# Patient Record
Sex: MALE | Race: WHITE | Employment: UNEMPLOYED | URBAN - METROPOLITAN AREA
[De-identification: names, ages, dates, MRNs, and addresses within clinical notes are randomized per-mention and may not be internally consistent; named-entity substitution may affect disease eponyms.]

---

## 2021-09-09 ENCOUNTER — HOSPITAL ENCOUNTER (INPATIENT)
Age: 56
LOS: 25 days | Discharge: NURSING, REHAB OR LONG-TERM CARE | DRG: 291 | End: 2021-10-04
Attending: EMERGENCY MEDICINE | Admitting: HOSPITALIST
Payer: MEDICARE

## 2021-09-09 ENCOUNTER — APPOINTMENT (OUTPATIENT)
Dept: GENERAL RADIOLOGY | Age: 56
DRG: 291 | End: 2021-09-09
Attending: EMERGENCY MEDICINE
Payer: MEDICARE

## 2021-09-09 DIAGNOSIS — T38.0X5A STEROID-INDUCED HYPERGLYCEMIA: ICD-10-CM

## 2021-09-09 DIAGNOSIS — J96.22 ACUTE ON CHRONIC RESPIRATORY FAILURE WITH HYPERCAPNIA (HCC): Primary | ICD-10-CM

## 2021-09-09 DIAGNOSIS — L03.114 CELLULITIS OF LEFT UPPER EXTREMITY: ICD-10-CM

## 2021-09-09 DIAGNOSIS — R73.9 STEROID-INDUCED HYPERGLYCEMIA: ICD-10-CM

## 2021-09-09 DIAGNOSIS — J44.1 ACUTE EXACERBATION OF CHRONIC OBSTRUCTIVE PULMONARY DISEASE (COPD) (HCC): ICD-10-CM

## 2021-09-09 PROBLEM — J96.20 ACUTE ON CHRONIC RESPIRATORY FAILURE (HCC): Status: ACTIVE | Noted: 2021-09-09

## 2021-09-09 LAB
ALBUMIN SERPL-MCNC: 2.6 G/DL (ref 3.5–5)
ALBUMIN/GLOB SERPL: 0.5 {RATIO} (ref 1.1–2.2)
ALP SERPL-CCNC: 106 U/L (ref 45–117)
ALT SERPL-CCNC: 12 U/L (ref 12–78)
ANION GAP SERPL CALC-SCNC: 2 MMOL/L (ref 5–15)
ARTERIAL PATENCY WRIST A: POSITIVE
ARTERIAL PATENCY WRIST A: POSITIVE
AST SERPL-CCNC: 9 U/L (ref 15–37)
ATRIAL RATE: 74 BPM
BASE EXCESS BLD CALC-SCNC: 7 MMOL/L
BASE EXCESS BLD CALC-SCNC: 7.5 MMOL/L
BASOPHILS # BLD: 0 K/UL (ref 0–0.1)
BASOPHILS NFR BLD: 0 % (ref 0–1)
BDY SITE: ABNORMAL
BDY SITE: ABNORMAL
BILIRUB SERPL-MCNC: 0.5 MG/DL (ref 0.2–1)
BNP SERPL-MCNC: 625 PG/ML
BUN SERPL-MCNC: 27 MG/DL (ref 6–20)
BUN/CREAT SERPL: 24 (ref 12–20)
CA-I BLD-SCNC: 1.22 MMOL/L (ref 1.12–1.32)
CALCIUM SERPL-MCNC: 8.7 MG/DL (ref 8.5–10.1)
CALCULATED P AXIS, ECG09: 43 DEGREES
CALCULATED R AXIS, ECG10: -136 DEGREES
CALCULATED T AXIS, ECG11: 62 DEGREES
CHLORIDE SERPL-SCNC: 102 MMOL/L (ref 97–108)
CO2 SERPL-SCNC: 33 MMOL/L (ref 21–32)
COMMENT, HOLDF: NORMAL
COMMENT, HOLDF: NORMAL
COVID-19 RAPID TEST, COVR: NOT DETECTED
CREAT SERPL-MCNC: 1.14 MG/DL (ref 0.7–1.3)
DIAGNOSIS, 93000: NORMAL
DIFFERENTIAL METHOD BLD: ABNORMAL
EOSINOPHIL # BLD: 0.2 K/UL (ref 0–0.4)
EOSINOPHIL NFR BLD: 2 % (ref 0–7)
ERYTHROCYTE [DISTWIDTH] IN BLOOD BY AUTOMATED COUNT: 16.8 % (ref 11.5–14.5)
GAS FLOW.O2 O2 DELIVERY SYS: ABNORMAL L/MIN
GAS FLOW.O2 O2 DELIVERY SYS: ABNORMAL L/MIN
GLOBULIN SER CALC-MCNC: 5.1 G/DL (ref 2–4)
GLUCOSE BLD STRIP.AUTO-MCNC: 138 MG/DL (ref 65–117)
GLUCOSE BLD STRIP.AUTO-MCNC: 143 MG/DL (ref 65–117)
GLUCOSE SERPL-MCNC: 120 MG/DL (ref 65–100)
HCO3 BLD-SCNC: 39.1 MMOL/L (ref 22–26)
HCO3 BLD-SCNC: 40 MMOL/L (ref 22–26)
HCT VFR BLD AUTO: 44.9 % (ref 36.6–50.3)
HGB BLD-MCNC: 13 G/DL (ref 12.1–17)
IMM GRANULOCYTES # BLD AUTO: 0.1 K/UL (ref 0–0.04)
IMM GRANULOCYTES NFR BLD AUTO: 1 % (ref 0–0.5)
LYMPHOCYTES # BLD: 1.1 K/UL (ref 0.8–3.5)
LYMPHOCYTES NFR BLD: 7 % (ref 12–49)
MCH RBC QN AUTO: 24.1 PG (ref 26–34)
MCHC RBC AUTO-ENTMCNC: 29 G/DL (ref 30–36.5)
MCV RBC AUTO: 83.3 FL (ref 80–99)
MONOCYTES # BLD: 1.2 K/UL (ref 0–1)
MONOCYTES NFR BLD: 8 % (ref 5–13)
NEUTS SEG # BLD: 12.4 K/UL (ref 1.8–8)
NEUTS SEG NFR BLD: 82 % (ref 32–75)
NRBC # BLD: 0 K/UL (ref 0–0.01)
NRBC BLD-RTO: 0 PER 100 WBC
O2/TOTAL GAS SETTING VFR VENT: 4 %
O2/TOTAL GAS SETTING VFR VENT: 50 %
P-R INTERVAL, ECG05: 212 MS
PCO2 BLD: 91.9 MMHG (ref 35–45)
PCO2 BLD: 95.1 MMHG (ref 35–45)
PEEP RESPIRATORY: 8 CMH2O
PH BLD: 7.23 [PH] (ref 7.35–7.45)
PH BLD: 7.24 [PH] (ref 7.35–7.45)
PIP ISTAT,IPIP: 18
PLATELET # BLD AUTO: 210 K/UL (ref 150–400)
PMV BLD AUTO: 10.5 FL (ref 8.9–12.9)
PO2 BLD: 64 MMHG (ref 80–100)
PO2 BLD: 64 MMHG (ref 80–100)
POTASSIUM SERPL-SCNC: 3.7 MMOL/L (ref 3.5–5.1)
PROT SERPL-MCNC: 7.7 G/DL (ref 6.4–8.2)
Q-T INTERVAL, ECG07: 398 MS
QRS DURATION, ECG06: 96 MS
QTC CALCULATION (BEZET), ECG08: 441 MS
RBC # BLD AUTO: 5.39 M/UL (ref 4.1–5.7)
SAMPLES BEING HELD,HOLD: NORMAL
SAMPLES BEING HELD,HOLD: NORMAL
SAO2 % BLD: 85.6 % (ref 92–97)
SAO2 % BLD: 86 % (ref 92–97)
SERVICE CMNT-IMP: ABNORMAL
SODIUM SERPL-SCNC: 137 MMOL/L (ref 136–145)
SOURCE, COVRS: NORMAL
SPECIMEN TYPE: ABNORMAL
SPECIMEN TYPE: ABNORMAL
TROPONIN I SERPL-MCNC: <0.05 NG/ML
VENTILATION MODE VENT: ABNORMAL
VENTRICULAR RATE, ECG03: 74 BPM
WBC # BLD AUTO: 15 K/UL (ref 4.1–11.1)

## 2021-09-09 PROCEDURE — 82962 GLUCOSE BLOOD TEST: CPT

## 2021-09-09 PROCEDURE — 94762 N-INVAS EAR/PLS OXIMTRY CONT: CPT

## 2021-09-09 PROCEDURE — 85025 COMPLETE CBC W/AUTO DIFF WBC: CPT

## 2021-09-09 PROCEDURE — 74011000258 HC RX REV CODE- 258: Performed by: EMERGENCY MEDICINE

## 2021-09-09 PROCEDURE — 94660 CPAP INITIATION&MGMT: CPT

## 2021-09-09 PROCEDURE — 36415 COLL VENOUS BLD VENIPUNCTURE: CPT

## 2021-09-09 PROCEDURE — 77030020365 HC SOL INJ SOD CL 0.9% 50ML

## 2021-09-09 PROCEDURE — 77030013038 HC MSK CPAP FISP -A

## 2021-09-09 PROCEDURE — 77010033678 HC OXYGEN DAILY

## 2021-09-09 PROCEDURE — 74011636637 HC RX REV CODE- 636/637: Performed by: HOSPITALIST

## 2021-09-09 PROCEDURE — 74011250636 HC RX REV CODE- 250/636: Performed by: HOSPITALIST

## 2021-09-09 PROCEDURE — 83880 ASSAY OF NATRIURETIC PEPTIDE: CPT

## 2021-09-09 PROCEDURE — 74011250636 HC RX REV CODE- 250/636: Performed by: EMERGENCY MEDICINE

## 2021-09-09 PROCEDURE — 99285 EMERGENCY DEPT VISIT HI MDM: CPT

## 2021-09-09 PROCEDURE — 36600 WITHDRAWAL OF ARTERIAL BLOOD: CPT

## 2021-09-09 PROCEDURE — 74011250637 HC RX REV CODE- 250/637: Performed by: HOSPITALIST

## 2021-09-09 PROCEDURE — 76937 US GUIDE VASCULAR ACCESS: CPT

## 2021-09-09 PROCEDURE — 5A09357 ASSISTANCE WITH RESPIRATORY VENTILATION, LESS THAN 24 CONSECUTIVE HOURS, CONTINUOUS POSITIVE AIRWAY PRESSURE: ICD-10-PCS | Performed by: INTERNAL MEDICINE

## 2021-09-09 PROCEDURE — 94640 AIRWAY INHALATION TREATMENT: CPT

## 2021-09-09 PROCEDURE — 02HV33Z INSERTION OF INFUSION DEVICE INTO SUPERIOR VENA CAVA, PERCUTANEOUS APPROACH: ICD-10-PCS | Performed by: INTERNAL MEDICINE

## 2021-09-09 PROCEDURE — 84484 ASSAY OF TROPONIN QUANT: CPT

## 2021-09-09 PROCEDURE — 74011000250 HC RX REV CODE- 250: Performed by: HOSPITALIST

## 2021-09-09 PROCEDURE — 82803 BLOOD GASES ANY COMBINATION: CPT

## 2021-09-09 PROCEDURE — 87635 SARS-COV-2 COVID-19 AMP PRB: CPT

## 2021-09-09 PROCEDURE — 71046 X-RAY EXAM CHEST 2 VIEWS: CPT

## 2021-09-09 PROCEDURE — 65610000006 HC RM INTENSIVE CARE

## 2021-09-09 PROCEDURE — 65660000001 HC RM ICU INTERMED STEPDOWN

## 2021-09-09 PROCEDURE — 36573 INSJ PICC RS&I 5 YR+: CPT | Performed by: HOSPITALIST

## 2021-09-09 PROCEDURE — 74011000258 HC RX REV CODE- 258: Performed by: HOSPITALIST

## 2021-09-09 PROCEDURE — C1751 CATH, INF, PER/CENT/MIDLINE: HCPCS

## 2021-09-09 PROCEDURE — 93005 ELECTROCARDIOGRAM TRACING: CPT

## 2021-09-09 PROCEDURE — 87040 BLOOD CULTURE FOR BACTERIA: CPT

## 2021-09-09 PROCEDURE — 80053 COMPREHEN METABOLIC PANEL: CPT

## 2021-09-09 RX ORDER — FUROSEMIDE 10 MG/ML
40 INJECTION INTRAMUSCULAR; INTRAVENOUS DAILY
Status: DISCONTINUED | OUTPATIENT
Start: 2021-09-09 | End: 2021-09-12

## 2021-09-09 RX ORDER — NEBIVOLOL 10 MG/1
10 TABLET ORAL DAILY
Status: DISCONTINUED | OUTPATIENT
Start: 2021-09-10 | End: 2021-09-10 | Stop reason: ALTCHOICE

## 2021-09-09 RX ORDER — POLYETHYLENE GLYCOL 3350 17 G/17G
17 POWDER, FOR SOLUTION ORAL DAILY PRN
Status: DISCONTINUED | OUTPATIENT
Start: 2021-09-09 | End: 2021-09-11 | Stop reason: SDUPTHER

## 2021-09-09 RX ORDER — ENOXAPARIN SODIUM 100 MG/ML
40 INJECTION SUBCUTANEOUS DAILY
Status: DISCONTINUED | OUTPATIENT
Start: 2021-09-10 | End: 2021-09-13

## 2021-09-09 RX ORDER — SODIUM CHLORIDE 0.9 % (FLUSH) 0.9 %
5-40 SYRINGE (ML) INJECTION AS NEEDED
Status: DISCONTINUED | OUTPATIENT
Start: 2021-09-09 | End: 2021-10-04 | Stop reason: HOSPADM

## 2021-09-09 RX ORDER — ONDANSETRON 2 MG/ML
4 INJECTION INTRAMUSCULAR; INTRAVENOUS
Status: DISCONTINUED | OUTPATIENT
Start: 2021-09-09 | End: 2021-10-04 | Stop reason: HOSPADM

## 2021-09-09 RX ORDER — ONDANSETRON 4 MG/1
4 TABLET, ORALLY DISINTEGRATING ORAL
Status: DISCONTINUED | OUTPATIENT
Start: 2021-09-09 | End: 2021-10-04 | Stop reason: HOSPADM

## 2021-09-09 RX ORDER — BUDESONIDE 0.5 MG/2ML
500 INHALANT ORAL
Status: DISCONTINUED | OUTPATIENT
Start: 2021-09-09 | End: 2021-10-04 | Stop reason: HOSPADM

## 2021-09-09 RX ORDER — LISINOPRIL 10 MG/1
40 TABLET ORAL DAILY
Status: DISCONTINUED | OUTPATIENT
Start: 2021-09-10 | End: 2021-09-10 | Stop reason: ALTCHOICE

## 2021-09-09 RX ORDER — ACETAMINOPHEN 650 MG/1
650 SUPPOSITORY RECTAL
Status: DISCONTINUED | OUTPATIENT
Start: 2021-09-09 | End: 2021-10-04 | Stop reason: HOSPADM

## 2021-09-09 RX ORDER — MICONAZOLE NITRATE 2 %
POWDER (GRAM) TOPICAL 2 TIMES DAILY
Status: DISCONTINUED | OUTPATIENT
Start: 2021-09-09 | End: 2021-10-04 | Stop reason: HOSPADM

## 2021-09-09 RX ORDER — DEXTROSE 50 % IN WATER (D50W) INTRAVENOUS SYRINGE
25-50 AS NEEDED
Status: DISCONTINUED | OUTPATIENT
Start: 2021-09-09 | End: 2021-10-04 | Stop reason: HOSPADM

## 2021-09-09 RX ORDER — IPRATROPIUM BROMIDE AND ALBUTEROL SULFATE 2.5; .5 MG/3ML; MG/3ML
3 SOLUTION RESPIRATORY (INHALATION)
Status: ACTIVE | OUTPATIENT
Start: 2021-09-09 | End: 2021-09-10

## 2021-09-09 RX ORDER — POLYETHYLENE GLYCOL 3350 17 G/17G
17 POWDER, FOR SOLUTION ORAL DAILY PRN
Status: DISCONTINUED | OUTPATIENT
Start: 2021-09-09 | End: 2021-10-04 | Stop reason: HOSPADM

## 2021-09-09 RX ORDER — ACETAMINOPHEN 325 MG/1
650 TABLET ORAL
Status: DISCONTINUED | OUTPATIENT
Start: 2021-09-09 | End: 2021-10-04 | Stop reason: HOSPADM

## 2021-09-09 RX ORDER — IPRATROPIUM BROMIDE AND ALBUTEROL SULFATE 2.5; .5 MG/3ML; MG/3ML
3 SOLUTION RESPIRATORY (INHALATION)
Status: DISCONTINUED | OUTPATIENT
Start: 2021-09-09 | End: 2021-09-11

## 2021-09-09 RX ORDER — AMLODIPINE BESYLATE 5 MG/1
10 TABLET ORAL DAILY
Status: DISCONTINUED | OUTPATIENT
Start: 2021-09-10 | End: 2021-09-10 | Stop reason: ALTCHOICE

## 2021-09-09 RX ORDER — INSULIN GLARGINE 100 [IU]/ML
40 INJECTION, SOLUTION SUBCUTANEOUS DAILY
Status: DISCONTINUED | OUTPATIENT
Start: 2021-09-10 | End: 2021-09-10

## 2021-09-09 RX ORDER — SODIUM CHLORIDE 0.9 % (FLUSH) 0.9 %
5-40 SYRINGE (ML) INJECTION EVERY 8 HOURS
Status: DISCONTINUED | OUTPATIENT
Start: 2021-09-09 | End: 2021-10-04 | Stop reason: HOSPADM

## 2021-09-09 RX ORDER — ARFORMOTEROL TARTRATE 15 UG/2ML
15 SOLUTION RESPIRATORY (INHALATION)
Status: DISCONTINUED | OUTPATIENT
Start: 2021-09-09 | End: 2021-10-04 | Stop reason: HOSPADM

## 2021-09-09 RX ORDER — ENOXAPARIN SODIUM 100 MG/ML
40 INJECTION SUBCUTANEOUS DAILY
Status: DISCONTINUED | OUTPATIENT
Start: 2021-09-10 | End: 2021-09-09

## 2021-09-09 RX ORDER — INSULIN LISPRO 100 [IU]/ML
INJECTION, SOLUTION INTRAVENOUS; SUBCUTANEOUS EVERY 4 HOURS
Status: DISCONTINUED | OUTPATIENT
Start: 2021-09-09 | End: 2021-09-10

## 2021-09-09 RX ORDER — HYDRALAZINE HYDROCHLORIDE 50 MG/1
25 TABLET, FILM COATED ORAL 3 TIMES DAILY
Status: DISCONTINUED | OUTPATIENT
Start: 2021-09-09 | End: 2021-09-10 | Stop reason: ALTCHOICE

## 2021-09-09 RX ORDER — MAGNESIUM SULFATE 100 %
4 CRYSTALS MISCELLANEOUS AS NEEDED
Status: DISCONTINUED | OUTPATIENT
Start: 2021-09-09 | End: 2021-10-04 | Stop reason: HOSPADM

## 2021-09-09 RX ADMIN — DOXYCYCLINE 100 MG: 100 INJECTION, POWDER, LYOPHILIZED, FOR SOLUTION INTRAVENOUS at 19:19

## 2021-09-09 RX ADMIN — MICONAZOLE NITRATE 2 % TOPICAL POWDER: at 19:25

## 2021-09-09 RX ADMIN — Medication 10 ML: at 17:06

## 2021-09-09 RX ADMIN — FUROSEMIDE 40 MG: 10 INJECTION, SOLUTION INTRAMUSCULAR; INTRAVENOUS at 19:14

## 2021-09-09 RX ADMIN — INSULIN LISPRO 2 UNITS: 100 INJECTION, SOLUTION INTRAVENOUS; SUBCUTANEOUS at 19:25

## 2021-09-09 RX ADMIN — METHYLPREDNISOLONE SODIUM SUCCINATE 125 MG: 125 INJECTION, POWDER, FOR SOLUTION INTRAMUSCULAR; INTRAVENOUS at 19:14

## 2021-09-09 RX ADMIN — IPRATROPIUM BROMIDE AND ALBUTEROL SULFATE 3 ML: .5; 3 SOLUTION RESPIRATORY (INHALATION) at 19:35

## 2021-09-09 RX ADMIN — IPRATROPIUM BROMIDE AND ALBUTEROL SULFATE 3 ML: .5; 3 SOLUTION RESPIRATORY (INHALATION) at 23:59

## 2021-09-09 RX ADMIN — CEFTRIAXONE SODIUM 1 G: 1 INJECTION, POWDER, FOR SOLUTION INTRAMUSCULAR; INTRAVENOUS at 19:17

## 2021-09-09 RX ADMIN — ARFORMOTEROL TARTRATE 15 MCG: 15 SOLUTION RESPIRATORY (INHALATION) at 19:35

## 2021-09-09 RX ADMIN — Medication 5 ML: at 22:00

## 2021-09-09 RX ADMIN — BUDESONIDE 500 MCG: 0.5 INHALANT RESPIRATORY (INHALATION) at 19:35

## 2021-09-09 RX ADMIN — AZITHROMYCIN MONOHYDRATE 500 MG: 500 INJECTION, POWDER, LYOPHILIZED, FOR SOLUTION INTRAVENOUS at 23:54

## 2021-09-09 NOTE — PROGRESS NOTES
9/9/2021; 17:10 -   CM received call from nursing indicating that patient is currently not alert and oriented enough to give procedural consents. CM reviewed patient's chart and determined that patient was last seen in the [address] system approximately 5 years ago by an Joesph Mcgill. CM reviewed patient's media and found Permission to Release PMH indicating a Jacklyn De La Torre with same phone number as patient's listed home phone. CM also found an incorrectly scanned release for Jacklyn De La Torre that includes Ninoska Cam and Verona Given. Nursing to attempt reaching Jacklyn De La Torre at [phone]. Nursing identified that patient does have his phone present at bedside; nursing to check patient's phone and other belongings for additional emergency contact information if needed. CM discussed that 2 MDs may be able to provide consent, if LNOK cannot be determined. At present time, a [place] would not be possible until normal business hours of 9/10.     CRM: Cirstino Bazzi, MPH, [address] [place] [address]; Z: [phone]

## 2021-09-09 NOTE — ED TRIAGE NOTES
Pt arrives from home via EMS due to the belief of having cellulitis on left arm since Sunday. 70% at room air when dispatch health arrived, he was then placed on 6L at 99%, when EMS transported they placed him on 4L and he stayed at 95%, EMS reports clear lung sounds. EMS reports the pt is on blood thinners, but has recently stopped taking within the last 2-4 weeks. EMS reports the pt has had the COVID vaccine.

## 2021-09-09 NOTE — ED NOTES
Bedside and Verbal shift change report given to NIK Harvey (oncoming nurse) by Ketty Cruz RN (offgoing nurse). Report included the following information SBAR, ED Summary, MAR and Recent Results.

## 2021-09-09 NOTE — ED NOTES
Patient was stuck for IV and straight stick x5, including ultrasound IV. Only successful line is a 22 in the right hand, but does not draw blood back. Patient needs blood cultures and antibiotics given, PICC line insertion requested by Dr. Jodi De La Paz. PICC team called, no answer, left voicemail requesting PICC.

## 2021-09-09 NOTE — ED NOTES
Bedside and Verbal shift change report given to Jenny Valadez RN (oncoming nurse) by Nas Echavarria RN (offgoing nurse). Report included the following information SBAR, ED Summary, MAR and Recent Results.

## 2021-09-09 NOTE — CONSULTS
Pulmonary    Reason:  Acute on chronic resp failure  Requesting:  Dr Janis Marshall reviewed / images viewed    47 yo male  has a past medical history of DM (diabetes mellitus) ([address] Dyslipidemia, and Hypertension. presented with shortness of breath and altered mental status  Has morbid obesity and has a h/o CHF on nocturnal O2 and cpap  He is fully vaccinated for covid 19  COVID testing was negative    Patient Vitals for the past 4 hrs:   BP Temp Pulse Resp SpO2   21 1545 123/87  61 20 91 %   21 1503     91 %   21 1500 120/72 98.9 °F (37.2 °C) (!) 58 17 92 %   21 1424     95 %   21 1419 122/70  62 18 (!) 87 %   Temp (24hrs), Av.7 °F (37.1 °C), Min:98.4 °F (36.9 °C), Max:98.9 °F (37.2 °C)  No intake or output data in the 24 hours ending 21 1728    Lab:  Recent Labs     21  1306   WBC 15.0*   HGB 13.0         K 3.7      CO2 33*   BUN 27*   CREA 1.14   *   CA 8.7   TROIQ <0.05   TBILI 0.5     ABG:  Recent Labs     21  1306   PHI 7.24*   PCO2I 91.9*   PO2I 64*   HCO3I 39.1*   SO2I 86.0*   FIO2I 4     Results for Zeyad Brantley (MRN [id number]) as of 2021 17:24   Ref.  Range 2021 13:06   NT pro-BNP Latest Ref Range: <125 PG/ (H)     CXR - low lung volumes, CM    Pulmonary Impression / Recommendations:    Acute on chronic hypoxemic / hypercarbic respiratory failure 2/2 CHF / ? Obstructive lung disease / IVY / OHS with hypercarbia and CO2 narcosis    --placed on NIPPV  --bronchodilators / steroids / diuretics / empiric abx  --Echo ordered  --wean O2 by sats  --will need follow up with sleep physician  --avoid sedatives  --consider check TSH    Robinson Goldberg, MD

## 2021-09-09 NOTE — PROGRESS NOTES
Order for PICC verified. Consent obtained from his brother Shayna Damon [phone] (pt  gave his name to call for emergency ), after risk,benefits and procedure explained and questions answered.     Krystyna Powell  Vascular Access Team

## 2021-09-09 NOTE — PROCEDURES
PICC Placement Note    Spoke to Dr. Anita Estrada prior to procedure regarding pending blood cultures and elevated WBCs. States okay to proceed with line placement. PRE-PROCEDURE VERIFICATION  Correct Procedure: yes  Correct Site:  yes  Temperature: Temp: 98.9 °F (37.2 °C), Temperature Source: Temp Source: Oral  Recent Labs     09/09/21  1306   BUN 27*   CREA 1.14      WBC 15.0*       Allergies: Patient has no known allergies. Education materials, including PICC Booklet and written information regarding central catheter related bloodstream infection and prevention given to patient. See Patient Education activity for further details. PROCEDURE DETAIL  A triple lumen power injectable PICC line was started for reliable vascular access. The following documentation is in addition to the PICC properties in the lines/airways flowsheet :  Lot #: [id number]  Xylocaine 1% used intradermally: yes  Total Catheter Length: 44 (cm)  External Catheter Length: 0 (cm)  Circumference: 36 (cm)  Vein Selection for PICC: right basilic  Central Line Bundle followed: yes  Complication Related to Insertion: none    The placement was verified by ECG technology. The PICC is on the right side and the tip overlies the superior vena cava. ECG verification documentation is on the patient's paper chart. Line is okay to use. Report to [facility].     JASON MaciasN, RN, [facility]   Vascular Access Team

## 2021-09-09 NOTE — ED NOTES
Spoke to PICC team at this time, nurse states she needs to discuss some information with Dr. Melissa Klein prior to PICC insertion. Awaiting call back with answer.

## 2021-09-09 NOTE — ED PROVIDER NOTES
81-year-old gentleman history of diabetes, hypertension, dyslipidemia, likely history of CHF as well he uses oxygen as needed at home (particularly at night for obstructive sleep apnea) presents to the emergency department today with chief complaint of generalized not feeling well for the past 4 to 5 days. No fevers but increased shortness of breath. EMS reports room air hypoxia in the 70s. Patient complains of a rash to his left upper extremity consistent with cellulitis and is not currently on treatment. He has been up titrating his Lasix at home without significant relief of his symptoms. There is a conflicting history about whether or not he has been taking his blood thinner as prescribed. He is fully Covid vaccinated. The history is provided by the patient, the EMS personnel and medical records. Shortness of Breath  This is a recurrent problem. The problem has been gradually worsening. Associated symptoms include cough, orthopnea, rash (Left upper extremity) and leg swelling. Pertinent negatives include no fever, no headaches, no sore throat, no chest pain, no vomiting and no abdominal pain. Past Medical History:   Diagnosis Date    DM (diabetes mellitus) [address]     Dyslipidemia     Hypertension        No past surgical history on file. No family history on file.     Social History     Socioeconomic History    Marital status: SINGLE     Spouse name: Not on file    Number of children: Not on file    Years of education: Not on file    Highest education level: Not on file   Occupational History    Not on file   Tobacco Use    Smoking status: Never Smoker   Substance and Sexual Activity    Alcohol use: No    Drug use: No    Sexual activity: Not on file   Other Topics Concern    Not on file   Social History Narrative    Not on file     Social Determinants of Health     Financial Resource Strain:     Difficulty of Paying Living Expenses:    Food Insecurity:     Worried About Running Out of Food in the Last Year:    [address] in the Last Year:    Transportation Needs:     Lack of Transportation (Medical):  Lack of Transportation (Non-Medical):    Physical Activity:     Days of Exercise per Week:     Minutes of Exercise per Session:    Stress:     Feeling of Stress :    Social Connections:     Frequency of Communication with Friends and Family:     Frequency of Social Gatherings with Friends and Family:     Attends Zoroastrianism Services:     Active Member of Clubs or Organizations:     Attends Club or Organization Meetings:     Marital Status:    Intimate Partner Violence:     Fear of Current or Ex-Partner:     Emotionally Abused:     Physically Abused:     Sexually Abused: ALLERGIES: Patient has no known allergies. Review of Systems   Constitutional: Negative for fatigue and fever. HENT: Negative for sneezing and sore throat. Respiratory: Positive for cough and shortness of breath. Cardiovascular: Positive for orthopnea and leg swelling. Negative for chest pain. Gastrointestinal: Negative for abdominal pain, diarrhea, nausea and vomiting. Genitourinary: Negative for difficulty urinating and dysuria. Musculoskeletal: Negative for arthralgias and myalgias. Skin: Positive for rash (Left upper extremity). Negative for color change. Neurological: Negative for weakness and headaches. Psychiatric/Behavioral: Negative for agitation and behavioral problems. There were no vitals filed for this visit. Physical Exam  Vitals and nursing note reviewed. Constitutional:       General: He is not in acute distress. Appearance: He is well-developed. He is obese. He is ill-appearing. He is not toxic-appearing or diaphoretic. HENT:      Head: Normocephalic and atraumatic. Nose: Nose normal.      Mouth/Throat:      Mouth: Mucous membranes are moist.      Pharynx: Oropharynx is clear.    Eyes:      Extraocular Movements: Extraocular movements intact. Conjunctiva/sclera: Conjunctivae normal.      Pupils: Pupils are equal, round, and reactive to light. Cardiovascular:      Rate and Rhythm: Normal rate and regular rhythm. Pulses: Normal pulses. Heart sounds: No murmur heard. Pulmonary:      Effort: Pulmonary effort is normal. Tachypnea present. No respiratory distress. Breath sounds: Decreased breath sounds present. No wheezing. Chest:      Chest wall: No mass or tenderness. Abdominal:      General: There is no distension. Palpations: Abdomen is soft. Tenderness: There is no abdominal tenderness. There is no guarding or rebound. Musculoskeletal:         General: No swelling, tenderness, deformity or signs of injury. Normal range of motion. Cervical back: Normal range of motion and neck supple. No rigidity. No muscular tenderness. Right lower leg: No tenderness. Edema present. Left lower leg: No tenderness. Edema present. Skin:     General: Skin is warm and dry. Capillary Refill: Capillary refill takes less than 2 seconds. Neurological:      General: No focal deficit present. Mental Status: He is alert and oriented to person, place, and time. Psychiatric:         Mood and Affect: Mood normal.         Behavior: Behavior normal.          MDM  Number of Diagnoses or Management Options  Acute on chronic respiratory failure with hypercapnia (HCC)  Cellulitis of left upper extremity  Diagnosis management comments: 44-year-old gentleman presents as above with acute on chronic hypercapnic respiratory failure which likely represents a COPD exacerbation along with a cellulitic rash to his left upper extremity. Plan to treat with antibiotics, BiPAP, admit to the hospital for further management.        Amount and/or Complexity of Data Reviewed  Clinical lab tests: reviewed  Tests in the radiology section of CPT®: reviewed  Tests in the medicine section of CPT®: reviewed    Risk of Complications, Morbidity, and/or Mortality  General comments: 1:33 PM  I have spent 40 minutes of critical care time involved in lab review, consultations with specialist, family decision-making, and documentation. During this entire length of time I was immediately available to the patient. Critical Care: The reason for providing this level of medical care for this critically ill patient was due a critical illness that impaired one or more vital organ systems such that there was a high probability of imminent or life threatening deterioration in the patients condition. This care involved high complexity decision making to assess, manipulate, and support vital system functions, to treat this degreee vital organ system failure and to prevent further life threatening deterioration of the patient's condition. ED Course as of Sep 09 1333   Thu Sep 09, 2021   1204 ED EKG interpretation:Rhythm: Sinus rhythm with first-degree AV block at a rate of approximately 74, WI interval approximate 212. Axis: normal.  ST segment:  No concerning ST elevations or depressions. This EKG was interpreted by Anival Hahn MD,ED Provider. [JM]      ED Course User Index  [JM] Rojelio Khalil MD       Procedures      Perfect Serve Consult for Admission  2:08 PM    ED Room Number: G/HG  Patient Name and age:  Patricia Brown 48 y.o.  male  Working Diagnosis:   1. Acute on chronic respiratory failure with hypercapnia (HCC)    2. Cellulitis of left upper extremity    3. Acute exacerbation of chronic obstructive pulmonary disease (COPD) ([address]        COVID-19 Suspicion:  no  Sepsis present:  no  Reassessment needed: N/A  Code Status:  Full Code  Readmission: no  Isolation Requirements:  no  Recommended Level of Care:  step down  Department:[facility] Adult ED - 21   Other: BiPAP for hypercapnic/hypoxic respiratory failure. Will send Covid testing as well.

## 2021-09-09 NOTE — H&P
History & Physical    Primary Care Provider: None  Source of Information: Patient     History of Presenting Illness:   Tejsa Carreon is a 48 y.o. male who presents with sob and AMS     71-year-old gentleman history of diabetes, hypertension, dyslipidemia, likely history of CHF as well he uses oxygen as needed at home (particularly at night for obstructive sleep apnea), morbid obese  presents to the emergency department today with chief complaint of generalized not feeling well for the past 4 to 5 days. No fevers but increased shortness of breath. EMS reports room air hypoxia in the 70s. Patient complains of a rash to his left upper extremity consistent with cellulitis and is not currently on treatment. He has been up titrating his Lasix at home without significant relief of his symptoms. There is a conflicting history about whether or not he has been taking his blood thinner as prescribed. He is fully Covid vaccinated. He is lethargic in ER. Place on bipap now. Not able to get history for now        Review of Systems:  UA now     Past Medical History:   Diagnosis Date    DM (diabetes mellitus) ([facility]     Dyslipidemia     Hypertension       No past surgical history on file. Prior to Admission medications    Medication Sig Start Date End Date Taking? Authorizing Provider   insulin aspart (NOVOLOG) 100 unit/mL injection 0.2 mL by SubCUTAneous route Before breakfast, lunch, and dinner. Plus sliding scale 4/25/13   Nader Wallace MD   insulin detemir (LEVEMIR) 100 unit/mL injection 0.6 mL by SubCUTAneous route two (2) times a day. 4/25/13   Nader Wallace MD   hydrochlorothiazide (HYDRODIURIL) 25 mg tablet Take 25 mg by mouth daily. Provider, Historical   insulin NPH (NOVOLIN, HUMULIN) 100 unit/mL injection Inject 50 Units at bedtime and 50 Units in the morning 1/22/13   Nader Wallace MD   nebivolol (BYSTOLIC) 10 mg tablet Take 1 Tab by mouth daily.  1/22/13   Love Eduardo Seaman MD   Olmesartan-Amlodipine-HCTZ Triciapiter [place]) 40-10-25 mg Tab Take 1 Tab by mouth daily. 1/22/13   Mick Allen MD   lisinopril (PRINIVIL, ZESTRIL) 40 mg tablet TAKE ONE TABLET BY MOUTH EVERY DAY 11/25/11   Mick Allen MD   amLODIPine (NORVASC) 10 mg tablet TAKE ONE TABLET BY MOUTH EVERY DAY 11/27/11   Mick Allen MD   hydrALAZINE (APRESOLINE) 25 mg tablet TAKE ONE TABLET BY MOUTH THREE TIMES DAILY 10/8/11   Mick Allen MD   Insulin Needles, Disposable, 31 X 5/16 \" Ndle by SubCUTAneous route three (3) times daily (with meals). 11/1/10   Mick Allen MD   Lancets (ONE TOUCH ULTRASOFT LANCETS) Misc by Does Not Apply route. Provider, Historical   glucose blood VI test strips (ONE TOUCH ULTRA TEST) strip by Does Not Apply route See Admin Instructions. Provider, Historical   Blood-Glucose Meter (CONTOUR METER) monitoring kit by Does Not Apply route. Provider, Historical     No Known Allergies   No family history on file. SOCIAL HISTORY:   Patient resides:  Independently x   Assisted Living    SNF    With family care       Smoking history:   None x   Former    Chronic      Alcohol history:   None x   Social    Chronic      Ambulates: unknown   Independently    w/cane    w/walker    w/wc    CODE STATUS:  DNR    Full x   Other      Objective:     Physical Exam:     Visit Vitals  /87   Pulse 61   Temp 98.4 °F (36.9 °C)   Resp 20   Wt 149.5 kg (329 lb 9.4 oz)   SpO2 91%   BMI 43.48 kg/m²    O2 Flow Rate (L/min): 15 l/min O2 Device: BIPAP    General:  Lethargic, on bipap, open eyes, no good conversation at this moment    Head:  Normocephalic, without obvious abnormality, atraumatic. Eyes:  Conjunctivae/corneas clear. PERRL, EOMs intact. Nose: On bipap    Throat:    Neck: Supple, symmetrical, trachea midline, no adenopathy, thyroid: no enlargement/tenderness/nodules, no carotid bruit and no JVD. Back:   Symmetric, no curvature. ROM normal. No CVA tenderness.    Lungs:   Clear to auscultation bilaterally. Chest wall:  No tenderness or deformity. Heart:  Regular rate and rhythm, S1, S2 normal, no murmur, click, rub or gallop. Abdomen:   Obese, swelling of scrotum and redness skin noticed    Extremities: bilat leg edema, chronic stasis with compressive dressing. Left upper arm redness rash , mild swelling    Pulses: 2+ and symmetric all extremities. Skin: As above    Neurologic: Limited                Data Review:     Recent Days:  Recent Labs     09/09/21  1306   WBC 15.0*   HGB 13.0   HCT 44.9        Recent Labs     09/09/21  1306      K 3.7      CO2 33*   *   BUN 27*   CREA 1.14   CA 8.7   ALB 2.6*   ALT 12     No results for input(s): PH, PCO2, PO2, HCO3, FIO2 in the last 72 hours. 24 Hour Results:  Recent Results (from the past 24 hour(s))   EKG, 12 LEAD, INITIAL    Collection Time: 09/09/21 11:54 AM   Result Value Ref Range    Ventricular Rate 74 BPM    Atrial Rate 74 BPM    P-R Interval 212 ms    QRS Duration 96 ms    Q-T Interval 398 ms    QTC Calculation (Bezet) 441 ms    Calculated P Axis 43 degrees    Calculated R Axis -136 degrees    Calculated T Axis 62 degrees    Diagnosis       Sinus rhythm with 1st degree AV block  Low voltage QRS  Septal infarct , age undetermined  Possible Lateral infarct , age undetermined  Abnormal ECG  No previous ECGs available  Confirmed by Saroj Loera ([id number]) on 9/9/2021 12:07:33 PM     SAMPLES BEING HELD    Collection Time: 09/09/21 11:59 AM   Result Value Ref Range    SAMPLES BEING HELD 1 BC(HAIDER)     COMMENT        Add-on orders for these samples will be processed based on acceptable specimen integrity and analyte stability, which may vary by analyte. CBC WITH AUTOMATED DIFF    Collection Time: 09/09/21  1:06 PM   Result Value Ref Range    WBC 15.0 (H) 4.1 - 11.1 K/uL    RBC 5.39 4. 10 - 5.70 M/uL    HGB 13.0 12.1 - 17.0 g/dL    HCT 44.9 36.6 - 50.3 %    MCV 83.3 80.0 - 99.0 FL    MCH 24.1 (L) 26.0 - 34.0 PG MCHC 29.0 (L) 30.0 - 36.5 g/dL    RDW 16.8 (H) 11.5 - 14.5 %    PLATELET 243 892 - 436 K/uL    MPV 10.5 8.9 - 12.9 FL    NRBC 0.0 0  WBC    ABSOLUTE NRBC 0.00 0.00 - 0.01 K/uL    NEUTROPHILS 82 (H) 32 - 75 %    LYMPHOCYTES 7 (L) 12 - 49 %    MONOCYTES 8 5 - 13 %    EOSINOPHILS 2 0 - 7 %    BASOPHILS 0 0 - 1 %    IMMATURE GRANULOCYTES 1 (H) 0.0 - 0.5 %    ABS. NEUTROPHILS 12.4 (H) 1.8 - 8.0 K/UL    ABS. LYMPHOCYTES 1.1 0.8 - 3.5 K/UL    ABS. MONOCYTES 1.2 (H) 0.0 - 1.0 K/UL    ABS. EOSINOPHILS 0.2 0.0 - 0.4 K/UL    ABS. BASOPHILS 0.0 0.0 - 0.1 K/UL    ABS. IMM. GRANS. 0.1 (H) 0.00 - 0.04 K/UL    DF AUTOMATED     METABOLIC PANEL, COMPREHENSIVE    Collection Time: 09/09/21  1:06 PM   Result Value Ref Range    Sodium 137 136 - 145 mmol/L    Potassium 3.7 3.5 - 5.1 mmol/L    Chloride 102 97 - 108 mmol/L    CO2 33 (H) 21 - 32 mmol/L    Anion gap 2 (L) 5 - 15 mmol/L    Glucose 120 (H) 65 - 100 mg/dL    BUN 27 (H) 6 - 20 MG/DL    Creatinine 1.14 0.70 - 1.30 MG/DL    BUN/Creatinine ratio 24 (H) 12 - 20      GFR est AA >60 >60 ml/min/1.73m2    GFR est non-AA >60 >60 ml/min/1.73m2    Calcium 8.7 8.5 - 10.1 MG/DL    Bilirubin, total 0.5 0.2 - 1.0 MG/DL    ALT (SGPT) 12 12 - 78 U/L    AST (SGOT) 9 (L) 15 - 37 U/L    Alk.  phosphatase 106 45 - 117 U/L    Protein, total 7.7 6.4 - 8.2 g/dL    Albumin 2.6 (L) 3.5 - 5.0 g/dL    Globulin 5.1 (H) 2.0 - 4.0 g/dL    A-G Ratio 0.5 (L) 1.1 - 2.2     TROPONIN I    Collection Time: 09/09/21  1:06 PM   Result Value Ref Range    Troponin-I, Qt. <0.05 <0.05 ng/mL   NT-PRO BNP    Collection Time: 09/09/21  1:06 PM   Result Value Ref Range    NT pro- (H) <125 PG/ML   POC EG7    Collection Time: 09/09/21  1:06 PM   Result Value Ref Range    Calcium, ionized (POC) 1.22 1.12 - 1.32 mmol/L    FIO2 (POC) 4 %    pH (POC) 7.24 (LL) 7.35 - 7.45      pCO2 (POC) 91.9 (H) 35.0 - 45.0 MMHG    pO2 (POC) 64 (L) 80 - 100 MMHG    HCO3 (POC) 39.1 (H) 22 - 26 MMOL/L    Base excess (POC) 7.0 mmol/L sO2 (POC) 86.0 (L) 92 - 97 %    Site RIGHT BRACHIAL      Device: NASAL CANNULA      Allens test (POC) Positive      Specimen type (POC) ARTERIAL      Critical value read back SANDY KLEIN MD    SAMPLES BEING HELD    Collection Time: 09/09/21  1:06 PM   Result Value Ref Range    SAMPLES BEING HELD 1RED 1BLU     COMMENT        Add-on orders for these samples will be processed based on acceptable specimen integrity and analyte stability, which may vary by analyte. Imaging:   XR CHEST PA LAT    Result Date: 9/9/2021  1. Study limited by patient increased body habitus. 2.  Pulmonary vascular congestion with mild edema pattern       Assessment:     Active Problems:    Acute on chronic respiratory failure ([address] (9/9/2021)           Plan:     1. Acute on chronic  hypercapnic respiratory failure: ? COPD ex+ CHF, + IVY. bipap now. Iv steroids, abx, nebs, pulmonologist consult   2. CODP exacerbation: plan as above. 3. CHF: continue lasix 40mg iv daily , echo. Follow cr.   4. Cellulitis: of left upper ext, groin, iv rocephin and azithromycin and re-assess. 5. Morbid obese   6. DM: npo tonight due to respiratory status. SSI. bg may high due to steroids.  lantus in am, will need adjust dsoe        Signed By: Ken Guillen MD     September 9, 2021

## 2021-09-09 NOTE — PROGRESS NOTES
Follow up pt, increasing lethargy   Repeat ABG no improvement compared to 4 hours ago. I discussed with ICU Dr. Tameka Holman.  Will transfer to ICU

## 2021-09-09 NOTE — ED NOTES
Pt moved into ER 7. Pt was lethargic with O2 around 87% on 4L, however pt was mouth breathing. Pt temporarily placed on non-rebreather and up to 94%. Pt responds to stimuli.

## 2021-09-09 NOTE — PROGRESS NOTES
Clinical Pharmacy Note: Ceftriaxone Dosing    Please note that the ceftriaxone dose for Ivin Mabry has been changed to 1000 mg IV q24h per [facility]-approved protocol. Please contact the pharmacy with any questions.     Zo Castillo [facility]

## 2021-09-09 NOTE — ED NOTES
PICC team called back and stated they will be down to place PICC as soon as possible. PICC team also needs consent from a relative, since patient is drowsy at this time.

## 2021-09-10 ENCOUNTER — APPOINTMENT (OUTPATIENT)
Dept: GENERAL RADIOLOGY | Age: 56
DRG: 291 | End: 2021-09-10
Attending: HOSPITALIST
Payer: MEDICARE

## 2021-09-10 ENCOUNTER — APPOINTMENT (OUTPATIENT)
Dept: NON INVASIVE DIAGNOSTICS | Age: 56
DRG: 291 | End: 2021-09-10
Attending: HOSPITALIST
Payer: MEDICARE

## 2021-09-10 ENCOUNTER — APPOINTMENT (OUTPATIENT)
Dept: CT IMAGING | Age: 56
DRG: 291 | End: 2021-09-10
Attending: NURSE PRACTITIONER
Payer: MEDICARE

## 2021-09-10 ENCOUNTER — APPOINTMENT (OUTPATIENT)
Dept: VASCULAR SURGERY | Age: 56
DRG: 291 | End: 2021-09-10
Attending: HOSPITALIST
Payer: MEDICARE

## 2021-09-10 LAB
ALBUMIN SERPL-MCNC: 2.5 G/DL (ref 3.5–5)
ALBUMIN/GLOB SERPL: 0.5 {RATIO} (ref 1.1–2.2)
ALP SERPL-CCNC: 113 U/L (ref 45–117)
ALT SERPL-CCNC: 12 U/L (ref 12–78)
ANION GAP SERPL CALC-SCNC: 7 MMOL/L (ref 5–15)
ARTERIAL PATENCY WRIST A: POSITIVE
AST SERPL-CCNC: 13 U/L (ref 15–37)
BASE EXCESS BLD CALC-SCNC: 4.5 MMOL/L
BASOPHILS # BLD: 0 K/UL (ref 0–0.1)
BASOPHILS NFR BLD: 0 % (ref 0–1)
BDY SITE: ABNORMAL
BILIRUB SERPL-MCNC: 0.4 MG/DL (ref 0.2–1)
BUN SERPL-MCNC: 31 MG/DL (ref 6–20)
BUN/CREAT SERPL: 26 (ref 12–20)
CALCIUM SERPL-MCNC: 9.2 MG/DL (ref 8.5–10.1)
CHLORIDE SERPL-SCNC: 102 MMOL/L (ref 97–108)
CO2 SERPL-SCNC: 31 MMOL/L (ref 21–32)
COMMENT, HOLDF: NORMAL
CREAT SERPL-MCNC: 1.17 MG/DL (ref 0.7–1.3)
DIFFERENTIAL METHOD BLD: ABNORMAL
EOSINOPHIL # BLD: 0 K/UL (ref 0–0.4)
EOSINOPHIL NFR BLD: 0 % (ref 0–7)
ERYTHROCYTE [DISTWIDTH] IN BLOOD BY AUTOMATED COUNT: 18.1 % (ref 11.5–14.5)
GAS FLOW.O2 O2 DELIVERY SYS: ABNORMAL L/MIN
GLOBULIN SER CALC-MCNC: 5.5 G/DL (ref 2–4)
GLUCOSE BLD STRIP.AUTO-MCNC: 158 MG/DL (ref 65–117)
GLUCOSE BLD STRIP.AUTO-MCNC: 173 MG/DL (ref 65–117)
GLUCOSE BLD STRIP.AUTO-MCNC: 184 MG/DL (ref 65–117)
GLUCOSE BLD STRIP.AUTO-MCNC: 226 MG/DL (ref 65–117)
GLUCOSE BLD STRIP.AUTO-MCNC: 296 MG/DL (ref 65–117)
GLUCOSE SERPL-MCNC: 171 MG/DL (ref 65–100)
HCO3 BLD-SCNC: 30.8 MMOL/L (ref 22–26)
HCT VFR BLD AUTO: 48.1 % (ref 36.6–50.3)
HGB BLD-MCNC: 14.1 G/DL (ref 12.1–17)
IMM GRANULOCYTES # BLD AUTO: 0.1 K/UL (ref 0–0.04)
IMM GRANULOCYTES NFR BLD AUTO: 1 % (ref 0–0.5)
LYMPHOCYTES # BLD: 0.2 K/UL (ref 0.8–3.5)
LYMPHOCYTES NFR BLD: 2 % (ref 12–49)
MAGNESIUM SERPL-MCNC: 2.6 MG/DL (ref 1.6–2.4)
MCH RBC QN AUTO: 24.3 PG (ref 26–34)
MCHC RBC AUTO-ENTMCNC: 29.3 G/DL (ref 30–36.5)
MCV RBC AUTO: 82.9 FL (ref 80–99)
MONOCYTES # BLD: 0.1 K/UL (ref 0–1)
MONOCYTES NFR BLD: 1 % (ref 5–13)
NEUTS SEG # BLD: 10.9 K/UL (ref 1.8–8)
NEUTS SEG NFR BLD: 96 % (ref 32–75)
NRBC # BLD: 0 K/UL (ref 0–0.01)
NRBC BLD-RTO: 0 PER 100 WBC
O2/TOTAL GAS SETTING VFR VENT: 100 %
PCO2 BLD: 50.4 MMHG (ref 35–45)
PH BLD: 7.39 [PH] (ref 7.35–7.45)
PHOSPHATE SERPL-MCNC: 4 MG/DL (ref 2.6–4.7)
PLATELET # BLD AUTO: 223 K/UL (ref 150–400)
PMV BLD AUTO: 11 FL (ref 8.9–12.9)
PO2 BLD: 61 MMHG (ref 80–100)
POTASSIUM SERPL-SCNC: 4.7 MMOL/L (ref 3.5–5.1)
PROT SERPL-MCNC: 8 G/DL (ref 6.4–8.2)
RBC # BLD AUTO: 5.8 M/UL (ref 4.1–5.7)
RBC MORPH BLD: ABNORMAL
RBC MORPH BLD: ABNORMAL
SAMPLES BEING HELD,HOLD: NORMAL
SAO2 % BLD: 90.4 % (ref 92–97)
SERVICE CMNT-IMP: ABNORMAL
SODIUM SERPL-SCNC: 140 MMOL/L (ref 136–145)
SPECIMEN TYPE: ABNORMAL
TSH SERPL DL<=0.05 MIU/L-ACNC: 0.5 UIU/ML (ref 0.36–3.74)
WBC # BLD AUTO: 11.3 K/UL (ref 4.1–11.1)

## 2021-09-10 PROCEDURE — 74011636637 HC RX REV CODE- 636/637: Performed by: NURSE PRACTITIONER

## 2021-09-10 PROCEDURE — C8929 TTE W OR WO FOL WCON,DOPPLER: HCPCS

## 2021-09-10 PROCEDURE — 82962 GLUCOSE BLOOD TEST: CPT

## 2021-09-10 PROCEDURE — 93321 DOPPLER ECHO F-UP/LMTD STD: CPT | Performed by: SPECIALIST

## 2021-09-10 PROCEDURE — 77030041076 HC DRSG AG OPTICELL MDII -A

## 2021-09-10 PROCEDURE — 93971 EXTREMITY STUDY: CPT

## 2021-09-10 PROCEDURE — 71275 CT ANGIOGRAPHY CHEST: CPT

## 2021-09-10 PROCEDURE — 84443 ASSAY THYROID STIM HORMONE: CPT

## 2021-09-10 PROCEDURE — 85025 COMPLETE CBC W/AUTO DIFF WBC: CPT

## 2021-09-10 PROCEDURE — 80053 COMPREHEN METABOLIC PANEL: CPT

## 2021-09-10 PROCEDURE — 93325 DOPPLER ECHO COLOR FLOW MAPG: CPT | Performed by: SPECIALIST

## 2021-09-10 PROCEDURE — 36600 WITHDRAWAL OF ARTERIAL BLOOD: CPT

## 2021-09-10 PROCEDURE — 74011250637 HC RX REV CODE- 250/637: Performed by: HOSPITALIST

## 2021-09-10 PROCEDURE — 36415 COLL VENOUS BLD VENIPUNCTURE: CPT

## 2021-09-10 PROCEDURE — 84100 ASSAY OF PHOSPHORUS: CPT

## 2021-09-10 PROCEDURE — 74011636637 HC RX REV CODE- 636/637: Performed by: HOSPITALIST

## 2021-09-10 PROCEDURE — 73080 X-RAY EXAM OF ELBOW: CPT

## 2021-09-10 PROCEDURE — 83735 ASSAY OF MAGNESIUM: CPT

## 2021-09-10 PROCEDURE — 94660 CPAP INITIATION&MGMT: CPT

## 2021-09-10 PROCEDURE — 74011000250 HC RX REV CODE- 250: Performed by: HOSPITALIST

## 2021-09-10 PROCEDURE — 65660000000 HC RM CCU STEPDOWN

## 2021-09-10 PROCEDURE — 74011250636 HC RX REV CODE- 250/636: Performed by: HEALTH CARE PROVIDER

## 2021-09-10 PROCEDURE — 74011000258 HC RX REV CODE- 258: Performed by: HOSPITALIST

## 2021-09-10 PROCEDURE — 82803 BLOOD GASES ANY COMBINATION: CPT

## 2021-09-10 PROCEDURE — 74011250636 HC RX REV CODE- 250/636: Performed by: HOSPITALIST

## 2021-09-10 PROCEDURE — 74011000636 HC RX REV CODE- 636: Performed by: RADIOLOGY

## 2021-09-10 PROCEDURE — 94640 AIRWAY INHALATION TREATMENT: CPT

## 2021-09-10 PROCEDURE — C8923 2D TTE W OR W/O FOL W/CON,CO: HCPCS

## 2021-09-10 PROCEDURE — 77030012794 HC KT NSL CANN/HGH TRAN -A

## 2021-09-10 PROCEDURE — 93308 TTE F-UP OR LMTD: CPT | Performed by: SPECIALIST

## 2021-09-10 RX ORDER — FENTANYL CITRATE 50 UG/ML
50 INJECTION, SOLUTION INTRAMUSCULAR; INTRAVENOUS
Status: DISCONTINUED | OUTPATIENT
Start: 2021-09-10 | End: 2021-10-04 | Stop reason: HOSPADM

## 2021-09-10 RX ORDER — HYDRALAZINE HYDROCHLORIDE 20 MG/ML
10 INJECTION INTRAMUSCULAR; INTRAVENOUS
Status: DISCONTINUED | OUTPATIENT
Start: 2021-09-10 | End: 2021-10-04 | Stop reason: HOSPADM

## 2021-09-10 RX ORDER — INSULIN GLARGINE 100 [IU]/ML
18 INJECTION, SOLUTION SUBCUTANEOUS DAILY
COMMUNITY

## 2021-09-10 RX ORDER — NEBIVOLOL 10 MG/1
10 TABLET ORAL DAILY
Status: DISCONTINUED | OUTPATIENT
Start: 2021-09-10 | End: 2021-09-10 | Stop reason: ALTCHOICE

## 2021-09-10 RX ORDER — INSULIN GLARGINE 100 [IU]/ML
18 INJECTION, SOLUTION SUBCUTANEOUS DAILY
Status: DISCONTINUED | OUTPATIENT
Start: 2021-09-10 | End: 2021-09-11

## 2021-09-10 RX ORDER — INSULIN GLARGINE 100 [IU]/ML
45 INJECTION, SOLUTION SUBCUTANEOUS
Status: DISCONTINUED | OUTPATIENT
Start: 2021-09-10 | End: 2021-09-10 | Stop reason: ALTCHOICE

## 2021-09-10 RX ORDER — AMLODIPINE BESYLATE 5 MG/1
10 TABLET ORAL DAILY
Status: DISCONTINUED | OUTPATIENT
Start: 2021-09-10 | End: 2021-09-10 | Stop reason: ALTCHOICE

## 2021-09-10 RX ORDER — INSULIN LISPRO 100 [IU]/ML
INJECTION, SOLUTION INTRAVENOUS; SUBCUTANEOUS
Status: DISCONTINUED | OUTPATIENT
Start: 2021-09-10 | End: 2021-09-14

## 2021-09-10 RX ADMIN — IPRATROPIUM BROMIDE AND ALBUTEROL SULFATE 3 ML: .5; 3 SOLUTION RESPIRATORY (INHALATION) at 16:52

## 2021-09-10 RX ADMIN — ACETAMINOPHEN 650 MG: 325 TABLET ORAL at 08:21

## 2021-09-10 RX ADMIN — IPRATROPIUM BROMIDE AND ALBUTEROL SULFATE 3 ML: .5; 3 SOLUTION RESPIRATORY (INHALATION) at 19:24

## 2021-09-10 RX ADMIN — ARFORMOTEROL TARTRATE 15 MCG: 15 SOLUTION RESPIRATORY (INHALATION) at 08:14

## 2021-09-10 RX ADMIN — INSULIN LISPRO 2 UNITS: 100 INJECTION, SOLUTION INTRAVENOUS; SUBCUTANEOUS at 08:22

## 2021-09-10 RX ADMIN — BUDESONIDE 500 MCG: 0.5 INHALANT RESPIRATORY (INHALATION) at 08:14

## 2021-09-10 RX ADMIN — Medication 10 ML: at 21:20

## 2021-09-10 RX ADMIN — METHYLPREDNISOLONE SODIUM SUCCINATE 40 MG: 40 INJECTION, POWDER, FOR SOLUTION INTRAMUSCULAR; INTRAVENOUS at 08:32

## 2021-09-10 RX ADMIN — ENOXAPARIN SODIUM 40 MG: 100 INJECTION SUBCUTANEOUS at 08:21

## 2021-09-10 RX ADMIN — AZITHROMYCIN MONOHYDRATE 500 MG: 500 INJECTION, POWDER, LYOPHILIZED, FOR SOLUTION INTRAVENOUS at 23:50

## 2021-09-10 RX ADMIN — Medication 10 ML: at 23:51

## 2021-09-10 RX ADMIN — ARFORMOTEROL TARTRATE 15 MCG: 15 SOLUTION RESPIRATORY (INHALATION) at 19:24

## 2021-09-10 RX ADMIN — METHYLPREDNISOLONE SODIUM SUCCINATE 40 MG: 40 INJECTION, POWDER, FOR SOLUTION INTRAMUSCULAR; INTRAVENOUS at 21:19

## 2021-09-10 RX ADMIN — CEFTRIAXONE SODIUM 1 G: 1 INJECTION, POWDER, FOR SOLUTION INTRAMUSCULAR; INTRAVENOUS at 17:21

## 2021-09-10 RX ADMIN — MICONAZOLE NITRATE 2 % TOPICAL POWDER: at 08:30

## 2021-09-10 RX ADMIN — INSULIN LISPRO 2 UNITS: 100 INJECTION, SOLUTION INTRAVENOUS; SUBCUTANEOUS at 11:25

## 2021-09-10 RX ADMIN — IPRATROPIUM BROMIDE AND ALBUTEROL SULFATE 3 ML: .5; 3 SOLUTION RESPIRATORY (INHALATION) at 04:23

## 2021-09-10 RX ADMIN — BUDESONIDE 500 MCG: 0.5 INHALANT RESPIRATORY (INHALATION) at 19:24

## 2021-09-10 RX ADMIN — INSULIN LISPRO 3 UNITS: 100 INJECTION, SOLUTION INTRAVENOUS; SUBCUTANEOUS at 21:30

## 2021-09-10 RX ADMIN — INSULIN GLARGINE 18 UNITS: 100 INJECTION, SOLUTION SUBCUTANEOUS at 10:29

## 2021-09-10 RX ADMIN — IPRATROPIUM BROMIDE AND ALBUTEROL SULFATE 3 ML: .5; 3 SOLUTION RESPIRATORY (INHALATION) at 23:04

## 2021-09-10 RX ADMIN — Medication 10 ML: at 22:00

## 2021-09-10 RX ADMIN — IPRATROPIUM BROMIDE AND ALBUTEROL SULFATE 3 ML: .5; 3 SOLUTION RESPIRATORY (INHALATION) at 08:14

## 2021-09-10 RX ADMIN — Medication 10 ML: at 14:00

## 2021-09-10 RX ADMIN — IOPAMIDOL 80 ML: 755 INJECTION, SOLUTION INTRAVENOUS at 01:07

## 2021-09-10 RX ADMIN — FUROSEMIDE 40 MG: 10 INJECTION, SOLUTION INTRAMUSCULAR; INTRAVENOUS at 08:21

## 2021-09-10 NOTE — PROGRESS NOTES
Pulmonary    ABG improved with NIPPV  Pt has removed bipap and apparently refusing to put it back on    ABG from this am with compensated chronic resp acidosis with pH of 7.39 and PCO2 of 50 (appears to be a baseline gas for him)    Patient Vitals for the past 4 hrs:   BP Temp Pulse Resp SpO2   09/10/21 0814     (!) 88 %   09/10/21 0700 (!) 160/88  68 22 94 %   09/10/21 0646 (!) 162/76 97.3 °F (36.3 °C) 68 15 94 %   09/10/21 0644     92 %   09/10/21 0500 137/70  (!) 57 20 93 %   Temp (24hrs), Av.2 °F (36.8 °C), Min:97.3 °F (36.3 °C), Max:98.9 °F (37.2 °C)      Intake/Output Summary (Last 24 hours) at 9/10/2021 0839  Last data filed at 9/10/2021 0100  Gross per 24 hour   Intake 400 ml   Output    Net 400 ml       Pulmonary Impression / Recommendations:     Acute on chronic hypoxemic / hypercarbic respiratory failure 2/2 CHF / ? Obstructive lung disease / IVY / OHS with hypercarbia and CO2 narcosis     --Bipap qhs and prn  --bronchodilators / steroids / diuretics / empiric abx  --Echo ordered  --wean O2 by sats - lowest level to keep 90-92%  --will need follow up with sleep physician  --avoid sedatives  --check TSH --> 0.50    Will be available to see again in the hospital if needed    Luis Alfredo Aguilar MD

## 2021-09-10 NOTE — PROGRESS NOTES
Physical Therapy 9/10/2021    Orders received and chart reviewed up to date. Per RN, best to defer PT at this time 2/2 SOB at rest. Will follow-up as appropriate. Thank you.   Olesya Randall, PT, DPT

## 2021-09-10 NOTE — ED NOTES
Verbal shift change report given to Henry Torres, RN and Osman Arvizu RN (oncoming nurse) by Len Jimenez RN and NIK Harvey (offgoing nurse). Report included the following information SBAR, ED Summary, Intake/Output, MAR and Recent Results.

## 2021-09-10 NOTE — ED NOTES
Pt pulled off bipap and is refusing it to be placed back on. Pt placed on nonrebreather at 15L. RN educated patient about need for BIPAP and pt continues to refuse.  Ramesh Skinner NP aware

## 2021-09-10 NOTE — WOUND CARE
WOCN Note:     New consult placed for assessment of legs. Assessed in room 400. Chart reviewed. Admitted DX:  Acute on chronic respiratory failure. Past Medical History:   Diagnosis Date    DM (diabetes mellitus) ([address]     Dyslipidemia     Hypertension      Assessment:   Patient is A&O x 3, communicative and sitting up in chair. Bed: foam mattress  Patient reports no pain. Heels intact without erythema. Bilateral low legs have red erythema and hemosiderin staining with edema. 1. POA Left low leg, venous ulcer: 1 x 0.5 x 0.1 cm  100% red; small serous exudate; no odor. Periwound has edema and red erythema. Cleansed with saline; applied Opticel ag; covered with abd pad and roll gauze. 2.  POA Abdomen and abdominal fold:  Red moist rash consistent with Candidiasis. Miconazole powder in use. Wound, Pressure Prevention & Skin Care Recommendations:    1. Minimize layers of linen/pads under patient to optimize support surface. 2.  Turn/reposition approximately every 2 hours and offload heels. 3.  Manage moisture/ Keep skin folds clean and dry. 4.  Left and right low legs:  Daily cleanse legs with soap and water; cleanse Left leg wound with saline; apply purple moisturizing cream to bilateral legs; apply Opticel ag to wound; cover with abd pad and roll gauze. Discussed above plan with patient and Courtney King RN.     Transition of Care:   Plan to follow as needed while admitted to hospital.    ARISTEO Reyna RN [facility] Inpatient Wound Care  Available on Perfect Serve  Pager [phone]  Office [phone]

## 2021-09-10 NOTE — PROGRESS NOTES
9/10/2021; 11:15 -   CM attempted to reach patient's Emergency Contact (Brother, Kedar Jung: [phone]) and left a HIPAA compliant VM requesting a return call. CRM: Sharon Mario, MPH, [facility]; Z: [phone]    11:25 -   TRANSITIONS OF CARE PLAN:   1. RUR: 15%  2. DESTINATION: TBD - possibly own home  3. TRANSPORT: TBD  4. NEEDS FOR DISCHARGE: likely [facility] Orders for HH  5. ANTICIPATED FOLLOW UPS: [facility]  6. ONGOING INPATIENT NEEDS: Echo, O2 support as needed with weaning as possible, ABX, PT/OT evals,     Anticipated Discharge is: Greater than 48 Hours    Reason for Admission:  Acute on Chronic Respiratory Failure, Cellulitis                 RUR Score:    15%                 Plan for utilizing home health: Will likely need [facility] Orders    PCP: First and Last name:  None  Followed closely by [facility]   Name of Practice:    Are you a current patient: Yes/No:    Approximate date of last visit:    Can you participate in a virtual visit with your PCP:                     Current Advanced Directive/Advance Care Plan: Full Code      Healthcare Decision Maker:   Click here to complete [address] including selection of the Healthcare Decision Maker Relationship (ie \"Primary\")           Primary Decision Maker: Tahira Vasquez, brother: [phone]                  Transition of Care Plan:        CM received return call from patient's brother Macie Barksdale. Per the brother, he and the patient do not keep in touch very well. The brother identified that at baseline, patient is independent in ADLs, to include driving and living alone in a 2nd floor apartment. Patient's vehicle is currently not functioning, so the patient walks to most places. Home DME includes: walker, cane, electric scooter is on order, shower stool, bedside commode, CPAP, Home O2, glucometer. The patient is believed to be followed closely by [facility]. Patient's brother is unsure of patient's ins coverage.   Per the brother, patient may be open to [facility] at this time for wound care SN, PT, and OT. The brother cannot recall the agency's name, but stated that it was coordinated by [facility]. Patient also has distant hx of Rehab. Patient's brother identified that the patient is not legally , does not have any children, and both parents are . Patient is believed to have an AMD that identifies brother, Christine Wilkinson, as [facility]. The brother is also [facility] due to no other relatives. CM contacted [facility] Team, who identified that the only available documentation indicates that the patient may have had a Rehab stay at a [facility] in 2020. CM Team to continue following for patient's mentation to clear in order to discuss possible disposition of discharge with [facility] Orders for [facility], pending determination of which [facility] agency follows the patient. Patient will likely require transport assist.            Correct Home Address is believed to be: [address]    Care Management Interventions  Mode of Transport at Discharge: S  Transition of Care Consult (CM Consult): Discharge Planning  MyChart Signup: No  Discharge Durable Medical Equipment: No (has: walker, cane, electric scooter is on order, shower stool, bedside commode, CPAP, Home O2, glucometer)  Health Maintenance Reviewed: Yes (cm spoke with patient's brother by phone)  Physical Therapy Consult: Yes  Occupational Therapy Consult: Yes  Speech Therapy Consult: No  Support Systems: Other Family Member(s)  Confirm Follow Up Transport: Other (see comment) (independent in adls, to include living alone - vehicle currently is not working)  [facility]  Discharge Location  Discharge Placement: Unable to determine at this time (lives alone in a 2nd floor apartment)  CRM: Guillermo Regalado, MPH, [address] [place] [address]; Z: [phone]

## 2021-09-10 NOTE — CONSULTS
ICU TEAM Consult Note    Name: Theresa Olivas   : 1965   MRN: [id number]   Date: 2021      Assessment: Mr. Joseph Bermeo is a 48year old male with a PMH significant for COPD, CHF, IVY on CPAP at night, DM II, severe obesity, HTN, and dyslipidemia. He arrived to the ED via EMS for increased SOB, per EMS he was 70% on RA. He is being admitted to the ICU for a COPD exacerbation. ICU Problems:  1. Acute on chronic respiratory failure  2. Respiratory acidosis  3. Hypercapnic respiratory failure    ICU Comprehensive Plan of Care:     Plans for this Shift:     1. Acute on chronic respiratory failure  2. Respiratory acidosis  3. Hypercapnic respiratory failure- remain on BIPAP, titrate settings to decrease CO2 as it was >90%, trend ABGs. Most recent ABG CO2 50.4%. 4. COVID Treatment: he received COVID-19 vaccine, rapid was negative. 5. SBP Goal of: > 90 mmHg  6. MAP Goal of: > 65 mmHg  7. None - For above SBP/MAP goals  8. IVFs: None  9. Transfusion Trigger (Hgb): <7 g/dL  10. Respiratory Goals:  a. Head of bed > 30 degrees  b. Incentive spirometry  11. Pulmonary toilet: Duo-Nebs   12. SpO2 Goal: 88-92%  13. Keep K>4; Mg>2   14. PT/OT: on hold   15. Goals of Care Discussion with family N/A   12. Plan of Care/Code Status: Full Code  17. Appreciate Consultants Input Pulmonary  18. Discussed Care Plan with Bedside RN  19. Documentation of Current Medications  20.  Rest of Plan Below:    F - Feeding:  No NPO  A - Analgesia: Acetaminophen, fentanyl  S - Sedation: N/A  T - DVT Prophylaxis: Lovenox   H - Head of Bed: > 30 Degrees  U - Ulcer Prophylaxis: Not at this time   G - Glycemic Control: Insulin  S - Spontaneous Breathing Trial: N/A  B - Bowel Regimen: MiraLax  I - Indwelling Catheter:   Tubes: None  Lines: Peripheral IV  Drains: None  D - De-escalation of Antibiotics: Ceftriaxone  doxycycline    Subjective:   Progress Note: 2021      Reason for ICU Admission: Acute on chronic respiratory failure    HPI: Per EMS, Mr. Matt Mcdonald was 70% on RA at home. He was brought in by ambulance for increased shortness of breath and is being admitted to the ICU for acute on chronic COPD exacerbation with hypercapnia. On arrival he was trialed on Hiflow but found to have CO2 >90 and was transitioned to Bipap. Currently his CO2 is 50.4 after the majority of his evening was spent on Bipap. He has a history significant for COPD, which he wears CPAP at night for at home. Mr. Matt Mcdonald has been fully vaccinated against COVID-19 and his rapid test was negative. A CTA of his chest is pending. Active Problem List:     Problem List  Date Reviewed: 4/28/2013        Codes Class    Acute on chronic respiratory failure [facility]) ICD-10-CM: J96.20  ICD-9-CM: 518.84               Past Medical History:      has a past medical history of DM (diabetes mellitus) ([employer] [facility] [address] Dyslipidemia, and Hypertension. Past Surgical History:      has no past surgical history on file. Home Medications:     Prior to Admission medications    Medication Sig Start Date End Date Taking? Authorizing Provider   insulin aspart (NOVOLOG) 100 unit/mL injection 0.2 mL by SubCUTAneous route Before breakfast, lunch, and dinner. Plus sliding scale 4/25/13   Joi Gonzalez MD   insulin detemir (LEVEMIR) 100 unit/mL injection 0.6 mL by SubCUTAneous route two (2) times a day. 4/25/13   Joi Gonzalez MD   hydrochlorothiazide (HYDRODIURIL) 25 mg tablet Take 25 mg by mouth daily. Provider, Historical   insulin NPH (NOVOLIN, HUMULIN) 100 unit/mL injection Inject 50 Units at bedtime and 50 Units in the morning 1/22/13   Joi Gonzalez MD   nebivolol (BYSTOLIC) 10 mg tablet Take 1 Tab by mouth daily. 1/22/13   Joi Gonzalez MD   Olmesartan-Amlodipine-HCTZ Dennie Breath) 40-10-25 mg Tab Take 1 Tab by mouth daily.  1/22/13   Joi Gonzalez MD   lisinopril (PRINIVIL, ZESTRIL) 40 mg tablet TAKE ONE TABLET BY MOUTH EVERY DAY 11/25/11   Joi Gonzalez MD   amLODIPine (NORVASC) 10 mg tablet TAKE ONE TABLET BY MOUTH EVERY DAY 11   Lyubov Guerrero MD   hydrALAZINE (APRESOLINE) 25 mg tablet TAKE ONE TABLET BY MOUTH THREE TIMES DAILY 10/8/11   Lyubov Guerrero MD   Insulin Needles, Disposable, 31 X 5/16 \" Ndle by SubCUTAneous route three (3) times daily (with meals). 11/1/10   Lyubov Guerrero MD   Lancets (ONE TOUCH ULTRASOFT LANCETS) Misc by Does Not Apply route. Provider, Historical   glucose blood VI test strips (ONE TOUCH ULTRA TEST) strip by Does Not Apply route See Admin Instructions. Provider, Historical   Blood-Glucose Meter (CONTOUR METER) monitoring kit by Does Not Apply route. Provider, Historical       Allergies/Social/Family History:     No Known Allergies   Social History     Tobacco Use    Smoking status: Never Smoker   Substance Use Topics    Alcohol use: No      No family history on file. Review of Systems:     Respiratory: positive for dyspnea on exertion  Musculoskeletal:positive for back pain    Objective:   Vital Signs:  Visit Vitals  /70 (BP 1 Location: Right arm, BP Patient Position: At rest)   Pulse (!) 52   Temp 98.9 °F (37.2 °C)   Resp 18   Wt 149.5 kg (329 lb 9.4 oz)   SpO2 94%   BMI 43.48 kg/m²    O2 Flow Rate (L/min): 15 l/min O2 Device: BIPAP Temp (24hrs), Av.7 °F (37.1 °C), Min:98.4 °F (36.9 °C), Max:98.9 °F (37.2 °C)    Intake/Output:   No intake or output data in the 24 hours ending 21    Physical Exam:    General:  alert, cooperative, no distress, appears older than stated age, pale, morbidly obese  Eye:  negative  Neurologic:  oriented, normal  Neck:  normal and no erythema or exudates noted. Lungs:  rales R anterior, L anterior, diminished breath sounds R base, L base  Heart:  regular rate and rhythm, S1, S2 normal, no murmur, click, rub or gallop  Abdomen:  soft, non-tender.  Bowel sounds normal. No masses,  no organomegaly, abnormal findings:  obese  Cardiovascular:  Regular rate and rhythm, S1S2 present, without murmur or extra heart sounds, pedal pulses normal and no edema  Skin:  Erythema/excoriation noted on trunk, scrotum and LUE and discolored BLE, cellulitis of the LUE and abdomen     LABS AND  DATA: Personally reviewed  Recent Labs     09/09/21  1306   WBC 15.0*   HGB 13.0   HCT 44.9        Recent Labs     09/09/21  1306      K 3.7      CO2 33*   BUN 27*   CREA 1.14   *   CA 8.7     Recent Labs     09/09/21  1306      TP 7.7   ALB 2.6*   GLOB 5.1*     No results for input(s): INR, PTP, APTT, INREXT in the last 72 hours. Recent Labs     09/09/21  1742 09/09/21  1306   PHI 7.23* 7.24*   PCO2I 95.1* 91.9*   PO2I 64* 64*   FIO2I 50 4     Recent Labs     09/09/21  1306   TROIQ <0.05       MEDS: Reviewed    Chest X-Ray:  CXR Results  (Last 48 hours)               09/09/21 1220  XR CHEST PA LAT Final result    Impression:  1. Study limited by patient increased body habitus. 2.  Pulmonary vascular congestion with mild edema pattern           Narrative:  EXAM: XR CHEST PA LAT       INDICATION: Short of breath       COMPARISON: None       TECHNIQUE: PA and lateral chest views       FINDINGS:   Study limited by patient increased body habitus. Cardiomediastinal silhouette is enlarged. Pulmonary vascular congestion with   mild edema pattern. Right hemidiaphragm elevation. No definite focal consolidation, sizable pleural   effusion, no discernible pneumothorax. ECHO:  Ordered. ABCDEF Bundle/Checklist Completed:  Yes    SPECIAL EQUIPMENT  BIPAP    DISPOSITION  Stay in ICU    CRITICAL CARE CONSULTANT NOTE  I had a face to face encounter with the patient, reviewed and interpreted patient data including clinical events, labs, images, vital signs, I/O's, and examined patient.   I have discussed the case and the plan and management of the patient's care with the consulting services, the bedside nurses and the respiratory therapist.      NOTE OF PERSONAL INVOLVEMENT IN CARE   This patient has a high probability of imminent, clinically significant deterioration, which requires the highest level of preparedness to intervene urgently. I participated in the decision-making and personally managed or directed the management of the following life and organ supporting interventions that required my frequent assessment to treat or prevent imminent deterioration.       [id number] [facility] Student

## 2021-09-10 NOTE — PROGRESS NOTES
Hospitalist Progress Note    NAME: Osmany Sanchez   :  1965   MRN:  [id number]     Subjective:   Daily Progress Note: 9/10/2021 10:46 AM      Chief complaint: Admitted with respiratory failure, left upper extremity redness and swelling  Patient seen and examined at bedside today in the ER  Patient is off BiPAP, seen by pulmonary, not in respiratory distress  Patient denies chest pain, shortness of breath but admits to left upper extremity swelling and redness  Patient denies any recent trauma to left elbow area    Current Facility-Administered Medications   Medication Dose Route Frequency    fentaNYL citrate (PF) injection 50 mcg  50 mcg IntraVENous Q4H PRN    insulin glargine (LANTUS) injection 18 Units  18 Units SubCUTAneous DAILY    hydrALAZINE (APRESOLINE) 20 mg/mL injection 10 mg  10 mg IntraVENous Q6H PRN    glucose chewable tablet 16 g  4 Tablet Oral PRN    dextrose (D50W) injection syrg 12.5-25 g  25-50 mL IntraVENous PRN    glucagon (GLUCAGEN) injection 1 mg  1 mg IntraMUSCular PRN    insulin lispro (HUMALOG) injection   SubCUTAneous Q4H    sodium chloride (NS) flush 5-40 mL  5-40 mL IntraVENous Q8H    sodium chloride (NS) flush 5-40 mL  5-40 mL IntraVENous PRN    acetaminophen (TYLENOL) tablet 650 mg  650 mg Oral Q6H PRN    Or    acetaminophen (TYLENOL) suppository 650 mg  650 mg Rectal Q6H PRN    polyethylene glycol (MIRALAX) packet 17 g  17 g Oral DAILY PRN    ondansetron (ZOFRAN ODT) tablet 4 mg  4 mg Oral Q8H PRN    Or    ondansetron (ZOFRAN) injection 4 mg  4 mg IntraVENous Q6H PRN    azithromycin (ZITHROMAX) 500 mg in 0.9% sodium chloride 250 mL (VIAL-MATE)  500 mg IntraVENous Q24H    methylPREDNISolone (PF) (SOLU-MEDROL) injection 40 mg  40 mg IntraVENous Q12H    albuterol-ipratropium (DUO-NEB) 2.5 MG-0.5 MG/3 ML  3 mL Nebulization Q4H RT    arformoteroL (BROVANA) neb solution 15 mcg  15 mcg Nebulization BID RT    And    budesonide (PULMICORT) 500 mcg/2 ml nebulizer suspension  500 mcg Nebulization BID RT    furosemide (LASIX) injection 40 mg  40 mg IntraVENous DAILY    miconazole (MICOTIN) 2 % powder   Topical BID    cefTRIAXone (ROCEPHIN) 1 g in 0.9% sodium chloride (MBP/ADV) 50 mL MBP  1 g IntraVENous Q24H    sodium chloride (NS) flush 5-40 mL  5-40 mL IntraVENous Q8H    sodium chloride (NS) flush 5-40 mL  5-40 mL IntraVENous PRN    polyethylene glycol (MIRALAX) packet 17 g  17 g Oral DAILY PRN    enoxaparin (LOVENOX) injection 40 mg  40 mg SubCUTAneous DAILY     Current Outpatient Medications   Medication Sig    insulin aspart (NOVOLOG) 100 unit/mL injection 0.2 mL by SubCUTAneous route Before breakfast, lunch, and dinner. Plus sliding scale    insulin detemir (LEVEMIR) 100 unit/mL injection 0.6 mL by SubCUTAneous route two (2) times a day.  hydrochlorothiazide (HYDRODIURIL) 25 mg tablet Take 25 mg by mouth daily.  insulin NPH (NOVOLIN, HUMULIN) 100 unit/mL injection Inject 50 Units at bedtime and 50 Units in the morning    nebivolol (BYSTOLIC) 10 mg tablet Take 1 Tab by mouth daily.  Olmesartan-Amlodipine-HCTZ (TRIBENZOR) 40-10-25 mg Tab Take 1 Tab by mouth daily.     lisinopril (PRINIVIL, ZESTRIL) 40 mg tablet TAKE ONE TABLET BY MOUTH EVERY DAY    amLODIPine (NORVASC) 10 mg tablet TAKE ONE TABLET BY MOUTH EVERY DAY    hydrALAZINE (APRESOLINE) 25 mg tablet TAKE ONE TABLET BY MOUTH THREE TIMES DAILY          Objective:     Visit Vitals  BP (!) 160/88   Pulse 68   Temp 97.3 °F (36.3 °C)   Resp 22   Wt 149.5 kg (329 lb 9.4 oz)   SpO2 (!) 88%   BMI 43.48 kg/m²    O2 Flow Rate (L/min): 6 l/min O2 Device: Nasal cannula    Temp (24hrs), Av.2 °F (36.8 °C), Min:97.3 °F (36.3 °C), Max:98.9 °F (37.2 °C)        PHYSICAL EXAM:  General chronic ill looking older than stated age  Neck Short  CVS RRR, normal S1-S2  Respiratory coarse sounds, not in distress  Abdomen obese soft  Extremities with Ace wraps to LE, left elbow and forearm swelling redness noted  Neuro awake alert oriented x3  Psych anxious        Data Review    Recent Results (from the past 24 hour(s))   EKG, 12 LEAD, INITIAL    Collection Time: 09/09/21 11:54 AM   Result Value Ref Range    Ventricular Rate 74 BPM    Atrial Rate 74 BPM    P-R Interval 212 ms    QRS Duration 96 ms    Q-T Interval 398 ms    QTC Calculation (Bezet) 441 ms    Calculated P Axis 43 degrees    Calculated R Axis -136 degrees    Calculated T Axis 62 degrees    Diagnosis       Sinus rhythm with 1st degree AV block  Low voltage QRS  Septal infarct , age undetermined  Possible Lateral infarct , age undetermined  Abnormal ECG  No previous ECGs available  Confirmed by Joaquín Nath ([id number]) on 9/9/2021 12:07:33 PM     SAMPLES BEING HELD    Collection Time: 09/09/21 11:59 AM   Result Value Ref Range    SAMPLES BEING HELD 1 BC(HAIDER)     COMMENT        Add-on orders for these samples will be processed based on acceptable specimen integrity and analyte stability, which may vary by analyte. CULTURE, BLOOD    Collection Time: 09/09/21 11:59 AM    Specimen: Blood   Result Value Ref Range    Special Requests: NO SPECIAL REQUESTS      Culture result: NO GROWTH AFTER 11 HOURS     CBC WITH AUTOMATED DIFF    Collection Time: 09/09/21  1:06 PM   Result Value Ref Range    WBC 15.0 (H) 4.1 - 11.1 K/uL    RBC 5.39 4. 10 - 5.70 M/uL    HGB 13.0 12.1 - 17.0 g/dL    HCT 44.9 36.6 - 50.3 %    MCV 83.3 80.0 - 99.0 FL    MCH 24.1 (L) 26.0 - 34.0 PG    MCHC 29.0 (L) 30.0 - 36.5 g/dL    RDW 16.8 (H) 11.5 - 14.5 %    PLATELET 588 877 - 019 K/uL    MPV 10.5 8.9 - 12.9 FL    NRBC 0.0 0  WBC    ABSOLUTE NRBC 0.00 0.00 - 0.01 K/uL    NEUTROPHILS 82 (H) 32 - 75 %    LYMPHOCYTES 7 (L) 12 - 49 %    MONOCYTES 8 5 - 13 %    EOSINOPHILS 2 0 - 7 %    BASOPHILS 0 0 - 1 %    IMMATURE GRANULOCYTES 1 (H) 0.0 - 0.5 %    ABS. NEUTROPHILS 12.4 (H) 1.8 - 8.0 K/UL    ABS. LYMPHOCYTES 1.1 0.8 - 3.5 K/UL    ABS. MONOCYTES 1.2 (H) 0.0 - 1.0 K/UL    ABS.  EOSINOPHILS 0.2 0.0 - 0.4 K/UL    ABS. BASOPHILS 0.0 0.0 - 0.1 K/UL    ABS. IMM. GRANS. 0.1 (H) 0.00 - 0.04 K/UL    DF AUTOMATED     METABOLIC PANEL, COMPREHENSIVE    Collection Time: 09/09/21  1:06 PM   Result Value Ref Range    Sodium 137 136 - 145 mmol/L    Potassium 3.7 3.5 - 5.1 mmol/L    Chloride 102 97 - 108 mmol/L    CO2 33 (H) 21 - 32 mmol/L    Anion gap 2 (L) 5 - 15 mmol/L    Glucose 120 (H) 65 - 100 mg/dL    BUN 27 (H) 6 - 20 MG/DL    Creatinine 1.14 0.70 - 1.30 MG/DL    BUN/Creatinine ratio 24 (H) 12 - 20      GFR est AA >60 >60 ml/min/1.73m2    GFR est non-AA >60 >60 ml/min/1.73m2    Calcium 8.7 8.5 - 10.1 MG/DL    Bilirubin, total 0.5 0.2 - 1.0 MG/DL    ALT (SGPT) 12 12 - 78 U/L    AST (SGOT) 9 (L) 15 - 37 U/L    Alk. phosphatase 106 45 - 117 U/L    Protein, total 7.7 6.4 - 8.2 g/dL    Albumin 2.6 (L) 3.5 - 5.0 g/dL    Globulin 5.1 (H) 2.0 - 4.0 g/dL    A-G Ratio 0.5 (L) 1.1 - 2.2     TROPONIN I    Collection Time: 09/09/21  1:06 PM   Result Value Ref Range    Troponin-I, Qt. <0.05 <0.05 ng/mL   NT-PRO BNP    Collection Time: 09/09/21  1:06 PM   Result Value Ref Range    NT pro- (H) <125 PG/ML   POC EG7    Collection Time: 09/09/21  1:06 PM   Result Value Ref Range    Calcium, ionized (POC) 1.22 1.12 - 1.32 mmol/L    FIO2 (POC) 4 %    pH (POC) 7.24 (LL) 7.35 - 7.45      pCO2 (POC) 91.9 (H) 35.0 - 45.0 MMHG    pO2 (POC) 64 (L) 80 - 100 MMHG    HCO3 (POC) 39.1 (H) 22 - 26 MMOL/L    Base excess (POC) 7.0 mmol/L    sO2 (POC) 86.0 (L) 92 - 97 %    Site RIGHT BRACHIAL      Device: NASAL CANNULA      Allens test (POC) Positive      Specimen type (POC) ARTERIAL      Critical value read back SANDY KLEIN MD    SAMPLES BEING HELD    Collection Time: 09/09/21  1:06 PM   Result Value Ref Range    SAMPLES BEING HELD 1RED 1BLU     COMMENT        Add-on orders for these samples will be processed based on acceptable specimen integrity and analyte stability, which may vary by analyte.    COVID-19 RAPID TEST    Collection Time: 09/09/21  4:28 PM   Result Value Ref Range    Specimen source Nasopharyngeal      COVID-19 rapid test Not detected NOTD     CULTURE, BLOOD    Collection Time: 09/09/21  4:55 PM    Specimen: Blood   Result Value Ref Range    Special Requests: NO SPECIAL REQUESTS      Culture result: NO GROWTH AFTER 12 HOURS     POC G3 - PUL    Collection Time: 09/09/21  5:42 PM   Result Value Ref Range    FIO2 (POC) 50 %    pH (POC) 7.23 (LL) 7.35 - 7.45      pCO2 (POC) 95.1 (H) 35.0 - 45.0 MMHG    pO2 (POC) 64 (L) 80 - 100 MMHG    HCO3 (POC) 40.0 (H) 22 - 26 MMOL/L    sO2 (POC) 85.6 (L) 92 - 97 %    Base excess (POC) 7.5 mmol/L    Site RIGHT BRACHIAL      Device: BIPAP MASK      Mode Non Invasive      PEEP/CPAP (POC) 8 cmH2O    PIP (POC) 18      Allens test (POC) Positive      Specimen type (POC) ARTERIAL      Critical value read back SANDY CAVANAUGH.  MD    GLUCOSE, POC    Collection Time: 09/09/21  7:12 PM   Result Value Ref Range    Glucose (POC) 143 (H) 65 - 117 mg/dL    Performed by Sendy Carvalho    GLUCOSE, POC    Collection Time: 09/09/21 11:14 PM   Result Value Ref Range    Glucose (POC) 138 (H) 65 - 117 mg/dL    Performed by Manuelito ZAFAR    POC G3 - PUL    Collection Time: 09/10/21  1:11 AM   Result Value Ref Range    FIO2 (POC) 100 %    pH (POC) 7.39 7.35 - 7.45      pCO2 (POC) 50.4 (H) 35.0 - 45.0 MMHG    pO2 (POC) 61 (L) 80 - 100 MMHG    HCO3 (POC) 30.8 (H) 22 - 26 MMOL/L    sO2 (POC) 90.4 (L) 92 - 97 %    Base excess (POC) 4.5 mmol/L    Site RIGHT BRACHIAL      Device: BIPAP MASK      Allens test (POC) Positive      Specimen type (POC) ARTERIAL     GLUCOSE, POC    Collection Time: 09/10/21  3:58 AM   Result Value Ref Range    Glucose (POC) 158 (H) 65 - 117 mg/dL    Performed by [address] [place] [address], [facility]    Collection Time: 09/10/21  4:11 AM   Result Value Ref Range    Sodium 140 136 - 145 mmol/L    Potassium 4.7 3.5 - 5.1 mmol/L    Chloride 102 97 - 108 mmol/L    CO2 31 21 - 32 mmol/L    Anion gap 7 5 - 15 mmol/L    Glucose 171 (H) 65 - 100 mg/dL    BUN 31 (H) 6 - 20 MG/DL    Creatinine 1.17 0.70 - 1.30 MG/DL    BUN/Creatinine ratio 26 (H) 12 - 20      GFR est AA >60 >60 ml/min/1.73m2    GFR est non-AA >60 >60 ml/min/1.73m2    Calcium 9.2 8.5 - 10.1 MG/DL    Bilirubin, total 0.4 0.2 - 1.0 MG/DL    ALT (SGPT) 12 12 - 78 U/L    AST (SGOT) 13 (L) 15 - 37 U/L    Alk. phosphatase 113 45 - 117 U/L    Protein, total 8.0 6.4 - 8.2 g/dL    Albumin 2.5 (L) 3.5 - 5.0 g/dL    Globulin 5.5 (H) 2.0 - 4.0 g/dL    A-G Ratio 0.5 (L) 1.1 - 2.2     MAGNESIUM    Collection Time: 09/10/21  4:11 AM   Result Value Ref Range    Magnesium 2.6 (H) 1.6 - 2.4 mg/dL   PHOSPHORUS    Collection Time: 09/10/21  4:11 AM   Result Value Ref Range    Phosphorus 4.0 2.6 - 4.7 MG/DL   TSH 3RD GENERATION    Collection Time: 09/10/21  4:11 AM   Result Value Ref Range    TSH 0.50 0.36 - 3.74 uIU/mL   CBC WITH AUTOMATED DIFF    Collection Time: 09/10/21  4:11 AM   Result Value Ref Range    WBC 11.3 (H) 4.1 - 11.1 K/uL    RBC 5.80 (H) 4.10 - 5.70 M/uL    HGB 14.1 12.1 - 17.0 g/dL    HCT 48.1 36.6 - 50.3 %    MCV 82.9 80.0 - 99.0 FL    MCH 24.3 (L) 26.0 - 34.0 PG    MCHC 29.3 (L) 30.0 - 36.5 g/dL    RDW 18.1 (H) 11.5 - 14.5 %    PLATELET 004 977 - 550 K/uL    MPV 11.0 8.9 - 12.9 FL    NRBC 0.0 0  WBC    ABSOLUTE NRBC 0.00 0.00 - 0.01 K/uL    NEUTROPHILS 96 (H) 32 - 75 %    LYMPHOCYTES 2 (L) 12 - 49 %    MONOCYTES 1 (L) 5 - 13 %    EOSINOPHILS 0 0 - 7 %    BASOPHILS 0 0 - 1 %    IMMATURE GRANULOCYTES 1 (H) 0.0 - 0.5 %    ABS. NEUTROPHILS 10.9 (H) 1.8 - 8.0 K/UL    ABS. LYMPHOCYTES 0.2 (L) 0.8 - 3.5 K/UL    ABS. MONOCYTES 0.1 0.0 - 1.0 K/UL    ABS. EOSINOPHILS 0.0 0.0 - 0.4 K/UL    ABS. BASOPHILS 0.0 0.0 - 0.1 K/UL    ABS. IMM.  GRANS. 0.1 (H) 0.00 - 0.04 K/UL    DF SMEAR SCANNED      RBC COMMENTS ANISOCYTOSIS  1+        RBC COMMENTS POLYCHROMASIA  1+       SAMPLES BEING HELD    Collection Time: 09/10/21  4:11 AM   Result Value Ref Range    SAMPLES BEING HELD 1RED     COMMENT        Add-on orders for these samples will be processed based on acceptable specimen integrity and analyte stability, which may vary by analyte. GLUCOSE, POC    Collection Time: 09/10/21  7:56 AM   Result Value Ref Range    Glucose (POC) 173 (H) 65 - 117 mg/dL    Performed by Arnulfo Linares      No results found for this visit on 09/09/21. All Micro Results     Procedure Component Value Units Date/Time    CULTURE, BLOOD [[id number]] Collected: 09/09/21 1159    Order Status: Completed Specimen: Blood Updated: 09/10/21 0526     Special Requests: NO SPECIAL REQUESTS        Culture result: NO GROWTH AFTER 11 HOURS       CULTURE, BLOOD [[id number]] Collected: 09/09/21 1655    Order Status: Completed Specimen: Blood Updated: 09/10/21 0526     Special Requests: NO SPECIAL REQUESTS        Culture result: NO GROWTH AFTER 12 HOURS       COVID-19 RAPID TEST [[id number]] Collected: 09/09/21 1628    Order Status: Completed Specimen: Nasopharyngeal Updated: 09/09/21 1650     Specimen source Nasopharyngeal        COVID-19 rapid test Not detected        Comment: Rapid Abbott ID Now       Rapid NAAT:  The specimen is NEGATIVE for SARS-CoV-2, the novel coronavirus associated with COVID-19. Negative results should be treated as presumptive and, if inconsistent with clinical signs and symptoms or necessary for patient management, should be tested with an alternative molecular assay. Negative results do not preclude SARS-CoV-2 infection and should not be used as the sole basis for patient management decisions. This test has been authorized by the FDA under an Emergency Use Authorization (EUA) for use by authorized laboratories.    Fact sheet for Healthcare Providers: ConventionUpdate.co.nz  Fact sheet for Patients: ConventionUpdate.co.nz       Methodology: Isothermal Nucleic Acid Amplification         CULTURE, BLOOD, PAIRED [[id number]]     Order Status: Canceled Specimen: Blood            Radiology reports and films for the last 24 hours have been reviewed    CTA chest IMPRESSION  1. No pulmonary embolism to the segmental arterial level. 2. Cardiomegaly and coronary artery disease with evidence of old left  ventricular apical infarct. 3. Bilateral patchy atelectasis versus nonspecific pneumonia. No classic pattern  of COVID19. Assessment/Plan:     Acute on chronic  hypercapnic respiratory failure: Off bipap now. Wean O2, monitor clinically, ABG if needed, COVID rapid neg. CTA chest  No PE as above    Ac CODP exacerbation: Iv steroids, IV abx, nebs, pulmonologist on case wean O2 to keep sats 88-91%    CHF: continue lasix 40mg iv daily , Echo pend, I&o    Cellulitis: LUE iv rocephin and azithromycin, f/u XRays, Ortho eval to r/o joint infecton.  F/u CRp/procla    Morbid obese TSH ok    DM:  SSI/ lantus monitor BG f/u A1c    DVT PRx sq heparin  AD FC  NOK brother  Dispo: >48hrs    Signed By: Marito Savage MD     September 10, 2021

## 2021-09-10 NOTE — PROGRESS NOTES
Wound care at bedside. D/w patient how he is SOB with sats at 90% on 6 LO2 NC. Pt declining to wear mask. Pt wishes to stay in the chair. 1610: Shift change report given to Bessy Williamson RN.

## 2021-09-10 NOTE — CONSULTS
ORTHO CONSULT NOTE    Subjective:     Date of Consultation:  September 10, 2021    Silvestre Joyce is a 48 y.o. male who is being seen for left arm pain and swelling. He denies any injury. Symptoms began on Sunday, September 5, 2021. He is normally followed at [facility] for chronic medical issues including diabetes, CHF, HTN, hyperlipidemia, and sleep apnea. He presented to the ER at [address] [place] [address] yesterday because he was feeling poorly overall and had not seen any improvement in his left arm symptoms. He was found to be significantly short of breath and has been admitted to the hospital. He localizes his pain to the proximal forearm and posterior elbow. Pain is worsened with motion. He has no previous similar episodes of left arm swelling. He denies numbness/tingling/weakness. Orthopedic surgery has been consulted for evaluation and management of his left arm. Patient Active Problem List    Diagnosis Date Noted    Acute on chronic respiratory failure ([facility] 09/09/2021     History reviewed. No pertinent family history. Social History     Tobacco Use    Smoking status: Never Smoker   Substance Use Topics    Alcohol use: No     Past Medical History:   Diagnosis Date    DM (diabetes mellitus) ([facility] [facility]     Dyslipidemia     Hypertension       History reviewed. No pertinent surgical history. Prior to Admission medications    Medication Sig Start Date End Date Taking? Authorizing Provider   insulin glargine (LANTUS) 100 unit/mL injection 18 Units by SubCUTAneous route daily.    Yes Provider, Historical     Current Facility-Administered Medications   Medication Dose Route Frequency    fentaNYL citrate (PF) injection 50 mcg  50 mcg IntraVENous Q4H PRN    insulin glargine (LANTUS) injection 18 Units  18 Units SubCUTAneous DAILY    hydrALAZINE (APRESOLINE) 20 mg/mL injection 10 mg  10 mg IntraVENous Q6H PRN    perflutren lipid microspheres (DEFINITY) in NS bolus IV  1.5 mL IntraVENous RAD ONCE    glucose chewable tablet 16 g 4 Tablet Oral PRN    dextrose (D50W) injection syrg 12.5-25 g  25-50 mL IntraVENous PRN    glucagon (GLUCAGEN) injection 1 mg  1 mg IntraMUSCular PRN    insulin lispro (HUMALOG) injection   SubCUTAneous Q4H    sodium chloride (NS) flush 5-40 mL  5-40 mL IntraVENous Q8H    sodium chloride (NS) flush 5-40 mL  5-40 mL IntraVENous PRN    acetaminophen (TYLENOL) tablet 650 mg  650 mg Oral Q6H PRN    Or    acetaminophen (TYLENOL) suppository 650 mg  650 mg Rectal Q6H PRN    polyethylene glycol (MIRALAX) packet 17 g  17 g Oral DAILY PRN    ondansetron (ZOFRAN ODT) tablet 4 mg  4 mg Oral Q8H PRN    Or    ondansetron (ZOFRAN) injection 4 mg  4 mg IntraVENous Q6H PRN    azithromycin (ZITHROMAX) 500 mg in 0.9% sodium chloride 250 mL (VIAL-MATE)  500 mg IntraVENous Q24H    methylPREDNISolone (PF) (SOLU-MEDROL) injection 40 mg  40 mg IntraVENous Q12H    albuterol-ipratropium (DUO-NEB) 2.5 MG-0.5 MG/3 ML  3 mL Nebulization Q4H RT    arformoteroL (BROVANA) neb solution 15 mcg  15 mcg Nebulization BID RT    And    budesonide (PULMICORT) 500 mcg/2 ml nebulizer suspension  500 mcg Nebulization BID RT    furosemide (LASIX) injection 40 mg  40 mg IntraVENous DAILY    miconazole (MICOTIN) 2 % powder   Topical BID    cefTRIAXone (ROCEPHIN) 1 g in 0.9% sodium chloride (MBP/ADV) 50 mL MBP  1 g IntraVENous Q24H    sodium chloride (NS) flush 5-40 mL  5-40 mL IntraVENous Q8H    sodium chloride (NS) flush 5-40 mL  5-40 mL IntraVENous PRN    polyethylene glycol (MIRALAX) packet 17 g  17 g Oral DAILY PRN    enoxaparin (LOVENOX) injection 40 mg  40 mg SubCUTAneous DAILY      No Known Allergies     Review of Systems:  A comprehensive review of systems was negative except for that written in the HPI.     Mental Status: no dementia    Objective:     Patient Vitals for the past 8 hrs:   BP Temp Pulse Resp SpO2   09/10/21 1300 (!) 143/79 98.7 °F (37.1 °C) 73 18 91 %   09/10/21 1200 (!) 151/79  81 23 (!) 87 %   09/10/21 1000 (!) 140/90  85 19 (!) 89 %   09/10/21 0814     (!) 88 %   09/10/21 0700 (!) 160/88  68 22 94 %   09/10/21 0646 (!) 162/76 97.3 °F (36.3 °C) 68 15 94 %   09/10/21 0644     92 %     Temp (24hrs), Av.3 °F (36.8 °C), Min:97.3 °F (36.3 °C), Max:98.9 °F (37.2 °C)      PHYSICAL EXAM:  General: 51yo male, morbid obesity, has difficulty providing history due to shortness of breath, cooperative,  CNA: alert/oriented, normal mood  Skin: left elbow generally swollen, erythema/warmth noted on proximal forearm, no obvious wound  Extremities: swelling as above, good ROM left elbow/wrist/hand, tender over olecranon without significant fluctuance, compartments swollen but compressible  Vascular: strong radial pulse, capillary refill <2 secs in left hand  Neurologic: sensation grossly intact throughout left arm, moving hand/wrist normally    Imaging Review:   XR ELBOW LT MIN 3 V 9/10/2021 11:30   INDICATION: L elbow swelling. COMPARISON: None. FINDINGS: Three views of the left elbow demonstrate no fracture, dislocation,  effusion or other acute abnormality. IMPRESSION  No acute abnormality.     Labs:   Recent Results (from the past 24 hour(s))   COVID-19 RAPID TEST    Collection Time: 21  4:28 PM   Result Value Ref Range    Specimen source Nasopharyngeal      COVID-19 rapid test Not detected NOTD     CULTURE, BLOOD    Collection Time: 21  4:55 PM    Specimen: Blood   Result Value Ref Range    Special Requests: NO SPECIAL REQUESTS      Culture result: NO GROWTH AFTER 12 HOURS     POC G3 - PUL    Collection Time: 21  5:42 PM   Result Value Ref Range    FIO2 (POC) 50 %    pH (POC) 7.23 (LL) 7.35 - 7.45      pCO2 (POC) 95.1 (H) 35.0 - 45.0 MMHG    pO2 (POC) 64 (L) 80 - 100 MMHG    HCO3 (POC) 40.0 (H) 22 - 26 MMOL/L    sO2 (POC) 85.6 (L) 92 - 97 %    Base excess (POC) 7.5 mmol/L    Site RIGHT BRACHIAL      Device: BIPAP MASK      Mode Non Invasive      PEEP/CPAP (POC) 8 cmH2O    PIP (POC) 18      Allens test (POC) Positive      Specimen type (POC) ARTERIAL      Critical value read back SANDY KLEIN MD    GLUCOSE, POC    Collection Time: 09/09/21  7:12 PM   Result Value Ref Range    Glucose (POC) 143 (H) 65 - 117 mg/dL    Performed by Doe Franco    GLUCOSE, POC    Collection Time: 09/09/21 11:14 PM   Result Value Ref Range    Glucose (POC) 138 (H) 65 - 117 mg/dL    Performed by Keila ZAFAR    POC G3 - PUL    Collection Time: 09/10/21  1:11 AM   Result Value Ref Range    FIO2 (POC) 100 %    pH (POC) 7.39 7.35 - 7.45      pCO2 (POC) 50.4 (H) 35.0 - 45.0 MMHG    pO2 (POC) 61 (L) 80 - 100 MMHG    HCO3 (POC) 30.8 (H) 22 - 26 MMOL/L    sO2 (POC) 90.4 (L) 92 - 97 %    Base excess (POC) 4.5 mmol/L    Site RIGHT BRACHIAL      Device: BIPAP MASK      Allens test (POC) Positive      Specimen type (POC) ARTERIAL     GLUCOSE, POC    Collection Time: 09/10/21  3:58 AM   Result Value Ref Range    Glucose (POC) 158 (H) 65 - 117 mg/dL    Performed by [address] [place] [address]    Collection Time: 09/10/21  4:11 AM   Result Value Ref Range    Sodium 140 136 - 145 mmol/L    Potassium 4.7 3.5 - 5.1 mmol/L    Chloride 102 97 - 108 mmol/L    CO2 31 21 - 32 mmol/L    Anion gap 7 5 - 15 mmol/L    Glucose 171 (H) 65 - 100 mg/dL    BUN 31 (H) 6 - 20 MG/DL    Creatinine 1.17 0.70 - 1.30 MG/DL    BUN/Creatinine ratio 26 (H) 12 - 20      GFR est AA >60 >60 ml/min/1.73m2    GFR est non-AA >60 >60 ml/min/1.73m2    Calcium 9.2 8.5 - 10.1 MG/DL    Bilirubin, total 0.4 0.2 - 1.0 MG/DL    ALT (SGPT) 12 12 - 78 U/L    AST (SGOT) 13 (L) 15 - 37 U/L    Alk.  phosphatase 113 45 - 117 U/L    Protein, total 8.0 6.4 - 8.2 g/dL    Albumin 2.5 (L) 3.5 - 5.0 g/dL    Globulin 5.5 (H) 2.0 - 4.0 g/dL    A-G Ratio 0.5 (L) 1.1 - 2.2     MAGNESIUM    Collection Time: 09/10/21  4:11 AM   Result Value Ref Range    Magnesium 2.6 (H) 1.6 - 2.4 mg/dL   PHOSPHORUS    Collection Time: 09/10/21  4:11 AM   Result Value Ref Range    Phosphorus 4.0 2.6 - 4.7 MG/DL   TSH 3RD GENERATION    Collection Time: 09/10/21  4:11 AM   Result Value Ref Range    TSH 0.50 0.36 - 3.74 uIU/mL   CBC WITH AUTOMATED DIFF    Collection Time: 09/10/21  4:11 AM   Result Value Ref Range    WBC 11.3 (H) 4.1 - 11.1 K/uL    RBC 5.80 (H) 4.10 - 5.70 M/uL    HGB 14.1 12.1 - 17.0 g/dL    HCT 48.1 36.6 - 50.3 %    MCV 82.9 80.0 - 99.0 FL    MCH 24.3 (L) 26.0 - 34.0 PG    MCHC 29.3 (L) 30.0 - 36.5 g/dL    RDW 18.1 (H) 11.5 - 14.5 %    PLATELET 056 936 - 315 K/uL    MPV 11.0 8.9 - 12.9 FL    NRBC 0.0 0  WBC    ABSOLUTE NRBC 0.00 0.00 - 0.01 K/uL    NEUTROPHILS 96 (H) 32 - 75 %    LYMPHOCYTES 2 (L) 12 - 49 %    MONOCYTES 1 (L) 5 - 13 %    EOSINOPHILS 0 0 - 7 %    BASOPHILS 0 0 - 1 %    IMMATURE GRANULOCYTES 1 (H) 0.0 - 0.5 %    ABS. NEUTROPHILS 10.9 (H) 1.8 - 8.0 K/UL    ABS. LYMPHOCYTES 0.2 (L) 0.8 - 3.5 K/UL    ABS. MONOCYTES 0.1 0.0 - 1.0 K/UL    ABS. EOSINOPHILS 0.0 0.0 - 0.4 K/UL    ABS. BASOPHILS 0.0 0.0 - 0.1 K/UL    ABS. IMM. GRANS. 0.1 (H) 0.00 - 0.04 K/UL    DF SMEAR SCANNED      RBC COMMENTS ANISOCYTOSIS  1+        RBC COMMENTS POLYCHROMASIA  1+       SAMPLES BEING HELD    Collection Time: 09/10/21  4:11 AM   Result Value Ref Range    SAMPLES BEING HELD 1RED     COMMENT        Add-on orders for these samples will be processed based on acceptable specimen integrity and analyte stability, which may vary by analyte.    GLUCOSE, POC    Collection Time: 09/10/21  7:56 AM   Result Value Ref Range    Glucose (POC) 173 (H) 65 - 117 mg/dL    Performed by [address] [place] [address] Ne, POC    Collection Time: 09/10/21 11:14 AM   Result Value Ref Range    Glucose (POC) 184 (H) 65 - 117 mg/dL    Performed by Shayna Main          Impression:     Patient Active Problem List    Diagnosis Date Noted    Acute on chronic respiratory failure ([facility] [address] 09/09/2021     Active Problems:    [facility] (9/9/2021)        Plan:   No indication for orthopedic surgical intervention at this time. Appears to have generalized cellulitis rather than septic joint or bursa. Nothing to aspirate today. Would anticipate that this will improve with antibiotics. Will re-evaluate over the weekend. Dr. Montero Gum aware and agree with above plan.       Agata Lara 39

## 2021-09-10 NOTE — ED NOTES
RN spoke with Abrahan Randall NP about pt's ABG results from 09/09 1742 and pt awaiting to be seen by ICU provider in ED.  Per NP, repeat ABG now and pt will be evaluated

## 2021-09-10 NOTE — ROUTINE PROCESS
TRANSFER - OUT REPORT:    Verbal report given to Che(name) on Bridgett Schumacher  being transferred to [facility](unit) for routine progression of care       Report consisted of patients Situation, Background, Assessment and   Recommendations(SBAR). Information from the following report(s) SBAR, ED Summary and MAR was reviewed with the receiving nurse. Lines:   PICC Triple Lumen 75/90/38 Right;Basilic (Active)       Peripheral IV 09/09/21 Right Hand (Active)   Site Assessment Clean, dry, & intact 09/09/21 1147   Phlebitis Assessment 0 09/09/21 1147   Infiltration Assessment 0 09/09/21 1147   Dressing Status Clean, dry, & intact 09/09/21 1147   Dressing Type Transparent 09/09/21 1147   Hub Color/Line Status Blue;Flushed;Patent 09/09/21 1147   Action Taken Blood drawn 09/09/21 1147        Opportunity for questions and clarification was provided.       Patient transported with:   [employer]

## 2021-09-10 NOTE — PROGRESS NOTES
Admission Medication Reconciliation: In progress:    Consult received by Dr. Luciana English to perform medication reconciliation services. Unable to speak with patient face to face at this time due to general isolation precautions in the ED related to COVID-19 pandemic. At this time patient can not participate in interview by telephone due to clinical condition. Unable to reach Coretta Olvera @ [phone] (located in chart notes, not in demographics) or brother Patsy Berger @ [phone]. Chart notes: PTA med list is noted to have had all items removed today between 1532-8893 without notation as to who removed from list or why. Was apparently \"last reviewed by Dr. Boy Harrington on 4/28/13 @ 1333\" - clearly Epic documentation is not appropriately reflective of today's review by unknown person. There is no RX Query data at this time. RX Query were used to update medication list.    Will check back to see whether patient can participate in interview- will update PTA med list and post progress note if current information becomes available. Thank you for allowing me to participate in the care of your patient. Judy Weir PharmD, RN # [phone]       [facility] pharmacy benefit data reflects medications filled and processed through the patient's insurance, however   this data does NOT capture whether the medication was picked up or is currently being taken by the patient. Allergies:  Patient has no known allergies.     Significant PMH/Disease States:   Past Medical History:   Diagnosis Date    DM (diabetes mellitus) ([address]     Dyslipidemia     Hypertension      Chief Complaint for this Admission:    Chief Complaint   Patient presents with    Rash    Shortness of Breath     Prior to Admission Medications:   Prior to Admission Medications           Please contact the main inpatient pharmacy with any questions or concerns at [phone] and we will direct you to the clinical pharmacist covering this patient's care while in-house.    MORAIMA Vasquez

## 2021-09-10 NOTE — ED NOTES
Pt's linen changed and changed into a gown. Extensive breakdown and redness noted to pt's perineum. Antifungal powder applied to area. Waffle pillow placed under sacrum and pillows given to support arms.

## 2021-09-10 NOTE — PROGRESS NOTES
[facility]    ICU TEAM Progress Note    Name: Dorina Castrejon   : 1965   MRN: [id number]   Date: 9/10/2021           ICU Assessment     1. Hypercapnic respiratory failure  2. Left upper extremity cellulitis  3. Morbid obesity    Imagin/10/2021   Reviewed         ICU Comprehensive Plan of Care:     1. Neurological System  No current issues  Spontaneous Awakening Trial: N/A  Sedation: N/A  Analgesia: Fentanyl    2. Cardiovascular System  Possible CHF, echo pending  SBP Goal of: > 90 mmHg  MAP Goal of: > 65 mmHg  None - For above SBP/MAP goals  Transfusion Trigger (Hgb): <7 g/dL  Keep K > 4; Mg > 2     3. Respiratory System  Aggressive bronchopulmonary hygiene  Spontaneous Breathing Trial: N/A  Pulmonary toilet: Incentive Spirometry   SpO2 Goal: > 92%   DVT Prophylaxis: Lovenox     4. Renal/GI/Endocrine System  IVFs: TKO  Ulcer Prophylaxis: Not at this time   Bowel Regimen: MiraLax  Feeding:  Yes    Blood Sugar Goal 120-180 - Glycemic Control: Insulin    5. Infectious Disease  Ceftriaxone and Zithromax for left upper extremity cellulitis   Indwelling Catheter:  Tubes: None  Lines: PICC Line  Drains: None  D - De-escalation of Antibiotics:   Azithromycin  Ceftriaxone  COVID Treatment:  Rapid negative    6. PT/OT: PT consulted and on board and OT consulted and on board     7. Goals of Care Discussion with family Yes     8. Plan of Care/Code Status: Full Code    9. Discussed Care Plan with Bedside RN    10. Documentation of Current Medications    Subjective:   Progress Note: 9/10/2021      Reason for ICU Admission: Hypercapnic respiratory failure    HPI: Patient is a 23-year-old morbidly obese male that presented to the ED yesterday for left upper extremity cellulitis. It was noted that the patient was hypoxic when vitals were taken. An ABG was obtained which showed a PCO2 in the 90s. The patient was placed on BiPAP and slowly improved overnight.     Overnight Events:   9/10/2021  As above    POD:  * No surgery found *    S/P:       Active Problem List:     Problem List  Date Reviewed: 2013        Codes Class    Acute on chronic respiratory failure [facility]) ICD-10-CM: J96.20  ICD-9-CM: 518.84               Past Medical History:      has a past medical history of DM (diabetes mellitus) ([address] Dyslipidemia, and Hypertension. Past Surgical History:      has no past surgical history on file. Home Medications:     Prior to Admission medications    Medication Sig Start Date End Date Taking? Authorizing Provider   insulin aspart (NOVOLOG) 100 unit/mL injection 0.2 mL by SubCUTAneous route Before breakfast, lunch, and dinner. Plus sliding scale 13   Venice Valenzuela MD   insulin detemir (LEVEMIR) 100 unit/mL injection 0.6 mL by SubCUTAneous route two (2) times a day. 13   Venice Valenzuela MD   insulin NPH (NOVOLIN, HUMULIN) 100 unit/mL injection Inject 50 Units at bedtime and 50 Units in the morning 13   Venice Valenzuela MD   nebivolol (BYSTOLIC) 10 mg tablet Take 1 Tab by mouth daily. 13   Venice Valenzuela MD   Olmesartan-Amlodipine-HCTZ Darlina Netter) 40-10-25 mg Tab Take 1 Tab by mouth daily. 13   Venice Valenzuela MD   lisinopril (PRINIVIL, ZESTRIL) 40 mg tablet TAKE ONE TABLET BY MOUTH EVERY DAY 11   Venice Valenzuela MD       Allergies/Social/Family History:     No Known Allergies   Social History     Tobacco Use    Smoking status: Never Smoker   Substance Use Topics    Alcohol use: No      History reviewed. No pertinent family history.     Review of Systems:     Integument/breast: positive for Left upper extremity cellulitis    Objective:   Vital Signs:  Visit Vitals  BP (!) 160/88   Pulse 68   Temp 97.3 °F (36.3 °C)   Resp 22   Wt 149.5 kg (329 lb 9.4 oz)   SpO2 (!) 88%   BMI 43.48 kg/m²    O2 Flow Rate (L/min): 6 l/min O2 Device: Nasal cannula Temp (24hrs), Av.1 °F (36.7 °C), Min:97.3 °F (36.3 °C), Max:98.9 °F (37.2 °C)           Intake/Output:     Intake/Output Summary (Last 24 hours) at 9/10/2021 1117  Last data filed at 9/10/2021 0100  Gross per 24 hour   Intake 400 ml   Output    Net 400 ml       Physical Exam:    General appearance: alert, cooperative, no distress, appears older than stated age  Lungs: clear to auscultation bilaterally  Heart: regular rate and rhythm, S1, S2 normal, no murmur, click, rub or gallop  Abdomen: soft, non-tender. Bowel sounds normal. No masses,  no organomegaly, pannus noted  Skin: Patient with left upper extremity erythema from mid humerus to wrist.  Warm and tender to the touch. No fluctuance noted  Neurologic: Alert and oriented X 3, normal strength and tone. Normal symmetric reflexes. Normal coordination and gait      LABS AND  DATA: Personally reviewed  Recent Labs     09/10/21  0411 09/09/21  1306   WBC 11.3* 15.0*   HGB 14.1 13.0   HCT 48.1 44.9    210     Recent Labs     09/10/21  0411 09/09/21  1306    137   K 4.7 3.7    102   CO2 31 33*   BUN 31* 27*   CREA 1.17 1.14   * 120*   CA 9.2 8.7   MG 2.6*  --    PHOS 4.0  --      Recent Labs     09/10/21  0411 09/09/21  1306    106   TP 8.0 7.7   ALB 2.5* 2.6*   GLOB 5.5* 5.1*     No results for input(s): INR, PTP, APTT, INREXT in the last 72 hours. Recent Labs     09/10/21  0111 09/09/21  1742   PHI 7.39 7.23*   PCO2I 50.4* 95.1*   PO2I 61* 64*   FIO2I 100 50     Recent Labs     09/09/21  1306   TROIQ <0.05       Hemodynamics:   PAP:   CO:     Wedge:   CI:     CVP:    SVR:       PVR:       Ventilator Settings:  Mode Rate Tidal Volume Pressure FiO2 PEEP            60 %       Peak airway pressure:      Minute ventilation: 11.9 l/min        MEDS: Reviewed    Chest X-Ray:  CXR Results  (Last 48 hours)               09/09/21 1220  XR CHEST PA LAT Final result    Impression:  1. Study limited by patient increased body habitus.    2.  Pulmonary vascular congestion with mild edema pattern           Narrative:  EXAM: XR CHEST PA LAT       INDICATION: Short of breath       COMPARISON: None       TECHNIQUE: PA and lateral chest views       FINDINGS:   Study limited by patient increased body habitus. Cardiomediastinal silhouette is enlarged. Pulmonary vascular congestion with   mild edema pattern. Right hemidiaphragm elevation. No definite focal consolidation, sizable pleural   effusion, no discernible pneumothorax. ECHO:        Multidisciplinary Rounds Completed:  N/A    ABCDEF Bundle/Checklist Completed:  Yes    SPECIAL EQUIPMENT  None    DISPOSITION  Transfer to non-ICU bed    CRITICAL CARE CONSULTANT NOTE  I had a face to face encounter with the patient, reviewed and interpreted patient data including clinical events, labs, images, vital signs, I/O's, and examined patient. I have discussed the case and the plan and management of the patient's care with the consulting services, the bedside nurses and the respiratory therapist.      NOTE OF PERSONAL INVOLVEMENT IN CARE   This patient has a high probability of imminent, clinically significant deterioration, which requires the highest level of preparedness to intervene urgently. I participated in the decision-making and personally managed or directed the management of the following life and organ supporting interventions that required my frequent assessment to treat or prevent imminent deterioration. I personally spent 30 minutes of critical care time. This is time spent at this critically ill patient's bedside actively involved in patient care as well as the coordination of care. This does not include any procedural time which has been billed separately.     [address] [facility]  9/10/2021

## 2021-09-11 LAB
ANION GAP SERPL CALC-SCNC: 3 MMOL/L (ref 5–15)
BASOPHILS # BLD: 0 K/UL (ref 0–0.1)
BASOPHILS NFR BLD: 0 % (ref 0–1)
BUN SERPL-MCNC: 39 MG/DL (ref 6–20)
BUN/CREAT SERPL: 27 (ref 12–20)
CALCIUM SERPL-MCNC: 8.8 MG/DL (ref 8.5–10.1)
CHLORIDE SERPL-SCNC: 99 MMOL/L (ref 97–108)
CO2 SERPL-SCNC: 34 MMOL/L (ref 21–32)
CREAT SERPL-MCNC: 1.42 MG/DL (ref 0.7–1.3)
CRP SERPL HS-MCNC: >9.5 MG/L
DIFFERENTIAL METHOD BLD: ABNORMAL
EOSINOPHIL # BLD: 0 K/UL (ref 0–0.4)
EOSINOPHIL NFR BLD: 0 % (ref 0–7)
ERYTHROCYTE [DISTWIDTH] IN BLOOD BY AUTOMATED COUNT: 17 % (ref 11.5–14.5)
EST. AVERAGE GLUCOSE BLD GHB EST-MCNC: 192 MG/DL
GLUCOSE BLD STRIP.AUTO-MCNC: 239 MG/DL (ref 65–117)
GLUCOSE BLD STRIP.AUTO-MCNC: 272 MG/DL (ref 65–117)
GLUCOSE BLD STRIP.AUTO-MCNC: 325 MG/DL (ref 65–117)
GLUCOSE BLD STRIP.AUTO-MCNC: 355 MG/DL (ref 65–117)
GLUCOSE SERPL-MCNC: 284 MG/DL (ref 65–100)
HBA1C MFR BLD: 8.3 % (ref 4–5.6)
HCT VFR BLD AUTO: 43.8 % (ref 36.6–50.3)
HGB BLD-MCNC: 12.9 G/DL (ref 12.1–17)
IMM GRANULOCYTES # BLD AUTO: 0.2 K/UL (ref 0–0.04)
IMM GRANULOCYTES NFR BLD AUTO: 1 % (ref 0–0.5)
LYMPHOCYTES # BLD: 0.2 K/UL (ref 0.8–3.5)
LYMPHOCYTES NFR BLD: 1 % (ref 12–49)
MAGNESIUM SERPL-MCNC: 2.6 MG/DL (ref 1.6–2.4)
MCH RBC QN AUTO: 23.8 PG (ref 26–34)
MCHC RBC AUTO-ENTMCNC: 29.5 G/DL (ref 30–36.5)
MCV RBC AUTO: 81 FL (ref 80–99)
MONOCYTES # BLD: 0.3 K/UL (ref 0–1)
MONOCYTES NFR BLD: 2 % (ref 5–13)
NEUTS SEG # BLD: 15.8 K/UL (ref 1.8–8)
NEUTS SEG NFR BLD: 96 % (ref 32–75)
NRBC # BLD: 0 K/UL (ref 0–0.01)
NRBC BLD-RTO: 0 PER 100 WBC
PHOSPHATE SERPL-MCNC: 3.1 MG/DL (ref 2.6–4.7)
PLATELET # BLD AUTO: 226 K/UL (ref 150–400)
PMV BLD AUTO: 10.9 FL (ref 8.9–12.9)
POTASSIUM SERPL-SCNC: 3.6 MMOL/L (ref 3.5–5.1)
PROCALCITONIN SERPL-MCNC: 0.07 NG/ML
RBC # BLD AUTO: 5.41 M/UL (ref 4.1–5.7)
RBC MORPH BLD: ABNORMAL
SERVICE CMNT-IMP: ABNORMAL
SODIUM SERPL-SCNC: 136 MMOL/L (ref 136–145)
URATE SERPL-MCNC: 9.3 MG/DL (ref 3.5–7.2)
WBC # BLD AUTO: 16.5 K/UL (ref 4.1–11.1)

## 2021-09-11 PROCEDURE — 84100 ASSAY OF PHOSPHORUS: CPT

## 2021-09-11 PROCEDURE — 74011250637 HC RX REV CODE- 250/637: Performed by: HOSPITALIST

## 2021-09-11 PROCEDURE — 74011250636 HC RX REV CODE- 250/636: Performed by: HOSPITALIST

## 2021-09-11 PROCEDURE — 74011250637 HC RX REV CODE- 250/637: Performed by: INTERNAL MEDICINE

## 2021-09-11 PROCEDURE — 74011000250 HC RX REV CODE- 250: Performed by: HOSPITALIST

## 2021-09-11 PROCEDURE — 80048 BASIC METABOLIC PNL TOTAL CA: CPT

## 2021-09-11 PROCEDURE — 97161 PT EVAL LOW COMPLEX 20 MIN: CPT

## 2021-09-11 PROCEDURE — 85025 COMPLETE CBC W/AUTO DIFF WBC: CPT

## 2021-09-11 PROCEDURE — 77010033711 HC HIGH FLOW OXYGEN

## 2021-09-11 PROCEDURE — 94640 AIRWAY INHALATION TREATMENT: CPT

## 2021-09-11 PROCEDURE — 36415 COLL VENOUS BLD VENIPUNCTURE: CPT

## 2021-09-11 PROCEDURE — 86141 C-REACTIVE PROTEIN HS: CPT

## 2021-09-11 PROCEDURE — 74011636637 HC RX REV CODE- 636/637: Performed by: NURSE PRACTITIONER

## 2021-09-11 PROCEDURE — 97116 GAIT TRAINING THERAPY: CPT

## 2021-09-11 PROCEDURE — 82962 GLUCOSE BLOOD TEST: CPT

## 2021-09-11 PROCEDURE — 83735 ASSAY OF MAGNESIUM: CPT

## 2021-09-11 PROCEDURE — 74011250636 HC RX REV CODE- 250/636: Performed by: HEALTH CARE PROVIDER

## 2021-09-11 PROCEDURE — 84145 PROCALCITONIN (PCT): CPT

## 2021-09-11 PROCEDURE — 74011000258 HC RX REV CODE- 258: Performed by: HOSPITALIST

## 2021-09-11 PROCEDURE — 83036 HEMOGLOBIN GLYCOSYLATED A1C: CPT

## 2021-09-11 PROCEDURE — 84550 ASSAY OF BLOOD/URIC ACID: CPT

## 2021-09-11 PROCEDURE — 74011000250 HC RX REV CODE- 250: Performed by: INTERNAL MEDICINE

## 2021-09-11 PROCEDURE — 65660000000 HC RM CCU STEPDOWN

## 2021-09-11 RX ORDER — INSULIN GLARGINE 100 [IU]/ML
22 INJECTION, SOLUTION SUBCUTANEOUS DAILY
Status: DISCONTINUED | OUTPATIENT
Start: 2021-09-12 | End: 2021-09-12

## 2021-09-11 RX ORDER — IPRATROPIUM BROMIDE AND ALBUTEROL SULFATE 2.5; .5 MG/3ML; MG/3ML
3 SOLUTION RESPIRATORY (INHALATION)
Status: DISCONTINUED | OUTPATIENT
Start: 2021-09-11 | End: 2021-09-13

## 2021-09-11 RX ADMIN — Medication 10 ML: at 22:46

## 2021-09-11 RX ADMIN — ARFORMOTEROL TARTRATE 15 MCG: 15 SOLUTION RESPIRATORY (INHALATION) at 08:04

## 2021-09-11 RX ADMIN — INSULIN GLARGINE 18 UNITS: 100 INJECTION, SOLUTION SUBCUTANEOUS at 09:38

## 2021-09-11 RX ADMIN — INSULIN LISPRO 3 UNITS: 100 INJECTION, SOLUTION INTRAVENOUS; SUBCUTANEOUS at 09:37

## 2021-09-11 RX ADMIN — METHYLPREDNISOLONE SODIUM SUCCINATE 40 MG: 40 INJECTION, POWDER, FOR SOLUTION INTRAMUSCULAR; INTRAVENOUS at 09:38

## 2021-09-11 RX ADMIN — ENOXAPARIN SODIUM 40 MG: 100 INJECTION SUBCUTANEOUS at 09:37

## 2021-09-11 RX ADMIN — Medication 5 ML: at 06:00

## 2021-09-11 RX ADMIN — BUDESONIDE 500 MCG: 0.5 INHALANT RESPIRATORY (INHALATION) at 08:04

## 2021-09-11 RX ADMIN — METHYLPREDNISOLONE SODIUM SUCCINATE 40 MG: 40 INJECTION, POWDER, FOR SOLUTION INTRAMUSCULAR; INTRAVENOUS at 20:45

## 2021-09-11 RX ADMIN — IPRATROPIUM BROMIDE AND ALBUTEROL SULFATE 3 ML: .5; 3 SOLUTION RESPIRATORY (INHALATION) at 13:41

## 2021-09-11 RX ADMIN — IPRATROPIUM BROMIDE AND ALBUTEROL SULFATE 3 ML: .5; 3 SOLUTION RESPIRATORY (INHALATION) at 08:04

## 2021-09-11 RX ADMIN — Medication 10 ML: at 13:03

## 2021-09-11 RX ADMIN — MICONAZOLE NITRATE 2 % TOPICAL POWDER: at 10:14

## 2021-09-11 RX ADMIN — BUDESONIDE 500 MCG: 0.5 INHALANT RESPIRATORY (INHALATION) at 19:13

## 2021-09-11 RX ADMIN — MICONAZOLE NITRATE 2 % TOPICAL POWDER: at 18:21

## 2021-09-11 RX ADMIN — ACETAMINOPHEN 650 MG: 325 TABLET ORAL at 10:15

## 2021-09-11 RX ADMIN — INSULIN LISPRO 7 UNITS: 100 INJECTION, SOLUTION INTRAVENOUS; SUBCUTANEOUS at 12:21

## 2021-09-11 RX ADMIN — INSULIN LISPRO 4 UNITS: 100 INJECTION, SOLUTION INTRAVENOUS; SUBCUTANEOUS at 22:45

## 2021-09-11 RX ADMIN — BENZOCAINE AND MENTHOL 1 LOZENGE: 15; 3.6 LOZENGE ORAL at 13:03

## 2021-09-11 RX ADMIN — INSULIN LISPRO 5 UNITS: 100 INJECTION, SOLUTION INTRAVENOUS; SUBCUTANEOUS at 18:20

## 2021-09-11 RX ADMIN — IPRATROPIUM BROMIDE AND ALBUTEROL SULFATE 3 ML: .5; 3 SOLUTION RESPIRATORY (INHALATION) at 19:13

## 2021-09-11 RX ADMIN — CEFTRIAXONE SODIUM 1 G: 1 INJECTION, POWDER, FOR SOLUTION INTRAMUSCULAR; INTRAVENOUS at 18:20

## 2021-09-11 RX ADMIN — ARFORMOTEROL TARTRATE 15 MCG: 15 SOLUTION RESPIRATORY (INHALATION) at 19:13

## 2021-09-11 RX ADMIN — FUROSEMIDE 40 MG: 10 INJECTION, SOLUTION INTRAMUSCULAR; INTRAVENOUS at 09:38

## 2021-09-11 NOTE — PROGRESS NOTES
[address] [facility] Adult  Hospitalist Group                                                                                          Hospitalist Progress Note  Marcos Marina MD  Answering service: [phone] from in house phone        Date of Service:  2021  NAME:  Mavis Espinoza  :  1965  MRN:  [id number]      Admission Summary:   60-year-old gentleman history of diabetes, hypertension, dyslipidemia, likely history of CHF as well he uses oxygen as needed at home (particularly at night for obstructive sleep apnea), morbid obese  presents to the emergency department today with chief complaint of generalized not feeling well for the past 4 to 5 days. Interval history / Subjective:   Pt is doing ok overall. Has some ongoing R upper extremity redness but improving. On 15HFNC, and saturating low 90's. Says he has 2LNC PRN at home and at night. Assessment & Plan:     Acute on chronic hypercapneic and hypoxic respiratory failure  Acute COPD exacerbation   - PRN bipap  - On 15LNC wean as tolerated for saturation 88% or above   - Continue steroids, nebs  - Pulmonary following  - will need o/p sleep study     Acute on chronic diastolic CHF - NYHA class IV on admit  - I and O   - Daily lasix   - BMP daily   - O2 as  needed    Cellulitis - LUE  - IV CTX, improving  - Monitor with abx  - Ortho consulted, no sign of joint or bursa involvement.      Type 2 diabetes - check A1c  - SSI, POCYT glucose checks and hypoglycemia prtocol   - increase glargine to 22U    Morbid obestiy -counseled weight loss     Code status: FULL  DVT prophylaxis: heparin     Care Plan discussed with: Patient/Family  Anticipated Disposition: Home w/Family  Anticipated Discharge: Greater than 48 hours     Hospital Problems  Date Reviewed: 2013        Codes Class Noted POA    Acute on chronic respiratory failure [facility]) ICD-10-CM: J96.20  ICD-9-CM: 518.84  2021 Unknown                Review of Systems: A comprehensive review of systems was negative except for that written in the HPI. Vital Signs:    Last 24hrs VS reviewed since prior progress note. Most recent are:  Visit Vitals  BP (!) 143/74 (BP 1 Location: Left upper arm, BP Patient Position: Sitting)   Pulse 79   Temp 97.8 °F (36.6 °C)   Resp 21   Wt 149.5 kg (329 lb 9.4 oz)   SpO2 90%   BMI 43.48 kg/m²         Intake/Output Summary (Last 24 hours) at 9/11/2021 1355  Last data filed at 9/11/2021 1222  Gross per 24 hour   Intake 90 ml   Output 2040 ml   Net -1950 ml        Physical Examination:     I had a face to face encounter with this patient and independently examined them on 9/11/2021 as outlined below:          Constitutional:  No acute distress, cooperative, pleasant, morbidly obese   ENT:  Oral mucosa moist, oropharynx benign. Resp:  Scattered wheezing, bilateral crackles in the bases. No accessory muscle use saturating 90% on 15 L   CV:  Regular rhythm, normal rate, no murmurs, gallops, rubs    GI:  Soft, non distended, non tender. normoactive bowel sounds, no hepatosplenomegaly     Musculoskeletal:  1+ edema, warm, 2+ pulses throughout. Left upper extremity with minimal erythema to the elbow    Neurologic:  Moves all extremities.   AAOx3, CN II-XII reviewed            Data Review:    Review and/or order of clinical lab test  Review and/or order of tests in the radiology section of CPT  Review and/or order of tests in the medicine section of CPT      Labs:     Recent Labs     09/11/21  0333 09/10/21  0411   WBC 16.5* 11.3*   HGB 12.9 14.1   HCT 43.8 48.1    223     Recent Labs     09/11/21  0333 09/10/21  0411 09/09/21  1306    140 137   K 3.6 4.7 3.7   CL 99 102 102   CO2 34* 31 33*   BUN 39* 31* 27*   CREA 1.42* 1.17 1.14   * 171* 120*   CA 8.8 9.2 8.7   MG 2.6* 2.6*  --    PHOS 3.1 4.0  --    URICA 9.3*  --   --      Recent Labs     09/10/21  0411 09/09/21  1306   ALT 12 12    106   TBILI 0.4 0.5   TP 8.0 7.7 ALB 2.5* 2.6*   GLOB 5.5* 5.1*     No results for input(s): INR, PTP, APTT, INREXT in the last 72 hours. No results for input(s): FE, TIBC, PSAT, FERR in the last 72 hours. No results found for: FOL, RBCF   No results for input(s): PH, PCO2, PO2 in the last 72 hours.   Recent Labs     09/09/21  1306   TROIQ <0.05     Lab Results   Component Value Date/Time    Cholesterol, total 170 01/23/2013 11:08 AM    HDL Cholesterol 37 (L) 01/23/2013 11:08 AM    LDL, calculated 97 01/23/2013 11:08 AM    Triglyceride 180 (H) 01/23/2013 11:08 AM    CHOL/HDL Ratio 6.0 (H) 10/06/2010 03:18 PM     Lab Results   Component Value Date/Time    Glucose (POC) 355 (H) 09/11/2021 11:15 AM    Glucose (POC) 239 (H) 09/11/2021 08:14 AM    Glucose (POC) 296 (H) 09/10/2021 09:25 PM    Glucose (POC) 226 (H) 09/10/2021 05:06 PM    Glucose (POC) 184 (H) 09/10/2021 11:14 AM     No results found for: COLOR, APPRN, SPGRU, REFSG, ELIZA, PROTU, GLUCU, KETU, BILU, UROU, RON, LEUKU, GLUKE, EPSU, BACTU, WBCU, RBCU, CASTS, UCRY      Medications Reviewed:     Current Facility-Administered Medications   Medication Dose Route Frequency    albuterol-ipratropium (DUO-NEB) 2.5 MG-0.5 MG/3 ML  3 mL Nebulization Q6H RT    benzocaine-menthoL (CEPACOL) lozenge 1 Lozenge  1 Lozenge Mucous Membrane PRN    fentaNYL citrate (PF) injection 50 mcg  50 mcg IntraVENous Q4H PRN    insulin glargine (LANTUS) injection 18 Units  18 Units SubCUTAneous DAILY    hydrALAZINE (APRESOLINE) 20 mg/mL injection 10 mg  10 mg IntraVENous Q6H PRN    insulin lispro (HUMALOG) injection   SubCUTAneous AC&HS    glucose chewable tablet 16 g  4 Tablet Oral PRN    dextrose (D50W) injection syrg 12.5-25 g  25-50 mL IntraVENous PRN    glucagon (GLUCAGEN) injection 1 mg  1 mg IntraMUSCular PRN    sodium chloride (NS) flush 5-40 mL  5-40 mL IntraVENous Q8H    sodium chloride (NS) flush 5-40 mL  5-40 mL IntraVENous PRN    acetaminophen (TYLENOL) tablet 650 mg  650 mg Oral Q6H PRN    Or  acetaminophen (TYLENOL) suppository 650 mg  650 mg Rectal Q6H PRN    polyethylene glycol (MIRALAX) packet 17 g  17 g Oral DAILY PRN    ondansetron (ZOFRAN ODT) tablet 4 mg  4 mg Oral Q8H PRN    Or    ondansetron (ZOFRAN) injection 4 mg  4 mg IntraVENous Q6H PRN    azithromycin (ZITHROMAX) 500 mg in 0.9% sodium chloride 250 mL (VIAL-MATE)  500 mg IntraVENous Q24H    methylPREDNISolone (PF) (SOLU-MEDROL) injection 40 mg  40 mg IntraVENous Q12H    arformoteroL (BROVANA) neb solution 15 mcg  15 mcg Nebulization BID RT    And    budesonide (PULMICORT) 500 mcg/2 ml nebulizer suspension  500 mcg Nebulization BID RT    furosemide (LASIX) injection 40 mg  40 mg IntraVENous DAILY    miconazole (MICOTIN) 2 % powder   Topical BID    cefTRIAXone (ROCEPHIN) 1 g in 0.9% sodium chloride (MBP/ADV) 50 mL MBP  1 g IntraVENous Q24H    sodium chloride (NS) flush 5-40 mL  5-40 mL IntraVENous Q8H    sodium chloride (NS) flush 5-40 mL  5-40 mL IntraVENous PRN    polyethylene glycol (MIRALAX) packet 17 g  17 g Oral DAILY PRN    enoxaparin (LOVENOX) injection 40 mg  40 mg SubCUTAneous DAILY     ______________________________________________________________________  EXPECTED LENGTH OF STAY: - - -  ACTUAL LENGTH OF STAY:          2                 Jung Watkins MD

## 2021-09-11 NOTE — PROGRESS NOTES
Bedside shift change report given to Meredith RN (oncoming nurse) by Baldo Billingsley RN (offgoing nurse). Report included the following information SBAR, Kardex, Intake/Output, MAR and Recent Results.    t

## 2021-09-11 NOTE — PROGRESS NOTES
Bedside shift change report given to [address] [place] [address] (oncoming nurse) by Joel Villalobos (offgoing nurse). Report included the following information SBAR, ED Summary, Intake/Output, MAR, Recent Results and Cardiac Rhythm NSR.

## 2021-09-11 NOTE — PROGRESS NOTES
Ortho Progress Note    Reports left arm is still somewhat painful  Clinically his arm looks much better, swelling and redness decreasing  No palpable fluctuance  Continue ABX  No plan for further intervention from orthopedic surgery    Pamela Tuckre PA-C

## 2021-09-11 NOTE — PROGRESS NOTES
Bedside shift change report given to NIK Rico (oncoming nurse) by Jose Armenta (offgoing nurse). Report included the following information SBAR, Kardex, OR Summary, MAR and Recent Results.

## 2021-09-11 NOTE — PROGRESS NOTES
Problem: Mobility Impaired (Adult and Pediatric)  Goal: *Acute Goals and Plan of Care (Insert Text)  Description: FUNCTIONAL STATUS PRIOR TO ADMISSION: Patient was modified independent using a rollator for functional mobility. HOME SUPPORT PRIOR TO ADMISSION: The patient lived alone with family to provide assistance. Patient also reports having an aide that comes 11-5 Monday-Friday. Physical Therapy Goals  Initiated 9/11/2021  1. Patient will move from supine to sit and sit to supine , scoot up and down, and roll side to side in bed with modified independence within 7 day(s). 2.  Patient will transfer from bed to chair and chair to bed with modified independence using the least restrictive device within 7 day(s). 3.  Patient will perform sit to stand with modified independence within 7 day(s). 4.  Patient will ambulate with modified independence for 100 feet with the least restrictive device within 7 day(s). Outcome: Progressing Towards Goal     PHYSICAL THERAPY EVALUATION  Patient: Claudean So [de-identified]48 y.o. male)  Date: 9/11/2021  Primary Diagnosis: Acute on chronic respiratory failure (HCC) [J96.20]        Precautions:        ASSESSMENT  Based on the objective data described below, the patient presents with generalized weakness, impaired balance, reliance on supplemental O2, and decreased independence from baseline following admission for acute on chronic respiratory failure. Patient currently on midflow O2 at 12L. Patient overall CGA for observed transfers and tolerated ambulation x 15 feet with rollator. O2 sats 85% with activity, requires seated rest break to return O2 to baseline 97%. At baseline patient does not require supplemental O2. Patient remained OOB in chair. Recommend SNF at discharge. Current Level of Function Impacting Discharge (mobility/balance): CGA transfers, short distance ambulation    Functional Outcome Measure:   The patient scored 20 seconds on the TUG outcome measure which is indicative of high fall risk. Other factors to consider for discharge:      Patient will benefit from skilled therapy intervention to address the above noted impairments. PLAN :  Recommendations and Planned Interventions: bed mobility training, transfer training, gait training, therapeutic exercises, neuromuscular re-education, patient and family training/education, and therapeutic activities      Frequency/Duration: Patient will be followed by physical therapy:  5 times a week to address goals. Recommendation for discharge: (in order for the patient to meet his/her long term goals)  Therapy up to 5 days/week in SNF setting    This discharge recommendation:  Has not yet been discussed the attending provider and/or case management    IF patient discharges home will need the following DME: patient owns DME required for discharge         SUBJECTIVE:   Patient stated I've been to rehab before.     OBJECTIVE DATA SUMMARY:   HISTORY:    Past Medical History:   Diagnosis Date    DM (diabetes mellitus) ([facility] [address]     Dyslipidemia     Hypertension    History reviewed. No pertinent surgical history.     Personal factors and/or comorbidities impacting plan of care:     Home Situation  Home Environment: Apartment  # Steps to Enter: 0  One/Two Story Residence: One story  Living Alone: Yes  Support Systems: Other Family Member(s) (home nurse 11-5 Monday-Friday)  Current DME Used/Available at Home: Hospital bed, Shower chair, Walker, rollator, Safety frame [employer]    EXAMINATION/PRESENTATION/DECISION MAKING:   Critical Behavior:  Neurologic State: Alert, Appropriate for age, Drowsy  Orientation Level: Disoriented X4, Oriented X4  Cognition: Appropriate decision making, Appropriate for age attention/concentration, Follows commands     Hearing:     Skin:    Edema:   Range Of Motion:  AROM: Generally decreased, functional           PROM: Within functional limits           Strength:    Strength: Generally decreased, functional                    Tone & Sensation:                                  Coordination:     Vision:      Functional Mobility:  Bed Mobility:     Supine to Sit:  (received up in chair)        Transfers:  Sit to Stand: Contact guard assistance  Stand to Sit: Contact guard assistance  Stand Pivot Transfers: Contact guard assistance                    Balance:   Sitting: Intact  Standing: Impaired  Standing - Static: Good;Constant support  Standing - Dynamic : Fair;Constant support  Ambulation/Gait Training:  Distance (ft): 15 Feet (ft)  Assistive Device: Walker, rollator  Ambulation - Level of Assistance: Contact guard assistance        Gait Abnormalities: Decreased step clearance        Base of Support: Widened     Speed/Maral: Pace decreased (<100 feet/min)  Step Length: Right shortened;Left shortened                     Stairs: Therapeutic Exercises:       Functional Measure:  Timed up and go:    Timed Get Up And Go Test: 20       < than 10 seconds=Normal  Greater then 13.5 seconds (in elderly)=Increased fall risk   Zuleyma Barron [place] M. Predicting the probability for falls in community dwelling older adults using the Timed Up and Go Test. Phys Ther. 2000;80:896-903. Activity Tolerance:   Fair    After treatment patient left in no apparent distress:   Sitting in chair and Call bell within reach    COMMUNICATION/EDUCATION:   The patients plan of care was discussed with: Registered nurse. Fall prevention education was provided and the patient/caregiver indicated understanding., Patient/family have participated as able in goal setting and plan of care. , and Patient/family agree to work toward stated goals and plan of care.     Thank you for this referral.  Gerardo Harrell, PT   Time Calculation: 22 mins

## 2021-09-12 ENCOUNTER — APPOINTMENT (OUTPATIENT)
Dept: GENERAL RADIOLOGY | Age: 56
DRG: 291 | End: 2021-09-12
Attending: INTERNAL MEDICINE
Payer: MEDICARE

## 2021-09-12 LAB
ANION GAP SERPL CALC-SCNC: 5 MMOL/L (ref 5–15)
ANION GAP SERPL CALC-SCNC: 9 MMOL/L (ref 5–15)
B PERT DNA SPEC QL NAA+PROBE: NOT DETECTED
BASOPHILS # BLD: 0 K/UL (ref 0–0.1)
BASOPHILS # BLD: 0 K/UL (ref 0–0.1)
BASOPHILS NFR BLD: 0 % (ref 0–1)
BASOPHILS NFR BLD: 0 % (ref 0–1)
BORDETELLA PARAPERTUSSIS PCR, BORPAR: NOT DETECTED
BUN SERPL-MCNC: 45 MG/DL (ref 6–20)
BUN SERPL-MCNC: 47 MG/DL (ref 6–20)
BUN/CREAT SERPL: 35 (ref 12–20)
BUN/CREAT SERPL: 38 (ref 12–20)
C PNEUM DNA SPEC QL NAA+PROBE: NOT DETECTED
CALCIUM SERPL-MCNC: 8.8 MG/DL (ref 8.5–10.1)
CALCIUM SERPL-MCNC: 8.9 MG/DL (ref 8.5–10.1)
CHLORIDE SERPL-SCNC: 96 MMOL/L (ref 97–108)
CHLORIDE SERPL-SCNC: 98 MMOL/L (ref 97–108)
CO2 SERPL-SCNC: 32 MMOL/L (ref 21–32)
CO2 SERPL-SCNC: 33 MMOL/L (ref 21–32)
COMMENT, HOLDF: NORMAL
CREAT SERPL-MCNC: 1.25 MG/DL (ref 0.7–1.3)
CREAT SERPL-MCNC: 1.28 MG/DL (ref 0.7–1.3)
DIFFERENTIAL METHOD BLD: ABNORMAL
DIFFERENTIAL METHOD BLD: ABNORMAL
EOSINOPHIL # BLD: 0 K/UL (ref 0–0.4)
EOSINOPHIL # BLD: 0 K/UL (ref 0–0.4)
EOSINOPHIL NFR BLD: 0 % (ref 0–7)
EOSINOPHIL NFR BLD: 0 % (ref 0–7)
ERYTHROCYTE [DISTWIDTH] IN BLOOD BY AUTOMATED COUNT: 17 % (ref 11.5–14.5)
ERYTHROCYTE [DISTWIDTH] IN BLOOD BY AUTOMATED COUNT: 17.2 % (ref 11.5–14.5)
FLUAV H1 2009 PAND RNA SPEC QL NAA+PROBE: NOT DETECTED
FLUAV H1 RNA SPEC QL NAA+PROBE: NOT DETECTED
FLUAV H3 RNA SPEC QL NAA+PROBE: NOT DETECTED
FLUAV SUBTYP SPEC NAA+PROBE: NOT DETECTED
FLUBV RNA SPEC QL NAA+PROBE: NOT DETECTED
GLUCOSE BLD STRIP.AUTO-MCNC: 250 MG/DL (ref 65–117)
GLUCOSE BLD STRIP.AUTO-MCNC: 267 MG/DL (ref 65–117)
GLUCOSE BLD STRIP.AUTO-MCNC: 287 MG/DL (ref 65–117)
GLUCOSE BLD STRIP.AUTO-MCNC: 366 MG/DL (ref 65–117)
GLUCOSE SERPL-MCNC: 295 MG/DL (ref 65–100)
GLUCOSE SERPL-MCNC: 318 MG/DL (ref 65–100)
HADV DNA SPEC QL NAA+PROBE: NOT DETECTED
HCOV 229E RNA SPEC QL NAA+PROBE: NOT DETECTED
HCOV HKU1 RNA SPEC QL NAA+PROBE: NOT DETECTED
HCOV NL63 RNA SPEC QL NAA+PROBE: NOT DETECTED
HCOV OC43 RNA SPEC QL NAA+PROBE: NOT DETECTED
HCT VFR BLD AUTO: 45.6 % (ref 36.6–50.3)
HCT VFR BLD AUTO: 45.8 % (ref 36.6–50.3)
HGB BLD-MCNC: 13.2 G/DL (ref 12.1–17)
HGB BLD-MCNC: 13.3 G/DL (ref 12.1–17)
HMPV RNA SPEC QL NAA+PROBE: NOT DETECTED
HPIV1 RNA SPEC QL NAA+PROBE: NOT DETECTED
HPIV2 RNA SPEC QL NAA+PROBE: NOT DETECTED
HPIV3 RNA SPEC QL NAA+PROBE: NOT DETECTED
HPIV4 RNA SPEC QL NAA+PROBE: NOT DETECTED
IMM GRANULOCYTES # BLD AUTO: 0.2 K/UL (ref 0–0.04)
IMM GRANULOCYTES # BLD AUTO: 0.2 K/UL (ref 0–0.04)
IMM GRANULOCYTES NFR BLD AUTO: 1 % (ref 0–0.5)
IMM GRANULOCYTES NFR BLD AUTO: 1 % (ref 0–0.5)
LYMPHOCYTES # BLD: 0.2 K/UL (ref 0.8–3.5)
LYMPHOCYTES # BLD: 0.2 K/UL (ref 0.8–3.5)
LYMPHOCYTES NFR BLD: 1 % (ref 12–49)
LYMPHOCYTES NFR BLD: 1 % (ref 12–49)
M PNEUMO DNA SPEC QL NAA+PROBE: NOT DETECTED
MAGNESIUM SERPL-MCNC: 2.6 MG/DL (ref 1.6–2.4)
MAGNESIUM SERPL-MCNC: 2.8 MG/DL (ref 1.6–2.4)
MCH RBC QN AUTO: 23.7 PG (ref 26–34)
MCH RBC QN AUTO: 23.8 PG (ref 26–34)
MCHC RBC AUTO-ENTMCNC: 28.9 G/DL (ref 30–36.5)
MCHC RBC AUTO-ENTMCNC: 29 G/DL (ref 30–36.5)
MCV RBC AUTO: 81.6 FL (ref 80–99)
MCV RBC AUTO: 82.3 FL (ref 80–99)
MONOCYTES # BLD: 0.3 K/UL (ref 0–1)
MONOCYTES # BLD: 0.5 K/UL (ref 0–1)
MONOCYTES NFR BLD: 2 % (ref 5–13)
MONOCYTES NFR BLD: 3 % (ref 5–13)
NEUTS SEG # BLD: 14.6 K/UL (ref 1.8–8)
NEUTS SEG # BLD: 15 K/UL (ref 1.8–8)
NEUTS SEG NFR BLD: 95 % (ref 32–75)
NEUTS SEG NFR BLD: 96 % (ref 32–75)
NRBC # BLD: 0 K/UL (ref 0–0.01)
NRBC # BLD: 0 K/UL (ref 0–0.01)
NRBC BLD-RTO: 0 PER 100 WBC
NRBC BLD-RTO: 0 PER 100 WBC
PHOSPHATE SERPL-MCNC: 2.7 MG/DL (ref 2.6–4.7)
PHOSPHATE SERPL-MCNC: 3.4 MG/DL (ref 2.6–4.7)
PLATELET # BLD AUTO: 205 K/UL (ref 150–400)
PLATELET # BLD AUTO: 213 K/UL (ref 150–400)
PMV BLD AUTO: 10.6 FL (ref 8.9–12.9)
PMV BLD AUTO: 11.2 FL (ref 8.9–12.9)
POTASSIUM SERPL-SCNC: 4.5 MMOL/L (ref 3.5–5.1)
POTASSIUM SERPL-SCNC: 4.6 MMOL/L (ref 3.5–5.1)
RBC # BLD AUTO: 5.54 M/UL (ref 4.1–5.7)
RBC # BLD AUTO: 5.61 M/UL (ref 4.1–5.7)
RBC MORPH BLD: ABNORMAL
RBC MORPH BLD: ABNORMAL
RSV RNA SPEC QL NAA+PROBE: NOT DETECTED
RV+EV RNA SPEC QL NAA+PROBE: NOT DETECTED
SAMPLES BEING HELD,HOLD: NORMAL
SARS-COV-2 PCR, COVPCR: NOT DETECTED
SERVICE CMNT-IMP: ABNORMAL
SODIUM SERPL-SCNC: 136 MMOL/L (ref 136–145)
SODIUM SERPL-SCNC: 137 MMOL/L (ref 136–145)
WBC # BLD AUTO: 15.5 K/UL (ref 4.1–11.1)
WBC # BLD AUTO: 15.7 K/UL (ref 4.1–11.1)

## 2021-09-12 PROCEDURE — 74011000258 HC RX REV CODE- 258: Performed by: HOSPITALIST

## 2021-09-12 PROCEDURE — 94664 DEMO&/EVAL PT USE INHALER: CPT

## 2021-09-12 PROCEDURE — 0202U NFCT DS 22 TRGT SARS-COV-2: CPT

## 2021-09-12 PROCEDURE — 74011000250 HC RX REV CODE- 250: Performed by: INTERNAL MEDICINE

## 2021-09-12 PROCEDURE — 80048 BASIC METABOLIC PNL TOTAL CA: CPT

## 2021-09-12 PROCEDURE — 94640 AIRWAY INHALATION TREATMENT: CPT

## 2021-09-12 PROCEDURE — 71045 X-RAY EXAM CHEST 1 VIEW: CPT

## 2021-09-12 PROCEDURE — 83735 ASSAY OF MAGNESIUM: CPT

## 2021-09-12 PROCEDURE — 74011250637 HC RX REV CODE- 250/637: Performed by: HOSPITALIST

## 2021-09-12 PROCEDURE — 36415 COLL VENOUS BLD VENIPUNCTURE: CPT

## 2021-09-12 PROCEDURE — 74011000250 HC RX REV CODE- 250: Performed by: HOSPITALIST

## 2021-09-12 PROCEDURE — 84100 ASSAY OF PHOSPHORUS: CPT

## 2021-09-12 PROCEDURE — 85025 COMPLETE CBC W/AUTO DIFF WBC: CPT

## 2021-09-12 PROCEDURE — 74011250636 HC RX REV CODE- 250/636: Performed by: HOSPITALIST

## 2021-09-12 PROCEDURE — 74011250636 HC RX REV CODE- 250/636: Performed by: HEALTH CARE PROVIDER

## 2021-09-12 PROCEDURE — 82962 GLUCOSE BLOOD TEST: CPT

## 2021-09-12 PROCEDURE — 65660000000 HC RM CCU STEPDOWN

## 2021-09-12 PROCEDURE — 74011636637 HC RX REV CODE- 636/637: Performed by: INTERNAL MEDICINE

## 2021-09-12 PROCEDURE — 74011250636 HC RX REV CODE- 250/636: Performed by: INTERNAL MEDICINE

## 2021-09-12 PROCEDURE — 74011250636 HC RX REV CODE- 250/636: Performed by: NURSE PRACTITIONER

## 2021-09-12 PROCEDURE — 74011636637 HC RX REV CODE- 636/637: Performed by: NURSE PRACTITIONER

## 2021-09-12 PROCEDURE — 77010033711 HC HIGH FLOW OXYGEN

## 2021-09-12 RX ORDER — INSULIN LISPRO 100 [IU]/ML
4 INJECTION, SOLUTION INTRAVENOUS; SUBCUTANEOUS
Status: DISCONTINUED | OUTPATIENT
Start: 2021-09-12 | End: 2021-09-13

## 2021-09-12 RX ORDER — INSULIN GLARGINE 100 [IU]/ML
26 INJECTION, SOLUTION SUBCUTANEOUS DAILY
Status: DISCONTINUED | OUTPATIENT
Start: 2021-09-13 | End: 2021-09-13

## 2021-09-12 RX ORDER — FUROSEMIDE 10 MG/ML
60 INJECTION INTRAMUSCULAR; INTRAVENOUS EVERY 12 HOURS
Status: DISCONTINUED | OUTPATIENT
Start: 2021-09-12 | End: 2021-09-13

## 2021-09-12 RX ADMIN — METHYLPREDNISOLONE SODIUM SUCCINATE 40 MG: 40 INJECTION, POWDER, FOR SOLUTION INTRAMUSCULAR; INTRAVENOUS at 20:52

## 2021-09-12 RX ADMIN — INSULIN LISPRO 10 UNITS: 100 INJECTION, SOLUTION INTRAVENOUS; SUBCUTANEOUS at 12:57

## 2021-09-12 RX ADMIN — INSULIN LISPRO 5 UNITS: 100 INJECTION, SOLUTION INTRAVENOUS; SUBCUTANEOUS at 17:20

## 2021-09-12 RX ADMIN — Medication 10 ML: at 17:21

## 2021-09-12 RX ADMIN — FENTANYL CITRATE 50 MCG: 50 INJECTION, SOLUTION INTRAMUSCULAR; INTRAVENOUS at 02:53

## 2021-09-12 RX ADMIN — CEFTRIAXONE SODIUM 1 G: 1 INJECTION, POWDER, FOR SOLUTION INTRAMUSCULAR; INTRAVENOUS at 17:20

## 2021-09-12 RX ADMIN — Medication 10 ML: at 21:56

## 2021-09-12 RX ADMIN — INSULIN GLARGINE 22 UNITS: 100 INJECTION, SOLUTION SUBCUTANEOUS at 09:00

## 2021-09-12 RX ADMIN — BUDESONIDE 500 MCG: 0.5 INHALANT RESPIRATORY (INHALATION) at 22:40

## 2021-09-12 RX ADMIN — ARFORMOTEROL TARTRATE 15 MCG: 15 SOLUTION RESPIRATORY (INHALATION) at 22:40

## 2021-09-12 RX ADMIN — Medication 10 ML: at 17:20

## 2021-09-12 RX ADMIN — INSULIN LISPRO 3 UNITS: 100 INJECTION, SOLUTION INTRAVENOUS; SUBCUTANEOUS at 21:55

## 2021-09-12 RX ADMIN — IPRATROPIUM BROMIDE AND ALBUTEROL SULFATE 3 ML: .5; 3 SOLUTION RESPIRATORY (INHALATION) at 22:40

## 2021-09-12 RX ADMIN — INSULIN LISPRO 4 UNITS: 100 INJECTION, SOLUTION INTRAVENOUS; SUBCUTANEOUS at 17:20

## 2021-09-12 RX ADMIN — METHYLPREDNISOLONE SODIUM SUCCINATE 40 MG: 40 INJECTION, POWDER, FOR SOLUTION INTRAMUSCULAR; INTRAVENOUS at 09:00

## 2021-09-12 RX ADMIN — FUROSEMIDE 60 MG: 10 INJECTION, SOLUTION INTRAMUSCULAR; INTRAVENOUS at 10:00

## 2021-09-12 RX ADMIN — FUROSEMIDE 60 MG: 10 INJECTION, SOLUTION INTRAMUSCULAR; INTRAVENOUS at 20:52

## 2021-09-12 RX ADMIN — MICONAZOLE NITRATE 2 % TOPICAL POWDER: at 17:19

## 2021-09-12 RX ADMIN — IPRATROPIUM BROMIDE AND ALBUTEROL SULFATE 3 ML: .5; 3 SOLUTION RESPIRATORY (INHALATION) at 02:00

## 2021-09-12 RX ADMIN — AZITHROMYCIN MONOHYDRATE 500 MG: 500 INJECTION, POWDER, LYOPHILIZED, FOR SOLUTION INTRAVENOUS at 00:43

## 2021-09-12 RX ADMIN — ARFORMOTEROL TARTRATE 15 MCG: 15 SOLUTION RESPIRATORY (INHALATION) at 08:01

## 2021-09-12 RX ADMIN — INSULIN LISPRO 4 UNITS: 100 INJECTION, SOLUTION INTRAVENOUS; SUBCUTANEOUS at 12:57

## 2021-09-12 RX ADMIN — AZITHROMYCIN MONOHYDRATE 500 MG: 500 INJECTION, POWDER, LYOPHILIZED, FOR SOLUTION INTRAVENOUS at 23:31

## 2021-09-12 RX ADMIN — IPRATROPIUM BROMIDE AND ALBUTEROL SULFATE 3 ML: .5; 3 SOLUTION RESPIRATORY (INHALATION) at 08:01

## 2021-09-12 RX ADMIN — MICONAZOLE NITRATE 2 % TOPICAL POWDER: at 09:00

## 2021-09-12 RX ADMIN — INSULIN LISPRO 3 UNITS: 100 INJECTION, SOLUTION INTRAVENOUS; SUBCUTANEOUS at 07:30

## 2021-09-12 RX ADMIN — ACETAMINOPHEN 650 MG: 325 TABLET ORAL at 20:52

## 2021-09-12 RX ADMIN — ENOXAPARIN SODIUM 40 MG: 100 INJECTION SUBCUTANEOUS at 09:00

## 2021-09-12 RX ADMIN — BUDESONIDE 500 MCG: 0.5 INHALANT RESPIRATORY (INHALATION) at 08:01

## 2021-09-12 NOTE — PROGRESS NOTES
Pt O2 sats maintain in low 90s on 15L midflow NC overnight. RT aware. Bedside shift change report given to Hanh RN (oncoming nurse) by Paresh Trejo RN (offgoing nurse). Report included the following information SBAR, Kardex, ED Summary, Intake/Output, MAR, Recent Results and Cardiac Rhythm NSR.

## 2021-09-12 NOTE — PROGRESS NOTES
09/12/21 0952   Oxygen Therapy   O2 Sat (%) 91 %   Pulse via Oximetry 94 beats per minute   O2 Device Hi flow nasal cannula; Heated   O2 Flow Rate (L/min) 20 l/min   O2 Temperature 93.2 °F (34 °C)   FIO2 (%) 70 %   Pre-Treatment   Breathing Pattern Regular   Respirations 14       Patient placed on HFNC per Dr. Pritesh Justice order. No respiratory distress noted at this time.

## 2021-09-12 NOTE — PROGRESS NOTES
[address] [facility] Adult  Hospitalist Group                                                                                          Hospitalist Progress Note  Luz Marina Rosario MD  Answering service: [phone] from in house phone        Date of Service:  2021  NAME:  Waylon Major  :  1965  MRN:  [id number]      Admission Summary:   64-year-old gentleman history of diabetes, hypertension, dyslipidemia, likely history of CHF as well he uses oxygen as needed at home (particularly at night for obstructive sleep apnea), morbid obese  presents to the emergency department today with chief complaint of generalized not feeling well for the past 4 to 5 days. Interval history / Subjective:   Pt denies significant shortness of breath, however saturations currently at low 80's on 15L mid flow. States he feels like his nose is stuffed. Otherwise redness on the LUE has improved. Saturations unable to improve despite deep breaths on 15L mid flow, called respiratory stat, placed on NRB and then ordered high flow. Will obtain CXR as well. Assessment & Plan:     Acute on chronic hypercapneic and hypoxic respiratory failure  Acute COPD exacerbation   - PRN bipap, switch to high flow today. Discussed with nursing that if work of breathing worsens will place on BiPAP  - Wean as tolerated for saturation 88% or above   - Continue steroids, nebs  - Pulmonary following  - will need o/p sleep study   - Add viral panel to rule out RSV    Acute on chronic diastolic CHF - NYHA class IV on admit  - I and O   - Daily lasix --> increase dose today. Pt takes 60mg PO at home. Will place on 80mg IV BID for now   - BMP this afternoon  - Repeat CXR  - O2 as  needed    Cellulitis - LUE  - IV CTX, improving continue. May transition to oral on discharge. - Monitor with abx  - Ortho consulted, no sign of joint or bursa involvement.      Type 2 diabetes - check A1c  - SSI, POCT glucose checks and hypoglycemia prtocol   - increase glargine to 26U, add 4U lispro with meals + SSI  - Consult DTC tomorrow    Morbid obestiy -counseled weight loss     CRITICAL CARE ATTESTATION:  I had a face to face encounter with the patient, reviewed and interpreted patient data including clinical events, labs, images, vital signs, I/O's, and examined patient. I have discussed the case and the plan and management of the patient's care with the consulting services, the bedside nurses and necessary ancillary providers. NOTE OF PERSONAL INVOLVEMENT IN CARE   This patient has a high probability of imminent, clinically significant deterioration, which requires the highest level of preparedness to intervene urgently. I participated in the decision-making and personally managed or directed the management of the following life and organ supporting interventions that required my frequent assessment to treat or prevent imminent deterioration. I personally spent 40 minutes of critical care time. This is time spent at this critically ill patient's bedside actively involved in patient care as well as the coordination of care and discussions with the patient's family. This does not include any procedural time which has been billed separately. Code status: FULL  DVT prophylaxis: heparin     Care Plan discussed with: Patient/Family  Anticipated Disposition: Home w/Family  Anticipated Discharge: Greater than 48 hours     Hospital Problems  Date Reviewed: 4/28/2013        Codes Class Noted POA    Acute on chronic respiratory failure [facility]) ICD-10-CM: J96.20  ICD-9-CM: 518.84  9/9/2021 Unknown                Review of Systems:   A comprehensive review of systems was negative except for that written in the HPI. Vital Signs:    Last 24hrs VS reviewed since prior progress note.  Most recent are:  Visit Vitals  BP (!) 169/98 (BP 1 Location: Left upper arm, BP Patient Position: At rest)   Pulse 65   Temp 98.1 °F (36.7 °C)   Resp 17 Wt 149.5 kg (329 lb 9.4 oz)   SpO2 (!) 89%   BMI 43.48 kg/m²         Intake/Output Summary (Last 24 hours) at 9/12/2021 0944  Last data filed at 9/12/2021 0720  Gross per 24 hour   Intake 90 ml   Output 2840 ml   Net -2750 ml        Physical Examination:     I had a face to face encounter with this patient and independently examined them on 9/12/2021 as outlined below:          Constitutional:  Moderate respiratory distress, cooperative, pleasant, morbidly obese   ENT:  Oral mucosa moist, oropharynx benign. Resp:  Crackles bilaterally, course. + accessory muscle use, and tachypnea, saturating 84% on 15 L   CV:  Regular rhythm, normal rate, no murmurs, gallops, rubs    GI:  Soft, non distended, non tender. normoactive bowel sounds, no hepatosplenomegaly     Musculoskeletal:  1+ edema, warm, 2+ pulses throughout. Left upper extremity with minimal erythema (improving) to the elbow    Neurologic:  Moves all extremities. AAOx3, CN II-XII reviewed            Data Review:    Review and/or order of clinical lab test  Review and/or order of tests in the radiology section of CPT  Review and/or order of tests in the medicine section of CPT      Labs:     Recent Labs     09/12/21 0257 09/11/21 0333   WBC 15.7* 16.5*   HGB 13.3 12.9   HCT 45.8 43.8    226     Recent Labs     09/12/21  0257 09/11/21  0333 09/10/21  0411    136 140   K 4.5 3.6 4.7   CL 98 99 102   CO2 33* 34* 31   BUN 45* 39* 31*   CREA 1.28 1.42* 1.17   * 284* 171*   CA 8.8 8.8 9.2   MG 2.8* 2.6* 2.6*   PHOS 3.4 3.1 4.0   URICA  --  9.3*  --      Recent Labs     09/10/21  0411 09/09/21  1306   ALT 12 12    106   TBILI 0.4 0.5   TP 8.0 7.7   ALB 2.5* 2.6*   GLOB 5.5* 5.1*     No results for input(s): INR, PTP, APTT, INREXT, INREXT in the last 72 hours. No results for input(s): FE, TIBC, PSAT, FERR in the last 72 hours. No results found for: FOL, RBCF   No results for input(s): PH, PCO2, PO2 in the last 72 hours.   Recent Labs 09/09/21  1306   TROIQ <0.05     Lab Results   Component Value Date/Time    Cholesterol, total 170 01/23/2013 11:08 AM    HDL Cholesterol 37 (L) 01/23/2013 11:08 AM    LDL, calculated 97 01/23/2013 11:08 AM    Triglyceride 180 (H) 01/23/2013 11:08 AM    CHOL/HDL Ratio 6.0 (H) 10/06/2010 03:18 PM     Lab Results   Component Value Date/Time    Glucose (POC) 250 (H) 09/12/2021 08:58 AM    Glucose (POC) 325 (H) 09/11/2021 10:03 PM    Glucose (POC) 272 (H) 09/11/2021 04:51 PM    Glucose (POC) 355 (H) 09/11/2021 11:15 AM    Glucose (POC) 239 (H) 09/11/2021 08:14 AM     No results found for: COLOR, APPRN, SPGRU, REFSG, ELIZA, PROTU, GLUCU, KETU, BILU, UROU, RON, LEUKU, GLUKE, EPSU, BACTU, WBCU, RBCU, CASTS, UCRY      Medications Reviewed:     Current Facility-Administered Medications   Medication Dose Route Frequency    furosemide (LASIX) injection 60 mg  60 mg IntraVENous Q12H    albuterol-ipratropium (DUO-NEB) 2.5 MG-0.5 MG/3 ML  3 mL Nebulization Q6H RT    benzocaine-menthoL (CEPACOL) lozenge 1 Lozenge  1 Lozenge Mucous Membrane PRN    insulin glargine (LANTUS) injection 22 Units  22 Units SubCUTAneous DAILY    fentaNYL citrate (PF) injection 50 mcg  50 mcg IntraVENous Q4H PRN    hydrALAZINE (APRESOLINE) 20 mg/mL injection 10 mg  10 mg IntraVENous Q6H PRN    insulin lispro (HUMALOG) injection   SubCUTAneous AC&HS    glucose chewable tablet 16 g  4 Tablet Oral PRN    dextrose (D50W) injection syrg 12.5-25 g  25-50 mL IntraVENous PRN    glucagon (GLUCAGEN) injection 1 mg  1 mg IntraMUSCular PRN    sodium chloride (NS) flush 5-40 mL  5-40 mL IntraVENous Q8H    sodium chloride (NS) flush 5-40 mL  5-40 mL IntraVENous PRN    acetaminophen (TYLENOL) tablet 650 mg  650 mg Oral Q6H PRN    Or    acetaminophen (TYLENOL) suppository 650 mg  650 mg Rectal Q6H PRN    polyethylene glycol (MIRALAX) packet 17 g  17 g Oral DAILY PRN    ondansetron (ZOFRAN ODT) tablet 4 mg  4 mg Oral Q8H PRN    Or    ondansetron [facility]) injection 4 mg  4 mg IntraVENous Q6H PRN    azithromycin (ZITHROMAX) 500 mg in 0.9% sodium chloride 250 mL (VIAL-MATE)  500 mg IntraVENous Q24H    methylPREDNISolone (PF) (SOLU-MEDROL) injection 40 mg  40 mg IntraVENous Q12H    arformoteroL (BROVANA) neb solution 15 mcg  15 mcg Nebulization BID RT    And    budesonide (PULMICORT) 500 mcg/2 ml nebulizer suspension  500 mcg Nebulization BID RT    miconazole (MICOTIN) 2 % powder   Topical BID    cefTRIAXone (ROCEPHIN) 1 g in 0.9% sodium chloride (MBP/ADV) 50 mL MBP  1 g IntraVENous Q24H    sodium chloride (NS) flush 5-40 mL  5-40 mL IntraVENous Q8H    sodium chloride (NS) flush 5-40 mL  5-40 mL IntraVENous PRN    enoxaparin (LOVENOX) injection 40 mg  40 mg SubCUTAneous DAILY     ______________________________________________________________________  EXPECTED LENGTH OF STAY: - - -  ACTUAL LENGTH OF STAY:          3                 Sarthak Martinez MD

## 2021-09-12 NOTE — PROGRESS NOTES
Verbal bedside report given to Herb Nguyen, RN oncoming nurse by Catracho Chowdhury. Jennifer Vidal, RN off-going nurse. Report included current pt status and condition, recent results, hx of present illness, heart rate and rhythm, and respiratory status. 1240  BG of 366, informed MD.  Orders for 10 units humalog sliding scale and 4 units humalog mealtime coverage received. 0930  Moved to [facility] per MD, will monitor.

## 2021-09-13 LAB
ANION GAP SERPL CALC-SCNC: 10 MMOL/L (ref 5–15)
BASOPHILS # BLD: 0 K/UL (ref 0–0.1)
BASOPHILS NFR BLD: 0 % (ref 0–1)
BUN SERPL-MCNC: 46 MG/DL (ref 6–20)
BUN/CREAT SERPL: 32 (ref 12–20)
CALCIUM SERPL-MCNC: 9.4 MG/DL (ref 8.5–10.1)
CHLORIDE SERPL-SCNC: 98 MMOL/L (ref 97–108)
CO2 SERPL-SCNC: 27 MMOL/L (ref 21–32)
CREAT SERPL-MCNC: 1.46 MG/DL (ref 0.7–1.3)
DIFFERENTIAL METHOD BLD: ABNORMAL
EOSINOPHIL # BLD: 0 K/UL (ref 0–0.4)
EOSINOPHIL NFR BLD: 0 % (ref 0–7)
ERYTHROCYTE [DISTWIDTH] IN BLOOD BY AUTOMATED COUNT: 18 % (ref 11.5–14.5)
GLUCOSE BLD STRIP.AUTO-MCNC: 242 MG/DL (ref 65–117)
GLUCOSE BLD STRIP.AUTO-MCNC: 318 MG/DL (ref 65–117)
GLUCOSE BLD STRIP.AUTO-MCNC: 321 MG/DL (ref 65–117)
GLUCOSE BLD STRIP.AUTO-MCNC: 481 MG/DL (ref 65–117)
GLUCOSE SERPL-MCNC: 298 MG/DL (ref 65–100)
HCT VFR BLD AUTO: 47.7 % (ref 36.6–50.3)
HGB BLD-MCNC: 14.3 G/DL (ref 12.1–17)
IMM GRANULOCYTES # BLD AUTO: 0 K/UL (ref 0–0.04)
IMM GRANULOCYTES NFR BLD AUTO: 0 % (ref 0–0.5)
LYMPHOCYTES # BLD: 0.1 K/UL (ref 0.8–3.5)
LYMPHOCYTES NFR BLD: 1 % (ref 12–49)
MAGNESIUM SERPL-MCNC: 2.6 MG/DL (ref 1.6–2.4)
MCH RBC QN AUTO: 24.1 PG (ref 26–34)
MCHC RBC AUTO-ENTMCNC: 30 G/DL (ref 30–36.5)
MCV RBC AUTO: 80.3 FL (ref 80–99)
MONOCYTES # BLD: 0.3 K/UL (ref 0–1)
MONOCYTES NFR BLD: 2 % (ref 5–13)
NEUTS SEG # BLD: 13.7 K/UL (ref 1.8–8)
NEUTS SEG NFR BLD: 97 % (ref 32–75)
NRBC # BLD: 0 K/UL (ref 0–0.01)
NRBC BLD-RTO: 0 PER 100 WBC
PHOSPHATE SERPL-MCNC: 3.5 MG/DL (ref 2.6–4.7)
PLATELET # BLD AUTO: 203 K/UL (ref 150–400)
PMV BLD AUTO: 10 FL (ref 8.9–12.9)
POTASSIUM SERPL-SCNC: 4.2 MMOL/L (ref 3.5–5.1)
RBC # BLD AUTO: 5.94 M/UL (ref 4.1–5.7)
RBC MORPH BLD: ABNORMAL
SERVICE CMNT-IMP: ABNORMAL
SODIUM SERPL-SCNC: 135 MMOL/L (ref 136–145)
WBC # BLD AUTO: 14.1 K/UL (ref 4.1–11.1)

## 2021-09-13 PROCEDURE — 74011250636 HC RX REV CODE- 250/636: Performed by: HOSPITALIST

## 2021-09-13 PROCEDURE — 83735 ASSAY OF MAGNESIUM: CPT

## 2021-09-13 PROCEDURE — 74011250636 HC RX REV CODE- 250/636: Performed by: HEALTH CARE PROVIDER

## 2021-09-13 PROCEDURE — 65660000000 HC RM CCU STEPDOWN

## 2021-09-13 PROCEDURE — 74011250637 HC RX REV CODE- 250/637: Performed by: INTERNAL MEDICINE

## 2021-09-13 PROCEDURE — 84100 ASSAY OF PHOSPHORUS: CPT

## 2021-09-13 PROCEDURE — 74011000250 HC RX REV CODE- 250: Performed by: HOSPITALIST

## 2021-09-13 PROCEDURE — 74011000258 HC RX REV CODE- 258: Performed by: HOSPITALIST

## 2021-09-13 PROCEDURE — 94640 AIRWAY INHALATION TREATMENT: CPT

## 2021-09-13 PROCEDURE — 36415 COLL VENOUS BLD VENIPUNCTURE: CPT

## 2021-09-13 PROCEDURE — 74011636637 HC RX REV CODE- 636/637: Performed by: NURSE PRACTITIONER

## 2021-09-13 PROCEDURE — 82962 GLUCOSE BLOOD TEST: CPT

## 2021-09-13 PROCEDURE — 80048 BASIC METABOLIC PNL TOTAL CA: CPT

## 2021-09-13 PROCEDURE — 77010033711 HC HIGH FLOW OXYGEN

## 2021-09-13 PROCEDURE — 74011250637 HC RX REV CODE- 250/637: Performed by: HOSPITALIST

## 2021-09-13 PROCEDURE — 97530 THERAPEUTIC ACTIVITIES: CPT

## 2021-09-13 PROCEDURE — 85025 COMPLETE CBC W/AUTO DIFF WBC: CPT

## 2021-09-13 PROCEDURE — 74011636637 HC RX REV CODE- 636/637: Performed by: INTERNAL MEDICINE

## 2021-09-13 PROCEDURE — 99233 SBSQ HOSP IP/OBS HIGH 50: CPT | Performed by: CLINICAL NURSE SPECIALIST

## 2021-09-13 PROCEDURE — 74011000250 HC RX REV CODE- 250: Performed by: INTERNAL MEDICINE

## 2021-09-13 PROCEDURE — 74011250636 HC RX REV CODE- 250/636: Performed by: INTERNAL MEDICINE

## 2021-09-13 RX ORDER — ISOSORBIDE MONONITRATE 30 MG/1
30 TABLET, EXTENDED RELEASE ORAL DAILY
Status: DISCONTINUED | OUTPATIENT
Start: 2021-09-14 | End: 2021-10-04 | Stop reason: HOSPADM

## 2021-09-13 RX ORDER — PANTOPRAZOLE SODIUM 40 MG/1
40 TABLET, DELAYED RELEASE ORAL
Status: DISCONTINUED | OUTPATIENT
Start: 2021-09-14 | End: 2021-10-04 | Stop reason: HOSPADM

## 2021-09-13 RX ORDER — PANTOPRAZOLE SODIUM 40 MG/1
40 TABLET, DELAYED RELEASE ORAL DAILY
COMMUNITY

## 2021-09-13 RX ORDER — AMIODARONE HYDROCHLORIDE 100 MG/1
100 TABLET ORAL DAILY
COMMUNITY

## 2021-09-13 RX ORDER — SACUBITRIL AND VALSARTAN 49; 51 MG/1; MG/1
1 TABLET, FILM COATED ORAL 2 TIMES DAILY
COMMUNITY

## 2021-09-13 RX ORDER — ROSUVASTATIN CALCIUM 10 MG/1
5 TABLET, COATED ORAL DAILY
Status: DISCONTINUED | OUTPATIENT
Start: 2021-09-14 | End: 2021-10-04 | Stop reason: HOSPADM

## 2021-09-13 RX ORDER — FUROSEMIDE 10 MG/ML
60 INJECTION INTRAMUSCULAR; INTRAVENOUS DAILY
Status: DISCONTINUED | OUTPATIENT
Start: 2021-09-13 | End: 2021-09-17

## 2021-09-13 RX ORDER — ASPIRIN 81 MG/1
TABLET ORAL DAILY
COMMUNITY

## 2021-09-13 RX ORDER — ROSUVASTATIN CALCIUM 5 MG/1
5 TABLET, COATED ORAL DAILY
COMMUNITY

## 2021-09-13 RX ORDER — MONTELUKAST SODIUM 10 MG/1
10 TABLET ORAL
COMMUNITY

## 2021-09-13 RX ORDER — CEPHALEXIN 500 MG/1
500 CAPSULE ORAL EVERY 6 HOURS
Status: DISCONTINUED | OUTPATIENT
Start: 2021-09-14 | End: 2021-09-13

## 2021-09-13 RX ORDER — SPIRONOLACTONE 25 MG/1
25 TABLET ORAL DAILY
COMMUNITY

## 2021-09-13 RX ORDER — PREGABALIN 75 MG/1
75 CAPSULE ORAL 2 TIMES DAILY
Status: DISCONTINUED | OUTPATIENT
Start: 2021-09-13 | End: 2021-10-04 | Stop reason: HOSPADM

## 2021-09-13 RX ORDER — IPRATROPIUM BROMIDE AND ALBUTEROL SULFATE 2.5; .5 MG/3ML; MG/3ML
3 SOLUTION RESPIRATORY (INHALATION)
Status: DISCONTINUED | OUTPATIENT
Start: 2021-09-13 | End: 2021-10-04 | Stop reason: HOSPADM

## 2021-09-13 RX ORDER — EZETIMIBE 10 MG/1
10 TABLET ORAL DAILY
Status: DISCONTINUED | OUTPATIENT
Start: 2021-09-14 | End: 2021-10-04 | Stop reason: HOSPADM

## 2021-09-13 RX ORDER — TORSEMIDE 20 MG/1
60 TABLET ORAL DAILY
COMMUNITY

## 2021-09-13 RX ORDER — SPIRONOLACTONE 25 MG/1
25 TABLET ORAL DAILY
Status: DISCONTINUED | OUTPATIENT
Start: 2021-09-14 | End: 2021-10-04 | Stop reason: HOSPADM

## 2021-09-13 RX ORDER — INSULIN LISPRO 100 [IU]/ML
8 INJECTION, SOLUTION INTRAVENOUS; SUBCUTANEOUS
Status: DISCONTINUED | OUTPATIENT
Start: 2021-09-13 | End: 2021-09-14

## 2021-09-13 RX ORDER — AMIODARONE HYDROCHLORIDE 200 MG/1
100 TABLET ORAL DAILY
Status: DISCONTINUED | OUTPATIENT
Start: 2021-09-14 | End: 2021-10-04 | Stop reason: HOSPADM

## 2021-09-13 RX ORDER — CEPHALEXIN 500 MG/1
500 CAPSULE ORAL EVERY 6 HOURS
Status: COMPLETED | OUTPATIENT
Start: 2021-09-14 | End: 2021-09-15

## 2021-09-13 RX ORDER — EZETIMIBE 10 MG/1
10 TABLET ORAL DAILY
COMMUNITY

## 2021-09-13 RX ORDER — ISOSORBIDE MONONITRATE 30 MG/1
30 TABLET, EXTENDED RELEASE ORAL DAILY
COMMUNITY

## 2021-09-13 RX ORDER — FUROSEMIDE 10 MG/ML
60 INJECTION INTRAMUSCULAR; INTRAVENOUS DAILY
Status: DISCONTINUED | OUTPATIENT
Start: 2021-09-14 | End: 2021-09-13

## 2021-09-13 RX ORDER — ASPIRIN 81 MG/1
81 TABLET ORAL DAILY
Status: DISCONTINUED | OUTPATIENT
Start: 2021-09-14 | End: 2021-10-04 | Stop reason: HOSPADM

## 2021-09-13 RX ORDER — PREGABALIN 75 MG/1
75 CAPSULE ORAL 2 TIMES DAILY
COMMUNITY

## 2021-09-13 RX ORDER — AZITHROMYCIN 250 MG/1
500 TABLET, FILM COATED ORAL DAILY
Status: COMPLETED | OUTPATIENT
Start: 2021-09-13 | End: 2021-09-13

## 2021-09-13 RX ORDER — MONTELUKAST SODIUM 10 MG/1
10 TABLET ORAL
Status: DISCONTINUED | OUTPATIENT
Start: 2021-09-13 | End: 2021-10-04 | Stop reason: HOSPADM

## 2021-09-13 RX ADMIN — Medication 10 ML: at 06:31

## 2021-09-13 RX ADMIN — ENOXAPARIN SODIUM 40 MG: 100 INJECTION SUBCUTANEOUS at 09:23

## 2021-09-13 RX ADMIN — Medication 10 ML: at 17:35

## 2021-09-13 RX ADMIN — ALTEPLASE 1 MG: 2.2 INJECTION, POWDER, LYOPHILIZED, FOR SOLUTION INTRAVENOUS at 12:50

## 2021-09-13 RX ADMIN — INSULIN LISPRO 4 UNITS: 100 INJECTION, SOLUTION INTRAVENOUS; SUBCUTANEOUS at 12:51

## 2021-09-13 RX ADMIN — ARFORMOTEROL TARTRATE 15 MCG: 15 SOLUTION RESPIRATORY (INHALATION) at 19:31

## 2021-09-13 RX ADMIN — MICONAZOLE NITRATE 2 % TOPICAL POWDER: at 09:26

## 2021-09-13 RX ADMIN — METHYLPREDNISOLONE SODIUM SUCCINATE 40 MG: 40 INJECTION, POWDER, FOR SOLUTION INTRAMUSCULAR; INTRAVENOUS at 20:18

## 2021-09-13 RX ADMIN — ALTEPLASE 1 MG: 2.2 INJECTION, POWDER, LYOPHILIZED, FOR SOLUTION INTRAVENOUS at 12:51

## 2021-09-13 RX ADMIN — BUDESONIDE 500 MCG: 0.5 INHALANT RESPIRATORY (INHALATION) at 19:31

## 2021-09-13 RX ADMIN — ACETAMINOPHEN 650 MG: 325 TABLET ORAL at 20:18

## 2021-09-13 RX ADMIN — INSULIN LISPRO 10 UNITS: 100 INJECTION, SOLUTION INTRAVENOUS; SUBCUTANEOUS at 09:25

## 2021-09-13 RX ADMIN — IPRATROPIUM BROMIDE AND ALBUTEROL SULFATE 3 ML: .5; 3 SOLUTION RESPIRATORY (INHALATION) at 01:40

## 2021-09-13 RX ADMIN — INSULIN LISPRO 8 UNITS: 100 INJECTION, SOLUTION INTRAVENOUS; SUBCUTANEOUS at 17:20

## 2021-09-13 RX ADMIN — PREGABALIN 75 MG: 25 CAPSULE ORAL at 17:20

## 2021-09-13 RX ADMIN — INSULIN GLARGINE 26 UNITS: 100 INJECTION, SOLUTION SUBCUTANEOUS at 09:26

## 2021-09-13 RX ADMIN — Medication 10 ML: at 21:15

## 2021-09-13 RX ADMIN — APIXABAN 5 MG: 5 TABLET, FILM COATED ORAL at 17:20

## 2021-09-13 RX ADMIN — CEFTRIAXONE SODIUM 1 G: 1 INJECTION, POWDER, FOR SOLUTION INTRAMUSCULAR; INTRAVENOUS at 17:20

## 2021-09-13 RX ADMIN — INSULIN LISPRO 7 UNITS: 100 INJECTION, SOLUTION INTRAVENOUS; SUBCUTANEOUS at 12:51

## 2021-09-13 RX ADMIN — MONTELUKAST 10 MG: 10 TABLET, FILM COATED ORAL at 21:15

## 2021-09-13 RX ADMIN — HUMAN INSULIN 30 UNITS: 100 INJECTION, SUSPENSION SUBCUTANEOUS at 17:20

## 2021-09-13 RX ADMIN — INSULIN LISPRO 4 UNITS: 100 INJECTION, SOLUTION INTRAVENOUS; SUBCUTANEOUS at 09:24

## 2021-09-13 RX ADMIN — INSULIN LISPRO 2 UNITS: 100 INJECTION, SOLUTION INTRAVENOUS; SUBCUTANEOUS at 21:22

## 2021-09-13 RX ADMIN — MICONAZOLE NITRATE 2 % TOPICAL POWDER: at 20:21

## 2021-09-13 RX ADMIN — FUROSEMIDE 60 MG: 10 INJECTION, SOLUTION INTRAMUSCULAR; INTRAVENOUS at 09:25

## 2021-09-13 RX ADMIN — INSULIN LISPRO 7 UNITS: 100 INJECTION, SOLUTION INTRAVENOUS; SUBCUTANEOUS at 17:20

## 2021-09-13 RX ADMIN — METHYLPREDNISOLONE SODIUM SUCCINATE 40 MG: 40 INJECTION, POWDER, FOR SOLUTION INTRAMUSCULAR; INTRAVENOUS at 09:24

## 2021-09-13 RX ADMIN — AZITHROMYCIN MONOHYDRATE 500 MG: 250 TABLET ORAL at 21:15

## 2021-09-13 NOTE — DIABETES MGMT
[address] [facility]    CLINICAL NURSE SPECIALIST CONSULT     Initial Presentation   Patricia Brown is a 48 y.o. male who presented to the ED 9/9/21 via EMS for generalized c/o feeling unwell. He was hypoxic in the 70%s and placed on O2 with EMS. HX:   Past Medical History:   Diagnosis Date    DM (diabetes mellitus) ([facility] [address]     Dyslipidemia     Hypertension         INITIAL DX: Acute on chronic respiratory failure with hypercapnia; upper left arm cellulitis    Current Treatment     TX: IV steroids, antibiotics, pulmonary consult    Consulted by Tayla Gustafson MD for advanced diabetes nursing assessment and care for:   [x] Inpatient management strategy      Hospital Course   Clinical progress has been complicated by: .   9/9: Lethargic, transitioned to BiPAP. Seen by pulmonary- continue steroids, diuresis, bronchodilators. Transfer to ICU for respiratory acidosis. ? Obstructive lung disease  9/10: BiPAP overnight. Seen by ortho for generalized LUE cellulitis. Transfer out of ICU  9/12: HFNC  Diabetes History   Hx Type 2 Diabets \"for many years\"  Diabetes managed by Vy Carlos MD    Diabetes-related Medical History  Acute complications  Steroid induced hyperglycemia      Diabetes Medication History  Drug class Currently in use Discontinued Never used   Biguanide      DDP-4 inhibitor       Sulfonylurea      Thiazolidinedione      GLP-1 RA      SGLT-2 inhibitors      Basal insulin 18 units Lantus once daily Levemir     Bolus insulin  Novolog with meals    Fixed Dose  Combinations  70/30 50 units BID      Subjective   I take sliding scale and my glucose goes up.     Patient reports the following home diabetes self-management practices:   Eating pattern   [x] Eats once meal a day  Limited snacking  Water throughout the day  Physical activity pattern   [x] Mostly sedentary   Monitoring pattern   [x] Bedtime: -180  Taking medications pattern  [x] Consistent administration  [x] Affordable     A1C 8.3%  Initial B  GFR 60    Fasting B  Pre-prandial: 318/267/287  WBC 14.1  IV abox  Basal:Lantus 26 units  Bolus: 4 units with meals  Correction: 28 units    Methylprednisolone 40 mg Q12  Methylprednisolone Dosing    Objective   Physical exam  General Obese male in respiratory distress/ill-appearing. Conversant and cooperative  Neuro  Alert, oriented   Vital Signs   Visit Vitals  /89   Pulse 77   Temp 98 °F (36.7 °C)   Resp 12   Wt 149.2 kg (329 lb)   SpO2 92%   BMI 43.41 kg/m²     Skin  Warm and dry. No acanthosis noted along neckline. No lipohypertrophy or lipoatrophy noted at injection sites   Heart   Regular rate and rhythm. No murmurs, rubs or gallops  Lungs  Clear to auscultation without rales or rhonchi  Extremities No foot wounds       Laboratory      CBC WITH AUTOMATED DIFF    Collection Time: 21  3:28 AM   Result Value Ref Range    WBC 14.1 (H) 4.1 - 11.1 K/uL    RBC 5.94 (H) 4.10 - 5.70 M/uL    HGB 14.3 12.1 - 17.0 g/dL    HCT 47.7 36.6 - 50.3 %    MCV 80.3 80.0 - 99.0 FL    MCH 24.1 (L) 26.0 - 34.0 PG    MCHC 30.0 30.0 - 36.5 g/dL    RDW 18.0 (H) 11.5 - 14.5 %    PLATELET 480 852 - 604 K/uL    MPV 10.0 8.9 - 12.9 FL    NRBC 0.0 0  WBC    ABSOLUTE NRBC 0.00 0.00 - 0.01 K/uL    NEUTROPHILS 97 (H) 32 - 75 %    LYMPHOCYTES 1 (L) 12 - 49 %    MONOCYTES 2 (L) 5 - 13 %    EOSINOPHILS 0 0 - 7 %    BASOPHILS 0 0 - 1 %    IMMATURE GRANULOCYTES 0 0.0 - 0.5 %    ABS. NEUTROPHILS 13.7 (H) 1.8 - 8.0 K/UL    ABS. LYMPHOCYTES 0.1 (L) 0.8 - 3.5 K/UL    ABS. MONOCYTES 0.3 0.0 - 1.0 K/UL    ABS. EOSINOPHILS 0.0 0.0 - 0.4 K/UL    ABS. BASOPHILS 0.0 0.0 - 0.1 K/UL    ABS. IMM. GRANS. 0.0 0.00 - 0.04 K/UL    DF SMEAR SCANNED      RBC COMMENTS ANISOCYTOSIS  1+         No results found for this visit on 21 (from the past 12 hour(s)).   BMP:   Lab Results   Component Value Date/Time     (L) 2021 03:28 AM    K 4.2 2021 03:28 AM    CL 98 09/13/2021 03:28 AM    CO2 27 09/13/2021 03:28 AM    AGAP 10 09/13/2021 03:28 AM     (H) 09/13/2021 03:28 AM    BUN 46 (H) 09/13/2021 03:28 AM    CREA 1.46 (H) 09/13/2021 03:28 AM    GFRAA >60 09/13/2021 03:28 AM    GFRNA 50 (L) 09/13/2021 03:28 AM        Factors impacting BG management  Factor Dose Comments   Nutrition:  Standard meals     60 grams/meal      Drugs:    Steroids     Solu-medrol 40mg Q12   Impairs insulin action       Blood glucose pattern        Assessment and Plan   Nursing Diagnosis Risk for unstable blood glucose pattern   Nursing Intervention Domain 5252 Decision-making Support   Nursing Interventions Examined current inpatient diabetes control   Explored factors facilitating and impeding inpatient management  Identified self-management practices impeding diabetes control  Explored corrective strategies with patient and responsible inpatient provider   Informed patient of rational for insulin strategy while hospitalized     Evaluation   Ángel Jewell is a 48year old gentleman, with uncontrolled Type 2 Diabetes on 70/30 insulin, admitted with acute on chronic hypercapneic and hypoxic respiratory failure with acute COPD exacerbation. He did not achieve diabetes control prior to admission, as evidenced by A1c of 8.3%. During this hospitalization, the patient's glucose was in goal the 1st 24 hrs without the use of antihyperglycemic agents- he was on BiPAP and NPO at that time. 40mg methylprednisolone Q12 was initiated and now with steroid induced hyperglycemia. Low dose Lantus was started with low dose bolus humalog but not sufficient to override steroid impact. Please advance basal/bolus insulin to inpatient blood glucose target of 100-180mg/dl. Recommendations   1. POC glucose ACHS  2. Consistent carbohydrate diet (60 grams CHO/meal), carb consistent shakes if they are ordered  3.  Adjust the Subcutaneous Insulin Order set (4822)  Insulin Dosing Specific recommendation   Basal (Based on weight, BMI & GFR) 30 units NPH Q12, starting tonight    Nutritional                                      (Based on CHO/dextrose load) 8 units Humalog/meal    Corrective                                       (Useful in adjusting insulin dosing) [x] Insulin-resistant sensitivity        Billing Code(s)   [x] 41088     Before making these care recommendations, I personally reviewed the hosptialization record, including laboratory and diagnostic data, medications and examined the patient at bedside (circumstances permitting).   Total minutes: 36      Delilah Craig, CNS  Diabetes Clinical Nurse Specialist  [facility]  Access via [employer]

## 2021-09-13 NOTE — PROGRESS NOTES
[address] [facility]  Hospitalist Group                                                                                          Hospitalist Progress Note  Chantel Topete MD  Answering service: [phone] from in house phone        Date of Service:  2021  NAME:  Quirino Ruelas  :  1965  MRN:  [id number]      Admission Summary:   52-year-old gentleman history of diabetes, hypertension, dyslipidemia, likely history of CHF as well he uses oxygen as needed at home (particularly at night for obstructive sleep apnea), morbid obese  presents to the emergency department today with chief complaint of generalized not feeling well for the past 4 to 5 days. Interval history / Subjective:   Pt says his breathing is about the same today. Had a lot of urine output, over 5.5L in the last 24 hours. No wheezing. O2 requirements increased overnight. Assessment & Plan:     Acute on chronic hypercapneic and hypoxic respiratory failure  Acute COPD exacerbation   - HFNC, and add BIPAP PRN, may need BiPAP with sleeping   - Wean as tolerated for saturation 88% or above   - Continue steroids, nebs  - Pulmonary following, will ask for them to see again today   - will need o/p sleep study   - Viral panel negative. Acute on chronic diastolic CHF - NYHA class IV on admit  - I and O   - Decrease lasix to 60mg IV daily. Had over 5.5L with BID dosing.   - O2 as  Needed  - Resume home imdur,holding entresto and aldactone with elevated cr. May hold if needed for BP    Cellulitis - LUE  - Change CTX to oral keflex for total of 2 additional days for cellulitis   - Ortho consulted, no sign of joint or bursa involvement.      Type 2 diabetes - check A1c  - SSI, POCT glucose checks and hypoglycemia prtocol   - Swtich to NPH 30 BID, and increase to 8U lispro with meals + SSI  - Consult DTC, appreeciate recs   - Continue lyrica for neuropathy     H/o Afib - on amiodarone PO, eliquis     Morbid obestiy -counseled weight loss     CRITICAL CARE ATTESTATION:  I had a face to face encounter with the patient, reviewed and interpreted patient data including clinical events, labs, images, vital signs, I/O's, and examined patient. I have discussed the case and the plan and management of the patient's care with the consulting services, the bedside nurses and necessary ancillary providers. NOTE OF PERSONAL INVOLVEMENT IN CARE   This patient has a high probability of imminent, clinically significant deterioration, which requires the highest level of preparedness to intervene urgently. I participated in the decision-making and personally managed or directed the management of the following life and organ supporting interventions that required my frequent assessment to treat or prevent imminent deterioration. I personally spent 40 minutes of critical care time. This is time spent at this critically ill patient's bedside actively involved in patient care as well as the coordination of care and discussions with the patient's family. This does not include any procedural time which has been billed separately. Code status: FULL  DVT prophylaxis: heparin     Care Plan discussed with: Patient/Family  Anticipated Disposition: Home w/Family  Anticipated Discharge: Greater than 48 hours     Hospital Problems  Date Reviewed: 4/28/2013        Codes Class Noted POA    Acute on chronic respiratory failure [facility]) ICD-10-CM: J96.20  ICD-9-CM: 518.84  9/9/2021 Unknown                Review of Systems:   A comprehensive review of systems was negative except for that written in the HPI. Vital Signs:    Last 24hrs VS reviewed since prior progress note.  Most recent are:  Visit Vitals  /89   Pulse 77   Temp 98 °F (36.7 °C)   Resp 12   Wt 149.2 kg (329 lb)   SpO2 92%   BMI 43.41 kg/m²         Intake/Output Summary (Last 24 hours) at 9/13/2021 1203  Last data filed at 9/13/2021 1123  Gross per 24 hour   Intake 600 ml Output 5415 ml   Net -4815 ml        Physical Examination:     I had a face to face encounter with this patient and independently examined them on 9/13/2021 as outlined below:          Constitutional:  Moderate respiratory distress, cooperative, pleasant, morbidly obese   ENT:  Oral mucosa moist, oropharynx benign. Resp:  Crackles bilaterally, course. + accessory muscle use, and tachypnea, saturating 84% on 15 L   CV:  Regular rhythm, normal rate, no murmurs, gallops, rubs    GI:  Soft, non distended, non tender. normoactive bowel sounds, no hepatosplenomegaly     Musculoskeletal:  1+ edema, warm, 2+ pulses throughout. Left upper extremity with minimal erythema (improving) to the elbow    Neurologic:  Moves all extremities. AAOx3, CN II-XII reviewed            Data Review:    Review and/or order of clinical lab test  Review and/or order of tests in the radiology section of CPT  Review and/or order of tests in the medicine section of CPT      Labs:     Recent Labs     09/13/21 0328 09/12/21  1132   WBC 14.1* 15.5*   HGB 14.3 13.2   HCT 47.7 45.6    205     Recent Labs     09/13/21  0328 09/12/21  1132 09/12/21  0257 09/11/21  0333 09/11/21  0333   * 137 136   < > 136   K 4.2 4.6 4.5   < > 3.6   CL 98 96* 98   < > 99   CO2 27 32 33*   < > 34*   BUN 46* 47* 45*   < > 39*   CREA 1.46* 1.25 1.28   < > 1.42*   * 318* 295*   < > 284*   CA 9.4 8.9 8.8   < > 8.8   MG 2.6* 2.6* 2.8*   < > 2.6*   PHOS 3.5 2.7 3.4   < > 3.1   URICA  --   --   --   --  9.3*    < > = values in this interval not displayed. No results for input(s): ALT, AP, TBIL, TBILI, TP, ALB, GLOB, GGT, AML, LPSE in the last 72 hours. No lab exists for component: SGOT, GPT, AMYP, HLPSE  No results for input(s): INR, PTP, APTT, INREXT, INREXT in the last 72 hours. No results for input(s): FE, TIBC, PSAT, FERR in the last 72 hours.    No results found for: FOL, RBCF   No results for input(s): PH, PCO2, PO2 in the last 72 hours. No results for input(s): CPK, CKNDX, TROIQ in the last 72 hours.     No lab exists for component: CPKMB  Lab Results   Component Value Date/Time    Cholesterol, total 170 01/23/2013 11:08 AM    HDL Cholesterol 37 (L) 01/23/2013 11:08 AM    LDL, calculated 97 01/23/2013 11:08 AM    Triglyceride 180 (H) 01/23/2013 11:08 AM    CHOL/HDL Ratio 6.0 (H) 10/06/2010 03:18 PM     Lab Results   Component Value Date/Time    Glucose (POC) 318 (H) 09/13/2021 11:21 AM    Glucose (POC) 481 (H) 09/13/2021 08:45 AM    Glucose (POC) 267 (H) 09/12/2021 09:00 PM    Glucose (POC) 287 (H) 09/12/2021 04:31 PM    Glucose (POC) 366 (H) 09/12/2021 12:10 PM     No results found for: COLOR, APPRN, SPGRU, REFSG, ELIZA, PROTU, GLUCU, KETU, BILU, UROU, RON, LEUKU, GLUKE, EPSU, BACTU, WBCU, RBCU, CASTS, UCRY      Medications Reviewed:     Current Facility-Administered Medications   Medication Dose Route Frequency    albuterol-ipratropium (DUO-NEB) 2.5 MG-0.5 MG/3 ML  3 mL Nebulization Q6H PRN    furosemide (LASIX) injection 60 mg  60 mg IntraVENous DAILY    alteplase (CATHFLO) 1 mg in sterile water (preservative free) 1 mL injection  1 mg InterCATHeter PRN    insulin glargine (LANTUS) injection 26 Units  26 Units SubCUTAneous DAILY    insulin lispro (HUMALOG) injection 4 Units  4 Units SubCUTAneous TIDAC    benzocaine-menthoL (CEPACOL) lozenge 1 Lozenge  1 Lozenge Mucous Membrane PRN    fentaNYL citrate (PF) injection 50 mcg  50 mcg IntraVENous Q4H PRN    hydrALAZINE (APRESOLINE) 20 mg/mL injection 10 mg  10 mg IntraVENous Q6H PRN    insulin lispro (HUMALOG) injection   SubCUTAneous AC&HS    glucose chewable tablet 16 g  4 Tablet Oral PRN    dextrose (D50W) injection syrg 12.5-25 g  25-50 mL IntraVENous PRN    glucagon (GLUCAGEN) injection 1 mg  1 mg IntraMUSCular PRN    sodium chloride (NS) flush 5-40 mL  5-40 mL IntraVENous Q8H    sodium chloride (NS) flush 5-40 mL  5-40 mL IntraVENous PRN    acetaminophen (TYLENOL) tablet 650 mg  650 mg Oral Q6H PRN    Or    acetaminophen (TYLENOL) suppository 650 mg  650 mg Rectal Q6H PRN    polyethylene glycol (MIRALAX) packet 17 g  17 g Oral DAILY PRN    ondansetron (ZOFRAN ODT) tablet 4 mg  4 mg Oral Q8H PRN    Or    ondansetron (ZOFRAN) injection 4 mg  4 mg IntraVENous Q6H PRN    azithromycin (ZITHROMAX) 500 mg in 0.9% sodium chloride 250 mL (VIAL-MATE)  500 mg IntraVENous Q24H    methylPREDNISolone (PF) (SOLU-MEDROL) injection 40 mg  40 mg IntraVENous Q12H    arformoteroL (BROVANA) neb solution 15 mcg  15 mcg Nebulization BID RT    And    budesonide (PULMICORT) 500 mcg/2 ml nebulizer suspension  500 mcg Nebulization BID RT    miconazole (MICOTIN) 2 % powder   Topical BID    cefTRIAXone (ROCEPHIN) 1 g in 0.9% sodium chloride (MBP/ADV) 50 mL MBP  1 g IntraVENous Q24H    sodium chloride (NS) flush 5-40 mL  5-40 mL IntraVENous Q8H    sodium chloride (NS) flush 5-40 mL  5-40 mL IntraVENous PRN    enoxaparin (LOVENOX) injection 40 mg  40 mg SubCUTAneous DAILY     ______________________________________________________________________  EXPECTED LENGTH OF STAY: - - -  ACTUAL LENGTH OF STAY:          4                 Luz Marina Rosario MD

## 2021-09-13 NOTE — PROGRESS NOTES
Problem: Mobility Impaired (Adult and Pediatric)  Goal: *Acute Goals and Plan of Care (Insert Text)  Description: FUNCTIONAL STATUS PRIOR TO ADMISSION: Patient was modified independent using a rollator for functional mobility. HOME SUPPORT PRIOR TO ADMISSION: The patient lived alone with family to provide assistance. Patient also reports having an aide that comes 11-5 Monday-Friday. Physical Therapy Goals  Initiated 9/11/2021  1. Patient will move from supine to sit and sit to supine , scoot up and down, and roll side to side in bed with modified independence within 7 day(s). 2.  Patient will transfer from bed to chair and chair to bed with modified independence using the least restrictive device within 7 day(s). 3.  Patient will perform sit to stand with modified independence within 7 day(s). 4.  Patient will ambulate with modified independence for 100 feet with the least restrictive device within 7 day(s). Outcome: Progressing Towards Goal   PHYSICAL THERAPY TREATMENT  Patient: Dorina Castrejon (89 y.o. male)  Date: 9/13/2021  Diagnosis: Acute on chronic respiratory failure ([facility] [address] [J96.20] <principal problem not specified>       Precautions:    Chart, physical therapy assessment, plan of care and goals were reviewed. ASSESSMENT  Patient continues with skilled PT services and is slowly progressing towards goals. Pt with increased O2 requirement this date with HFO2 40L FiO2 72%; cleared by RN for activity as tolerated. Pt mobilized with SBA use of bariatric rollator for brief standing trials x 2 and SPT to/ from [facility]. Required total A with pericare. Noted brief O2 desat to 89% with first stand, recovered quickly to 92% with seated rest. Unable to further progress mobility training this date due to Hi-flow constraints. Pt instructed in seated LE strength ex to perform as tolerated between sessions. Recommend follow up SNF rehab at d/c.     Current Level of Function Impacting Discharge (mobility/balance): sit to stand from chair to rollator SBA, chair <->BSC stand pivot with rollator SBA    Other factors to consider for discharge: lives alone, modified indep baseline         PLAN :  Patient continues to benefit from skilled intervention to address the above impairments. Continue treatment per established plan of care. to address goals. Recommendation for discharge: (in order for the patient to meet his/her long term goals)  Therapy up to 5 days/week in SNF setting    This discharge recommendation:  Has not yet been discussed the attending provider and/or case management    IF patient discharges home will need the following DME: patient owns DME required for discharge       SUBJECTIVE:   Patient requested use of BSC     OBJECTIVE DATA SUMMARY:   Critical Behavior:  Neurologic State: Alert, Eyes open spontaneously  Orientation Level: Oriented X4  Cognition: Appropriate decision making, Appropriate for age attention/concentration, Appropriate safety awareness, Follows commands     Functional Mobility Training:  Bed Mobility:         not observed           Transfers:  Sit to Stand: Stand-by assistance; Additional time  Stand to Sit: Stand-by assistance  Stand Pivot Transfers: Stand-by assistance (chair<->BSC with rollator)                          Balance:  Sitting: Intact  Standing: Impaired  Standing - Static: Good;Constant support  Standing - Dynamic : Fair;Constant support  ATherapeutic Exercises:   Pt instructed in seated LE strength ex to perform between sessions as tolerated: marching, LAQs, heel raises  Pain Rating:  No c/o pain    Activity Tolerance:   Poor; SpO2 89% with standing, HFO2 in place at 40L, FiO2 72%    After treatment patient left in no apparent distress:   Sitting in chair and Call bell within reach    COMMUNICATION/COLLABORATION:   The patients plan of care was discussed with: Registered nurse.      Mandeep Adames, PT   Time Calculation: 30 mins

## 2021-09-13 NOTE — NURSE NAVIGATOR
Chart reviewed by Heart Failure Nurse Navigator. Heart Failure database completed. EF:  60/65%    ACEi/ARB/ARNi: entresto 49/51 mg every 12 hours    BB: not currentlty indicated    Aldosterone Antagonist: not currently indicated    Obstructive Sleep Apnea Screening: screening priority 1   STOP-BANG score:   Referred to Sleep Medicine:     CRT not currently indicated. NYHA Functional Class IV on admission. Heart Failure Teach Back in Patient Education. Heart Failure Avoiding Triggers on Discharge Instructions. Cardiologist: not yet consulted.       Post discharge follow up phone call to be made within 48-72 hours of discharge.  '

## 2021-09-13 NOTE — PROGRESS NOTES
Physician Progress Note      Kushal Brooks  CSN #:                  [id number]  :                       1965  ADMIT DATE:       2021 10:59 AM  DISCH DATE:  RESPONDING  PROVIDER #:        Wilfred Narvaez MD          QUERY TEXT:    Dear Attending,    Pt admitted with LUE cellulitis. Pt noted to have DM type 2 and to have blood glucose levels of 120-318 (by BMP) & 138-481 (by POC) during admission. If possible, please document in progress notes and discharge summary any further specificity regarding the DM. The medical record reflects the following:    Risk Factors: DM, steroid use, morbid obesity    Clinical Indicators:  -- Hospitalist notes document Type 2 diabetes; H&P notes BG may high due to steroids  -- BG = 120-318 (BMP) & 138-481 (POC) during admission, with BG = 143 by POC & 120 by BMP on admission    Treatment: Lantus insulin plus both basal lispro insulin and SSI with meals, Accuchecks, DTC consult, serial lab monitoring, hypoglycemia protocol    Thank-you,  Mahad Martinez RN, [facility]  Clinical   [phone]  Options provided:  -- DM with hyperglycemia  -- Hyperglycemia due to steroid use only  -- Other - I will add my own diagnosis  -- Disagree - Not applicable / Not valid  -- Disagree - Clinically unable to determine / Unknown  -- Refer to Clinical Documentation Reviewer    PROVIDER RESPONSE TEXT:    This patient has DM with hyperglycemia. Query created by: Mary Jane Eldridge on 2021 1:57 PM      QUERY TEXT:    Dear Attending,    Pt admitted with LUE cellulitis. Pt noted to have DM type 2. Pt noted to have BG of 120-318 (by BMP) & 138-481 (by POC) during admission. If possible, please document in progress notes and discharge summary the relationship, if any, between cellulitis and DM.     The medical record reflects the following:    Risk Factors: LUE cellulitis, DM    Clinical Indicators:  -- H&P notes pt c/o rash to left upper extremity consistent with cellulitis POA not being treated at time of admission  -- Hospitalist notes document cellulitis LUE & Type 2 diabetes  -- BG = 120-318 (BMP) & 138-481 (POC)    Treatment: IV & PO Abx (doxycycline & cephalexin), lantus insulin plus both additional lispro insulin and SSI with meals, Accuchecks, DTC consult, serial lab monitoring    Thank-you,  Joel Reveles RN, [facility]  Clinical   [phone]  Options provided:  -- Left upper extremity cellulitis associated with Diabetes  -- Left upper extremity cellulitis unrelated to Diabetes  -- Other - I will add my own diagnosis  -- Disagree - Not applicable / Not valid  -- Disagree - Clinically unable to determine / Unknown  -- Refer to Clinical Documentation Reviewer    PROVIDER RESPONSE TEXT:    Left upper extremity cellulitis associated with Diabetes.     Query created by: Kaleb Dickey on 9/13/2021 1:58 PM      Electronically signed by:  Alexus Walton MD 9/13/2021 3:38 PM

## 2021-09-13 NOTE — PROGRESS NOTES
Clinical Pharmacy Note: Re: IV to PO Automatic Conversion - Antibiotic    Please note: Shanta Ferrari medication azithromycin has/have been changed from IV to PO based on the following criteria:    The patient:  Received IV therapy for at least 24 hours   Is tolerating diet more advanced than clear liquids  Is tolerating oral medications  Is not on vasopressor blood pressure support (i.e. no signs of shock)      This IV to PO conversion is based on the P&[employer] approved automatic conversion policy for eligible patients. Please call with questions.

## 2021-09-13 NOTE — PROGRESS NOTES
Admission Medication Reconciliation:    Information obtained from:  patient and [address]:  NO    Comments/Recommendations: Updated PTA meds/reviewed patient's allergies. 1)  Spoke to patient in room. Patient not a reliable historian, thus contacted pharmacy and got fax records of medication list.    2)  Medication changes (since last review): Added  - Pregabalin 75 mg twice daily  - Montelukast 10 mg at bedtime  - Entresto 49 mg-51 mg twice daily  - Eliquis 5 mg twice daily  - Amiodarone 100 mg daily  - Ezetimbe 10 mg daily  - Pantoprazole 40 mg daily  - Jardiance 25 mg daily  - Isosorbide MN ER 30 mg daily  - Aspirin 81 mg daily  - Rosuvastatin 5 mg daily  - Torsemide 60 mg daily  - Spironolactone 25 mg daily   ¹Rx[employer] pharmacy benefit data reflects medications filled and processed through the patient's insurance, however   this data does NOT capture whether the medication was picked up or is currently being taken by the patient. Allergies:  Patient has no known allergies. Significant PMH/Disease States:   Past Medical History:   Diagnosis Date    DM (diabetes mellitus) ([facility] [address]     Dyslipidemia     Hypertension      Chief Complaint for this Admission:    Chief Complaint   Patient presents with    Rash    Shortness of Breath     Prior to Admission Medications:   Prior to Admission Medications   Prescriptions Last Dose Informant Taking?   amiodarone (PACERONE) 100 mg tablet   Yes   Sig: Take 100 mg by mouth daily. apixaban (ELIQUIS) 5 mg tablet   Yes   Sig: Take 5 mg by mouth two (2) times a day. Indications: treatment to prevent blood clots in chronic atrial fibrillation   aspirin delayed-release 81 mg tablet   Yes   Sig: Take  by mouth daily. Indications: treatment to prevent a heart attack   empagliflozin (Jardiance) 25 mg tablet   Yes   Sig: Take 25 mg by mouth daily.  Indications: type 2 diabetes mellitus   ezetimibe (ZETIA) 10 mg tablet   Yes   Sig: Take 10 mg by mouth daily. insulin glargine (LANTUS) 100 unit/mL injection   Yes   Si Units by SubCUTAneous route daily. isosorbide mononitrate ER (IMDUR) 30 mg tablet   Yes   Sig: Take 30 mg by mouth daily. Indications: prevention of anginal chest pain associated with coronary artery disease   montelukast (SINGULAIR) 10 mg tablet   Yes   Sig: Take 10 mg by mouth nightly. pantoprazole (PROTONIX) 40 mg tablet   Yes   Sig: Take 40 mg by mouth daily. Indications: gastroesophageal reflux disease   pregabalin (LYRICA) 75 mg capsule   Yes   Sig: Take 75 mg by mouth two (2) times a day. Indications: nerve pain after herpes   rosuvastatin (CRESTOR) 5 mg tablet   Yes   Sig: Take 5 mg by mouth daily. sacubitriL-valsartan (Entresto) 49-51 mg tab tablet   Yes   Sig: Take 1 Tablet by mouth two (2) times a day. spironolactone (ALDACTONE) 25 mg tablet   Yes   Sig: Take 25 mg by mouth daily. torsemide (DEMADEX) 20 mg tablet   Yes   Sig: Take 60 mg by mouth daily. Indications: high blood pressure      Facility-Administered Medications: None     Please contact the main inpatient pharmacy with any questions or concerns at [phone] and we will direct you to the clinical pharmacist covering this patient's care while in-house.      MORAIMA Phan

## 2021-09-14 LAB
ANION GAP SERPL CALC-SCNC: 3 MMOL/L (ref 5–15)
BASOPHILS # BLD: 0 K/UL (ref 0–0.1)
BASOPHILS NFR BLD: 0 % (ref 0–1)
BUN SERPL-MCNC: 51 MG/DL (ref 6–20)
BUN/CREAT SERPL: 46 (ref 12–20)
CALCIUM SERPL-MCNC: 8.9 MG/DL (ref 8.5–10.1)
CHLORIDE SERPL-SCNC: 99 MMOL/L (ref 97–108)
CO2 SERPL-SCNC: 33 MMOL/L (ref 21–32)
CREAT SERPL-MCNC: 1.12 MG/DL (ref 0.7–1.3)
DIFFERENTIAL METHOD BLD: ABNORMAL
EOSINOPHIL # BLD: 0 K/UL (ref 0–0.4)
EOSINOPHIL NFR BLD: 0 % (ref 0–7)
ERYTHROCYTE [DISTWIDTH] IN BLOOD BY AUTOMATED COUNT: 16.7 % (ref 11.5–14.5)
GLUCOSE BLD STRIP.AUTO-MCNC: 218 MG/DL (ref 65–117)
GLUCOSE BLD STRIP.AUTO-MCNC: 226 MG/DL (ref 65–117)
GLUCOSE BLD STRIP.AUTO-MCNC: 274 MG/DL (ref 65–117)
GLUCOSE BLD STRIP.AUTO-MCNC: 332 MG/DL (ref 65–117)
GLUCOSE SERPL-MCNC: 251 MG/DL (ref 65–100)
HCT VFR BLD AUTO: 45.3 % (ref 36.6–50.3)
HGB BLD-MCNC: 13.4 G/DL (ref 12.1–17)
IMM GRANULOCYTES # BLD AUTO: 0.1 K/UL (ref 0–0.04)
IMM GRANULOCYTES NFR BLD AUTO: 1 % (ref 0–0.5)
LYMPHOCYTES # BLD: 0.1 K/UL (ref 0.8–3.5)
LYMPHOCYTES NFR BLD: 1 % (ref 12–49)
MAGNESIUM SERPL-MCNC: 2.9 MG/DL (ref 1.6–2.4)
MCH RBC QN AUTO: 23.8 PG (ref 26–34)
MCHC RBC AUTO-ENTMCNC: 29.6 G/DL (ref 30–36.5)
MCV RBC AUTO: 80.3 FL (ref 80–99)
MONOCYTES # BLD: 0.2 K/UL (ref 0–1)
MONOCYTES NFR BLD: 2 % (ref 5–13)
NEUTS SEG # BLD: 11.9 K/UL (ref 1.8–8)
NEUTS SEG NFR BLD: 96 % (ref 32–75)
NRBC # BLD: 0 K/UL (ref 0–0.01)
NRBC BLD-RTO: 0 PER 100 WBC
PHOSPHATE SERPL-MCNC: 4.5 MG/DL (ref 2.6–4.7)
PLATELET # BLD AUTO: 193 K/UL (ref 150–400)
PMV BLD AUTO: 10.3 FL (ref 8.9–12.9)
POTASSIUM SERPL-SCNC: 4.5 MMOL/L (ref 3.5–5.1)
RBC # BLD AUTO: 5.64 M/UL (ref 4.1–5.7)
RBC MORPH BLD: ABNORMAL
SERVICE CMNT-IMP: ABNORMAL
SODIUM SERPL-SCNC: 135 MMOL/L (ref 136–145)
WBC # BLD AUTO: 12.3 K/UL (ref 4.1–11.1)

## 2021-09-14 PROCEDURE — 83735 ASSAY OF MAGNESIUM: CPT

## 2021-09-14 PROCEDURE — 77030041076 HC DRSG AG OPTICELL MDII -A

## 2021-09-14 PROCEDURE — 74011250637 HC RX REV CODE- 250/637: Performed by: INTERNAL MEDICINE

## 2021-09-14 PROCEDURE — 36415 COLL VENOUS BLD VENIPUNCTURE: CPT

## 2021-09-14 PROCEDURE — 65660000001 HC RM ICU INTERMED STEPDOWN

## 2021-09-14 PROCEDURE — 94640 AIRWAY INHALATION TREATMENT: CPT

## 2021-09-14 PROCEDURE — 74011636637 HC RX REV CODE- 636/637: Performed by: INTERNAL MEDICINE

## 2021-09-14 PROCEDURE — 85025 COMPLETE CBC W/AUTO DIFF WBC: CPT

## 2021-09-14 PROCEDURE — 80048 BASIC METABOLIC PNL TOTAL CA: CPT

## 2021-09-14 PROCEDURE — 77010033711 HC HIGH FLOW OXYGEN

## 2021-09-14 PROCEDURE — 74011000250 HC RX REV CODE- 250: Performed by: HOSPITALIST

## 2021-09-14 PROCEDURE — 74011250636 HC RX REV CODE- 250/636: Performed by: HOSPITALIST

## 2021-09-14 PROCEDURE — 82962 GLUCOSE BLOOD TEST: CPT

## 2021-09-14 PROCEDURE — 74011250636 HC RX REV CODE- 250/636: Performed by: INTERNAL MEDICINE

## 2021-09-14 PROCEDURE — 84100 ASSAY OF PHOSPHORUS: CPT

## 2021-09-14 PROCEDURE — 74011250637 HC RX REV CODE- 250/637: Performed by: HOSPITALIST

## 2021-09-14 PROCEDURE — 99231 SBSQ HOSP IP/OBS SF/LOW 25: CPT | Performed by: CLINICAL NURSE SPECIALIST

## 2021-09-14 RX ORDER — DEXTROSE 50 % IN WATER (D50W) INTRAVENOUS SYRINGE
12.5-25 AS NEEDED
Status: DISCONTINUED | OUTPATIENT
Start: 2021-09-14 | End: 2021-10-04 | Stop reason: HOSPADM

## 2021-09-14 RX ORDER — INSULIN LISPRO 100 [IU]/ML
10 INJECTION, SOLUTION INTRAVENOUS; SUBCUTANEOUS
Status: DISCONTINUED | OUTPATIENT
Start: 2021-09-14 | End: 2021-09-15

## 2021-09-14 RX ORDER — INSULIN LISPRO 100 [IU]/ML
INJECTION, SOLUTION INTRAVENOUS; SUBCUTANEOUS
Status: DISCONTINUED | OUTPATIENT
Start: 2021-09-14 | End: 2021-09-16

## 2021-09-14 RX ADMIN — CEPHALEXIN 500 MG: 500 CAPSULE ORAL at 13:23

## 2021-09-14 RX ADMIN — HUMAN INSULIN 36 UNITS: 100 INJECTION, SUSPENSION SUBCUTANEOUS at 09:29

## 2021-09-14 RX ADMIN — APIXABAN 5 MG: 5 TABLET, FILM COATED ORAL at 09:29

## 2021-09-14 RX ADMIN — ROSUVASTATIN CALCIUM 5 MG: 10 TABLET, FILM COATED ORAL at 09:29

## 2021-09-14 RX ADMIN — METHYLPREDNISOLONE SODIUM SUCCINATE 40 MG: 40 INJECTION, POWDER, FOR SOLUTION INTRAMUSCULAR; INTRAVENOUS at 21:27

## 2021-09-14 RX ADMIN — PREGABALIN 75 MG: 25 CAPSULE ORAL at 17:49

## 2021-09-14 RX ADMIN — ARFORMOTEROL TARTRATE 15 MCG: 15 SOLUTION RESPIRATORY (INHALATION) at 09:12

## 2021-09-14 RX ADMIN — ASPIRIN 81 MG: 81 TABLET, COATED ORAL at 09:29

## 2021-09-14 RX ADMIN — INSULIN LISPRO 10 UNITS: 100 INJECTION, SOLUTION INTRAVENOUS; SUBCUTANEOUS at 17:49

## 2021-09-14 RX ADMIN — INSULIN LISPRO 10 UNITS: 100 INJECTION, SOLUTION INTRAVENOUS; SUBCUTANEOUS at 13:24

## 2021-09-14 RX ADMIN — CEPHALEXIN 500 MG: 500 CAPSULE ORAL at 06:36

## 2021-09-14 RX ADMIN — APIXABAN 5 MG: 5 TABLET, FILM COATED ORAL at 17:49

## 2021-09-14 RX ADMIN — INSULIN LISPRO 10 UNITS: 100 INJECTION, SOLUTION INTRAVENOUS; SUBCUTANEOUS at 09:31

## 2021-09-14 RX ADMIN — METHYLPREDNISOLONE SODIUM SUCCINATE 40 MG: 40 INJECTION, POWDER, FOR SOLUTION INTRAMUSCULAR; INTRAVENOUS at 09:29

## 2021-09-14 RX ADMIN — Medication 10 ML: at 21:28

## 2021-09-14 RX ADMIN — BUDESONIDE 500 MCG: 0.5 INHALANT RESPIRATORY (INHALATION) at 19:29

## 2021-09-14 RX ADMIN — ISOSORBIDE MONONITRATE 30 MG: 30 TABLET, EXTENDED RELEASE ORAL at 09:29

## 2021-09-14 RX ADMIN — Medication 10 ML: at 13:24

## 2021-09-14 RX ADMIN — Medication 10 ML: at 06:36

## 2021-09-14 RX ADMIN — EZETIMIBE 10 MG: 10 TABLET ORAL at 09:29

## 2021-09-14 RX ADMIN — MICONAZOLE NITRATE 2 % TOPICAL POWDER: at 21:27

## 2021-09-14 RX ADMIN — BUDESONIDE 500 MCG: 0.5 INHALANT RESPIRATORY (INHALATION) at 09:12

## 2021-09-14 RX ADMIN — MONTELUKAST 10 MG: 10 TABLET, FILM COATED ORAL at 21:27

## 2021-09-14 RX ADMIN — MICONAZOLE NITRATE 2 % TOPICAL POWDER: at 09:32

## 2021-09-14 RX ADMIN — INSULIN LISPRO 7 UNITS: 100 INJECTION, SOLUTION INTRAVENOUS; SUBCUTANEOUS at 17:50

## 2021-09-14 RX ADMIN — ACETAMINOPHEN 650 MG: 325 TABLET ORAL at 21:31

## 2021-09-14 RX ADMIN — FUROSEMIDE 60 MG: 10 INJECTION, SOLUTION INTRAMUSCULAR; INTRAVENOUS at 09:29

## 2021-09-14 RX ADMIN — AMIODARONE HYDROCHLORIDE 100 MG: 200 TABLET ORAL at 09:29

## 2021-09-14 RX ADMIN — INSULIN LISPRO 10 UNITS: 100 INJECTION, SOLUTION INTRAVENOUS; SUBCUTANEOUS at 13:23

## 2021-09-14 RX ADMIN — INSULIN LISPRO 4 UNITS: 100 INJECTION, SOLUTION INTRAVENOUS; SUBCUTANEOUS at 09:31

## 2021-09-14 RX ADMIN — INSULIN LISPRO 2 UNITS: 100 INJECTION, SOLUTION INTRAVENOUS; SUBCUTANEOUS at 21:27

## 2021-09-14 RX ADMIN — ARFORMOTEROL TARTRATE 15 MCG: 15 SOLUTION RESPIRATORY (INHALATION) at 19:29

## 2021-09-14 RX ADMIN — CEPHALEXIN 500 MG: 500 CAPSULE ORAL at 17:49

## 2021-09-14 RX ADMIN — PREGABALIN 75 MG: 25 CAPSULE ORAL at 09:29

## 2021-09-14 RX ADMIN — HUMAN INSULIN 36 UNITS: 100 INJECTION, SUSPENSION SUBCUTANEOUS at 17:49

## 2021-09-14 RX ADMIN — PANTOPRAZOLE SODIUM 40 MG: 40 TABLET, DELAYED RELEASE ORAL at 06:36

## 2021-09-14 NOTE — DIABETES MGMT
[facility]    CLINICAL NURSE SPECIALIST CONSULT   FOLLOW UP NOTE    Initial Presentation   Ángel Jewell is a 48 y.o. male who presented to the ED 9/9/21 via EMS for generalized c/o feeling unwell. He was hypoxic in the 70%s and placed on O2 with EMS. HX:   Past Medical History:   Diagnosis Date    DM (diabetes mellitus) ([facility] [address]     Dyslipidemia     Hypertension         INITIAL DX: Acute on chronic respiratory failure with hypercapnia; upper left arm cellulitis    Current Treatment     TX: IV steroids, antibiotics, pulmonary consult    Consulted by Abhishek Austin MD for advanced diabetes nursing assessment and care for:   [x] Inpatient management strategy      Hospital Course   Clinical progress has been complicated by: .   9/9: Lethargic, transitioned to BiPAP. Seen by pulmonary- continue steroids, diuresis, bronchodilators. Transfer to ICU for respiratory acidosis. ? Obstructive lung disease  9/10: BiPAP overnight. Seen by ortho for generalized LUE cellulitis. Transfer out of ICU  9/12: HFNC  Diabetes History   Hx Type 2 Diabets \"for many years\"  Diabetes managed by Ilya Russo MD    Diabetes-related Medical History  Acute complications  Steroid induced hyperglycemia      Diabetes Medication History  Drug class Currently in use Discontinued Never used   Biguanide      DDP-4 inhibitor       Sulfonylurea      Thiazolidinedione      GLP-1 RA      SGLT-2 inhibitors      Basal insulin 18 units Lantus once daily Levemir     Bolus insulin  Novolog with meals    Fixed Dose  Combinations  70/30 50 units BID      Subjective   I take sliding scale and my glucose goes up.     Patient reports the following home diabetes self-management practices:   Eating pattern   [x] Eats once meal a day  Limited snacking  Water throughout the day  Physical activity pattern   [x] Mostly sedentary   Monitoring pattern   [x] Bedtime: -180  Taking medications pattern  [x] Consistent administration  [x] Affordable     A1C 8.3%  Initial B  GFR 60    Fasting B  Pre-prandial: 242-321  WBC 14.1  IV abox  Basal: NPH 30 units  Bolus: 8 units with meals  Correction: 16 units    Methylprednisolone 40 mg Q12  Methylprednisolone Dosing    Objective   Physical exam  General Obese male in respiratory distress/ill-appearing. Conversant and cooperative  Neuro  Alert, oriented   Vital Signs   Visit Vitals  /81   Pulse 69   Temp 97.8 °F (36.6 °C)   Resp 20   Wt 150.7 kg (332 lb 4.8 oz)   SpO2 94%   BMI 43.84 kg/m²     Skin  Warm and dry. No acanthosis noted along neckline. No lipohypertrophy or lipoatrophy noted at injection sites   Heart   Regular rate and rhythm. No murmurs, rubs or gallops  Lungs  Clear to auscultation without rales or rhonchi  Extremities No foot wounds       Laboratory      CBC WITH AUTOMATED DIFF    Collection Time: 21  3:38 AM   Result Value Ref Range    WBC 12.3 (H) 4.1 - 11.1 K/uL    RBC 5.64 4.10 - 5.70 M/uL    HGB 13.4 12.1 - 17.0 g/dL    HCT 45.3 36.6 - 50.3 %    MCV 80.3 80.0 - 99.0 FL    MCH 23.8 (L) 26.0 - 34.0 PG    MCHC 29.6 (L) 30.0 - 36.5 g/dL    RDW 16.7 (H) 11.5 - 14.5 %    PLATELET 473 836 - 497 K/uL    MPV 10.3 8.9 - 12.9 FL    NRBC 0.0 0  WBC    ABSOLUTE NRBC 0.00 0.00 - 0.01 K/uL    NEUTROPHILS 96 (H) 32 - 75 %    LYMPHOCYTES 1 (L) 12 - 49 %    MONOCYTES 2 (L) 5 - 13 %    EOSINOPHILS 0 0 - 7 %    BASOPHILS 0 0 - 1 %    IMMATURE GRANULOCYTES 1 (H) 0.0 - 0.5 %    ABS. NEUTROPHILS 11.9 (H) 1.8 - 8.0 K/UL    ABS. LYMPHOCYTES 0.1 (L) 0.8 - 3.5 K/UL    ABS. MONOCYTES 0.2 0.0 - 1.0 K/UL    ABS. EOSINOPHILS 0.0 0.0 - 0.4 K/UL    ABS. BASOPHILS 0.0 0.0 - 0.1 K/UL    ABS. IMM. GRANS. 0.1 (H) 0.00 - 0.04 K/UL    DF SMEAR SCANNED      RBC COMMENTS ANISOCYTOSIS  1+        RBC COMMENTS MICROCYTOSIS  1+        RBC COMMENTS HYPOCHROMIA  1+         No results found for this visit on 21 (from the past 12 hour(s)).   BMP:   Lab Results   Component Value Date/Time     (L) 09/14/2021 03:38 AM    K 4.5 09/14/2021 03:38 AM    CL 99 09/14/2021 03:38 AM    CO2 33 (H) 09/14/2021 03:38 AM    AGAP 3 (L) 09/14/2021 03:38 AM     (H) 09/14/2021 03:38 AM    BUN 51 (H) 09/14/2021 03:38 AM    CREA 1.12 09/14/2021 03:38 AM    GFRAA >60 09/14/2021 03:38 AM    GFRNA >60 09/14/2021 03:38 AM        Factors impacting BG management  Factor Dose Comments   Nutrition:  Standard meals     60 grams/meal      Drugs:    Steroids     Solu-medrol 40mg Q12   Impairs insulin action       Blood glucose pattern        Assessment and Plan   Nursing Diagnosis Risk for unstable blood glucose pattern   Nursing Intervention Domain 5252 Decision-making Support   Nursing Interventions Examined current inpatient diabetes control   Explored factors facilitating and impeding inpatient management  Identified self-management practices impeding diabetes control  Explored corrective strategies with patient and responsible inpatient provider   Informed patient of rational for insulin strategy while hospitalized     Evaluation   Vernell Ramos is a 48year old gentleman, with uncontrolled Type 2 Diabetes on 70/30 insulin, admitted with acute on chronic hypercapneic and hypoxic respiratory failure with acute COPD exacerbation. He did not achieve diabetes control prior to admission, as evidenced by A1c of 8.3%. During this hospitalization, the patient's glucose was in goal the 1st 24 hrs without the use of antihyperglycemic agents- he was on BiPAP and NPO at that time. 40mg methylprednisolone Q12 was initiated and now with steroid induced hyperglycemia. Low dose Lantus (18 units/home dose)/humalog (4 units/meal) was started but not sufficient to override steroid impact as BG was over 310. Basal/bolus insulin was doubled yesterday with an improvement in glucose pattern but still over goal.  Fasting BG of 218 indicates a need to advance basal insulin.   Likewise, pre-prandial BG after adjustments yesterday was 242- bolus can also be advanced. Please titrate daily to inpatient blood glucose target of 100-180mg/dl. Recommendations   1. POC glucose ACHS  2. Consistent carbohydrate diet (60 grams CHO/meal), carb consistent shakes if they are ordered  3. Adjust the Subcutaneous Insulin Order set (1588)  Insulin Dosing Specific recommendation   Basal                                      (Based on weight, BMI & GFR) 36 units NPH Q12    Nutritional                                      (Based on CHO/dextrose load) 10 units Humalog/meal    Corrective                                       (Useful in adjusting insulin dosing) [x] Insulin-resistant sensitivity        Billing Code(s)   [x] 75737    Before making these care recommendations, I personally reviewed the hosptialization record, including laboratory and diagnostic data, medications and examined the patient at bedside (circumstances permitting).   Total minutes: 13      JUDY Ritter  Diabetes Clinical Nurse Specialist  [facility]  Access via [employer]

## 2021-09-14 NOTE — PROGRESS NOTES
2000 Verbal bedside update given to Patti Davis RN returning nurse by Sanjay Estrella RN off-going nurse. Report included update on pt status and condition, recent results,heart rate and rhythm, respiratory status and MAR.

## 2021-09-14 NOTE — PROGRESS NOTES
[facility]  Hospitalist Group                                                                                          Hospitalist Progress Note  Solis Sloan MD  Answering service: [phone] OR [phone] from in house phone        Date of Service:  2021  NAME:  Vivi Zamora  :  1965  MRN:  [id number]      Admission Summary:   60-year-old gentleman history of diabetes, hypertension, dyslipidemia, likely history of CHF as well he uses oxygen as needed at home (particularly at night for obstructive sleep apnea), morbid obese  presents to the emergency department today with chief complaint of generalized not feeling well for the past 4 to 5 days. Interval history / Subjective:   Pt seen and examined. Feels well subjectively, though on 50L HFNC. Lying in bed/not good position, encourage getting out of bed and using IS frequently. Assessment & Plan:     Acute on chronic hypercapneic and hypoxic respiratory failure  Acute COPD exacerbation   - HFNC, and add BIPAP PRN, may need BiPAP with sleeping   - Wean as tolerated for saturation 88% or above - Continue steroids, nebs  - Pulmonary following- will need o/p sleep study - Viral panel negative. Acute on chronic diastolic CHF - NYHA class IV on admit  - I and O - Decrease lasix to 60mg IV daily.- O2 as  Needed  - Resume home imdur,holding entresto and aldactone with elevated cr. Cellulitis - LUE  - Change CTX to oral keflex for total of 2 additional days for cellulitis   - Ortho consulted, no sign of joint or bursa involvement.      Type 2 diabetes - A1c 8.3, NPH BID, preMeal+ SSI- Continue lyrica for neuropathy   H/o Afib - on amiodarone PO, eliquis   Morbid obestiy -counseled weight loss     Code status: FULL  DVT prophylaxis: heparin   Care Plan discussed with: Patient/Family  Anticipated Disposition: Home w/Family  Anticipated Discharge: Greater than 48 hours     Hospital Problems  Date Reviewed: 2013        Codes Class Noted POA    Acute on chronic respiratory failure [facility]) ICD-10-CM: J96.20  ICD-9-CM: 518.84  9/9/2021 Unknown                Review of Systems:   A comprehensive review of systems was negative except for that written in the HPI. Vital Signs:    Last 24hrs VS reviewed since prior progress note. Most recent are:  Visit Vitals  /81   Pulse 69   Temp 97.8 °F (36.6 °C)   Resp 20   Wt 150.7 kg (332 lb 4.8 oz)   SpO2 94%   BMI 43.84 kg/m²         Intake/Output Summary (Last 24 hours) at 9/14/2021 6092  Last data filed at 9/14/2021 3045  Gross per 24 hour   Intake    Output 2740 ml   Net -2740 ml        Physical Examination:     I had a face to face encounter with this patient and independently examined them on 9/14/2021 as outlined below:          Constitutional:  Moderate respiratory distress, cooperative, pleasant, morbidly obese   ENT:  Oral mucosa moist, oropharynx benign. Resp:  Crackles bilaterally, course. + accessory muscle use, and tachypnea, saturating 84% on 15 L   CV:  Regular rhythm, normal rate, no murmurs, gallops, rubs    GI:  Soft, non distended, non tender. normoactive bowel sounds, no hepatosplenomegaly     Musculoskeletal:  1+ edema, warm, 2+ pulses throughout. Left upper extremity with minimal erythema (improving) to the elbow    Neurologic:  Moves all extremities.   AAOx3, CN II-XII reviewed            Data Review:    Review and/or order of clinical lab test  Review and/or order of tests in the radiology section of CPT  Review and/or order of tests in the medicine section of CPT      Labs:     Recent Labs     09/14/21 0338 09/13/21 0328   WBC 12.3* 14.1*   HGB 13.4 14.3   HCT 45.3 47.7    203     Recent Labs     09/14/21 0338 09/13/21 0328 09/12/21  1132   * 135* 137   K 4.5 4.2 4.6   CL 99 98 96*   CO2 33* 27 32   BUN 51* 46* 47*   CREA 1.12 1.46* 1.25   * 298* 318*   CA 8.9 9.4 8.9   MG 2.9* 2.6* 2.6*   PHOS 4.5 3.5 2.7     No results for input(s): ALT, AP, TBIL, TBILI, TP, ALB, GLOB, GGT, AML, LPSE in the last 72 hours. No lab exists for component: SGOT, GPT, AMYP, HLPSE  No results for input(s): INR, PTP, APTT, INREXT, INREXT in the last 72 hours. No results for input(s): FE, TIBC, PSAT, FERR in the last 72 hours. No results found for: FOL, RBCF   No results for input(s): PH, PCO2, PO2 in the last 72 hours. No results for input(s): CPK, CKNDX, TROIQ in the last 72 hours.     No lab exists for component: CPKMB  Lab Results   Component Value Date/Time    Cholesterol, total 170 01/23/2013 11:08 AM    HDL Cholesterol 37 (L) 01/23/2013 11:08 AM    LDL, calculated 97 01/23/2013 11:08 AM    Triglyceride 180 (H) 01/23/2013 11:08 AM    CHOL/HDL Ratio 6.0 (H) 10/06/2010 03:18 PM     Lab Results   Component Value Date/Time    Glucose (POC) 242 (H) 09/13/2021 09:18 PM    Glucose (POC) 321 (H) 09/13/2021 04:30 PM    Glucose (POC) 318 (H) 09/13/2021 11:21 AM    Glucose (POC) 481 (H) 09/13/2021 08:45 AM    Glucose (POC) 267 (H) 09/12/2021 09:00 PM     No results found for: COLOR, APPRN, SPGRU, REFSG, ELIZA, PROTU, GLUCU, KETU, BILU, UROU, RON, LEUKU, GLUKE, EPSU, BACTU, WBCU, RBCU, CASTS, UCRY      Medications Reviewed:     Current Facility-Administered Medications   Medication Dose Route Frequency    albuterol-ipratropium (DUO-NEB) 2.5 MG-0.5 MG/3 ML  3 mL Nebulization Q6H PRN    furosemide (LASIX) injection 60 mg  60 mg IntraVENous DAILY    alteplase (CATHFLO) 1 mg in sterile water (preservative free) 1 mL injection  1 mg InterCATHeter PRN    insulin NPH (NOVOLIN N, HUMULIN N) injection 30 Units  30 Units SubCUTAneous ACB&D    insulin lispro (HUMALOG) injection 8 Units  8 Units SubCUTAneous TIDAC    pregabalin (LYRICA) capsule 75 mg  75 mg Oral BID    montelukast (SINGULAIR) tablet 10 mg  10 mg Oral QHS    [Held by provider] sacubitriL-valsartan (ENTRESTO) 49-51 mg tablet 1 Tablet  1 Tablet Oral Q12H    apixaban (ELIQUIS) tablet 5 mg  5 mg Oral BID    amiodarone (CORDARONE) tablet 100 mg  100 mg Oral DAILY    ezetimibe (ZETIA) tablet 10 mg  10 mg Oral DAILY    pantoprazole (PROTONIX) tablet 40 mg  40 mg Oral ACB    isosorbide mononitrate ER (IMDUR) tablet 30 mg  30 mg Oral DAILY    aspirin delayed-release tablet 81 mg  81 mg Oral DAILY    rosuvastatin (CRESTOR) tablet 5 mg  5 mg Oral DAILY    [Held by provider] spironolactone (ALDACTONE) tablet 25 mg  25 mg Oral DAILY    cephALEXin (KEFLEX) capsule 500 mg  500 mg Oral Q6H    benzocaine-menthoL (CEPACOL) lozenge 1 Lozenge  1 Lozenge Mucous Membrane PRN    fentaNYL citrate (PF) injection 50 mcg  50 mcg IntraVENous Q4H PRN    hydrALAZINE (APRESOLINE) 20 mg/mL injection 10 mg  10 mg IntraVENous Q6H PRN    insulin lispro (HUMALOG) injection   SubCUTAneous AC&HS    glucose chewable tablet 16 g  4 Tablet Oral PRN    dextrose (D50W) injection syrg 12.5-25 g  25-50 mL IntraVENous PRN    glucagon (GLUCAGEN) injection 1 mg  1 mg IntraMUSCular PRN    sodium chloride (NS) flush 5-40 mL  5-40 mL IntraVENous Q8H    sodium chloride (NS) flush 5-40 mL  5-40 mL IntraVENous PRN    acetaminophen (TYLENOL) tablet 650 mg  650 mg Oral Q6H PRN    Or    acetaminophen (TYLENOL) suppository 650 mg  650 mg Rectal Q6H PRN    polyethylene glycol (MIRALAX) packet 17 g  17 g Oral DAILY PRN    ondansetron (ZOFRAN ODT) tablet 4 mg  4 mg Oral Q8H PRN    Or    ondansetron (ZOFRAN) injection 4 mg  4 mg IntraVENous Q6H PRN    methylPREDNISolone (PF) (SOLU-MEDROL) injection 40 mg  40 mg IntraVENous Q12H    arformoteroL (BROVANA) neb solution 15 mcg  15 mcg Nebulization BID RT    And    budesonide (PULMICORT) 500 mcg/2 ml nebulizer suspension  500 mcg Nebulization BID RT    miconazole (MICOTIN) 2 % powder   Topical BID    sodium chloride (NS) flush 5-40 mL  5-40 mL IntraVENous Q8H    sodium chloride (NS) flush 5-40 mL  5-40 mL IntraVENous PRN     ______________________________________________________________________  EXPECTED LENGTH OF STAY: 3d 14h  ACTUAL LENGTH OF STAY:          Agus Reid MD

## 2021-09-14 NOTE — PROGRESS NOTES
Physician Progress Note      Krissy Couch  [facility] #:                  [id number]  :                       1965  ADMIT DATE:       2021 10:59 AM  DISCH DATE:  RESPONDING  PROVIDER #:        ORTIZ Bush MD          QUERY TEXT:    Dear Attending,    Patient with noted documentation of acute on chronic diastolic CHF NYHA class IV on admit per Hospitalist progress notes. Echo results state normal cavity size, wall thickness, systolic function and diastolic function. In order to support the diagnosis of acute CHF, please include additional clinical indicators in your documentation. Or please document if CHF is chronic only    The medical record reflects the following:    Risk Factors: HTN, DM, morbid obesity, COPD    Clinical Indicators:  -- Echo results LV = Normal cavity size, wall thickness, systolic function (ejection fraction normal) and diastolic function. The muscle mass is normal. The cavity shape is normal. Wall motion: unable to assess due to limited image quality. The estimated EF is 60 - 65%. End-systolic volume is normal. Normal left ventricular strain. Normal left ventricular diastolic pressure.  End-diastolic volume is normal.  -- pBNP = 625  -- CXR  = pulmonary vascular congestion with mild edema pattern  -- CXR  = no pulmonary edema  -- CT chest 9/10 = cardiomegaly and coronary artery disease with evidence of old left ventricular apical infarct; bilateral patchy atelectasis versus nonspecific pneumonia  -- Hospitalist progress note on  &  states acute on chronic diastolic CHF - NYHA class IV on admit; Hospitalist notes on -9/10 note no gallop  -- Pulmonology progress note on 9/10 states acute on chronic hypoxemic / hypercarbic respiratory failure 2/2 CHF / ? Obstructive lung disease / IVY / OHS with hypercarbia and CO2 narcosis    Treatment: IV Lasix, echocardiogram, I&O monitoring, supplemental O2, Pulmonology consult, pBNP testing, imaging studies, hydralazine (IV), Imdur,  Entresto, spironolactone    Thank-you,  Bora Alejandro RN, [facility]  Clinical   [phone]    [place] Criteria for Acute CHF: Heart Failure exacerbation met with two major criteria or one major and two minor met ([email or web address])  Major: acute pulmonary edema on CXR, cardiomegaly, hepatojugular reflux, JVD, paroxysmal nocturnal dyspnea or orthopnea, rales or S3 gallop;  Minor: ankle edema, [place], hepatomegaly, nocturnal cough, pleural effusion on CXR, tachycardia with HR > 120 bpm  Options provided:  -- Acute on chronic diastolic CHF as evidenced by, Please document evidence. -- Acute diastolic CHF ruled out after study and chronic diastolic CHF confirmed  -- Other - I will add my own diagnosis  -- Disagree - Not applicable / Not valid  -- Disagree - Clinically unable to determine / Unknown  -- Refer to Clinical Documentation Reviewer    PROVIDER RESPONSE TEXT:    Provider is clinically unable to determine a response to this query.     Query created by: Say Patel on 9/13/2021 2:29 PM      Electronically signed by:  ORTIZ TENA [facility] MD 9/14/2021 8:49 AM

## 2021-09-14 NOTE — PROGRESS NOTES
Pulmonary      Reconsulted for ongoing hypoxic respiratory failure. Patient was seen and evaluated earlier today and was sitting in the chair on high flow oxygen denies worsening dyspnea. CT of the chest that was done earlier showed cardiomegaly with nonspecific findings of basilar atelectasis. Limited echo has been reported as normal. He has ongoing pedal edema.  He is receiving diuretics    9/10  ABG improved with NIPPV  Pt has removed bipap and apparently refusing to put it back on    ABG from this am with compensated chronic resp acidosis with pH of 7.39 and PCO2 of 50 (appears to be a baseline gas for him)    Patient Vitals for the past 4 hrs:   BP Temp Pulse Resp SpO2   21 1505 104/68 98.8 °F (37.1 °C) 73 16 95 %   21 1400   80     21 1200   75     21 1150 119/74 98.6 °F (37 °C) 77 16 93 %   Temp (24hrs), Av.4 °F (36.9 °C), Min:97.8 °F (36.6 °C), Max:98.8 °F (37.1 °C)      Intake/Output Summary (Last 24 hours) at 2021 1547  Last data filed at 2021 1509  Gross per 24 hour   Intake    Output 3175 ml   Net -3175 ml     Exam  Gen- comfortable and up in the chair  HEENT- increased neck circumference  Chest- diminished BS   Cor- distant heart sounds  Ext- bilateral 4 + edema  Skin- chronic skin changes    Pulmonary Impression / Recommendations:     Acute on chronic hypoxemic / hypercarbic respiratory failure 2/2 CHF / ? Obstructive lung disease / IVY / OHS with hypercarbia and CO2 narcosis  OHD, on amiodarone, anticoagulated     --Bipap qhs and prn  -- Incentive spirometer, mobilize  -- CXR in am  -- Check sed rate  -- May need to repeat echo with bubble study fo evaluate for right to left shunt  --bronchodilators / steroids / diuretics / empiric abx  --wean O2 by sats - lowest level to keep 90-92%  --will need follow up with sleep physician  --avoid sedatives      D/W patient    Emmanuel Andrew MD

## 2021-09-15 ENCOUNTER — APPOINTMENT (OUTPATIENT)
Dept: GENERAL RADIOLOGY | Age: 56
DRG: 291 | End: 2021-09-15
Attending: INTERNAL MEDICINE
Payer: MEDICARE

## 2021-09-15 LAB
ANION GAP SERPL CALC-SCNC: 3 MMOL/L (ref 5–15)
BACTERIA SPEC CULT: NORMAL
BACTERIA SPEC CULT: NORMAL
BUN SERPL-MCNC: 56 MG/DL (ref 6–20)
BUN/CREAT SERPL: 47 (ref 12–20)
CALCIUM SERPL-MCNC: 8.8 MG/DL (ref 8.5–10.1)
CHLORIDE SERPL-SCNC: 94 MMOL/L (ref 97–108)
CO2 SERPL-SCNC: 34 MMOL/L (ref 21–32)
CREAT SERPL-MCNC: 1.19 MG/DL (ref 0.7–1.3)
GLUCOSE BLD STRIP.AUTO-MCNC: 204 MG/DL (ref 65–117)
GLUCOSE BLD STRIP.AUTO-MCNC: 242 MG/DL (ref 65–117)
GLUCOSE BLD STRIP.AUTO-MCNC: 246 MG/DL (ref 65–117)
GLUCOSE BLD STRIP.AUTO-MCNC: 289 MG/DL (ref 65–117)
GLUCOSE SERPL-MCNC: 254 MG/DL (ref 65–100)
POTASSIUM SERPL-SCNC: 4.8 MMOL/L (ref 3.5–5.1)
SERVICE CMNT-IMP: ABNORMAL
SERVICE CMNT-IMP: NORMAL
SERVICE CMNT-IMP: NORMAL
SODIUM SERPL-SCNC: 131 MMOL/L (ref 136–145)

## 2021-09-15 PROCEDURE — 94640 AIRWAY INHALATION TREATMENT: CPT

## 2021-09-15 PROCEDURE — 97530 THERAPEUTIC ACTIVITIES: CPT

## 2021-09-15 PROCEDURE — 74011636637 HC RX REV CODE- 636/637: Performed by: INTERNAL MEDICINE

## 2021-09-15 PROCEDURE — 80048 BASIC METABOLIC PNL TOTAL CA: CPT

## 2021-09-15 PROCEDURE — 36415 COLL VENOUS BLD VENIPUNCTURE: CPT

## 2021-09-15 PROCEDURE — 74011250637 HC RX REV CODE- 250/637: Performed by: INTERNAL MEDICINE

## 2021-09-15 PROCEDURE — 71045 X-RAY EXAM CHEST 1 VIEW: CPT

## 2021-09-15 PROCEDURE — 82962 GLUCOSE BLOOD TEST: CPT

## 2021-09-15 PROCEDURE — 65660000001 HC RM ICU INTERMED STEPDOWN

## 2021-09-15 PROCEDURE — 94664 DEMO&/EVAL PT USE INHALER: CPT

## 2021-09-15 PROCEDURE — 74011250637 HC RX REV CODE- 250/637: Performed by: HOSPITALIST

## 2021-09-15 PROCEDURE — 99231 SBSQ HOSP IP/OBS SF/LOW 25: CPT | Performed by: CLINICAL NURSE SPECIALIST

## 2021-09-15 PROCEDURE — 74011250636 HC RX REV CODE- 250/636: Performed by: HOSPITALIST

## 2021-09-15 PROCEDURE — 74011000250 HC RX REV CODE- 250: Performed by: HOSPITALIST

## 2021-09-15 RX ORDER — INSULIN LISPRO 100 [IU]/ML
12 INJECTION, SOLUTION INTRAVENOUS; SUBCUTANEOUS
Status: DISCONTINUED | OUTPATIENT
Start: 2021-09-15 | End: 2021-09-20

## 2021-09-15 RX ADMIN — PREGABALIN 75 MG: 25 CAPSULE ORAL at 18:04

## 2021-09-15 RX ADMIN — INSULIN LISPRO 4 UNITS: 100 INJECTION, SOLUTION INTRAVENOUS; SUBCUTANEOUS at 09:41

## 2021-09-15 RX ADMIN — AMIODARONE HYDROCHLORIDE 100 MG: 200 TABLET ORAL at 09:47

## 2021-09-15 RX ADMIN — Medication 10 ML: at 06:39

## 2021-09-15 RX ADMIN — Medication 10 ML: at 18:05

## 2021-09-15 RX ADMIN — INSULIN LISPRO 7 UNITS: 100 INJECTION, SOLUTION INTRAVENOUS; SUBCUTANEOUS at 12:22

## 2021-09-15 RX ADMIN — Medication 20 ML: at 22:48

## 2021-09-15 RX ADMIN — ASPIRIN 81 MG: 81 TABLET, COATED ORAL at 09:42

## 2021-09-15 RX ADMIN — INSULIN LISPRO 2 UNITS: 100 INJECTION, SOLUTION INTRAVENOUS; SUBCUTANEOUS at 22:47

## 2021-09-15 RX ADMIN — ARFORMOTEROL TARTRATE 15 MCG: 15 SOLUTION RESPIRATORY (INHALATION) at 22:30

## 2021-09-15 RX ADMIN — METHYLPREDNISOLONE SODIUM SUCCINATE 40 MG: 40 INJECTION, POWDER, FOR SOLUTION INTRAMUSCULAR; INTRAVENOUS at 09:40

## 2021-09-15 RX ADMIN — HUMAN INSULIN 10 UNITS: 100 INJECTION, SUSPENSION SUBCUTANEOUS at 12:22

## 2021-09-15 RX ADMIN — ACETAMINOPHEN 650 MG: 325 TABLET ORAL at 18:04

## 2021-09-15 RX ADMIN — MICONAZOLE NITRATE 2 % TOPICAL POWDER: at 21:06

## 2021-09-15 RX ADMIN — HUMAN INSULIN 40 UNITS: 100 INJECTION, SUSPENSION SUBCUTANEOUS at 09:42

## 2021-09-15 RX ADMIN — METHYLPREDNISOLONE SODIUM SUCCINATE 40 MG: 40 INJECTION, POWDER, FOR SOLUTION INTRAMUSCULAR; INTRAVENOUS at 21:06

## 2021-09-15 RX ADMIN — INSULIN LISPRO 12 UNITS: 100 INJECTION, SOLUTION INTRAVENOUS; SUBCUTANEOUS at 12:22

## 2021-09-15 RX ADMIN — APIXABAN 5 MG: 5 TABLET, FILM COATED ORAL at 09:42

## 2021-09-15 RX ADMIN — PREGABALIN 75 MG: 25 CAPSULE ORAL at 09:47

## 2021-09-15 RX ADMIN — CEPHALEXIN 500 MG: 500 CAPSULE ORAL at 00:11

## 2021-09-15 RX ADMIN — CEPHALEXIN 500 MG: 500 CAPSULE ORAL at 06:39

## 2021-09-15 RX ADMIN — ISOSORBIDE MONONITRATE 30 MG: 30 TABLET, EXTENDED RELEASE ORAL at 09:42

## 2021-09-15 RX ADMIN — ROSUVASTATIN CALCIUM 5 MG: 10 TABLET, FILM COATED ORAL at 09:42

## 2021-09-15 RX ADMIN — INSULIN LISPRO 12 UNITS: 100 INJECTION, SOLUTION INTRAVENOUS; SUBCUTANEOUS at 18:03

## 2021-09-15 RX ADMIN — CEPHALEXIN 500 MG: 500 CAPSULE ORAL at 12:22

## 2021-09-15 RX ADMIN — Medication 10 ML: at 22:48

## 2021-09-15 RX ADMIN — ARFORMOTEROL TARTRATE 15 MCG: 15 SOLUTION RESPIRATORY (INHALATION) at 09:37

## 2021-09-15 RX ADMIN — INSULIN LISPRO 4 UNITS: 100 INJECTION, SOLUTION INTRAVENOUS; SUBCUTANEOUS at 18:03

## 2021-09-15 RX ADMIN — MONTELUKAST 10 MG: 10 TABLET, FILM COATED ORAL at 22:47

## 2021-09-15 RX ADMIN — HUMAN INSULIN 50 UNITS: 100 INJECTION, SUSPENSION SUBCUTANEOUS at 18:03

## 2021-09-15 RX ADMIN — INSULIN LISPRO 12 UNITS: 100 INJECTION, SOLUTION INTRAVENOUS; SUBCUTANEOUS at 09:40

## 2021-09-15 RX ADMIN — POLYETHYLENE GLYCOL 3350 17 G: 17 POWDER, FOR SOLUTION ORAL at 18:04

## 2021-09-15 RX ADMIN — BUDESONIDE 500 MCG: 0.5 INHALANT RESPIRATORY (INHALATION) at 09:37

## 2021-09-15 RX ADMIN — EZETIMIBE 10 MG: 10 TABLET ORAL at 09:43

## 2021-09-15 RX ADMIN — APIXABAN 5 MG: 5 TABLET, FILM COATED ORAL at 18:04

## 2021-09-15 RX ADMIN — PANTOPRAZOLE SODIUM 40 MG: 40 TABLET, DELAYED RELEASE ORAL at 06:39

## 2021-09-15 RX ADMIN — CEPHALEXIN 500 MG: 500 CAPSULE ORAL at 18:30

## 2021-09-15 RX ADMIN — BUDESONIDE 500 MCG: 0.5 INHALANT RESPIRATORY (INHALATION) at 22:30

## 2021-09-15 RX ADMIN — Medication 10 ML: at 18:04

## 2021-09-15 NOTE — PROGRESS NOTES
Verbal bedside update given to Juany Dangelo RN returning nurse by Gil Dias RN off-going nurse. Report included update on pt status and condition, recent results,heart rate and rhythm, respiratory status and MAR.

## 2021-09-15 NOTE — DIABETES MGMT
[address] [facility]    CLINICAL NURSE SPECIALIST CONSULT   FOLLOW UP NOTE    Initial Presentation   Tejas Carreon is a 48 y.o. male who presented to the ED 9/9/21 via EMS for generalized c/o feeling unwell. He was hypoxic in the 70%s and placed on O2 with EMS. HX:   Past Medical History:   Diagnosis Date    DM (diabetes mellitus) ([facility]     Dyslipidemia     Hypertension         INITIAL DX: Acute on chronic respiratory failure with hypercapnia; upper left arm cellulitis    Current Treatment     TX: IV steroids, antibiotics, pulmonary consult    Consulted by Gustavo Madsen MD for advanced diabetes nursing assessment and care for:   [x] Inpatient management strategy      Hospital Course   Clinical progress has been complicated by: .   9/9: Lethargic, transitioned to BiPAP. Seen by pulmonary- continue steroids, diuresis, bronchodilators. Transfer to ICU for respiratory acidosis. ? Obstructive lung disease  9/10: BiPAP overnight. Seen by ortho for generalized LUE cellulitis. Transfer out of ICU  9/12: HFNC  9/14: Pulm re consulted- continue mobility, cxr, consider repeating ECHO  Diabetes History   Hx Type 2 Diabets \"for many years\"  Diabetes managed by Ej Olivier MD    Diabetes-related Medical History  Acute complications  Steroid induced hyperglycemia      Diabetes Medication History  Drug class Currently in use Discontinued Never used   Biguanide      DDP-4 inhibitor       Sulfonylurea      Thiazolidinedione      GLP-1 RA      SGLT-2 inhibitors      Basal insulin 18 units Lantus once daily Levemir     Bolus insulin  Novolog with meals    Fixed Dose  Combinations  70/30 50 units BID      Subjective   I take sliding scale and my glucose goes up.     Patient reports the following home diabetes self-management practices:   Eating pattern   [x] Eats once meal a day  Limited snacking  Water throughout the day  Physical activity pattern   [x] Mostly sedentary   Monitoring pattern   [x] Bedtime: -180  Taking medications pattern  [x] Consistent administration  [x] Affordable     A1C 8.3%  Initial B  GFR 60    Fasting B  Pre-prandial: 226-332  WBC 14.1  IV abox  Basal: NPH 36 units  Bolus: 10 units with meals- increasing to 12  Correction: 23 units    Methylprednisolone 40 mg Q12  Methylprednisolone Dosing    Objective   Physical exam  General Obese male in respiratory distress/ill-appearing. Conversant and cooperative  Neuro  Alert, oriented   Vital Signs   Visit Vitals  BP (!) 146/88 (BP 1 Location: Left upper arm, BP Patient Position: Sitting)   Pulse 65   Temp 98.5 °F (36.9 °C)   Resp 17   Wt 150.7 kg (332 lb 4.8 oz)   SpO2 96%   BMI 43.84 kg/m²     Skin  Warm and dry. No acanthosis noted along neckline. No lipohypertrophy or lipoatrophy noted at injection sites   Heart   Regular rate and rhythm. No murmurs, rubs or gallops  Lungs  Clear to auscultation without rales or rhonchi  Extremities No foot wounds       Laboratory  No results found for this visit on 21 (from the past 12 hour(s)). No results found for this visit on 21 (from the past 12 hour(s)).   BMP:   Lab Results   Component Value Date/Time     (L) 09/15/2021 03:09 AM    K 4.8 09/15/2021 03:09 AM    CL 94 (L) 09/15/2021 03:09 AM    CO2 34 (H) 09/15/2021 03:09 AM    AGAP 3 (L) 09/15/2021 03:09 AM     (H) 09/15/2021 03:09 AM    BUN 56 (H) 09/15/2021 03:09 AM    CREA 1.19 09/15/2021 03:09 AM    GFRAA >60 09/15/2021 03:09 AM    GFRNA >60 09/15/2021 03:09 AM        Factors impacting BG management  Factor Dose Comments   Nutrition:  Standard meals     60 grams/meal      Drugs:    Steroids     Solu-medrol 40mg Q12   Impairs insulin action       Blood glucose pattern        Assessment and Plan   Nursing Diagnosis Risk for unstable blood glucose pattern   Nursing Intervention Domain 5250 Decision-making Support   Nursing Interventions Examined current inpatient diabetes control   Explored factors facilitating and impeding inpatient management  Identified self-management practices impeding diabetes control  Explored corrective strategies with patient and responsible inpatient provider   Informed patient of rational for insulin strategy while hospitalized     Evaluation   Joyce Garcia is a 48year old gentleman, with uncontrolled Type 2 Diabetes on 70/30 insulin, admitted with acute on chronic hypercapneic and hypoxic respiratory failure with acute COPD exacerbation. He did not achieve diabetes control prior to admission, as evidenced by A1c of 8.3%. During this hospitalization, the patient's glucose was in goal the 1st 24 hrs without the use of antihyperglycemic agents- he was on BiPAP and NPO at that time. 40mg methylprednisolone Q12 was initiated and now with steroid induced hyperglycemia. Low dose Lantus (18 units/home dose)/humalog (4 units/meal) was started but not sufficient to override steroid impact as BG was over 310. Basal/bolus insulin was doubled with little improvement in glucose pattern. Fasting BG of 242 indicates a need to advance basal insulin. Likewise, pre-prandial BG after adjustments yesterday was 274 and bolus can also be advanced. Please titrate daily to inpatient blood glucose target of 100-180mg/dl. Recommendations   1. POC glucose ACHS  2. Consistent carbohydrate diet (60 grams CHO/meal), carb consistent shakes if they are ordered  3.  Adjust the Subcutaneous Insulin Order set (9384)  Insulin Dosing Specific recommendation   Basal                                      (Based on weight, BMI & GFR) Additional 10 units NPH x1 then advance to 50 units NPH Q12 Advance to 60 units Q12   tomorrow if fasting BG over   200   Nutritional                                      (Based on CHO/dextrose load) 12 units Humalog/meal    Hold if patient consumes less than 50% of carbohydrates on meal tray Advance to 15 units humalog/meal tomorrow for pre-prandial hyperglcyemia Corrective                                       (Useful in adjusting insulin dosing) [x] Insulin-resistant sensitivity    Consider switching to custom sensitivity:  -249: 4 units Humalog  -299: 8 units Humalog  -349: 12 units Humalog  -399: 16 units Humalog          Billing Code(s)   [x] 19905    Before making these care recommendations, I personally reviewed the hosptialization record, including laboratory and diagnostic data, medications and examined the patient at bedside (circumstances permitting).   Total minutes: 13      JUDY Jones  Diabetes Clinical Nurse Specialist  [facility]  Access via [employer]

## 2021-09-15 NOTE — PROGRESS NOTES
Problem: Mobility Impaired (Adult and Pediatric)  Goal: *Acute Goals and Plan of Care (Insert Text)  Description: FUNCTIONAL STATUS PRIOR TO ADMISSION: Patient was modified independent using a rollator for functional mobility. HOME SUPPORT PRIOR TO ADMISSION: The patient lived alone with family to provide assistance. Patient also reports having an aide that comes 11-5 Monday-Friday. Physical Therapy Goals  Initiated 9/11/2021  1. Patient will move from supine to sit and sit to supine , scoot up and down, and roll side to side in bed with modified independence within 7 day(s). 2.  Patient will transfer from bed to chair and chair to bed with modified independence using the least restrictive device within 7 day(s). 3.  Patient will perform sit to stand with modified independence within 7 day(s). 4.  Patient will ambulate with modified independence for 100 feet with the least restrictive device within 7 day(s). Outcome: Not Progressing Towards Goal   PHYSICAL THERAPY TREATMENT  Patient: Theresa Olivas (21 y.o. male)  Date: 9/15/2021  Diagnosis: Acute on chronic respiratory failure ([facility] [J96.20] <principal problem not specified>       Precautions:    Chart, physical therapy assessment, plan of care and goals were reviewed. ASSESSMENT  Patient continues with skilled PT services and demo no significant progress towards goals due to genreal weakness/ debility, decreased activity tolerance, and ongoing need for HFO2 at 35L/ FiO2 86%. Pt received in chair following breathing treatment, SpO2 91-92%. Pt required increased time, use of momentum and multiple attempts to reach standing. Pt placed forearms on bariatric rollator for support due to reported back pain with full upright posture. Instructed pt in small amplitude marching, however pt reported LE weakness L>R and did not feel safe completing activity. Pt requested use of BSC; able to pivot to L with rollator and SBA.  Leaned forward for relief of back pain while sitting on toilet, SpO2 95-96%. Pt maintained static stand x several min with forearm support on rollator during total A pericare. Able to pivot back to chair with SBA. Reviewed LE strength ex to be completed while sitting in chair at least 2x/ day. Pt able to return demo of all ex included. Pt remains below functional baseline with continued HFO2 requirement. Recommend follow up SNF rehab at d/c. Current Level of Function Impacting Discharge (mobility/balance): sit to stand SBA, SPT with rollator SBA    Other factors to consider for discharge: lives lone         PLAN :  Patient continues to benefit from skilled intervention to address the above impairments. Continue treatment per established plan of care. to address goals. Recommendation for discharge: (in order for the patient to meet his/her long term goals)  Therapy up to 5 days/week in SNF setting    This discharge recommendation:  Has been made in collaboration with the attending provider and/or case management    IF patient discharges home will need the following DME: patient owns DME required for discharge       SUBJECTIVE:   Patient stated I feel too weak, especially that left leg.     OBJECTIVE DATA SUMMARY:   Critical Behavior:  Neurologic State: Alert, Eyes open spontaneously  Orientation Level: Oriented X4  Cognition: Appropriate for age attention/concentration, Appropriate decision making, Appropriate safety awareness, Follows commands     Functional Mobility Training:  Bed Mobility:         not oberved           Transfers:  Sit to Stand: Stand-by assistance; Additional time (use of momentum, multiple attempts from chair and BSC)  Stand to Sit: Stand-by assistance  Stand Pivot Transfers: Stand-by assistance (chair <->BSC stand pivot with bariatric rollator)                          Balance:  Sitting: Intact  Standing: Impaired; With support (bariatric rollator)  Standing - Static: Good;Constant support (bariatric rollator)  Standing - Dynamic : Fair;Constant support    Therapeutic Exercises:   Reviewed LAQs, knee raises, heel raises to be performed while up in chair BID  Pain Rating:  Pt reported LBP with full upright posture    Activity Tolerance:   Poor and desaturates with exertion and requires oxygen    After treatment patient left in no apparent distress:   Sitting in chair    COMMUNICATION/COLLABORATION:   The patients plan of care was discussed with: Registered nurse.      Mandeep Adames, PT   Time Calculation: 36 mins

## 2021-09-15 NOTE — PROGRESS NOTES
[address] [facility]  Hospitalist Group                                                                                          Hospitalist Progress Note  Oanh Hi MD  Answering service: [phone] OR [phone] from in house phone        Date of Service:  9/15/2021  NAME:  Elisa Burdick  :  1965  MRN:  [id number]      Admission Summary:   52-year-old gentleman history of diabetes, hypertension, dyslipidemia, likely history of CHF as well he uses oxygen as needed at home (particularly at night for obstructive sleep apnea), morbid obese  presents to the emergency department today with chief complaint of generalized not feeling well for the past 4 to 5 days. Interval history / Subjective:   Pt seen and examined. Feels ok, sitting on chair, pretty comfortable. Though still on HFNC 45L, 90% FiO2, sats 96%. Will get RT to be more aggressive on weaning oxygen down. Trouble shoot High cherelle machine. Assessment & Plan:     Acute on chronic hypercapneic and hypoxic respiratory failure  Acute COPD exacerbation   - HFNC, and add BIPAP PRN, may need BiPAP with sleeping   - Wean as tolerated for saturation 88% or above - Continue steroids, nebs  - Pulmonary following- will need o/p sleep study - Viral panel negative. Acute on chronic diastolic CHF - NYHA class IV on admit  - I & O - Decrease lasix to 60mg IV daily.- O2 as  Needed  - Resume home imdur,holding entresto and aldactone with elevated cr. Cellulitis - LUE  - Change CTX to oral keflex for total of 2 additional days for cellulitis   - Ortho consulted, no sign of joint or bursa involvement.      DM2: - A1c 8.3, NPH BID, preMeal+ SSI- Continue lyrica for neuropathy   H/o Afib - on amiodarone PO, eliquis   Morbid obestiy -counseled weight loss     Code status: FULL  DVT prophylaxis: heparin   Care Plan discussed with: Patient/Family  Anticipated Disposition: Home w/Family Greater than 48 hours     Hospital Problems  Date Reviewed: 2013 Codes Class Noted POA    Acute on chronic respiratory failure [facility]) ICD-10-CM: J96.20  ICD-9-CM: 518.84  9/9/2021 Unknown            Review of Systems:   A comprehensive review of systems was negative except for that written in the HPI. Vital Signs:    Last 24hrs VS reviewed since prior progress note. Most recent are:  Visit Vitals  BP (!) 146/88 (BP 1 Location: Left upper arm, BP Patient Position: Sitting)   Pulse 65   Temp 98.5 °F (36.9 °C)   Resp 17   Wt 150.7 kg (332 lb 4.8 oz)   SpO2 95%   BMI 43.84 kg/m²         Intake/Output Summary (Last 24 hours) at 9/15/2021 0731  Last data filed at 9/15/2021 0308  Gross per 24 hour   Intake    Output 3225 ml   Net -3225 ml        Physical Examination:     I had a face to face encounter with this patient and independently examined them on 9/15/2021 as outlined below:          Constitutional:  Moderate respiratory distress, cooperative, pleasant, morbidly obese       Resp:  course. Reduced AE globally. No wheezes. CV:  Regular rhythm, normal rate, no murmurs, gallops, rubs    GI:  Soft, non distended, non tender. normoactive bowel sounds, no hepatosplenomegaly     Musculoskeletal:  1+ edema, warm, 2+ pulses throughout. Left upper extremity with minimal erythema (improving) to the elbow    Neurologic:  Moves all extremities.   AAOx3, CN II-XII reviewed         Data Review:    Review and/or order of clinical lab test  Review and/or order of tests in the radiology section of CPT  Review and/or order of tests in the medicine section of CPT      Labs:     Recent Labs     09/14/21 0338 09/13/21 0328   WBC 12.3* 14.1*   HGB 13.4 14.3   HCT 45.3 47.7    203     Recent Labs     09/15/21  0309 09/14/21  0338 09/13/21  0328 09/12/21  1132 09/12/21  1132   * 135* 135*   < > 137   K 4.8 4.5 4.2   < > 4.6   CL 94* 99 98   < > 96*   CO2 34* 33* 27   < > 32   BUN 56* 51* 46*   < > 47*   CREA 1.19 1.12 1.46*   < > 1.25   * 251* 298*   < > 318*   CA 8.8 8.9 9.4   < > 8.9   MG  --  2.9* 2.6*  --  2.6*   PHOS  --  4.5 3.5  --  2.7    < > = values in this interval not displayed. No results for input(s): ALT, AP, TBIL, TBILI, TP, ALB, GLOB, GGT, AML, LPSE in the last 72 hours. No lab exists for component: SGOT, GPT, AMYP, HLPSE  No results for input(s): INR, PTP, APTT, INREXT, INREXT in the last 72 hours. No results for input(s): FE, TIBC, PSAT, FERR in the last 72 hours. No results found for: FOL, RBCF   No results for input(s): PH, PCO2, PO2 in the last 72 hours. No results for input(s): CPK, CKNDX, TROIQ in the last 72 hours.     No lab exists for component: CPKMB  Lab Results   Component Value Date/Time    Cholesterol, total 170 01/23/2013 11:08 AM    HDL Cholesterol 37 (L) 01/23/2013 11:08 AM    LDL, calculated 97 01/23/2013 11:08 AM    Triglyceride 180 (H) 01/23/2013 11:08 AM    CHOL/HDL Ratio 6.0 (H) 10/06/2010 03:18 PM     Lab Results   Component Value Date/Time    Glucose (POC) 226 (H) 09/14/2021 09:06 PM    Glucose (POC) 274 (H) 09/14/2021 04:46 PM    Glucose (POC) 332 (H) 09/14/2021 11:47 AM    Glucose (POC) 218 (H) 09/14/2021 08:33 AM    Glucose (POC) 242 (H) 09/13/2021 09:18 PM     No results found for: COLOR, APPRN, SPGRU, REFSG, ELIZA, PROTU, GLUCU, KETU, BILU, UROU, RON, LEUKU, GLUKE, EPSU, BACTU, WBCU, RBCU, CASTS, UCRY      Medications Reviewed:     Current Facility-Administered Medications   Medication Dose Route Frequency    insulin lispro (HUMALOG) injection   SubCUTAneous AC&HS    dextrose (D50W) injection syrg 12.5-25 g  12.5-25 g IntraVENous PRN    insulin lispro (HUMALOG) injection 10 Units  10 Units SubCUTAneous TIDAC    insulin NPH (NOVOLIN N, HUMULIN N) injection 36 Units  36 Units SubCUTAneous ACB&D    albuterol-ipratropium (DUO-NEB) 2.5 MG-0.5 MG/3 ML  3 mL Nebulization Q6H PRN    furosemide (LASIX) injection 60 mg  60 mg IntraVENous DAILY    alteplase (CATHFLO) 1 mg in sterile water (preservative free) 1 mL injection  1 mg InterCATHeter PRN    pregabalin (LYRICA) capsule 75 mg  75 mg Oral BID    montelukast (SINGULAIR) tablet 10 mg  10 mg Oral QHS    [Held by provider] sacubitriL-valsartan (ENTRESTO) 49-51 mg tablet 1 Tablet  1 Tablet Oral Q12H    apixaban (ELIQUIS) tablet 5 mg  5 mg Oral BID    amiodarone (CORDARONE) tablet 100 mg  100 mg Oral DAILY    ezetimibe (ZETIA) tablet 10 mg  10 mg Oral DAILY    pantoprazole (PROTONIX) tablet 40 mg  40 mg Oral ACB    isosorbide mononitrate ER (IMDUR) tablet 30 mg  30 mg Oral DAILY    aspirin delayed-release tablet 81 mg  81 mg Oral DAILY    rosuvastatin (CRESTOR) tablet 5 mg  5 mg Oral DAILY    [Held by provider] spironolactone (ALDACTONE) tablet 25 mg  25 mg Oral DAILY    cephALEXin (KEFLEX) capsule 500 mg  500 mg Oral Q6H    benzocaine-menthoL (CEPACOL) lozenge 1 Lozenge  1 Lozenge Mucous Membrane PRN    fentaNYL citrate (PF) injection 50 mcg  50 mcg IntraVENous Q4H PRN    hydrALAZINE (APRESOLINE) 20 mg/mL injection 10 mg  10 mg IntraVENous Q6H PRN    glucose chewable tablet 16 g  4 Tablet Oral PRN    dextrose (D50W) injection syrg 12.5-25 g  25-50 mL IntraVENous PRN    glucagon (GLUCAGEN) injection 1 mg  1 mg IntraMUSCular PRN    sodium chloride (NS) flush 5-40 mL  5-40 mL IntraVENous Q8H    sodium chloride (NS) flush 5-40 mL  5-40 mL IntraVENous PRN    acetaminophen (TYLENOL) tablet 650 mg  650 mg Oral Q6H PRN    Or    acetaminophen (TYLENOL) suppository 650 mg  650 mg Rectal Q6H PRN    polyethylene glycol (MIRALAX) packet 17 g  17 g Oral DAILY PRN    ondansetron (ZOFRAN ODT) tablet 4 mg  4 mg Oral Q8H PRN    Or    ondansetron (ZOFRAN) injection 4 mg  4 mg IntraVENous Q6H PRN    methylPREDNISolone (PF) (SOLU-MEDROL) injection 40 mg  40 mg IntraVENous Q12H    arformoteroL (BROVANA) neb solution 15 mcg  15 mcg Nebulization BID RT    And    budesonide (PULMICORT) 500 mcg/2 ml nebulizer suspension  500 mcg Nebulization BID RT    miconazole (MICOTIN) 2 % powder Topical BID    sodium chloride (NS) flush 5-40 mL  5-40 mL IntraVENous Q8H    sodium chloride (NS) flush 5-40 mL  5-40 mL IntraVENous PRN     ______________________________________________________________________  EXPECTED LENGTH OF STAY: 3d 14h  ACTUAL LENGTH OF STAY:          Beatrice Plascencia MD

## 2021-09-16 LAB
ANION GAP SERPL CALC-SCNC: 7 MMOL/L (ref 5–15)
BUN SERPL-MCNC: 62 MG/DL (ref 6–20)
BUN/CREAT SERPL: 55 (ref 12–20)
CALCIUM SERPL-MCNC: 8.9 MG/DL (ref 8.5–10.1)
CHLORIDE SERPL-SCNC: 95 MMOL/L (ref 97–108)
CO2 SERPL-SCNC: 33 MMOL/L (ref 21–32)
CREAT SERPL-MCNC: 1.13 MG/DL (ref 0.7–1.3)
GLUCOSE BLD STRIP.AUTO-MCNC: 134 MG/DL (ref 65–117)
GLUCOSE BLD STRIP.AUTO-MCNC: 186 MG/DL (ref 65–117)
GLUCOSE BLD STRIP.AUTO-MCNC: 193 MG/DL (ref 65–117)
GLUCOSE SERPL-MCNC: 109 MG/DL (ref 65–100)
MAGNESIUM SERPL-MCNC: 3.2 MG/DL (ref 1.6–2.4)
PHOSPHATE SERPL-MCNC: 4 MG/DL (ref 2.6–4.7)
POTASSIUM SERPL-SCNC: 4.7 MMOL/L (ref 3.5–5.1)
SERVICE CMNT-IMP: ABNORMAL
SODIUM SERPL-SCNC: 135 MMOL/L (ref 136–145)

## 2021-09-16 PROCEDURE — 99231 SBSQ HOSP IP/OBS SF/LOW 25: CPT | Performed by: CLINICAL NURSE SPECIALIST

## 2021-09-16 PROCEDURE — 74011000250 HC RX REV CODE- 250: Performed by: HOSPITALIST

## 2021-09-16 PROCEDURE — 82962 GLUCOSE BLOOD TEST: CPT

## 2021-09-16 PROCEDURE — 36415 COLL VENOUS BLD VENIPUNCTURE: CPT

## 2021-09-16 PROCEDURE — 65660000001 HC RM ICU INTERMED STEPDOWN

## 2021-09-16 PROCEDURE — 74011250637 HC RX REV CODE- 250/637: Performed by: INTERNAL MEDICINE

## 2021-09-16 PROCEDURE — 74011250637 HC RX REV CODE- 250/637: Performed by: HOSPITALIST

## 2021-09-16 PROCEDURE — 74011636637 HC RX REV CODE- 636/637: Performed by: INTERNAL MEDICINE

## 2021-09-16 PROCEDURE — 84100 ASSAY OF PHOSPHORUS: CPT

## 2021-09-16 PROCEDURE — 74011250636 HC RX REV CODE- 250/636: Performed by: HOSPITALIST

## 2021-09-16 PROCEDURE — 94640 AIRWAY INHALATION TREATMENT: CPT

## 2021-09-16 PROCEDURE — 83735 ASSAY OF MAGNESIUM: CPT

## 2021-09-16 PROCEDURE — 97116 GAIT TRAINING THERAPY: CPT

## 2021-09-16 PROCEDURE — 80048 BASIC METABOLIC PNL TOTAL CA: CPT

## 2021-09-16 PROCEDURE — 77010033711 HC HIGH FLOW OXYGEN

## 2021-09-16 RX ORDER — INSULIN LISPRO 100 [IU]/ML
INJECTION, SOLUTION INTRAVENOUS; SUBCUTANEOUS
Status: DISCONTINUED | OUTPATIENT
Start: 2021-09-16 | End: 2021-10-04 | Stop reason: HOSPADM

## 2021-09-16 RX ADMIN — ARFORMOTEROL TARTRATE 15 MCG: 15 SOLUTION RESPIRATORY (INHALATION) at 08:53

## 2021-09-16 RX ADMIN — PANTOPRAZOLE SODIUM 40 MG: 40 TABLET, DELAYED RELEASE ORAL at 06:41

## 2021-09-16 RX ADMIN — POLYETHYLENE GLYCOL 3350 17 G: 17 POWDER, FOR SOLUTION ORAL at 21:42

## 2021-09-16 RX ADMIN — PREGABALIN 75 MG: 25 CAPSULE ORAL at 17:03

## 2021-09-16 RX ADMIN — INSULIN LISPRO 12 UNITS: 100 INJECTION, SOLUTION INTRAVENOUS; SUBCUTANEOUS at 17:03

## 2021-09-16 RX ADMIN — ARFORMOTEROL TARTRATE 15 MCG: 15 SOLUTION RESPIRATORY (INHALATION) at 20:11

## 2021-09-16 RX ADMIN — METHYLPREDNISOLONE SODIUM SUCCINATE 40 MG: 40 INJECTION, POWDER, FOR SOLUTION INTRAMUSCULAR; INTRAVENOUS at 10:21

## 2021-09-16 RX ADMIN — ISOSORBIDE MONONITRATE 30 MG: 30 TABLET, EXTENDED RELEASE ORAL at 10:20

## 2021-09-16 RX ADMIN — MONTELUKAST 10 MG: 10 TABLET, FILM COATED ORAL at 21:42

## 2021-09-16 RX ADMIN — Medication 10 ML: at 13:11

## 2021-09-16 RX ADMIN — PREGABALIN 75 MG: 25 CAPSULE ORAL at 10:19

## 2021-09-16 RX ADMIN — BUDESONIDE 500 MCG: 0.5 INHALANT RESPIRATORY (INHALATION) at 20:11

## 2021-09-16 RX ADMIN — INSULIN LISPRO 12 UNITS: 100 INJECTION, SOLUTION INTRAVENOUS; SUBCUTANEOUS at 13:11

## 2021-09-16 RX ADMIN — ACETAMINOPHEN 650 MG: 325 TABLET ORAL at 00:18

## 2021-09-16 RX ADMIN — ASPIRIN 81 MG: 81 TABLET, COATED ORAL at 10:20

## 2021-09-16 RX ADMIN — EZETIMIBE 10 MG: 10 TABLET ORAL at 10:19

## 2021-09-16 RX ADMIN — Medication 10 ML: at 21:48

## 2021-09-16 RX ADMIN — Medication 20 ML: at 06:41

## 2021-09-16 RX ADMIN — HUMAN INSULIN 50 UNITS: 100 INJECTION, SUSPENSION SUBCUTANEOUS at 17:03

## 2021-09-16 RX ADMIN — Medication 10 ML: at 21:42

## 2021-09-16 RX ADMIN — AMIODARONE HYDROCHLORIDE 100 MG: 200 TABLET ORAL at 10:20

## 2021-09-16 RX ADMIN — BUDESONIDE 500 MCG: 0.5 INHALANT RESPIRATORY (INHALATION) at 08:53

## 2021-09-16 RX ADMIN — ACETAMINOPHEN 650 MG: 325 TABLET ORAL at 17:03

## 2021-09-16 RX ADMIN — Medication 10 ML: at 13:12

## 2021-09-16 RX ADMIN — APIXABAN 5 MG: 5 TABLET, FILM COATED ORAL at 10:20

## 2021-09-16 RX ADMIN — MICONAZOLE NITRATE 2 % TOPICAL POWDER: at 10:23

## 2021-09-16 RX ADMIN — ROSUVASTATIN CALCIUM 5 MG: 10 TABLET, FILM COATED ORAL at 09:00

## 2021-09-16 RX ADMIN — INSULIN LISPRO 12 UNITS: 100 INJECTION, SOLUTION INTRAVENOUS; SUBCUTANEOUS at 10:20

## 2021-09-16 RX ADMIN — APIXABAN 5 MG: 5 TABLET, FILM COATED ORAL at 17:03

## 2021-09-16 RX ADMIN — Medication 10 ML: at 06:41

## 2021-09-16 RX ADMIN — HUMAN INSULIN 50 UNITS: 100 INJECTION, SUSPENSION SUBCUTANEOUS at 10:20

## 2021-09-16 RX ADMIN — METHYLPREDNISOLONE SODIUM SUCCINATE 40 MG: 40 INJECTION, POWDER, FOR SOLUTION INTRAMUSCULAR; INTRAVENOUS at 21:42

## 2021-09-16 NOTE — DIABETES MGMT
[address] [facility]    CLINICAL NURSE SPECIALIST CONSULT   FOLLOW UP NOTE    Initial Presentation   Lm Thompson is a 48 y.o. male who presented to the ED 9/9/21 via EMS for generalized c/o feeling unwell. He was hypoxic in the 70%s and placed on O2 with EMS. HX:   Past Medical History:   Diagnosis Date    DM (diabetes mellitus) ([facility] [address]     Dyslipidemia     Hypertension         INITIAL DX: Acute on chronic respiratory failure with hypercapnia; upper left arm cellulitis    Current Treatment     TX: IV steroids, antibiotics, pulmonary consult    Consulted by Radha Palumbo MD for advanced diabetes nursing assessment and care for:   [x] Inpatient management strategy      Hospital Course   Clinical progress has been complicated by: .   9/9: Lethargic, transitioned to BiPAP. Seen by pulmonary- continue steroids, diuresis, bronchodilators. Transfer to ICU for respiratory acidosis. ? Obstructive lung disease  9/10: BiPAP overnight. Seen by ortho for generalized LUE cellulitis. Transfer out of ICU  9/12: HFNC  9/14: Pulm re consulted- continue mobility, cxr, consider repeating ECHO  Diabetes History   Hx Type 2 Diabets \"for many years\"  Diabetes managed by Dora Bains MD    Diabetes-related Medical History  Acute complications  Steroid induced hyperglycemia      Diabetes Medication History  Drug class Currently in use Discontinued Never used   Biguanide      DDP-4 inhibitor       Sulfonylurea      Thiazolidinedione      GLP-1 RA      SGLT-2 inhibitors      Basal insulin 18 units Lantus once daily Levemir     Bolus insulin  Novolog with meals    Fixed Dose  Combinations  70/30 50 units BID      Subjective   I take sliding scale and my glucose goes up.     Patient reports the following home diabetes self-management practices:   Eating pattern   [x] Eats once meal a day  Limited snacking  Water throughout the day  Physical activity pattern   [x] Mostly sedentary   Monitoring pattern   [x] Bedtime: -180  Taking medications pattern  [x] Consistent administration  [x] Affordable     A1C 8.3%  Initial B  GFR 60    Fasting B  Pre-prandial: 204-289  WBC 14.1  IV abox  Basal: NPH 50 units  Bolus: 12 units with meals   Correction: 17 units in the last 24h    Methylprednisolone 40 mg Q12  Methylprednisolone Dosing    Objective   Physical exam  General Obese male in respiratory distress/ill-appearing. Conversant and cooperative  Neuro  Alert, oriented   Vital Signs   Visit Vitals  /80 (BP 1 Location: Left upper arm, BP Patient Position: At rest)   Pulse 64   Temp 98.2 °F (36.8 °C)   Resp 19   Wt 150.7 kg (332 lb 4.8 oz)   SpO2 96%   BMI 43.84 kg/m²     Skin  Warm and dry. No acanthosis noted along neckline. No lipohypertrophy or lipoatrophy noted at injection sites   Heart   Regular rate and rhythm. No murmurs, rubs or gallops  Lungs  Clear to auscultation without rales or rhonchi  Extremities No foot wounds       Laboratory  No results found for this visit on 21 (from the past 12 hour(s)). No results found for this visit on 21 (from the past 12 hour(s)).   BMP:   Lab Results   Component Value Date/Time     (L) 2021 12:15 AM    K 4.7 2021 12:15 AM    CL 95 (L) 2021 12:15 AM    CO2 33 (H) 2021 12:15 AM    AGAP 7 2021 12:15 AM     (H) 2021 12:15 AM    BUN 62 (H) 2021 12:15 AM    CREA 1.13 2021 12:15 AM    GFRAA >60 2021 12:15 AM    GFRNA >60 2021 12:15 AM        Factors impacting BG management  Factor Dose Comments   Nutrition:  Standard meals     60 grams/meal      Drugs:    Steroids     Solu-medrol 40mg Q12   Impairs insulin action       Blood glucose pattern        Assessment and Plan   Nursing Diagnosis Risk for unstable blood glucose pattern   Nursing Intervention Domain 5251 Decision-making Support   Nursing Interventions Examined current inpatient diabetes control   Explored factors facilitating and impeding inpatient management  Identified self-management practices impeding diabetes control  Explored corrective strategies with patient and responsible inpatient provider   Informed patient of rational for insulin strategy while hospitalized     Evaluation   Olimpia Antoine is a 48year old gentleman, with uncontrolled Type 2 Diabetes on 70/30 insulin, admitted with acute on chronic hypercapneic and hypoxic respiratory failure with acute COPD exacerbation. He did not achieve diabetes control prior to admission, as evidenced by A1c of 8.3%. During this hospitalization, the patient's glucose was in goal the 1st 24 hrs without the use of antihyperglycemic agents- he was on BiPAP and NPO at that time. 40mg methylprednisolone Q12 was initiated and now with steroid induced hyperglycemia. Low dose Lantus (18 units/home dose)/humalog (4 units/meal) was started but not sufficient to override steroid impact as BG was over 310. Basal/bolus insulin was doubled with little improvement in glucose pattern. With a fasting BG of 242 yesterday, NPH was advanced to 50 units Q12 and 12 units humalog with meals. His glucose pattern did improve with advancements and fasting BG was 109 this am. Please titrate daily to inpatient blood glucose target of 100-180mg/dl. Recommendations   1. POC glucose ACHS    2. Consistent carbohydrate diet (60 grams CHO/meal), carb consistent shakes if they are ordered    3.  Continue the Subcutaneous Insulin Order set (4395)  Insulin Dosing Specific recommendation   Basal                                      (Based on weight, BMI & GFR) 50 units NPH Q12 Advance to 60 units Q12   tomorrow if fasting BG over   200   Nutritional                                      (Based on CHO/dextrose load) 12 units Humalog/meal    Hold if patient consumes less than 50% of carbohydrates on meal tray Advance to 15 units humalog/meal tomorrow for pre-prandial hyperglcyemia   Corrective (Useful in adjusting insulin dosing) [x] Insulin-resistant sensitivity    Consider switching to custom sensitivity:  -249: 4 units Humalog  -299: 8 units Humalog  -349: 12 units Humalog  -399: 16 units Humalog          Billing Code(s)   [x] 77346    Before making these care recommendations, I personally reviewed the hosptialization record, including laboratory and diagnostic data, medications and examined the patient at bedside (circumstances permitting).   Total minutes: 13      JUDY Nicole  Diabetes Clinical Nurse Specialist  [facility]  Access via [employer]

## 2021-09-16 NOTE — PROGRESS NOTES
[facility]  Hospitalist Group                                                                                          Hospitalist Progress Note  Mercedez Torres MD  Answering service: [phone] OR [phone] from in house phone              Progress Note    Patient: Almaz Harvey MRN: [id number]  SSN: [id number]    YOB: 1965  Age: 48 y.o. Sex: male      Admit Date: 9/9/2021    LOS: 7 days     Subjective:     Patient presents with COPD and CHF. Patient is still on high flow oxygen but denies any shortness of breath. Patient has known history of COPD but does not use oxygen at home. Patient tells me that he always have lower extremity edema and wears compression stocking. His diuretics currently on hold due to worsening BUN. Denies headache, dizziness, chest pain, shortness of breath, nausea, vomiting, abdominal pain. Objective:     Vitals:    09/16/21 0855 09/16/21 1025 09/16/21 1026 09/16/21 1225   BP:  138/86     Pulse:  71 71 71   Resp:  16     Temp:  98.2 °F (36.8 °C)     SpO2: 95% 95%     Weight:            Intake and Output:  Current Shift: No intake/output data recorded. Last three shifts: 09/14 1901 - 09/16 0700  In: -   Out: 2550 [Urine:2550]    Physical Exam:   GENERAL: alert, cooperative, no distress, appears stated age  THROAT & NECK: normal and no erythema or exudates noted. LUNG: clear to auscultation bilaterally  HEART: regular rate and rhythm, S1, S2 normal, no murmur, click, rub or gallop  ABDOMEN: soft, non-tender. Bowel sounds normal. No masses,  no organomegaly  EXTREMITIES:  extremities normal, atraumatic, no cyanosis or edema  SKIN: no rash or abnormalities  NEUROLOGIC: AOx3. PSYCHIATRIC: non focal    Lab/Data Review: All lab results for the last 24 hours reviewed.      Recent Results (from the past 24 hour(s))   GLUCOSE, POC    Collection Time: 09/15/21  5:42 PM   Result Value Ref Range    Glucose (POC) 246 (H) 65 - 117 mg/dL    Performed by GORDON Ngo    GLUCOSE, POC    Collection Time: 09/15/21  9:19 PM   Result Value Ref Range    Glucose (POC) 204 (H) 65 - 117 mg/dL    Performed by Anuradha Lan    PHOSPHORUS    Collection Time: 09/16/21 12:15 AM   Result Value Ref Range    Phosphorus 4.0 2.6 - 4.7 MG/DL   MAGNESIUM    Collection Time: 09/16/21 12:15 AM   Result Value Ref Range    Magnesium 3.2 (H) 1.6 - 2.4 mg/dL   METABOLIC PANEL, BASIC    Collection Time: 09/16/21 12:15 AM   Result Value Ref Range    Sodium 135 (L) 136 - 145 mmol/L    Potassium 4.7 3.5 - 5.1 mmol/L    Chloride 95 (L) 97 - 108 mmol/L    CO2 33 (H) 21 - 32 mmol/L    Anion gap 7 5 - 15 mmol/L    Glucose 109 (H) 65 - 100 mg/dL    BUN 62 (H) 6 - 20 MG/DL    Creatinine 1.13 0.70 - 1.30 MG/DL    BUN/Creatinine ratio 55 (H) 12 - 20      GFR est AA >60 >60 ml/min/1.73m2    GFR est non-AA >60 >60 ml/min/1.73m2    Calcium 8.9 8.5 - 10.1 MG/DL   GLUCOSE, POC    Collection Time: 09/16/21  1:04 PM   Result Value Ref Range    Glucose (POC) 186 (H) 65 - 117 mg/dL    Performed by Jacob Johnston         Imaging:    No results found.        Assessment and Plan:     Acute respiratory failure  -Acute hypoxic and hypercapnic respiratory failure due to COPD exacerbation and CHF, also complicated by obstructive sleep apnea and likely obesity hypoventilation syndrome  -Currently on high flow oxygen with 30 L  -Continue wean as tolerated    Acute exacerbation of COPD  -Continue steroid, bronchodilator and breathing treatment  -Wean oxygen as tolerated, pulmonology following    Congestive heart failure  -Acute on chronic diastolic congestive heart failure, patient with normal EF on recent echo  -Repeat echo pending  -Patient with lower extremity edema, likely lymphedema related versus cor pulmonale  -Currently diuresis on hold due to elevated BUN    Diabetes  -On NPH, premeal Humalog and insulin sliding scale coverage  -Diabetic management team following and adjusting insulin dose    Atrial fibrillation  -Paroxysmal atrial fibrillation  -Stable on amiodarone, Eliquis for anticoagulation    Cellulitis  -Cellulitis of the left upper extremity  -Completed antibiotics    Hypertension  -Continue Imdur    Dyslipidemia  -On statin    Discharge disposition: Likely more than 48 hours pending progress.     Signed By: Yulia Lezama MD     September 16, 2021

## 2021-09-16 NOTE — PROGRESS NOTES
2000: Verbal bedside report given to Padmini Barton oncoming nurse by Cl Castillo RN off-going nurse. Report included current pt status and condition, recent results, hx of present illness, heart rate and rhythm, and respiratory status.

## 2021-09-16 NOTE — PROGRESS NOTES
Problem: Mobility Impaired (Adult and Pediatric)  Goal: *Acute Goals and Plan of Care (Insert Text)  Description: FUNCTIONAL STATUS PRIOR TO ADMISSION: Patient was modified independent using a rollator for functional mobility. HOME SUPPORT PRIOR TO ADMISSION: The patient lived alone with family to provide assistance. Patient also reports having an aide that comes 11-5 Monday-Friday. Physical Therapy Goals  Initiated 9/11/2021  1. Patient will move from supine to sit and sit to supine , scoot up and down, and roll side to side in bed with modified independence within 7 day(s). 2.  Patient will transfer from bed to chair and chair to bed with modified independence using the least restrictive device within 7 day(s). 3.  Patient will perform sit to stand with modified independence within 7 day(s). 4.  Patient will ambulate with modified independence for 100 feet with the least restrictive device within 7 day(s). Outcome: Progressing Towards Goal     PHYSICAL THERAPY TREATMENT  Patient: Yakov Willis (26 y.o. male)  Date: 9/16/2021  Diagnosis: Acute on chronic respiratory failure ([facility] [J96.20] <principal problem not specified>       Precautions:    Chart, physical therapy assessment, plan of care and goals were reviewed. ASSESSMENT  Patient continues with skilled PT services and is progressing towards goals. Functional mobility remains limited by generalized weakness, activity tolerance, HF NCO2, and chronic back pain. Patient tolerated increased activity ambulating 32 ft w/ two standing rest breaks with SpO2 in the upper 90's while on 30L HF NCO2 (down from 35L yesterday). Gait with the rollator walker is steady though pt continues to rest with forearms on walker handles (suspect this is baseline). Distance was most limited by HF O2 lines and time it takes to manage vs SOB (recovers with brief standing rest).   Patient is reporting follow through with the previously issued exercises and recalled 3 of 3. Current Level of Function Impacting Discharge (mobility/balance): SBA to CGA     Other factors to consider for discharge: HF NCO2, Lives alone         PLAN :  Patient continues to benefit from skilled intervention to address the above impairments. Continue treatment per established plan of care. to address goals. Recommendation for discharge: (in order for the patient to meet his/her long term goals)  Therapy up to 5 days/week in SNF setting    This discharge recommendation:  Has been made in collaboration with the attending provider and/or case management    IF patient discharges home will need the following DME:  patient owns DME required for discharge       SUBJECTIVE:   Patient stated I told them I can walk over there [to [facility] They move everything close making more work). \"  Explained to patient the HF O2 lines limit the ability to move equipment to allow him to walk across the room. Pt acknowledges understanding. OBJECTIVE DATA SUMMARY:   Critical Behavior:  Neurologic State: Alert  Orientation Level: Oriented X4  Cognition: Appropriate for age attention/concentration, Appropriate decision making, Appropriate safety awareness, Follows commands     Functional Mobility Training:  Bed Mobility:  Received/ remained sitting up in the chair. Transfers:  Sit to Stand: Stand-by assistance; Additional time  Stand to Sit: Stand-by assistance                        Balance:  Sitting: Intact; Without support  Standing: Impaired  Standing - Static: Good;Constant support  Standing - Dynamic : Fair;Constant support  Ambulation/Gait Training:  Distance (ft): 32 Feet (ft) (two standing rest breaks)  Assistive Device: Gait belt;Walker, rollator  Ambulation - Level of Assistance: Contact guard assistance        Gait Abnormalities: Decreased step clearance        Base of Support: Widened     Speed/Maral: Slow;Pace decreased (<100 feet/min)  Step Length: Left shortened;Right shortened  Cues provided to improve safety with lines, benefit of standing rest breaks, and limitations of gait progression/ distance d/t HF NCO2       Therapeutic Exercises:   Reviewed exercises previously issued: seated LAQ, knee raises, heel raises. Recommended patient complete stepping in place when RN is in the room assisting with transfers, toileting, etc to allow him to participate in standing ex intermittently throughout the day. Pain Rating:  None voiced    Activity Tolerance:   Fair, desaturates with exertion and requires oxygen (30L HF NCO2, 86% FiO2), requires rest breaks, and observed SOB with activity    After treatment patient left in no apparent distress:   Sitting in chair and Call bell within reach    COMMUNICATION/COLLABORATION:   The patients plan of care was discussed with: Registered nurse.      Harmeet Corona, PT   Time Calculation: 22 mins

## 2021-09-16 NOTE — PROGRESS NOTES
Verbal bedside report given to Afshan Gallardo RN oncoming nurse by Dominique Amaya RN off-going nurse. Report included current pt status and condition, recent results, hx of present illness, heart rate and rhythm, and respiratory status.

## 2021-09-16 NOTE — PROGRESS NOTES
Pulmonary      Resting on the side of the bed, on high flow. Says he feels about the same. Not taking deep breaths during my exam. Isn't using his IS. Refusing NIV. Net negative volume over past 24 hours. On high flow 30L, sats 91%.   Reconsulted for ongoing hypoxic respiratory failure. Patient was seen and evaluated earlier today and was sitting in the chair on high flow oxygen denies worsening dyspnea. CT of the chest that was done earlier showed cardiomegaly with nonspecific findings of basilar atelectasis. Limited echo has been reported as normal. He has ongoing pedal edema.  He is receiving diuretics    9/10  ABG improved with NIPPV  Pt has removed bipap and apparently refusing to put it back on    ABG from this am with compensated chronic resp acidosis with pH of 7.39 and PCO2 of 50 (appears to be a baseline gas for him)    Patient Vitals for the past 4 hrs:   BP Temp Pulse Resp SpO2   21 0855     95 %   21 0644 116/80 98.2 °F (36.8 °C) 64 19 96 %   21 0614   66     Temp (24hrs), Av.7 °F (36.5 °C), Min:97.1 °F (36.2 °C), Max:98.2 °F (36.8 °C)      Intake/Output Summary (Last 24 hours) at 2021 0908  Last data filed at 2021 7952  Gross per 24 hour   Intake    Output 1900 ml   Net -1900 ml     Exam  Gen- comfortable and up in the chair  HEENT- increased neck circumference  Chest- diminished BS   Cor- distant heart sounds  Ext- bilateral 4 + edema  Skin- chronic skin changes    Pulmonary Impression / Recommendations:     Acute on chronic hypoxemic / hypercarbic respiratory failure 2/2 CHF / ? Obstructive lung disease / IVY / OHS with hypercarbia and CO2 narcosis  OHD, on amiodarone, anticoagulated     --Bipap qhs and prn  -- Incentive spirometer, mobilize  -- Repeat echo with bubble study fo evaluate for right to left shunt  --bronchodilators / steroids / diuretics / empiric abx  --wean O2 by sats - lowest level to keep 90-92%  --will need follow up with sleep physician and pulmonary eval with PFTs  --avoid sedatives      D/W patient    Portia Walter, NP

## 2021-09-17 ENCOUNTER — APPOINTMENT (OUTPATIENT)
Dept: NON INVASIVE DIAGNOSTICS | Age: 56
DRG: 291 | End: 2021-09-17
Attending: NURSE PRACTITIONER
Payer: MEDICARE

## 2021-09-17 LAB
ANION GAP SERPL CALC-SCNC: 2 MMOL/L (ref 5–15)
BUN SERPL-MCNC: 60 MG/DL (ref 6–20)
BUN/CREAT SERPL: 58 (ref 12–20)
CALCIUM SERPL-MCNC: 8.4 MG/DL (ref 8.5–10.1)
CHLORIDE SERPL-SCNC: 98 MMOL/L (ref 97–108)
CO2 SERPL-SCNC: 33 MMOL/L (ref 21–32)
CREAT SERPL-MCNC: 1.04 MG/DL (ref 0.7–1.3)
GLUCOSE BLD STRIP.AUTO-MCNC: 151 MG/DL (ref 65–117)
GLUCOSE BLD STRIP.AUTO-MCNC: 174 MG/DL (ref 65–117)
GLUCOSE BLD STRIP.AUTO-MCNC: 219 MG/DL (ref 65–117)
GLUCOSE BLD STRIP.AUTO-MCNC: 220 MG/DL (ref 65–117)
GLUCOSE BLD STRIP.AUTO-MCNC: 233 MG/DL (ref 65–117)
GLUCOSE SERPL-MCNC: 157 MG/DL (ref 65–100)
MAGNESIUM SERPL-MCNC: 3.3 MG/DL (ref 1.6–2.4)
PHOSPHATE SERPL-MCNC: 4.3 MG/DL (ref 2.6–4.7)
POTASSIUM SERPL-SCNC: 4.8 MMOL/L (ref 3.5–5.1)
SERVICE CMNT-IMP: ABNORMAL
SODIUM SERPL-SCNC: 133 MMOL/L (ref 136–145)

## 2021-09-17 PROCEDURE — 74011250637 HC RX REV CODE- 250/637: Performed by: INTERNAL MEDICINE

## 2021-09-17 PROCEDURE — 74011250637 HC RX REV CODE- 250/637: Performed by: FAMILY MEDICINE

## 2021-09-17 PROCEDURE — 82962 GLUCOSE BLOOD TEST: CPT

## 2021-09-17 PROCEDURE — 74011636637 HC RX REV CODE- 636/637: Performed by: INTERNAL MEDICINE

## 2021-09-17 PROCEDURE — 65660000001 HC RM ICU INTERMED STEPDOWN

## 2021-09-17 PROCEDURE — 80048 BASIC METABOLIC PNL TOTAL CA: CPT

## 2021-09-17 PROCEDURE — 83735 ASSAY OF MAGNESIUM: CPT

## 2021-09-17 PROCEDURE — 74011250636 HC RX REV CODE- 250/636: Performed by: HOSPITALIST

## 2021-09-17 PROCEDURE — 94640 AIRWAY INHALATION TREATMENT: CPT

## 2021-09-17 PROCEDURE — 77030041076 HC DRSG AG OPTICELL MDII -A

## 2021-09-17 PROCEDURE — 36415 COLL VENOUS BLD VENIPUNCTURE: CPT

## 2021-09-17 PROCEDURE — 74011250637 HC RX REV CODE- 250/637: Performed by: HOSPITALIST

## 2021-09-17 PROCEDURE — 84100 ASSAY OF PHOSPHORUS: CPT

## 2021-09-17 PROCEDURE — 74011636637 HC RX REV CODE- 636/637: Performed by: FAMILY MEDICINE

## 2021-09-17 PROCEDURE — 74011000250 HC RX REV CODE- 250: Performed by: HOSPITALIST

## 2021-09-17 PROCEDURE — 77010033711 HC HIGH FLOW OXYGEN

## 2021-09-17 RX ORDER — FUROSEMIDE 40 MG/1
40 TABLET ORAL DAILY
Status: DISCONTINUED | OUTPATIENT
Start: 2021-09-17 | End: 2021-09-19

## 2021-09-17 RX ADMIN — Medication 10 ML: at 16:57

## 2021-09-17 RX ADMIN — ACETAMINOPHEN 650 MG: 325 TABLET ORAL at 10:02

## 2021-09-17 RX ADMIN — PREGABALIN 75 MG: 25 CAPSULE ORAL at 10:04

## 2021-09-17 RX ADMIN — BUDESONIDE 500 MCG: 0.5 INHALANT RESPIRATORY (INHALATION) at 08:36

## 2021-09-17 RX ADMIN — AMIODARONE HYDROCHLORIDE 100 MG: 200 TABLET ORAL at 10:04

## 2021-09-17 RX ADMIN — HUMAN INSULIN 50 UNITS: 100 INJECTION, SUSPENSION SUBCUTANEOUS at 16:55

## 2021-09-17 RX ADMIN — Medication 10 ML: at 07:20

## 2021-09-17 RX ADMIN — Medication 10 ML: at 22:00

## 2021-09-17 RX ADMIN — APIXABAN 5 MG: 5 TABLET, FILM COATED ORAL at 10:03

## 2021-09-17 RX ADMIN — MONTELUKAST 10 MG: 10 TABLET, FILM COATED ORAL at 21:26

## 2021-09-17 RX ADMIN — MICONAZOLE NITRATE 2 % TOPICAL POWDER: at 16:56

## 2021-09-17 RX ADMIN — PREGABALIN 75 MG: 25 CAPSULE ORAL at 16:56

## 2021-09-17 RX ADMIN — HUMAN INSULIN 50 UNITS: 100 INJECTION, SUSPENSION SUBCUTANEOUS at 11:38

## 2021-09-17 RX ADMIN — PANTOPRAZOLE SODIUM 40 MG: 40 TABLET, DELAYED RELEASE ORAL at 07:20

## 2021-09-17 RX ADMIN — ARFORMOTEROL TARTRATE 15 MCG: 15 SOLUTION RESPIRATORY (INHALATION) at 08:36

## 2021-09-17 RX ADMIN — METHYLPREDNISOLONE SODIUM SUCCINATE 40 MG: 40 INJECTION, POWDER, FOR SOLUTION INTRAMUSCULAR; INTRAVENOUS at 20:34

## 2021-09-17 RX ADMIN — INSULIN LISPRO 4 UNITS: 100 INJECTION, SOLUTION INTRAVENOUS; SUBCUTANEOUS at 21:25

## 2021-09-17 RX ADMIN — INSULIN LISPRO 12 UNITS: 100 INJECTION, SOLUTION INTRAVENOUS; SUBCUTANEOUS at 11:38

## 2021-09-17 RX ADMIN — ROSUVASTATIN CALCIUM 5 MG: 10 TABLET, FILM COATED ORAL at 10:04

## 2021-09-17 RX ADMIN — ASPIRIN 81 MG: 81 TABLET, COATED ORAL at 10:03

## 2021-09-17 RX ADMIN — APIXABAN 5 MG: 5 TABLET, FILM COATED ORAL at 16:56

## 2021-09-17 RX ADMIN — METHYLPREDNISOLONE SODIUM SUCCINATE 40 MG: 40 INJECTION, POWDER, FOR SOLUTION INTRAMUSCULAR; INTRAVENOUS at 10:05

## 2021-09-17 RX ADMIN — EZETIMIBE 10 MG: 10 TABLET ORAL at 10:02

## 2021-09-17 RX ADMIN — INSULIN LISPRO 12 UNITS: 100 INJECTION, SOLUTION INTRAVENOUS; SUBCUTANEOUS at 16:55

## 2021-09-17 RX ADMIN — FUROSEMIDE 40 MG: 40 TABLET ORAL at 10:05

## 2021-09-17 RX ADMIN — MICONAZOLE NITRATE 2 % TOPICAL POWDER: at 11:41

## 2021-09-17 RX ADMIN — ISOSORBIDE MONONITRATE 30 MG: 30 TABLET, EXTENDED RELEASE ORAL at 10:04

## 2021-09-17 NOTE — WOUND CARE
WOCN Note:      Follow up assessment of legs.     Assessed in room 400. Chart reviewed. Admitted DX:  Acute on chronic respiratory failure. Past Medical History:   Diagnosis Date    DM (diabetes mellitus) ([address]      Dyslipidemia      Hypertension        Assessment:   Patient is A&O x 3, communicative and sitting up in chair. Bed: foam mattress  Patient reports no pain. Heels intact without erythema.      Bilateral low legs have red erythema and hemosiderin staining with edema.     1. POA Left low leg, venous ulcer has resufaced. There is a fluid filled bullae adjacent to the resurfaced area; Cleansed leg with saline; applied Opticel ag; covered with abd pad and roll gauze. Applied ACE wrap. 2.  POA Abdomen and abdominal fold: Improving, Red moist rash consistent with Candidiasis. Miconazole powder in use.     Wound, Pressure Prevention & Skin Care Recommendations:    1. Minimize layers of linen/pads under patient to optimize support surface. 2.  Turn/reposition approximately every 2 hours and offload heels. 3.  Manage moisture/ Keep skin folds clean and dry. 4.  Left and right low legs:  Daily cleanse legs with soap and water; apply purple moisturizing cream to bilateral legs; apply Opticel ag over bullae; cover with abd pad and roll gauze and ACE wrap.   Right low leg apply roll gauze and ACE wrap.     Discussed above plan with patient and Lynette ZARAGOZA.     Transition of Care:   Plan to follow as needed while admitted to hospital.     ARISTEO Durbin RN [facility]  Available on [employer]  Pager [phone]  Office [phone]

## 2021-09-17 NOTE — PROGRESS NOTES
Verbal bedside report given to NIK Ojeda oncoming nurse by Bart Farrar RN off-going nurse. Report included current pt status and condition, recent results, hx of present illness, heart rate and rhythm, and respiratory status.

## 2021-09-17 NOTE — PROGRESS NOTES
Pulmonary    Echo still not done, f/u results when available. Sats improved on 30L high flow, hope to start weaning today.   Resting on the side of the bed, on high flow. Says he feels about the same. Not taking deep breaths during my exam. Isn't using his IS. Refusing NIV. Net negative volume over past 24 hours. On high flow 30L, sats 91%. -2L in past 24 hours.   Reconsulted for ongoing hypoxic respiratory failure. Patient was seen and evaluated earlier today and was sitting in the chair on high flow oxygen denies worsening dyspnea. CT of the chest that was done earlier showed cardiomegaly with nonspecific findings of basilar atelectasis. Limited echo has been reported as normal. He has ongoing pedal edema.  He is receiving diuretics    9/10  ABG improved with NIPPV  Pt has removed bipap and apparently refusing to put it back on    ABG from this am with compensated chronic resp acidosis with pH of 7.39 and PCO2 of 50 (appears to be a baseline gas for him)    Patient Vitals for the past 4 hrs:   BP Temp Pulse Resp SpO2   21 0604   (!) 57     21 0502 (!) 137/90 97.7 °F (36.5 °C) 62 13 95 %   Temp (24hrs), Av.1 °F (36.7 °C), Min:97.7 °F (36.5 °C), Max:98.4 °F (36.9 °C)      Intake/Output Summary (Last 24 hours) at 2021 0816  Last data filed at 2021 0516  Gross per 24 hour   Intake    Output 1250 ml   Net -1250 ml     Exam  Gen- comfortable and up in the chair  HEENT- increased neck circumference  Chest- diminished BS   Cor- distant heart sounds  Ext- bilateral 4 + edema  Skin- chronic skin changes    Pulmonary Impression / Recommendations:     Acute on chronic hypoxemic / hypercarbic respiratory failure 2/2 CHF / ? Obstructive lung disease / IVY / OHS with hypercarbia and CO2 narcosis  OHD, on amiodarone, anticoagulated     --Wean high flow as able, has been refusing NIV  -- Incentive spirometer, mobilize  -- Repeat echo with bubble study fo evaluate for right to left shunt- still pending  --bronchodilators / steroids / diuretics / empiric abx  --will need follow up with sleep physician and pulmonary eval with PFTs  --avoid sedatives    Will see over the weekend if needed, please call with questions.       D/W patient    Portia Walter NP

## 2021-09-17 NOTE — PROGRESS NOTES
[address] [facility] Adult  Hospitalist Group                                                                                          Hospitalist Progress Note  Laurie Louis MD  Answering service: [phone] OR [phone] from in house phone              Progress Note    Patient: Carmita Torres MRN: [id number]  SSN: [id number]    YOB: 1965  Age: 48 y.o. Sex: male      Admit Date: 9/9/2021    LOS: 8 days     Subjective:     Patient presents with COPD and CHF. Patient is still on high flow oxygen but denies any shortness of breath. Patient has known history of COPD but does not use oxygen at home. Patient tells me that he always have lower extremity edema and wears compression stocking. His diuretics currently on hold due to worsening BUN. Denies headache, dizziness, chest pain, shortness of breath, nausea, vomiting, abdominal pain. Objective:     Vitals:    09/17/21 0835 09/17/21 1000 09/17/21 1150 09/17/21 1200   BP:   (!) 160/94    Pulse:  65 65 67   Resp:   14    Temp:   97.8 °F (36.6 °C)    SpO2: 95%  99%    Weight:            Intake and Output:  Current Shift: No intake/output data recorded. Last three shifts: 09/15 1901 - 09/17 0700  In: -   Out: 2050 [Urine:2050]    Physical Exam:   GENERAL: alert, cooperative, no distress, appears stated age  THROAT & NECK: normal and no erythema or exudates noted. LUNG: clear to auscultation bilaterally  HEART: regular rate and rhythm, S1, S2 normal, no murmur, click, rub or gallop  ABDOMEN: soft, non-tender. Bowel sounds normal. No masses,  no organomegaly  EXTREMITIES:  extremities normal, atraumatic, no cyanosis or edema  SKIN: no rash or abnormalities  NEUROLOGIC: AOx3. PSYCHIATRIC: non focal    Lab/Data Review: All lab results for the last 24 hours reviewed.      Recent Results (from the past 24 hour(s))   GLUCOSE, POC    Collection Time: 09/16/21  1:04 PM   Result Value Ref Range    Glucose (POC) 186 (H) 65 - 117 mg/dL    Performed by Salvatore Fernandes    GLUCOSE, POC    Collection Time: 09/16/21  4:52 PM   Result Value Ref Range    Glucose (POC) 193 (H) 65 - 117 mg/dL    Performed by CARTER Wade    GLUCOSE, POC    Collection Time: 09/16/21  9:25 PM   Result Value Ref Range    Glucose (POC) 134 (H) 65 - 117 mg/dL    Performed by Nafisa Rodriguez    PHOSPHORUS    Collection Time: 09/17/21  5:37 AM   Result Value Ref Range    Phosphorus 4.3 2.6 - 4.7 MG/DL   MAGNESIUM    Collection Time: 09/17/21  5:37 AM   Result Value Ref Range    Magnesium 3.3 (H) 1.6 - 2.4 mg/dL   METABOLIC PANEL, BASIC    Collection Time: 09/17/21  5:37 AM   Result Value Ref Range    Sodium 133 (L) 136 - 145 mmol/L    Potassium 4.8 3.5 - 5.1 mmol/L    Chloride 98 97 - 108 mmol/L    CO2 33 (H) 21 - 32 mmol/L    Anion gap 2 (L) 5 - 15 mmol/L    Glucose 157 (H) 65 - 100 mg/dL    BUN 60 (H) 6 - 20 MG/DL    Creatinine 1.04 0.70 - 1.30 MG/DL    BUN/Creatinine ratio 58 (H) 12 - 20      GFR est AA >60 >60 ml/min/1.73m2    GFR est non-AA >60 >60 ml/min/1.73m2    Calcium 8.4 (L) 8.5 - 10.1 MG/DL   GLUCOSE, POC    Collection Time: 09/17/21  8:48 AM   Result Value Ref Range    Glucose (POC) 151 (H) 65 - 117 mg/dL    Performed by [email or web address] Spaphay P    GLUCOSE, POC    Collection Time: 09/17/21 10:40 AM   Result Value Ref Range    Glucose (POC) 220 (H) 65 - 117 mg/dL    Performed by [email or web address]aph[employer] P    GLUCOSE, POC    Collection Time: 09/17/21 11:30 AM   Result Value Ref Range    Glucose (POC) 219 (H) 65 - 117 mg/dL    Performed by [email or web address] Spaph[employer] P         Imaging:    No results found.        Assessment and Plan:     Acute respiratory failure  -Acute hypoxic and hypercapnic respiratory failure due to COPD exacerbation and CHF, also complicated by obstructive sleep apnea and likely obesity hypoventilation syndrome  -Currently on high flow oxygen with 30 L  -Continue wean as tolerated    Acute exacerbation of COPD  -Continue steroid, bronchodilator and breathing treatment  -Wean oxygen as tolerated, pulmonology following    Congestive heart failure  -Acute on chronic diastolic congestive heart failure, patient with normal EF on recent echo  -Repeat echo pending  -Patient with lower extremity edema, likely lymphedema related versus cor pulmonale  -Resume p.o. Lasix today and monitor renal function    Diabetes  -On NPH, premeal Humalog and insulin sliding scale coverage  -Diabetic management team following and adjusting insulin dose    Atrial fibrillation  -Paroxysmal atrial fibrillation  -Stable on amiodarone, Eliquis for anticoagulation    Cellulitis  -Cellulitis of the left upper extremity  -Completed antibiotics    Hypertension  -Continue Imdur    Dyslipidemia  -On statin    Discharge disposition: Likely more than 48 hours pending progress.     Signed By: Jennifer Rubio MD     September 17, 2021

## 2021-09-18 LAB
ANION GAP SERPL CALC-SCNC: 1 MMOL/L (ref 5–15)
BUN SERPL-MCNC: 54 MG/DL (ref 6–20)
BUN/CREAT SERPL: 53 (ref 12–20)
CALCIUM SERPL-MCNC: 8.7 MG/DL (ref 8.5–10.1)
CHLORIDE SERPL-SCNC: 97 MMOL/L (ref 97–108)
CO2 SERPL-SCNC: 34 MMOL/L (ref 21–32)
CREAT SERPL-MCNC: 1.01 MG/DL (ref 0.7–1.3)
GLUCOSE BLD STRIP.AUTO-MCNC: 117 MG/DL (ref 65–117)
GLUCOSE BLD STRIP.AUTO-MCNC: 130 MG/DL (ref 65–117)
GLUCOSE BLD STRIP.AUTO-MCNC: 157 MG/DL (ref 65–117)
GLUCOSE BLD STRIP.AUTO-MCNC: 166 MG/DL (ref 65–117)
GLUCOSE SERPL-MCNC: 155 MG/DL (ref 65–100)
MAGNESIUM SERPL-MCNC: 3.2 MG/DL (ref 1.6–2.4)
PHOSPHATE SERPL-MCNC: 1.1 MG/DL (ref 2.6–4.7)
POTASSIUM SERPL-SCNC: 4.7 MMOL/L (ref 3.5–5.1)
SERVICE CMNT-IMP: ABNORMAL
SERVICE CMNT-IMP: NORMAL
SODIUM SERPL-SCNC: 132 MMOL/L (ref 136–145)

## 2021-09-18 PROCEDURE — 74011250637 HC RX REV CODE- 250/637: Performed by: FAMILY MEDICINE

## 2021-09-18 PROCEDURE — 94640 AIRWAY INHALATION TREATMENT: CPT

## 2021-09-18 PROCEDURE — 74011000250 HC RX REV CODE- 250: Performed by: HOSPITALIST

## 2021-09-18 PROCEDURE — 74011250636 HC RX REV CODE- 250/636: Performed by: HOSPITALIST

## 2021-09-18 PROCEDURE — 74011250637 HC RX REV CODE- 250/637: Performed by: INTERNAL MEDICINE

## 2021-09-18 PROCEDURE — 84100 ASSAY OF PHOSPHORUS: CPT

## 2021-09-18 PROCEDURE — 80048 BASIC METABOLIC PNL TOTAL CA: CPT

## 2021-09-18 PROCEDURE — 77010033711 HC HIGH FLOW OXYGEN

## 2021-09-18 PROCEDURE — 65660000001 HC RM ICU INTERMED STEPDOWN

## 2021-09-18 PROCEDURE — 82962 GLUCOSE BLOOD TEST: CPT

## 2021-09-18 PROCEDURE — 83735 ASSAY OF MAGNESIUM: CPT

## 2021-09-18 PROCEDURE — 74011250637 HC RX REV CODE- 250/637: Performed by: HOSPITALIST

## 2021-09-18 PROCEDURE — 36415 COLL VENOUS BLD VENIPUNCTURE: CPT

## 2021-09-18 PROCEDURE — 74011636637 HC RX REV CODE- 636/637: Performed by: INTERNAL MEDICINE

## 2021-09-18 RX ADMIN — Medication 10 ML: at 06:00

## 2021-09-18 RX ADMIN — APIXABAN 5 MG: 5 TABLET, FILM COATED ORAL at 18:00

## 2021-09-18 RX ADMIN — ARFORMOTEROL TARTRATE 15 MCG: 15 SOLUTION RESPIRATORY (INHALATION) at 22:05

## 2021-09-18 RX ADMIN — HUMAN INSULIN 50 UNITS: 100 INJECTION, SUSPENSION SUBCUTANEOUS at 17:59

## 2021-09-18 RX ADMIN — METHYLPREDNISOLONE SODIUM SUCCINATE 40 MG: 40 INJECTION, POWDER, FOR SOLUTION INTRAMUSCULAR; INTRAVENOUS at 10:52

## 2021-09-18 RX ADMIN — Medication 10 ML: at 22:13

## 2021-09-18 RX ADMIN — PREGABALIN 75 MG: 25 CAPSULE ORAL at 10:50

## 2021-09-18 RX ADMIN — Medication 10 ML: at 14:00

## 2021-09-18 RX ADMIN — EZETIMIBE 10 MG: 10 TABLET ORAL at 10:51

## 2021-09-18 RX ADMIN — BUDESONIDE 500 MCG: 0.5 INHALANT RESPIRATORY (INHALATION) at 22:05

## 2021-09-18 RX ADMIN — HUMAN INSULIN 50 UNITS: 100 INJECTION, SUSPENSION SUBCUTANEOUS at 09:52

## 2021-09-18 RX ADMIN — ACETAMINOPHEN 650 MG: 325 TABLET ORAL at 04:28

## 2021-09-18 RX ADMIN — MICONAZOLE NITRATE 2 % TOPICAL POWDER: at 18:00

## 2021-09-18 RX ADMIN — METHYLPREDNISOLONE SODIUM SUCCINATE 40 MG: 40 INJECTION, POWDER, FOR SOLUTION INTRAMUSCULAR; INTRAVENOUS at 22:12

## 2021-09-18 RX ADMIN — INSULIN LISPRO 12 UNITS: 100 INJECTION, SOLUTION INTRAVENOUS; SUBCUTANEOUS at 13:13

## 2021-09-18 RX ADMIN — ASPIRIN 81 MG: 81 TABLET, COATED ORAL at 10:52

## 2021-09-18 RX ADMIN — ISOSORBIDE MONONITRATE 30 MG: 30 TABLET, EXTENDED RELEASE ORAL at 10:48

## 2021-09-18 RX ADMIN — PANTOPRAZOLE SODIUM 40 MG: 40 TABLET, DELAYED RELEASE ORAL at 07:30

## 2021-09-18 RX ADMIN — INSULIN LISPRO 12 UNITS: 100 INJECTION, SOLUTION INTRAVENOUS; SUBCUTANEOUS at 09:52

## 2021-09-18 RX ADMIN — APIXABAN 5 MG: 5 TABLET, FILM COATED ORAL at 10:49

## 2021-09-18 RX ADMIN — AMIODARONE HYDROCHLORIDE 100 MG: 200 TABLET ORAL at 10:47

## 2021-09-18 RX ADMIN — ARFORMOTEROL TARTRATE 15 MCG: 15 SOLUTION RESPIRATORY (INHALATION) at 08:08

## 2021-09-18 RX ADMIN — ROSUVASTATIN CALCIUM 5 MG: 10 TABLET, FILM COATED ORAL at 10:46

## 2021-09-18 RX ADMIN — POLYETHYLENE GLYCOL 3350 17 G: 17 POWDER, FOR SOLUTION ORAL at 04:28

## 2021-09-18 RX ADMIN — ACETAMINOPHEN 650 MG: 325 TABLET ORAL at 22:12

## 2021-09-18 RX ADMIN — MICONAZOLE NITRATE 2 % TOPICAL POWDER: at 09:00

## 2021-09-18 RX ADMIN — INSULIN LISPRO 12 UNITS: 100 INJECTION, SOLUTION INTRAVENOUS; SUBCUTANEOUS at 17:59

## 2021-09-18 RX ADMIN — BUDESONIDE 500 MCG: 0.5 INHALANT RESPIRATORY (INHALATION) at 08:09

## 2021-09-18 RX ADMIN — MONTELUKAST 10 MG: 10 TABLET, FILM COATED ORAL at 22:11

## 2021-09-18 RX ADMIN — FUROSEMIDE 40 MG: 40 TABLET ORAL at 10:51

## 2021-09-18 RX ADMIN — PREGABALIN 75 MG: 25 CAPSULE ORAL at 18:00

## 2021-09-18 NOTE — PROGRESS NOTES
[address] [facility] Adult  Hospitalist Group                                                                                          Hospitalist Progress Note  Gerri Burks MD  Answering service: [phone] OR [phone] from in house phone              Progress Note    Patient: Silvana Rogel MRN: [id number]  SSN: [id number]    YOB: 1965  Age: 48 y.o. Sex: male      Admit Date: 9/9/2021    LOS: 9 days     Subjective:     Patient presents with COPD and CHF. Patient is still on high flow oxygen but denies any shortness of breath. Patient has known history of COPD but does not use oxygen at home. Patient tells me that he always have lower extremity edema and wears compression stocking. His diuretics currently on hold due to worsening BUN. Denies headache, dizziness, chest pain, shortness of breath, nausea, vomiting, abdominal pain. Objective:     Vitals:    09/18/21 0345 09/18/21 0348 09/18/21 0809 09/18/21 0829   BP:  (!) 165/96  (!) 160/2   Pulse: (!) 59 61  (!) 56   Resp:    13   Temp:  98 °F (36.7 °C)  (!) 96.7 °F (35.9 °C)   SpO2:   97% 94%   Weight:            Intake and Output:  Current Shift: No intake/output data recorded. Last three shifts: 09/16 1901 - 09/18 0700  In: -   Out: 2400 [Urine:2400]    Physical Exam:   GENERAL: alert, cooperative, no distress, appears stated age  THROAT & NECK: normal and no erythema or exudates noted. LUNG: clear to auscultation bilaterally  HEART: regular rate and rhythm, S1, S2 normal, no murmur, click, rub or gallop  ABDOMEN: soft, non-tender. Bowel sounds normal. No masses,  no organomegaly  EXTREMITIES:  extremities normal, atraumatic, no cyanosis or edema  SKIN: no rash or abnormalities  NEUROLOGIC: AOx3. PSYCHIATRIC: non focal    Lab/Data Review: All lab results for the last 24 hours reviewed.      Recent Results (from the past 24 hour(s))   GLUCOSE, POC    Collection Time: 09/17/21 11:30 AM   Result Value Ref Range    Glucose (POC) 219 (H) 65 - 117 mg/dL    Performed by Ouner P    GLUCOSE, POC    Collection Time: 09/17/21  4:35 PM   Result Value Ref Range    Glucose (POC) 174 (H) 65 - 117 mg/dL    Performed by Beena De La Cruz [address], POC    Collection Time: 09/17/21  9:19 PM   Result Value Ref Range    Glucose (POC) 233 (H) 65 - 117 mg/dL    Performed by Sisi Wyatt    PHOSPHORUS    Collection Time: 09/18/21  4:13 AM   Result Value Ref Range    Phosphorus 1.1 (L) 2.6 - 4.7 MG/DL   MAGNESIUM    Collection Time: 09/18/21  4:13 AM   Result Value Ref Range    Magnesium 3.2 (H) 1.6 - 2.4 mg/dL   METABOLIC PANEL, BASIC    Collection Time: 09/18/21  4:13 AM   Result Value Ref Range    Sodium 132 (L) 136 - 145 mmol/L    Potassium 4.7 3.5 - 5.1 mmol/L    Chloride 97 97 - 108 mmol/L    CO2 34 (H) 21 - 32 mmol/L    Anion gap 1 (L) 5 - 15 mmol/L    Glucose 155 (H) 65 - 100 mg/dL    BUN 54 (H) 6 - 20 MG/DL    Creatinine 1.01 0.70 - 1.30 MG/DL    BUN/Creatinine ratio 53 (H) 12 - 20      GFR est AA >60 >60 ml/min/1.73m2    GFR est non-AA >60 >60 ml/min/1.73m2    Calcium 8.7 8.5 - 10.1 MG/DL   GLUCOSE, POC    Collection Time: 09/18/21  8:17 AM   Result Value Ref Range    Glucose (POC) 117 65 - 117 mg/dL    Performed by Ouner P         Imaging:    No results found.        Assessment and Plan:     Acute respiratory failure  -Acute hypoxic and hypercapnic respiratory failure due to COPD exacerbation and CHF, also complicated by obstructive sleep apnea and likely obesity hypoventilation syndrome  -Currently on high flow oxygen with 30 L  -Continue wean as tolerated    Acute exacerbation of COPD  -Continue steroid, bronchodilator and breathing treatment  -Wean oxygen as tolerated, pulmonology following    Congestive heart failure  -Acute on chronic diastolic congestive heart failure, patient with normal EF on recent echo  -Repeat echo pending  -Patient with lower extremity edema, likely lymphedema related versus cor pulmonale  -Continue PO Lasix, increase dose as patient complaining of more swelling in the legs    Diabetes  -On NPH, premeal Humalog and insulin sliding scale coverage  -Diabetic management team following and adjusting insulin dose    Atrial fibrillation  -Paroxysmal atrial fibrillation  -Stable on amiodarone, Eliquis for anticoagulation    Cellulitis  -Cellulitis of the left upper extremity  -Completed antibiotics    Hypertension  -Continue Imdur    Dyslipidemia  -On statin    Discharge disposition: Likely more than 48 hours pending progress.     Signed By: Pamela De León MD     September 18, 2021

## 2021-09-18 NOTE — PROGRESS NOTES
Bedside shift change report given to Jorge (oncoming nurse) by Aga Brewer (offgoing nurse). Report included the following information SBAR, Kardex and Recent Results. Assessment complete -patient sitting upin chair in with no complaints - passing excessive gas.

## 2021-09-19 ENCOUNTER — HOSPITAL ENCOUNTER (INPATIENT)
Dept: NON INVASIVE DIAGNOSTICS | Age: 56
DRG: 291 | End: 2021-09-19
Attending: NURSE PRACTITIONER | Admitting: HOSPITALIST
Payer: MEDICARE

## 2021-09-19 LAB
ANION GAP SERPL CALC-SCNC: 2 MMOL/L (ref 5–15)
BUN SERPL-MCNC: 54 MG/DL (ref 6–20)
BUN/CREAT SERPL: 53 (ref 12–20)
CALCIUM SERPL-MCNC: 8.7 MG/DL (ref 8.5–10.1)
CHLORIDE SERPL-SCNC: 100 MMOL/L (ref 97–108)
CO2 SERPL-SCNC: 32 MMOL/L (ref 21–32)
CREAT SERPL-MCNC: 1.01 MG/DL (ref 0.7–1.3)
GLUCOSE BLD STRIP.AUTO-MCNC: 169 MG/DL (ref 65–117)
GLUCOSE BLD STRIP.AUTO-MCNC: 209 MG/DL (ref 65–117)
GLUCOSE BLD STRIP.AUTO-MCNC: 248 MG/DL (ref 65–117)
GLUCOSE BLD STRIP.AUTO-MCNC: 252 MG/DL (ref 65–117)
GLUCOSE SERPL-MCNC: 166 MG/DL (ref 65–100)
MAGNESIUM SERPL-MCNC: 3.1 MG/DL (ref 1.6–2.4)
PHOSPHATE SERPL-MCNC: 3.3 MG/DL (ref 2.6–4.7)
POTASSIUM SERPL-SCNC: 5.4 MMOL/L (ref 3.5–5.1)
SERVICE CMNT-IMP: ABNORMAL
SODIUM SERPL-SCNC: 134 MMOL/L (ref 136–145)

## 2021-09-19 PROCEDURE — 74011250636 HC RX REV CODE- 250/636: Performed by: HOSPITALIST

## 2021-09-19 PROCEDURE — 74011636637 HC RX REV CODE- 636/637: Performed by: INTERNAL MEDICINE

## 2021-09-19 PROCEDURE — 94640 AIRWAY INHALATION TREATMENT: CPT

## 2021-09-19 PROCEDURE — 74011250637 HC RX REV CODE- 250/637: Performed by: INTERNAL MEDICINE

## 2021-09-19 PROCEDURE — 74011000250 HC RX REV CODE- 250: Performed by: HOSPITALIST

## 2021-09-19 PROCEDURE — 83735 ASSAY OF MAGNESIUM: CPT

## 2021-09-19 PROCEDURE — 74011250637 HC RX REV CODE- 250/637: Performed by: HOSPITALIST

## 2021-09-19 PROCEDURE — 80048 BASIC METABOLIC PNL TOTAL CA: CPT

## 2021-09-19 PROCEDURE — 74011250637 HC RX REV CODE- 250/637: Performed by: FAMILY MEDICINE

## 2021-09-19 PROCEDURE — 74011000250 HC RX REV CODE- 250: Performed by: INTERNAL MEDICINE

## 2021-09-19 PROCEDURE — 82962 GLUCOSE BLOOD TEST: CPT

## 2021-09-19 PROCEDURE — 65660000001 HC RM ICU INTERMED STEPDOWN

## 2021-09-19 PROCEDURE — 74011636637 HC RX REV CODE- 636/637: Performed by: FAMILY MEDICINE

## 2021-09-19 PROCEDURE — 84100 ASSAY OF PHOSPHORUS: CPT

## 2021-09-19 PROCEDURE — 36415 COLL VENOUS BLD VENIPUNCTURE: CPT

## 2021-09-19 PROCEDURE — 74011250636 HC RX REV CODE- 250/636: Performed by: INTERNAL MEDICINE

## 2021-09-19 RX ORDER — FUROSEMIDE 40 MG/1
40 TABLET ORAL 2 TIMES DAILY
Status: DISCONTINUED | OUTPATIENT
Start: 2021-09-19 | End: 2021-09-20

## 2021-09-19 RX ADMIN — HUMAN INSULIN 50 UNITS: 100 INJECTION, SUSPENSION SUBCUTANEOUS at 08:33

## 2021-09-19 RX ADMIN — EZETIMIBE 10 MG: 10 TABLET ORAL at 08:31

## 2021-09-19 RX ADMIN — Medication 10 ML: at 07:15

## 2021-09-19 RX ADMIN — APIXABAN 5 MG: 5 TABLET, FILM COATED ORAL at 17:39

## 2021-09-19 RX ADMIN — MONTELUKAST 10 MG: 10 TABLET, FILM COATED ORAL at 21:04

## 2021-09-19 RX ADMIN — ROSUVASTATIN CALCIUM 5 MG: 10 TABLET, FILM COATED ORAL at 08:31

## 2021-09-19 RX ADMIN — METHYLPREDNISOLONE SODIUM SUCCINATE 40 MG: 40 INJECTION, POWDER, FOR SOLUTION INTRAMUSCULAR; INTRAVENOUS at 21:05

## 2021-09-19 RX ADMIN — BUDESONIDE 500 MCG: 0.5 INHALANT RESPIRATORY (INHALATION) at 08:20

## 2021-09-19 RX ADMIN — PREGABALIN 75 MG: 25 CAPSULE ORAL at 17:39

## 2021-09-19 RX ADMIN — MICONAZOLE NITRATE 2 % TOPICAL POWDER: at 17:54

## 2021-09-19 RX ADMIN — PANTOPRAZOLE SODIUM 40 MG: 40 TABLET, DELAYED RELEASE ORAL at 07:14

## 2021-09-19 RX ADMIN — ARFORMOTEROL TARTRATE 15 MCG: 15 SOLUTION RESPIRATORY (INHALATION) at 19:36

## 2021-09-19 RX ADMIN — ALTEPLASE 1 MG: 2.2 INJECTION, POWDER, LYOPHILIZED, FOR SOLUTION INTRAVENOUS at 08:34

## 2021-09-19 RX ADMIN — ASPIRIN 81 MG: 81 TABLET, COATED ORAL at 08:31

## 2021-09-19 RX ADMIN — INSULIN LISPRO 12 UNITS: 100 INJECTION, SOLUTION INTRAVENOUS; SUBCUTANEOUS at 08:33

## 2021-09-19 RX ADMIN — BUDESONIDE 500 MCG: 0.5 INHALANT RESPIRATORY (INHALATION) at 19:36

## 2021-09-19 RX ADMIN — Medication 10 ML: at 17:58

## 2021-09-19 RX ADMIN — PREGABALIN 75 MG: 25 CAPSULE ORAL at 08:31

## 2021-09-19 RX ADMIN — ISOSORBIDE MONONITRATE 30 MG: 30 TABLET, EXTENDED RELEASE ORAL at 08:31

## 2021-09-19 RX ADMIN — Medication 10 ML: at 21:04

## 2021-09-19 RX ADMIN — AMIODARONE HYDROCHLORIDE 100 MG: 200 TABLET ORAL at 08:31

## 2021-09-19 RX ADMIN — FUROSEMIDE 40 MG: 40 TABLET ORAL at 17:51

## 2021-09-19 RX ADMIN — HUMAN INSULIN 50 UNITS: 100 INJECTION, SUSPENSION SUBCUTANEOUS at 17:38

## 2021-09-19 RX ADMIN — FUROSEMIDE 40 MG: 40 TABLET ORAL at 08:31

## 2021-09-19 RX ADMIN — ARFORMOTEROL TARTRATE 15 MCG: 15 SOLUTION RESPIRATORY (INHALATION) at 08:20

## 2021-09-19 RX ADMIN — INSULIN LISPRO 12 UNITS: 100 INJECTION, SOLUTION INTRAVENOUS; SUBCUTANEOUS at 17:38

## 2021-09-19 RX ADMIN — METHYLPREDNISOLONE SODIUM SUCCINATE 40 MG: 40 INJECTION, POWDER, FOR SOLUTION INTRAMUSCULAR; INTRAVENOUS at 08:33

## 2021-09-19 RX ADMIN — INSULIN LISPRO 4 UNITS: 100 INJECTION, SOLUTION INTRAVENOUS; SUBCUTANEOUS at 21:11

## 2021-09-19 RX ADMIN — MICONAZOLE NITRATE 2 % TOPICAL POWDER: at 10:00

## 2021-09-19 RX ADMIN — ACETAMINOPHEN 650 MG: 325 TABLET ORAL at 07:14

## 2021-09-19 RX ADMIN — APIXABAN 5 MG: 5 TABLET, FILM COATED ORAL at 08:31

## 2021-09-19 NOTE — PROGRESS NOTES
Pt A&O X 4, sitting up in recliner. Patient was weaned to midflow at 13L during the shift by RT and has done very well. SPO2 currently at 97%, no SOB. All other Vitals stable and no signs of distress.

## 2021-09-19 NOTE — PROGRESS NOTES
[address] [facility] Adult  Hospitalist Group                                                                                          Hospitalist Progress Note  Ayala Cherry MD  Answering service: [phone] OR [phone] from in house phone              Progress Note    Patient: Megan Galindo MRN: [id number]  SSN: [id number]    YOB: 1965  Age: 48 y.o. Sex: male      Admit Date: 9/9/2021    LOS: 10 days     Subjective:     Patient presents with COPD and CHF. Patient is still on high flow oxygen but denies any shortness of breath. Patient has known history of COPD but does not use oxygen at home. Patient tells me that he always have lower extremity edema and wears compression stocking. His diuretics currently on hold due to worsening BUN. Denies headache, dizziness, chest pain, shortness of breath, nausea, vomiting, abdominal pain. Objective:     Vitals:    09/19/21 0407 09/19/21 0652 09/19/21 0800 09/19/21 0820   BP: 123/86  137/86    Pulse: 63  67    Resp: 18  12    Temp: 97.8 °F (36.6 °C)  98.6 °F (37 °C)    SpO2: 90%  90% 93%   Weight:  149.1 kg (328 lb 11.3 oz)          Intake and Output:  Current Shift: No intake/output data recorded. Last three shifts: 09/17 1901 - 09/19 0700  In: -   Out: 3465 [Urine:3465]    Physical Exam:   GENERAL: alert, cooperative, no distress, appears stated age  THROAT & NECK: normal and no erythema or exudates noted. LUNG: clear to auscultation bilaterally  HEART: regular rate and rhythm, S1, S2 normal, no murmur, click, rub or gallop  ABDOMEN: soft, non-tender. Bowel sounds normal. No masses,  no organomegaly  EXTREMITIES:  extremities normal, atraumatic, no cyanosis or edema  SKIN: no rash or abnormalities  NEUROLOGIC: AOx3. PSYCHIATRIC: non focal    Lab/Data Review: All lab results for the last 24 hours reviewed.      Recent Results (from the past 24 hour(s))   GLUCOSE, POC    Collection Time: 09/18/21 12:12 PM   Result Value Ref Range Glucose (POC) 157 (H) 65 - 117 mg/dL    Performed by [email or web address], POC    Collection Time: 09/18/21  6:05 PM   Result Value Ref Range    Glucose (POC) 166 (H) 65 - 117 mg/dL    Performed by Mell Stewart    GLUCOSE, POC    Collection Time: 09/18/21  9:42 PM   Result Value Ref Range    Glucose (POC) 130 (H) 65 - 117 mg/dL    Performed by Homar Maynard    PHOSPHORUS    Collection Time: 09/19/21  7:05 AM   Result Value Ref Range    Phosphorus 3.3 2.6 - 4.7 MG/DL   MAGNESIUM    Collection Time: 09/19/21  7:05 AM   Result Value Ref Range    Magnesium 3.1 (H) 1.6 - 2.4 mg/dL   METABOLIC PANEL, BASIC    Collection Time: 09/19/21  7:05 AM   Result Value Ref Range    Sodium 134 (L) 136 - 145 mmol/L    Potassium 5.4 (H) 3.5 - 5.1 mmol/L    Chloride 100 97 - 108 mmol/L    CO2 32 21 - 32 mmol/L    Anion gap 2 (L) 5 - 15 mmol/L    Glucose 166 (H) 65 - 100 mg/dL    BUN 54 (H) 6 - 20 MG/DL    Creatinine 1.01 0.70 - 1.30 MG/DL    BUN/Creatinine ratio 53 (H) 12 - 20      GFR est AA >60 >60 ml/min/1.73m2    GFR est non-AA >60 >60 ml/min/1.73m2    Calcium 8.7 8.5 - 10.1 MG/DL   GLUCOSE, POC    Collection Time: 09/19/21  8:10 AM   Result Value Ref Range    Glucose (POC) 169 (H) 65 - 117 mg/dL    Performed by Mell Stewart         Imaging:    No results found.        Assessment and Plan:     Acute respiratory failure  -Acute hypoxic and hypercapnic respiratory failure due to COPD exacerbation and CHF, also complicated by obstructive sleep apnea and likely obesity hypoventilation syndrome  -Currently on high flow oxygen with 25 L  -Continue wean as tolerated    Acute exacerbation of COPD  -Continue steroid, bronchodilator and breathing treatment  -Wean oxygen as tolerated, pulmonology following    Congestive heart failure  -Acute on chronic diastolic congestive heart failure, patient with normal EF on recent echo  -Repeat echo pending  -Patient with lower extremity edema, likely lymphedema related versus cor pulmonale  -Continue Lasix, increased frequency to twice daily    Diabetes  -On NPH, premeal Humalog and insulin sliding scale coverage  -Diabetic management team following and adjusting insulin dose    Atrial fibrillation  -Paroxysmal atrial fibrillation  -Stable on amiodarone, Eliquis for anticoagulation    Cellulitis  -Cellulitis of the left upper extremity  -Completed antibiotics    Hypertension  -Continue Imdur    Dyslipidemia  -On statin    Discharge disposition: Likely more than 48 hours pending progress.     Signed By: Medardo Sterling MD     September 19, 2021

## 2021-09-20 ENCOUNTER — APPOINTMENT (OUTPATIENT)
Dept: NON INVASIVE DIAGNOSTICS | Age: 56
DRG: 291 | End: 2021-09-20
Attending: PHYSICIAN ASSISTANT
Payer: MEDICARE

## 2021-09-20 LAB
ECHO LV INTERNAL DIMENSION DIASTOLIC: 4.77 CM (ref 4.2–5.9)
ECHO LV INTERNAL DIMENSION SYSTOLIC: 3.59 CM
ECHO LV IVSD: 1.7 CM (ref 0.6–1)
ECHO LV MASS 2D: 236.6 G (ref 88–224)
ECHO LV POSTERIOR WALL DIASTOLIC: 0.85 CM (ref 0.6–1)
ECHO LVOT DIAM: 2.05 CM
GLUCOSE BLD STRIP.AUTO-MCNC: 171 MG/DL (ref 65–117)
GLUCOSE BLD STRIP.AUTO-MCNC: 175 MG/DL (ref 65–117)
GLUCOSE BLD STRIP.AUTO-MCNC: 178 MG/DL (ref 65–117)
GLUCOSE BLD STRIP.AUTO-MCNC: 198 MG/DL (ref 65–117)
MAGNESIUM SERPL-MCNC: 3.1 MG/DL (ref 1.6–2.4)
PHOSPHATE SERPL-MCNC: 3 MG/DL (ref 2.6–4.7)
SERVICE CMNT-IMP: ABNORMAL

## 2021-09-20 PROCEDURE — 74011250637 HC RX REV CODE- 250/637: Performed by: INTERNAL MEDICINE

## 2021-09-20 PROCEDURE — 74011250636 HC RX REV CODE- 250/636: Performed by: FAMILY MEDICINE

## 2021-09-20 PROCEDURE — 94640 AIRWAY INHALATION TREATMENT: CPT

## 2021-09-20 PROCEDURE — 99231 SBSQ HOSP IP/OBS SF/LOW 25: CPT | Performed by: CLINICAL NURSE SPECIALIST

## 2021-09-20 PROCEDURE — 74011636637 HC RX REV CODE- 636/637: Performed by: FAMILY MEDICINE

## 2021-09-20 PROCEDURE — 74011250636 HC RX REV CODE- 250/636: Performed by: HOSPITALIST

## 2021-09-20 PROCEDURE — 97530 THERAPEUTIC ACTIVITIES: CPT

## 2021-09-20 PROCEDURE — 74011250637 HC RX REV CODE- 250/637: Performed by: FAMILY MEDICINE

## 2021-09-20 PROCEDURE — 84100 ASSAY OF PHOSPHORUS: CPT

## 2021-09-20 PROCEDURE — 97116 GAIT TRAINING THERAPY: CPT

## 2021-09-20 PROCEDURE — 65660000001 HC RM ICU INTERMED STEPDOWN

## 2021-09-20 PROCEDURE — 93308 TTE F-UP OR LMTD: CPT

## 2021-09-20 PROCEDURE — 77010033711 HC HIGH FLOW OXYGEN

## 2021-09-20 PROCEDURE — 74011250637 HC RX REV CODE- 250/637: Performed by: HOSPITALIST

## 2021-09-20 PROCEDURE — 74011000250 HC RX REV CODE- 250: Performed by: FAMILY MEDICINE

## 2021-09-20 PROCEDURE — 74011000250 HC RX REV CODE- 250: Performed by: HOSPITALIST

## 2021-09-20 PROCEDURE — 36415 COLL VENOUS BLD VENIPUNCTURE: CPT

## 2021-09-20 PROCEDURE — 83735 ASSAY OF MAGNESIUM: CPT

## 2021-09-20 PROCEDURE — 94664 DEMO&/EVAL PT USE INHALER: CPT

## 2021-09-20 PROCEDURE — 93308 TTE F-UP OR LMTD: CPT | Performed by: SPECIALIST

## 2021-09-20 PROCEDURE — 82962 GLUCOSE BLOOD TEST: CPT

## 2021-09-20 PROCEDURE — 74011636637 HC RX REV CODE- 636/637: Performed by: INTERNAL MEDICINE

## 2021-09-20 RX ORDER — INSULIN LISPRO 100 [IU]/ML
15 INJECTION, SOLUTION INTRAVENOUS; SUBCUTANEOUS
Status: DISCONTINUED | OUTPATIENT
Start: 2021-09-20 | End: 2021-09-22

## 2021-09-20 RX ORDER — TORSEMIDE 20 MG/1
60 TABLET ORAL DAILY
Status: DISCONTINUED | OUTPATIENT
Start: 2021-09-21 | End: 2021-09-30

## 2021-09-20 RX ADMIN — MICONAZOLE NITRATE 2 % TOPICAL POWDER: at 08:52

## 2021-09-20 RX ADMIN — Medication 10 ML: at 06:37

## 2021-09-20 RX ADMIN — ACETAMINOPHEN 650 MG: 325 TABLET ORAL at 13:03

## 2021-09-20 RX ADMIN — HUMAN INSULIN 50 UNITS: 100 INJECTION, SUSPENSION SUBCUTANEOUS at 17:13

## 2021-09-20 RX ADMIN — PERFLUTREN 1.5 ML: 6.52 INJECTION, SUSPENSION INTRAVENOUS at 16:00

## 2021-09-20 RX ADMIN — INSULIN LISPRO 12 UNITS: 100 INJECTION, SOLUTION INTRAVENOUS; SUBCUTANEOUS at 13:03

## 2021-09-20 RX ADMIN — INSULIN LISPRO 15 UNITS: 100 INJECTION, SOLUTION INTRAVENOUS; SUBCUTANEOUS at 17:13

## 2021-09-20 RX ADMIN — INSULIN LISPRO 12 UNITS: 100 INJECTION, SOLUTION INTRAVENOUS; SUBCUTANEOUS at 08:51

## 2021-09-20 RX ADMIN — METHYLPREDNISOLONE SODIUM SUCCINATE 40 MG: 40 INJECTION, POWDER, FOR SOLUTION INTRAMUSCULAR; INTRAVENOUS at 08:51

## 2021-09-20 RX ADMIN — MONTELUKAST 10 MG: 10 TABLET, FILM COATED ORAL at 21:55

## 2021-09-20 RX ADMIN — BUDESONIDE 500 MCG: 0.5 INHALANT RESPIRATORY (INHALATION) at 08:32

## 2021-09-20 RX ADMIN — APIXABAN 5 MG: 5 TABLET, FILM COATED ORAL at 17:12

## 2021-09-20 RX ADMIN — BUDESONIDE 500 MCG: 0.5 INHALANT RESPIRATORY (INHALATION) at 22:40

## 2021-09-20 RX ADMIN — APIXABAN 5 MG: 5 TABLET, FILM COATED ORAL at 08:52

## 2021-09-20 RX ADMIN — ASPIRIN 81 MG: 81 TABLET, COATED ORAL at 08:52

## 2021-09-20 RX ADMIN — HUMAN INSULIN 50 UNITS: 100 INJECTION, SUSPENSION SUBCUTANEOUS at 08:51

## 2021-09-20 RX ADMIN — PREGABALIN 75 MG: 25 CAPSULE ORAL at 08:52

## 2021-09-20 RX ADMIN — ARFORMOTEROL TARTRATE 15 MCG: 15 SOLUTION RESPIRATORY (INHALATION) at 08:32

## 2021-09-20 RX ADMIN — AMIODARONE HYDROCHLORIDE 100 MG: 200 TABLET ORAL at 08:52

## 2021-09-20 RX ADMIN — FUROSEMIDE 40 MG: 40 TABLET ORAL at 08:51

## 2021-09-20 RX ADMIN — MICONAZOLE NITRATE 2 % TOPICAL POWDER: at 18:00

## 2021-09-20 RX ADMIN — METHYLPREDNISOLONE SODIUM SUCCINATE 40 MG: 40 INJECTION, POWDER, FOR SOLUTION INTRAMUSCULAR; INTRAVENOUS at 21:55

## 2021-09-20 RX ADMIN — Medication 10 ML: at 21:55

## 2021-09-20 RX ADMIN — PREGABALIN 75 MG: 25 CAPSULE ORAL at 17:12

## 2021-09-20 RX ADMIN — ROSUVASTATIN CALCIUM 5 MG: 10 TABLET, FILM COATED ORAL at 08:52

## 2021-09-20 RX ADMIN — Medication 10 ML: at 21:56

## 2021-09-20 RX ADMIN — PANTOPRAZOLE SODIUM 40 MG: 40 TABLET, DELAYED RELEASE ORAL at 06:37

## 2021-09-20 RX ADMIN — ISOSORBIDE MONONITRATE 30 MG: 30 TABLET, EXTENDED RELEASE ORAL at 08:52

## 2021-09-20 RX ADMIN — EZETIMIBE 10 MG: 10 TABLET ORAL at 08:52

## 2021-09-20 RX ADMIN — ARFORMOTEROL TARTRATE 15 MCG: 15 SOLUTION RESPIRATORY (INHALATION) at 22:40

## 2021-09-20 RX ADMIN — ACETAMINOPHEN 650 MG: 325 TABLET ORAL at 22:01

## 2021-09-20 NOTE — PROGRESS NOTES
Problem: Mobility Impaired (Adult and Pediatric)  Goal: *Acute Goals and Plan of Care (Insert Text)  Description: FUNCTIONAL STATUS PRIOR TO ADMISSION: Patient was modified independent using a rollator for functional mobility. HOME SUPPORT PRIOR TO ADMISSION: The patient lived alone with family to provide assistance. Patient also reports having an aide that comes 11-5 Monday-Friday. Physical Therapy Goals  Initiated 9/11/2021; reviewed/ continued 9/20/2021  1. Patient will move from supine to sit and sit to supine , scoot up and down, and roll side to side in bed with modified independence within 7 day(s). 2.  Patient will transfer from bed to chair and chair to bed with modified independence using the least restrictive device within 7 day(s). 3.  Patient will perform sit to stand with modified independence within 7 day(s). 4.  Patient will ambulate with modified independence for 100 feet with the least restrictive device within 7 day(s). Outcome: Progressing Towards Goal   PHYSICAL THERAPY TREATMENT: WEEKLY REASSESSMENT  Patient: Elisa Burdick [de-identified]48 y.o. male)  Date: 9/20/2021  Primary Diagnosis: Acute on chronic [facility] ([facility] [J96.20]        Precautions: fall         ASSESSMENT  Patient continues with skilled PT services and is progressing towards goals. Primary barrier to indep with functional mobility remains decreased activity tolerance with continued midflow O2 requirement. Pt received with 12L O2 in place, sitting EOB completing repeated anterior wt shifts to assist with pain relief to low back. Pt required increased time to initiate transfer sit to stand, use of momentum to assist. Tolerated household distance amb with rollator leaning on forearms for support due to back pain (endorses this as baseline technique), SBA for safety although no LOB noted.  Practiced sit to stand x 2 from chair height for LE strengthening and increased indep; required use of arm rests, momentum, exaggerated forward wt shift. Resting vitals: SpO2 91%, HR 71  Activity vitals: SpO2 86%, HR 79; quick recovery to SpO2 90% with seated rest    Pt remains below functional baseline with ongoing O2 requirement at 12 L. Will benefit from ongoing mobility training with acute PT. Recommend follow up SNF rehab at d/c. Patient's progression toward goals since last assessment: pt off HFO2, increased gait tolerance    Current Level of Function Impacting Discharge (mobility/balance): transfers SBA, amb with rollator SBA    Functional Outcome Measure: The patient scored 35 on the TUG outcome measure which is indicative of high risk for fall. Other factors to consider for discharge: remains on midflow O2 with brief desat during activity, lives alone         PLAN :  Goals have been updated based on progression since last assessment. Patient continues to benefit from skilled intervention to address the above impairments. Recommendations and Planned Interventions: bed mobility training, transfer training, gait training, therapeutic exercises, patient and family training/education, and therapeutic activities      Frequency/Duration: Patient will be followed by physical therapy:  5 times a week to address goals. Recommendation for discharge: (in order for the patient to meet his/her long term goals)  Therapy up to 5 days/week in SNF setting    This discharge recommendation:  Has been made in collaboration with the attending provider and/or case management    IF patient discharges home will need the following DME: patient owns DME required for discharge         SUBJECTIVE:   Patient stated I'm trying to get some relief for my back.     OBJECTIVE DATA SUMMARY:   HISTORY:    Past Medical History:   Diagnosis Date    DM (diabetes mellitus) ([facility]     Dyslipidemia     Hypertension    History reviewed. No pertinent surgical history.     Personal factors and/or comorbidities impacting plan of care: chronic LBP, obesity    Home Situation  Home Environment: Apartment  # Steps to Enter: 0  One/Two Story Residence: One story  Living Alone: Yes  Support Systems: Other Family Member(s) (home nurse 11-5 Monday-Friday)  Current DME Used/Available at Home: Hospital bed, Shower chair, Walker, rollator, Safety frame [employer]    EXAMINATION/PRESENTATION/DECISION MAKING:   Critical Behavior:  Neurologic State: Alert  Orientation Level: Oriented X4  Cognition: Follows commands     Edema: ACE wrap to bilat LEs   Range Of Motion:  AROM: Generally decreased, functional                       Strength:    Strength: Generally decreased, functional                    Tone & Sensation:   Tone: Normal                              Coordination:  Coordination: Generally decreased, functional  Vision:      Functional Mobility:  Bed Mobility:              Transfers:  Sit to Stand: Stand-by assistance; Additional time (use of momentum)  Stand to Sit: Supervision        Bed to Chair: Supervision (use of rollator)              Balance:   Sitting: Intact  Standing: Intact; With support  Standing - Static: Good;Constant support  Standing - Dynamic : Good;Constant support  Ambulation/Gait Training:  Distance (ft): 50 Feet (ft)  Assistive Device: Walker, rollator (bariatric (personal device))  Ambulation - Level of Assistance: Stand-by assistance        Gait Abnormalities:  (flexed posture with forward lean on forearms 2* LBP)        Base of Support: Widened     Speed/Maral: Slow  Step Length: Left shortened;Right shortened        Functional Measure:  Timed up and go:    Timed Get Up And Go Test: 35       < than 10 seconds=Normal  Greater then 13.5 seconds (in elderly)=Increased fall risk   Mariam Barron, Taryn ZAFAR. Predicting the probability for falls in community dwelling older adults using the Timed Up and Go Test. Phys Ther. 2000;80:896-903.         Pain Rating:  Pt notes chronic low back pain; does not rate, agreeable to therapy    Activity Tolerance:   Fair    After treatment patient left in no apparent distress:   Sitting in chair and Call bell within reach    COMMUNICATION/EDUCATION:   The patients plan of care was discussed with: Registered nurse. Fall prevention education was provided and the patient/caregiver indicated understanding., Patient/family have participated as able in goal setting and plan of care. , and Patient/family agree to work toward stated goals and plan of care.     Thank you for this referral.  Jigar Hui, PT   Time Calculation: 24 mins

## 2021-09-20 NOTE — DIABETES MGMT
[address] [facility]    CLINICAL NURSE SPECIALIST CONSULT   FOLLOW UP NOTE    Initial Presentation   Anjelica Wilburn is a 48 y.o. male who presented to the ED 9/9/21 via EMS for generalized c/o feeling unwell. He was hypoxic in the 70%s and placed on O2 with EMS. HX:   Past Medical History:   Diagnosis Date    DM (diabetes mellitus) ([facility] [address]     Dyslipidemia     Hypertension         INITIAL DX: Acute on chronic respiratory failure with hypercapnia; upper left arm cellulitis    Current Treatment     TX: IV steroids, antibiotics, pulmonary consult    Consulted by Randy Seo MD for advanced diabetes nursing assessment and care for:   [x] Inpatient management strategy      Hospital Course   Clinical progress has been complicated by: .   9/9: Lethargic, transitioned to BiPAP. Seen by pulmonary- continue steroids, diuresis, bronchodilators. Transfer to ICU for respiratory acidosis. ? Obstructive lung disease  9/10: BiPAP overnight. Seen by ortho for generalized LUE cellulitis. Transfer out of ICU  9/12: HFNC  9/14: Pulm re consulted- continue mobility, cxr, consider repeating ECHO  Diabetes History   Hx Type 2 Diabets \"for many years\"  Diabetes managed by Olga Mcallister MD    Diabetes-related Medical History  Acute complications  Steroid induced hyperglycemia      Diabetes Medication History  Drug class Currently in use Discontinued Never used   Biguanide      DDP-4 inhibitor       Sulfonylurea      Thiazolidinedione      GLP-1 RA      SGLT-2 inhibitors      Basal insulin 18 units Lantus once daily Levemir     Bolus insulin  Novolog with meals    Fixed Dose  Combinations  70/30 50 units BID      Subjective   I take sliding scale and my glucose goes up.     Patient reports the following home diabetes self-management practices:   Eating pattern   [x] Eats once meal a day  Limited snacking  Water throughout the day  Physical activity pattern   [x] Mostly sedentary   Monitoring pattern   [x] Bedtime: -180  Taking medications pattern  [x] Consistent administration  [x] Affordable     A1C 8.3%  Initial B  GFR 60    Fasting B  Pre-prandial: 204-289  WBC 14.1  IV abox  Basal: NPH 50 units  Bolus: 12 units with meals   Correction: 17 units in the last 24h    Methylprednisolone 40 mg Q12  Methylprednisolone Dosing    Objective   Physical exam  General Obese male in respiratory distress/ill-appearing. Conversant and cooperative  Neuro  Alert, oriented   Vital Signs   Visit Vitals  /73 (BP 1 Location: Left upper arm, BP Patient Position: At rest;Sitting;Reclining)   Pulse 68   Temp 98.2 °F (36.8 °C)   Resp 16   Wt 149.2 kg (328 lb 14.8 oz)   SpO2 96%   BMI 43.40 kg/m²     Skin  Warm and dry. No acanthosis noted along neckline. No lipohypertrophy or lipoatrophy noted at injection sites   Heart   Regular rate and rhythm. No murmurs, rubs or gallops  Lungs  Clear to auscultation without rales or rhonchi  Extremities No foot wounds       Laboratory  No results found for this visit on 21 (from the past 12 hour(s)). No results found for this visit on 21 (from the past 12 hour(s)).   BMP:   No results found for: NA, K, CL, CO2, AGAP, GLU, BUN, CREA, GFRAA, GFRNA     Factors impacting BG management  Factor Dose Comments   Nutrition:  Standard meals     60 grams/meal      Drugs:    Steroids     Solu-medrol 40mg Q12   Impairs insulin action       Blood glucose pattern          Assessment and Plan   Nursing Diagnosis Risk for unstable blood glucose pattern   Nursing Intervention Domain 5257 Decision-making Support   Nursing Interventions Examined current inpatient diabetes control   Explored factors facilitating and impeding inpatient management  Identified self-management practices impeding diabetes control  Explored corrective strategies with patient and responsible inpatient provider   Informed patient of rational for insulin strategy while hospitalized     Evaluation Kimberley Dash is a 48year old gentleman, with uncontrolled Type 2 Diabetes on 70/30 insulin, admitted with acute on chronic hypercapneic and hypoxic respiratory failure with acute COPD exacerbation. He did not achieve diabetes control prior to admission, as evidenced by A1c of 8.3%. During this hospitalization, the patient's glucose was in goal the 1st 24 hrs without the use of antihyperglycemic agents- he was on BiPAP and NPO at that time. 40mg methylprednisolone Q12 was initiated and now with steroid induced hyperglycemia. Low dose Lantus (18 units/home dose)/humalog (4 units/meal) was started but not sufficient to override steroid impact as BG was over 310. Basal/bolus insulin was doubled with little improvement in glucose pattern. With a fasting BG of 242 yesterday, NPH was advanced to 50 units Q12 and 12 units humalog with meals. Pt continuing with BG range from 198-252 in relationship to mealtime. Pt to remain on 50 units NPH to offset steroids and humalog increased to address the meal time hyperglycemia  Recommendations   1. POC glucose ACHS    2. Consistent carbohydrate diet (60 grams CHO/meal), carb consistent shakes if they are ordered    3.  Continue the Subcutaneous Insulin Order set (3745)  Insulin Dosing Specific recommendation   Basal                                      (Based on weight, BMI & GFR) 50 units NPH Q12 Advance to 60 units Q12   tomorrow if fasting BG over   200   Nutritional                                      (Based on CHO/dextrose load) 15 units Humalog/meal    Hold if patient consumes less than 50% of carbohydrates on meal tray Advance to 15 units humalog/meal tomorrow for pre-prandial hyperglcyemia   Corrective                                       (Useful in adjusting insulin dosing) [x] Insulin-resistant sensitivity    Consider switching to custom sensitivity:  -249: 4 units Humalog  -299: 8 units Humalog  -349: 12 units Humalog  -399: 16 units Cyndialog          Billing Code(s)   [x] [id number]    Before making these care recommendations, I personally reviewed the hosptialization record, including laboratory and diagnostic data, medications and examined the patient at bedside (circumstances permitting).   Total minutes: 13      JUDY Jaeger  Diabetes Clinical Nurse Specialist  [facility]  Access via i[employer]

## 2021-09-20 NOTE — PROGRESS NOTES
[facility]                                                                                          Hospitalist Progress Note  Rangel Dhillon MD  Answering service: [phone] OR [phone] from in house phone              Progress Note    Patient: Mateo Brito MRN: [id number]  SSN: [id number]    YOB: 1965  Age: 48 y.o. Sex: male      Admit Date: 9/9/2021    LOS: 11 days     Subjective:     Patient presents with COPD and CHF. Patient is still on high flow oxygen but denies any shortness of breath. Patient has known history of COPD but does not use oxygen at home. Patient tells me that he always have lower extremity edema and wears compression stocking. His diuretics currently on hold due to worsening BUN. Denies headache, dizziness, chest pain, shortness of breath, nausea, vomiting, abdominal pain. Objective:     Vitals:    09/20/21 0700 09/20/21 0832 09/20/21 0858 09/20/21 1000   BP: (!) 157/96  (!) 148/85    Pulse: 63  68 77   Resp: 14  20    Temp: 97.5 °F (36.4 °C)      SpO2: 92% 94% 90%    Weight:            Intake and Output:  Current Shift: 09/20 0701 - 09/20 1900  In: 240 [P.O.:240]  Out: 250 [Urine:250]  Last three shifts: 09/18 1901 - 09/20 0700  In: -   Out: 2440 [Urine:2440]    Physical Exam:   GENERAL: alert, cooperative, no distress, appears stated age  THROAT & NECK: normal and no erythema or exudates noted. LUNG: clear to auscultation bilaterally  HEART: regular rate and rhythm, S1, S2 normal, no murmur, click, rub or gallop  ABDOMEN: soft, non-tender. Bowel sounds normal. No masses,  no organomegaly  EXTREMITIES:  extremities normal, atraumatic, no cyanosis or edema  SKIN: no rash or abnormalities  NEUROLOGIC: AOx3. PSYCHIATRIC: non focal    Lab/Data Review: All lab results for the last 24 hours reviewed.      Recent Results (from the past 24 hour(s))   GLUCOSE, POC    Collection Time: 09/19/21 11:51 AM   Result Value Ref Range Glucose (POC) 209 (H) 65 - 117 mg/dL    Performed by Janki Loza, POC    Collection Time: 09/19/21  4:47 PM   Result Value Ref Range    Glucose (POC) 252 (H) 65 - 117 mg/dL    Performed by Beena Delgado, POC    Collection Time: 09/19/21  9:06 PM   Result Value Ref Range    Glucose (POC) 248 (H) 65 - 117 mg/dL    Performed by Vickey Sacks    PHOSPHORUS    Collection Time: 09/20/21  3:20 AM   Result Value Ref Range    Phosphorus 3.0 2.6 - 4.7 MG/DL   MAGNESIUM    Collection Time: 09/20/21  3:20 AM   Result Value Ref Range    Magnesium 3.1 (H) 1.6 - 2.4 mg/dL   GLUCOSE, POC    Collection Time: 09/20/21  8:32 AM   Result Value Ref Range    Glucose (POC) 175 (H) 65 - 117 mg/dL    Performed by Guevara SERRANO         Imaging:    No results found.        Assessment and Plan:     Acute respiratory failure  -Acute hypoxic and hypercapnic respiratory failure due to COPD exacerbation and CHF, also complicated by obstructive sleep apnea and likely obesity hypoventilation syndrome  -Weaning oxygen, currently around 11 L  -Continue wean as tolerated    Acute exacerbation of COPD  -Continue steroid, bronchodilator and breathing treatment  -Wean oxygen as tolerated, pulmonology following    Congestive heart failure  -Acute on chronic diastolic congestive heart failure, patient with normal EF on recent echo  -Repeat echo pending  -Patient with lower extremity edema, likely lymphedema related versus cor pulmonale  -Continue Lasix 40 mg twice daily    Diabetes  -On NPH, premeal Humalog and insulin sliding scale coverage  -Diabetic management team following and adjusting insulin dose    Atrial fibrillation  -Paroxysmal atrial fibrillation  -Stable on amiodarone, Eliquis for anticoagulation    Cellulitis  -Cellulitis of the left upper extremity  -Completed antibiotics    Hypertension  -Continue Imdur    Dyslipidemia  -On statin    Discharge disposition: Likely more than 48 hours pending progress.     Signed By: Janki Franks MD     September 20, 2021

## 2021-09-20 NOTE — PROGRESS NOTES
1930: Bedside and Verbal shift change report given to Corin Mcgill [facility] (oncoming nurse) by RN (offgoing nurse). Report included the following information SBAR, Kardex, MAR and Recent Results. 0730: Bedside and Verbal shift change report given to RN (oncoming nurse) by Corin Mcgill RN (offgoing nurse). Report included the following information SBAR, Kardex, MAR and Recent Results.

## 2021-09-20 NOTE — PROGRESS NOTES
Pulmonary      ECHO still not done yet  O2 requirements are trending downward      Echo still not done, f/u results when available. Sats improved on 30L high flow, hope to start weaning today.   Resting on the side of the bed, on high flow. Says he feels about the same. Not taking deep breaths during my exam. Isn't using his IS. Refusing NIV. Net negative volume over past 24 hours. On high flow 30L, sats 91%. -2L in past 24 hours.   Reconsulted for ongoing hypoxic respiratory failure. Patient was seen and evaluated earlier today and was sitting in the chair on high flow oxygen denies worsening dyspnea. CT of the chest that was done earlier showed cardiomegaly with nonspecific findings of basilar atelectasis. Limited echo has been reported as normal. He has ongoing pedal edema.  He is receiving diuretics    9/10  ABG improved with NIPPV  Pt has removed bipap and apparently refusing to put it back on    ABG from this am with compensated chronic resp acidosis with pH of 7.39 and PCO2 of 50 (appears to be a baseline gas for him)    Patient Vitals for the past 4 hrs:   BP Temp Pulse Resp SpO2   21 1207 (!) 138/92 97.7 °F (36.5 °C) 65 16 92 %   21 1200   66     21 1000   77     21 0858 (!) 148/85  68 20 90 %   21 0832     94 %   Temp (24hrs), Av.5 °F (36.4 °C), Min:97.4 °F (36.3 °C), Max:97.7 °F (36.5 °C)      Intake/Output Summary (Last 24 hours) at 2021 1212  Last data filed at 2021 3761  Gross per 24 hour   Intake 240 ml   Output 2350 ml   Net -2110 ml     Exam  Gen- comfortable and up in the chair  HEENT- increased neck circumference  Chest- diminished BS   Cor- distant heart sounds  Ext- bilateral 4 + edema  Skin- chronic skin changes    Pulmonary Impression / Recommendations:     Acute on chronic hypoxemic / hypercarbic respiratory failure 2/2 CHF / ? Obstructive lung disease / IVY / OHS with hypercarbia and CO2 narcosis  OHD, on amiodarone, anticoagulated     --Wean high flow as able, has been refusing NIV  -- Incentive spirometer, mobilize  -- Repeat echo with bubble study fo evaluate for right to left shunt- still pending.  I reordered it stat today  --bronchodilators / steroids / diuretics / empiric abx  --will need follow up with sleep physician and pulmonary eval with PFTs  --avoid sedatives    D/W patient    Bryan Moreland PA-C

## 2021-09-21 ENCOUNTER — APPOINTMENT (OUTPATIENT)
Dept: GENERAL RADIOLOGY | Age: 56
DRG: 291 | End: 2021-09-21
Attending: PHYSICIAN ASSISTANT
Payer: MEDICARE

## 2021-09-21 LAB
ANION GAP SERPL CALC-SCNC: 2 MMOL/L (ref 5–15)
BNP SERPL-MCNC: 445 PG/ML
BUN SERPL-MCNC: 43 MG/DL (ref 6–20)
BUN/CREAT SERPL: 51 (ref 12–20)
CALCIUM SERPL-MCNC: 8.8 MG/DL (ref 8.5–10.1)
CHLORIDE SERPL-SCNC: 98 MMOL/L (ref 97–108)
CO2 SERPL-SCNC: 35 MMOL/L (ref 21–32)
COMMENT, HOLDF: NORMAL
CREAT SERPL-MCNC: 0.84 MG/DL (ref 0.7–1.3)
CRP SERPL-MCNC: <0.29 MG/DL (ref 0–0.6)
ERYTHROCYTE [SEDIMENTATION RATE] IN BLOOD: 2 MM/HR (ref 0–20)
GLUCOSE BLD STRIP.AUTO-MCNC: 154 MG/DL (ref 65–117)
GLUCOSE BLD STRIP.AUTO-MCNC: 158 MG/DL (ref 65–117)
GLUCOSE BLD STRIP.AUTO-MCNC: 184 MG/DL (ref 65–117)
GLUCOSE BLD STRIP.AUTO-MCNC: 204 MG/DL (ref 65–117)
GLUCOSE SERPL-MCNC: 168 MG/DL (ref 65–100)
MAGNESIUM SERPL-MCNC: 3 MG/DL (ref 1.6–2.4)
PHOSPHATE SERPL-MCNC: 3.5 MG/DL (ref 2.6–4.7)
POTASSIUM SERPL-SCNC: 4.8 MMOL/L (ref 3.5–5.1)
SAMPLES BEING HELD,HOLD: NORMAL
SERVICE CMNT-IMP: ABNORMAL
SODIUM SERPL-SCNC: 135 MMOL/L (ref 136–145)

## 2021-09-21 PROCEDURE — 84100 ASSAY OF PHOSPHORUS: CPT

## 2021-09-21 PROCEDURE — 80048 BASIC METABOLIC PNL TOTAL CA: CPT

## 2021-09-21 PROCEDURE — 65660000001 HC RM ICU INTERMED STEPDOWN

## 2021-09-21 PROCEDURE — 74011250637 HC RX REV CODE- 250/637: Performed by: INTERNAL MEDICINE

## 2021-09-21 PROCEDURE — 74011250637 HC RX REV CODE- 250/637: Performed by: HOSPITALIST

## 2021-09-21 PROCEDURE — 74011000250 HC RX REV CODE- 250: Performed by: HOSPITALIST

## 2021-09-21 PROCEDURE — 99231 SBSQ HOSP IP/OBS SF/LOW 25: CPT | Performed by: CLINICAL NURSE SPECIALIST

## 2021-09-21 PROCEDURE — 36415 COLL VENOUS BLD VENIPUNCTURE: CPT

## 2021-09-21 PROCEDURE — 74011250637 HC RX REV CODE- 250/637: Performed by: FAMILY MEDICINE

## 2021-09-21 PROCEDURE — 74011636637 HC RX REV CODE- 636/637: Performed by: FAMILY MEDICINE

## 2021-09-21 PROCEDURE — 77010033711 HC HIGH FLOW OXYGEN

## 2021-09-21 PROCEDURE — 74011250636 HC RX REV CODE- 250/636: Performed by: PHYSICIAN ASSISTANT

## 2021-09-21 PROCEDURE — 86140 C-REACTIVE PROTEIN: CPT

## 2021-09-21 PROCEDURE — 71045 X-RAY EXAM CHEST 1 VIEW: CPT

## 2021-09-21 PROCEDURE — 82962 GLUCOSE BLOOD TEST: CPT

## 2021-09-21 PROCEDURE — 83880 ASSAY OF NATRIURETIC PEPTIDE: CPT

## 2021-09-21 PROCEDURE — 94640 AIRWAY INHALATION TREATMENT: CPT

## 2021-09-21 PROCEDURE — 83735 ASSAY OF MAGNESIUM: CPT

## 2021-09-21 PROCEDURE — 97116 GAIT TRAINING THERAPY: CPT

## 2021-09-21 PROCEDURE — 74011636637 HC RX REV CODE- 636/637: Performed by: INTERNAL MEDICINE

## 2021-09-21 PROCEDURE — 85652 RBC SED RATE AUTOMATED: CPT

## 2021-09-21 RX ORDER — FUROSEMIDE 10 MG/ML
20 INJECTION INTRAMUSCULAR; INTRAVENOUS ONCE
Status: COMPLETED | OUTPATIENT
Start: 2021-09-21 | End: 2021-09-21

## 2021-09-21 RX ORDER — PREDNISONE 20 MG/1
40 TABLET ORAL
Status: DISCONTINUED | OUTPATIENT
Start: 2021-09-22 | End: 2021-09-22 | Stop reason: SDUPTHER

## 2021-09-21 RX ADMIN — Medication 10 ML: at 15:07

## 2021-09-21 RX ADMIN — ASPIRIN 81 MG: 81 TABLET, COATED ORAL at 10:44

## 2021-09-21 RX ADMIN — ACETAMINOPHEN 650 MG: 325 TABLET ORAL at 21:35

## 2021-09-21 RX ADMIN — ISOSORBIDE MONONITRATE 30 MG: 30 TABLET, EXTENDED RELEASE ORAL at 10:44

## 2021-09-21 RX ADMIN — TORSEMIDE 60 MG: 20 TABLET ORAL at 10:44

## 2021-09-21 RX ADMIN — AMIODARONE HYDROCHLORIDE 100 MG: 200 TABLET ORAL at 10:43

## 2021-09-21 RX ADMIN — Medication 10 ML: at 15:09

## 2021-09-21 RX ADMIN — MONTELUKAST 10 MG: 10 TABLET, FILM COATED ORAL at 21:35

## 2021-09-21 RX ADMIN — HUMAN INSULIN 50 UNITS: 100 INJECTION, SUSPENSION SUBCUTANEOUS at 10:45

## 2021-09-21 RX ADMIN — APIXABAN 5 MG: 5 TABLET, FILM COATED ORAL at 18:25

## 2021-09-21 RX ADMIN — APIXABAN 5 MG: 5 TABLET, FILM COATED ORAL at 10:44

## 2021-09-21 RX ADMIN — SACUBITRIL AND VALSARTAN 1 TABLET: 49; 51 TABLET, FILM COATED ORAL at 21:35

## 2021-09-21 RX ADMIN — ROSUVASTATIN CALCIUM 5 MG: 10 TABLET, FILM COATED ORAL at 10:44

## 2021-09-21 RX ADMIN — INSULIN LISPRO 4 UNITS: 100 INJECTION, SOLUTION INTRAVENOUS; SUBCUTANEOUS at 12:44

## 2021-09-21 RX ADMIN — Medication 10 ML: at 21:39

## 2021-09-21 RX ADMIN — BUDESONIDE 500 MCG: 0.5 INHALANT RESPIRATORY (INHALATION) at 08:07

## 2021-09-21 RX ADMIN — ARFORMOTEROL TARTRATE 15 MCG: 15 SOLUTION RESPIRATORY (INHALATION) at 19:49

## 2021-09-21 RX ADMIN — ARFORMOTEROL TARTRATE 15 MCG: 15 SOLUTION RESPIRATORY (INHALATION) at 08:07

## 2021-09-21 RX ADMIN — PREGABALIN 75 MG: 25 CAPSULE ORAL at 18:25

## 2021-09-21 RX ADMIN — PANTOPRAZOLE SODIUM 40 MG: 40 TABLET, DELAYED RELEASE ORAL at 07:13

## 2021-09-21 RX ADMIN — PREGABALIN 75 MG: 25 CAPSULE ORAL at 10:44

## 2021-09-21 RX ADMIN — INSULIN LISPRO 15 UNITS: 100 INJECTION, SOLUTION INTRAVENOUS; SUBCUTANEOUS at 12:44

## 2021-09-21 RX ADMIN — Medication 10 ML: at 07:14

## 2021-09-21 RX ADMIN — HUMAN INSULIN 50 UNITS: 100 INJECTION, SUSPENSION SUBCUTANEOUS at 18:24

## 2021-09-21 RX ADMIN — MICONAZOLE NITRATE 2 % TOPICAL POWDER: at 10:49

## 2021-09-21 RX ADMIN — EZETIMIBE 10 MG: 10 TABLET ORAL at 10:44

## 2021-09-21 RX ADMIN — Medication 10 ML: at 07:13

## 2021-09-21 RX ADMIN — FUROSEMIDE 20 MG: 10 INJECTION, SOLUTION INTRAMUSCULAR; INTRAVENOUS at 15:00

## 2021-09-21 RX ADMIN — BUDESONIDE 500 MCG: 0.5 INHALANT RESPIRATORY (INHALATION) at 19:49

## 2021-09-21 RX ADMIN — INSULIN LISPRO 15 UNITS: 100 INJECTION, SOLUTION INTRAVENOUS; SUBCUTANEOUS at 18:25

## 2021-09-21 NOTE — PROGRESS NOTES
Pulmonary, Critical Care, and Sleep Medicine~Progress Note    Name: Quirino Ruelas MRN: [id number]   : 1965 Hospital: [facility]    Date: 2021 12:38 PM Admission: 2021     Impression Plan   1. Acute hypoxic resp failure   2. Decom HF  3. Suspected underlying IVY/OHS; has not completed PSG testing yet  4. Obesity  5. A fib   1. Outpatient PFTs  2. Avoid sedative medications  3. Give extra dose of diuretics today   4. Reduce steroids to PO with 7 day taper  5. Chest x-ray  6. Probnp/ESR/CRP  7. On eliquis   8. Bronchodilators   9. Wean O2 with saturations above 89%   10. ECHO was done. But bubble study wasn't; stated that he was hypoxic with supine. We can optimize him here and repeat as an outpatient basis      Daily Progression:    Sitting up in chair  Looks like he is feeling well  O2 requirements trending down     I have reviewed the labs and previous days notes. Pertinent items are noted in HPI.     OBJECTIVE:     Vital Signs:       Visit Vitals  BP (!) 150/86   Pulse 65   Temp 97.6 °F (36.4 °C)   Resp 15   Ht 6' 1\" (1.854 m)   Wt 152.1 kg (335 lb 6.4 oz)   SpO2 96%   BMI 44.25 kg/m²      Temp (24hrs), Av °F (36.7 °C), Min:97.6 °F (36.4 °C), Max:98.4 °F (36.9 °C)     Intake/Output:     Last shift: 701 - 1900  In: -   Out: 500 [Urine:500]    Last 3 shifts: 1901 -  07  In: 240 [P.O.:240]  Out: 2125 [Urine:2125]          Intake/Output Summary (Last 24 hours) at 2021 1238  Last data filed at 2021 9687  Gross per 24 hour   Intake    Output 1600 ml   Net -1600 ml       Physical Exam:                                        Exam Findings Other   General: No resp distress noted, appears stated age    HEENT:  No ulcers, JVD not elevated, no cervical LAD    Chest: No pectus deformity, normal chest rise b/l    HEART:  RRR, no murmurs/rubs/gallops    Lungs:  CTA b/l, no rhonchi/crackles/wheeze, diminished BS at bases    ABD: Soft/NT, non rigid mildly distended    EXT: No cyanosis/clubbing/edema, normal peripheral pulses    Skin: No rashes or ulcers, no mottling    Neuro: A/O x 3        Medications:  Current Facility-Administered Medications   Medication Dose Route Frequency    [START ON 9/22/2021] predniSONE (DELTASONE) tablet 40 mg  40 mg Oral DAILY WITH BREAKFAST    furosemide (LASIX) injection 20 mg  20 mg IntraVENous ONCE    torsemide (DEMADEX) tablet 60 mg  60 mg Oral DAILY    insulin lispro (HUMALOG) injection 15 Units  15 Units SubCUTAneous TIDAC    insulin lispro (HUMALOG) injection   SubCUTAneous AC&HS    insulin NPH (NOVOLIN N, HUMULIN N) injection 50 Units  50 Units SubCUTAneous ACB&D    dextrose (D50W) injection syrg 12.5-25 g  12.5-25 g IntraVENous PRN    albuterol-ipratropium (DUO-NEB) 2.5 MG-0.5 MG/3 ML  3 mL Nebulization Q6H PRN    alteplase (CATHFLO) 1 mg in sterile water (preservative free) 1 mL injection  1 mg InterCATHeter PRN    pregabalin (LYRICA) capsule 75 mg  75 mg Oral BID    montelukast (SINGULAIR) tablet 10 mg  10 mg Oral QHS    [Held by provider] sacubitriL-valsartan (ENTRESTO) 49-51 mg tablet 1 Tablet  1 Tablet Oral Q12H    apixaban (ELIQUIS) tablet 5 mg  5 mg Oral BID    amiodarone (CORDARONE) tablet 100 mg  100 mg Oral DAILY    ezetimibe (ZETIA) tablet 10 mg  10 mg Oral DAILY    pantoprazole (PROTONIX) tablet 40 mg  40 mg Oral ACB    isosorbide mononitrate ER (IMDUR) tablet 30 mg  30 mg Oral DAILY    aspirin delayed-release tablet 81 mg  81 mg Oral DAILY    rosuvastatin (CRESTOR) tablet 5 mg  5 mg Oral DAILY    [Held by provider] spironolactone (ALDACTONE) tablet 25 mg  25 mg Oral DAILY    benzocaine-menthoL (CEPACOL) lozenge 1 Lozenge  1 Lozenge Mucous Membrane PRN    fentaNYL citrate (PF) injection 50 mcg  50 mcg IntraVENous Q4H PRN    hydrALAZINE (APRESOLINE) 20 mg/mL injection 10 mg  10 mg IntraVENous Q6H PRN    glucose chewable tablet 16 g  4 Tablet Oral PRN    dextrose (D50W) injection syrg 12.5-25 g  25-50 mL IntraVENous PRN    glucagon (GLUCAGEN) injection 1 mg  1 mg IntraMUSCular PRN    sodium chloride (NS) flush 5-40 mL  5-40 mL IntraVENous Q8H    sodium chloride (NS) flush 5-40 mL  5-40 mL IntraVENous PRN    acetaminophen (TYLENOL) tablet 650 mg  650 mg Oral Q6H PRN    Or    acetaminophen (TYLENOL) suppository 650 mg  650 mg Rectal Q6H PRN    polyethylene glycol (MIRALAX) packet 17 g  17 g Oral DAILY PRN    ondansetron (ZOFRAN ODT) tablet 4 mg  4 mg Oral Q8H PRN    Or    ondansetron (ZOFRAN) injection 4 mg  4 mg IntraVENous Q6H PRN    arformoteroL (BROVANA) neb solution 15 mcg  15 mcg Nebulization BID RT    And    budesonide (PULMICORT) 500 mcg/2 ml nebulizer suspension  500 mcg Nebulization BID RT    miconazole (MICOTIN) 2 % powder   Topical BID    sodium chloride (NS) flush 5-40 mL  5-40 mL IntraVENous Q8H    sodium chloride (NS) flush 5-40 mL  5-40 mL IntraVENous PRN       Labs:  ABG No results for input(s): PHI, PCO2I, PO2I, HCO3I, SO2I, FIO2I in the last 72 hours. CBC No results for input(s): WBC, HGB, HCT, PLT, MCV, MCH, HGBEXT, HCTEXT, PLTEXT in the last 72 hours.      Metabolic  Panel Recent Labs     09/21/21  0354 09/20/21  0320 09/19/21  0705   *  --  134*   K 4.8  --  5.4*   CL 98  --  100   CO2 35*  --  32   *  --  166*   BUN 43*  --  54*   CREA 0.84  --  1.01   CA 8.8  --  8.7   MG 3.0* 3.1* 3.1*   PHOS 3.5 3.0 3.3        Pertinent Labs                Bryan Russo PA-C  9/21/2021

## 2021-09-21 NOTE — PROGRESS NOTES
Spiritual Care Assessment/Progress Note  [facility]      NAME: Dorina Castrejon      MRN: [id number]  AGE: 48 y.o. SEX: male  Yazidi Affiliation: Unknown   Language: English     9/21/2021     Total Time (in minutes): 9     Spiritual Assessment begun in [facility] through conversation with:         []Patient        [] Family    [] Friend(s)        Reason for Consult: Initial/Spiritual assessment, patient floor     Spiritual beliefs: (Please include comment if needed)     [] Identifies with a nanda tradition:         [] Supported by a nanda community:            [] Claims no spiritual orientation:           [] Seeking spiritual identity:                [] Adheres to an individual form of spirituality:           [x] Not able to assess:                           Identified resources for coping:      [] Prayer                               [] Music                  [] Guided Imagery     [] Family/friends                 [] Pet visits     [] Devotional reading                         [x] Unknown     [] Other:                                            Interventions offered during this visit: (See comments for more details)                Plan of Care:     [] Support spiritual and/or cultural needs    [] Support AMD and/or advance care planning process      [] Support grieving process   [] Coordinate Rites and/or Rituals    [] Coordination with community clergy   [] No spiritual needs identified at this time   [] Detailed Plan of Care below (See Comments)  [] Make referral to Music Therapy  [] Make referral to Pet Therapy     [] Make referral to Addiction services  [] Make referral to [facility]  [] Make referral to Spiritual Care Partner  [] No future visits requested        [x] Follow up upon further referrals     Comments:  visit for initial spiritual assessment. Staff with patient providing care. Please contact spiritual care for further referral or consult. Rev.  Dionne Randhawa MDiv, [facility], [facility]   paging service: [phone])

## 2021-09-21 NOTE — PROGRESS NOTES
NUTRITION  Reason for Assessment: LOS      Recommendations/plan:   Continue current diet regimen      Will rescreen per policy     Information obtained from:   Chart, RN  Past Medical History:   Diagnosis Date    DM [facility]diabetes mellitus) ([facility])     Dyslipidemia     Hypertension        Pt screened for LOS. Chart/labs/meds reviewed. Admitted with Acute on chronic respiratory failure ([facility]) [J96.20]. Discussed in rounds. Noted decreasing O2 demands, on 10 L O2. Adjusting diuretics. Requiring a lot of insulin especially on steroids. Diabetes management following for education needs. Per RN - eating very well, has not missed a meal.  No wounds. 2+ bilat LE edema. Last BM 9/20. No overt nutritional concerns at this time. Current Nutrition Therapies:  Diet: consistent CHO 60 gm/meal, cardiac  Supplements: none   Meal intake: No data found.  - 100% all meals per nuring      Wt Hx:  Wt Readings from Last 10 Encounters:   09/21/21 152.1 kg (335 lb 6.4 oz)   04/25/13 138.3 kg (305 lb)   01/22/13 139.3 kg (307 lb 3.2 oz)   10/06/10 156.2 kg (344 lb 6.4 oz)         Nutrition-Related Concerns Identified:  none    Estimated Nutrition Needs:   Energy: 2545-1343 (MSJ x 1-1.2 x 1-1.2)  Wt used: Current (152.1 kg)  Protein: 145-175 (~1-1.2 gm/kg or ~2 gm/kg IBW)    Fluid: 1 mL/kcal       Recent Labs     09/21/21  0354 09/20/21  0320 09/19/21  0705   *  --  166*   BUN 43*  --  54*   CREA 0.84  --  1.01   *  --  134*   K 4.8  --  5.4*   CL 98  --  100   CO2 35*  --  32   CA 8.8  --  8.7   PHOS 3.5 3.0 3.3   MG 3.0* 3.1* 3.1*       Recent Labs     09/21/21  1105 09/21/21  0818 09/20/21  2118 09/20/21  1702 09/20/21  1206 09/20/21  0832 09/19/21  2106 09/19/21  1647 09/19/21  1151 09/19/21  0810 09/18/21  2142 09/18/21  1805   GLUCPOC 204* 184* 171* 178* 198* 175* 248* 252* 209* 169* 130* 166*       Lab Results   Component Value Date/Time    Hemoglobin A1c 8.3 (H) 09/11/2021 03:53 PM    Hemoglobin A1c (POC) 10.8 04/25/2013 02:49 PM         Debo Arredondo RD  Available via [employer]

## 2021-09-21 NOTE — PROGRESS NOTES
[address] [facility] Adult  Hospitalist Group                                                                                          Hospitalist Progress Note  Tali Chavez MD  Answering service: [phone] OR [phone] from in house phone              Progress Note    Patient: Osmany Sanchez MRN: [id number]  SSN: [id number]    YOB: 1965  Age: 48 y.o. Sex: male      Admit Date: 9/9/2021    LOS: 12 days     Subjective:     Patient presents with COPD and CHF. Patient is still on high flow oxygen but denies any shortness of breath. Patient has known history of COPD but does not use oxygen at home. Patient tells me that he always have lower extremity edema and wears compression stocking. His diuretics currently on hold due to worsening BUN. Denies headache, dizziness, chest pain, shortness of breath, nausea, vomiting, abdominal pain. Objective:     Vitals:    09/21/21 0548 09/21/21 0714 09/21/21 0807 09/21/21 1000   BP:  (!) 157/97     Pulse: (!) 55 61  61   Resp:  16     Temp:  97.8 °F (36.6 °C)     SpO2:  (!) 89% 96%    Weight:            Intake and Output:  Current Shift: 09/21 0701 - 09/21 1900  In: -   Out: 500 [Urine:500]  Last three shifts: 09/19 1901 - 09/21 0700  In: 240 [P.O.:240]  Out: 2125 [Urine:2125]    Physical Exam:   GENERAL: alert, cooperative, no distress, appears stated age  THROAT & NECK: normal and no erythema or exudates noted. LUNG: clear to auscultation bilaterally  HEART: regular rate and rhythm, S1, S2 normal, no murmur, click, rub or gallop  ABDOMEN: soft, non-tender. Bowel sounds normal. No masses,  no organomegaly  EXTREMITIES:  extremities normal, atraumatic, no cyanosis or edema  SKIN: no rash or abnormalities  NEUROLOGIC: AOx3. PSYCHIATRIC: non focal    Lab/Data Review: All lab results for the last 24 hours reviewed.      Recent Results (from the past 24 hour(s))   GLUCOSE, POC    Collection Time: 09/20/21 12:06 PM   Result Value Ref Range    Glucose (POC) 198 (H) 65 - 117 mg/dL    Performed by Guevara SERRANO    ECHO ADULT FOLLOW-UP OR LIMITED    Collection Time: 09/20/21  3:34 PM   Result Value Ref Range    IVSd 1.70 (A) 0.60 - 1.00 cm    LVIDd 4.77 4.20 - 5.90 cm    LVIDs 3.59 cm    LVOT d 2.05 cm    LVPWd 0.85 0.60 - 1.00 cm    LV Mass .6 88.0 - 224.0 g   GLUCOSE, POC    Collection Time: 09/20/21  5:02 PM   Result Value Ref Range    Glucose (POC) 178 (H) 65 - 117 mg/dL    Performed by [address] [place] [address], POC    Collection Time: 09/20/21  9:18 PM   Result Value Ref Range    Glucose (POC) 171 (H) 65 - 117 mg/dL    Performed by Rehan Nicholas    PHOSPHORUS    Collection Time: 09/21/21  3:54 AM   Result Value Ref Range    Phosphorus 3.5 2.6 - 4.7 MG/DL   MAGNESIUM    Collection Time: 09/21/21  3:54 AM   Result Value Ref Range    Magnesium 3.0 (H) 1.6 - 2.4 mg/dL   METABOLIC PANEL, BASIC    Collection Time: 09/21/21  3:54 AM   Result Value Ref Range    Sodium 135 (L) 136 - 145 mmol/L    Potassium 4.8 3.5 - 5.1 mmol/L    Chloride 98 97 - 108 mmol/L    CO2 35 (H) 21 - 32 mmol/L    Anion gap 2 (L) 5 - 15 mmol/L    Glucose 168 (H) 65 - 100 mg/dL    BUN 43 (H) 6 - 20 MG/DL    Creatinine 0.84 0.70 - 1.30 MG/DL    BUN/Creatinine ratio 51 (H) 12 - 20      GFR est AA >60 >60 ml/min/1.73m2    GFR est non-AA >60 >60 ml/min/1.73m2    Calcium 8.8 8.5 - 10.1 MG/DL   SAMPLES BEING HELD    Collection Time: 09/21/21  3:54 AM   Result Value Ref Range    SAMPLES BEING HELD 1SST     COMMENT        Add-on orders for these samples will be processed based on acceptable specimen integrity and analyte stability, which may vary by analyte. GLUCOSE, POC    Collection Time: 09/21/21  8:18 AM   Result Value Ref Range    Glucose (POC) 184 (H) 65 - 117 mg/dL    Performed by [facility]          Imaging:    ECHO ADULT FOLLOW-UP OR LIMITED    Result Date: 9/20/2021  · Image quality for this study was technically difficult. · LV: Calculated LVEF is 50%.  Low normal systolic function. · Contrast (agitated saline) not administered           Assessment and Plan:     Acute respiratory failure  -Acute hypoxic and hypercapnic respiratory failure due to COPD exacerbation and CHF, also complicated by obstructive sleep apnea and likely obesity hypoventilation syndrome  -Weaning oxygen, currently around 10 L  -Continue wean as tolerated    Acute exacerbation of COPD  -Continue steroid, bronchodilator and breathing treatment  -Wean oxygen as tolerated, pulmonology following    Congestive heart failure  -Acute on chronic diastolic congestive heart failure, patient with normal EF on recent echo  -Repeat echo pending  -Patient with lower extremity edema, likely lymphedema related versus cor pulmonale  -Diuretic changed to Torsemide, patient's home med    Diabetes  -On NPH, premeal Humalog and insulin sliding scale coverage  -Diabetic management team following and adjusting insulin dose    Atrial fibrillation  -Paroxysmal atrial fibrillation  -Stable on amiodarone, Eliquis for anticoagulation    Cellulitis  -Cellulitis of the left upper extremity  -Completed antibiotics    Hypertension  -Continue Imdur    Dyslipidemia  -On statin    Discharge disposition: Likely more than 48 hours pending progress.     Signed By: Jules Trivedi MD     September 21, 2021

## 2021-09-21 NOTE — PROGRESS NOTES
0800:  Verbal bedside report given to Katiuska Campbell RN oncoming nurse by Patricia Anaya RN off-going nurse. Report included current pt status and condition, recent results, hx of present illness, heart rate and rhythm, and respiratory status.

## 2021-09-21 NOTE — PROGRESS NOTES
Problem: Mobility Impaired (Adult and Pediatric)  Goal: *Acute Goals and Plan of Care (Insert Text)  Description: FUNCTIONAL STATUS PRIOR TO ADMISSION: Patient was modified independent using a rollator for functional mobility. HOME SUPPORT PRIOR TO ADMISSION: The patient lived alone with family to provide assistance. Patient also reports having an aide that comes 11-5 Monday-Friday. Physical Therapy Goals  Initiated 9/11/2021; reviewed/ continued 9/20/2021  1. Patient will move from supine to sit and sit to supine , scoot up and down, and roll side to side in bed with modified independence within 7 day(s). 2.  Patient will transfer from bed to chair and chair to bed with modified independence using the least restrictive device within 7 day(s). 3.  Patient will perform sit to stand with modified independence within 7 day(s). 4.  Patient will ambulate with modified independence for 100 feet with the least restrictive device within 7 day(s). Outcome: Progressing Towards Goal    PHYSICAL THERAPY TREATMENT  Patient: Almaz Harvey [de-identified]48 y.o. male)  Date: 9/21/2021  Diagnosis: Acute on chronic respiratory failure ([facility] [address] [J96.20] <principal problem not specified>       Precautions:    Chart, physical therapy assessment, plan of care and goals were reviewed. ASSESSMENT  Patient continues with skilled PT services and is progressing towards goals. Pt was able to increase gait tolerance. Pt ambulates with forearm on rollator due to back pain. This is baseline for him. Pt had one episode of knee buckle, pt self recovered. Pt utilized 10 L O2 SpO2 94% post gait HR 76bpm. .     Current Level of Function Impacting Discharge (mobility/balance): CGA    Other factors to consider for discharge: oxygen needs         PLAN :  Patient continues to benefit from skilled intervention to address the above impairments. Continue treatment per established plan of care. to address goals.     Recommendation for discharge: (in order for the patient to meet his/her long term goals)  Dependent on oxygen needs. HHPT with assistance vs SNF    This discharge recommendation:  Has not yet been discussed the attending provider and/or case management    IF patient discharges home will need the following DME: patient owns DME required for discharge       SUBJECTIVE:   Patient stated It does.  when ask if it feels good to walk    OBJECTIVE DATA SUMMARY:   Critical Behavior:  Neurologic State: Alert  Orientation Level: Oriented X4  Cognition: Follows commands     Functional Mobility Training:  Bed Mobility:                    Transfers:  Sit to Stand: Supervision  Stand to Sit: Supervision                             Balance:  Sitting: Intact  Standing: Impaired  Standing - Static: Good  Standing - Dynamic : Fair  Ambulation/Gait Training:  Distance (ft): 300 Feet (ft)  Assistive Device: Walker, rollator  Ambulation - Level of Assistance: Stand-by assistance;Contact guard assistance        Gait Abnormalities:  (trunk flexed. forarms on rollator. one knee buckle)        Base of Support: Widened        Step Length: Left shortened;Right shortened                    Stairs: Therapeutic Exercises:     Pain Rating:  back    Activity Tolerance:   Improving     After treatment patient left in no apparent distress:   Sitting in chair and Call bell within reach    COMMUNICATION/COLLABORATION:   The patients plan of care was discussed with: Registered nurse.      Cedrick Linda PTA   Time Calculation: 25 mins

## 2021-09-21 NOTE — PROGRESS NOTES
09/21/21 0301   Oxygen Therapy   O2 Sat (%) 93 %   Pulse via Oximetry 54 beats per minute   O2 Device Hi flow nasal cannula;Humidifier  (Mid-flow cannula)   O2 Flow Rate (L/min) 12 l/min  (found on)   CPAP/BIPAP   CPAP/BIPAP Start/Stop Off  (pt stable on cannula, not indicated at this time)   $$ Bipap Daily   (no charge, no EIR)     Patient has not worn NIPPV since 9/10

## 2021-09-21 NOTE — DIABETES MGMT
[address] [facility]    CLINICAL NURSE SPECIALIST CONSULT   FOLLOW UP NOTE    Initial Presentation   Theresa Olivas is a 48 y.o. male who presented to the ED 9/9/21 via EMS for generalized c/o feeling unwell. He was hypoxic in the 70%s and placed on O2 with EMS. HX:   Past Medical History:   Diagnosis Date    DM (diabetes mellitus) ([address]     Dyslipidemia     Hypertension         INITIAL DX: Acute on chronic respiratory failure with hypercapnia; upper left arm cellulitis    Current Treatment     TX: IV steroids, antibiotics, pulmonary consult    Consulted by Lorean Leventhal, MD for advanced diabetes nursing assessment and care for:   [x] Inpatient management strategy      Hospital Course   Clinical progress has been complicated by: .   9/9: Lethargic, transitioned to BiPAP. Seen by pulmonary- continue steroids, diuresis, bronchodilators. Transfer to ICU for respiratory acidosis. ? Obstructive lung disease  9/10: BiPAP overnight. Seen by ortho for generalized LUE cellulitis. Transfer out of ICU  9/12: HFNC  9/14: Pulm re consulted- continue mobility, cxr, consider repeating ECHO  9/20: Decreasing O2 demands, adjusting diuretics  Diabetes History   Hx Type 2 Diabets \"for many years\"  Diabetes managed by Boy Harrington MD    Diabetes-related Medical History  Acute complications  Steroid induced hyperglycemia      Diabetes Medication History  Drug class Currently in use Discontinued Never used   Biguanide      DDP-4 inhibitor       Sulfonylurea      Thiazolidinedione      GLP-1 RA      SGLT-2 inhibitors      Basal insulin 18 units Lantus once daily Levemir     Bolus insulin  Novolog with meals    Fixed Dose  Combinations  70/30 50 units BID      Subjective   I take sliding scale and my glucose goes up.     Patient reports the following home diabetes self-management practices:   Eating pattern   [x] Eats once meal a day  Limited snacking  Water throughout the day  Physical activity pattern   [x] Mostly sedentary   Monitoring pattern   [x] Bedtime: -180  Taking medications pattern  [x] Consistent administration  [x] Affordable     A1C 8.3%  Initial B  GFR 60    Fasting B  Pre-prandial: 171-198  WBC 14.1  IV abox  Basal: NPH 50 units  Bolus: 15 units with meals   Correction: 0 units in the last 24h    Methylprednisolone 40 mg Q12- likely for another day    Objective   Physical exam  General Obese male in respiratory distress/ill-appearing. Conversant and cooperative  Neuro  Alert, oriented   Vital Signs   Visit Vitals  BP (!) 157/97 (BP 1 Location: Left upper arm, BP Patient Position: At rest)   Pulse 61   Temp 97.8 °F (36.6 °C)   Resp 16   Wt 152.1 kg (335 lb 6.4 oz)   SpO2 96%   BMI 44.25 kg/m²     Skin  Warm and dry. No acanthosis noted along neckline. No lipohypertrophy or lipoatrophy noted at injection sites   Heart   Regular rate and rhythm. No murmurs, rubs or gallops  Lungs  Clear to auscultation without rales or rhonchi  Extremities No foot wounds       Laboratory  No results found for this visit on 21 (from the past 12 hour(s)). No results found for this visit on 21 (from the past 12 hour(s)).   BMP:   Lab Results   Component Value Date/Time     (L) 2021 03:54 AM    K 4.8 2021 03:54 AM    CL 98 2021 03:54 AM    CO2 35 (H) 2021 03:54 AM    AGAP 2 (L) 2021 03:54 AM     (H) 2021 03:54 AM    BUN 43 (H) 2021 03:54 AM    CREA 0.84 2021 03:54 AM    GFRAA >60 2021 03:54 AM    GFRNA >60 2021 03:54 AM        Factors impacting BG management  Factor Dose Comments   Nutrition:  Standard meals     60 grams/meal      Drugs:    Steroids     Solu-medrol 40mg Q12   Impairs insulin action       Blood glucose pattern          Assessment and Plan   Nursing Diagnosis Risk for unstable blood glucose pattern   Nursing Intervention Domain 5250 Decision-making Support   Nursing Interventions Examined current inpatient diabetes control   Explored factors facilitating and impeding inpatient management  Identified self-management practices impeding diabetes control  Explored corrective strategies with patient and responsible inpatient provider   Informed patient of rational for insulin strategy while hospitalized     Evaluation   Patricia Brown is a 48year old gentleman, with uncontrolled Type 2 Diabetes on 70/30 insulin, admitted with acute on chronic hypercapneic and hypoxic respiratory failure with acute COPD exacerbation. He did not achieve diabetes control prior to admission, as evidenced by A1c of 8.3%. During this hospitalization, the patient's glucose was in goal the 1st 24 hrs without the use of antihyperglycemic agents- he was on BiPAP and NPO at that time. 40mg methylprednisolone Q12 was initiated and now with steroid induced hyperglycemia. Low dose Lantus (18 units/home dose)/humalog (4 units/meal) was started but not sufficient to override steroid impact as BG was over 310. Basal/bolus insulin was doubled with little improvement in glucose pattern so NPH was titrated to 50 units Q12 and 15 units humalog with meals. Glucose range from 168-198. Will need to reduce along with steroid taper. Recommendations   1. POC glucose ACHS    2. Consistent carbohydrate diet (60 grams CHO/meal), carb consistent shakes if they are ordered    3. Continue the Subcutaneous Insulin Order set (7031)  Insulin Dosing Specific recommendation   Basal                                      (Based on weight, BMI & GFR) 50 units NPH Q12 Please wean with steroids.      Nutritional                                      (Based on CHO/dextrose load) 15 units Humalog/meal    Hold if patient consumes less than 50% of carbohydrates on meal tray Advance to 18 units humalog/meal tomorrow for pre-prandial hyperglycemia   Corrective                                       (Useful in adjusting insulin dosing) [x] Custom Sensitivity    BG 200-249: 4 units Humalog  -299: 8 units Humalog  -349: 12 units Humalog  -399: 16 units Humalog          Billing Code(s)   [x] 98982    Before making these care recommendations, I personally reviewed the hosptialization record, including laboratory and diagnostic data, medications and examined the patient at bedside (circumstances permitting).   Total minutes: 13      JUDY Elizondo  Diabetes Clinical Nurse Specialist  [facility]  Access via [employer]

## 2021-09-22 LAB
ANION GAP SERPL CALC-SCNC: 2 MMOL/L (ref 5–15)
BUN SERPL-MCNC: 42 MG/DL (ref 6–20)
BUN/CREAT SERPL: 48 (ref 12–20)
CALCIUM SERPL-MCNC: 8.3 MG/DL (ref 8.5–10.1)
CHLORIDE SERPL-SCNC: 99 MMOL/L (ref 97–108)
CO2 SERPL-SCNC: 38 MMOL/L (ref 21–32)
CREAT SERPL-MCNC: 0.87 MG/DL (ref 0.7–1.3)
GLUCOSE BLD STRIP.AUTO-MCNC: 103 MG/DL (ref 65–117)
GLUCOSE BLD STRIP.AUTO-MCNC: 124 MG/DL (ref 65–117)
GLUCOSE BLD STRIP.AUTO-MCNC: 135 MG/DL (ref 65–117)
GLUCOSE BLD STRIP.AUTO-MCNC: 54 MG/DL (ref 65–117)
GLUCOSE BLD STRIP.AUTO-MCNC: 66 MG/DL (ref 65–117)
GLUCOSE BLD STRIP.AUTO-MCNC: 72 MG/DL (ref 65–117)
GLUCOSE BLD STRIP.AUTO-MCNC: 73 MG/DL (ref 65–117)
GLUCOSE BLD STRIP.AUTO-MCNC: 76 MG/DL (ref 65–117)
GLUCOSE SERPL-MCNC: 48 MG/DL (ref 65–100)
MAGNESIUM SERPL-MCNC: 2.6 MG/DL (ref 1.6–2.4)
PHOSPHATE SERPL-MCNC: 3.7 MG/DL (ref 2.6–4.7)
POTASSIUM SERPL-SCNC: 3.7 MMOL/L (ref 3.5–5.1)
SERVICE CMNT-IMP: ABNORMAL
SERVICE CMNT-IMP: NORMAL
SODIUM SERPL-SCNC: 139 MMOL/L (ref 136–145)
TROPONIN I SERPL-MCNC: <0.05 NG/ML

## 2021-09-22 PROCEDURE — 84100 ASSAY OF PHOSPHORUS: CPT

## 2021-09-22 PROCEDURE — 74011250637 HC RX REV CODE- 250/637: Performed by: INTERNAL MEDICINE

## 2021-09-22 PROCEDURE — 93005 ELECTROCARDIOGRAM TRACING: CPT

## 2021-09-22 PROCEDURE — 74011000250 HC RX REV CODE- 250: Performed by: HOSPITALIST

## 2021-09-22 PROCEDURE — 74011250637 HC RX REV CODE- 250/637: Performed by: FAMILY MEDICINE

## 2021-09-22 PROCEDURE — 74011000250 HC RX REV CODE- 250: Performed by: NURSE PRACTITIONER

## 2021-09-22 PROCEDURE — 99231 SBSQ HOSP IP/OBS SF/LOW 25: CPT | Performed by: CLINICAL NURSE SPECIALIST

## 2021-09-22 PROCEDURE — 74011000250 HC RX REV CODE- 250: Performed by: INTERNAL MEDICINE

## 2021-09-22 PROCEDURE — 84484 ASSAY OF TROPONIN QUANT: CPT

## 2021-09-22 PROCEDURE — 74011636637 HC RX REV CODE- 636/637: Performed by: FAMILY MEDICINE

## 2021-09-22 PROCEDURE — 65660000001 HC RM ICU INTERMED STEPDOWN

## 2021-09-22 PROCEDURE — 94640 AIRWAY INHALATION TREATMENT: CPT

## 2021-09-22 PROCEDURE — 94664 DEMO&/EVAL PT USE INHALER: CPT

## 2021-09-22 PROCEDURE — 80048 BASIC METABOLIC PNL TOTAL CA: CPT

## 2021-09-22 PROCEDURE — 83735 ASSAY OF MAGNESIUM: CPT

## 2021-09-22 PROCEDURE — 74011250636 HC RX REV CODE- 250/636: Performed by: NURSE PRACTITIONER

## 2021-09-22 PROCEDURE — 82962 GLUCOSE BLOOD TEST: CPT

## 2021-09-22 PROCEDURE — 36415 COLL VENOUS BLD VENIPUNCTURE: CPT

## 2021-09-22 PROCEDURE — 74011250637 HC RX REV CODE- 250/637: Performed by: HOSPITALIST

## 2021-09-22 RX ORDER — PREDNISONE 20 MG/1
40 TABLET ORAL
Status: COMPLETED | OUTPATIENT
Start: 2021-09-22 | End: 2021-09-24

## 2021-09-22 RX ORDER — INSULIN LISPRO 100 [IU]/ML
10 INJECTION, SOLUTION INTRAVENOUS; SUBCUTANEOUS
Status: DISCONTINUED | OUTPATIENT
Start: 2021-09-22 | End: 2021-09-23

## 2021-09-22 RX ORDER — CALCIUM CARBONATE 200(500)MG
200 TABLET,CHEWABLE ORAL
Status: DISCONTINUED | OUTPATIENT
Start: 2021-09-22 | End: 2021-10-04 | Stop reason: HOSPADM

## 2021-09-22 RX ORDER — OXYCODONE HYDROCHLORIDE 5 MG/1
5 TABLET ORAL
Status: DISCONTINUED | OUTPATIENT
Start: 2021-09-22 | End: 2021-09-23

## 2021-09-22 RX ADMIN — PREGABALIN 75 MG: 25 CAPSULE ORAL at 10:50

## 2021-09-22 RX ADMIN — ARFORMOTEROL TARTRATE 15 MCG: 15 SOLUTION RESPIRATORY (INHALATION) at 21:57

## 2021-09-22 RX ADMIN — MICONAZOLE NITRATE 2 % TOPICAL POWDER: at 18:54

## 2021-09-22 RX ADMIN — HUMAN INSULIN 40 UNITS: 100 INJECTION, SUSPENSION SUBCUTANEOUS at 10:55

## 2021-09-22 RX ADMIN — Medication 10 ML: at 21:32

## 2021-09-22 RX ADMIN — INSULIN LISPRO 10 UNITS: 100 INJECTION, SOLUTION INTRAVENOUS; SUBCUTANEOUS at 13:35

## 2021-09-22 RX ADMIN — Medication 10 ML: at 13:35

## 2021-09-22 RX ADMIN — BUDESONIDE 500 MCG: 0.5 INHALANT RESPIRATORY (INHALATION) at 07:34

## 2021-09-22 RX ADMIN — IPRATROPIUM BROMIDE AND ALBUTEROL SULFATE 3 ML: .5; 3 SOLUTION RESPIRATORY (INHALATION) at 01:45

## 2021-09-22 RX ADMIN — MONTELUKAST 10 MG: 10 TABLET, FILM COATED ORAL at 21:32

## 2021-09-22 RX ADMIN — ROSUVASTATIN CALCIUM 5 MG: 10 TABLET, FILM COATED ORAL at 10:51

## 2021-09-22 RX ADMIN — ISOSORBIDE MONONITRATE 30 MG: 30 TABLET, EXTENDED RELEASE ORAL at 10:51

## 2021-09-22 RX ADMIN — PANTOPRAZOLE SODIUM 40 MG: 40 TABLET, DELAYED RELEASE ORAL at 06:30

## 2021-09-22 RX ADMIN — SACUBITRIL AND VALSARTAN 1 TABLET: 49; 51 TABLET, FILM COATED ORAL at 10:51

## 2021-09-22 RX ADMIN — PREGABALIN 75 MG: 25 CAPSULE ORAL at 18:49

## 2021-09-22 RX ADMIN — AMIODARONE HYDROCHLORIDE 100 MG: 200 TABLET ORAL at 10:51

## 2021-09-22 RX ADMIN — Medication 10 ML: at 06:26

## 2021-09-22 RX ADMIN — ACETAMINOPHEN 650 MG: 325 TABLET ORAL at 06:14

## 2021-09-22 RX ADMIN — TORSEMIDE 60 MG: 20 TABLET ORAL at 10:51

## 2021-09-22 RX ADMIN — ALTEPLASE 2 MG: 2.2 INJECTION, POWDER, LYOPHILIZED, FOR SOLUTION INTRAVENOUS at 03:42

## 2021-09-22 RX ADMIN — APIXABAN 5 MG: 5 TABLET, FILM COATED ORAL at 10:52

## 2021-09-22 RX ADMIN — EZETIMIBE 10 MG: 10 TABLET ORAL at 10:52

## 2021-09-22 RX ADMIN — SACUBITRIL AND VALSARTAN 1 TABLET: 49; 51 TABLET, FILM COATED ORAL at 21:32

## 2021-09-22 RX ADMIN — CALCIUM CARBONATE (ANTACID) CHEW TAB 500 MG 200 MG: 500 CHEW TAB at 16:01

## 2021-09-22 RX ADMIN — ASPIRIN 81 MG: 81 TABLET, COATED ORAL at 10:52

## 2021-09-22 RX ADMIN — MICONAZOLE NITRATE 2 % TOPICAL POWDER: at 10:53

## 2021-09-22 RX ADMIN — APIXABAN 5 MG: 5 TABLET, FILM COATED ORAL at 18:49

## 2021-09-22 RX ADMIN — Medication 16 G: at 06:14

## 2021-09-22 RX ADMIN — PREDNISONE 40 MG: 20 TABLET ORAL at 10:52

## 2021-09-22 RX ADMIN — ARFORMOTEROL TARTRATE 15 MCG: 15 SOLUTION RESPIRATORY (INHALATION) at 07:34

## 2021-09-22 RX ADMIN — BUDESONIDE 500 MCG: 0.5 INHALANT RESPIRATORY (INHALATION) at 21:57

## 2021-09-22 RX ADMIN — OXYCODONE 5 MG: 5 TABLET ORAL at 12:08

## 2021-09-22 NOTE — PROGRESS NOTES
HYPOGLYCEMIC EPISODE DOCUMENTATION    Patient with hypoglycemic episode(s) at 602(time) on 9/22/21(date). BG value(s) pre-treatment 47  Was patient symptomatic? [] yes, [x] no  Patient was treated with the following rescue medications/treatments: [] D50                [x] Glucose tablets                [] Glucagon                [x] 4oz juice                [] 6oz reg soda                [] 8oz low fat milk  BG value post-treatment: 66, repeat 15 mins: 103  Once BG treated and value greater than 80mg/dl, pt was provided with the following:  [x] snack  [] meal  Name of MD notified:Dago  The following orders were received: hypoglycemic protocol    0800:  Verbal bedside report given to Manohar Lucio RN oncoming nurse by Pat Logan RN off-going nurse. Report included current pt status and condition, recent results, hx of present illness, heart rate and rhythm, and respiratory status.

## 2021-09-22 NOTE — PROGRESS NOTES
[facility] [facility] Adult  Hospitalist Group                                                                                          Hospitalist Progress Note  Yulia Lezama MD  Answering service: [phone] OR [phone] from in house phone              Progress Note    Patient: Mavis Espinoza MRN: [id number]  SSN: [id number]    YOB: 1965  Age: 48 y.o. Sex: male      Admit Date: 9/9/2021    LOS: 13 days     Subjective:     Patient presents with COPD and CHF. Patient is still on high flow oxygen but denies any shortness of breath. Patient has known history of COPD but does not use oxygen at home. Patient tells me that he always have lower extremity edema and wears compression stocking. His diuretics currently on hold due to worsening BUN. Denies headache, dizziness, chest pain, shortness of breath, nausea, vomiting, abdominal pain. Objective:     Vitals:    09/22/21 0734 09/22/21 0800 09/22/21 1000 09/22/21 1114   BP:    94/61   Pulse:  66 76 73   Resp:    16   Temp:    99.1 °F (37.3 °C)   SpO2: 94%   94%   Weight:       Height:            Intake and Output:  Current Shift: 09/22 0701 - 09/22 1900  In: -   Out: 825 [Urine:825]  Last three shifts: 09/20 1901 - 09/22 0700  In: -   Out: 3350 [Urine:3350]    Physical Exam:   GENERAL: alert, cooperative, no distress, appears stated age  THROAT & NECK: normal and no erythema or exudates noted. LUNG: clear to auscultation bilaterally  HEART: regular rate and rhythm, S1, S2 normal, no murmur, click, rub or gallop  ABDOMEN: soft, non-tender. Bowel sounds normal. No masses,  no organomegaly  EXTREMITIES:  extremities normal, atraumatic, no cyanosis or edema  SKIN: no rash or abnormalities  NEUROLOGIC: AOx3. PSYCHIATRIC: non focal    Lab/Data Review: All lab results for the last 24 hours reviewed.      Recent Results (from the past 24 hour(s))   GLUCOSE, POC    Collection Time: 09/21/21  4:52 PM   Result Value Ref Range    Glucose (POC) 154 (H) 65 - 117 mg/dL    Performed by Rory Torres, POC    Collection Time: 09/21/21  9:27 PM   Result Value Ref Range    Glucose (POC) 158 (H) 65 - 117 mg/dL    Performed by Odette Doyle    PHOSPHORUS    Collection Time: 09/22/21  4:29 AM   Result Value Ref Range    Phosphorus 3.7 2.6 - 4.7 MG/DL   MAGNESIUM    Collection Time: 09/22/21  4:29 AM   Result Value Ref Range    Magnesium 2.6 (H) 1.6 - 2.4 mg/dL   METABOLIC PANEL, BASIC    Collection Time: 09/22/21  4:29 AM   Result Value Ref Range    Sodium 139 136 - 145 mmol/L    Potassium 3.7 3.5 - 5.1 mmol/L    Chloride 99 97 - 108 mmol/L    CO2 38 (H) 21 - 32 mmol/L    Anion gap 2 (L) 5 - 15 mmol/L    Glucose 48 (LL) 65 - 100 mg/dL    BUN 42 (H) 6 - 20 MG/DL    Creatinine 0.87 0.70 - 1.30 MG/DL    BUN/Creatinine ratio 48 (H) 12 - 20      GFR est AA >60 >60 ml/min/1.73m2    GFR est non-AA >60 >60 ml/min/1.73m2    Calcium 8.3 (L) 8.5 - 10.1 MG/DL   GLUCOSE, POC    Collection Time: 09/22/21  6:02 AM   Result Value Ref Range    Glucose (POC) 54 (L) 65 - 117 mg/dL    Performed by [address] [place] [address], POC    Collection Time: 09/22/21  6:32 AM   Result Value Ref Range    Glucose (POC) 66 65 - 117 mg/dL    Performed by [address] [place] [address], POC    Collection Time: 09/22/21  7:02 AM   Result Value Ref Range    Glucose (POC) 103 65 - 117 mg/dL    Performed by [address] [place] [address], POC    Collection Time: 09/22/21  8:27 AM   Result Value Ref Range    Glucose (POC) 76 65 - 117 mg/dL    Performed by Guevara Spaphay P    GLUCOSE, POC    Collection Time: 09/22/21  9:52 AM   Result Value Ref Range    Glucose (POC) 124 (H) 65 - 117 mg/dL    Performed by Dosailajagpmaximiliano Spaphay P    GLUCOSE, POC    Collection Time: 09/22/21 11:16 AM   Result Value Ref Range    Glucose (POC) 135 (H) 65 - 117 mg/dL    Performed by Geuvara SERRANO         Imaging:    No results found.        Assessment and Plan:     Acute respiratory failure  -Acute hypoxic and hypercapnic respiratory failure due to COPD exacerbation and CHF, also complicated by obstructive sleep apnea and likely obesity hypoventilation syndrome  -Weaning oxygen    Acute exacerbation of COPD  -Steroids changed to prednisone, continue bronchodilator and breathing treatment  -Wean oxygen as tolerated, pulmonology following    Congestive heart failure  -Acute on chronic diastolic congestive heart failure, patient with normal EF on recent echo  -Repeat echo pending  -Patient with lower extremity edema, likely lymphedema related versus cor pulmonale  -Diuretic changed to Torsemide, patient's home med  -Consider resuming Entresto if blood pressure improves    Diabetes  -On NPH, premeal Humalog and insulin sliding scale coverage  -Diabetic management team following and adjusting insulin dose    Atrial fibrillation  -Paroxysmal atrial fibrillation  -Stable on amiodarone, Eliquis for anticoagulation    Cellulitis  -Cellulitis of the left upper extremity  -Completed antibiotics    Hypertension  -Continue Imdur    Dyslipidemia  -On statin    Discharge disposition: Likely more than 48 hours pending progress.     Signed By: Gerri Burks MD     September 22, 2021

## 2021-09-22 NOTE — DIABETES MGMT
[address] [facility]    CLINICAL NURSE SPECIALIST CONSULT   FOLLOW UP NOTE    Initial Presentation   Wyalon Major is a 48 y.o. male who presented to the ED 9/9/21 via EMS for generalized c/o feeling unwell. He was hypoxic in the 70%s and placed on O2 with EMS. HX:   Past Medical History:   Diagnosis Date    DM (diabetes mellitus) ([facility] [address]     Dyslipidemia     Hypertension         INITIAL DX: Acute on chronic respiratory failure with hypercapnia; upper left arm cellulitis    Current Treatment     TX: IV steroids, antibiotics, pulmonary consult    Consulted by Graciela Escobar MD for advanced diabetes nursing assessment and care for:   [x] Inpatient management strategy      Hospital Course   Clinical progress has been complicated by: .   9/9: Lethargic, transitioned to BiPAP. Seen by pulmonary- continue steroids, diuresis, bronchodilators. Transfer to ICU for respiratory acidosis. ? Obstructive lung disease  9/10: BiPAP overnight. Seen by ortho for generalized LUE cellulitis. Transfer out of ICU  9/12: HFNC  9/14: Pulm re consulted- continue mobility, cxr, consider repeating ECHO  9/20: Decreasing O2 demands, adjusting diuretics  Diabetes History   Hx Type 2 Diabetes \"for many years\"  Diabetes managed by Vianca Montero MD    Diabetes-related Medical History  Acute complications  Steroid induced hyperglycemia      Diabetes Medication History  Drug class Currently in use Discontinued Never used   Biguanide      DDP-4 inhibitor       Sulfonylurea      Thiazolidinedione      GLP-1 RA      SGLT-2 inhibitors      Basal insulin 18 units Lantus once daily Levemir     Bolus insulin  Novolog with meals    Fixed Dose  Combinations  70/30 50 units BID      Subjective   I take sliding scale and my glucose goes up.     Patient reports the following home diabetes self-management practices:   Eating pattern   [x] Eats once meal a day  Limited snacking  Water throughout the day  Physical activity pattern   [x] Mostly sedentary   Monitoring pattern   [x] Bedtime: -180  Taking medications pattern  [x] Consistent administration  [x] Affordable     A1C 8.3%  Initial B  GFR 60    Fasting B  Pre-prandial: 171-198  WBC 14.1  IV abox  Basal: NPH 50 units Q12  Bolus: 15 units with meals   Correction: 0 units in the last 24h    Methylprednisolone 40 mg Q12 changed to prednisone 40mg daily  Did not get any steroids yesterday  Objective   Physical exam  General Obese male in respiratory distress/ill-appearing. Conversant and cooperative  Neuro  Alert, oriented   Vital Signs   Visit Vitals  /80 (BP 1 Location: Left upper arm, BP Patient Position: At rest)   Pulse 73   Temp 98.4 °F (36.9 °C)   Resp 22   Ht 6' 1\" (1.854 m)   Wt 152.1 kg (335 lb 6.4 oz)   SpO2 94%   BMI 44.25 kg/m²     Skin  Warm and dry. No acanthosis noted along neckline. No lipohypertrophy or lipoatrophy noted at injection sites   Heart   Regular rate and rhythm. No murmurs, rubs or gallops  Lungs  Clear to auscultation without rales or rhonchi  Extremities No foot wounds       Laboratory  No results found for this visit on 21 (from the past 12 hour(s)). No results found for this visit on 21 (from the past 12 hour(s)).   BMP:   Lab Results   Component Value Date/Time     2021 04:29 AM    K 3.7 2021 04:29 AM    CL 99 2021 04:29 AM    CO2 38 (H) 2021 04:29 AM    AGAP 2 (L) 2021 04:29 AM    GLU 48 (LL) 2021 04:29 AM    BUN 42 (H) 2021 04:29 AM    CREA 0.87 2021 04:29 AM    GFRAA >60 2021 04:29 AM    GFRNA >60 2021 04:29 AM        Factors impacting BG management  Factor Dose Comments   Nutrition:  Standard meals     60 grams/meal      Drugs:    Steroids     Solu-medrol 40mg Q12   Impairs insulin action       Blood glucose pattern          Assessment and Plan   Nursing Diagnosis Risk for unstable blood glucose pattern   Nursing Intervention Domain 5250 Decision-making Support   Nursing Interventions Examined current inpatient diabetes control   Explored factors facilitating and impeding inpatient management  Identified self-management practices impeding diabetes control  Explored corrective strategies with patient and responsible inpatient provider   Informed patient of rational for insulin strategy while hospitalized     Evaluation   Vivi Zamora is a 48year old gentleman, with uncontrolled Type 2 Diabetes on 70/30 insulin, admitted with acute on chronic hypercapneic and hypoxic respiratory failure with acute COPD exacerbation. He did not achieve diabetes control prior to admission, as evidenced by A1c of 8.3%. During this hospitalization, the patient's glucose was in goal the 1st 24 hrs without the use of antihyperglycemic agents- he was on BiPAP and NPO at that time. 40mg methylprednisolone Q12 was initiated and now with steroid induced hyperglycemia. Low dose Lantus (18 units/home dose)/humalog (4 units/meal) was started but not sufficient to override steroid impact as BG was over 310. Basal/bolus insulin was doubled with little improvement in glucose pattern so NPH was titrated to 50 units Q12 and 15 units humalog with meals. Glucose range from 168-198. Steroids were adjusted to 40mg prednisone once daily but there was a gap of 12hrs without steroids on board. Despite reduction, basal insulin was not tapered and fasting BG was 48 this morning which was treated per protocol. NPH order was reduced to 30units BID. Based on steroid pattern- he may need a higher day time NPH dose but smaller evening NPH dose as duration of action of prednisone is 12-15hrs with each dose. Recommendations   1. POC glucose ACHS    2. Consistent carbohydrate diet (60 grams CHO/meal), carb consistent shakes if they are ordered    3.  Adjust the Subcutaneous Insulin Order set (0726)  Insulin Dosing Specific recommendation   Basal (Based on weight, BMI & GFR) 40 units NPH am  20 units NPH pm Please wean with steroids. Nutritional                                      (Based on CHO/dextrose load) 10 units Humalog/meal    Hold if patient consumes less than 50% of carbohydrates on meal tray Advance to 18 units humalog/meal tomorrow for pre-prandial hyperglycemia   Corrective                                       (Useful in adjusting insulin dosing) Resistant sensitivity humalog          Billing Code(s)   [x] 37237    Before making these care recommendations, I personally reviewed the hosptialization record, including laboratory and diagnostic data, medications and examined the patient at bedside (circumstances permitting).   Total minutes: 13      Silvia Licona, CNS  Diabetes Clinical Nurse Specialist  [facility]  Access via [employer]

## 2021-09-22 NOTE — PROGRESS NOTES
Patient developed chest pain substernal area while he was on the toilet. EKG shows T wave inversion in the inferior leads. Check troponin. Give Tums. Follow clinical course.

## 2021-09-22 NOTE — PROGRESS NOTES
Physical Therapy 9/22/2021    Chart reviewed up to date. RN transferring pt back to bed 2/2 chest pain. Will follow-up as appropriate. Recommend with nursing patient to complete as able in order to maintain strength, endurance and independence: OOB to chair 3x/day and ambulating with 1 assist x rollator. Thank you.   Yovanny Stinson, PT, DPT

## 2021-09-22 NOTE — PROGRESS NOTES
Verbal shift change report given to Marli Gomez and Yunior Choudhury (oncoming nurse) by Serjio Quarles (offgoing nurse). Report included the following information SBAR, MAR, Recent Results and Cardiac Rhythm nsr/afibb.

## 2021-09-22 NOTE — WOUND CARE
WOCN Note:     Follow-up visit for:   Reconsult placed for Left lower leg wound with fluid bullae adjacent to resurfaced area. This area is not new: present on 9-17-21 with assessment and original consult. Wound care following : assessed by Greg Akbar 9-17-21. Wound care orders in place. 9-17-21: Assessed:   - POA: left lower leg venous ulcer with fluid filled bullae adjacent to resurfaced area. ( orders in place). - POA: abdomen and abdominal fold: ( orders in place). Gisela to reassess 9-24-21.     Transition of Care: Plan to follow weekly and as needed while admitted to hospital.     Sheralyn Runner BSN RN ET  Inpatient Wound Care Department  Office: [phone]  Pager: [phone] or Perfect Serve

## 2021-09-22 NOTE — PROGRESS NOTES
Problem: Falls - Risk of  Goal: *Absence of Falls  Description: Document Geoffrey Sol Fall Risk and appropriate interventions in the flowsheet. Outcome: Progressing Towards Goal  Note: Fall Risk Interventions:  Mobility Interventions: Patient to call before getting OOB, Utilize walker, cane, or other assistive device         Medication Interventions: Patient to call before getting OOB    Elimination Interventions: Call light in reach              Problem: Patient Education: Go to Patient Education Activity  Goal: Patient/Family Education  Outcome: Progressing Towards Goal     Problem: Pressure Injury - Risk of  Goal: *Prevention of pressure injury  Description: Document Ben Scale and appropriate interventions in the flowsheet.   Outcome: Progressing Towards Goal  Note: Pressure Injury Interventions:  Sensory Interventions: Keep linens dry and wrinkle-free    Moisture Interventions: Absorbent underpads, Maintain skin hydration (lotion/cream)    Activity Interventions: Increase time out of bed    Mobility Interventions: PT/OT evaluation    Nutrition Interventions: Offer support with meals,snacks and hydration                     Problem: Patient Education: Go to Patient Education Activity  Goal: Patient/Family Education  Outcome: Progressing Towards Goal     Problem: Breathing Pattern - Ineffective  Goal: *Absence of hypoxia  Outcome: Progressing Towards Goal  Goal: *Use of effective breathing techniques  Outcome: Progressing Towards Goal  Goal: *PALLIATIVE CARE:  Alleviation of Dyspnea  Outcome: Progressing Towards Goal     Problem: Patient Education: Go to Patient Education Activity  Goal: Patient/Family Education  Outcome: Progressing Towards Goal     Problem: Patient Education: Go to Patient Education Activity  Goal: Patient/Family Education  Outcome: Progressing Towards Goal     Problem: Diabetes Self-Management  Goal: *Disease process and treatment process  Description: Define diabetes and identify own type of diabetes; list 3 options for treating diabetes. Outcome: Progressing Towards Goal  Goal: *Incorporating nutritional management into lifestyle  Description: Describe effect of type, amount and timing of food on blood glucose; list 3 methods for planning meals. Outcome: Progressing Towards Goal  Goal: *Incorporating physical activity into lifestyle  Description: State effect of exercise on blood glucose levels. Outcome: Progressing Towards Goal  Goal: *Developing strategies to promote health/change behavior  Description: Define the ABC's of diabetes; identify appropriate screenings, schedule and personal plan for screenings. Outcome: Progressing Towards Goal  Goal: *Using medications safely  Description: State effect of diabetes medications on diabetes; name diabetes medication taking, action and side effects. Outcome: Progressing Towards Goal  Goal: *Monitoring blood glucose, interpreting and using results  Description: Identify recommended blood glucose targets  and personal targets. Outcome: Progressing Towards Goal  Goal: *Prevention, detection, treatment of acute complications  Description: List symptoms of hyper- and hypoglycemia; describe how to treat low blood sugar and actions for lowering  high blood glucose level. Outcome: Progressing Towards Goal  Goal: *Prevention, detection and treatment of chronic complications  Description: Define the natural course of diabetes and describe the relationship of blood glucose levels to long term complications of diabetes.   Outcome: Progressing Towards Goal  Goal: *Developing strategies to address psychosocial issues  Description: Describe feelings about living with diabetes; identify support needed and support network  Outcome: Progressing Towards Goal  Goal: *Insulin pump training  Outcome: Progressing Towards Goal  Goal: *Sick day guidelines  Outcome: Progressing Towards Goal  Goal: *Patient Specific Goal (EDIT GOAL, INSERT TEXT)  Outcome: Progressing Towards Goal     Problem: Patient Education: Go to Patient Education Activity  Goal: Patient/Family Education  Outcome: Progressing Towards Goal     Problem: Nutrition Deficit  Goal: *Optimize nutritional status  Outcome: Progressing Towards Goal

## 2021-09-22 NOTE — PROGRESS NOTES
PCCM:    O2 requirements still on midflow,but weaning down    CRP 0.29      Plan:  Acute hypoxic resp failure   Continue diuretics as tolerated   Would recommend tapering steroids over the next 7 days   Follow up PFTs/sleep appt.     We will be available again to see if needed    Alaina Arreaga PA-C

## 2021-09-23 ENCOUNTER — APPOINTMENT (OUTPATIENT)
Dept: GENERAL RADIOLOGY | Age: 56
DRG: 291 | End: 2021-09-23
Attending: INTERNAL MEDICINE
Payer: MEDICARE

## 2021-09-23 LAB
ANION GAP SERPL CALC-SCNC: 3 MMOL/L (ref 5–15)
ATRIAL RATE: 71 BPM
BUN SERPL-MCNC: 46 MG/DL (ref 6–20)
BUN/CREAT SERPL: 46 (ref 12–20)
CALCIUM SERPL-MCNC: 8 MG/DL (ref 8.5–10.1)
CALCULATED P AXIS, ECG09: -22 DEGREES
CALCULATED R AXIS, ECG10: -4 DEGREES
CALCULATED T AXIS, ECG11: 31 DEGREES
CHLORIDE SERPL-SCNC: 95 MMOL/L (ref 97–108)
CO2 SERPL-SCNC: 36 MMOL/L (ref 21–32)
CREAT SERPL-MCNC: 1.01 MG/DL (ref 0.7–1.3)
DIAGNOSIS, 93000: NORMAL
GLUCOSE BLD STRIP.AUTO-MCNC: 100 MG/DL (ref 65–117)
GLUCOSE BLD STRIP.AUTO-MCNC: 149 MG/DL (ref 65–117)
GLUCOSE BLD STRIP.AUTO-MCNC: 161 MG/DL (ref 65–117)
GLUCOSE BLD STRIP.AUTO-MCNC: 161 MG/DL (ref 65–117)
GLUCOSE SERPL-MCNC: 129 MG/DL (ref 65–100)
MAGNESIUM SERPL-MCNC: 2.6 MG/DL (ref 1.6–2.4)
P-R INTERVAL, ECG05: 204 MS
PHOSPHATE SERPL-MCNC: 3.1 MG/DL (ref 2.6–4.7)
POTASSIUM SERPL-SCNC: 4.2 MMOL/L (ref 3.5–5.1)
Q-T INTERVAL, ECG07: 404 MS
QRS DURATION, ECG06: 94 MS
QTC CALCULATION (BEZET), ECG08: 439 MS
SERVICE CMNT-IMP: ABNORMAL
SERVICE CMNT-IMP: NORMAL
SODIUM SERPL-SCNC: 134 MMOL/L (ref 136–145)
VENTRICULAR RATE, ECG03: 71 BPM

## 2021-09-23 PROCEDURE — 65660000001 HC RM ICU INTERMED STEPDOWN

## 2021-09-23 PROCEDURE — 82962 GLUCOSE BLOOD TEST: CPT

## 2021-09-23 PROCEDURE — 99231 SBSQ HOSP IP/OBS SF/LOW 25: CPT | Performed by: CLINICAL NURSE SPECIALIST

## 2021-09-23 PROCEDURE — 74011000250 HC RX REV CODE- 250: Performed by: HOSPITALIST

## 2021-09-23 PROCEDURE — 84100 ASSAY OF PHOSPHORUS: CPT

## 2021-09-23 PROCEDURE — 74011250637 HC RX REV CODE- 250/637: Performed by: INTERNAL MEDICINE

## 2021-09-23 PROCEDURE — 36415 COLL VENOUS BLD VENIPUNCTURE: CPT

## 2021-09-23 PROCEDURE — 74011636637 HC RX REV CODE- 636/637: Performed by: FAMILY MEDICINE

## 2021-09-23 PROCEDURE — 77010033678 HC OXYGEN DAILY

## 2021-09-23 PROCEDURE — 74011636637 HC RX REV CODE- 636/637: Performed by: INTERNAL MEDICINE

## 2021-09-23 PROCEDURE — 80048 BASIC METABOLIC PNL TOTAL CA: CPT

## 2021-09-23 PROCEDURE — 74011250637 HC RX REV CODE- 250/637: Performed by: FAMILY MEDICINE

## 2021-09-23 PROCEDURE — 83735 ASSAY OF MAGNESIUM: CPT

## 2021-09-23 PROCEDURE — 74011250637 HC RX REV CODE- 250/637: Performed by: HOSPITALIST

## 2021-09-23 PROCEDURE — 94640 AIRWAY INHALATION TREATMENT: CPT

## 2021-09-23 RX ORDER — OXYCODONE HYDROCHLORIDE 5 MG/1
7.5 TABLET ORAL
Status: DISCONTINUED | OUTPATIENT
Start: 2021-09-23 | End: 2021-10-04 | Stop reason: HOSPADM

## 2021-09-23 RX ORDER — INSULIN LISPRO 100 [IU]/ML
5 INJECTION, SOLUTION INTRAVENOUS; SUBCUTANEOUS
Status: DISCONTINUED | OUTPATIENT
Start: 2021-09-23 | End: 2021-10-04 | Stop reason: HOSPADM

## 2021-09-23 RX ADMIN — BUDESONIDE 500 MCG: 0.5 INHALANT RESPIRATORY (INHALATION) at 20:23

## 2021-09-23 RX ADMIN — PANTOPRAZOLE SODIUM 40 MG: 40 TABLET, DELAYED RELEASE ORAL at 07:22

## 2021-09-23 RX ADMIN — MONTELUKAST 10 MG: 10 TABLET, FILM COATED ORAL at 22:03

## 2021-09-23 RX ADMIN — MICONAZOLE NITRATE 2 % TOPICAL POWDER: at 09:14

## 2021-09-23 RX ADMIN — EZETIMIBE 10 MG: 10 TABLET ORAL at 09:13

## 2021-09-23 RX ADMIN — ARFORMOTEROL TARTRATE 15 MCG: 15 SOLUTION RESPIRATORY (INHALATION) at 20:23

## 2021-09-23 RX ADMIN — ASPIRIN 81 MG: 81 TABLET, COATED ORAL at 09:13

## 2021-09-23 RX ADMIN — ROSUVASTATIN CALCIUM 5 MG: 10 TABLET, FILM COATED ORAL at 09:13

## 2021-09-23 RX ADMIN — ACETAMINOPHEN 650 MG: 325 TABLET ORAL at 03:20

## 2021-09-23 RX ADMIN — Medication 10 ML: at 22:00

## 2021-09-23 RX ADMIN — APIXABAN 5 MG: 5 TABLET, FILM COATED ORAL at 19:03

## 2021-09-23 RX ADMIN — TORSEMIDE 60 MG: 20 TABLET ORAL at 09:13

## 2021-09-23 RX ADMIN — BUDESONIDE 500 MCG: 0.5 INHALANT RESPIRATORY (INHALATION) at 08:53

## 2021-09-23 RX ADMIN — MICONAZOLE NITRATE 2 % TOPICAL POWDER: at 19:03

## 2021-09-23 RX ADMIN — CALCIUM CARBONATE (ANTACID) CHEW TAB 500 MG 200 MG: 500 CHEW TAB at 19:08

## 2021-09-23 RX ADMIN — AMIODARONE HYDROCHLORIDE 100 MG: 200 TABLET ORAL at 09:14

## 2021-09-23 RX ADMIN — SACUBITRIL AND VALSARTAN 1 TABLET: 49; 51 TABLET, FILM COATED ORAL at 22:03

## 2021-09-23 RX ADMIN — SACUBITRIL AND VALSARTAN 1 TABLET: 49; 51 TABLET, FILM COATED ORAL at 09:13

## 2021-09-23 RX ADMIN — ISOSORBIDE MONONITRATE 30 MG: 30 TABLET, EXTENDED RELEASE ORAL at 09:13

## 2021-09-23 RX ADMIN — Medication 10 ML: at 07:22

## 2021-09-23 RX ADMIN — APIXABAN 5 MG: 5 TABLET, FILM COATED ORAL at 09:13

## 2021-09-23 RX ADMIN — HUMAN INSULIN 40 UNITS: 100 INJECTION, SUSPENSION SUBCUTANEOUS at 09:12

## 2021-09-23 RX ADMIN — INSULIN LISPRO 5 UNITS: 100 INJECTION, SOLUTION INTRAVENOUS; SUBCUTANEOUS at 19:07

## 2021-09-23 RX ADMIN — CALCIUM CARBONATE (ANTACID) CHEW TAB 500 MG 200 MG: 500 CHEW TAB at 12:14

## 2021-09-23 RX ADMIN — ARFORMOTEROL TARTRATE 15 MCG: 15 SOLUTION RESPIRATORY (INHALATION) at 08:53

## 2021-09-23 RX ADMIN — PREDNISONE 40 MG: 20 TABLET ORAL at 09:13

## 2021-09-23 RX ADMIN — PREGABALIN 75 MG: 25 CAPSULE ORAL at 19:02

## 2021-09-23 RX ADMIN — HUMAN INSULIN 10 UNITS: 100 INJECTION, SUSPENSION SUBCUTANEOUS at 22:04

## 2021-09-23 RX ADMIN — Medication 10 ML: at 14:00

## 2021-09-23 RX ADMIN — CALCIUM CARBONATE (ANTACID) CHEW TAB 500 MG 200 MG: 500 CHEW TAB at 09:13

## 2021-09-23 RX ADMIN — PREGABALIN 75 MG: 25 CAPSULE ORAL at 09:13

## 2021-09-23 RX ADMIN — OXYCODONE 5 MG: 5 TABLET ORAL at 09:23

## 2021-09-23 NOTE — DIABETES MGMT
[address] [facility]    CLINICAL NURSE SPECIALIST CONSULT   FOLLOW UP NOTE    Initial Presentation   Nadia Gauthier is a 48 y.o. male who presented to the ED 9/9/21 via EMS for generalized c/o feeling unwell. He was hypoxic in the 70%s and placed on O2 with EMS. HX:   Past Medical History:   Diagnosis Date    DM (diabetes mellitus) ([address]     Dyslipidemia     Hypertension         INITIAL DX: Acute on chronic respiratory failure with hypercapnia; upper left arm cellulitis    Current Treatment     TX: IV steroids, antibiotics, pulmonary consult    Consulted by Africa Singh MD for advanced diabetes nursing assessment and care for:   [x] Inpatient management strategy      Hospital Course   Clinical progress has been complicated by: .   9/9: Lethargic, transitioned to BiPAP. Seen by pulmonary- continue steroids, diuresis, bronchodilators. Transfer to ICU for respiratory acidosis. ? Obstructive lung disease  9/10: BiPAP overnight. Seen by ortho for generalized LUE cellulitis. Transfer out of ICU  9/12: HFNC  9/14: Pulm re consulted- continue mobility, cxr, consider repeating ECHO  9/20: Decreasing O2 demands, adjusting diuretics  Diabetes History   Hx Type 2 Diabetes \"for many years\"  Diabetes managed by Sandi Proctor MD    Diabetes-related Medical History  Acute complications  Steroid induced hyperglycemia      Diabetes Medication History  Drug class Currently in use Discontinued Never used   Biguanide      DDP-4 inhibitor       Sulfonylurea      Thiazolidinedione      GLP-1 RA      SGLT-2 inhibitors      Basal insulin 18 units Lantus once daily Levemir     Bolus insulin  Novolog with meals    Fixed Dose  Combinations  70/30 50 units BID      Subjective   I take sliding scale and my glucose goes up.     Patient reports the following home diabetes self-management practices:   Eating pattern   [x] Eats once meal a day  Limited snacking  Water throughout the day  Physical activity pattern   [x] Mostly sedentary   Monitoring pattern   [x] Bedtime: -180  Taking medications pattern  [x] Consistent administration  [x] Affordable     A1C 8.3%  Initial B  GFR 60    Fasting B  Pre-prandial:   WBC 14.1  IV abox  Basal: NPH 50 units Q12  Bolus: 10 units with meals- 2 doses held yesterday  Correction: 0 units in the last 24h    Methylprednisolone 40 mg Q12 changed to prednisone 40mg daily    Objective   Physical exam  General Obese male in respiratory distress/ill-appearing. Conversant and cooperative  Neuro  Alert, oriented   Vital Signs   Visit Vitals  /76 (BP 1 Location: Left upper arm, BP Patient Position: At rest)   Pulse 79   Temp 98.1 °F (36.7 °C)   Resp 18   Ht 6' 1\" (1.854 m)   Wt 151.5 kg (333 lb 14.4 oz)   SpO2 92%   BMI 44.05 kg/m²     Skin  Warm and dry. No acanthosis noted along neckline. No lipohypertrophy or lipoatrophy noted at injection sites   Heart   Regular rate and rhythm. No murmurs, rubs or gallops  Lungs  Clear to auscultation without rales or rhonchi  Extremities No foot wounds       Laboratory  No results found for this visit on 21 (from the past 12 hour(s)). No results found for this visit on 21 (from the past 12 hour(s)).   BMP:   Lab Results   Component Value Date/Time     (L) 2021 03:30 AM    K 4.2 2021 03:30 AM    CL 95 (L) 2021 03:30 AM    CO2 36 (H) 2021 03:30 AM    AGAP 3 (L) 2021 03:30 AM     (H) 2021 03:30 AM    BUN 46 (H) 2021 03:30 AM    CREA 1.01 2021 03:30 AM    GFRAA >60 2021 03:30 AM    GFRNA >60 2021 03:30 AM        Factors impacting BG management  Factor Dose Comments   Nutrition:  Standard meals     60 grams/meal      Drugs:    Steroids     Solu-medrol 40mg Q12   Impairs insulin action       Blood glucose pattern          Assessment and Plan   Nursing Diagnosis Risk for unstable blood glucose pattern   Nursing Intervention Domain 8275 Decision-making Support   Nursing Interventions Examined current inpatient diabetes control   Explored factors facilitating and impeding inpatient management  Identified self-management practices impeding diabetes control  Explored corrective strategies with patient and responsible inpatient provider   Informed patient of rational for insulin strategy while hospitalized     Evaluation   Mateo Brito is a 48year old gentleman, with uncontrolled Type 2 Diabetes on 70/30 insulin, admitted with acute on chronic hypercapneic and hypoxic respiratory failure with acute COPD exacerbation. He did not achieve diabetes control prior to admission, as evidenced by A1c of 8.3%. During this hospitalization, the patient's glucose was in goal the 1st 24 hrs without the use of antihyperglycemic agents- he was on BiPAP and NPO at that time. 40mg methylprednisolone Q12 was initiated and now with steroid induced hyperglycemia. Low dose Lantus (18 units/home dose)/humalog (4 units/meal) was started but not sufficient to override steroid impact as BG was over 310. Basal/bolus insulin was doubled with little improvement in glucose pattern so NPH was titrated to 50 units Q12 and 15 units humalog with meals. Glucose ranged from 168-198. Steroids were adjusted to 40mg prednisone once daily but there was a gap of 12hrs without steroids on board. Despite reduction, basal insulin was not tapered and fasting BG was low yesterday morning which was treated per protocol. NPH order was reduced to 40 units NPH am/20 units pm with bolus humalog 10 units with meals. Blood glucose did stay around 72-73 yesterday with ok PO intake so will reduce bolus humalog again today. Please continue a higher day time NPH dose with each prednisone dose. Recommendations   1. POC glucose ACHS    2. Consistent carbohydrate diet (60 grams CHO/meal), carb consistent shakes if they are ordered    3.  Adjust the Subcutaneous Insulin Order set (1935)  Insulin Dosing Specific recommendation   Basal                                      (Based on weight, BMI & GFR) 40 units NPH am  10 units NPH pm Please wean am NPH dose with steroids. Nutritional                                      (Based on CHO/dextrose load) 5 units Humalog/meal    Hold if patient consumes less than 50% of carbohydrates on meal tray    Corrective                                       (Useful in adjusting insulin dosing) Resistant sensitivity [facility]og [facility]          Billing Code(s)   [x] 71941    Before making these care recommendations, I personally reviewed the hosptialization record, including laboratory and diagnostic data, medications and examined the patient at bedside (circumstances permitting).   Total minutes: 13      Unique Tejada, CNS  Diabetes Clinical Nurse Specialist  [facility]  Access via [employer]

## 2021-09-23 NOTE — PROGRESS NOTES
TRANSFER - IN REPORT:    Verbal report received from Mary(name) on Osmany Box  being received from ER(unit) for routine progression of care      Report consisted of patients Situation, Background, Assessment and   Recommendations(SBAR). Information from the following report(s) ED Summary and Recent Results was reviewed with the receiving nurse. Opportunity for questions and clarification was provided. Assessment completed upon patients arrival to unit and care assumed.

## 2021-09-23 NOTE — PROGRESS NOTES
Physical Therapy 9/23/2021    Chart reviewed up to date. Pt received in chair, deferred therapy. Required assistance to recline to rest in chair. RN aware. Will follow-up as appropriate. Recommend with nursing patient to complete as able in order to maintain strength, endurance and independence: OOB to chair 3x/day and ambulating with 1 assist x rollator. Thank you.   Luna Foley, PT, DPT

## 2021-09-23 NOTE — ROUTINE PROCESS
Transition  Plan of Care  RUR 23%-Med  Disposition-recomendations are for SNF level rehab. This CM spoke with patient at bedside and he is agreeable to rehab. Referrals sent to [facility], [address] [place] [address] via [facility]. He will need S transport.   Yuriy Rios RN CRM  Ext [phone]

## 2021-09-23 NOTE — PROGRESS NOTES
Hospitalist Progress Note  Lisa Oh MD  Answering service: [phone] from in house phone      Date of Service:  2021  NAME:  Syed Leggett  :  1965  MRN:  [id number]      Admission Summary:   Syed Leggett is a 48 y.o. male who presents with sob and AMS      80-year-old gentleman history of diabetes, hypertension, dyslipidemia, likely history of CHF as well he uses oxygen as needed at home (particularly at night for obstructive sleep apnea), morbid obese  presents to the emergency department today with chief complaint of generalized not feeling well for the past 4 to 5 days. No fevers but increased shortness of breath. EMS reports room air hypoxia in the 70s. Patient complains of a rash to his left upper extremity consistent with cellulitis and is not currently on treatment. He has been up titrating his Lasix at home without significant relief of his symptoms. There is a conflicting history about whether or not he has been taking his blood thinner as prescribed. He is fully Covid vaccinated. He is lethargic in ER. Place on bipap now. Not able to get history for now     Interval history / Subjective:      Patient seen and examined this morning. Patient sitting at the side of bed rocking back and forth stating he is in pain. He c/o back pain,chronic in nature. Pain medication not helping.     Assessment & Plan:     # Acute hypoxic and hypercapnic respiratory failure due to COPD exacerbation and CHF, + IVY + likely obesity hypoventilation syndrome  - wean off oxygen as tolerated      # Acute exacerbation of COPD  - On steroid, Nebs   - Wean oxygen as tolerated, pulmonology following     # Acute on chronic diastolic congestive heart failure  - normal EF on recent echo  - Repeat echo pending  - Patient with lower extremity edema, likely lymphedema related versus cor pulmonale  - Diuretic changed to Torsemide, patient's home med  - Consider resuming Entresto if blood pressure improves    # Atrial fibrillation  - Paroxysmal atrial fibrillation  - Stable on amiodarone, Eliquis for anticoagulation    # Hypertension  - Continue Imdur     # Dyslipidemia  - On statin     # Diabetes  - On NPH, premeal Humalog and insulin sliding scale coverage  - Diabetic management team following and adjusting insulin dose     # Cellulitis of the left upper extremity  - Completed antibiotics       Discharge disposition: Likely more than 48 hours pending progress. Hospital Problems  Date Reviewed: 4/28/2013        Codes Class Noted POA    Acute on chronic respiratory failure [facility]) ICD-10-CM: J96.20  ICD-9-CM: 518.84  9/9/2021 Unknown                Review of Systems:   Pertinent positive mentioned in interval hx/HPI. Other systems reviewed and negative     Vital Signs:    Last 24hrs VS reviewed since prior progress note. Most recent are:  Visit Vitals  /76 (BP 1 Location: Left upper arm, BP Patient Position: At rest)   Pulse 79   Temp 98.1 °F (36.7 °C)   Resp 18   Ht 6' 1\" (1.854 m)   Wt 151.5 kg (333 lb 14.4 oz)   SpO2 92%   BMI 44.05 kg/m²         Intake/Output Summary (Last 24 hours) at 9/23/2021 1026  Last data filed at 9/22/2021 2121  Gross per 24 hour   Intake    Output 1250 ml   Net -1250 ml        Physical Examination:             Constitutional:  No acute distress, in pain, chronically sick looking, obese     ENT:  Oral mucous moist   Resp:  CTA bilaterally. No wheezing/rhonchi/rales. No accessory muscle use   CV:  Regular rhythm, normal rate, no murmurs, gallops, rubs    GI:  Soft, non distended, non tender. normoactive bowel sounds, no hepatosplenomegaly     Musculoskeletal:  bilateral lower leg edema + 3, clean dressing covering the shin     Neurologic:  Moves all extremities.   AAOx3, CN II-XII reviewed            Data Review:    I personally reviewed labs and imaging     Labs:   No results for input(s): WBC, HGB, HCT, PLT, HGBEXT, HCTEXT, PLTEXT in the last 72 hours. Recent Labs     09/23/21  0330 09/22/21  0429 09/21/21  0354   * 139 135*   K 4.2 3.7 4.8   CL 95* 99 98   CO2 36* 38* 35*   BUN 46* 42* 43*   CREA 1.01 0.87 0.84   * 48* 168*   CA 8.0* 8.3* 8.8   MG 2.6* 2.6* 3.0*   PHOS 3.1 3.7 3.5     No results for input(s): ALT, AP, TBIL, TBILI, TP, ALB, GLOB, GGT, AML, LPSE in the last 72 hours. No lab exists for component: SGOT, GPT, AMYP, HLPSE  No results for input(s): INR, PTP, APTT, INREXT in the last 72 hours. No results for input(s): FE, TIBC, PSAT, FERR in the last 72 hours. No results found for: FOL, RBCF   No results for input(s): PH, PCO2, PO2 in the last 72 hours.   Recent Labs     09/22/21  1621   TROIQ <0.05     Lab Results   Component Value Date/Time    Cholesterol, total 170 01/23/2013 11:08 AM    HDL Cholesterol 37 (L) 01/23/2013 11:08 AM    LDL, calculated 97 01/23/2013 11:08 AM    Triglyceride 180 (H) 01/23/2013 11:08 AM    CHOL/HDL Ratio 6.0 (H) 10/06/2010 03:18 PM     Lab Results   Component Value Date/Time    Glucose (POC) 161 (H) 09/23/2021 08:25 AM    Glucose (POC) 73 09/22/2021 09:22 PM    Glucose (POC) 72 09/22/2021 04:14 PM    Glucose (POC) 135 (H) 09/22/2021 11:16 AM    Glucose (POC) 124 (H) 09/22/2021 09:52 AM     No results found for: COLOR, APPRN, SPGRU, REFSG, ELIZA, PROTU, GLUCU, KETU, BILU, UROU, RON, LEUKU, GLUKE, EPSU, BACTU, WBCU, RBCU, CASTS, UCRY      Medications Reviewed:     Current Facility-Administered Medications   Medication Dose Route Frequency    insulin NPH (NOVOLIN N, HUMULIN N) injection 10 Units  10 Units SubCUTAneous QHS    insulin lispro (HUMALOG) injection 5 Units  5 Units SubCUTAneous TIDAC    oxyCODONE IR (ROXICODONE) tablet 7.5 mg  7.5 mg Oral Q6H PRN    predniSONE (DELTASONE) tablet 40 mg  40 mg Oral DAILY WITH BREAKFAST    And    insulin NPH (NOVOLIN N, HUMULIN N) injection 40 Units  40 Units SubCUTAneous DAILY WITH BREAKFAST    calcium carbonate (TUMS) chewable tablet 200 mg [elemental]  200 mg Oral TID WITH MEALS    torsemide (DEMADEX) tablet 60 mg  60 mg Oral DAILY    insulin lispro (HUMALOG) injection   SubCUTAneous AC&HS    dextrose (D50W) injection syrg 12.5-25 g  12.5-25 g IntraVENous PRN    albuterol-ipratropium (DUO-NEB) 2.5 MG-0.5 MG/3 ML  3 mL Nebulization Q6H PRN    alteplase (CATHFLO) 1 mg in sterile water (preservative free) 1 mL injection  1 mg InterCATHeter PRN    pregabalin (LYRICA) capsule 75 mg  75 mg Oral BID    montelukast (SINGULAIR) tablet 10 mg  10 mg Oral QHS    sacubitriL-valsartan (ENTRESTO) 49-51 mg tablet 1 Tablet  1 Tablet Oral Q12H    apixaban (ELIQUIS) tablet 5 mg  5 mg Oral BID    amiodarone (CORDARONE) tablet 100 mg  100 mg Oral DAILY    ezetimibe (ZETIA) tablet 10 mg  10 mg Oral DAILY    pantoprazole (PROTONIX) tablet 40 mg  40 mg Oral ACB    isosorbide mononitrate ER (IMDUR) tablet 30 mg  30 mg Oral DAILY    aspirin delayed-release tablet 81 mg  81 mg Oral DAILY    rosuvastatin (CRESTOR) tablet 5 mg  5 mg Oral DAILY    [Held by provider] spironolactone (ALDACTONE) tablet 25 mg  25 mg Oral DAILY    benzocaine-menthoL (CEPACOL) lozenge 1 Lozenge  1 Lozenge Mucous Membrane PRN    fentaNYL citrate (PF) injection 50 mcg  50 mcg IntraVENous Q4H PRN    hydrALAZINE (APRESOLINE) 20 mg/mL injection 10 mg  10 mg IntraVENous Q6H PRN    glucose chewable tablet 16 g  4 Tablet Oral PRN    dextrose (D50W) injection syrg 12.5-25 g  25-50 mL IntraVENous PRN    glucagon (GLUCAGEN) injection 1 mg  1 mg IntraMUSCular PRN    sodium chloride (NS) flush 5-40 mL  5-40 mL IntraVENous Q8H    sodium chloride (NS) flush 5-40 mL  5-40 mL IntraVENous PRN    acetaminophen (TYLENOL) tablet 650 mg  650 mg Oral Q6H PRN    Or    acetaminophen (TYLENOL) suppository 650 mg  650 mg Rectal Q6H PRN    polyethylene glycol (MIRALAX) packet 17 g  17 g Oral DAILY PRN    ondansetron (ZOFRAN ODT) tablet 4 mg  4 mg Oral Q8H PRN    Or    ondansetron (ZOFRAN) injection 4 mg  4 mg IntraVENous Q6H PRN    arformoteroL (BROVANA) neb solution 15 mcg  15 mcg Nebulization BID RT    And    budesonide (PULMICORT) 500 mcg/2 ml nebulizer suspension  500 mcg Nebulization BID RT    miconazole (MICOTIN) 2 % powder   Topical BID    sodium chloride (NS) flush 5-40 mL  5-40 mL IntraVENous Q8H    sodium chloride (NS) flush 5-40 mL  5-40 mL IntraVENous PRN     ______________________________________________________________________  EXPECTED LENGTH OF STAY: 3d 14h  ACTUAL LENGTH OF STAY:          15                 Letitia Mirza MD   Patient has given Verbal permission to discuss medical care with   persons present in the room and and also with contact as listed on face sheet. Please note that this dictation was completed with [employer], the computer voice recognition software. Quite often unanticipated grammatical, syntax, homophones, and other interpretive errors are inadvertently transcribed by the computer software. Please disregard these errors. Please excuse any errors that have escaped final proofreading. Thank you.

## 2021-09-23 NOTE — PROGRESS NOTES
Bedside shift change report given to Brittani Reyes RN (oncoming nurse) by Coretta Reardon RN (offgoing nurse). Report included the following information SBAR, Kardex, ED Summary, Intake/Output, MAR, Accordion, Recent Results, Med Rec Status, Cardiac Rhythm NSR and Alarm Parameters .      1529- pt complaining of 8/10 chest pain while on BSC, pt assisted back to bed, vital signs stable, EKG ordered per protocol, Dr. Leeanna Cruz notified of above, orders received for Tums and troponin  1547- EKG results negative  1600- pt reporting improvement in chest pain  1621- troponin negative

## 2021-09-24 LAB
ERYTHROCYTE [DISTWIDTH] IN BLOOD BY AUTOMATED COUNT: 17.1 % (ref 11.5–14.5)
GLUCOSE BLD STRIP.AUTO-MCNC: 161 MG/DL (ref 65–117)
GLUCOSE BLD STRIP.AUTO-MCNC: 165 MG/DL (ref 65–117)
GLUCOSE BLD STRIP.AUTO-MCNC: 167 MG/DL (ref 65–117)
GLUCOSE BLD STRIP.AUTO-MCNC: 183 MG/DL (ref 65–117)
HCT VFR BLD AUTO: 41.4 % (ref 36.6–50.3)
HGB BLD-MCNC: 12.7 G/DL (ref 12.1–17)
MAGNESIUM SERPL-MCNC: 2.5 MG/DL (ref 1.6–2.4)
MCH RBC QN AUTO: 24.4 PG (ref 26–34)
MCHC RBC AUTO-ENTMCNC: 30.7 G/DL (ref 30–36.5)
MCV RBC AUTO: 79.5 FL (ref 80–99)
NRBC # BLD: 0 K/UL (ref 0–0.01)
NRBC BLD-RTO: 0 PER 100 WBC
PHOSPHATE SERPL-MCNC: 2.4 MG/DL (ref 2.6–4.7)
PLATELET # BLD AUTO: 154 K/UL (ref 150–400)
RBC # BLD AUTO: 5.21 M/UL (ref 4.1–5.7)
SERVICE CMNT-IMP: ABNORMAL
WBC # BLD AUTO: 29.3 K/UL (ref 4.1–11.1)

## 2021-09-24 PROCEDURE — 94640 AIRWAY INHALATION TREATMENT: CPT

## 2021-09-24 PROCEDURE — 74011636637 HC RX REV CODE- 636/637: Performed by: INTERNAL MEDICINE

## 2021-09-24 PROCEDURE — 77030041076 HC DRSG AG OPTICELL MDII -A

## 2021-09-24 PROCEDURE — 77010033678 HC OXYGEN DAILY

## 2021-09-24 PROCEDURE — 85027 COMPLETE CBC AUTOMATED: CPT

## 2021-09-24 PROCEDURE — 36415 COLL VENOUS BLD VENIPUNCTURE: CPT

## 2021-09-24 PROCEDURE — 74011250637 HC RX REV CODE- 250/637: Performed by: HOSPITALIST

## 2021-09-24 PROCEDURE — 74011250637 HC RX REV CODE- 250/637: Performed by: INTERNAL MEDICINE

## 2021-09-24 PROCEDURE — 65270000029 HC RM PRIVATE

## 2021-09-24 PROCEDURE — 77030020847 HC STATLOK BARD -A

## 2021-09-24 PROCEDURE — 82962 GLUCOSE BLOOD TEST: CPT

## 2021-09-24 PROCEDURE — 83735 ASSAY OF MAGNESIUM: CPT

## 2021-09-24 PROCEDURE — 74011250637 HC RX REV CODE- 250/637: Performed by: FAMILY MEDICINE

## 2021-09-24 PROCEDURE — 74011000250 HC RX REV CODE- 250: Performed by: HOSPITALIST

## 2021-09-24 PROCEDURE — 84100 ASSAY OF PHOSPHORUS: CPT

## 2021-09-24 RX ORDER — PREDNISONE 10 MG/1
10 TABLET ORAL
Status: COMPLETED | OUTPATIENT
Start: 2021-09-27 | End: 2021-09-28

## 2021-09-24 RX ORDER — PREDNISONE 20 MG/1
20 TABLET ORAL
Status: COMPLETED | OUTPATIENT
Start: 2021-09-25 | End: 2021-09-26

## 2021-09-24 RX ADMIN — AMIODARONE HYDROCHLORIDE 100 MG: 200 TABLET ORAL at 10:24

## 2021-09-24 RX ADMIN — ARFORMOTEROL TARTRATE 15 MCG: 15 SOLUTION RESPIRATORY (INHALATION) at 11:36

## 2021-09-24 RX ADMIN — CALCIUM CARBONATE (ANTACID) CHEW TAB 500 MG 200 MG: 500 CHEW TAB at 10:32

## 2021-09-24 RX ADMIN — HUMAN INSULIN 10 UNITS: 100 INJECTION, SUSPENSION SUBCUTANEOUS at 21:16

## 2021-09-24 RX ADMIN — PANTOPRAZOLE SODIUM 40 MG: 40 TABLET, DELAYED RELEASE ORAL at 07:12

## 2021-09-24 RX ADMIN — MICONAZOLE NITRATE 2 % TOPICAL POWDER: at 10:29

## 2021-09-24 RX ADMIN — ASPIRIN 81 MG: 81 TABLET, COATED ORAL at 10:26

## 2021-09-24 RX ADMIN — Medication 10 ML: at 13:40

## 2021-09-24 RX ADMIN — ARFORMOTEROL TARTRATE 15 MCG: 15 SOLUTION RESPIRATORY (INHALATION) at 19:27

## 2021-09-24 RX ADMIN — BUDESONIDE 500 MCG: 0.5 INHALANT RESPIRATORY (INHALATION) at 11:36

## 2021-09-24 RX ADMIN — SACUBITRIL AND VALSARTAN 1 TABLET: 49; 51 TABLET, FILM COATED ORAL at 10:26

## 2021-09-24 RX ADMIN — MONTELUKAST 10 MG: 10 TABLET, FILM COATED ORAL at 21:16

## 2021-09-24 RX ADMIN — EZETIMIBE 10 MG: 10 TABLET ORAL at 10:24

## 2021-09-24 RX ADMIN — ROSUVASTATIN CALCIUM 5 MG: 10 TABLET, FILM COATED ORAL at 10:26

## 2021-09-24 RX ADMIN — PREDNISONE 40 MG: 20 TABLET ORAL at 13:37

## 2021-09-24 RX ADMIN — INSULIN LISPRO 5 UNITS: 100 INJECTION, SOLUTION INTRAVENOUS; SUBCUTANEOUS at 18:46

## 2021-09-24 RX ADMIN — CALCIUM CARBONATE (ANTACID) CHEW TAB 500 MG 200 MG: 500 CHEW TAB at 18:46

## 2021-09-24 RX ADMIN — ACETAMINOPHEN 650 MG: 325 TABLET ORAL at 03:22

## 2021-09-24 RX ADMIN — APIXABAN 5 MG: 5 TABLET, FILM COATED ORAL at 10:24

## 2021-09-24 RX ADMIN — PREGABALIN 75 MG: 25 CAPSULE ORAL at 18:46

## 2021-09-24 RX ADMIN — TORSEMIDE 60 MG: 20 TABLET ORAL at 10:26

## 2021-09-24 RX ADMIN — BUDESONIDE 500 MCG: 0.5 INHALANT RESPIRATORY (INHALATION) at 19:27

## 2021-09-24 RX ADMIN — PREGABALIN 75 MG: 25 CAPSULE ORAL at 10:24

## 2021-09-24 RX ADMIN — INSULIN LISPRO 5 UNITS: 100 INJECTION, SOLUTION INTRAVENOUS; SUBCUTANEOUS at 13:36

## 2021-09-24 RX ADMIN — MICONAZOLE NITRATE 2 % TOPICAL POWDER: at 18:47

## 2021-09-24 RX ADMIN — INSULIN LISPRO 5 UNITS: 100 INJECTION, SOLUTION INTRAVENOUS; SUBCUTANEOUS at 10:23

## 2021-09-24 RX ADMIN — CALCIUM CARBONATE (ANTACID) CHEW TAB 500 MG 200 MG: 500 CHEW TAB at 13:37

## 2021-09-24 RX ADMIN — SACUBITRIL AND VALSARTAN 1 TABLET: 49; 51 TABLET, FILM COATED ORAL at 21:16

## 2021-09-24 RX ADMIN — Medication 10 ML: at 21:17

## 2021-09-24 RX ADMIN — APIXABAN 5 MG: 5 TABLET, FILM COATED ORAL at 18:46

## 2021-09-24 RX ADMIN — ISOSORBIDE MONONITRATE 30 MG: 30 TABLET, EXTENDED RELEASE ORAL at 10:24

## 2021-09-24 NOTE — WOUND CARE
WOCN Note:      Follow up assessment of legs.     Assessed in room 400. Chart reviewed. Admitted DX:  Acute on chronic respiratory failure.          Past Medical History:   Diagnosis Date    DM (diabetes mellitus) ([address]      Dyslipidemia      Hypertension        Assessment:   Patient is A&O x 3, communicative and sitting up in chair. Bed: foam mattress  Heels intact without erythema.   Hemosiderin staining to bilateral legs.     1. POA Left low leg, venous ulcer has resufaced. The fluid filled bullae has ruptured leaving a 3 x 3 x 0.1 cm opening; 100% maroon wound bed; draining serosang fluid. The dorsal foot has an area of erythema and ecchymosis. The leg is more tender, has more erythema and 4+ pitting edema to the leg and dorsal foot. Pulses obtained via doppler. Covered with ruptured bullae with Opticel Ag and foam dressing and left the dressing off for Dr Nathan Babb to assess. RN to replace dressing. 2.  POA Abdomen and abdominal fold:  Improving, Red moist rash consistent with Candidiasis. Miconazole powder in use.     Wound, Pressure Prevention & Skin Care Recommendations:    1. Minimize layers of linen/pads under patient to optimize support surface.    2.  Turn/reposition approximately every 2 hours and offload heels. 3.  Manage moisture/ Keep skin folds clean and dry. 4.  Left and right low legs:  Daily cleanse legs with soap and water; apply purple moisturizing cream to bilateral legs; apply Opticel ag over wound; cover with abd pad and roll gauze and ACE wrap.   Right low leg apply roll gauze and ACE wrap.     Transition of Care:   Plan to follow as needed while admitted to hospital.     ARISTEO Suarez RN [facility] Inpatient Wound Care  Available on [employer]  Pager [phone]  Office [phone]

## 2021-09-24 NOTE — PROGRESS NOTES
Hospitalist Progress Note  Rudolph Michelle MD  Answering service: [phone] from in house phone      Date of Service:  2021  NAME:  Waylon Major  :  1965  MRN:  [id number]      Admission Summary:   Waylon Major is a 48 y.o. male who presents with sob and AMS      50-year-old gentleman history of diabetes, hypertension, dyslipidemia, likely history of CHF as well he uses oxygen as needed at home (particularly at night for obstructive sleep apnea), morbid obese  presents to the emergency department today with chief complaint of generalized not feeling well for the past 4 to 5 days. No fevers but increased shortness of breath. EMS reports room air hypoxia in the 70s. Patient complains of a rash to his left upper extremity consistent with cellulitis and is not currently on treatment. He has been up titrating his Lasix at home without significant relief of his symptoms. There is a conflicting history about whether or not he has been taking his blood thinner as prescribed. He is fully Covid vaccinated. He is lethargic in ER. Place on bipap now. Not able to get history for now     Interval history / Subjective:      Patient seen and examined this morning. Patient is now down to 2L oxygen and comfortable.  His legs looks worse, now with draining wound and significant swelling     Assessment & Plan:     # Bilateral lower leg wound on the bases of stasis dermatitis   - obtain doppler US b/i, and CHONG  - hold on abx for now   - ID consult   - continue wound care   - may need vascular surgery consult depending on CHONG result   - elevate the leg     # Acute hypoxic and hypercapnic respiratory failure due to COPD exacerbation and CHF, + IVY + likely obesity hypoventilation syndrome  - BiPAP qhs   - continue on NC oxygen at day time      # Acute exacerbation of COPD  - On steroid, Nebs   - Wean oxygen as tolerated, pulmonology following     # Acute on chronic diastolic congestive heart failure  - normal EF on recent echo  - Repeat echo pending  - Patient with lower extremity edema, likely lymphedema related versus cor pulmonale  - Diuretic changed to Torsemide, patient's home med  - Consider resuming Entresto if blood pressure improves    # Atrial fibrillation  - Paroxysmal atrial fibrillation  - Stable on amiodarone, Eliquis for anticoagulation    # Hypertension  - Continue Imdur     # Dyslipidemia  - On statin     # Diabetes  - On NPH, premeal Humalog and insulin sliding scale coverage  - Diabetic management team following and adjusting insulin dose     # Cellulitis of the left upper extremity  - Completed antibiotics       Discharge disposition: Likely more than 48 hours pending progress. Hospital Problems  Date Reviewed: 4/28/2013        Codes Class Noted POA    Acute on chronic respiratory failure [facility]) ICD-10-CM: J96.20  ICD-9-CM: 518.84  9/9/2021 Unknown                Review of Systems:   Pertinent positive mentioned in interval hx/HPI. Other systems reviewed and negative     Vital Signs:    Last 24hrs VS reviewed since prior progress note. Most recent are:  Visit Vitals  /65   Pulse 68   Temp 97.5 °F (36.4 °C)   Resp 14   Ht 6' 1\" (1.854 m)   Wt 151.5 kg (333 lb 14.4 oz)   SpO2 99%   BMI 44.05 kg/m²         Intake/Output Summary (Last 24 hours) at 9/24/2021 1556  Last data filed at 9/24/2021 0951  Gross per 24 hour   Intake    Output 850 ml   Net -850 ml        Physical Examination:             Constitutional:  No acute distress, in pain, chronically sick looking, obese     ENT:  Oral mucous moist   Resp:  CTA bilaterally. No wheezing/rhonchi/rales. No accessory muscle use   CV:  Regular rhythm, normal rate, no murmurs, gallops, rubs    GI:  Soft, non distended, non tender.  normoactive bowel sounds, no hepatosplenomegaly     Musculoskeletal:  bilateral lower leg edema + 3, clean dressing covering the shin , small wound on the left leg Neurologic:  Moves all extremities. AAOx3, CN II-XII reviewed                Data Review:    I personally reviewed labs and imaging     Labs:     Recent Labs     09/24/21 0318   WBC 29.3*   HGB 12.7   HCT 41.4        Recent Labs     09/24/21  0318 09/23/21  0330 09/22/21  0429   NA  --  134* 139   K  --  4.2 3.7   CL  --  95* 99   CO2  --  36* 38*   BUN  --  46* 42*   CREA  --  1.01 0.87   GLU  --  129* 48*   CA  --  8.0* 8.3*   MG 2.5* 2.6* 2.6*   PHOS 2.4* 3.1 3.7     No results for input(s): ALT, AP, TBIL, TBILI, TP, ALB, GLOB, GGT, AML, LPSE in the last 72 hours. No lab exists for component: SGOT, GPT, AMYP, HLPSE  No results for input(s): INR, PTP, APTT, INREXT, INREXT in the last 72 hours. No results for input(s): FE, TIBC, PSAT, FERR in the last 72 hours. No results found for: FOL, RBCF   No results for input(s): PH, PCO2, PO2 in the last 72 hours.   Recent Labs     09/22/21  1621   TROIQ <0.05     Lab Results   Component Value Date/Time    Cholesterol, total 170 01/23/2013 11:08 AM    HDL Cholesterol 37 (L) 01/23/2013 11:08 AM    LDL, calculated 97 01/23/2013 11:08 AM    Triglyceride 180 (H) 01/23/2013 11:08 AM    CHOL/HDL Ratio 6.0 (H) 10/06/2010 03:18 PM     Lab Results   Component Value Date/Time    Glucose (POC) 183 (H) 09/24/2021 12:25 PM    Glucose (POC) 167 (H) 09/24/2021 10:21 AM    Glucose (POC) 161 (H) 09/23/2021 09:19 PM    Glucose (POC) 149 (H) 09/23/2021 04:55 PM    Glucose (POC) 100 09/23/2021 12:06 PM     No results found for: COLOR, APPRN, SPGRU, REFSG, ELIZA, PROTU, GLUCU, KETU, BILU, UROU, RON, LEUKU, GLUKE, EPSU, BACTU, WBCU, RBCU, CASTS, UCRY      Medications Reviewed:     Current Facility-Administered Medications   Medication Dose Route Frequency    [START ON 9/25/2021] predniSONE (DELTASONE) tablet 20 mg  20 mg Oral DAILY WITH BREAKFAST    [START ON 9/27/2021] predniSONE (DELTASONE) tablet 10 mg  10 mg Oral DAILY WITH BREAKFAST    insulin NPH (NOVOLIN N, HUMULIN N) injection 10 Units  10 Units SubCUTAneous QHS    insulin lispro (HUMALOG) injection 5 Units  5 Units SubCUTAneous TIDAC    oxyCODONE IR (ROXICODONE) tablet 7.5 mg  7.5 mg Oral Q6H PRN    insulin NPH (NOVOLIN N, HUMULIN N) injection 40 Units  40 Units SubCUTAneous DAILY WITH BREAKFAST    calcium carbonate (TUMS) chewable tablet 200 mg [elemental]  200 mg Oral TID WITH MEALS    torsemide (DEMADEX) tablet 60 mg  60 mg Oral DAILY    insulin lispro (HUMALOG) injection   SubCUTAneous AC&HS    dextrose (D50W) injection syrg 12.5-25 g  12.5-25 g IntraVENous PRN    albuterol-ipratropium (DUO-NEB) 2.5 MG-0.5 MG/3 ML  3 mL Nebulization Q6H PRN    alteplase (CATHFLO) 1 mg in sterile water (preservative free) 1 mL injection  1 mg InterCATHeter PRN    pregabalin (LYRICA) capsule 75 mg  75 mg Oral BID    montelukast (SINGULAIR) tablet 10 mg  10 mg Oral QHS    sacubitriL-valsartan (ENTRESTO) 49-51 mg tablet 1 Tablet  1 Tablet Oral Q12H    apixaban (ELIQUIS) tablet 5 mg  5 mg Oral BID    amiodarone (CORDARONE) tablet 100 mg  100 mg Oral DAILY    ezetimibe (ZETIA) tablet 10 mg  10 mg Oral DAILY    pantoprazole (PROTONIX) tablet 40 mg  40 mg Oral ACB    isosorbide mononitrate ER (IMDUR) tablet 30 mg  30 mg Oral DAILY    aspirin delayed-release tablet 81 mg  81 mg Oral DAILY    rosuvastatin (CRESTOR) tablet 5 mg  5 mg Oral DAILY    [Held by provider] spironolactone (ALDACTONE) tablet 25 mg  25 mg Oral DAILY    benzocaine-menthoL (CEPACOL) lozenge 1 Lozenge  1 Lozenge Mucous Membrane PRN    fentaNYL citrate (PF) injection 50 mcg  50 mcg IntraVENous Q4H PRN    hydrALAZINE (APRESOLINE) 20 mg/mL injection 10 mg  10 mg IntraVENous Q6H PRN    glucose chewable tablet 16 g  4 Tablet Oral PRN    dextrose (D50W) injection syrg 12.5-25 g  25-50 mL IntraVENous PRN    glucagon (GLUCAGEN) injection 1 mg  1 mg IntraMUSCular PRN    sodium chloride (NS) flush 5-40 mL  5-40 mL IntraVENous Q8H    sodium chloride (NS) flush 5-40 mL  5-40 mL IntraVENous PRN    acetaminophen (TYLENOL) tablet 650 mg  650 mg Oral Q6H PRN    Or    acetaminophen (TYLENOL) suppository 650 mg  650 mg Rectal Q6H PRN    polyethylene glycol (MIRALAX) packet 17 g  17 g Oral DAILY PRN    ondansetron (ZOFRAN ODT) tablet 4 mg  4 mg Oral Q8H PRN    Or    ondansetron (ZOFRAN) injection 4 mg  4 mg IntraVENous Q6H PRN    arformoteroL (BROVANA) neb solution 15 mcg  15 mcg Nebulization BID RT    And    budesonide (PULMICORT) 500 mcg/2 ml nebulizer suspension  500 mcg Nebulization BID RT    miconazole (MICOTIN) 2 % powder   Topical BID    sodium chloride (NS) flush 5-40 mL  5-40 mL IntraVENous Q8H    sodium chloride (NS) flush 5-40 mL  5-40 mL IntraVENous PRN     ______________________________________________________________________  EXPECTED LENGTH OF STAY: 3d 14h  ACTUAL LENGTH OF STAY:          13                 Letitia Mirza MD   Patient has given Verbal permission to discuss medical care with   persons present in the room and and also with contact as listed on face sheet. Please note that this dictation was completed with [employer], the computer voice recognition software. Quite often unanticipated grammatical, syntax, homophones, and other interpretive errors are inadvertently transcribed by the computer software. Please disregard these errors. Please excuse any errors that have escaped final proofreading. Thank you.

## 2021-09-24 NOTE — PROGRESS NOTES
Bedside shift change report given to Dang Grullon RN (oncoming nurse) by Demetria Govea RN (offgoing nurse). Report included the following information SBAR, Kardex, ED Summary, Intake/Output, MAR, Accordion, Recent Results, Med Rec Status, Cardiac Rhythm NSR and Alarm Parameters .

## 2021-09-24 NOTE — PROGRESS NOTES
Transition Plan of Care  RUR 24%-Med  Disposition plan to discharge to SNF level or rehab. Patient is agreeable. [facility] [id number] [place] [address] and [facility]. All  have accepted. CM updated attending. Awaiting medical stability. Bette Sandifer, RN CRM  Ext [phone]  Update-attending expects that he will be here through the weekend.

## 2021-09-24 NOTE — PROGRESS NOTES
Bedside shift change report given to Laura (oncoming nurse) by Dalia Vizcarra (offgoing nurse).  Report included the following information SBAR, Intake/Output, Recent Results and Cardiac Rhythm SR.

## 2021-09-24 NOTE — PROGRESS NOTES
Physical Therapy    Reviewed chart and attempted to treat pt. Pt reports unable to ambulate due to increase swelling and pain in left LE. Pt is awaiting doppler. Will defer at this time.

## 2021-09-25 ENCOUNTER — APPOINTMENT (OUTPATIENT)
Dept: GENERAL RADIOLOGY | Age: 56
DRG: 291 | End: 2021-09-25
Attending: INTERNAL MEDICINE
Payer: MEDICARE

## 2021-09-25 ENCOUNTER — APPOINTMENT (OUTPATIENT)
Dept: VASCULAR SURGERY | Age: 56
DRG: 291 | End: 2021-09-25
Attending: INTERNAL MEDICINE
Payer: MEDICARE

## 2021-09-25 LAB
GLUCOSE BLD STRIP.AUTO-MCNC: 204 MG/DL (ref 65–117)
GLUCOSE BLD STRIP.AUTO-MCNC: 248 MG/DL (ref 65–117)
GLUCOSE BLD STRIP.AUTO-MCNC: 253 MG/DL (ref 65–117)
GLUCOSE BLD STRIP.AUTO-MCNC: 312 MG/DL (ref 65–117)
GLUCOSE BLD STRIP.AUTO-MCNC: 347 MG/DL (ref 65–117)
MAGNESIUM SERPL-MCNC: 2.4 MG/DL (ref 1.6–2.4)
PHOSPHATE SERPL-MCNC: 2.4 MG/DL (ref 2.6–4.7)
SERVICE CMNT-IMP: ABNORMAL

## 2021-09-25 PROCEDURE — 77010033678 HC OXYGEN DAILY

## 2021-09-25 PROCEDURE — 74011636637 HC RX REV CODE- 636/637: Performed by: FAMILY MEDICINE

## 2021-09-25 PROCEDURE — 82962 GLUCOSE BLOOD TEST: CPT

## 2021-09-25 PROCEDURE — 94640 AIRWAY INHALATION TREATMENT: CPT

## 2021-09-25 PROCEDURE — 74011636637 HC RX REV CODE- 636/637: Performed by: INTERNAL MEDICINE

## 2021-09-25 PROCEDURE — 74011250636 HC RX REV CODE- 250/636: Performed by: INTERNAL MEDICINE

## 2021-09-25 PROCEDURE — 74011000250 HC RX REV CODE- 250: Performed by: HOSPITALIST

## 2021-09-25 PROCEDURE — 84100 ASSAY OF PHOSPHORUS: CPT

## 2021-09-25 PROCEDURE — 36415 COLL VENOUS BLD VENIPUNCTURE: CPT

## 2021-09-25 PROCEDURE — 93970 EXTREMITY STUDY: CPT

## 2021-09-25 PROCEDURE — 74011250637 HC RX REV CODE- 250/637: Performed by: FAMILY MEDICINE

## 2021-09-25 PROCEDURE — 65270000029 HC RM PRIVATE

## 2021-09-25 PROCEDURE — 83735 ASSAY OF MAGNESIUM: CPT

## 2021-09-25 PROCEDURE — 94760 N-INVAS EAR/PLS OXIMETRY 1: CPT

## 2021-09-25 PROCEDURE — 74011000258 HC RX REV CODE- 258: Performed by: INTERNAL MEDICINE

## 2021-09-25 PROCEDURE — 71045 X-RAY EXAM CHEST 1 VIEW: CPT

## 2021-09-25 PROCEDURE — 74011250637 HC RX REV CODE- 250/637: Performed by: INTERNAL MEDICINE

## 2021-09-25 PROCEDURE — 74011250637 HC RX REV CODE- 250/637: Performed by: HOSPITALIST

## 2021-09-25 PROCEDURE — 74011000250 HC RX REV CODE- 250: Performed by: INTERNAL MEDICINE

## 2021-09-25 RX ADMIN — INSULIN LISPRO 5 UNITS: 100 INJECTION, SOLUTION INTRAVENOUS; SUBCUTANEOUS at 12:34

## 2021-09-25 RX ADMIN — ISOSORBIDE MONONITRATE 30 MG: 30 TABLET, EXTENDED RELEASE ORAL at 09:09

## 2021-09-25 RX ADMIN — Medication 10 ML: at 06:00

## 2021-09-25 RX ADMIN — CALCIUM CARBONATE (ANTACID) CHEW TAB 500 MG 200 MG: 500 CHEW TAB at 17:11

## 2021-09-25 RX ADMIN — CALCIUM CARBONATE (ANTACID) CHEW TAB 500 MG 200 MG: 500 CHEW TAB at 09:08

## 2021-09-25 RX ADMIN — ROSUVASTATIN CALCIUM 5 MG: 10 TABLET, FILM COATED ORAL at 09:09

## 2021-09-25 RX ADMIN — INSULIN LISPRO 5 UNITS: 100 INJECTION, SOLUTION INTRAVENOUS; SUBCUTANEOUS at 17:12

## 2021-09-25 RX ADMIN — INSULIN LISPRO 4 UNITS: 100 INJECTION, SOLUTION INTRAVENOUS; SUBCUTANEOUS at 22:21

## 2021-09-25 RX ADMIN — BUDESONIDE 500 MCG: 0.5 INHALANT RESPIRATORY (INHALATION) at 20:47

## 2021-09-25 RX ADMIN — BUDESONIDE 500 MCG: 0.5 INHALANT RESPIRATORY (INHALATION) at 13:29

## 2021-09-25 RX ADMIN — TORSEMIDE 60 MG: 20 TABLET ORAL at 12:33

## 2021-09-25 RX ADMIN — AMIODARONE HYDROCHLORIDE 100 MG: 200 TABLET ORAL at 09:10

## 2021-09-25 RX ADMIN — APIXABAN 5 MG: 5 TABLET, FILM COATED ORAL at 17:11

## 2021-09-25 RX ADMIN — MONTELUKAST 10 MG: 10 TABLET, FILM COATED ORAL at 22:21

## 2021-09-25 RX ADMIN — ACETAMINOPHEN 650 MG: 325 TABLET ORAL at 03:37

## 2021-09-25 RX ADMIN — PREDNISONE 20 MG: 20 TABLET ORAL at 09:11

## 2021-09-25 RX ADMIN — PREGABALIN 75 MG: 25 CAPSULE ORAL at 12:34

## 2021-09-25 RX ADMIN — INSULIN LISPRO 12 UNITS: 100 INJECTION, SOLUTION INTRAVENOUS; SUBCUTANEOUS at 17:12

## 2021-09-25 RX ADMIN — EZETIMIBE 10 MG: 10 TABLET ORAL at 09:08

## 2021-09-25 RX ADMIN — ASPIRIN 81 MG: 81 TABLET, COATED ORAL at 09:08

## 2021-09-25 RX ADMIN — ARFORMOTEROL TARTRATE 15 MCG: 15 SOLUTION RESPIRATORY (INHALATION) at 13:29

## 2021-09-25 RX ADMIN — Medication 10 ML: at 22:24

## 2021-09-25 RX ADMIN — CALCIUM CARBONATE (ANTACID) CHEW TAB 500 MG 200 MG: 500 CHEW TAB at 12:33

## 2021-09-25 RX ADMIN — ARFORMOTEROL TARTRATE 15 MCG: 15 SOLUTION RESPIRATORY (INHALATION) at 20:47

## 2021-09-25 RX ADMIN — ALTEPLASE 1 MG: 2.2 INJECTION, POWDER, LYOPHILIZED, FOR SOLUTION INTRAVENOUS at 15:47

## 2021-09-25 RX ADMIN — Medication 10 ML: at 22:23

## 2021-09-25 RX ADMIN — CEFEPIME HYDROCHLORIDE 2 G: 2 INJECTION, POWDER, FOR SOLUTION INTRAVENOUS at 17:43

## 2021-09-25 RX ADMIN — VANCOMYCIN HYDROCHLORIDE 2500 MG: 10 INJECTION, POWDER, LYOPHILIZED, FOR SOLUTION INTRAVENOUS at 18:27

## 2021-09-25 RX ADMIN — APIXABAN 5 MG: 5 TABLET, FILM COATED ORAL at 09:10

## 2021-09-25 RX ADMIN — HUMAN INSULIN 10 UNITS: 100 INJECTION, SUSPENSION SUBCUTANEOUS at 22:22

## 2021-09-25 RX ADMIN — MICONAZOLE NITRATE 2 % TOPICAL POWDER: at 17:13

## 2021-09-25 RX ADMIN — PANTOPRAZOLE SODIUM 40 MG: 40 TABLET, DELAYED RELEASE ORAL at 06:51

## 2021-09-25 NOTE — PROGRESS NOTES
TRANSFER - OUT REPORT:    Verbal report given to Beti(name) on Patricia Brown  being transferred to (unit) for routine progression of care       Report consisted of patients Situation, Background, Assessment and   Recommendations(SBAR). Information from the following report(s) Kardex and MAR was reviewed with the receiving nurse. Lines:   PICC Triple Lumen 19/08/69 Right;Basilic (Active)   Central Line Being Utilized Yes 09/24/21 2354   Criteria for Appropriate Use Long term IV/antibiotic administration 09/24/21 2354   Site Assessment Clean, dry, & intact 09/24/21 2354   Phlebitis Assessment 0 09/24/21 2354   Infiltration Assessment 0 09/24/21 2354   Date of Last Dressing Change 09/24/21 09/24/21 2354   Dressing Status Clean, dry, & intact 09/24/21 2354   External Catheter Length (cm) 0 centimeters 09/24/21 2354   Dressing Type Disk with Chlorhexadine gluconate (CHG); Transparent 09/24/21 2354   Action Taken Open ports on tubing capped 09/24/21 2354   Hub Color/Line Status Gray;Capped 09/24/21 2156   Positive Blood Return (Site #1) Yes 09/24/21 2156   Hub Color/Line Status White;Capped 09/24/21 2156   Positive Blood Return (Site #2) Yes 09/24/21 2156   Hub Color/Line Status Red;Capped 09/24/21 2156   Positive Blood Return (Site #3) Yes 09/24/21 2156   Alcohol Cap Used Yes 09/24/21 2156        Opportunity for questions and clarification was provided.       Patient transported with:   O2 @ 2 liters

## 2021-09-25 NOTE — PROGRESS NOTES
Bedside and Verbal shift change report given to Jorge (oncoming nurse) by June Aparicio (offgoing nurse). Report included the following information SBAR, Kardex and Recent Results.

## 2021-09-25 NOTE — PROGRESS NOTES
Hospitalist Progress Note  Daniel Vanegas DO  Answering service: [phone] OR 2017 from in house phone      Date of Service:  2021  NAME:  Gus Kan  :  1965  MRN:  [id number]      Admission Summary:   Gus Kan is a 48 y.o. male who presents with sob and AMS      52-year-old gentleman history of diabetes, hypertension, dyslipidemia, likely history of CHF as well he uses oxygen as needed at home (particularly at night for obstructive sleep apnea), morbid obese  presents to the emergency department today with chief complaint of generalized not feeling well for the past 4 to 5 days. No fevers but increased shortness of breath. EMS reports room air hypoxia in the 70s. Patient complains of a rash to his left upper extremity consistent with cellulitis and is not currently on treatment. He has been up titrating his Lasix at home without significant relief of his symptoms. There is a conflicting history about whether or not he has been taking his blood thinner as prescribed. He is fully Covid vaccinated. He is lethargic in ER. Place on bipap now. Not able to get history for now     Interval history / Subjective: Follow up COPD, lower extremity swelling. Patient seen and examined. Continues to be comfortable on 2 liters o2. Lower extremity edema appears stable from day prior photos. Appreciate ID.  Unable to do CHONG    Assessment & Plan:     # Bilateral lower leg wound on the bases of stasis dermatitis   - dopplers negative   - ID consult, antibiotics initiated   - continue wound care    - elevate the leg     # Acute hypoxic and hypercapnic respiratory failure due to COPD exacerbation and CHF, + IVY + likely obesity hypoventilation syndrome  - BiPAP qhs   - continue on NC oxygen at day time      # Acute exacerbation of COPD  - On steroid, Nebs   - Wean oxygen as tolerated, pulmonology following     # Acute on chronic diastolic congestive heart failure  - normal EF on recent echo  - Repeat echo pending  - Patient with lower extremity edema, likely lymphedema related versus cor pulmonale  - Diuretic changed to Torsemide, patient's home med  - Consider resuming Entresto if blood pressure improves    # Atrial fibrillation  - Paroxysmal atrial fibrillation  - Stable on amiodarone, Eliquis for anticoagulation    # Hypertension  - Continue Imdur     # Dyslipidemia  - On statin     # Diabetes  - On NPH, premeal Humalog and insulin sliding scale coverage  - Diabetic management team following and adjusting insulin dose     # Cellulitis of the left upper extremity  - Completed antibiotics       Discharge disposition: Likely more than 48 hours pending progress. Hospital Problems  Date Reviewed: 4/28/2013        Codes Class Noted POA    Acute on chronic respiratory failure [facility]) ICD-10-CM: J96.20  ICD-9-CM: 518.84  9/9/2021 Unknown                Review of Systems:   Pertinent positive mentioned in interval hx/HPI. Other systems reviewed and negative     Vital Signs:    Last 24hrs VS reviewed since prior progress note. Most recent are:  Visit Vitals  /66   Pulse 74   Temp 97.8 °F (36.6 °C)   Resp 16   Ht 6' 1\" (1.854 m)   Wt 151.5 kg (333 lb 14.4 oz)   SpO2 91%   BMI 44.05 kg/m²         Intake/Output Summary (Last 24 hours) at 9/25/2021 1831  Last data filed at 9/25/2021 0472  Gross per 24 hour   Intake 400 ml   Output 1300 ml   Net -900 ml        Physical Examination:             Constitutional:  No acute distress, in pain, chronically sick looking, obese     ENT:  Oral mucous moist   Resp:  CTA bilaterally. No wheezing/rhonchi/rales. No accessory muscle use   CV:  Regular rhythm, normal rate, no murmurs, gallops, rubs    GI:  Soft, non distended, non tender.  normoactive bowel sounds, no hepatosplenomegaly     Musculoskeletal:  bilateral lower leg edema + 3, clean dressing covering the shin , small wound on the left leg     Neurologic: Moves all extremities. Data Review:    I personally reviewed labs and imaging     Labs:     Recent Labs     09/24/21 0318   WBC 29.3*   HGB 12.7   HCT 41.4        Recent Labs     09/25/21 0318 09/24/21 0318 09/23/21  0330   NA  --   --  134*   K  --   --  4.2   CL  --   --  95*   CO2  --   --  36*   BUN  --   --  46*   CREA  --   --  1.01   GLU  --   --  129*   CA  --   --  8.0*   MG 2.4 2.5* 2.6*   PHOS 2.4* 2.4* 3.1     No results for input(s): ALT, AP, TBIL, TBILI, TP, ALB, GLOB, GGT, AML, LPSE in the last 72 hours. No lab exists for component: SGOT, GPT, AMYP, HLPSE  No results for input(s): INR, PTP, APTT, INREXT, INREXT in the last 72 hours. No results for input(s): FE, TIBC, PSAT, FERR in the last 72 hours. No results found for: FOL, RBCF   No results for input(s): PH, PCO2, PO2 in the last 72 hours. No results for input(s): CPK, CKNDX, TROIQ in the last 72 hours.     No lab exists for component: CPKMB  Lab Results   Component Value Date/Time    Cholesterol, total 170 01/23/2013 11:08 AM    HDL Cholesterol 37 (L) 01/23/2013 11:08 AM    LDL, calculated 97 01/23/2013 11:08 AM    Triglyceride 180 (H) 01/23/2013 11:08 AM    CHOL/HDL Ratio 6.0 (H) 10/06/2010 03:18 PM     Lab Results   Component Value Date/Time    Glucose (POC) 312 (H) 09/25/2021 03:58 PM    Glucose (POC) 347 (H) 09/25/2021 03:57 PM    Glucose (POC) 253 (H) 09/25/2021 11:17 AM    Glucose (POC) 204 (H) 09/25/2021 07:58 AM    Glucose (POC) 165 (H) 09/24/2021 09:07 PM     No results found for: COLOR, APPRN, SPGRU, REFSG, ELIZA, PROTU, GLUCU, KETU, BILU, UROU, RON, LEUKU, GLUKE, EPSU, BACTU, WBCU, RBCU, CASTS, UCRY      Medications Reviewed:     Current Facility-Administered Medications   Medication Dose Route Frequency    cefepime (MAXIPIME) 2 g in 0.9% sodium chloride (MBP/ADV) 100 mL MBP  2 g IntraVENous Q8H    vancomycin (VANCOCIN) 2500 mg in  mL infusion  2,500 mg IntraVENous ONCE    predniSONE (DELTASONE) tablet 20 mg  20 mg Oral DAILY WITH BREAKFAST    [START ON 9/27/2021] predniSONE (DELTASONE) tablet 10 mg  10 mg Oral DAILY WITH BREAKFAST    insulin NPH (NOVOLIN N, HUMULIN N) injection 10 Units  10 Units SubCUTAneous QHS    insulin lispro (HUMALOG) injection 5 Units  5 Units SubCUTAneous TIDAC    oxyCODONE IR (ROXICODONE) tablet 7.5 mg  7.5 mg Oral Q6H PRN    insulin NPH (NOVOLIN N, HUMULIN N) injection 40 Units  40 Units SubCUTAneous DAILY WITH BREAKFAST    calcium carbonate (TUMS) chewable tablet 200 mg [elemental]  200 mg Oral TID WITH MEALS    torsemide (DEMADEX) tablet 60 mg  60 mg Oral DAILY    insulin lispro (HUMALOG) injection   SubCUTAneous AC&HS    dextrose (D50W) injection syrg 12.5-25 g  12.5-25 g IntraVENous PRN    albuterol-ipratropium (DUO-NEB) 2.5 MG-0.5 MG/3 ML  3 mL Nebulization Q6H PRN    alteplase (CATHFLO) 1 mg in sterile water (preservative free) 1 mL injection  1 mg InterCATHeter PRN    pregabalin (LYRICA) capsule 75 mg  75 mg Oral BID    montelukast (SINGULAIR) tablet 10 mg  10 mg Oral QHS    sacubitriL-valsartan (ENTRESTO) 49-51 mg tablet 1 Tablet  1 Tablet Oral Q12H    apixaban (ELIQUIS) tablet 5 mg  5 mg Oral BID    amiodarone (CORDARONE) tablet 100 mg  100 mg Oral DAILY    ezetimibe (ZETIA) tablet 10 mg  10 mg Oral DAILY    pantoprazole (PROTONIX) tablet 40 mg  40 mg Oral ACB    isosorbide mononitrate ER (IMDUR) tablet 30 mg  30 mg Oral DAILY    aspirin delayed-release tablet 81 mg  81 mg Oral DAILY    rosuvastatin (CRESTOR) tablet 5 mg  5 mg Oral DAILY    [Held by provider] spironolactone (ALDACTONE) tablet 25 mg  25 mg Oral DAILY    benzocaine-menthoL (CEPACOL) lozenge 1 Lozenge  1 Lozenge Mucous Membrane PRN    fentaNYL citrate (PF) injection 50 mcg  50 mcg IntraVENous Q4H PRN    hydrALAZINE (APRESOLINE) 20 mg/mL injection 10 mg  10 mg IntraVENous Q6H PRN    glucose chewable tablet 16 g  4 Tablet Oral PRN    dextrose (D50W) injection syrg 12.5-25 g  25-50 mL IntraVENous PRN    glucagon (GLUCAGEN) injection 1 mg  1 mg IntraMUSCular PRN    sodium chloride (NS) flush 5-40 mL  5-40 mL IntraVENous Q8H    sodium chloride (NS) flush 5-40 mL  5-40 mL IntraVENous PRN    acetaminophen (TYLENOL) tablet 650 mg  650 mg Oral Q6H PRN    Or    acetaminophen (TYLENOL) suppository 650 mg  650 mg Rectal Q6H PRN    polyethylene glycol (MIRALAX) packet 17 g  17 g Oral DAILY PRN    ondansetron (ZOFRAN ODT) tablet 4 mg  4 mg Oral Q8H PRN    Or    ondansetron (ZOFRAN) injection 4 mg  4 mg IntraVENous Q6H PRN    arformoteroL (BROVANA) neb solution 15 mcg  15 mcg Nebulization BID RT    And    budesonide (PULMICORT) 500 mcg/2 ml nebulizer suspension  500 mcg Nebulization BID RT    miconazole (MICOTIN) 2 % powder   Topical BID    sodium chloride (NS) flush 5-40 mL  5-40 mL IntraVENous Q8H    sodium chloride (NS) flush 5-40 mL  5-40 mL IntraVENous PRN     ______________________________________________________________________  EXPECTED LENGTH OF STAY: 3d 14h  ACTUAL LENGTH OF STAY:          Charly Galvez 1313, DO   Patient has given Verbal permission to discuss medical care with   persons present in the room and and also with contact as listed on face sheet. Please note that this dictation was completed with [id number], the Setgo voice recognition software. Quite often unanticipated grammatical, syntax, homophones, and other interpretive errors are inadvertently transcribed by the computer software. Please disregard these errors. Please excuse any errors that have escaped final proofreading. Thank you.

## 2021-09-25 NOTE — PROGRESS NOTES
1000- LLE assessed with wound care nurse, marked increased in swelling and redness noted from yesterday, per [facility] nurse leave dressing off for Dr. Carloz Garcia to assess  1200- orders received for ankle brachial index and duplex  1900- duplex and index not completed yet, vascular lab not answering, LLE wound redressed. Bedside shift change report given to Yovanny Stinson RN (oncoming nurse) by Candis Mejia RN (offgoing nurse). Report included the following information SBAR, Kardex, ED Summary, Intake/Output, MAR, Accordion, Recent Results, Med Rec Status, Cardiac Rhythm NSR and Alarm Parameters .

## 2021-09-25 NOTE — PROGRESS NOTES
Venous duplex completed. Deferring CHONG at this time as patient is unable to tolerate blood pressure cuffs above the ankles. Also unable to place blood pressure cuffs over bloody oozing on the left calf. Patient has pulsatile flow in the calf arteries where visualized.     Bianca Ayala, TARAT  Vascular Lab

## 2021-09-25 NOTE — CONSULTS
Infectious Disease Consult    Today's Date: 9/25/2021   Admit Date: 9/9/2021    Impression:   · Leukocytosis   · ? steroids  · ? STI LLE  · Vascular insufficiency changes of bilateral legs  · Ulceration LLE    Plan:   · Consider imaging LLE if he doesn't improve  · Empiric antibiotic therapy  · Work up any fevers    Anti-infectives:   · None, currently    Subjective:   Date of Consultation:  September 25, 2021  Referring Physician: Dr Divya Edwards    Patient is a 48 y.o. male admitted with LUE abscess/cellulitis and AMS earlier in the month. He has problems with diabetic complications, vascular insufficiency ulcers and chronic edema. He has a wound on the LLE that he states has been there for about a year, the result of a hematoma. He has not been having fevers, chills, etc, but does have worsening leukocytosis--he is on steroids, but has been for some time. We are asked to see him in consultation. Patient Active Problem List   Diagnosis Code    Acute on chronic respiratory failure (HCC) J96.20     Past Medical History:   Diagnosis Date    DM (diabetes mellitus) ([facility] [address]     Dyslipidemia     Hypertension       History reviewed. No pertinent family history. Social History     Tobacco Use    Smoking status: Never Smoker   Substance Use Topics    Alcohol use: No     History reviewed. No pertinent surgical history. Prior to Admission medications    Medication Sig Start Date End Date Taking? Authorizing Provider   torsemide (DEMADEX) 20 mg tablet Take 60 mg by mouth daily. Indications: high blood pressure   Yes Provider, Historical   pregabalin (LYRICA) 75 mg capsule Take 75 mg by mouth two (2) times a day. Indications: nerve pain after herpes   Yes Provider, Historical   apixaban (ELIQUIS) 5 mg tablet Take 5 mg by mouth two (2) times a day. Indications: treatment to prevent blood clots in chronic atrial fibrillation   Yes Provider, Historical   aspirin delayed-release 81 mg tablet Take  by mouth daily. Indications: treatment to prevent a heart attack   Yes Provider, Historical   amiodarone (PACERONE) 100 mg tablet Take 100 mg by mouth daily. Yes Provider, Historical   ezetimibe (ZETIA) 10 mg tablet Take 10 mg by mouth daily. Yes Provider, Historical   pantoprazole (PROTONIX) 40 mg tablet Take 40 mg by mouth daily. Indications: gastroesophageal reflux disease   Yes Provider, Historical   empagliflozin (Jardiance) 25 mg tablet Take 25 mg by mouth daily. Indications: type 2 diabetes mellitus   Yes Provider, Historical   isosorbide mononitrate ER (IMDUR) 30 mg tablet Take 30 mg by mouth daily. Indications: prevention of anginal chest pain associated with coronary artery disease   Yes Provider, Historical   sacubitriL-valsartan (Entresto) 49-51 mg tab tablet Take 1 Tablet by mouth two (2) times a day. Yes Provider, Historical   rosuvastatin (CRESTOR) 5 mg tablet Take 5 mg by mouth daily. Yes Provider, Historical   spironolactone (ALDACTONE) 25 mg tablet Take 25 mg by mouth daily. Yes Provider, Historical   montelukast (SINGULAIR) 10 mg tablet Take 10 mg by mouth nightly. Yes Provider, Historical   insulin glargine (LANTUS) 100 unit/mL injection 18 Units by SubCUTAneous route daily. Yes Provider, Historical       No Known Allergies     Review of Systems:  A comprehensive review of systems was negative except for that written in the History of Present Illness. Objective:     Visit Vitals  /69   Pulse 81   Temp 97.5 °F (36.4 °C)   Resp 16   Ht 6' 1\" (1.854 m)   Wt 151.5 kg (333 lb 14.4 oz)   SpO2 92%   BMI 44.05 kg/m²     Temp (24hrs), Av.7 °F (36.5 °C), Min:97.4 °F (36.3 °C), Max:98 °F (36.7 °C)       Lines:  PICC:       Physical Exam:  Lungs:  clear to auscultation bilaterally  Heart:  regular rate and rhythm  Abdomen:  soft, non-tender.  Bowel sounds normal. No masses,  no organomegaly  Chronic edema, vascular insufficiency and diabetic skin changes--no obvious abscess and no purulence to culture. SQ hemorrhage dorsum of left foot    Data Review:     CBC:  Recent Labs     09/24/21  0318   WBC 29.3*   HGB 12.7   HCT 41.4          BMP:  Recent Labs     09/23/21  0330   CREA 1.01   BUN 46*   *   K 4.2   CL 95*   CO2 36*   AGAP 3*   *       LFTS:  No results for input(s): TBILI, ALT, AP, TP, ALB in the last 72 hours.     No lab exists for component: SGOT    Microbiology:     All Micro Results     Procedure Component Value Units Date/Time    CULTURE, BLOOD [[id number]] Collected: 09/09/21 1655    Order Status: Completed Specimen: Blood Updated: 09/15/21 0021     Special Requests: NO SPECIAL REQUESTS        Culture result: NO GROWTH 5 DAYS       CULTURE, BLOOD [[id number]] Collected: 09/09/21 1159    Order Status: Completed Specimen: Blood Updated: 09/15/21 0021     Special Requests: NO SPECIAL REQUESTS        Culture result: NO GROWTH 5 DAYS       RESPIRATORY VIRUS PANEL W/COVID-19, PCR [[id number]] Collected: 09/12/21 1132    Order Status: Completed Specimen: Nasopharyngeal Updated: 09/12/21 1745     Adenovirus Not detected        Coronavirus 229E Not detected        Coronavirus HKU1 Not detected        Coronavirus CVNL63 Not detected        Coronavirus OC43 Not detected        SARS-CoV-2, PCR Not detected        Metapneumovirus Not detected        Rhinovirus and Enterovirus Not detected        Influenza A Not detected        Influenza A, subtype H1 Not detected        Influenza A, subtype H3 Not detected        INFLUENZA A H1N1 PCR Not detected        Influenza B Not detected        Parainfluenza 1 Not detected        Parainfluenza 2 Not detected        Parainfluenza 3 Not detected        Parainfluenza virus 4 Not detected        RSV by PCR Not detected        B. parapertussis, PCR Not detected        Bordetella pertussis - PCR Not detected        Chlamydophila pneumoniae DNA, QL, PCR Not detected        Mycoplasma pneumoniae DNA, QL, PCR Not detected       COVID-19 RAPID TEST [[id number]] Collected: 09/09/21 1628    Order Status: Completed Specimen: Nasopharyngeal Updated: 09/09/21 1650     Specimen source Nasopharyngeal        COVID-19 rapid test Not detected        Comment: Rapid Abbott ID Now       Rapid NAAT:  The specimen is NEGATIVE for SARS-CoV-2, the novel coronavirus associated with COVID-19. Negative results should be treated as presumptive and, if inconsistent with clinical signs and symptoms or necessary for patient management, should be tested with an alternative molecular assay. Negative results do not preclude SARS-CoV-2 infection and should not be used as the sole basis for patient management decisions. This test has been authorized by the FDA under an Emergency Use Authorization (EUA) for use by authorized laboratories.    Fact sheet for Healthcare Providers: ConventionUpdate.co.nz  Fact sheet for Patients: ConventionUpdate.co.nz       Methodology: Isothermal Nucleic Acid Amplification         CULTURE, BLOOD, PAIRED [[id number]]     Order Status: Canceled Specimen: Blood           Imaging:   None recent    Signed By: Jermain Cloud MD     September 25, 2021

## 2021-09-26 LAB
ANION GAP SERPL CALC-SCNC: 3 MMOL/L (ref 5–15)
BUN SERPL-MCNC: 44 MG/DL (ref 6–20)
BUN/CREAT SERPL: 54 (ref 12–20)
CALCIUM SERPL-MCNC: 8.6 MG/DL (ref 8.5–10.1)
CHLORIDE SERPL-SCNC: 97 MMOL/L (ref 97–108)
CO2 SERPL-SCNC: 34 MMOL/L (ref 21–32)
CREAT SERPL-MCNC: 0.82 MG/DL (ref 0.7–1.3)
GLUCOSE BLD STRIP.AUTO-MCNC: 151 MG/DL (ref 65–117)
GLUCOSE BLD STRIP.AUTO-MCNC: 169 MG/DL (ref 65–117)
GLUCOSE BLD STRIP.AUTO-MCNC: 184 MG/DL (ref 65–117)
GLUCOSE BLD STRIP.AUTO-MCNC: 252 MG/DL (ref 65–117)
GLUCOSE SERPL-MCNC: 200 MG/DL (ref 65–100)
MAGNESIUM SERPL-MCNC: 2.3 MG/DL (ref 1.6–2.4)
PHOSPHATE SERPL-MCNC: 1.7 MG/DL (ref 2.6–4.7)
POTASSIUM SERPL-SCNC: 3.6 MMOL/L (ref 3.5–5.1)
SERVICE CMNT-IMP: ABNORMAL
SODIUM SERPL-SCNC: 134 MMOL/L (ref 136–145)

## 2021-09-26 PROCEDURE — 94760 N-INVAS EAR/PLS OXIMETRY 1: CPT

## 2021-09-26 PROCEDURE — 84100 ASSAY OF PHOSPHORUS: CPT

## 2021-09-26 PROCEDURE — 83735 ASSAY OF MAGNESIUM: CPT

## 2021-09-26 PROCEDURE — 74011636637 HC RX REV CODE- 636/637: Performed by: INTERNAL MEDICINE

## 2021-09-26 PROCEDURE — 94640 AIRWAY INHALATION TREATMENT: CPT

## 2021-09-26 PROCEDURE — 74011250637 HC RX REV CODE- 250/637: Performed by: INTERNAL MEDICINE

## 2021-09-26 PROCEDURE — 74011636637 HC RX REV CODE- 636/637: Performed by: FAMILY MEDICINE

## 2021-09-26 PROCEDURE — 80048 BASIC METABOLIC PNL TOTAL CA: CPT

## 2021-09-26 PROCEDURE — 65270000029 HC RM PRIVATE

## 2021-09-26 PROCEDURE — 77010033678 HC OXYGEN DAILY

## 2021-09-26 PROCEDURE — 36415 COLL VENOUS BLD VENIPUNCTURE: CPT

## 2021-09-26 PROCEDURE — 74011250637 HC RX REV CODE- 250/637: Performed by: FAMILY MEDICINE

## 2021-09-26 PROCEDURE — 74011000258 HC RX REV CODE- 258: Performed by: INTERNAL MEDICINE

## 2021-09-26 PROCEDURE — 74011000250 HC RX REV CODE- 250: Performed by: HOSPITALIST

## 2021-09-26 PROCEDURE — 74011250637 HC RX REV CODE- 250/637: Performed by: HOSPITALIST

## 2021-09-26 PROCEDURE — 74011250636 HC RX REV CODE- 250/636: Performed by: INTERNAL MEDICINE

## 2021-09-26 PROCEDURE — 82962 GLUCOSE BLOOD TEST: CPT

## 2021-09-26 RX ORDER — VANCOMYCIN HYDROCHLORIDE
1250 EVERY 12 HOURS
Status: DISCONTINUED | OUTPATIENT
Start: 2021-09-26 | End: 2021-09-30

## 2021-09-26 RX ADMIN — PREDNISONE 20 MG: 20 TABLET ORAL at 07:05

## 2021-09-26 RX ADMIN — ACETAMINOPHEN 650 MG: 325 TABLET ORAL at 16:51

## 2021-09-26 RX ADMIN — CEFEPIME HYDROCHLORIDE 2 G: 2 INJECTION, POWDER, FOR SOLUTION INTRAVENOUS at 16:51

## 2021-09-26 RX ADMIN — SACUBITRIL AND VALSARTAN 1 TABLET: 49; 51 TABLET, FILM COATED ORAL at 10:47

## 2021-09-26 RX ADMIN — MONTELUKAST 10 MG: 10 TABLET, FILM COATED ORAL at 22:15

## 2021-09-26 RX ADMIN — CALCIUM CARBONATE (ANTACID) CHEW TAB 500 MG 200 MG: 500 CHEW TAB at 11:48

## 2021-09-26 RX ADMIN — Medication 10 ML: at 07:07

## 2021-09-26 RX ADMIN — Medication 10 ML: at 22:16

## 2021-09-26 RX ADMIN — APIXABAN 5 MG: 5 TABLET, FILM COATED ORAL at 17:00

## 2021-09-26 RX ADMIN — HUMAN INSULIN 10 UNITS: 100 INJECTION, SUSPENSION SUBCUTANEOUS at 22:15

## 2021-09-26 RX ADMIN — CEFEPIME HYDROCHLORIDE 2 G: 2 INJECTION, POWDER, FOR SOLUTION INTRAVENOUS at 07:11

## 2021-09-26 RX ADMIN — SACUBITRIL AND VALSARTAN 1 TABLET: 49; 51 TABLET, FILM COATED ORAL at 22:14

## 2021-09-26 RX ADMIN — ACETAMINOPHEN 650 MG: 325 TABLET ORAL at 03:42

## 2021-09-26 RX ADMIN — BUDESONIDE 500 MCG: 0.5 INHALANT RESPIRATORY (INHALATION) at 20:10

## 2021-09-26 RX ADMIN — ASPIRIN 81 MG: 81 TABLET, COATED ORAL at 10:47

## 2021-09-26 RX ADMIN — ARFORMOTEROL TARTRATE 15 MCG: 15 SOLUTION RESPIRATORY (INHALATION) at 20:10

## 2021-09-26 RX ADMIN — CEFEPIME HYDROCHLORIDE 2 G: 2 INJECTION, POWDER, FOR SOLUTION INTRAVENOUS at 00:06

## 2021-09-26 RX ADMIN — VANCOMYCIN HYDROCHLORIDE 1250 MG: 10 INJECTION, POWDER, LYOPHILIZED, FOR SOLUTION INTRAVENOUS at 22:15

## 2021-09-26 RX ADMIN — ROSUVASTATIN CALCIUM 5 MG: 10 TABLET, FILM COATED ORAL at 10:55

## 2021-09-26 RX ADMIN — PANTOPRAZOLE SODIUM 40 MG: 40 TABLET, DELAYED RELEASE ORAL at 07:05

## 2021-09-26 RX ADMIN — INSULIN LISPRO 8 UNITS: 100 INJECTION, SOLUTION INTRAVENOUS; SUBCUTANEOUS at 11:47

## 2021-09-26 RX ADMIN — INSULIN LISPRO 5 UNITS: 100 INJECTION, SOLUTION INTRAVENOUS; SUBCUTANEOUS at 11:47

## 2021-09-26 RX ADMIN — INSULIN LISPRO 5 UNITS: 100 INJECTION, SOLUTION INTRAVENOUS; SUBCUTANEOUS at 07:06

## 2021-09-26 RX ADMIN — CALCIUM CARBONATE (ANTACID) CHEW TAB 500 MG 200 MG: 500 CHEW TAB at 07:05

## 2021-09-26 RX ADMIN — EZETIMIBE 10 MG: 10 TABLET ORAL at 10:56

## 2021-09-26 RX ADMIN — ACETAMINOPHEN 650 MG: 325 TABLET ORAL at 10:56

## 2021-09-26 RX ADMIN — APIXABAN 5 MG: 5 TABLET, FILM COATED ORAL at 10:47

## 2021-09-26 RX ADMIN — SACUBITRIL AND VALSARTAN 1 TABLET: 49; 51 TABLET, FILM COATED ORAL at 00:06

## 2021-09-26 RX ADMIN — PREGABALIN 75 MG: 25 CAPSULE ORAL at 00:06

## 2021-09-26 RX ADMIN — INSULIN LISPRO 5 UNITS: 100 INJECTION, SOLUTION INTRAVENOUS; SUBCUTANEOUS at 16:50

## 2021-09-26 RX ADMIN — VANCOMYCIN HYDROCHLORIDE 1000 MG: 1 INJECTION, POWDER, LYOPHILIZED, FOR SOLUTION INTRAVENOUS at 09:23

## 2021-09-26 RX ADMIN — AMIODARONE HYDROCHLORIDE 100 MG: 200 TABLET ORAL at 10:56

## 2021-09-26 RX ADMIN — HUMAN INSULIN 40 UNITS: 100 INJECTION, SUSPENSION SUBCUTANEOUS at 07:05

## 2021-09-26 RX ADMIN — CALCIUM CARBONATE (ANTACID) CHEW TAB 500 MG 200 MG: 500 CHEW TAB at 16:52

## 2021-09-26 RX ADMIN — PREGABALIN 75 MG: 25 CAPSULE ORAL at 11:48

## 2021-09-26 NOTE — PROGRESS NOTES
Day #2 of Vancomycin - Dosing Update  Indication:  B/L lower leg wounds  - Hx of diabetic complications, vascular insufficiency ulcers and chronic edema  Current regimen:  1000 mg IV Q 12 hr  Abx regimen:  Cefepime + Vancomycin  ID Following ?: YES  Concomitant nephrotoxic drugs (requires more frequent monitoring): None  Frequency of BMP?: Daily through     Recent Labs     21  0350 21  0318   WBC  --  29.3*   CREA 0.82  --    BUN 44*  --      Est CrCl: >100 ml/min; UO: -- ml/kg/hr  Temp (24hrs), Av.4 °F (36.3 °C), Min:96.8 °F (36 °C), Max:97.8 °F (36.6 °C)    Cultures:    Blood: NG, final   Blood #2: NG, final   Resp viral panel: (-)    MRSA Swab ordered (if applicable)? N/A    Goal target range AUC/GISEL 400-500    Recent level history:  Date/Time Dose & Interval Measured Level (mcg/mL) Associated AUC/GISEL Dose Adjustment                                                   Plan: The predicted AUC/GISEL with the current dose is subtherapeutic at 345. Will adjust the dose slightly to 1250 mg IV Q 12 hr for a predicted AUC/GISEL of 431. Pharmacy will continue to monitor patient daily and will make dosage adjustments based upon changing renal function.

## 2021-09-26 NOTE — PROGRESS NOTES
Pharmacist Note - Vancomycin Dosing    Consult provided for this 48 y.o. male for indication of BLE wound. Antibiotic regimen(s): Vancomycin, cefepime  Patient on vancomycin PTA? NO     Recent Labs     21  0318 21  0330   WBC 29.3*  --    CREA  --  1.01   BUN  --  46*     Frequency of BMP: daily x3  Height: 185.4 cm  Weight: 151.5 kg  Est CrCl: >100 ml/min; UO: 0.5 ml/kg/hr  Temp (24hrs), Av.8 °F (36.6 °C), Min:97.5 °F (36.4 °C), Max:98 °F (36.7 °C)    Cultures:   Blood - NG, final    MRSA Swab ordered (if applicable)? N/A    The plan below is expected to result in a target range of AUC/GISEL 400-500    Therapy will be initiated with a loading dose of 2500 mg IV x 1 to be followed by a maintenance dose of 1000 mg IV every 12 hours. Pharmacy to follow patient daily and order levels / make dose adjustments as appropriate. *Vancomycin has been dosed used Bayesian kinetics software to target an AUC/GISEL of 400-600, which provides adequate exposure for an assumed infection due to MRSA with an GISEL of 1 or less while reducing the risk of nephrotoxicity as seen with traditional trough based dosing goals.

## 2021-09-26 NOTE — PROGRESS NOTES
Hospitalist Progress Note  Farzad Jimenez MD  Answering service: [phone] from in house phone      Date of Service:  2021  NAME:  Silvestre Joyce  :  1965  MRN:  [id number]      Admission Summary:   Silvestre Joyce is a 48 y.o. male who presents with sob and AMS      60-year-old gentleman history of diabetes, hypertension, dyslipidemia, likely history of CHF as well he uses oxygen as needed at home (particularly at night for obstructive sleep apnea), morbid obese  presents to the emergency department today with chief complaint of generalized not feeling well for the past 4 to 5 days. No fevers but increased shortness of breath. EMS reports room air hypoxia in the 70s. Patient complains of a rash to his left upper extremity consistent with cellulitis and is not currently on treatment. He has been up titrating his Lasix at home without significant relief of his symptoms. There is a conflicting history about whether or not he has been taking his blood thinner as prescribed. He is fully Covid vaccinated. He is lethargic in ER. Place on bipap now. Not able to get history for now     Interval history / Subjective:      Patient seen today. He was sound asleep during my visit. Oxygen off his nose and sating fine. Abx reviewed.      Assessment & Plan:     # Bilateral lower leg wound, Ulceration on LLE on the bases of stasis dermatitis/ venous insufficiency    - dopplers negative   - CHONG pending   - ID consulted, antibiotics initiated   - on IV Cefepime and Vancomycin   - continue wound care    - elevate the leg   - will obtain CT/MRI leg if no improvement     # Acute hypoxic and hypercapnic respiratory failure due to COPD exacerbation and CHF, + IVY + likely obesity hypoventilation syndrome  - BiPAP qhs   - continue on NC oxygen at day time      # Acute exacerbation of COPD  - On steroid, Nebs   - Wean oxygen as tolerated, pulmonology following     # Acute on chronic diastolic congestive heart failure  - normal EF on recent echo  - Repeat echo pending  - Patient with lower extremity edema, likely lymphedema related versus cor pulmonale  - Diuretic changed to Torsemide, patient's home med  - Consider resuming Entresto if blood pressure improves    # Atrial fibrillation  - Paroxysmal atrial fibrillation  - Stable on amiodarone, Eliquis for anticoagulation    # Hypertension  - Continue Imdur     # Dyslipidemia  - On statin     # Diabetes  - On NPH, premeal Humalog and insulin sliding scale coverage  - Diabetic management team following and adjusting insulin dose     # Cellulitis of the left upper extremity  - Completed antibiotics       Discharge disposition:      Hospital Problems  Date Reviewed: 4/28/2013        Codes Class Noted POA    Acute on chronic respiratory failure ([facility] ICD-10-CM: J96.20  ICD-9-CM: 518.84  9/9/2021 Unknown                Review of Systems:   Pertinent positive mentioned in interval hx/HPI. Other systems reviewed and negative     Vital Signs:    Last 24hrs VS reviewed since prior progress note. Most recent are:  Visit Vitals  /73   Pulse 70   Temp 97.7 °F (36.5 °C)   Resp 16   Ht 6' 1\" (1.854 m)   Wt 151.5 kg (333 lb 14.4 oz)   SpO2 95%   BMI 44.05 kg/m²         Intake/Output Summary (Last 24 hours) at 9/26/2021 1508  Last data filed at 9/26/2021 0415  Gross per 24 hour   Intake    Output 1100 ml   Net -1100 ml        Physical Examination:             Constitutional:  No acute distress, in pain, chronically sick looking, obese     ENT:  Oral mucous moist   Resp:  CTA bilaterally. No wheezing/rhonchi/rales. No accessory muscle use   CV:  Regular rhythm, normal rate, no murmurs, gallops, rubs    GI:  Soft, non distended, non tender.  normoactive bowel sounds, no hepatosplenomegaly     Musculoskeletal:  bilateral lower leg edema + 3, clean dressing covering the shin , small wound on the left leg     Neurologic:  Moves all extremities. Data Review:    I personally reviewed labs and imaging     Labs:     Recent Labs     09/24/21 0318   WBC 29.3*   HGB 12.7   HCT 41.4        Recent Labs     09/26/21  0350 09/25/21 0318 09/24/21 0318   *  --   --    K 3.6  --   --    CL 97  --   --    CO2 34*  --   --    BUN 44*  --   --    CREA 0.82  --   --    *  --   --    CA 8.6  --   --    MG 2.3 2.4 2.5*   PHOS 1.7* 2.4* 2.4*     No results for input(s): ALT, AP, TBIL, TBILI, TP, ALB, GLOB, GGT, AML, LPSE in the last 72 hours. No lab exists for component: SGOT, GPT, AMYP, HLPSE  No results for input(s): INR, PTP, APTT, INREXT, INREXT in the last 72 hours. No results for input(s): FE, TIBC, PSAT, FERR in the last 72 hours. No results found for: FOL, RBCF   No results for input(s): PH, PCO2, PO2 in the last 72 hours. No results for input(s): CPK, CKNDX, TROIQ in the last 72 hours.     No lab exists for component: CPKMB  Lab Results   Component Value Date/Time    Cholesterol, total 170 01/23/2013 11:08 AM    HDL Cholesterol 37 (L) 01/23/2013 11:08 AM    LDL, calculated 97 01/23/2013 11:08 AM    Triglyceride 180 (H) 01/23/2013 11:08 AM    CHOL/HDL Ratio 6.0 (H) 10/06/2010 03:18 PM     Lab Results   Component Value Date/Time    Glucose (POC) 252 (H) 09/26/2021 11:08 AM    Glucose (POC) 184 (H) 09/26/2021 06:12 AM    Glucose (POC) 248 (H) 09/25/2021 09:16 PM    Glucose (POC) 312 (H) 09/25/2021 03:58 PM    Glucose (POC) 347 (H) 09/25/2021 03:57 PM     No results found for: COLOR, APPRN, SPGRU, REFSG, ELIZA, PROTU, GLUCU, KETU, BILU, UROU, RON, LEUKU, GLUKE, EPSU, BACTU, WBCU, RBCU, CASTS, UCRY      Medications Reviewed:     Current Facility-Administered Medications   Medication Dose Route Frequency    vancomycin (VANCOCIN) 1250 mg in  ml infusion  1,250 mg IntraVENous Q12H    cefepime (MAXIPIME) 2 g in 0.9% sodium chloride (MBP/ADV) 100 mL MBP  2 g IntraVENous Q8H    Vancomycin Pharmacy Dosing Other Rx Dosing/Monitoring    [START ON 9/27/2021] predniSONE (DELTASONE) tablet 10 mg  10 mg Oral DAILY WITH BREAKFAST    insulin NPH (NOVOLIN N, HUMULIN N) injection 10 Units  10 Units SubCUTAneous QHS    insulin lispro (HUMALOG) injection 5 Units  5 Units SubCUTAneous TIDAC    oxyCODONE IR (ROXICODONE) tablet 7.5 mg  7.5 mg Oral Q6H PRN    insulin NPH (NOVOLIN N, HUMULIN N) injection 40 Units  40 Units SubCUTAneous DAILY WITH BREAKFAST    calcium carbonate (TUMS) chewable tablet 200 mg [elemental]  200 mg Oral TID WITH MEALS    torsemide (DEMADEX) tablet 60 mg  60 mg Oral DAILY    insulin lispro (HUMALOG) injection   SubCUTAneous AC&HS    dextrose (D50W) injection syrg 12.5-25 g  12.5-25 g IntraVENous PRN    albuterol-ipratropium (DUO-NEB) 2.5 MG-0.5 MG/3 ML  3 mL Nebulization Q6H PRN    alteplase (CATHFLO) 1 mg in sterile water (preservative free) 1 mL injection  1 mg InterCATHeter PRN    pregabalin (LYRICA) capsule 75 mg  75 mg Oral BID    montelukast (SINGULAIR) tablet 10 mg  10 mg Oral QHS    sacubitriL-valsartan (ENTRESTO) 49-51 mg tablet 1 Tablet  1 Tablet Oral Q12H    apixaban (ELIQUIS) tablet 5 mg  5 mg Oral BID    amiodarone (CORDARONE) tablet 100 mg  100 mg Oral DAILY    ezetimibe (ZETIA) tablet 10 mg  10 mg Oral DAILY    pantoprazole (PROTONIX) tablet 40 mg  40 mg Oral ACB    isosorbide mononitrate ER (IMDUR) tablet 30 mg  30 mg Oral DAILY    aspirin delayed-release tablet 81 mg  81 mg Oral DAILY    rosuvastatin (CRESTOR) tablet 5 mg  5 mg Oral DAILY    [Held by provider] spironolactone (ALDACTONE) tablet 25 mg  25 mg Oral DAILY    benzocaine-menthoL (CEPACOL) lozenge 1 Lozenge  1 Lozenge Mucous Membrane PRN    fentaNYL citrate (PF) injection 50 mcg  50 mcg IntraVENous Q4H PRN    hydrALAZINE (APRESOLINE) 20 mg/mL injection 10 mg  10 mg IntraVENous Q6H PRN    glucose chewable tablet 16 g  4 Tablet Oral PRN    dextrose (D50W) injection syrg 12.5-25 g  25-50 mL IntraVENous PRN  glucagon (GLUCAGEN) injection 1 mg  1 mg IntraMUSCular PRN    sodium chloride (NS) flush 5-40 mL  5-40 mL IntraVENous Q8H    sodium chloride (NS) flush 5-40 mL  5-40 mL IntraVENous PRN    acetaminophen (TYLENOL) tablet 650 mg  650 mg Oral Q6H PRN    Or    acetaminophen (TYLENOL) suppository 650 mg  650 mg Rectal Q6H PRN    polyethylene glycol (MIRALAX) packet 17 g  17 g Oral DAILY PRN    ondansetron (ZOFRAN ODT) tablet 4 mg  4 mg Oral Q8H PRN    Or    ondansetron (ZOFRAN) injection 4 mg  4 mg IntraVENous Q6H PRN    arformoteroL (BROVANA) neb solution 15 mcg  15 mcg Nebulization BID RT    And    budesonide (PULMICORT) 500 mcg/2 ml nebulizer suspension  500 mcg Nebulization BID RT    miconazole (MICOTIN) 2 % powder   Topical BID    sodium chloride (NS) flush 5-40 mL  5-40 mL IntraVENous Q8H    sodium chloride (NS) flush 5-40 mL  5-40 mL IntraVENous PRN     ______________________________________________________________________  EXPECTED LENGTH OF STAY: 3d 14h  ACTUAL LENGTH OF STAY:          Alla Mirza MD   Patient has given Verbal permission to discuss medical care with   persons present in the room and and also with contact as listed on face sheet. Please note that this dictation was completed with [employer], the [employer] voice recognition software. Quite often unanticipated grammatical, syntax, homophones, and other interpretive errors are inadvertently transcribed by the computer software. Please disregard these errors. Please excuse any errors that have escaped final proofreading. Thank you.

## 2021-09-26 NOTE — ROUTINE PROCESS
Patient's BP running low this morning. 95/60 and pulse 62. Per Dr. Nair Blades hold torsemide and isosorbide.

## 2021-09-27 ENCOUNTER — APPOINTMENT (OUTPATIENT)
Dept: VASCULAR SURGERY | Age: 56
DRG: 291 | End: 2021-09-27
Attending: INTERNAL MEDICINE
Payer: MEDICARE

## 2021-09-27 LAB
ANION GAP SERPL CALC-SCNC: ABNORMAL MMOL/L (ref 5–15)
BASOPHILS # BLD: 0 K/UL (ref 0–0.1)
BASOPHILS NFR BLD: 0 % (ref 0–1)
BUN SERPL-MCNC: 31 MG/DL (ref 6–20)
BUN/CREAT SERPL: 40 (ref 12–20)
CALCIUM SERPL-MCNC: 8.7 MG/DL (ref 8.5–10.1)
CHLORIDE SERPL-SCNC: 102 MMOL/L (ref 97–108)
CO2 SERPL-SCNC: 36 MMOL/L (ref 21–32)
CREAT SERPL-MCNC: 0.77 MG/DL (ref 0.7–1.3)
DIFFERENTIAL METHOD BLD: ABNORMAL
EOSINOPHIL # BLD: 0.1 K/UL (ref 0–0.4)
EOSINOPHIL NFR BLD: 1 % (ref 0–7)
ERYTHROCYTE [DISTWIDTH] IN BLOOD BY AUTOMATED COUNT: 17.2 % (ref 11.5–14.5)
GLUCOSE BLD STRIP.AUTO-MCNC: 105 MG/DL (ref 65–117)
GLUCOSE BLD STRIP.AUTO-MCNC: 120 MG/DL (ref 65–117)
GLUCOSE BLD STRIP.AUTO-MCNC: 165 MG/DL (ref 65–117)
GLUCOSE BLD STRIP.AUTO-MCNC: 212 MG/DL (ref 65–117)
GLUCOSE SERPL-MCNC: 165 MG/DL (ref 65–100)
HCT VFR BLD AUTO: 40.4 % (ref 36.6–50.3)
HGB BLD-MCNC: 12 G/DL (ref 12.1–17)
IMM GRANULOCYTES # BLD AUTO: 0.1 K/UL (ref 0–0.04)
IMM GRANULOCYTES NFR BLD AUTO: 1 % (ref 0–0.5)
LYMPHOCYTES # BLD: 1.5 K/UL (ref 0.8–3.5)
LYMPHOCYTES NFR BLD: 12 % (ref 12–49)
MAGNESIUM SERPL-MCNC: 2.5 MG/DL (ref 1.6–2.4)
MCH RBC QN AUTO: 23.8 PG (ref 26–34)
MCHC RBC AUTO-ENTMCNC: 29.7 G/DL (ref 30–36.5)
MCV RBC AUTO: 80 FL (ref 80–99)
MONOCYTES # BLD: 0.7 K/UL (ref 0–1)
MONOCYTES NFR BLD: 5 % (ref 5–13)
NEUTS SEG # BLD: 10.4 K/UL (ref 1.8–8)
NEUTS SEG NFR BLD: 81 % (ref 32–75)
NRBC # BLD: 0 K/UL (ref 0–0.01)
NRBC BLD-RTO: 0 PER 100 WBC
PHOSPHATE SERPL-MCNC: 2 MG/DL (ref 2.6–4.7)
PLATELET # BLD AUTO: 142 K/UL (ref 150–400)
PMV BLD AUTO: 12.5 FL (ref 8.9–12.9)
POTASSIUM SERPL-SCNC: 3.8 MMOL/L (ref 3.5–5.1)
RBC # BLD AUTO: 5.05 M/UL (ref 4.1–5.7)
SERVICE CMNT-IMP: ABNORMAL
SERVICE CMNT-IMP: NORMAL
SODIUM SERPL-SCNC: 137 MMOL/L (ref 136–145)
VANCOMYCIN SERPL-MCNC: 16.5 UG/ML
WBC # BLD AUTO: 12.7 K/UL (ref 4.1–11.1)

## 2021-09-27 PROCEDURE — 80048 BASIC METABOLIC PNL TOTAL CA: CPT

## 2021-09-27 PROCEDURE — 65270000029 HC RM PRIVATE

## 2021-09-27 PROCEDURE — 74011250637 HC RX REV CODE- 250/637: Performed by: INTERNAL MEDICINE

## 2021-09-27 PROCEDURE — 74011636637 HC RX REV CODE- 636/637: Performed by: FAMILY MEDICINE

## 2021-09-27 PROCEDURE — 36415 COLL VENOUS BLD VENIPUNCTURE: CPT

## 2021-09-27 PROCEDURE — 74011636637 HC RX REV CODE- 636/637: Performed by: INTERNAL MEDICINE

## 2021-09-27 PROCEDURE — P9047 ALBUMIN (HUMAN), 25%, 50ML: HCPCS | Performed by: INTERNAL MEDICINE

## 2021-09-27 PROCEDURE — 97530 THERAPEUTIC ACTIVITIES: CPT

## 2021-09-27 PROCEDURE — 74011250637 HC RX REV CODE- 250/637: Performed by: FAMILY MEDICINE

## 2021-09-27 PROCEDURE — 77010033678 HC OXYGEN DAILY

## 2021-09-27 PROCEDURE — 74011000258 HC RX REV CODE- 258: Performed by: INTERNAL MEDICINE

## 2021-09-27 PROCEDURE — 83735 ASSAY OF MAGNESIUM: CPT

## 2021-09-27 PROCEDURE — 80202 ASSAY OF VANCOMYCIN: CPT

## 2021-09-27 PROCEDURE — 85025 COMPLETE CBC W/AUTO DIFF WBC: CPT

## 2021-09-27 PROCEDURE — 74011250636 HC RX REV CODE- 250/636: Performed by: INTERNAL MEDICINE

## 2021-09-27 PROCEDURE — 74011250637 HC RX REV CODE- 250/637: Performed by: HOSPITALIST

## 2021-09-27 PROCEDURE — 82962 GLUCOSE BLOOD TEST: CPT

## 2021-09-27 PROCEDURE — 99231 SBSQ HOSP IP/OBS SF/LOW 25: CPT | Performed by: CLINICAL NURSE SPECIALIST

## 2021-09-27 PROCEDURE — 84100 ASSAY OF PHOSPHORUS: CPT

## 2021-09-27 RX ORDER — ALBUMIN HUMAN 250 G/1000ML
12.5 SOLUTION INTRAVENOUS 2 TIMES DAILY
Status: COMPLETED | OUTPATIENT
Start: 2021-09-27 | End: 2021-09-27

## 2021-09-27 RX ORDER — FUROSEMIDE 10 MG/ML
40 INJECTION INTRAMUSCULAR; INTRAVENOUS 2 TIMES DAILY
Status: DISPENSED | OUTPATIENT
Start: 2021-09-27 | End: 2021-09-28

## 2021-09-27 RX ADMIN — Medication 10 ML: at 07:12

## 2021-09-27 RX ADMIN — INSULIN LISPRO 4 UNITS: 100 INJECTION, SOLUTION INTRAVENOUS; SUBCUTANEOUS at 07:11

## 2021-09-27 RX ADMIN — ASPIRIN 81 MG: 81 TABLET, COATED ORAL at 08:33

## 2021-09-27 RX ADMIN — SACUBITRIL AND VALSARTAN 1 TABLET: 49; 51 TABLET, FILM COATED ORAL at 08:44

## 2021-09-27 RX ADMIN — CEFEPIME HYDROCHLORIDE 2 G: 2 INJECTION, POWDER, FOR SOLUTION INTRAVENOUS at 16:53

## 2021-09-27 RX ADMIN — ROSUVASTATIN CALCIUM 5 MG: 10 TABLET, FILM COATED ORAL at 08:34

## 2021-09-27 RX ADMIN — TORSEMIDE 60 MG: 20 TABLET ORAL at 08:33

## 2021-09-27 RX ADMIN — CEFEPIME HYDROCHLORIDE 2 G: 2 INJECTION, POWDER, FOR SOLUTION INTRAVENOUS at 07:12

## 2021-09-27 RX ADMIN — MICONAZOLE NITRATE 2 % TOPICAL POWDER: at 17:14

## 2021-09-27 RX ADMIN — INSULIN LISPRO 5 UNITS: 100 INJECTION, SOLUTION INTRAVENOUS; SUBCUTANEOUS at 07:10

## 2021-09-27 RX ADMIN — VANCOMYCIN HYDROCHLORIDE 1250 MG: 10 INJECTION, POWDER, LYOPHILIZED, FOR SOLUTION INTRAVENOUS at 21:45

## 2021-09-27 RX ADMIN — FUROSEMIDE 40 MG: 10 INJECTION, SOLUTION INTRAMUSCULAR; INTRAVENOUS at 16:53

## 2021-09-27 RX ADMIN — VANCOMYCIN HYDROCHLORIDE 1250 MG: 10 INJECTION, POWDER, LYOPHILIZED, FOR SOLUTION INTRAVENOUS at 08:45

## 2021-09-27 RX ADMIN — PREGABALIN 75 MG: 25 CAPSULE ORAL at 00:39

## 2021-09-27 RX ADMIN — EZETIMIBE 10 MG: 10 TABLET ORAL at 08:33

## 2021-09-27 RX ADMIN — CALCIUM CARBONATE (ANTACID) CHEW TAB 500 MG 200 MG: 500 CHEW TAB at 07:10

## 2021-09-27 RX ADMIN — HUMAN INSULIN 40 UNITS: 100 INJECTION, SUSPENSION SUBCUTANEOUS at 07:11

## 2021-09-27 RX ADMIN — CALCIUM CARBONATE (ANTACID) CHEW TAB 500 MG 200 MG: 500 CHEW TAB at 12:00

## 2021-09-27 RX ADMIN — ISOSORBIDE MONONITRATE 30 MG: 30 TABLET, EXTENDED RELEASE ORAL at 08:33

## 2021-09-27 RX ADMIN — ALBUMIN (HUMAN) 12.5 G: 0.25 INJECTION, SOLUTION INTRAVENOUS at 17:12

## 2021-09-27 RX ADMIN — PREDNISONE 10 MG: 10 TABLET ORAL at 07:10

## 2021-09-27 RX ADMIN — SACUBITRIL AND VALSARTAN 1 TABLET: 49; 51 TABLET, FILM COATED ORAL at 21:45

## 2021-09-27 RX ADMIN — MICONAZOLE NITRATE 2 % TOPICAL POWDER: at 08:38

## 2021-09-27 RX ADMIN — APIXABAN 5 MG: 5 TABLET, FILM COATED ORAL at 08:33

## 2021-09-27 RX ADMIN — ALBUMIN (HUMAN) 12.5 G: 0.25 INJECTION, SOLUTION INTRAVENOUS at 12:54

## 2021-09-27 RX ADMIN — CALCIUM CARBONATE (ANTACID) CHEW TAB 500 MG 200 MG: 500 CHEW TAB at 16:54

## 2021-09-27 RX ADMIN — MONTELUKAST 10 MG: 10 TABLET, FILM COATED ORAL at 21:45

## 2021-09-27 RX ADMIN — ACETAMINOPHEN 650 MG: 325 TABLET ORAL at 07:10

## 2021-09-27 RX ADMIN — PANTOPRAZOLE SODIUM 40 MG: 40 TABLET, DELAYED RELEASE ORAL at 07:10

## 2021-09-27 RX ADMIN — CEFEPIME HYDROCHLORIDE 2 G: 2 INJECTION, POWDER, FOR SOLUTION INTRAVENOUS at 00:39

## 2021-09-27 RX ADMIN — APIXABAN 5 MG: 5 TABLET, FILM COATED ORAL at 17:13

## 2021-09-27 RX ADMIN — PREGABALIN 75 MG: 25 CAPSULE ORAL at 12:00

## 2021-09-27 RX ADMIN — AMIODARONE HYDROCHLORIDE 100 MG: 200 TABLET ORAL at 08:33

## 2021-09-27 RX ADMIN — ACETAMINOPHEN 650 MG: 325 TABLET ORAL at 00:45

## 2021-09-27 RX ADMIN — HUMAN INSULIN 10 UNITS: 100 INJECTION, SUSPENSION SUBCUTANEOUS at 21:44

## 2021-09-27 NOTE — PROGRESS NOTES
Hospitalist Progress Note  Tawanna Arnett MD  Answering service: [phone] from in house phone      Date of Service:  2021  NAME:  Jak Bhandari  :  1965  MRN:  [id number]      Admission Summary:   Jak Bhandari is a 48 y.o. male who presents with sob and AMS      61-year-old gentleman history of diabetes, hypertension, dyslipidemia, likely history of CHF as well he uses oxygen as needed at home (particularly at night for obstructive sleep apnea), morbid obese  presents to the emergency department today with chief complaint of generalized not feeling well for the past 4 to 5 days. No fevers but increased shortness of breath. EMS reports room air hypoxia in the 70s. Patient complains of a rash to his left upper extremity consistent with cellulitis and is not currently on treatment. He has been up titrating his Lasix at home without significant relief of his symptoms. There is a conflicting history about whether or not he has been taking his blood thinner as prescribed. He is fully Covid vaccinated. He is lethargic in ER. Place on bipap now. Not able to get history for now     Interval history / Subjective:      Patient seen and examined today. Legs still swollen, wound less drainage and dressing less soaked.      Assessment & Plan:     # Bilateral lower leg wound, Ulceration on LLE on the bases of stasis dermatitis/ venous insufficiency    - dopplers negative   - CHONG pending   - ID consulted, antibiotics initiated   - on IV Cefepime and Vancomycin --present   - continue wound care    - elevate the leg   - will obtain CTA leg if no improvement     # Acute hypoxic and hypercapnic respiratory failure due to COPD exacerbation and CHF, + IVY + likely obesity hypoventilation syndrome  - BiPAP qhs   - continue on NC oxygen at day time      # Acute exacerbation of COPD  - On steroid, Nebs   - Wean oxygen as tolerated, pulmonology following     # Acute on chronic diastolic congestive heart failure  - normal EF on recent echo  - Repeat echo pending  - Patient with lower extremity edema, likely lymphedema related versus cor pulmonale  - Diuretic changed to Torsemide, patient's home med  - Consider resuming Entresto if blood pressure improves    # Atrial fibrillation  - Paroxysmal atrial fibrillation  - Stable on amiodarone, Eliquis for anticoagulation    # Hypertension  - Continue Imdur     # Dyslipidemia  - On statin     # Diabetes  - On NPH, premeal Humalog and insulin sliding scale coverage  - Diabetic management team following and adjusting insulin dose     # Cellulitis of the left upper extremity  - Completed antibiotics       Discharge disposition: snf   Anticipated discharge: [address] Problems  Date Reviewed: 4/28/2013        Codes Class Noted POA    Acute on chronic respiratory failure [facility]) ICD-10-CM: J96.20  ICD-9-CM: 518.84  9/9/2021 Unknown                Review of Systems:   Pertinent positive mentioned in interval hx/HPI. Other systems reviewed and negative     Vital Signs:    Last 24hrs VS reviewed since prior progress note. Most recent are:  Visit Vitals  /75   Pulse 79   Temp 97.4 °F (36.3 °C)   Resp 16   Ht 6' 1\" (1.854 m)   Wt 151.5 kg (333 lb 14.4 oz)   SpO2 98%   BMI 44.05 kg/m²         Intake/Output Summary (Last 24 hours) at 9/27/2021 1628  Last data filed at 9/27/2021 0600  Gross per 24 hour   Intake    Output 1900 ml   Net -1900 ml        Physical Examination:             Constitutional:  No acute distress, in pain, chronically sick looking, obese     ENT:  Oral mucous moist   Resp:  CTA bilaterally. No wheezing/rhonchi/rales. No accessory muscle use   CV:  Regular rhythm, normal rate, no murmurs, gallops, rubs    GI:  Soft, non distended, non tender.  normoactive bowel sounds, no hepatosplenomegaly     Musculoskeletal:  bilateral lower leg edema + 3, clean dressing covering the shin , small wound on the left leg     Neurologic:  Moves all extremities. Data Review:    I personally reviewed labs and imaging     Labs:     Recent Labs     09/27/21 0538   WBC 12.7*   HGB 12.0*   HCT 40.4   *     Recent Labs     09/27/21  0538 09/26/21  0350 09/25/21  0318    134*  --    K 3.8 3.6  --     97  --    CO2 36* 34*  --    BUN 31* 44*  --    CREA 0.77 0.82  --    * 200*  --    CA 8.7 8.6  --    MG 2.5* 2.3 2.4   PHOS 2.0* 1.7* 2.4*     No results for input(s): ALT, AP, TBIL, TBILI, TP, ALB, GLOB, GGT, AML, LPSE in the last 72 hours. No lab exists for component: SGOT, GPT, AMYP, HLPSE  No results for input(s): INR, PTP, APTT, INREXT, INREXT in the last 72 hours. No results for input(s): FE, TIBC, PSAT, FERR in the last 72 hours. No results found for: FOL, RBCF   No results for input(s): PH, PCO2, PO2 in the last 72 hours. No results for input(s): CPK, CKNDX, TROIQ in the last 72 hours.     No lab exists for component: CPKMB  Lab Results   Component Value Date/Time    Cholesterol, total 170 01/23/2013 11:08 AM    HDL Cholesterol 37 (L) 01/23/2013 11:08 AM    LDL, calculated 97 01/23/2013 11:08 AM    Triglyceride 180 (H) 01/23/2013 11:08 AM    CHOL/HDL Ratio 6.0 (H) 10/06/2010 03:18 PM     Lab Results   Component Value Date/Time    Glucose (POC) 105 09/27/2021 01:13 PM    Glucose (POC) 212 (H) 09/27/2021 06:08 AM    Glucose (POC) 169 (H) 09/26/2021 09:20 PM    Glucose (POC) 151 (H) 09/26/2021 04:10 PM    Glucose (POC) 252 (H) 09/26/2021 11:08 AM     No results found for: COLOR, APPRN, SPGRU, REFSG, ELIZA, PROTU, GLUCU, KETU, BILU, UROU, RON, LEUKU, GLUKE, EPSU, BACTU, WBCU, RBCU, CASTS, UCRY      Medications Reviewed:     Current Facility-Administered Medications   Medication Dose Route Frequency    albumin human 25% (BUMINATE) solution 12.5 g  12.5 g IntraVENous BID    furosemide (LASIX) injection 40 mg  40 mg IntraVENous BID    Vancomycin, Random - Please draw on Monday (9/27) @ 2000. Thanks!    Other ONCE    [START ON 9/28/2021] insulin NPH (NOVOLIN N, HUMULIN N) injection 20 Units  20 Units SubCUTAneous DAILY WITH BREAKFAST    vancomycin (VANCOCIN) 1250 mg in  ml infusion  1,250 mg IntraVENous Q12H    cefepime (MAXIPIME) 2 g in 0.9% sodium chloride (MBP/ADV) 100 mL MBP  2 g IntraVENous Q8H    Vancomycin Pharmacy Dosing   Other Rx Dosing/Monitoring    predniSONE (DELTASONE) tablet 10 mg  10 mg Oral DAILY WITH BREAKFAST    insulin NPH (NOVOLIN N, HUMULIN N) injection 10 Units  10 Units SubCUTAneous QHS    insulin lispro (HUMALOG) injection 5 Units  5 Units SubCUTAneous TIDAC    oxyCODONE IR (ROXICODONE) tablet 7.5 mg  7.5 mg Oral Q6H PRN    calcium carbonate (TUMS) chewable tablet 200 mg [elemental]  200 mg Oral TID WITH MEALS    [Held by provider] torsemide (DEMADEX) tablet 60 mg  60 mg Oral DAILY    insulin lispro (HUMALOG) injection   SubCUTAneous AC&HS    dextrose (D50W) injection syrg 12.5-25 g  12.5-25 g IntraVENous PRN    albuterol-ipratropium (DUO-NEB) 2.5 MG-0.5 MG/3 ML  3 mL Nebulization Q6H PRN    alteplase (CATHFLO) 1 mg in sterile water (preservative free) 1 mL injection  1 mg InterCATHeter PRN    pregabalin (LYRICA) capsule 75 mg  75 mg Oral BID    montelukast (SINGULAIR) tablet 10 mg  10 mg Oral QHS    sacubitriL-valsartan (ENTRESTO) 49-51 mg tablet 1 Tablet  1 Tablet Oral Q12H    apixaban (ELIQUIS) tablet 5 mg  5 mg Oral BID    amiodarone (CORDARONE) tablet 100 mg  100 mg Oral DAILY    ezetimibe (ZETIA) tablet 10 mg  10 mg Oral DAILY    pantoprazole (PROTONIX) tablet 40 mg  40 mg Oral ACB    isosorbide mononitrate ER (IMDUR) tablet 30 mg  30 mg Oral DAILY    aspirin delayed-release tablet 81 mg  81 mg Oral DAILY    rosuvastatin (CRESTOR) tablet 5 mg  5 mg Oral DAILY    [Held by provider] spironolactone (ALDACTONE) tablet 25 mg  25 mg Oral DAILY    benzocaine-menthoL (CEPACOL) lozenge 1 Lozenge  1 Lozenge Mucous Membrane PRN    fentaNYL citrate (PF) injection 50 mcg  50 mcg IntraVENous Q4H PRN    hydrALAZINE (APRESOLINE) 20 mg/mL injection 10 mg  10 mg IntraVENous Q6H PRN    glucose chewable tablet 16 g  4 Tablet Oral PRN    dextrose (D50W) injection syrg 12.5-25 g  25-50 mL IntraVENous PRN    glucagon (GLUCAGEN) injection 1 mg  1 mg IntraMUSCular PRN    sodium chloride (NS) flush 5-40 mL  5-40 mL IntraVENous Q8H    sodium chloride (NS) flush 5-40 mL  5-40 mL IntraVENous PRN    acetaminophen (TYLENOL) tablet 650 mg  650 mg Oral Q6H PRN    Or    acetaminophen (TYLENOL) suppository 650 mg  650 mg Rectal Q6H PRN    polyethylene glycol (MIRALAX) packet 17 g  17 g Oral DAILY PRN    ondansetron (ZOFRAN ODT) tablet 4 mg  4 mg Oral Q8H PRN    Or    ondansetron (ZOFRAN) injection 4 mg  4 mg IntraVENous Q6H PRN    arformoteroL (BROVANA) neb solution 15 mcg  15 mcg Nebulization BID RT    And    budesonide (PULMICORT) 500 mcg/2 ml nebulizer suspension  500 mcg Nebulization BID RT    miconazole (MICOTIN) 2 % powder   Topical BID    sodium chloride (NS) flush 5-40 mL  5-40 mL IntraVENous Q8H    sodium chloride (NS) flush 5-40 mL  5-40 mL IntraVENous PRN     ______________________________________________________________________  EXPECTED LENGTH OF STAY: 3d 14h  ACTUAL LENGTH OF STAY:          25                 Letitia Mirza MD   Patient has given Verbal permission to discuss medical care with   persons present in the room and and also with contact as listed on face sheet. Please note that this dictation was completed with [employer]et, the [employer] voice recognition software. Quite often unanticipated grammatical, syntax, homophones, and other interpretive errors are inadvertently transcribed by the computer software. Please disregard these errors. Please excuse any errors that have escaped final proofreading. Thank you.

## 2021-09-27 NOTE — PROGRESS NOTES
Problem: Mobility Impaired (Adult and Pediatric)  Goal: *Acute Goals and Plan of Care (Insert Text)  Description: FUNCTIONAL STATUS PRIOR TO ADMISSION: Patient was modified independent using a rollator for functional mobility. HOME SUPPORT PRIOR TO ADMISSION: The patient lived alone with family to provide assistance. Patient also reports having an aide that comes 11-5 Monday-Friday. Physical Therapy Goals  Initiated 9/11/2021; reviewed/ continued 9/20/2021, reviewed/continued 9/27/2021  1. Patient will move from supine to sit and sit to supine , scoot up and down, and roll side to side in bed with modified independence within 7 day(s). 2.  Patient will transfer from bed to chair and chair to bed with modified independence using the least restrictive device within 7 day(s). 3.  Patient will perform sit to stand with modified independence within 7 day(s). 4.  Patient will ambulate with modified independence for 100 feet with the least restrictive device within 7 day(s). 9/27/2021 1521 by Ira Gustafson  Outcome: Not Progressing Towards Goal   PHYSICAL THERAPY TREATMENT: WEEKLY REASSESSMENT  Patient: Osmany Sanchez [de-identified]48 y.o. male)  Date: 9/27/2021  Primary Diagnosis: Acute on chronic respiratory failure ([facility] [address] [J96.20]        Precautions: fall, skin          ASSESSMENT  Patient continues with skilled PT services and is not progressing towards goals. Pt received sitting in recliner since 12 am per pt. Pt reports feeling more comfortable sitting in  with BLE elevated. Pt states BLE continue to have increased edema and LLE draining and more painful. Pt provided with yellow socks prior to attempting transfer to stand. Pt needed several attempts and rocking forward and backwards to gain momentum to stand up requiring minimal assist x 2 and securing chair which was initially sliding a bit even with brakes on.  Pt stood in place with rollator for several minutes and frequently leans over rollator to rest his elbows onto the handles due to chronic low back pain. He also completed marching in place and side stepped to bedside commode. PCT came in to assist pt at session end. Patient's progression toward goals since last assessment: slow progress, limited due to increased edema, drainage, and pain BLE, L worse than R, chronic back pain, pt missed several sessions due to declining therapy and not able to participate most of the week due to issues including chest pain, increased edema BLE, and  r/o DVT, ambulated 50 feet with rollator 1 week ago    Current Level of Function Impacting Discharge (mobility/balance): transfers with minimal assist x 2, chair to bedside commode with rollator and CGA       Other factors to consider for discharge: lives alone with aide that comes 11-5 Monday-Friday         PLAN :  Goals have been updated based on progression since last assessment. Patient continues to benefit from skilled intervention to address the above impairments. Recommendations and Planned Interventions: bed mobility training, transfer training, gait training, and patient and family training/education      Frequency/Duration: Patient will be followed by physical therapy:  5 times a week to address goals. Recommendation for discharge: (in order for the patient to meet his/her long term goals)  Therapy up to 5 days/week in SNF setting    This discharge recommendation:  A follow-up discussion with the attending provider and/or case management is planned    IF patient discharges home will need the following DME: patient owns DME required for discharge         SUBJECTIVE:   Patient agreeable to participate with therapy.          EXAMINATION/PRESENTATION/DECISION MAKING:   Critical Behavior:  Neurologic State: Alert  Orientation Level: Oriented X4  Cognition: Follows commands    Functional Mobility:  Bed Mobility:   Not assessed, OOB in chair            Transfers:  Sit to Stand: Minimum assistance;Assist x2;Additional time; Adaptive equipment, required multiple attempts, rocks forward and backward to gain momentum to stand  Stand to Sit: Contact guard assistance;Assist x2; Additional time; Adaptive equipment        Chair to bedside commode: Contact guard assistance;Assist x2;Adaptive equipment; Additional time              Balance:   Sitting: Intact  Standing: Impaired  Standing - Static: Constant support;Good  Standing - Dynamic : Constant support; Fair      Pain Rating:  Complains of LLE pain, did not rate    Activity Tolerance:   Fair    After treatment patient left in no apparent distress:   Sitting in chair and Call bell within reach    COMMUNICATION/EDUCATION:   The patients plan of care was discussed with: Registered nurse. Fall prevention education was provided and the patient/caregiver indicated understanding. and Patient/family agree to work toward stated goals and plan of care.     Thank you for this referral.  Meredith Bravo Gist   Time Calculation: 23 mins

## 2021-09-27 NOTE — DIABETES MGMT
[address] [facility]    CLINICAL NURSE SPECIALIST CONSULT   FOLLOW UP NOTE    Initial Presentation   Kimberley Dash is a 48 y.o. male who presented to the ED 9/9/21 via EMS for generalized c/o feeling unwell. He was hypoxic in the 70%s and placed on O2 with EMS. HX:   Past Medical History:   Diagnosis Date    DM (diabetes mellitus) ([facility] [address]     Dyslipidemia     Hypertension         INITIAL DX: Acute on chronic respiratory failure with hypercapnia; upper left arm cellulitis    Current Treatment     TX: IV steroids, antibiotics, pulmonary consult    Consulted by Yazan Lopez MD for advanced diabetes nursing assessment and care for:   [x] Inpatient management strategy      Hospital Course   Clinical progress has been complicated by: .   9/9: Lethargic, transitioned to BiPAP. Seen by pulmonary- continue steroids, diuresis, bronchodilators. Transfer to ICU for respiratory acidosis. ? Obstructive lung disease  9/10: BiPAP overnight. Seen by ortho for generalized LUE cellulitis. Transfer out of ICU  9/12: HFNC  9/14: Pulm re consulted- continue mobility, cxr, consider repeating ECHO  9/20: Decreasing O2 demands, adjusting diuretics  Diabetes History   Hx Type 2 Diabetes \"for many years\"  Diabetes managed by Anay Mixon MD    Diabetes-related Medical History  Acute complications  Steroid induced hyperglycemia      Diabetes Medication History  Drug class Currently in use Discontinued Never used   Biguanide      DDP-4 inhibitor       Sulfonylurea      Thiazolidinedione      GLP-1 RA      SGLT-2 inhibitors      Basal insulin 18 units Lantus once daily Levemir     Bolus insulin  Novolog with meals    Fixed Dose  Combinations  70/30 50 units BID      Subjective   I take sliding scale and my glucose goes up.     Patient reports the following home diabetes self-management practices:   Eating pattern   [x] Eats once meal a day  Limited snacking  Water throughout the day  Physical activity pattern   [x] Mostly sedentary   Monitoring pattern   [x] Bedtime: -180  Taking medications pattern  [x] Consistent administration  [x] Affordable     A1C 8.3%  Initial B  GFR 60    Fasting B  Pre-prandial:   WBC 14.1  IV abox  Basal: NPH 50 units Q12  Bolus: 10 units with meals- 2 doses held yesterday  Correction: 0 units in the last 24h    Methylprednisolone 40 mg Q12 changed to prednisone 40mg daily    Objective   Physical exam  General Obese male in respiratory distress/ill-appearing. Conversant and cooperative  Neuro  Alert, oriented   Vital Signs   Visit Vitals  /75   Pulse 79   Temp 97.4 °F (36.3 °C)   Resp 16   Ht 6' 1\" (1.854 m)   Wt 151.5 kg (333 lb 14.4 oz)   SpO2 98%   BMI 44.05 kg/m²     Skin  Warm and dry. No acanthosis noted along neckline. No lipohypertrophy or lipoatrophy noted at injection sites   Heart   Regular rate and rhythm. No murmurs, rubs or gallops  Lungs  Clear to auscultation without rales or rhonchi  Extremities No foot wounds       Laboratory      CBC WITH AUTOMATED DIFF    Collection Time: 21  5:38 AM   Result Value Ref Range    WBC 12.7 (H) 4.1 - 11.1 K/uL    RBC 5.05 4. 10 - 5.70 M/uL    HGB 12.0 (L) 12.1 - 17.0 g/dL    HCT 40.4 36.6 - 50.3 %    MCV 80.0 80.0 - 99.0 FL    MCH 23.8 (L) 26.0 - 34.0 PG    MCHC 29.7 (L) 30.0 - 36.5 g/dL    RDW 17.2 (H) 11.5 - 14.5 %    PLATELET 107 (L) 800 - 400 K/uL    MPV 12.5 8.9 - 12.9 FL    NRBC 0.0 0  WBC    ABSOLUTE NRBC 0.00 0.00 - 0.01 K/uL    NEUTROPHILS 81 (H) 32 - 75 %    LYMPHOCYTES 12 12 - 49 %    MONOCYTES 5 5 - 13 %    EOSINOPHILS 1 0 - 7 %    BASOPHILS 0 0 - 1 %    IMMATURE GRANULOCYTES 1 (H) 0.0 - 0.5 %    ABS. NEUTROPHILS 10.4 (H) 1.8 - 8.0 K/UL    ABS. LYMPHOCYTES 1.5 0.8 - 3.5 K/UL    ABS. MONOCYTES 0.7 0.0 - 1.0 K/UL    ABS. EOSINOPHILS 0.1 0.0 - 0.4 K/UL    ABS. BASOPHILS 0.0 0.0 - 0.1 K/UL    ABS. IMM.  GRANS. 0.1 (H) 0.00 - 0.04 K/UL    DF AUTOMATED       No results found for this visit on 09/09/21 (from the past 12 hour(s)). BMP:   Lab Results   Component Value Date/Time     09/27/2021 05:38 AM    K 3.8 09/27/2021 05:38 AM     09/27/2021 05:38 AM    CO2 36 (H) 09/27/2021 05:38 AM    AGAP NEG 1 09/27/2021 05:38 AM     (H) 09/27/2021 05:38 AM    BUN 31 (H) 09/27/2021 05:38 AM    CREA 0.77 09/27/2021 05:38 AM    GFRAA >60 09/27/2021 05:38 AM    GFRNA >60 09/27/2021 05:38 AM        Factors impacting BG management  Factor Dose Comments   Nutrition:  Standard meals     60 grams/meal      Drugs:    Steroids     [id number] mg daily   Impairs insulin action       Blood glucose pattern            Assessment and Plan   Nursing Diagnosis Risk for unstable blood glucose pattern   Nursing Intervention Domain 5250 Decision-making Support   Nursing Interventions Examined current inpatient diabetes control   Explored factors facilitating and impeding inpatient management  Identified self-management practices impeding diabetes control  Explored corrective strategies with patient and responsible inpatient provider   Informed patient of rational for insulin strategy while hospitalized     Evaluation   Mavis Espinoza is a 48year old gentleman, with uncontrolled Type 2 Diabetes on 70/30 insulin, admitted with acute on chronic hypercapneic and hypoxic respiratory failure with acute COPD exacerbation. He did not achieve diabetes control prior to admission, as evidenced by A1c of 8.3%. During this hospitalization, the patient's glucose was in goal the 1st 24 hrs without the use of antihyperglycemic agents- he was on BiPAP and NPO at that time. 40mg methylprednisolone Q12 was initiated and now with steroid induced hyperglycemia. Low dose Lantus (18 units/home dose)/humalog (4 units/meal) was started but not sufficient to override steroid impact as BG was over 310.   Basal/bolus insulin was doubled with little improvement in glucose pattern so NPH was titrated to 50 units Q12 and 15 units humalog with meals. Glucose ranged from 168-198. Steroids were adjusted to 40mg prednisone once daily and been titrated down over last week to a dose of 10 mg daily. Basal bolus insulin has been titrated down with steroids. Please continue a higher day time NPH dose with each prednisone dose. Recommendations   1. POC glucose ACHS    2. Consistent carbohydrate diet (60 grams CHO/meal), carb consistent shakes if they are ordered    3. Adjust the Subcutaneous Insulin Order set (0441)  Insulin Dosing Specific recommendation   Basal                                      (Based on weight, BMI & GFR) 20 units NPH am  10 units NPH pm Please wean am NPH dose with steroids. Nutritional                                      (Based on CHO/dextrose load) 5 units Humalog/meal    Hold if patient consumes less than 50% of carbohydrates on meal tray    Corrective                                       (Useful in adjusting insulin dosing) Resistant sensitivity humalog [facility]          Billing Code(s)   [x] 02234    Before making these care recommendations, I personally reviewed the hosptialization record, including laboratory and diagnostic data, medications and examined the patient at bedside (circumstances permitting).   Total minutes: 13      JUDY Davila  Diabetes Clinical Nurse Specialist  [facility]  Access via [employer]

## 2021-09-27 NOTE — PROGRESS NOTES
Transition of Care: to a SNF;  [facility] and [facility] have all accepted    Transport plan: likely BLS (not set up yet)    RUR: 25%    DX: acute on chronic respiratory failure    Main contact is brother Desean Bazzi [phone]    Discharge pending:  -patient continues on IV antibiotics  -pending progress with PT/OT  -pending medical progress and care recommendations    1715:this CM updated patient at bedside; he is agreeable to go to SNF    NOTE: patient is alert and oriented x 4; patient normally lives alone in an apartment    CM following  Becky Younger RN, CRM

## 2021-09-27 NOTE — PROGRESS NOTES
ID Progress Note  2021    Subjective:     Feeling tired, legs feeling some better    Objective:     Antibiotics:  1. Vancomycin   2. Cefepime       Vitals:   Visit Vitals  /75   Pulse 79   Temp 97.4 °F (36.3 °C)   Resp 16   Ht 6' 1\" (1.854 m)   Wt 151.5 kg (333 lb 14.4 oz)   SpO2 98%   BMI 44.05 kg/m²        Tmax:  Temp (24hrs), Av.6 °F (36.4 °C), Min:97.4 °F (36.3 °C), Max:98 °F (36.7 °C)      Exam:  Lungs:  clear to auscultation bilaterally  Heart:  regular rate and rhythm  Abdomen:  soft, non-tender. Bowel sounds normal. No masses,  no organomegaly  Compression wraps BLE    Labs:      Recent Labs     21  0538 21  0350   WBC 12.7*  --    HGB 12.0*  --    *  --    BUN 31* 44*   CREA 0.77 0.82       Cultures:     No results found for: [facility]  Lab Results   Component Value Date/Time    Culture result: NO GROWTH 5 DAYS 2021 04:55 PM    Culture result: NO GROWTH 5 DAYS 2021 11:59 AM       Radiology:     Line/Insert Date:           Assessment:     1. Fluid overload  2. Vascular insufficiency ulcerations  3. Diabetic skin changes BLE  4. Leukocytosis--resolving    Objective:     1. Continue current therapy until mid-week  2. Work up fevers  3.  Fluid management    Rosalie Dorsey MD

## 2021-09-27 NOTE — PROGRESS NOTES
Bedside and Verbal shift change report given to Rosalie Naqvi RN (oncoming nurse) by Mikki Recinos RN (offgoing nurse). Report included the following information SBAR, Kardex, ED Summary, Intake/Output, MAR and Recent Results.

## 2021-09-28 ENCOUNTER — APPOINTMENT (OUTPATIENT)
Dept: VASCULAR SURGERY | Age: 56
DRG: 291 | End: 2021-09-28
Attending: INTERNAL MEDICINE
Payer: MEDICARE

## 2021-09-28 LAB
ANION GAP SERPL CALC-SCNC: 2 MMOL/L (ref 5–15)
BUN SERPL-MCNC: 33 MG/DL (ref 6–20)
BUN/CREAT SERPL: 42 (ref 12–20)
CALCIUM SERPL-MCNC: 8.4 MG/DL (ref 8.5–10.1)
CHLORIDE SERPL-SCNC: 100 MMOL/L (ref 97–108)
CO2 SERPL-SCNC: 37 MMOL/L (ref 21–32)
CREAT SERPL-MCNC: 0.79 MG/DL (ref 0.7–1.3)
GLUCOSE BLD STRIP.AUTO-MCNC: 144 MG/DL (ref 65–117)
GLUCOSE BLD STRIP.AUTO-MCNC: 145 MG/DL (ref 65–117)
GLUCOSE BLD STRIP.AUTO-MCNC: 149 MG/DL (ref 65–117)
GLUCOSE BLD STRIP.AUTO-MCNC: 69 MG/DL (ref 65–117)
GLUCOSE SERPL-MCNC: 92 MG/DL (ref 65–100)
MAGNESIUM SERPL-MCNC: 2.1 MG/DL (ref 1.6–2.4)
PHOSPHATE SERPL-MCNC: 2.2 MG/DL (ref 2.6–4.7)
POTASSIUM SERPL-SCNC: 3.4 MMOL/L (ref 3.5–5.1)
SERVICE CMNT-IMP: ABNORMAL
SERVICE CMNT-IMP: NORMAL
SODIUM SERPL-SCNC: 139 MMOL/L (ref 136–145)

## 2021-09-28 PROCEDURE — 74011250637 HC RX REV CODE- 250/637: Performed by: HOSPITALIST

## 2021-09-28 PROCEDURE — P9047 ALBUMIN (HUMAN), 25%, 50ML: HCPCS | Performed by: INTERNAL MEDICINE

## 2021-09-28 PROCEDURE — 74011250636 HC RX REV CODE- 250/636: Performed by: INTERNAL MEDICINE

## 2021-09-28 PROCEDURE — 74011000250 HC RX REV CODE- 250: Performed by: HOSPITALIST

## 2021-09-28 PROCEDURE — 83735 ASSAY OF MAGNESIUM: CPT

## 2021-09-28 PROCEDURE — 80048 BASIC METABOLIC PNL TOTAL CA: CPT

## 2021-09-28 PROCEDURE — 74011636637 HC RX REV CODE- 636/637: Performed by: INTERNAL MEDICINE

## 2021-09-28 PROCEDURE — 94760 N-INVAS EAR/PLS OXIMETRY 1: CPT

## 2021-09-28 PROCEDURE — 74011000258 HC RX REV CODE- 258: Performed by: INTERNAL MEDICINE

## 2021-09-28 PROCEDURE — 82962 GLUCOSE BLOOD TEST: CPT

## 2021-09-28 PROCEDURE — 99231 SBSQ HOSP IP/OBS SF/LOW 25: CPT | Performed by: CLINICAL NURSE SPECIALIST

## 2021-09-28 PROCEDURE — 84100 ASSAY OF PHOSPHORUS: CPT

## 2021-09-28 PROCEDURE — 93922 UPR/L XTREMITY ART 2 LEVELS: CPT

## 2021-09-28 PROCEDURE — 65270000029 HC RM PRIVATE

## 2021-09-28 PROCEDURE — 97116 GAIT TRAINING THERAPY: CPT

## 2021-09-28 PROCEDURE — 74011250637 HC RX REV CODE- 250/637: Performed by: FAMILY MEDICINE

## 2021-09-28 PROCEDURE — 36415 COLL VENOUS BLD VENIPUNCTURE: CPT

## 2021-09-28 PROCEDURE — 74011250637 HC RX REV CODE- 250/637: Performed by: INTERNAL MEDICINE

## 2021-09-28 RX ORDER — ALBUMIN HUMAN 250 G/1000ML
25 SOLUTION INTRAVENOUS ONCE
Status: COMPLETED | OUTPATIENT
Start: 2021-09-28 | End: 2021-09-28

## 2021-09-28 RX ADMIN — HUMAN INSULIN 20 UNITS: 100 INJECTION, SUSPENSION SUBCUTANEOUS at 09:51

## 2021-09-28 RX ADMIN — CEFEPIME HYDROCHLORIDE 2 G: 2 INJECTION, POWDER, FOR SOLUTION INTRAVENOUS at 18:11

## 2021-09-28 RX ADMIN — Medication 10 ML: at 18:16

## 2021-09-28 RX ADMIN — PREGABALIN 75 MG: 25 CAPSULE ORAL at 01:21

## 2021-09-28 RX ADMIN — SACUBITRIL AND VALSARTAN 1 TABLET: 49; 51 TABLET, FILM COATED ORAL at 22:27

## 2021-09-28 RX ADMIN — HUMAN INSULIN 10 UNITS: 100 INJECTION, SUSPENSION SUBCUTANEOUS at 22:29

## 2021-09-28 RX ADMIN — ACETAMINOPHEN 650 MG: 325 TABLET ORAL at 08:35

## 2021-09-28 RX ADMIN — CALCIUM CARBONATE (ANTACID) CHEW TAB 500 MG 200 MG: 500 CHEW TAB at 18:11

## 2021-09-28 RX ADMIN — VANCOMYCIN HYDROCHLORIDE 1250 MG: 10 INJECTION, POWDER, LYOPHILIZED, FOR SOLUTION INTRAVENOUS at 10:06

## 2021-09-28 RX ADMIN — CALCIUM CARBONATE (ANTACID) CHEW TAB 500 MG 200 MG: 500 CHEW TAB at 08:35

## 2021-09-28 RX ADMIN — INSULIN LISPRO 5 UNITS: 100 INJECTION, SOLUTION INTRAVENOUS; SUBCUTANEOUS at 12:18

## 2021-09-28 RX ADMIN — PREGABALIN 75 MG: 25 CAPSULE ORAL at 12:19

## 2021-09-28 RX ADMIN — Medication 10 ML: at 22:25

## 2021-09-28 RX ADMIN — CEFEPIME HYDROCHLORIDE 2 G: 2 INJECTION, POWDER, FOR SOLUTION INTRAVENOUS at 01:21

## 2021-09-28 RX ADMIN — ARFORMOTEROL TARTRATE 15 MCG: 15 SOLUTION RESPIRATORY (INHALATION) at 23:05

## 2021-09-28 RX ADMIN — BUDESONIDE 500 MCG: 0.5 INHALANT RESPIRATORY (INHALATION) at 23:06

## 2021-09-28 RX ADMIN — MICONAZOLE NITRATE 2 % TOPICAL POWDER: at 09:51

## 2021-09-28 RX ADMIN — INSULIN LISPRO 5 UNITS: 100 INJECTION, SOLUTION INTRAVENOUS; SUBCUTANEOUS at 18:11

## 2021-09-28 RX ADMIN — PREDNISONE 10 MG: 10 TABLET ORAL at 08:35

## 2021-09-28 RX ADMIN — ALBUMIN (HUMAN) 25 G: 0.25 INJECTION, SOLUTION INTRAVENOUS at 12:20

## 2021-09-28 RX ADMIN — CEFEPIME HYDROCHLORIDE 2 G: 2 INJECTION, POWDER, FOR SOLUTION INTRAVENOUS at 08:35

## 2021-09-28 RX ADMIN — VANCOMYCIN HYDROCHLORIDE 1250 MG: 10 INJECTION, POWDER, LYOPHILIZED, FOR SOLUTION INTRAVENOUS at 21:00

## 2021-09-28 RX ADMIN — EZETIMIBE 10 MG: 10 TABLET ORAL at 09:13

## 2021-09-28 RX ADMIN — ROSUVASTATIN CALCIUM 5 MG: 10 TABLET, FILM COATED ORAL at 09:13

## 2021-09-28 RX ADMIN — CALCIUM CARBONATE (ANTACID) CHEW TAB 500 MG 200 MG: 500 CHEW TAB at 12:19

## 2021-09-28 RX ADMIN — PANTOPRAZOLE SODIUM 40 MG: 40 TABLET, DELAYED RELEASE ORAL at 07:42

## 2021-09-28 RX ADMIN — ACETAMINOPHEN 650 MG: 325 TABLET ORAL at 01:21

## 2021-09-28 NOTE — PROGRESS NOTES
Bedside shift change report given to Maritza (oncoming nurse) by Rosi Villagomez RN (offgoing nurse). Report included the following information SBAR, Kardex, Intake/Output, MAR and Recent Results.

## 2021-09-28 NOTE — PROGRESS NOTES
Up in chair all day. Bilateral legs weeping, Rt serous fluid and Lt is bloody fluid. Dressing changed this evening and bilat legs ace wrapped from toes to knees per MD. Pt is not able to wear JALEEL stockings as legs are to big. Pt states he doesn't like taking the Oxycodone because he doesn't like the way it makes him feel (light headed and dizzy). Bedside shift change report given to Radu Montez RN (oncoming nurse) by Lefty Suarez RN (offgoing nurse). Report included the following information SBAR, Kardex, Intake/Output and MAR.

## 2021-09-28 NOTE — PROGRESS NOTES
Bedside shift change report given to Cr Marinelli (oncoming nurse) by Edin Wilkes (offgoing nurse). Report included the following information SBAR, Kardex, Intake/Output, MAR and Recent Results.

## 2021-09-28 NOTE — PROGRESS NOTES
Problem: Mobility Impaired (Adult and Pediatric)  Goal: *Acute Goals and Plan of Care (Insert Text)  Description: FUNCTIONAL STATUS PRIOR TO ADMISSION: Patient was modified independent using a rollator for functional mobility. HOME SUPPORT PRIOR TO ADMISSION: The patient lived alone with family to provide assistance. Patient also reports having an aide that comes 11-5 Monday-Friday. Physical Therapy Goals  Initiated 9/11/2021; reviewed/ continued 9/20/2021, reviewed/continued 9/27/2021  1. Patient will move from supine to sit and sit to supine , scoot up and down, and roll side to side in bed with modified independence within 7 day(s). 2.  Patient will transfer from bed to chair and chair to bed with modified independence using the least restrictive device within 7 day(s). 3.  Patient will perform sit to stand with modified independence within 7 day(s). 4.  Patient will ambulate with modified independence for 100 feet with the least restrictive device within 7 day(s). Outcome: Progressing Towards Goal     PHYSICAL THERAPY TREATMENT  Patient: Theresa Olivas (14 y.o. male)  Date: 9/28/2021  [facility]: [facility] [J96.[facility]] <principal problem not specified>       Precautions:  Fall  Chart, physical therapy assessment, plan of care and goals were reviewed. ASSESSMENT  Patient continues with skilled PT services and is progressing towards goals. Pt greeted sitting up in chair, agreeable to work with therapy. Per chart, pt hypotensive this AM, currently 105/60mmHg while sitting and read 104/62mmHg when standing, pt asymptomatic. Transport arrived to take pt to vascular lab so therapist assisted pt in ambulating to stretcher. Today, he required Mod A to stand from low chair surface and then CGA to ambulate 22ft with rollator with overall slow gait with increased trunk and hip flexion and poor foot clearance B.  Pt assisted to supine on stretcher with mod A to assist BLE onto bed. Acute PT will continue to follow pt while he remains in the acute setting. Rec SNF placement for continued rehab once medically stable. Current Level of Function Impacting Discharge (mobility/balance): Mod A bed mobility, Mod A sit<>stand transfers, CGA short distance amb with rollator    Other factors to consider for discharge:          PLAN :  Patient continues to benefit from skilled intervention to address the above impairments. Continue treatment per established plan of care. to address goals. Recommendation for discharge: (in order for the patient to meet his/her long term goals)  Therapy up to 5 days/week in SNF setting    This discharge recommendation:  Has been made in collaboration with the attending provider and/or case management    IF patient discharges home will need the following DME: to be determined (TBD)       SUBJECTIVE:   Patient stated The pain is much better today.     OBJECTIVE DATA SUMMARY:   Critical Behavior:  Neurologic State: Alert  Orientation Level: Oriented X4  Cognition: Follows commands     Functional Mobility Training:  Bed Mobility:    Sit to Supine: Moderate assistance to bring BLE up and onto bed surface                 Transfers:  Sit to Stand: Moderate assistance  Stand to Sit: Minimum assistance                             Balance:  Sitting: Intact  Standing: Impaired  Standing - Static: Constant support; Fair  Standing - Dynamic : Constant support; Fair  Ambulation/Gait Training:        Ambulation - Level of Assistance: Contact guard assistance        Gait Abnormalities: Shuffling gait; Decreased step clearance;Trunk sway increased        Base of Support: Widened     Speed/Maral: Slow          Pain Rating:  3/10 BLE pain    Activity Tolerance:   Fair and requires frequent rest breaks    After treatment patient left in no apparent distress:    On stretcher with transport    COMMUNICATION/COLLABORATION:   The patients plan of care was discussed with: Registered nurse.      Ana Houston PT, DPT   Time Calculation: 15 mins

## 2021-09-28 NOTE — PROGRESS NOTES
Bedside shift change report given to Emile Kent RN  (oncoming nurse) by Sona Flaherty RN (offgoing nurse). Report included the following information SBAR, Kardex, Intake/Output, MAR and Recent Results.

## 2021-09-28 NOTE — PROGRESS NOTES
NUTRITION  Reason for Assessment: LOS      Recommendations/plan:   1. Follow Phos/K+ and replete as indicated    - low phos for past 4 days  2. Continue current diet regimen     Information obtained from:   Chart, RN  Past Medical History:   Diagnosis Date    DM (diabetes mellitus) ([facility] [address]     Dyslipidemia     Hypertension      Pt screened for LOS. Chart/labs/meds reviewed. Admitted with Acute on chronic respiratory failure ([facility] [address] [J96.20]. BLLE vascular insufficiency ulcerations with weeping legs. Abx in place. COPD exacerbation on admit, improved. Low phos for past 4 days, not repleted. Low K+ today. Consider oral Neutraphos per MD for repletion. Pt visited but OFT x 2 attempts. No overt nutritional concerns at this time. Will rescreen per policy    Current Nutrition Therapies:  Diet: consistent CHO 60 gm/meal, cardiac  Supplements: none   Meal intake: No data found.  eating well last week per nursing  Wt Hx:  Wt Readings   09/21/21 152.1 kg (335 lb 6.4 oz)   04/25/13 138.3 kg (305 lb)   01/22/13 139.3 kg (307 lb 3.2 oz)   10/06/10 156.2 kg (344 lb 6.4 oz)     Nutrition-Related Concerns Identified: none    Estimated Nutrition Needs:   Energy: 8146-1407 (MSJ x 1-1.2 x 1-1.2)  Wt used: Current (152.1 kg)  Protein: 145-175 (~1-1.2 gm/kg or ~2 gm/kg IBW)    Fluid: 1 mL/kcal     Raphael London RD  Available via [facility]

## 2021-09-28 NOTE — PROGRESS NOTES
Pharmacist Note - Vancomycin Dosing  Therapy day 3  Indication:   B/L lower leg wounds  Current regimen:  1250 mg IV Q 12 hr    Recent Labs     09/27/21  0538 09/26/21  0350   WBC 12.7*  --    CREA 0.77 0.82   BUN 31* 44*       A random vancomycin level of 16.5 mcg/mL was obtained and from this level, the patient's AUC24 is calculated to be 511 with the current regimen. Goal target range ~ AUC/GISEL 400-500      Plan: Continue current regimen. Pharmacy will continue to monitor this patient daily for changes in clinical status and renal function. Random vancomycin levels are used to calculate AUC/GISEL, this level should not be interpreted as a trough. Vancomycin has been dosed using Bayesian kinetics software to target an AUC24:GISEL of 400-600, which provides adequate exposure for as assumed infection due to MRSA with an GISEL of 1 or less while reducing the risk of nephrotoxicity as seen with traditional trough based dosing goals.

## 2021-09-28 NOTE — DIABETES MGMT
[address] [facility]    CLINICAL NURSE SPECIALIST CONSULT   FOLLOW UP NOTE    Initial Presentation   Lm Thompson is a 48 y.o. male who presented to the ED 9/9/21 via EMS for generalized c/o feeling unwell. He was hypoxic in the 70%s and placed on O2 with EMS. HX:   Past Medical History:   Diagnosis Date    DM (diabetes mellitus) ([facility] [address]     Dyslipidemia     Hypertension         INITIAL DX: Acute on chronic respiratory failure with hypercapnia; upper left arm cellulitis    Current Treatment     TX: IV steroids, antibiotics, pulmonary consult    Consulted by Radha Palumbo MD for advanced diabetes nursing assessment and care for:   [x] Inpatient management strategy      Hospital Course   Clinical progress has been complicated by: .   9/9: Lethargic, transitioned to BiPAP. Seen by pulmonary- continue steroids, diuresis, bronchodilators. Transfer to ICU for respiratory acidosis. ? Obstructive lung disease  9/10: BiPAP overnight. Seen by ortho for generalized LUE cellulitis. Transfer out of ICU  9/12: HFNC  9/14: Pulm re consulted- continue mobility, cxr, consider repeating ECHO  9/20: Decreasing O2 demands, adjusting diuretics  Diabetes History   Hx Type 2 Diabetes \"for many years\"  Diabetes managed by Dora Bains MD    Diabetes-related Medical History  Acute complications  Steroid induced hyperglycemia      Diabetes Medication History  Drug class Currently in use Discontinued Never used   Biguanide      DDP-4 inhibitor       Sulfonylurea      Thiazolidinedione      GLP-1 RA      SGLT-2 inhibitors      Basal insulin 18 units Lantus once daily Levemir     Bolus insulin  Novolog with meals    Fixed Dose  Combinations  70/30 50 units BID      Subjective   I take sliding scale and my glucose goes up.     Patient reports the following home diabetes self-management practices:   Eating pattern   [x] Eats once meal a day  Limited snacking  Water throughout the day  Physical activity pattern   [x] Mostly sedentary   Monitoring pattern   [x] Bedtime: -180  Taking medications pattern  [x] Consistent administration  [x] Affordable     A1C 8.3%  Initial B  GFR 60    Fasting B  Pre-prandial:   WBC 14.1  IV abox  Basal: NPH 50 units Q12  Bolus: 10 units with meals- 2 doses held yesterday  Correction: 0 units in the last 24h    Methylprednisolone 40 mg Q12 changed to prednisone 40mg daily    Objective   Physical exam  General Obese male in respiratory distress/ill-appearing. Conversant and cooperative  Neuro  Alert, oriented   Vital Signs   Visit Vitals  BP (!) 92/58 (BP 1 Location: Left upper arm, BP Patient Position: Supine)   Pulse 73   Temp 97.4 °F (36.3 °C)   Resp 16   Ht 6' 1\" (1.854 m)   Wt 151.5 kg (333 lb 14.4 oz)   SpO2 94%   BMI 44.05 kg/m²     Skin  Warm and dry. No acanthosis noted along neckline. No lipohypertrophy or lipoatrophy noted at injection sites   Heart   Regular rate and rhythm.  No murmurs, rubs or gallops  Lungs  Clear to auscultation without rales or rhonchi  Extremities Left leg cellulitis, bandaged       Laboratory  Recent Results (from the past 12 hour(s))   PHOSPHORUS    Collection Time: 21  1:31 AM   Result Value Ref Range    Phosphorus 2.2 (L) 2.6 - 4.7 MG/DL   MAGNESIUM    Collection Time: 21  1:31 AM   Result Value Ref Range    Magnesium 2.1 1.6 - 2.4 mg/dL   METABOLIC PANEL, BASIC    Collection Time: 21  1:31 AM   Result Value Ref Range    Sodium 139 136 - 145 mmol/L    Potassium 3.4 (L) 3.5 - 5.1 mmol/L    Chloride 100 97 - 108 mmol/L    CO2 37 (H) 21 - 32 mmol/L    Anion gap 2 (L) 5 - 15 mmol/L    Glucose 92 65 - 100 mg/dL    BUN 33 (H) 6 - 20 MG/DL    Creatinine 0.79 0.70 - 1.30 MG/DL    BUN/Creatinine ratio 42 (H) 12 - 20      GFR est AA >60 >60 ml/min/1.73m2    GFR est non-AA >60 >60 ml/min/1.73m2    Calcium 8.4 (L) 8.5 - 10.1 MG/DL   GLUCOSE, POC    Collection Time: 21  7:41 AM   Result Value Ref Range    Glucose (POC) 69 65 - 117 mg/dL    Performed by Radhames Hernandez POC    Collection Time: 09/28/21  9:47 AM   Result Value Ref Range    Glucose (POC) 145 (H) 65 - 117 mg/dL    Performed by Estela Morrell  PCT    BMP:   Lab Results   Component Value Date/Time     09/28/2021 01:31 AM    K 3.4 (L) 09/28/2021 01:31 AM     09/28/2021 01:31 AM    CO2 37 (H) 09/28/2021 01:31 AM    AGAP 2 (L) 09/28/2021 01:31 AM    GLU 92 09/28/2021 01:31 AM    BUN 33 (H) 09/28/2021 01:31 AM    CREA 0.79 09/28/2021 01:31 AM    GFRAA >60 09/28/2021 01:31 AM    GFRNA >60 09/28/2021 01:31 AM        Factors impacting BG management  Factor Dose Comments   Nutrition:  Standard meals     60 grams/meal      Drugs:    Steroids     [id number] mg daily   Impairs insulin action       Blood glucose pattern            Assessment and Plan   Nursing Diagnosis Risk for unstable blood glucose pattern   Nursing Intervention Domain 5250 Decision-making Support   Nursing Interventions Examined current inpatient diabetes control   Explored factors facilitating and impeding inpatient management  Identified self-management practices impeding diabetes control  Explored corrective strategies with patient and responsible inpatient provider   Informed patient of rational for insulin strategy while hospitalized     Evaluation   Kimberley Dash is a 48year old gentleman, with uncontrolled Type 2 Diabetes on 70/30 insulin, admitted with acute on chronic hypercapneic and hypoxic respiratory failure with acute COPD exacerbation. He did not achieve diabetes control prior to admission, as evidenced by A1c of 8.3%. During this hospitalization, the patient's glucose was in goal the 1st 24 hrs without the use of antihyperglycemic agents- he was on BiPAP and NPO at that time. 40mg methylprednisolone Q12 was initiated and now with steroid induced hyperglycemia.   Low dose Lantus (18 units/home dose)/humalog (4 units/meal) was started but not sufficient to override steroid impact as BG was over 310. Basal/bolus insulin was doubled with little improvement in glucose pattern so NPH was titrated to 50 units Q12 and 15 units humalog with meals. Glucose ranged from 168-198. Steroids were adjusted to 40mg prednisone once daily and been titrated down over last week to a dose of 10 mg daily. Basal bolus insulin has been titrated down with steroids. Please continue a higher day time NPH dose with each prednisone dose. 9/28 Pt BG pattern of 105-165 is at hospital target between 100-180 on split NPH dosing. Based on pattern and steroid administration pt will remain on NPH until steroids taper is complete. Pt at 18 units of Lantus PTA and with A1c of 8.3 a similar dose may be appropriate for discharge    Recommendations   1. POC glucose ACHS    2. Consistent carbohydrate diet (60 grams CHO/meal), carb consistent shakes if they are ordered    3. Adjust the Subcutaneous Insulin Order set (9853)  Insulin Dosing Specific recommendation   Basal                                      (Based on weight, BMI & GFR)  Continue 20 units NPH am   Continue 10 units NPH pm Please wean am NPH dose with steroids. Nutritional                                      (Based on CHO/dextrose load) 5 units Humalog/meal    Hold if patient consumes less than 50% of carbohydrates on meal tray    Corrective                                       (Useful in adjusting insulin dosing) Resistant sensitivity humalog ACHS          Billing Code(s)   [x] 26097    Before making these care recommendations, I personally reviewed the hosptialization record, including laboratory and diagnostic data, medications and examined the patient at bedside (circumstances permitting).   Total minutes: 13      JUDY Ybarra  Diabetes Clinical Nurse Specialist  [facility]  Access via [employer]

## 2021-09-28 NOTE — PROGRESS NOTES
ID Progress Note  2021    Subjective:     Still quite fatigued    Objective:     Antibiotics:  1. Vancomycin   2. Cefepime       Vitals:   Visit Vitals  BP (!) 92/58 (BP 1 Location: Left upper arm, BP Patient Position: Supine)   Pulse 73   Temp 97.4 °F (36.3 °C)   Resp 16   Ht 6' 1\" (1.854 m)   Wt 151.5 kg (333 lb 14.4 oz)   SpO2 94%   BMI 44.05 kg/m²        Tmax:  Temp (24hrs), Av.5 °F (36.4 °C), Min:97.3 °F (36.3 °C), Max:97.9 °F (36.6 °C)      Exam:  Lungs:  clear to auscultation bilaterally  Heart:  regular rate and rhythm  Abdomen:  soft, non-tender. Bowel sounds normal. No masses,  no organomegaly  Compression wraps BLE    Labs:      Recent Labs     21  0131 21  0538 21  0350   WBC  --  12.7*  --    HGB  --  12.0*  --    PLT  --  142*  --    BUN 33* 31* 44*   CREA 0.79 0.77 0.82       Cultures:     No results found for: [facility]  Lab Results   Component Value Date/Time    Culture result: NO GROWTH 5 DAYS 2021 04:55 PM    Culture result: NO GROWTH 5 DAYS 2021 11:59 AM       Radiology:     Line/Insert Date:           Assessment:     1. Fluid overload  2. Vascular insufficiency ulcerations  3. Diabetic skin changes BLE  4. Leukocytosis--resolving    Objective:     1. Continue current therapy until mid-week--can stop antibiotic therapy tomorrow  2. Work up fevers  3.  Fluid management    Keven Ridley MD

## 2021-09-29 LAB
ANION GAP SERPL CALC-SCNC: 6 MMOL/L (ref 5–15)
BUN SERPL-MCNC: 23 MG/DL (ref 6–20)
BUN/CREAT SERPL: 37 (ref 12–20)
CALCIUM SERPL-MCNC: 8.5 MG/DL (ref 8.5–10.1)
CHLORIDE SERPL-SCNC: 100 MMOL/L (ref 97–108)
CO2 SERPL-SCNC: 33 MMOL/L (ref 21–32)
CREAT SERPL-MCNC: 0.63 MG/DL (ref 0.7–1.3)
ERYTHROCYTE [DISTWIDTH] IN BLOOD BY AUTOMATED COUNT: 17.2 % (ref 11.5–14.5)
GLUCOSE BLD STRIP.AUTO-MCNC: 126 MG/DL (ref 65–117)
GLUCOSE BLD STRIP.AUTO-MCNC: 143 MG/DL (ref 65–117)
GLUCOSE BLD STRIP.AUTO-MCNC: 182 MG/DL (ref 65–117)
GLUCOSE BLD STRIP.AUTO-MCNC: 251 MG/DL (ref 65–117)
GLUCOSE SERPL-MCNC: 118 MG/DL (ref 65–100)
HCT VFR BLD AUTO: 35.7 % (ref 36.6–50.3)
HGB BLD-MCNC: 10.9 G/DL (ref 12.1–17)
LEFT ABI: 1.09
LEFT ANTERIOR TIBIAL: 129 MMHG
LEFT ARM BP: 118 MMHG
LEFT POSTERIOR TIBIAL: 94 MMHG
MAGNESIUM SERPL-MCNC: 2.1 MG/DL (ref 1.6–2.4)
MCH RBC QN AUTO: 24.3 PG (ref 26–34)
MCHC RBC AUTO-ENTMCNC: 30.5 G/DL (ref 30–36.5)
MCV RBC AUTO: 79.7 FL (ref 80–99)
NRBC # BLD: 0 K/UL (ref 0–0.01)
NRBC BLD-RTO: 0 PER 100 WBC
PHOSPHATE SERPL-MCNC: 2.6 MG/DL (ref 2.6–4.7)
PLATELET # BLD AUTO: 113 K/UL (ref 150–400)
PMV BLD AUTO: 12.3 FL (ref 8.9–12.9)
POTASSIUM SERPL-SCNC: 3.2 MMOL/L (ref 3.5–5.1)
RBC # BLD AUTO: 4.48 M/UL (ref 4.1–5.7)
RIGHT ABI: 1.12
RIGHT ANTERIOR TIBIAL: 132 MMHG
RIGHT POSTERIOR TIBIAL: 125 MMHG
SERVICE CMNT-IMP: ABNORMAL
SODIUM SERPL-SCNC: 139 MMOL/L (ref 136–145)
WBC # BLD AUTO: 11.3 K/UL (ref 4.1–11.1)

## 2021-09-29 PROCEDURE — 74011250637 HC RX REV CODE- 250/637: Performed by: HOSPITALIST

## 2021-09-29 PROCEDURE — 74011636637 HC RX REV CODE- 636/637: Performed by: INTERNAL MEDICINE

## 2021-09-29 PROCEDURE — 82962 GLUCOSE BLOOD TEST: CPT

## 2021-09-29 PROCEDURE — 74011000258 HC RX REV CODE- 258: Performed by: INTERNAL MEDICINE

## 2021-09-29 PROCEDURE — 99231 SBSQ HOSP IP/OBS SF/LOW 25: CPT | Performed by: CLINICAL NURSE SPECIALIST

## 2021-09-29 PROCEDURE — 85027 COMPLETE CBC AUTOMATED: CPT

## 2021-09-29 PROCEDURE — 83735 ASSAY OF MAGNESIUM: CPT

## 2021-09-29 PROCEDURE — 65270000029 HC RM PRIVATE

## 2021-09-29 PROCEDURE — 74011000250 HC RX REV CODE- 250: Performed by: HOSPITALIST

## 2021-09-29 PROCEDURE — 94640 AIRWAY INHALATION TREATMENT: CPT

## 2021-09-29 PROCEDURE — 80048 BASIC METABOLIC PNL TOTAL CA: CPT

## 2021-09-29 PROCEDURE — 84100 ASSAY OF PHOSPHORUS: CPT

## 2021-09-29 PROCEDURE — 74011250637 HC RX REV CODE- 250/637: Performed by: INTERNAL MEDICINE

## 2021-09-29 PROCEDURE — 77030029684 HC NEB SM VOL KT MONA -A

## 2021-09-29 PROCEDURE — 77030013038 HC MSK CPAP FISP -A

## 2021-09-29 PROCEDURE — 74011250636 HC RX REV CODE- 250/636: Performed by: INTERNAL MEDICINE

## 2021-09-29 PROCEDURE — 36415 COLL VENOUS BLD VENIPUNCTURE: CPT

## 2021-09-29 PROCEDURE — 74011636637 HC RX REV CODE- 636/637: Performed by: FAMILY MEDICINE

## 2021-09-29 PROCEDURE — 74011250637 HC RX REV CODE- 250/637: Performed by: FAMILY MEDICINE

## 2021-09-29 RX ORDER — POTASSIUM CHLORIDE 750 MG/1
40 TABLET, FILM COATED, EXTENDED RELEASE ORAL
Status: COMPLETED | OUTPATIENT
Start: 2021-09-29 | End: 2021-09-29

## 2021-09-29 RX ADMIN — ARFORMOTEROL TARTRATE 15 MCG: 15 SOLUTION RESPIRATORY (INHALATION) at 07:26

## 2021-09-29 RX ADMIN — ROSUVASTATIN CALCIUM 5 MG: 10 TABLET, FILM COATED ORAL at 10:15

## 2021-09-29 RX ADMIN — CALCIUM CARBONATE (ANTACID) CHEW TAB 500 MG 200 MG: 500 CHEW TAB at 16:51

## 2021-09-29 RX ADMIN — Medication 10 ML: at 21:05

## 2021-09-29 RX ADMIN — ASPIRIN 81 MG: 81 TABLET, COATED ORAL at 10:15

## 2021-09-29 RX ADMIN — VANCOMYCIN HYDROCHLORIDE 1250 MG: 10 INJECTION, POWDER, LYOPHILIZED, FOR SOLUTION INTRAVENOUS at 10:10

## 2021-09-29 RX ADMIN — ACETAMINOPHEN 650 MG: 325 TABLET ORAL at 21:02

## 2021-09-29 RX ADMIN — Medication 10 ML: at 21:06

## 2021-09-29 RX ADMIN — CALCIUM CARBONATE (ANTACID) CHEW TAB 500 MG 200 MG: 500 CHEW TAB at 07:05

## 2021-09-29 RX ADMIN — APIXABAN 5 MG: 5 TABLET, FILM COATED ORAL at 10:14

## 2021-09-29 RX ADMIN — VANCOMYCIN HYDROCHLORIDE 1250 MG: 10 INJECTION, POWDER, LYOPHILIZED, FOR SOLUTION INTRAVENOUS at 21:10

## 2021-09-29 RX ADMIN — CEFEPIME HYDROCHLORIDE 2 G: 2 INJECTION, POWDER, FOR SOLUTION INTRAVENOUS at 00:08

## 2021-09-29 RX ADMIN — PREGABALIN 75 MG: 25 CAPSULE ORAL at 23:28

## 2021-09-29 RX ADMIN — Medication 10 ML: at 18:54

## 2021-09-29 RX ADMIN — MONTELUKAST 10 MG: 10 TABLET, FILM COATED ORAL at 00:08

## 2021-09-29 RX ADMIN — INSULIN LISPRO 8 UNITS: 100 INJECTION, SOLUTION INTRAVENOUS; SUBCUTANEOUS at 16:30

## 2021-09-29 RX ADMIN — CALCIUM CARBONATE (ANTACID) CHEW TAB 500 MG 200 MG: 500 CHEW TAB at 13:41

## 2021-09-29 RX ADMIN — EZETIMIBE 10 MG: 10 TABLET ORAL at 10:14

## 2021-09-29 RX ADMIN — BUDESONIDE 500 MCG: 0.5 INHALANT RESPIRATORY (INHALATION) at 07:26

## 2021-09-29 RX ADMIN — PREGABALIN 75 MG: 25 CAPSULE ORAL at 13:41

## 2021-09-29 RX ADMIN — APIXABAN 5 MG: 5 TABLET, FILM COATED ORAL at 17:07

## 2021-09-29 RX ADMIN — CEFEPIME HYDROCHLORIDE 2 G: 2 INJECTION, POWDER, FOR SOLUTION INTRAVENOUS at 17:34

## 2021-09-29 RX ADMIN — SACUBITRIL AND VALSARTAN 1 TABLET: 49; 51 TABLET, FILM COATED ORAL at 10:14

## 2021-09-29 RX ADMIN — PANTOPRAZOLE SODIUM 40 MG: 40 TABLET, DELAYED RELEASE ORAL at 07:05

## 2021-09-29 RX ADMIN — MONTELUKAST 10 MG: 10 TABLET, FILM COATED ORAL at 21:02

## 2021-09-29 RX ADMIN — TORSEMIDE 60 MG: 20 TABLET ORAL at 10:14

## 2021-09-29 RX ADMIN — CEFEPIME HYDROCHLORIDE 2 G: 2 INJECTION, POWDER, FOR SOLUTION INTRAVENOUS at 23:28

## 2021-09-29 RX ADMIN — CEFEPIME HYDROCHLORIDE 2 G: 2 INJECTION, POWDER, FOR SOLUTION INTRAVENOUS at 07:06

## 2021-09-29 RX ADMIN — POTASSIUM CHLORIDE 40 MEQ: 750 TABLET, FILM COATED, EXTENDED RELEASE ORAL at 10:14

## 2021-09-29 RX ADMIN — SACUBITRIL AND VALSARTAN 1 TABLET: 49; 51 TABLET, FILM COATED ORAL at 21:09

## 2021-09-29 RX ADMIN — HUMAN INSULIN 20 UNITS: 100 INJECTION, SUSPENSION SUBCUTANEOUS at 07:06

## 2021-09-29 RX ADMIN — HUMAN INSULIN 10 UNITS: 100 INJECTION, SUSPENSION SUBCUTANEOUS at 21:02

## 2021-09-29 RX ADMIN — PREGABALIN 75 MG: 25 CAPSULE ORAL at 00:08

## 2021-09-29 RX ADMIN — ISOSORBIDE MONONITRATE 30 MG: 30 TABLET, EXTENDED RELEASE ORAL at 10:15

## 2021-09-29 RX ADMIN — AMIODARONE HYDROCHLORIDE 100 MG: 200 TABLET ORAL at 10:14

## 2021-09-29 RX ADMIN — ACETAMINOPHEN 650 MG: 325 TABLET ORAL at 00:18

## 2021-09-29 NOTE — PROGRESS NOTES
Spiritual Care Partner Volunteer visited patient in [address] [place] [address] on 9/29/21. Documented by:   Chaplain Palma MDiv, MACE  [phone])

## 2021-09-29 NOTE — PROGRESS NOTES
Hospitalist Progress Note  Zachery Babin MD  Answering service: [phone] from in house phone      Date of Service:  2021  NAME:  Vivi Zamora  :  1965  MRN:  [id number]      Admission Summary:   Vivi Zamora is a 48 y.o. male who presents with sob and AMS      28-year-old gentleman history of diabetes, hypertension, dyslipidemia, likely history of CHF as well he uses oxygen as needed at home (particularly at night for obstructive sleep apnea), morbid obese  presents to the emergency department today with chief complaint of generalized not feeling well for the past 4 to 5 days. No fevers but increased shortness of breath. EMS reports room air hypoxia in the 70s. Patient complains of a rash to his left upper extremity consistent with cellulitis and is not currently on treatment. He has been up titrating his Lasix at home without significant relief of his symptoms. There is a conflicting history about whether or not he has been taking his blood thinner as prescribed. He is fully Covid vaccinated. He is lethargic in ER. Place on bipap now. Not able to get history for now     Interval history / Subjective:      Patient seen and examined this morning. Patient wound also later examined together with the wound care nurse.    Patient off oxygen last two days     Assessment & Plan:     # Bilateral lower leg wound, Ulceration on LLE on the bases of stasis dermatitis/ venous insufficiency    - venous dopplers US negative   - CHONG no significant arterial obstruction b/l  - ID consulted, antibiotics initiated   - on IV Cefepime and Vancomycin --present   - continue wound care    - elevate the leg   - on diuretic   - will consider obtain CTA leg if no improvement     # Episode of low BP  - improved s/p albumin     # Acute hypoxic and hypercapnic respiratory failure due to COPD exacerbation and CHF, + IVY + likely obesity hypoventilation syndrome-- resolved   - BiPAP qhs   - off oxygen      # Acute exacerbation of COPD: improved   - completed steroid,   - ct with Nebs   - Weaned off oxygen   - Appt pulmonology input      # Acute on chronic diastolic congestive heart failure  - normal EF on recent echo  - Repeat echo pending  - Patient with lower extremity edema, likely lymphedema related versus cor pulmonale  - Diuretic changed to Torsemide, patient's home med  - Consider resuming Entresto if blood pressure improves    # Atrial fibrillation  - Paroxysmal atrial fibrillation  - Stable on amiodarone, Eliquis for anticoagulation    # Hypertension  - Continue Imdur     # Dyslipidemia  - On statin     # Diabetes  - On NPH, premeal Humalog and insulin sliding scale coverage  - Diabetic management team following and adjusting insulin dose     # Cellulitis of the left upper extremity  - Completed antibiotics       Discharge disposition: snf   Anticipated discharge: Tomorrow      Hospital Problems  Date Reviewed: 4/28/2013        Codes Class Noted POA    Acute on chronic respiratory failure [facility]) ICD-10-CM: J96.20  ICD-9-CM: 518.84  9/9/2021 Unknown                Review of Systems:   Pertinent positive mentioned in interval hx/HPI. Other systems reviewed and negative     Vital Signs:    Last 24hrs VS reviewed since prior progress note. Most recent are:  Visit Vitals  /70   Pulse 65   Temp 97.8 °F (36.6 °C)   Resp 16   Ht 6' 1\" (1.854 m)   Wt 151.5 kg (333 lb 14.4 oz)   SpO2 93%   BMI 44.05 kg/m²         Intake/Output Summary (Last 24 hours) at 9/29/2021 1633  Last data filed at 9/29/2021 1520  Gross per 24 hour   Intake    Output 2050 ml   Net -2050 ml        Physical Examination:             Constitutional:  No acute distress, in pain, chronically sick looking, obese     ENT:  Oral mucous moist   Resp:  CTA bilaterally. No wheezing/rhonchi/rales.  No accessory muscle use   CV:  Regular rhythm, normal rate, no murmurs, gallops, rubs GI:  Soft, non distended, non tender. normoactive bowel sounds, no hepatosplenomegaly     Musculoskeletal:  bilateral lower leg edema + 3, clean dressing covering the shin , small wound on the left leg     Neurologic:  Moves all extremities. Data Review:    I personally reviewed labs and imaging     Labs:     Recent Labs     09/29/21  0441 09/27/21  0538   WBC 11.3* 12.7*   HGB 10.9* 12.0*   HCT 35.7* 40.4   * 142*     Recent Labs     09/29/21  0441 09/28/21  0131 09/27/21  0538    139 137   K 3.2* 3.4* 3.8    100 102   CO2 33* 37* 36*   BUN 23* 33* 31*   CREA 0.63* 0.79 0.77   * 92 165*   CA 8.5 8.4* 8.7   MG 2.1 2.1 2.5*   PHOS 2.6 2.2* 2.0*     No results for input(s): ALT, AP, TBIL, TBILI, TP, ALB, GLOB, GGT, AML, LPSE in the last 72 hours. No lab exists for component: SGOT, GPT, AMYP, HLPSE  No results for input(s): INR, PTP, APTT, INREXT, INREXT in the last 72 hours. No results for input(s): FE, TIBC, PSAT, FERR in the last 72 hours. No results found for: FOL, RBCF   No results for input(s): PH, PCO2, PO2 in the last 72 hours. No results for input(s): CPK, CKNDX, TROIQ in the last 72 hours.     No lab exists for component: CPKMB  Lab Results   Component Value Date/Time    Cholesterol, total 170 01/23/2013 11:08 AM    HDL Cholesterol 37 (L) 01/23/2013 11:08 AM    LDL, calculated 97 01/23/2013 11:08 AM    Triglyceride 180 (H) 01/23/2013 11:08 AM    CHOL/HDL Ratio 6.0 (H) 10/06/2010 03:18 PM     Lab Results   Component Value Date/Time    Glucose (POC) 182 (H) 09/29/2021 12:30 PM    Glucose (POC) 126 (H) 09/29/2021 06:27 AM    Glucose (POC) 144 (H) 09/28/2021 05:05 PM    Glucose (POC) 149 (H) 09/28/2021 11:55 AM    Glucose (POC) 145 (H) 09/28/2021 09:47 AM     No results found for: COLOR, APPRN, SPGRU, REFSG, ELIZA, PROTU, GLUCU, KETU, BILU, UROU, RON, LEUKU, GLUKE, EPSU, BACTU, WBCU, RBCU, CASTS, UCRY      Medications Reviewed:     Current Facility-Administered Medications   Medication Dose Route Frequency    insulin NPH (NOVOLIN N, HUMULIN N) injection 20 Units  20 Units SubCUTAneous DAILY WITH BREAKFAST    vancomycin (VANCOCIN) 1250 mg in  ml infusion  1,250 mg IntraVENous Q12H    cefepime (MAXIPIME) 2 g in 0.9% sodium chloride (MBP/ADV) 100 mL MBP  2 g IntraVENous Q8H    Vancomycin Pharmacy Dosing   Other Rx Dosing/Monitoring    insulin NPH (NOVOLIN N, HUMULIN N) injection 10 Units  10 Units SubCUTAneous QHS    insulin lispro (HUMALOG) injection 5 Units  5 Units SubCUTAneous TIDAC    oxyCODONE IR (ROXICODONE) tablet 7.5 mg  7.5 mg Oral Q6H PRN    calcium carbonate (TUMS) chewable tablet 200 mg [elemental]  200 mg Oral TID WITH MEALS    torsemide (DEMADEX) tablet 60 mg  60 mg Oral DAILY    insulin lispro (HUMALOG) injection   SubCUTAneous AC&HS    dextrose (D50W) injection syrg 12.5-25 g  12.5-25 g IntraVENous PRN    albuterol-ipratropium (DUO-NEB) 2.5 MG-0.5 MG/3 ML  3 mL Nebulization Q6H PRN    alteplase (CATHFLO) 1 mg in sterile water (preservative free) 1 mL injection  1 mg InterCATHeter PRN    pregabalin (LYRICA) capsule 75 mg  75 mg Oral BID    montelukast (SINGULAIR) tablet 10 mg  10 mg Oral QHS    sacubitriL-valsartan (ENTRESTO) 49-51 mg tablet 1 Tablet  1 Tablet Oral Q12H    apixaban (ELIQUIS) tablet 5 mg  5 mg Oral BID    amiodarone (CORDARONE) tablet 100 mg  100 mg Oral DAILY    ezetimibe (ZETIA) tablet 10 mg  10 mg Oral DAILY    pantoprazole (PROTONIX) tablet 40 mg  40 mg Oral ACB    isosorbide mononitrate ER (IMDUR) tablet 30 mg  30 mg Oral DAILY    aspirin delayed-release tablet 81 mg  81 mg Oral DAILY    rosuvastatin (CRESTOR) tablet 5 mg  5 mg Oral DAILY    [Held by provider] spironolactone (ALDACTONE) tablet 25 mg  25 mg Oral DAILY    benzocaine-menthoL (CEPACOL) lozenge 1 Lozenge  1 Lozenge Mucous Membrane PRN    fentaNYL citrate (PF) injection 50 mcg  50 mcg IntraVENous Q4H PRN    hydrALAZINE (APRESOLINE) 20 mg/mL injection 10 mg  10 mg IntraVENous Q6H PRN    glucose chewable tablet 16 g  4 Tablet Oral PRN    dextrose (D50W) injection syrg 12.5-25 g  25-50 mL IntraVENous PRN    glucagon (GLUCAGEN) injection 1 mg  1 mg IntraMUSCular PRN    sodium chloride (NS) flush 5-40 mL  5-40 mL IntraVENous Q8H    sodium chloride (NS) flush 5-40 mL  5-40 mL IntraVENous PRN    acetaminophen (TYLENOL) tablet 650 mg  650 mg Oral Q6H PRN    Or    acetaminophen (TYLENOL) suppository 650 mg  650 mg Rectal Q6H PRN    polyethylene glycol (MIRALAX) packet 17 g  17 g Oral DAILY PRN    ondansetron (ZOFRAN ODT) tablet 4 mg  4 mg Oral Q8H PRN    Or    ondansetron (ZOFRAN) injection 4 mg  4 mg IntraVENous Q6H PRN    arformoteroL (BROVANA) neb solution 15 mcg  15 mcg Nebulization BID RT    And    budesonide (PULMICORT) 500 mcg/2 ml nebulizer suspension  500 mcg Nebulization BID RT    miconazole (MICOTIN) 2 % powder   Topical BID    sodium chloride (NS) flush 5-40 mL  5-40 mL IntraVENous Q8H    sodium chloride (NS) flush 5-40 mL  5-40 mL IntraVENous PRN     ______________________________________________________________________  EXPECTED LENGTH OF STAY: 3d 14h  ACTUAL LENGTH OF STAY:          21                 Letitia Mirza MD   Patient has given Verbal permission to discuss medical care with   persons present in the room and and also with contact as listed on face sheet. Please note that this dictation was completed with [employer], the computer voice recognition software. Quite often unanticipated grammatical, syntax, homophones, and other interpretive errors are inadvertently transcribed by the computer software. Please disregard these errors. Please excuse any errors that have escaped final proofreading. Thank you.

## 2021-09-29 NOTE — DIABETES MGMT
[address] [facility]    CLINICAL NURSE SPECIALIST CONSULT   FOLLOW UP NOTE    Initial Presentation   Carmita Torres is a 48 y.o. male who presented to the ED 9/9/21 via EMS for generalized c/o feeling unwell. He was hypoxic in the 70%s and placed on O2 with EMS. HX:   Past Medical History:   Diagnosis Date    DM (diabetes mellitus) ([facility] [address]     Dyslipidemia     Hypertension         INITIAL DX: Acute on chronic respiratory failure with hypercapnia; upper left arm cellulitis    Current Treatment     TX: IV steroids, antibiotics, pulmonary consult    Consulted by Aggie Lyn MD for advanced diabetes nursing assessment and care for:   [x] Inpatient management strategy      Hospital Course   Clinical progress has been complicated by: .   9/9: Lethargic, transitioned to BiPAP. Seen by pulmonary- continue steroids, diuresis, bronchodilators. Transfer to ICU for respiratory acidosis. ? Obstructive lung disease  9/10: BiPAP overnight. Seen by ortho for generalized LUE cellulitis. Transfer out of ICU  9/12: HFNC  9/14: Pulm re consulted- continue mobility, cxr, consider repeating ECHO  9/20: Decreasing O2 demands, adjusting diuretics  Diabetes History   Hx Type 2 Diabetes \"for many years\"  Diabetes managed by Cresencio Santos MD    Diabetes-related Medical History  Acute complications  Steroid induced hyperglycemia      Diabetes Medication History  Drug class Currently in use Discontinued Never used   Biguanide      DDP-4 inhibitor       Sulfonylurea      Thiazolidinedione      GLP-1 RA      SGLT-2 inhibitors      Basal insulin 18 units Lantus once daily Levemir     Bolus insulin  Novolog with meals    Fixed Dose  Combinations  70/30 50 units BID      Subjective   I take sliding scale and my glucose goes up.     Patient reports the following home diabetes self-management practices:   Eating pattern   [x] Eats once meal a day  Limited snacking  Water throughout the day  Physical activity pattern   [x] Mostly sedentary   Monitoring pattern   [x] Bedtime: -180  Taking medications pattern  [x] Consistent administration  [x] Affordable     A1C 8.3%  Initial B  GFR 60    Fasting B  Pre-prandial: 126-182  WBC 14.1  IV abox  Basal: NPH 20 units 10 Units PM  Bolus: 10 units with meals-   Correction: 0 units in the last 24h has refused two meal time doses    Objective   Physical exam  General Obese male in respiratory distress/ill-appearing. Conversant and cooperative  Neuro  Alert, oriented   Vital Signs   Visit Vitals  /74   Pulse 70   Temp 97.9 °F (36.6 °C)   Resp 16   Ht 6' 1\" (1.854 m)   Wt 151.5 kg (333 lb 14.4 oz)   SpO2 90%   BMI 44.05 kg/m²     Skin  Warm and dry. No acanthosis noted along neckline. No lipohypertrophy or lipoatrophy noted at injection sites   Heart   Regular rate and rhythm.  No murmurs, rubs or gallops  Lungs  Clear to auscultation without rales or rhonchi  Extremities Left leg cellulitis, bandaged       Laboratory  Recent Results (from the past 12 hour(s))   PHOSPHORUS    Collection Time: 21  4:41 AM   Result Value Ref Range    Phosphorus 2.6 2.6 - 4.7 MG/DL   MAGNESIUM    Collection Time: 21  4:41 AM   Result Value Ref Range    Magnesium 2.1 1.6 - 2.4 mg/dL   METABOLIC PANEL, BASIC    Collection Time: 21  4:41 AM   Result Value Ref Range    Sodium 139 136 - 145 mmol/L    Potassium 3.2 (L) 3.5 - 5.1 mmol/L    Chloride 100 97 - 108 mmol/L    CO2 33 (H) 21 - 32 mmol/L    Anion gap 6 5 - 15 mmol/L    Glucose 118 (H) 65 - 100 mg/dL    BUN 23 (H) 6 - 20 MG/DL    Creatinine 0.63 (L) 0.70 - 1.30 MG/DL    BUN/Creatinine ratio 37 (H) 12 - 20      GFR est AA >60 >60 ml/min/1.73m2    GFR est non-AA >60 >60 ml/min/1.73m2    Calcium 8.5 8.5 - 10.1 MG/DL   CBC W/O DIFF    Collection Time: 21  4:41 AM   Result Value Ref Range    WBC 11.3 (H) 4.1 - 11.1 K/uL    RBC 4.48 4.10 - 5.70 M/uL    HGB 10.9 (L) 12.1 - 17.0 g/dL    HCT 35.7 (L) 36.6 - 50.3 % MCV 79.7 (L) 80.0 - 99.0 FL    MCH 24.3 (L) 26.0 - 34.0 PG    MCHC 30.5 30.0 - 36.5 g/dL    RDW 17.2 (H) 11.5 - 14.5 %    PLATELET 209 (L) 527 - 400 K/uL    MPV 12.3 8.9 - 12.9 FL    NRBC 0.0 0  WBC    ABSOLUTE NRBC 0.00 0.00 - 0.01 K/uL   GLUCOSE, POC    Collection Time: 09/29/21  6:27 AM   Result Value Ref Range    Glucose (POC) 126 (H) 65 - 117 mg/dL    Performed by David Velez   PCT    GLUCOSE, POC    Collection Time: 09/29/21 12:30 PM   Result Value Ref Range    Glucose (POC) 182 (H) 65 - 117 mg/dL    Performed by Bell Ruiz  PCT    BMP:   Lab Results   Component Value Date/Time     09/29/2021 04:41 AM    K 3.2 (L) 09/29/2021 04:41 AM     09/29/2021 04:41 AM    CO2 33 (H) 09/29/2021 04:41 AM    AGAP 6 09/29/2021 04:41 AM     (H) 09/29/2021 04:41 AM    BUN 23 (H) 09/29/2021 04:41 AM    CREA 0.63 (L) 09/29/2021 04:41 AM    GFRAA >60 09/29/2021 04:41 AM    GFRNA >60 09/29/2021 04:41 AM        Factors impacting BG management  Factor Dose Comments   Nutrition:  Standard meals     60 grams/meal      Drugs:    Steroids     [id number] mg daily   Finished 9/28       Blood glucose pattern            Assessment and Plan   Nursing Diagnosis Risk for unstable blood glucose pattern   Nursing Intervention Domain 6686 Decision-making Support   Nursing Interventions Examined current inpatient diabetes control   Explored factors facilitating and impeding inpatient management  Identified self-management practices impeding diabetes control  Explored corrective strategies with patient and responsible inpatient provider   Informed patient of rational for insulin strategy while hospitalized     Evaluation   Tejas Carreon is a 48year old gentleman, with uncontrolled Type 2 Diabetes on 70/30 insulin, admitted with acute on chronic hypercapneic and hypoxic respiratory failure with acute COPD exacerbation.    He did not achieve diabetes control prior to admission, as evidenced by A1c of 8.3%.     During this hospitalization, the patient's glucose was in goal the 1st 24 hrs without the use of antihyperglycemic agents- he was on BiPAP and NPO at that time. 40mg methylprednisolone Q12 was initiated and now with steroid induced hyperglycemia. Low dose Lantus (18 units/home dose)/humalog (4 units/meal) was started but not sufficient to override steroid impact as BG was over 310. Basal/bolus insulin was doubled with little improvement in glucose pattern so NPH was titrated to 50 units Q12 and 15 units humalog with meals. Glucose ranged from 168-198. Steroids were adjusted to 40mg prednisone once daily and been titrated down over last week to a dose of 10 mg daily. Basal bolus insulin has been titrated down with steroids. Please continue a higher day time NPH dose with each prednisone dose. 9/28 Pt BG pattern of 105-165 is at hospital target between 100-180 on split NPH dosing. Based on pattern and steroid administration pt will remain on NPH until steroids taper is complete. Pt at 18 units of Lantus PTA and with A1c of 8.3 a similar dose may be appropriate for discharge    9/29/ Pt remains in impatient BG goal of 118-182 on 20U NPH in the morning and 10U NPH in the evening. Removal of steroid therapy has help reduce insulin needs. Pt has refused 2 meal time doses but blood sugars have remained stable at this time. Recommendations   1. POC glucose ACHS    2. Consistent carbohydrate diet (60 grams CHO/meal), carb consistent shakes if they are ordered    3.  Adjust the Subcutaneous Insulin Order set (8575)  Insulin Dosing Specific recommendation   Basal                                      (Based on weight, BMI & GFR)  Continue 20 units NPH am   Continue 10 units NPH pm    Nutritional                                      (Based on CHO/dextrose load) 5 units Humalog/meal    Hold if patient consumes less than 50% of carbohydrates on meal tray    Corrective (Useful in adjusting insulin dosing) Resistant sensitivity humalog [facility]        Diabetes Management Team to sign off at this point as patient's blood glucose remains stable. Please re-consult us if patient needs change. Thank you for including us in their care. Billing Code(s)   [x] 59552    Before making these care recommendations, I personally reviewed the hosptialization record, including laboratory and diagnostic data, medications and examined the patient at bedside (circumstances permitting).   Total minutes: 13      JUDY Gil  Diabetes Clinical Nurse Specialist  [facility]  Access via ZYB

## 2021-09-29 NOTE — PROGRESS NOTES
Transition of Care: to a SNF;  [facility] and [facility] have all accepted; patient is trying to decide on SNF at this time     Transport plan: likely BLS (not set up yet)     RUR: 23%     DX: acute on chronic respiratory failure     Main contact is brothalisa Garza [phone]     Discharge pending:  -patient continues on IV antibiotics  -pending progress with PT/OT  -pending medical progress and care recommendations        NOTE: patient is alert and oriented x 4; patient normally lives alone in an apartment     CM following  Britta Georges RN, CRM

## 2021-09-29 NOTE — WOUND CARE
WOCN Note:      Follow up assessment of legs.     Assessed in room 501 with Dr Danika Paz. Chart reviewed. Admitted DX:  Acute on chronic respiratory failure.          Past Medical History:   Diagnosis Date    DM (diabetes mellitus) ([address]      Dyslipidemia      Hypertension        Assessment:   Patient is A&O x 3, communicative and sitting up in chair. Bed: foam mattress  Heels intact without erythema.   Hemosiderin staining to bilateral legs.     1. POA Left low leg, venous ulcer to lateral leg. 3 x 3 x 0.1 cm; 100% red wound bed; draining moderate amount of serous fluid. There is a venous wound to the posterior leg (2 x 2 x 0.1 cm 100% red; moderate serous exudate. The dorsal foot has fluid filled bullae (2.5 x 3 x 0 cm), an area of erythema and ecchymosis. The leg is tender, has erythema and 4+ pitting edema to the leg and dorsal foot.     2. POA Right low leg has moderate serous weeping. 3.  POA Abdomen and abdominal fold:  Improving, Red moist rash consistent with Candidiasis. Miconazole powder in use.     Wound, Pressure Prevention & Skin Care Recommendations:    1. Minimize layers of linen/pads under patient to optimize support surface.    2.  Turn/reposition approximately every 2 hours and offload heels. 3.  Manage moisture/ Keep skin folds clean and dry.   4.  Left and right low legs:  Daily cleanse legs with soap and water; apply purple moisturizing cream to bilateral legs; apply Opticel ag over wounds; cover with abd pad and roll gauze and ACE wrap.  Right low leg apply Opticel ag and Abd over weepy area, roll gauze and ACE wrap.     Transition of Care:   Plan to follow as needed while admitted to hospital.     ARISTEO Lopez RN [facility] Inpatient Wound Care  Available on [employer]  Pager [phone]  Office [phone]

## 2021-09-29 NOTE — PROGRESS NOTES
Bedside shift change report given to Alex Capone RN (oncoming nurse) by Lay Charlton RN (offgoing nurse). Report included the following information SBAR, Kardex, Intake/Output, MAR and Recent Results.

## 2021-09-30 LAB
ANION GAP SERPL CALC-SCNC: 2 MMOL/L (ref 5–15)
BUN SERPL-MCNC: 22 MG/DL (ref 6–20)
BUN/CREAT SERPL: 29 (ref 12–20)
CALCIUM SERPL-MCNC: 8.6 MG/DL (ref 8.5–10.1)
CHLORIDE SERPL-SCNC: 100 MMOL/L (ref 97–108)
CO2 SERPL-SCNC: 37 MMOL/L (ref 21–32)
CREAT SERPL-MCNC: 0.75 MG/DL (ref 0.7–1.3)
GLUCOSE BLD STRIP.AUTO-MCNC: 105 MG/DL (ref 65–117)
GLUCOSE BLD STRIP.AUTO-MCNC: 121 MG/DL (ref 65–117)
GLUCOSE BLD STRIP.AUTO-MCNC: 153 MG/DL (ref 65–117)
GLUCOSE BLD STRIP.AUTO-MCNC: 162 MG/DL (ref 65–117)
GLUCOSE SERPL-MCNC: 103 MG/DL (ref 65–100)
MAGNESIUM SERPL-MCNC: 2 MG/DL (ref 1.6–2.4)
PHOSPHATE SERPL-MCNC: 2.6 MG/DL (ref 2.6–4.7)
POTASSIUM SERPL-SCNC: 3.7 MMOL/L (ref 3.5–5.1)
SERVICE CMNT-IMP: ABNORMAL
SERVICE CMNT-IMP: NORMAL
SODIUM SERPL-SCNC: 139 MMOL/L (ref 136–145)

## 2021-09-30 PROCEDURE — 36415 COLL VENOUS BLD VENIPUNCTURE: CPT

## 2021-09-30 PROCEDURE — 82962 GLUCOSE BLOOD TEST: CPT

## 2021-09-30 PROCEDURE — 74011250636 HC RX REV CODE- 250/636: Performed by: INTERNAL MEDICINE

## 2021-09-30 PROCEDURE — 74011250637 HC RX REV CODE- 250/637: Performed by: FAMILY MEDICINE

## 2021-09-30 PROCEDURE — 74011250637 HC RX REV CODE- 250/637: Performed by: INTERNAL MEDICINE

## 2021-09-30 PROCEDURE — 84100 ASSAY OF PHOSPHORUS: CPT

## 2021-09-30 PROCEDURE — 83735 ASSAY OF MAGNESIUM: CPT

## 2021-09-30 PROCEDURE — 80048 BASIC METABOLIC PNL TOTAL CA: CPT

## 2021-09-30 PROCEDURE — 65270000029 HC RM PRIVATE

## 2021-09-30 PROCEDURE — 97116 GAIT TRAINING THERAPY: CPT

## 2021-09-30 PROCEDURE — 74011000258 HC RX REV CODE- 258: Performed by: INTERNAL MEDICINE

## 2021-09-30 PROCEDURE — 74011250637 HC RX REV CODE- 250/637: Performed by: HOSPITALIST

## 2021-09-30 PROCEDURE — 74011636637 HC RX REV CODE- 636/637: Performed by: INTERNAL MEDICINE

## 2021-09-30 RX ORDER — FUROSEMIDE 10 MG/ML
40 INJECTION INTRAMUSCULAR; INTRAVENOUS 2 TIMES DAILY
Status: DISCONTINUED | OUTPATIENT
Start: 2021-09-30 | End: 2021-10-01

## 2021-09-30 RX ADMIN — Medication 10 ML: at 06:00

## 2021-09-30 RX ADMIN — CALCIUM CARBONATE (ANTACID) CHEW TAB 500 MG 200 MG: 500 CHEW TAB at 06:50

## 2021-09-30 RX ADMIN — Medication 10 ML: at 14:18

## 2021-09-30 RX ADMIN — FUROSEMIDE 40 MG: 10 INJECTION, SOLUTION INTRAMUSCULAR; INTRAVENOUS at 18:49

## 2021-09-30 RX ADMIN — AMIODARONE HYDROCHLORIDE 100 MG: 200 TABLET ORAL at 10:29

## 2021-09-30 RX ADMIN — ISOSORBIDE MONONITRATE 30 MG: 30 TABLET, EXTENDED RELEASE ORAL at 10:31

## 2021-09-30 RX ADMIN — ASPIRIN 81 MG: 81 TABLET, COATED ORAL at 10:33

## 2021-09-30 RX ADMIN — EZETIMIBE 10 MG: 10 TABLET ORAL at 10:32

## 2021-09-30 RX ADMIN — PANTOPRAZOLE SODIUM 40 MG: 40 TABLET, DELAYED RELEASE ORAL at 06:50

## 2021-09-30 RX ADMIN — APIXABAN 5 MG: 5 TABLET, FILM COATED ORAL at 18:49

## 2021-09-30 RX ADMIN — FUROSEMIDE 40 MG: 10 INJECTION, SOLUTION INTRAMUSCULAR; INTRAVENOUS at 12:03

## 2021-09-30 RX ADMIN — SACUBITRIL AND VALSARTAN 1 TABLET: 49; 51 TABLET, FILM COATED ORAL at 10:29

## 2021-09-30 RX ADMIN — CALCIUM CARBONATE (ANTACID) CHEW TAB 500 MG 200 MG: 500 CHEW TAB at 12:03

## 2021-09-30 RX ADMIN — PREGABALIN 75 MG: 25 CAPSULE ORAL at 12:03

## 2021-09-30 RX ADMIN — CEFEPIME HYDROCHLORIDE 2 G: 2 INJECTION, POWDER, FOR SOLUTION INTRAVENOUS at 17:21

## 2021-09-30 RX ADMIN — ROSUVASTATIN CALCIUM 5 MG: 10 TABLET, FILM COATED ORAL at 10:32

## 2021-09-30 RX ADMIN — Medication 10 ML: at 17:23

## 2021-09-30 RX ADMIN — HUMAN INSULIN 10 UNITS: 100 INJECTION, SUSPENSION SUBCUTANEOUS at 21:45

## 2021-09-30 RX ADMIN — CEFEPIME HYDROCHLORIDE 2 G: 2 INJECTION, POWDER, FOR SOLUTION INTRAVENOUS at 06:48

## 2021-09-30 RX ADMIN — Medication 10 ML: at 21:41

## 2021-09-30 RX ADMIN — ACETAMINOPHEN 650 MG: 325 TABLET ORAL at 18:57

## 2021-09-30 RX ADMIN — VANCOMYCIN HYDROCHLORIDE 1250 MG: 10 INJECTION, POWDER, LYOPHILIZED, FOR SOLUTION INTRAVENOUS at 10:43

## 2021-09-30 RX ADMIN — CALCIUM CARBONATE (ANTACID) CHEW TAB 500 MG 200 MG: 500 CHEW TAB at 16:07

## 2021-09-30 RX ADMIN — APIXABAN 5 MG: 5 TABLET, FILM COATED ORAL at 10:29

## 2021-09-30 RX ADMIN — MONTELUKAST 10 MG: 10 TABLET, FILM COATED ORAL at 21:41

## 2021-09-30 NOTE — PROGRESS NOTES
Transition of Care: to a SNF;  [facility] has accepted and patient wants to go there     Transport plan: likely BLS (not set up yet)     RUR: 24%     DX: acute on chronic respiratory failure     Main contact is brothalisa Iraheta [phone]     Discharge pending:  -patient continues on IV antibiotics  -pending progress with PT/OT  -pending medical progress and care recommendations        NOTE: patient is alert and oriented x 4; patient normally lives alone in an apartment    1400: this CM met with patient at bedside to discuss SNF choice; [facility] would [facility]- this CM called [facility] [phone] and talked to [facility] (new contact); they have accepted him     CM following  Mary Barrow RN, CRM

## 2021-09-30 NOTE — PROGRESS NOTES
Problem: Mobility Impaired (Adult and Pediatric)  Goal: *Acute Goals and Plan of Care (Insert Text)  Description: FUNCTIONAL STATUS PRIOR TO ADMISSION: Patient was modified independent using a rollator for functional mobility. HOME SUPPORT PRIOR TO ADMISSION: The patient lived alone with family to provide assistance. Patient also reports having an aide that comes 11-5 Monday-Friday. Physical Therapy Goals  Initiated 9/11/2021; reviewed/ continued 9/20/2021, reviewed/continued 9/27/2021  1. Patient will move from supine to sit and sit to supine , scoot up and down, and roll side to side in bed with modified independence within 7 day(s). 2.  Patient will transfer from bed to chair and chair to bed with modified independence using the least restrictive device within 7 day(s). 3.  Patient will perform sit to stand with modified independence within 7 day(s). 4.  Patient will ambulate with modified independence for 100 feet with the least restrictive device within 7 day(s). Outcome: Progressing Towards Goal   PHYSICAL THERAPY TREATMENT  Patient: Osmany Sanchez [de-identified]48 y.o. male)  Date: 9/30/2021  Diagnosis: Acute on chronic respiratory failure ([facility] [J96.20] <principal problem not specified>       Precautions:    Chart, physical therapy assessment, plan of care and goals were reviewed. ASSESSMENT  Patient continues with skilled PT services and is progressing towards goals. This afternoon, he was able to tolerate amb w/ the rollator x 75 Ft w/ CGA provided. He did require at least two, brief standing rest breaks during gait. Pt attempted to amb w/ hands on handles of rollator vs forearms for trunk extension. Pt amb w/ significant flexed posture and c/o low back pain (lower Right side where most of pain is located). Pt returned to chair w/ all needs in reach. Current Level of Function Impacting Discharge (mobility/balance): CGA-Min A for sit<>stand from recliner chair. CGA w/ rollator for gait. Other factors to consider for discharge: PMH/ PLOF         PLAN :  Patient continues to benefit from skilled intervention to address the above impairments. Continue treatment per established plan of care. to address goals. Recommendation for discharge: (in order for the patient to meet his/her long term goals)  Therapy up to 5 days/week in SNF setting    This discharge recommendation:  Has been made in collaboration with the attending provider and/or case management    IF patient discharges home will need the following DME: patient owns DME required for discharge       SUBJECTIVE:   Patient stated  the pain usually starts on the Right side and shoots down to my legs. \". Pt had no c/o pain in LE's this afternoon, only in R lower back. OBJECTIVE DATA SUMMARY:   Critical Behavior:  Neurologic State: Alert  Orientation Level: Oriented X4  Cognition: Follows commands     Functional Mobility Training:  Bed Mobility:     Supine to Sit:  (received OOB in chair)  Sit to Supine:  (remainedOOB in chair)           Transfers:  Sit to Stand: Minimum assistance;Assist x2  Stand to Sit: Contact guard assistance;Assist x1                             Balance:  Sitting: Intact  Standing: Impaired; With support  Standing - Static: Constant support; Fair (pt using personal rollator)  Standing - Dynamic : Constant support; Fair  Ambulation/Gait Training:  Distance (ft): 75 Feet (ft)  Assistive Device: Gait belt;Walker, rolling  Ambulation - Level of Assistance: Contact guard assistance;Assist x1        Gait Abnormalities: Decreased step clearance;Shuffling gait; Other (significantly flexed posture)        Base of Support: Widened        Step Length: Left shortened;Right shortened  Swing Pattern: Left asymmetrical;Right asymmetrical      Therapeutic Exercises:   Scapular retraction, trunk extension     Pain Rating:  C/o low back pain    Activity Tolerance:   Fair    After treatment patient left in no apparent distress:   Sitting in chair and Call bell within reach    COMMUNICATION/COLLABORATION:   The patients plan of care was discussed with: Registered nurse.      Salima MARIN Means,PTA   Time Calculation: 14 mins

## 2021-09-30 NOTE — PROGRESS NOTES
Hospitalist Progress Note  Claudeen Jack, MD  Answering service: [phone] OR [phone] from in house phone      Date of Service:  2021  NAME:  Mateo Brito  :  1965  MRN:  [id number]    Admission Summary:   Mateo Brito is a 48 y.o. male who presents with sob and AMS      73-year-old gentleman history of diabetes, hypertension, dyslipidemia, likely history of CHF as well he uses oxygen as needed at home (particularly at night for obstructive sleep apnea), morbid obese  presents to the emergency department today with chief complaint of generalized not feeling well for the past 4 to 5 days. No fevers but increased shortness of breath. EMS reports room air hypoxia in the 70s. Patient complains of a rash to his left upper extremity consistent with cellulitis and is not currently on treatment. He has been up titrating his Lasix at home without significant relief of his symptoms. There is a conflicting history about whether or not he has been taking his blood thinner as prescribed. He is fully Covid vaccinated. He is lethargic in ER. Place on bipap now. Not able to get history for now     Interval history / Subjective:      Patient seen and examined this morning. Feels ok, still LEs significantly more swollen than usually are, pretty sensitive on lateral LLE below knee. Will change diuretics back to iv lasix BID. Advised on elevation, try compression stocking if able to tolerate.     Assessment & Plan:     # B/l LE wounds, Ulceration on LLE on the bases of stasis dermatitis/ venous insufficiency    - venous dopplers US negative for DVTs  - CHONG no significant arterial obstruction b/l- ID consulted, antibiotics initiated   - on IV Cefepime and Vancomycin --present   - continue wound care  - elevate the leg - on diuretic     # Episode of low BP- improved s/p albumin   # Acute hypoxic and hypercapnic respiratory failure due to COPD exacerbation and CHF, + IVY + likely obesity hypoventilation syndrome-- resolved - BiPAP qhs - off oxygen      # Acute exacerbation of COPD: improved    # Acute on chronic diastolic CHF: NYHA class  - TTE: preserved ef.- Patient with lower extremity edema, likely lymphedema  - Diuretic changed back to furosemide iv- as leg edema is not improving- resuming Entresto    # Atrial fibrillation- Paroxysmal- Stable on amiodarone, Eliquis  # Hypertension- Continue Imdur  # Dyslipidemia- On statin  # Diabetes- On NPH, premeal Humalog and insulin sliding scale coverage  # Cellulitis of the left upper extremity- Completed antibiotics    Discharge disposition: snf   Anticipated discharge: Tomorrow      Hospital Problems  Date Reviewed: 4/28/2013        Codes Class Noted POA    Acute on chronic respiratory failure [facility]) ICD-10-CM: J96.20  ICD-9-CM: 518.84  9/9/2021 Unknown            Review of Systems:   Pertinent positive mentioned in interval hx/HPI. Other systems reviewed and negative     Vital Signs:    Last 24hrs VS reviewed since prior progress note. Most recent are:  Visit Vitals  /61   Pulse 75   Temp 97.1 °F (36.2 °C)   Resp 16   Ht 6' 1\" (1.854 m)   Wt 142.9 kg (315 lb 1.6 oz)   SpO2 95%   BMI 41.57 kg/m²         Intake/Output Summary (Last 24 hours) at 9/30/2021 1624  Last data filed at 9/30/2021 0701  Gross per 24 hour   Intake    Output 5050 ml   Net -5050 ml        Physical Examination:             Constitutional:  No acute distress, in pain, chronically sick looking, obese     ENT:  Oral mucous moist   Resp:  CTA bilaterally. No wheezing/rhonchi/rales. No accessory muscle use   CV:  Regular rhythm, normal rate, no murmurs, gallops, rubs    GI:  Soft, non distended, non tender. normoactive bowel sounds, no hepatosplenomegaly     Musculoskeletal:  bilateral lower leg edema + 3, clean dressing covering the shin , small wound on the left leg     Neurologic:  Moves all extremities.                   Data Review: I personally reviewed labs and imaging     Labs:     Recent Labs     09/29/21 0441   WBC 11.3*   HGB 10.9*   HCT 35.7*   *     Recent Labs     09/30/21  0642 09/29/21  0441 09/28/21  0131    139 139   K 3.7 3.2* 3.4*    100 100   CO2 37* 33* 37*   BUN 22* 23* 33*   CREA 0.75 0.63* 0.79   * 118* 92   CA 8.6 8.5 8.4*   MG 2.0 2.1 2.1   PHOS 2.6 2.6 2.2*     No results for input(s): ALT, AP, TBIL, TBILI, TP, ALB, GLOB, GGT, AML, LPSE in the last 72 hours. No lab exists for component: SGOT, GPT, AMYP, HLPSE  No results for input(s): INR, PTP, APTT, INREXT, INREXT in the last 72 hours. No results for input(s): FE, TIBC, PSAT, FERR in the last 72 hours. No results found for: FOL, RBCF   No results for input(s): PH, PCO2, PO2 in the last 72 hours. No results for input(s): CPK, CKNDX, TROIQ in the last 72 hours.     No lab exists for component: CPKMB  Lab Results   Component Value Date/Time    Cholesterol, total 170 01/23/2013 11:08 AM    HDL Cholesterol 37 (L) 01/23/2013 11:08 AM    LDL, calculated 97 01/23/2013 11:08 AM    Triglyceride 180 (H) 01/23/2013 11:08 AM    CHOL/HDL Ratio 6.0 (H) 10/06/2010 03:18 PM     Lab Results   Component Value Date/Time    Glucose (POC) 105 09/30/2021 06:09 AM    Glucose (POC) 143 (H) 09/29/2021 08:43 PM    Glucose (POC) 251 (H) 09/29/2021 05:22 PM    Glucose (POC) 182 (H) 09/29/2021 12:30 PM    Glucose (POC) 126 (H) 09/29/2021 06:27 AM     No results found for: COLOR, APPRN, SPGRU, REFSG, ELIZA, PROTU, GLUCU, KETU, BILU, UROU, RON, LEUKU, GLUKE, EPSU, BACTU, WBCU, RBCU, CASTS, UCRY      Medications Reviewed:     Current Facility-Administered Medications   Medication Dose Route Frequency    insulin NPH (NOVOLIN N, HUMULIN N) injection 20 Units  20 Units SubCUTAneous DAILY WITH BREAKFAST    vancomycin (VANCOCIN) 1250 mg in  ml infusion  1,250 mg IntraVENous Q12H    cefepime (MAXIPIME) 2 g in 0.9% sodium chloride (MBP/ADV) 100 mL MBP  2 g IntraVENous Q8H    Vancomycin Pharmacy Dosing   Other Rx Dosing/Monitoring    insulin NPH (NOVOLIN N, HUMULIN N) injection 10 Units  10 Units SubCUTAneous QHS    insulin lispro (HUMALOG) injection 5 Units  5 Units SubCUTAneous TIDAC    oxyCODONE IR (ROXICODONE) tablet 7.5 mg  7.5 mg Oral Q6H PRN    calcium carbonate (TUMS) chewable tablet 200 mg [elemental]  200 mg Oral TID WITH MEALS    torsemide (DEMADEX) tablet 60 mg  60 mg Oral DAILY    insulin lispro (HUMALOG) injection   SubCUTAneous AC&HS    dextrose (D50W) injection syrg 12.5-25 g  12.5-25 g IntraVENous PRN    albuterol-ipratropium (DUO-NEB) 2.5 MG-0.5 MG/3 ML  3 mL Nebulization Q6H PRN    alteplase (CATHFLO) 1 mg in sterile water (preservative free) 1 mL injection  1 mg InterCATHeter PRN    pregabalin (LYRICA) capsule 75 mg  75 mg Oral BID    montelukast (SINGULAIR) tablet 10 mg  10 mg Oral QHS    sacubitriL-valsartan (ENTRESTO) 49-51 mg tablet 1 Tablet  1 Tablet Oral Q12H    apixaban (ELIQUIS) tablet 5 mg  5 mg Oral BID    amiodarone (CORDARONE) tablet 100 mg  100 mg Oral DAILY    ezetimibe (ZETIA) tablet 10 mg  10 mg Oral DAILY    pantoprazole (PROTONIX) tablet 40 mg  40 mg Oral ACB    isosorbide mononitrate ER (IMDUR) tablet 30 mg  30 mg Oral DAILY    aspirin delayed-release tablet 81 mg  81 mg Oral DAILY    rosuvastatin (CRESTOR) tablet 5 mg  5 mg Oral DAILY    [Held by provider] spironolactone (ALDACTONE) tablet 25 mg  25 mg Oral DAILY    benzocaine-menthoL (CEPACOL) lozenge 1 Lozenge  1 Lozenge Mucous Membrane PRN    fentaNYL citrate (PF) injection 50 mcg  50 mcg IntraVENous Q4H PRN    hydrALAZINE (APRESOLINE) 20 mg/mL injection 10 mg  10 mg IntraVENous Q6H PRN    glucose chewable tablet 16 g  4 Tablet Oral PRN    dextrose (D50W) injection syrg 12.5-25 g  25-50 mL IntraVENous PRN    glucagon (GLUCAGEN) injection 1 mg  1 mg IntraMUSCular PRN    sodium chloride (NS) flush 5-40 mL  5-40 mL IntraVENous Q8H    sodium chloride (NS) flush 5-40 mL  5-40 mL IntraVENous PRN    acetaminophen (TYLENOL) tablet 650 mg  650 mg Oral Q6H PRN    Or    acetaminophen (TYLENOL) suppository 650 mg  650 mg Rectal Q6H PRN    polyethylene glycol (MIRALAX) packet 17 g  17 g Oral DAILY PRN    ondansetron (ZOFRAN ODT) tablet 4 mg  4 mg Oral Q8H PRN    Or    ondansetron (ZOFRAN) injection 4 mg  4 mg IntraVENous Q6H PRN    arformoteroL (BROVANA) neb solution 15 mcg  15 mcg Nebulization BID RT    And    budesonide (PULMICORT) 500 mcg/2 ml nebulizer suspension  500 mcg Nebulization BID RT    miconazole (MICOTIN) 2 % powder   Topical BID    sodium chloride (NS) flush 5-40 mL  5-40 mL IntraVENous Q8H    sodium chloride (NS) flush 5-40 mL  5-40 mL IntraVENous PRN     ______________________________________________________________________  EXPECTED LENGTH OF STAY: 3d 14h  ACTUAL LENGTH OF STAY:          21                 Nikunj Patel MD   Patient has given Verbal permission to discuss medical care with   persons present in the room and and also with contact as listed on face sheet. Please note that this dictation was completed with [employer], the computer voice recognition software. Quite often unanticipated grammatical, syntax, homophones, and other interpretive errors are inadvertently transcribed by the computer software. Please disregard these errors. Please excuse any errors that have escaped final proofreading. Thank you.

## 2021-09-30 NOTE — PROGRESS NOTES
Bedside and Verbal shift change report given to Edis Sin RN  (oncoming nurse) by Leticia Chamberlain (offgoing nurse). Report included the following information SBAR and Kardex.

## 2021-10-01 LAB
COMMENT, HOLDF: NORMAL
GLUCOSE BLD STRIP.AUTO-MCNC: 120 MG/DL (ref 65–117)
GLUCOSE BLD STRIP.AUTO-MCNC: 141 MG/DL (ref 65–117)
GLUCOSE BLD STRIP.AUTO-MCNC: 245 MG/DL (ref 65–117)
GLUCOSE BLD STRIP.AUTO-MCNC: 85 MG/DL (ref 65–117)
MAGNESIUM SERPL-MCNC: 1.9 MG/DL (ref 1.6–2.4)
PHOSPHATE SERPL-MCNC: 2.9 MG/DL (ref 2.6–4.7)
SAMPLES BEING HELD,HOLD: NORMAL
SERVICE CMNT-IMP: ABNORMAL
SERVICE CMNT-IMP: NORMAL

## 2021-10-01 PROCEDURE — 74011000250 HC RX REV CODE- 250: Performed by: HOSPITALIST

## 2021-10-01 PROCEDURE — 74011250636 HC RX REV CODE- 250/636: Performed by: INTERNAL MEDICINE

## 2021-10-01 PROCEDURE — 65270000029 HC RM PRIVATE

## 2021-10-01 PROCEDURE — 84100 ASSAY OF PHOSPHORUS: CPT

## 2021-10-01 PROCEDURE — 83735 ASSAY OF MAGNESIUM: CPT

## 2021-10-01 PROCEDURE — 74011636637 HC RX REV CODE- 636/637: Performed by: INTERNAL MEDICINE

## 2021-10-01 PROCEDURE — 74011636637 HC RX REV CODE- 636/637: Performed by: FAMILY MEDICINE

## 2021-10-01 PROCEDURE — 36415 COLL VENOUS BLD VENIPUNCTURE: CPT

## 2021-10-01 PROCEDURE — 82962 GLUCOSE BLOOD TEST: CPT

## 2021-10-01 PROCEDURE — 74011250637 HC RX REV CODE- 250/637: Performed by: INTERNAL MEDICINE

## 2021-10-01 PROCEDURE — 74011250637 HC RX REV CODE- 250/637: Performed by: HOSPITALIST

## 2021-10-01 PROCEDURE — 74011250637 HC RX REV CODE- 250/637: Performed by: FAMILY MEDICINE

## 2021-10-01 PROCEDURE — 94640 AIRWAY INHALATION TREATMENT: CPT

## 2021-10-01 RX ORDER — FUROSEMIDE 10 MG/ML
80 INJECTION INTRAMUSCULAR; INTRAVENOUS 2 TIMES DAILY
Status: DISCONTINUED | OUTPATIENT
Start: 2021-10-01 | End: 2021-10-02

## 2021-10-01 RX ADMIN — AMIODARONE HYDROCHLORIDE 100 MG: 200 TABLET ORAL at 08:42

## 2021-10-01 RX ADMIN — ARFORMOTEROL TARTRATE 15 MCG: 15 SOLUTION RESPIRATORY (INHALATION) at 09:10

## 2021-10-01 RX ADMIN — CALCIUM CARBONATE (ANTACID) CHEW TAB 500 MG 200 MG: 500 CHEW TAB at 16:57

## 2021-10-01 RX ADMIN — Medication 10 ML: at 14:59

## 2021-10-01 RX ADMIN — PANTOPRAZOLE SODIUM 40 MG: 40 TABLET, DELAYED RELEASE ORAL at 07:01

## 2021-10-01 RX ADMIN — INSULIN LISPRO 5 UNITS: 100 INJECTION, SOLUTION INTRAVENOUS; SUBCUTANEOUS at 11:45

## 2021-10-01 RX ADMIN — APIXABAN 5 MG: 5 TABLET, FILM COATED ORAL at 08:42

## 2021-10-01 RX ADMIN — ROSUVASTATIN CALCIUM 5 MG: 10 TABLET, FILM COATED ORAL at 08:41

## 2021-10-01 RX ADMIN — CALCIUM CARBONATE (ANTACID) CHEW TAB 500 MG 200 MG: 500 CHEW TAB at 11:06

## 2021-10-01 RX ADMIN — Medication 10 ML: at 06:57

## 2021-10-01 RX ADMIN — BUDESONIDE 500 MCG: 0.5 INHALANT RESPIRATORY (INHALATION) at 09:10

## 2021-10-01 RX ADMIN — ISOSORBIDE MONONITRATE 30 MG: 30 TABLET, EXTENDED RELEASE ORAL at 08:41

## 2021-10-01 RX ADMIN — Medication 10 ML: at 15:00

## 2021-10-01 RX ADMIN — APIXABAN 5 MG: 5 TABLET, FILM COATED ORAL at 17:05

## 2021-10-01 RX ADMIN — FUROSEMIDE 80 MG: 10 INJECTION, SOLUTION INTRAMUSCULAR; INTRAVENOUS at 17:04

## 2021-10-01 RX ADMIN — Medication 10 ML: at 21:35

## 2021-10-01 RX ADMIN — FUROSEMIDE 40 MG: 10 INJECTION, SOLUTION INTRAMUSCULAR; INTRAVENOUS at 08:42

## 2021-10-01 RX ADMIN — EZETIMIBE 10 MG: 10 TABLET ORAL at 08:41

## 2021-10-01 RX ADMIN — PREGABALIN 75 MG: 25 CAPSULE ORAL at 00:29

## 2021-10-01 RX ADMIN — SACUBITRIL AND VALSARTAN 1 TABLET: 49; 51 TABLET, FILM COATED ORAL at 21:34

## 2021-10-01 RX ADMIN — ASPIRIN 81 MG: 81 TABLET, COATED ORAL at 08:42

## 2021-10-01 RX ADMIN — MICONAZOLE NITRATE 2 % TOPICAL POWDER: at 19:07

## 2021-10-01 RX ADMIN — INSULIN LISPRO 4 UNITS: 100 INJECTION, SOLUTION INTRAVENOUS; SUBCUTANEOUS at 11:44

## 2021-10-01 RX ADMIN — MONTELUKAST 10 MG: 10 TABLET, FILM COATED ORAL at 21:34

## 2021-10-01 RX ADMIN — ACETAMINOPHEN 650 MG: 325 TABLET ORAL at 07:01

## 2021-10-01 RX ADMIN — HUMAN INSULIN 20 UNITS: 100 INJECTION, SUSPENSION SUBCUTANEOUS at 07:01

## 2021-10-01 RX ADMIN — CALCIUM CARBONATE (ANTACID) CHEW TAB 500 MG 200 MG: 500 CHEW TAB at 07:02

## 2021-10-01 RX ADMIN — PREGABALIN 75 MG: 25 CAPSULE ORAL at 11:06

## 2021-10-01 RX ADMIN — MICONAZOLE NITRATE 2 % TOPICAL POWDER: at 10:48

## 2021-10-01 RX ADMIN — ACETAMINOPHEN 650 MG: 325 TABLET ORAL at 14:59

## 2021-10-01 NOTE — PROGRESS NOTES
Bedside shift change report given to Marilee Cummings RN (oncoming nurse) by Sona Flaherty RN (offgoing nurse). Report included the following information SBAR, Kardex, Intake/Output, MAR and Recent Results.

## 2021-10-01 NOTE — PROGRESS NOTES
Bedside shift change report given to Diane Reese RN  (oncoming nurse) by Aubree Cha RN (offgoing nurse). Report included the following information SBAR, Kardex and MAR. Home

## 2021-10-01 NOTE — PROGRESS NOTES
Spiritual Care Assessment/Progress Note  [facility]      NAME: Patricia Brown      MRN: [id number]  AGE: 48 y.o. SEX: male  Mormonism Affiliation: Unknown   Language: English     10/1/2021     Total Time (in minutes): 10     Spiritual Assessment begun in [facility] [phone] through conversation with:         []Patient        [] Family    [] Friend(s)        Reason for Consult: Initial/Spiritual assessment, patient floor     Spiritual beliefs: (Please include comment if needed)     [] Identifies with a nanda tradition:         [] Supported by a nanda community:            [] Claims no spiritual orientation:           [] Seeking spiritual identity:                [] Adheres to an individual form of spirituality:           [x] Not able to assess:                           Identified resources for coping:      [] Prayer                               [] Music                  [] Guided Imagery     [] Family/friends                 [] Pet visits     [] Devotional reading                         [x] Unknown     [] Other:                                            Interventions offered during this visit: (See comments for more details)    Patient Interventions: Spiritual care volunteer support           Plan of Care:     [] Support spiritual and/or cultural needs    [] Support AMD and/or advance care planning process      [] Support grieving process   [] Coordinate Rites and/or Rituals    [] Coordination with community clergy   [] No spiritual needs identified at this time   [] Detailed Plan of Care below (See Comments)  [] Make referral to Music Therapy  [] Make referral to Pet Therapy     [] Make referral to Addiction services  [] Make referral to [facility]  [] Make referral to Spiritual Care Partner  [] No future visits requested        [x] Follow up upon further referrals     Comments:  visit for initial spiritual assessment. Patient sitting in chair at bedside asleep while watching television. Did not awaken to knock at door or verbal greeting. Appears comfortable. Decided not to disturb so he can continue to rest.  Please contact spiritual care for further referral or consult. Rev.  Marvin Wilson MDiv, [facility], [facility]   paging service: [phone])'

## 2021-10-01 NOTE — PROGRESS NOTES
Transition of Care: to a SNF;  [facility] has accepted; they will not have a bed available until Monday 10/4     Transport plan: likely BLS (not set up yet)     RUR: 23%     DX: acute on chronic respiratory failure     Main contact is brother Lauren Chong [phone]     Discharge pending:  -patient continues on IV antibiotics  -pending progress with PT/OT  -pending medical progress and care recommendations        NOTE: patient is alert and oriented x 4; patient normally lives alone in an apartment     [phone]: this CM spoke with contact at [facility]- they will not have a bed until Monday 10/4     CM following  Jesscia Dugan RN, CRM      Revision History

## 2021-10-01 NOTE — PROGRESS NOTES
Hospitalist Progress Note  Francisco J Stanford MD  Answering service: [phone] from in house phone      Date of Service:  10/1/2021  NAME:  Patricia Brown  :  1965  MRN:  [id number]    Admission Summary:   Patricia Brown is a 48 y.o. male who presents with sob and AMS      60-year-old gentleman history of diabetes, hypertension, dyslipidemia, likely history of CHF as well he uses oxygen as needed at home (particularly at night for obstructive sleep apnea), morbid obese  presents to the emergency department today with chief complaint of generalized not feeling well for the past 4 to 5 days. No fevers but increased shortness of breath. EMS reports room air hypoxia in the 70s. Patient complains of a rash to his left upper extremity consistent with cellulitis and is not currently on treatment. He has been up titrating his Lasix at home without significant relief of his symptoms. There is a conflicting history about whether or not he has been taking his blood thinner as prescribed. He is fully Covid vaccinated. He is lethargic in ER. Place on bipap now. Not able to get history for now     Interval history / Subjective:      Patient seen and examined this morning. Feels ok, LE swelling is not improving, will increase iv diuretic doses.  Monitor Cr    Assessment & Plan:     # B/l LE wounds, Ulceration on LLE on the bases of stasis dermatitis/ venous insufficiency    - venous dopplers US negative for DVTs  - CHONG no significant arterial obstruction b/l- ID consulted, antibiotics initiated   - on IV Cefepime and Vancomycin --present   - continue wound care  - elevate the leg - on diuretic     # Episode of low BP- improved s/p albumin   # Acute hypoxic and hypercapnic respiratory failure due to COPD exacerbation and CHF, + IVY + likely obesity hypoventilation syndrome-- resolved - BiPAP qhs - off oxygen      # Acute exacerbation of COPD: improved    # Acute on chronic diastolic CHF: NYHA class  - TTE: preserved ef.- Patient with lower extremity edema, likely lymphedema  - Diuretic changed back to furosemide iv- as leg edema is not improving- resuming Entresto    # Atrial fibrillation- Paroxysmal- Stable on amiodarone, Eliquis  # Hypertension- Continue Imdur  # Dyslipidemia- On statin  # Diabetes- On NPH, premeal Humalog and insulin sliding scale coverage  # Cellulitis of the left upper extremity- Completed antibiotics    Discharge disposition: snf   Anticipated discharge: Tomorrow      Hospital Problems  Date Reviewed: 4/28/2013        Codes Class Noted POA    Acute on chronic respiratory failure [facility]) ICD-10-CM: J96.20  ICD-9-CM: 518.84  9/9/2021 Unknown            Review of Systems:   Pertinent positive mentioned in interval hx/HPI. Other systems reviewed and negative     Vital Signs:    Last 24hrs VS reviewed since prior progress note. Most recent are:  Visit Vitals  /65 (BP 1 Location: Left upper arm, BP Patient Position: Standing)   Pulse 73   Temp 97.5 °F (36.4 °C)   Resp 15   Ht 6' 1\" (1.854 m)   Wt 142.9 kg (315 lb 1.6 oz)   SpO2 94%   BMI 41.57 kg/m²         Intake/Output Summary (Last 24 hours) at 10/1/2021 0956  Last data filed at 10/1/2021 2670  Gross per 24 hour   Intake    Output 1060 ml   Net -1060 ml        Physical Examination:             Constitutional:  No acute distress, in pain, chronically sick looking, obese     ENT:  Oral mucous moist   Resp:  CTA bilaterally. No wheezing/rhonchi/rales. No accessory muscle use   CV:  Regular rhythm, normal rate, no murmurs, gallops, rubs    GI:  Soft, non distended, non tender. normoactive bowel sounds, no hepatosplenomegaly     Musculoskeletal:  bilateral lower leg edema + 3, clean dressing covering the shin , small wound on the left leg     Neurologic:  Moves all extremities.                   Data Review:    I personally reviewed labs and imaging     Labs:     Recent Labs 09/29/21 0441   WBC 11.3*   HGB 10.9*   HCT 35.7*   *     Recent Labs     10/01/21  0339 09/30/21  0642 09/29/21 0441   NA  --  139 139   K  --  3.7 3.2*   CL  --  100 100   CO2  --  37* 33*   BUN  --  22* 23*   CREA  --  0.75 0.63*   GLU  --  103* 118*   CA  --  8.6 8.5   MG 1.9 2.0 2.1   PHOS 2.9 2.6 2.6     No results for input(s): ALT, AP, TBIL, TBILI, TP, ALB, GLOB, GGT, AML, LPSE in the last 72 hours. No lab exists for component: SGOT, GPT, AMYP, HLPSE  No results for input(s): INR, PTP, APTT, INREXT, INREXT in the last 72 hours. No results for input(s): FE, TIBC, PSAT, FERR in the last 72 hours. No results found for: FOL, RBCF   No results for input(s): PH, PCO2, PO2 in the last 72 hours. No results for input(s): CPK, CKNDX, TROIQ in the last 72 hours.     No lab exists for component: CPKMB  Lab Results   Component Value Date/Time    Cholesterol, total 170 01/23/2013 11:08 AM    HDL Cholesterol 37 (L) 01/23/2013 11:08 AM    LDL, calculated 97 01/23/2013 11:08 AM    Triglyceride 180 (H) 01/23/2013 11:08 AM    CHOL/HDL Ratio 6.0 (H) 10/06/2010 03:18 PM     Lab Results   Component Value Date/Time    Glucose (POC) 141 (H) 10/01/2021 06:46 AM    Glucose (POC) 121 (H) 09/30/2021 09:34 PM    Glucose (POC) 153 (H) 09/30/2021 04:12 PM    Glucose (POC) 162 (H) 09/30/2021 11:42 AM    Glucose (POC) 105 09/30/2021 06:09 AM     No results found for: COLOR, APPRN, SPGRU, REFSG, ELIZA, PROTU, GLUCU, KETU, BILU, UROU, RON, LEUKU, GLUKE, EPSU, BACTU, WBCU, RBCU, CASTS, UCRY      Medications Reviewed:     Current Facility-Administered Medications   Medication Dose Route Frequency    furosemide (LASIX) injection 40 mg  40 mg IntraVENous BID    insulin NPH (NOVOLIN N, HUMULIN N) injection 20 Units  20 Units SubCUTAneous DAILY WITH BREAKFAST    insulin NPH (NOVOLIN N, HUMULIN N) injection 10 Units  10 Units SubCUTAneous QHS    insulin lispro (HUMALOG) injection 5 Units  5 Units SubCUTAneous TIDAC    oxyCODONE IR (ROXICODONE) tablet 7.5 mg  7.5 mg Oral Q6H PRN    calcium carbonate (TUMS) chewable tablet 200 mg [elemental]  200 mg Oral TID WITH MEALS    insulin lispro (HUMALOG) injection   SubCUTAneous AC&HS    dextrose (D50W) injection syrg 12.5-25 g  12.5-25 g IntraVENous PRN    albuterol-ipratropium (DUO-NEB) 2.5 MG-0.5 MG/3 ML  3 mL Nebulization Q6H PRN    alteplase (CATHFLO) 1 mg in sterile water (preservative free) 1 mL injection  1 mg InterCATHeter PRN    pregabalin (LYRICA) capsule 75 mg  75 mg Oral BID    montelukast (SINGULAIR) tablet 10 mg  10 mg Oral QHS    sacubitriL-valsartan (ENTRESTO) 49-51 mg tablet 1 Tablet  1 Tablet Oral Q12H    apixaban (ELIQUIS) tablet 5 mg  5 mg Oral BID    amiodarone (CORDARONE) tablet 100 mg  100 mg Oral DAILY    ezetimibe (ZETIA) tablet 10 mg  10 mg Oral DAILY    pantoprazole (PROTONIX) tablet 40 mg  40 mg Oral ACB    isosorbide mononitrate ER (IMDUR) tablet 30 mg  30 mg Oral DAILY    aspirin delayed-release tablet 81 mg  81 mg Oral DAILY    rosuvastatin (CRESTOR) tablet 5 mg  5 mg Oral DAILY    [Held by provider] spironolactone (ALDACTONE) tablet 25 mg  25 mg Oral DAILY    benzocaine-menthoL (CEPACOL) lozenge 1 Lozenge  1 Lozenge Mucous Membrane PRN    fentaNYL citrate (PF) injection 50 mcg  50 mcg IntraVENous Q4H PRN    hydrALAZINE (APRESOLINE) 20 mg/mL injection 10 mg  10 mg IntraVENous Q6H PRN    glucose chewable tablet 16 g  4 Tablet Oral PRN    dextrose (D50W) injection syrg 12.5-25 g  25-50 mL IntraVENous PRN    glucagon (GLUCAGEN) injection 1 mg  1 mg IntraMUSCular PRN    sodium chloride (NS) flush 5-40 mL  5-40 mL IntraVENous Q8H    sodium chloride (NS) flush 5-40 mL  5-40 mL IntraVENous PRN    acetaminophen (TYLENOL) tablet 650 mg  650 mg Oral Q6H PRN    Or    acetaminophen (TYLENOL) suppository 650 mg  650 mg Rectal Q6H PRN    polyethylene glycol (MIRALAX) packet 17 g  17 g Oral DAILY PRN    ondansetron (ZOFRAN ODT) tablet 4 mg  4 mg Oral Q8H PRN    Or    ondansetron (ZOFRAN) injection 4 mg  4 mg IntraVENous Q6H PRN    arformoteroL (BROVANA) neb solution 15 mcg  15 mcg Nebulization BID RT    And    budesonide (PULMICORT) 500 mcg/2 ml nebulizer suspension  500 mcg Nebulization BID RT    miconazole (MICOTIN) 2 % powder   Topical BID    sodium chloride (NS) flush 5-40 mL  5-40 mL IntraVENous Q8H    sodium chloride (NS) flush 5-40 mL  5-40 mL IntraVENous PRN     ______________________________________________________________________  EXPECTED LENGTH OF STAY: 3d 14h  ACTUAL LENGTH OF STAY:          25                 Nikunj Patel MD   Patient has given Verbal permission to discuss medical care with   persons present in the room and and also with contact as listed on face sheet. Please note that this dictation was completed with [employer], the computer voice recognition software. Quite often unanticipated grammatical, syntax, homophones, and other interpretive errors are inadvertently transcribed by the computer software. Please disregard these errors. Please excuse any errors that have escaped final proofreading. Thank you.

## 2021-10-01 NOTE — PROGRESS NOTES
Physical Therapy  10/1/2021    Chart reviewed. Pt just received lunch tray. Pt reports he has been OOB in chair most of day. WIll follow up on Monday. Continue to encourage w/ NSG: pt be OOB>chair at least 3x/day as able and appropriate.      Salima López, PTA

## 2021-10-02 LAB
ANION GAP SERPL CALC-SCNC: 4 MMOL/L (ref 5–15)
BUN SERPL-MCNC: 22 MG/DL (ref 6–20)
BUN/CREAT SERPL: 26 (ref 12–20)
CALCIUM SERPL-MCNC: 8.4 MG/DL (ref 8.5–10.1)
CHLORIDE SERPL-SCNC: 99 MMOL/L (ref 97–108)
CO2 SERPL-SCNC: 34 MMOL/L (ref 21–32)
CREAT SERPL-MCNC: 0.84 MG/DL (ref 0.7–1.3)
GLUCOSE BLD STRIP.AUTO-MCNC: 158 MG/DL (ref 65–117)
GLUCOSE BLD STRIP.AUTO-MCNC: 166 MG/DL (ref 65–117)
GLUCOSE BLD STRIP.AUTO-MCNC: 180 MG/DL (ref 65–117)
GLUCOSE BLD STRIP.AUTO-MCNC: 195 MG/DL (ref 65–117)
GLUCOSE SERPL-MCNC: 184 MG/DL (ref 65–100)
MAGNESIUM SERPL-MCNC: 2 MG/DL (ref 1.6–2.4)
PHOSPHATE SERPL-MCNC: 2.8 MG/DL (ref 2.6–4.7)
POTASSIUM SERPL-SCNC: 3 MMOL/L (ref 3.5–5.1)
SERVICE CMNT-IMP: ABNORMAL
SODIUM SERPL-SCNC: 137 MMOL/L (ref 136–145)

## 2021-10-02 PROCEDURE — 74011250636 HC RX REV CODE- 250/636: Performed by: INTERNAL MEDICINE

## 2021-10-02 PROCEDURE — 84100 ASSAY OF PHOSPHORUS: CPT

## 2021-10-02 PROCEDURE — 80048 BASIC METABOLIC PNL TOTAL CA: CPT

## 2021-10-02 PROCEDURE — 65270000029 HC RM PRIVATE

## 2021-10-02 PROCEDURE — 83735 ASSAY OF MAGNESIUM: CPT

## 2021-10-02 PROCEDURE — 94640 AIRWAY INHALATION TREATMENT: CPT

## 2021-10-02 PROCEDURE — 74011000250 HC RX REV CODE- 250: Performed by: HOSPITALIST

## 2021-10-02 PROCEDURE — 36415 COLL VENOUS BLD VENIPUNCTURE: CPT

## 2021-10-02 PROCEDURE — 94760 N-INVAS EAR/PLS OXIMETRY 1: CPT

## 2021-10-02 PROCEDURE — 74011250637 HC RX REV CODE- 250/637: Performed by: FAMILY MEDICINE

## 2021-10-02 PROCEDURE — 74011250637 HC RX REV CODE- 250/637: Performed by: INTERNAL MEDICINE

## 2021-10-02 PROCEDURE — 74011250637 HC RX REV CODE- 250/637: Performed by: HOSPITALIST

## 2021-10-02 PROCEDURE — 74011000250 HC RX REV CODE- 250: Performed by: INTERNAL MEDICINE

## 2021-10-02 PROCEDURE — 82962 GLUCOSE BLOOD TEST: CPT

## 2021-10-02 RX ORDER — POTASSIUM CHLORIDE 750 MG/1
40 TABLET, FILM COATED, EXTENDED RELEASE ORAL 2 TIMES DAILY
Status: COMPLETED | OUTPATIENT
Start: 2021-10-02 | End: 2021-10-04

## 2021-10-02 RX ORDER — FUROSEMIDE 10 MG/ML
80 INJECTION INTRAMUSCULAR; INTRAVENOUS EVERY 8 HOURS
Status: DISCONTINUED | OUTPATIENT
Start: 2021-10-02 | End: 2021-10-04

## 2021-10-02 RX ADMIN — MICONAZOLE NITRATE 2 % TOPICAL POWDER: at 09:22

## 2021-10-02 RX ADMIN — ROSUVASTATIN CALCIUM 5 MG: 10 TABLET, FILM COATED ORAL at 09:20

## 2021-10-02 RX ADMIN — AMIODARONE HYDROCHLORIDE 100 MG: 200 TABLET ORAL at 09:21

## 2021-10-02 RX ADMIN — ASPIRIN 81 MG: 81 TABLET, COATED ORAL at 09:21

## 2021-10-02 RX ADMIN — APIXABAN 5 MG: 5 TABLET, FILM COATED ORAL at 17:44

## 2021-10-02 RX ADMIN — PREGABALIN 75 MG: 25 CAPSULE ORAL at 01:16

## 2021-10-02 RX ADMIN — BUDESONIDE 500 MCG: 0.5 INHALANT RESPIRATORY (INHALATION) at 07:56

## 2021-10-02 RX ADMIN — SACUBITRIL AND VALSARTAN 1 TABLET: 49; 51 TABLET, FILM COATED ORAL at 21:38

## 2021-10-02 RX ADMIN — FUROSEMIDE 80 MG: 10 INJECTION, SOLUTION INTRAMUSCULAR; INTRAVENOUS at 09:20

## 2021-10-02 RX ADMIN — MICONAZOLE NITRATE 2 % TOPICAL POWDER: at 17:50

## 2021-10-02 RX ADMIN — Medication 10 ML: at 14:00

## 2021-10-02 RX ADMIN — PANTOPRAZOLE SODIUM 40 MG: 40 TABLET, DELAYED RELEASE ORAL at 07:03

## 2021-10-02 RX ADMIN — SACUBITRIL AND VALSARTAN 1 TABLET: 49; 51 TABLET, FILM COATED ORAL at 09:27

## 2021-10-02 RX ADMIN — EZETIMIBE 10 MG: 10 TABLET ORAL at 09:21

## 2021-10-02 RX ADMIN — BUDESONIDE 500 MCG: 0.5 INHALANT RESPIRATORY (INHALATION) at 19:44

## 2021-10-02 RX ADMIN — CALCIUM CARBONATE (ANTACID) CHEW TAB 500 MG 200 MG: 500 CHEW TAB at 13:49

## 2021-10-02 RX ADMIN — CALCIUM CARBONATE (ANTACID) CHEW TAB 500 MG 200 MG: 500 CHEW TAB at 17:44

## 2021-10-02 RX ADMIN — MONTELUKAST 10 MG: 10 TABLET, FILM COATED ORAL at 21:38

## 2021-10-02 RX ADMIN — APIXABAN 5 MG: 5 TABLET, FILM COATED ORAL at 09:21

## 2021-10-02 RX ADMIN — ARFORMOTEROL TARTRATE 15 MCG: 15 SOLUTION RESPIRATORY (INHALATION) at 07:56

## 2021-10-02 RX ADMIN — PREGABALIN 75 MG: 25 CAPSULE ORAL at 13:49

## 2021-10-02 RX ADMIN — CHLOROTHIAZIDE SODIUM 250 MG: 500 INJECTION, POWDER, LYOPHILIZED, FOR SOLUTION INTRAVENOUS at 17:44

## 2021-10-02 RX ADMIN — ISOSORBIDE MONONITRATE 30 MG: 30 TABLET, EXTENDED RELEASE ORAL at 09:20

## 2021-10-02 RX ADMIN — CALCIUM CARBONATE (ANTACID) CHEW TAB 500 MG 200 MG: 500 CHEW TAB at 07:03

## 2021-10-02 RX ADMIN — POTASSIUM CHLORIDE 40 MEQ: 750 TABLET, FILM COATED, EXTENDED RELEASE ORAL at 13:49

## 2021-10-02 RX ADMIN — ACETAMINOPHEN 650 MG: 325 TABLET ORAL at 01:19

## 2021-10-02 RX ADMIN — POTASSIUM CHLORIDE 40 MEQ: 750 TABLET, FILM COATED, EXTENDED RELEASE ORAL at 17:44

## 2021-10-02 RX ADMIN — ARFORMOTEROL TARTRATE 15 MCG: 15 SOLUTION RESPIRATORY (INHALATION) at 19:44

## 2021-10-02 NOTE — PROGRESS NOTES
Bedside shift change report given to Erasmo Asher RN (oncoming nurse) by Nasima Bauer RN (offgoing nurse). Report included the following information SBAR, Kardex and MAR.

## 2021-10-02 NOTE — PROGRESS NOTES
Bedside shift change report given to Jigna Arnett RN (oncoming nurse) by Sarita Olivares RN (offgoing nurse). Report included the following information SBAR, Kardex, Intake/Output, MAR and Recent Results.

## 2021-10-02 NOTE — PROGRESS NOTES
Pt refused nighttime and morning scheduled insulin. Pt BG this , RN holding sliding scale per order. When RN asked pt what his goal was for his BG, pt shrugged his shoulders.     RN will notify oncoming RN of refusal.

## 2021-10-02 NOTE — PROGRESS NOTES
Hospitalist Progress Note  Renetta Castro MD  Answering service: [phone] from in house phone      Date of Service:  10/2/2021  NAME:  Olimpia Antoine  :  1965  MRN:  [id number]    Admission Summary:   Olimpia Antoine is a 48 y.o. male who presents with sob and AMS      31-year-old gentleman history of diabetes, hypertension, dyslipidemia, likely history of CHF as well he uses oxygen as needed at home (particularly at night for obstructive sleep apnea), morbid obese  presents to the emergency department today with chief complaint of generalized not feeling well for the past 4 to 5 days. No fevers but increased shortness of breath. EMS reports room air hypoxia in the 70s. Patient complains of a rash to his left upper extremity consistent with cellulitis and is not currently on treatment. He has been up titrating his Lasix at home without significant relief of his symptoms. There is a conflicting history about whether or not he has been taking his blood thinner as prescribed. He is fully Covid vaccinated. He is lethargic in ER. Place on bipap now. Not able to get history for now     Interval history / Subjective:      Patient seen and examined this morning. Feels     Assessment & Plan:     # B/l LE wounds, Ulceration on LLE on the bases of stasis dermatitis/ venous insufficiency    - venous dopplers US negative for DVTs  - CHONG no significant arterial obstruction b/l- ID consulted, antibiotics initiated   - finished IV Cefepime and Vancomycin course  - continue wound care - elevate legs - IV diuretic- increased dose, monitor renal function.     # Episode of low BP- improved s/p albumin   # Acute hypoxic and hypercapnic respiratory failure due to COPD exacerbation and CHF, + IVY + likely obesity hypoventilation syndrome-- resolved - BiPAP qhs - off oxygen      # Acute exacerbation of COPD: improved    # Acute on chronic diastolic CHF: NYHA class  - TTE: preserved ef.- Patient with lower extremity edema, likely lymphedema  - Diuretic changed back to furosemide iv- as leg edema is not improving- resuming Entresto    # Atrial fibrillation- Paroxysmal- Stable on amiodarone, Eliquis  # Hypertension- Continue Imdur  # Dyslipidemia- On statin  # Diabetes- On NPH, premeal Humalog and insulin sliding scale coverage  # Cellulitis of the left upper extremity- Completed antibiotics    Discharge disposition: snf   Anticipated discharge: Tomorrow      Hospital Problems  Date Reviewed: 4/28/2013        Codes Class Noted POA    Acute on chronic respiratory failure [facility]) ICD-10-CM: J96.20  ICD-9-CM: 518.84  9/9/2021 Unknown            Review of Systems:   Pertinent positive mentioned in interval hx/HPI. Other systems reviewed and negative     Vital Signs:    Last 24hrs VS reviewed since prior progress note. Most recent are:  Visit Vitals  /73   Pulse 64   Temp 98.3 °F (36.8 °C)   Resp 16   Ht 6' 1\" (1.854 m)   Wt 142.9 kg (315 lb 1.6 oz)   SpO2 98%   BMI 41.57 kg/m²         Intake/Output Summary (Last 24 hours) at 10/2/2021 4415  Last data filed at 10/2/2021 8462  Gross per 24 hour   Intake    Output 2195 ml   Net -2195 ml        Physical Examination:             Constitutional:  No acute distress, in pain, chronically sick looking, obese     ENT:  Oral mucous moist   Resp:  CTA bilaterally. No wheezing/rhonchi/rales. No accessory muscle use   CV:  Regular rhythm, normal rate, no murmurs, gallops, rubs    GI:  Soft, non distended, non tender. normoactive bowel sounds, no hepatosplenomegaly     Musculoskeletal:  bilateral lower leg edema + 3, clean dressing covering the shin , small wound on the left leg     Neurologic:  Moves all extremities. Data Review:    I personally reviewed labs and imaging     Labs:     No results for input(s): WBC, HGB, HCT, PLT, HGBEXT, HCTEXT, PLTEXT, HGBEXT, HCTEXT, PLTEXT in the last 72 hours.   Recent Labs 10/02/21  0244 10/01/21  0339 09/30/21  0642   NA  --   --  139   K  --   --  3.7   CL  --   --  100   CO2  --   --  37*   BUN  --   --  22*   CREA  --   --  0.75   GLU  --   --  103*   CA  --   --  8.6   MG 2.0 1.9 2.0   PHOS 2.8 2.9 2.6     No results for input(s): ALT, AP, TBIL, TBILI, TP, ALB, GLOB, GGT, AML, LPSE in the last 72 hours. No lab exists for component: SGOT, GPT, AMYP, HLPSE  No results for input(s): INR, PTP, APTT, INREXT, INREXT in the last 72 hours. No results for input(s): FE, TIBC, PSAT, FERR in the last 72 hours. No results found for: FOL, RBCF   No results for input(s): PH, PCO2, PO2 in the last 72 hours. No results for input(s): CPK, CKNDX, TROIQ in the last 72 hours.     No lab exists for component: CPKMB  Lab Results   Component Value Date/Time    Cholesterol, total 170 01/23/2013 11:08 AM    HDL Cholesterol 37 (L) 01/23/2013 11:08 AM    LDL, calculated 97 01/23/2013 11:08 AM    Triglyceride 180 (H) 01/23/2013 11:08 AM    CHOL/HDL Ratio 6.0 (H) 10/06/2010 03:18 PM     Lab Results   Component Value Date/Time    Glucose (POC) 195 (H) 10/02/2021 06:33 AM    Glucose (POC) 120 (H) 10/01/2021 09:27 PM    Glucose (POC) 85 10/01/2021 04:09 PM    Glucose (POC) 245 (H) 10/01/2021 11:17 AM    Glucose (POC) 141 (H) 10/01/2021 06:46 AM     No results found for: COLOR, APPRN, SPGRU, REFSG, ELIZA, PROTU, GLUCU, KETU, BILU, UROU, RON, LEUKU, GLUKE, EPSU, BACTU, WBCU, RBCU, CASTS, UCRY      Medications Reviewed:     Current Facility-Administered Medications   Medication Dose Route Frequency    furosemide (LASIX) injection 80 mg  80 mg IntraVENous BID    insulin NPH (NOVOLIN N, HUMULIN N) injection 20 Units  20 Units SubCUTAneous DAILY WITH BREAKFAST    insulin NPH (NOVOLIN N, HUMULIN N) injection 10 Units  10 Units SubCUTAneous QHS    insulin lispro (HUMALOG) injection 5 Units  5 Units SubCUTAneous TIDAC    oxyCODONE IR (ROXICODONE) tablet 7.5 mg  7.5 mg Oral Q6H PRN    calcium carbonate (TUMS) chewable tablet 200 mg [elemental]  200 mg Oral TID WITH MEALS    insulin lispro (HUMALOG) injection   SubCUTAneous AC&HS    dextrose (D50W) injection syrg 12.5-25 g  12.5-25 g IntraVENous PRN    albuterol-ipratropium (DUO-NEB) 2.5 MG-0.5 MG/3 ML  3 mL Nebulization Q6H PRN    alteplase (CATHFLO) 1 mg in sterile water (preservative free) 1 mL injection  1 mg InterCATHeter PRN    pregabalin (LYRICA) capsule 75 mg  75 mg Oral BID    montelukast (SINGULAIR) tablet 10 mg  10 mg Oral QHS    sacubitriL-valsartan (ENTRESTO) 49-51 mg tablet 1 Tablet  1 Tablet Oral Q12H    apixaban (ELIQUIS) tablet 5 mg  5 mg Oral BID    amiodarone (CORDARONE) tablet 100 mg  100 mg Oral DAILY    ezetimibe (ZETIA) tablet 10 mg  10 mg Oral DAILY    pantoprazole (PROTONIX) tablet 40 mg  40 mg Oral ACB    isosorbide mononitrate ER (IMDUR) tablet 30 mg  30 mg Oral DAILY    aspirin delayed-release tablet 81 mg  81 mg Oral DAILY    rosuvastatin (CRESTOR) tablet 5 mg  5 mg Oral DAILY    [Held by provider] spironolactone (ALDACTONE) tablet 25 mg  25 mg Oral DAILY    benzocaine-menthoL (CEPACOL) lozenge 1 Lozenge  1 Lozenge Mucous Membrane PRN    fentaNYL citrate (PF) injection 50 mcg  50 mcg IntraVENous Q4H PRN    hydrALAZINE (APRESOLINE) 20 mg/mL injection 10 mg  10 mg IntraVENous Q6H PRN    glucose chewable tablet 16 g  4 Tablet Oral PRN    dextrose (D50W) injection syrg 12.5-25 g  25-50 mL IntraVENous PRN    glucagon (GLUCAGEN) injection 1 mg  1 mg IntraMUSCular PRN    sodium chloride (NS) flush 5-40 mL  5-40 mL IntraVENous Q8H    sodium chloride (NS) flush 5-40 mL  5-40 mL IntraVENous PRN    acetaminophen (TYLENOL) tablet 650 mg  650 mg Oral Q6H PRN    Or    acetaminophen (TYLENOL) suppository 650 mg  650 mg Rectal Q6H PRN    polyethylene glycol (MIRALAX) packet 17 g  17 g Oral DAILY PRN    ondansetron (ZOFRAN ODT) tablet 4 mg  4 mg Oral Q8H PRN    Or    ondansetron (ZOFRAN) injection 4 mg  4 mg IntraVENous Q6H PRN    arformoteroL (BROVANA) neb solution 15 mcg  15 mcg Nebulization BID RT    And    budesonide (PULMICORT) 500 mcg/2 ml nebulizer suspension  500 mcg Nebulization BID RT    miconazole (MICOTIN) 2 % powder   Topical BID    sodium chloride (NS) flush 5-40 mL  5-40 mL IntraVENous Q8H    sodium chloride (NS) flush 5-40 mL  5-40 mL IntraVENous PRN     ______________________________________________________________________  EXPECTED LENGTH OF STAY: 3d 14h  ACTUAL LENGTH OF STAY:          Angeli Becerra MD   Patient has given Verbal permission to discuss medical care with   persons present in the room and and also with contact as listed on face sheet. Please note that this dictation was completed with [employer], the computer voice recognition software. Quite often unanticipated grammatical, syntax, homophones, and other interpretive errors are inadvertently transcribed by the computer software. Please disregard these errors. Please excuse any errors that have escaped final proofreading. Thank you.

## 2021-10-03 LAB
ANION GAP SERPL CALC-SCNC: 1 MMOL/L (ref 5–15)
BUN SERPL-MCNC: 17 MG/DL (ref 6–20)
BUN/CREAT SERPL: 23 (ref 12–20)
CALCIUM SERPL-MCNC: 8.7 MG/DL (ref 8.5–10.1)
CHLORIDE SERPL-SCNC: 100 MMOL/L (ref 97–108)
CO2 SERPL-SCNC: 36 MMOL/L (ref 21–32)
CREAT SERPL-MCNC: 0.73 MG/DL (ref 0.7–1.3)
GLUCOSE BLD STRIP.AUTO-MCNC: 132 MG/DL (ref 65–117)
GLUCOSE BLD STRIP.AUTO-MCNC: 163 MG/DL (ref 65–117)
GLUCOSE BLD STRIP.AUTO-MCNC: 178 MG/DL (ref 65–117)
GLUCOSE BLD STRIP.AUTO-MCNC: 179 MG/DL (ref 65–117)
GLUCOSE SERPL-MCNC: 149 MG/DL (ref 65–100)
MAGNESIUM SERPL-MCNC: 2.1 MG/DL (ref 1.6–2.4)
PHOSPHATE SERPL-MCNC: 2.4 MG/DL (ref 2.6–4.7)
POTASSIUM SERPL-SCNC: 3.4 MMOL/L (ref 3.5–5.1)
SERVICE CMNT-IMP: ABNORMAL
SODIUM SERPL-SCNC: 137 MMOL/L (ref 136–145)

## 2021-10-03 PROCEDURE — 74011250637 HC RX REV CODE- 250/637: Performed by: HOSPITALIST

## 2021-10-03 PROCEDURE — 74011250637 HC RX REV CODE- 250/637: Performed by: INTERNAL MEDICINE

## 2021-10-03 PROCEDURE — 36415 COLL VENOUS BLD VENIPUNCTURE: CPT

## 2021-10-03 PROCEDURE — 84100 ASSAY OF PHOSPHORUS: CPT

## 2021-10-03 PROCEDURE — 83735 ASSAY OF MAGNESIUM: CPT

## 2021-10-03 PROCEDURE — 80048 BASIC METABOLIC PNL TOTAL CA: CPT

## 2021-10-03 PROCEDURE — 82962 GLUCOSE BLOOD TEST: CPT

## 2021-10-03 PROCEDURE — 74011000250 HC RX REV CODE- 250: Performed by: INTERNAL MEDICINE

## 2021-10-03 PROCEDURE — 74011250637 HC RX REV CODE- 250/637: Performed by: FAMILY MEDICINE

## 2021-10-03 PROCEDURE — 74011250636 HC RX REV CODE- 250/636: Performed by: INTERNAL MEDICINE

## 2021-10-03 PROCEDURE — 65270000029 HC RM PRIVATE

## 2021-10-03 RX ADMIN — ACETAMINOPHEN 650 MG: 325 TABLET ORAL at 14:43

## 2021-10-03 RX ADMIN — CALCIUM CARBONATE (ANTACID) CHEW TAB 500 MG 200 MG: 500 CHEW TAB at 12:13

## 2021-10-03 RX ADMIN — CHLOROTHIAZIDE SODIUM 250 MG: 500 INJECTION, POWDER, LYOPHILIZED, FOR SOLUTION INTRAVENOUS at 12:23

## 2021-10-03 RX ADMIN — ISOSORBIDE MONONITRATE 30 MG: 30 TABLET, EXTENDED RELEASE ORAL at 09:40

## 2021-10-03 RX ADMIN — EZETIMIBE 10 MG: 10 TABLET ORAL at 09:40

## 2021-10-03 RX ADMIN — POTASSIUM CHLORIDE 40 MEQ: 750 TABLET, FILM COATED, EXTENDED RELEASE ORAL at 17:45

## 2021-10-03 RX ADMIN — MICONAZOLE NITRATE 2 % TOPICAL POWDER: at 17:46

## 2021-10-03 RX ADMIN — PREGABALIN 75 MG: 25 CAPSULE ORAL at 12:13

## 2021-10-03 RX ADMIN — MICONAZOLE NITRATE 2 % TOPICAL POWDER: at 09:00

## 2021-10-03 RX ADMIN — Medication 10 ML: at 14:00

## 2021-10-03 RX ADMIN — APIXABAN 5 MG: 5 TABLET, FILM COATED ORAL at 09:40

## 2021-10-03 RX ADMIN — AMIODARONE HYDROCHLORIDE 100 MG: 200 TABLET ORAL at 09:40

## 2021-10-03 RX ADMIN — MONTELUKAST 10 MG: 10 TABLET, FILM COATED ORAL at 21:20

## 2021-10-03 RX ADMIN — CALCIUM CARBONATE (ANTACID) CHEW TAB 500 MG 200 MG: 500 CHEW TAB at 07:07

## 2021-10-03 RX ADMIN — SACUBITRIL AND VALSARTAN 1 TABLET: 49; 51 TABLET, FILM COATED ORAL at 09:00

## 2021-10-03 RX ADMIN — CALCIUM CARBONATE (ANTACID) CHEW TAB 500 MG 200 MG: 500 CHEW TAB at 17:45

## 2021-10-03 RX ADMIN — APIXABAN 5 MG: 5 TABLET, FILM COATED ORAL at 17:45

## 2021-10-03 RX ADMIN — SACUBITRIL AND VALSARTAN 1 TABLET: 49; 51 TABLET, FILM COATED ORAL at 22:41

## 2021-10-03 RX ADMIN — ACETAMINOPHEN 650 MG: 325 TABLET ORAL at 02:15

## 2021-10-03 RX ADMIN — ROSUVASTATIN CALCIUM 5 MG: 10 TABLET, FILM COATED ORAL at 09:40

## 2021-10-03 RX ADMIN — FUROSEMIDE 80 MG: 10 INJECTION, SOLUTION INTRAMUSCULAR; INTRAVENOUS at 01:06

## 2021-10-03 RX ADMIN — PREGABALIN 75 MG: 25 CAPSULE ORAL at 01:06

## 2021-10-03 RX ADMIN — PANTOPRAZOLE SODIUM 40 MG: 40 TABLET, DELAYED RELEASE ORAL at 07:07

## 2021-10-03 RX ADMIN — Medication 10 ML: at 21:22

## 2021-10-03 RX ADMIN — FUROSEMIDE 80 MG: 10 INJECTION, SOLUTION INTRAMUSCULAR; INTRAVENOUS at 09:41

## 2021-10-03 RX ADMIN — POTASSIUM CHLORIDE 40 MEQ: 750 TABLET, FILM COATED, EXTENDED RELEASE ORAL at 09:40

## 2021-10-03 RX ADMIN — ASPIRIN 81 MG: 81 TABLET, COATED ORAL at 09:40

## 2021-10-03 NOTE — PROGRESS NOTES
[address] [facility] Adult  Hospitalist Group                                                                                          Hospitalist Progress Note  Angélica Solitario MD  Answering service: [phone] OR [phone] from in house phone              Progress Note    Patient: Patricia Brown MRN: [id number]  SSN: [id number]    YOB: 1965  Age: 48 y.o. Sex: male      Admit Date: 9/9/2021    LOS: 24 days     Subjective:     Patient presents with COPD and CHF. Patient is still on high flow oxygen but denies any shortness of breath. Patient has known history of COPD but does not use oxygen at home. Patient tells me that he always have lower extremity edema and wears compression stocking. His diuretics currently on hold due to worsening BUN. Denies headache, dizziness, chest pain, shortness of breath, nausea, vomiting, abdominal pain. Objective:     Vitals:    10/02/21 1944 10/02/21 2045 10/02/21 2327 10/03/21 0724   BP:  123/74 125/71 124/71   Pulse:  72 69 64   Resp:  16 16 16   Temp:  97.3 °F (36.3 °C) 97.4 °F (36.3 °C) 97.5 °F (36.4 °C)   SpO2: 95% 91% 92% 92%   Weight:       Height:            Intake and Output:  Current Shift: 10/03 0701 - 10/03 1900  In: -   Out: 125 [Urine:125]  Last three shifts: 10/01 1901 - 10/03 0700  In: -   Out: 1326 [Urine:4875]    Physical Exam:   GENERAL: alert, cooperative, no distress, appears stated age  THROAT & NECK: normal and no erythema or exudates noted. LUNG: clear to auscultation bilaterally  HEART: regular rate and rhythm, S1, S2 normal, no murmur, click, rub or gallop  ABDOMEN: soft, non-tender. Bowel sounds normal. No masses,  no organomegaly  EXTREMITIES:  extremities normal, atraumatic, no cyanosis or edema  SKIN: no rash or abnormalities  NEUROLOGIC: AOx3. PSYCHIATRIC: non focal    Lab/Data Review: All lab results for the last 24 hours reviewed.      Recent Results (from the past 24 hour(s))   GLUCOSE, POC    Collection Time: 10/02/21 5:14 PM   Result Value Ref Range    Glucose (POC) 166 (H) 65 - 117 mg/dL    Performed by Betty Reyes    GLUCOSE, POC    Collection Time: 10/02/21  9:07 PM   Result Value Ref Range    Glucose (POC) 180 (H) 65 - 117 mg/dL    Performed by [employer]  PCT    PHOSPHORUS    Collection Time: 10/03/21  1:06 AM   Result Value Ref Range    Phosphorus 2.4 (L) 2.6 - 4.7 MG/DL   MAGNESIUM    Collection Time: 10/03/21  1:06 AM   Result Value Ref Range    Magnesium 2.1 1.6 - 2.4 mg/dL   METABOLIC PANEL, BASIC    Collection Time: 10/03/21  1:06 AM   Result Value Ref Range    Sodium 137 136 - 145 mmol/L    Potassium 3.4 (L) 3.5 - 5.1 mmol/L    Chloride 100 97 - 108 mmol/L    CO2 36 (H) 21 - 32 mmol/L    Anion gap 1 (L) 5 - 15 mmol/L    Glucose 149 (H) 65 - 100 mg/dL    BUN 17 6 - 20 MG/DL    Creatinine 0.73 0.70 - 1.30 MG/DL    BUN/Creatinine ratio 23 (H) 12 - 20      GFR est AA >60 >60 ml/min/1.73m2    GFR est non-AA >60 >60 ml/min/1.73m2    Calcium 8.7 8.5 - 10.1 MG/DL   GLUCOSE, POC    Collection Time: 10/03/21  6:12 AM   Result Value Ref Range    Glucose (POC) 163 (H) 65 - 117 mg/dL    Performed by [employer]  PCT    GLUCOSE, POC    Collection Time: 10/03/21 11:15 AM   Result Value Ref Range    Glucose (POC) 179 (H) 65 - 117 mg/dL    Performed by CRISTIN DILL         Imaging:    No results found.        Assessment and Plan:     Acute respiratory failure  -Acute hypoxic and hypercapnic respiratory failure due to COPD exacerbation and CHF, also complicated by obstructive sleep apnea and likely obesity hypoventilation syndrome  -Now patient is off the oxygen    Acute exacerbation of COPD  -Now resolved, finished course of steroid, continue bronchodilator and breathing treatment  -Wean oxygen as tolerated, pulmonology following    Congestive heart failure  -Acute on chronic diastolic congestive heart failure, patient with normal EF on recent echo  -Repeat echo pending  -Patient with lower extremity edema, likely lymphedema related versus cor pulmonale  -On diuresis with Lasix, hopefully we can change to p.o. soon    Diabetes  -On NPH, premeal Humalog and insulin sliding scale coverage  -Diabetic management team following and adjusting insulin dose    Atrial fibrillation  -Paroxysmal atrial fibrillation  -Stable on amiodarone, Eliquis for anticoagulation    Cellulitis  -Cellulitis of the left upper extremity  -Completed antibiotics    Hypertension  -Continue Imdur    Dyslipidemia  -On statin    Discharge disposition: Likely more than 48 hours pending progress.     Signed By: Kathie Abebe MD     October 3, 2021

## 2021-10-03 NOTE — PROGRESS NOTES
Bedside shift change report given to Cr Nelson RN (oncoming nurse) by Michaela Chong RN (offgoing nurse). Report included the following information SBAR, Kardex, Intake/Output, MAR and Recent Results.

## 2021-10-03 NOTE — PROGRESS NOTES
Ambulated with walker and standby assist this am to recliner. Sat up all day. Stated he did elevate his feet most of the day. Swelling in feet were decreased this am and increased greatly this evening. No pain med today. Lasix given as ordered this am and stated he urinated like always but not extra. diuretic changed this afternoon and given this evening, waiting for pharmacy delivery. BM this evening. Bedside shift change report given to Shukri Amaya RN (oncoming nurse) by Lizbeth Amaya RN (offgoing nurse). Report included the following information SBAR, Kardex, Intake/Output and MAR.

## 2021-10-04 VITALS
SYSTOLIC BLOOD PRESSURE: 131 MMHG | HEIGHT: 73 IN | DIASTOLIC BLOOD PRESSURE: 74 MMHG | WEIGHT: 315 LBS | OXYGEN SATURATION: 91 % | HEART RATE: 69 BPM | RESPIRATION RATE: 16 BRPM | TEMPERATURE: 97.3 F | BODY MASS INDEX: 41.75 KG/M2

## 2021-10-04 LAB
ANION GAP SERPL CALC-SCNC: 0 MMOL/L (ref 5–15)
BUN SERPL-MCNC: 16 MG/DL (ref 6–20)
BUN/CREAT SERPL: 19 (ref 12–20)
CALCIUM SERPL-MCNC: 8.7 MG/DL (ref 8.5–10.1)
CHLORIDE SERPL-SCNC: 102 MMOL/L (ref 97–108)
CO2 SERPL-SCNC: 35 MMOL/L (ref 21–32)
COVID-19 RAPID TEST, COVR: NOT DETECTED
CREAT SERPL-MCNC: 0.84 MG/DL (ref 0.7–1.3)
GLUCOSE BLD STRIP.AUTO-MCNC: 182 MG/DL (ref 65–117)
GLUCOSE BLD STRIP.AUTO-MCNC: 199 MG/DL (ref 65–117)
GLUCOSE SERPL-MCNC: 183 MG/DL (ref 65–100)
MAGNESIUM SERPL-MCNC: 2 MG/DL (ref 1.6–2.4)
PHOSPHATE SERPL-MCNC: 2.4 MG/DL (ref 2.6–4.7)
POTASSIUM SERPL-SCNC: 3.7 MMOL/L (ref 3.5–5.1)
SARS-COV-2, COV2: NORMAL
SERVICE CMNT-IMP: ABNORMAL
SERVICE CMNT-IMP: ABNORMAL
SODIUM SERPL-SCNC: 137 MMOL/L (ref 136–145)
SOURCE, COVRS: NORMAL

## 2021-10-04 PROCEDURE — 74011250637 HC RX REV CODE- 250/637: Performed by: FAMILY MEDICINE

## 2021-10-04 PROCEDURE — 87635 SARS-COV-2 COVID-19 AMP PRB: CPT

## 2021-10-04 PROCEDURE — 94640 AIRWAY INHALATION TREATMENT: CPT

## 2021-10-04 PROCEDURE — 74011250637 HC RX REV CODE- 250/637: Performed by: INTERNAL MEDICINE

## 2021-10-04 PROCEDURE — 80048 BASIC METABOLIC PNL TOTAL CA: CPT

## 2021-10-04 PROCEDURE — 36415 COLL VENOUS BLD VENIPUNCTURE: CPT

## 2021-10-04 PROCEDURE — 84100 ASSAY OF PHOSPHORUS: CPT

## 2021-10-04 PROCEDURE — 83735 ASSAY OF MAGNESIUM: CPT

## 2021-10-04 PROCEDURE — 82962 GLUCOSE BLOOD TEST: CPT

## 2021-10-04 PROCEDURE — 74011250636 HC RX REV CODE- 250/636: Performed by: INTERNAL MEDICINE

## 2021-10-04 PROCEDURE — 74011000250 HC RX REV CODE- 250: Performed by: HOSPITALIST

## 2021-10-04 PROCEDURE — 94760 N-INVAS EAR/PLS OXIMETRY 1: CPT

## 2021-10-04 PROCEDURE — 74011250637 HC RX REV CODE- 250/637: Performed by: HOSPITALIST

## 2021-10-04 RX ORDER — TORSEMIDE 20 MG/1
60 TABLET ORAL DAILY
Status: DISCONTINUED | OUTPATIENT
Start: 2021-10-05 | End: 2021-10-04 | Stop reason: HOSPADM

## 2021-10-04 RX ORDER — BACITRACIN 500 UNIT/G
PACKET (EA) TOPICAL
Status: DISCONTINUED
Start: 2021-10-04 | End: 2021-10-04 | Stop reason: HOSPADM

## 2021-10-04 RX ADMIN — EZETIMIBE 10 MG: 10 TABLET ORAL at 08:40

## 2021-10-04 RX ADMIN — PREGABALIN 75 MG: 25 CAPSULE ORAL at 00:40

## 2021-10-04 RX ADMIN — ACETAMINOPHEN 650 MG: 325 TABLET ORAL at 07:03

## 2021-10-04 RX ADMIN — MICONAZOLE NITRATE 2 % TOPICAL POWDER: at 08:41

## 2021-10-04 RX ADMIN — BUDESONIDE 500 MCG: 0.5 INHALANT RESPIRATORY (INHALATION) at 07:28

## 2021-10-04 RX ADMIN — PANTOPRAZOLE SODIUM 40 MG: 40 TABLET, DELAYED RELEASE ORAL at 07:03

## 2021-10-04 RX ADMIN — PREGABALIN 75 MG: 25 CAPSULE ORAL at 13:31

## 2021-10-04 RX ADMIN — SACUBITRIL AND VALSARTAN 1 TABLET: 49; 51 TABLET, FILM COATED ORAL at 08:45

## 2021-10-04 RX ADMIN — CALCIUM CARBONATE (ANTACID) CHEW TAB 500 MG 200 MG: 500 CHEW TAB at 13:31

## 2021-10-04 RX ADMIN — ISOSORBIDE MONONITRATE 30 MG: 30 TABLET, EXTENDED RELEASE ORAL at 08:40

## 2021-10-04 RX ADMIN — APIXABAN 5 MG: 5 TABLET, FILM COATED ORAL at 08:40

## 2021-10-04 RX ADMIN — Medication 10 ML: at 13:31

## 2021-10-04 RX ADMIN — ROSUVASTATIN CALCIUM 5 MG: 10 TABLET, FILM COATED ORAL at 08:39

## 2021-10-04 RX ADMIN — POTASSIUM CHLORIDE 40 MEQ: 750 TABLET, FILM COATED, EXTENDED RELEASE ORAL at 08:40

## 2021-10-04 RX ADMIN — ACETAMINOPHEN 650 MG: 325 TABLET ORAL at 15:35

## 2021-10-04 RX ADMIN — CALCIUM CARBONATE (ANTACID) CHEW TAB 500 MG 200 MG: 500 CHEW TAB at 07:03

## 2021-10-04 RX ADMIN — FUROSEMIDE 80 MG: 10 INJECTION, SOLUTION INTRAMUSCULAR; INTRAVENOUS at 08:41

## 2021-10-04 RX ADMIN — AMIODARONE HYDROCHLORIDE 100 MG: 200 TABLET ORAL at 08:40

## 2021-10-04 RX ADMIN — ASPIRIN 81 MG: 81 TABLET, COATED ORAL at 08:40

## 2021-10-04 RX ADMIN — Medication 10 ML: at 13:32

## 2021-10-04 RX ADMIN — ARFORMOTEROL TARTRATE 15 MCG: 15 SOLUTION RESPIRATORY (INHALATION) at 07:28

## 2021-10-04 NOTE — PROGRESS NOTES
Problem: Falls - Risk of  Goal: *Absence of Falls  Description: Document Earma Yaritza Fall Risk and appropriate interventions in the flowsheet. Outcome: Progressing Towards Goal  Note: Fall Risk Interventions:  Mobility Interventions: Communicate number of staff needed for ambulation/transfer, PT Consult for mobility concerns, PT Consult for assist device competence, Strengthening exercises (ROM-active/passive), Utilize walker, cane, or other assistive device, Patient to call before getting OOB         Medication Interventions: Teach patient to arise slowly, Patient to call before getting OOB    Elimination Interventions: Call light in reach, Patient to call for help with toileting needs, Urinal in reach    History of Falls Interventions: Door open when patient unattended, Investigate reason for fall, Room close to nurse's station, Consult care management for discharge planning         Problem: Patient Education: Go to Patient Education Activity  Goal: Patient/Family Education  Outcome: Progressing Towards Goal     Problem: Pressure Injury - Risk of  Goal: *Prevention of pressure injury  Description: Document Ben Scale and appropriate interventions in the flowsheet.   Outcome: Progressing Towards Goal  Note: Pressure Injury Interventions:  Sensory Interventions: Assess changes in LOC, Assess need for specialty bed, Avoid rigorous massage over bony prominences, Discuss PT/OT consult with provider, Float heels, Maintain/enhance activity level, Keep linens dry and wrinkle-free, Monitor skin under medical devices, Pressure redistribution bed/mattress (bed type)    Moisture Interventions: Apply protective barrier, creams and emollients, Assess need for specialty bed, Check for incontinence Q2 hours and as needed, Maintain skin hydration (lotion/cream), Minimize layers, Moisture barrier    Activity Interventions: PT/OT evaluation, Pressure redistribution bed/mattress(bed type), Increase time out of bed, Chair cushion    Mobility Interventions: Float heels, Pressure redistribution bed/mattress (bed type), HOB 30 degrees or less, Chair cushion    Nutrition Interventions: Document food/fluid/supplement intake    Friction and Shear Interventions: Foam dressings/transparent film/skin sealants, Minimize layers, Apply protective barrier, creams and emollients, Feet elevated on foot rest                Problem: Patient Education: Go to Patient Education Activity  Goal: Patient/Family Education  Outcome: Progressing Towards Goal     Problem: Breathing Pattern - Ineffective  Goal: *Absence of hypoxia  Outcome: Progressing Towards Goal  Goal: *Use of effective breathing techniques  Outcome: Progressing Towards Goal  Goal: *PALLIATIVE CARE:  Alleviation of Dyspnea  Outcome: Progressing Towards Goal     Problem: Patient Education: Go to Patient Education Activity  Goal: Patient/Family Education  Outcome: Progressing Towards Goal     Problem: Patient Education: Go to Patient Education Activity  Goal: Patient/Family Education  Outcome: Progressing Towards Goal     Problem: Diabetes Self-Management  Goal: *Disease process and treatment process  Description: Define diabetes and identify own type of diabetes; list 3 options for treating diabetes. Outcome: Progressing Towards Goal  Goal: *Incorporating nutritional management into lifestyle  Description: Describe effect of type, amount and timing of food on blood glucose; list 3 methods for planning meals. Outcome: Progressing Towards Goal  Goal: *Incorporating physical activity into lifestyle  Description: State effect of exercise on blood glucose levels. Outcome: Progressing Towards Goal  Goal: *Developing strategies to promote health/change behavior  Description: Define the ABC's of diabetes; identify appropriate screenings, schedule and personal plan for screenings.   Outcome: Progressing Towards Goal  Goal: *Using medications safely  Description: State effect of diabetes medications on diabetes; name diabetes medication taking, action and side effects. Outcome: Progressing Towards Goal  Goal: *Monitoring blood glucose, interpreting and using results  Description: Identify recommended blood glucose targets  and personal targets. Outcome: Progressing Towards Goal  Goal: *Prevention, detection, treatment of acute complications  Description: List symptoms of hyper- and hypoglycemia; describe how to treat low blood sugar and actions for lowering  high blood glucose level. Outcome: Progressing Towards Goal  Goal: *Prevention, detection and treatment of chronic complications  Description: Define the natural course of diabetes and describe the relationship of blood glucose levels to long term complications of diabetes.   Outcome: Progressing Towards Goal  Goal: *Developing strategies to address psychosocial issues  Description: Describe feelings about living with diabetes; identify support needed and support network  Outcome: Progressing Towards Goal  Goal: *Insulin pump training  Outcome: Progressing Towards Goal  Goal: *Sick day guidelines  Outcome: Progressing Towards Goal  Goal: *Patient Specific Goal (EDIT GOAL, INSERT TEXT)  Outcome: Progressing Towards Goal     Problem: Patient Education: Go to Patient Education Activity  Goal: Patient/Family Education  Outcome: Progressing Towards Goal     Problem: Nutrition Deficit  Goal: *Optimize nutritional status  Outcome: Progressing Towards Goal

## 2021-10-04 NOTE — DISCHARGE INSTRUCTIONS
Discharge Instructions       PATIENT ID: Carmita Torres  MRN: [id number]   YOB: 1965    DATE OF ADMISSION: 9/9/2021 10:59 AM    DATE OF DISCHARGE: 10/4/2021    PRIMARY CARE PROVIDER: None     ATTENDING PHYSICIAN: Judd Reeves MD  DISCHARGING PROVIDER: Farshad Downs MD    To contact this individual call [phone] and ask the  to page. If unavailable ask to be transferred the Adult Hospitalist Department. DISCHARGE DIAGNOSES LEs edema/ CHF. COPD exacerbation    CONSULTATIONS: IP CONSULT TO PULMONOLOGY  IP CONSULT TO INTENSIVIST  IP CONSULT TO INFECTIOUS DISEASES  IP CONSULT TO ORTHOPEDIC SURGERY    PROCEDURES/SURGERIES: * No surgery found *    FOLLOW UP APPOINTMENTS:   Follow-up Information     Follow up With Specialties Details Why [facility] [address]  [phone]           ADDITIONAL CARE RECOMMENDATIONS: follow up with PCP and cardiology    DIET: Resume previous diet    DISCHARGE MEDICATIONS:      · It is important that you take the medication exactly as they are prescribed. · Keep your medication in the bottles provided by the pharmacist and keep a list of the medication names, dosages, and times to be taken in your wallet. · Do not take other medications without consulting your doctor. NOTIFY YOUR PHYSICIAN FOR ANY OF THE FOLLOWING:   Fever over 101 degrees for 24 hours. Chest pain, shortness of breath, fever, chills, nausea, vomiting, diarrhea, change in mentation, falling, weakness, bleeding. Severe pain or pain not relieved by medications. Or, any other signs or symptoms that you may have questions about. It was our pleasure to help take care of you and we hope you get well very soon.     DISPOSITION:    Home With:   OT  PT  HH  RN       SNF/Inpatient Rehab/LTAC   x Independent/assisted living    Hospice    Other:     CDMP Checked:   Yes x     PROBLEM LIST Updated:  Yes x     Signed:   Brenna Ruiz MD  10/4/2021  10:31 AM

## 2021-10-04 NOTE — DISCHARGE SUMMARY
Discharge Summary       PATIENT ID: Nadia Gauthier  MRN: [id number]   YOB: 1965    DATE OF ADMISSION: 9/9/2021 10:59 AM    DATE OF DISCHARGE: 10/4/2021   PRIMARY CARE PROVIDER: None     ATTENDING PHYSICIAN: Jeanette Morrison MD  DISCHARGING PROVIDER: Jeanette Morrison MD    To contact this individual call [phone] and ask the  to page. If unavailable ask to be transferred the Adult Hospitalist Department. CONSULTATIONS: IP CONSULT TO PULMONOLOGY  IP CONSULT TO INTENSIVIST  IP CONSULT TO INFECTIOUS DISEASES  IP CONSULT TO ORTHOPEDIC SURGERY    PROCEDURES/SURGERIES: * No surgery found *    ADMITTING [address] [place] [address] COURSE:   Evaluated and treated for COPD/sob, LE wounds/edema. Needs close monitoring op with PCP/ cardiology    Risk of Re-Admission: high  DISCHARGE DIAGNOSES / PLAN:      # B/l LE wounds, Ulceration on LLE on the bases of stasis dermatitis/ venous insufficiency    - venous dopplers US negative for DVTs  - CHONG no significant arterial obstruction b/l- ID consulted, antibiotics initiated   - finished IV Cefepime and Vancomycin course  - continue wound care - elevate legs - diuretics, monitor . Worked with therapy, will dc to SNF.      # Episode of low BP- improved s/p albumin   # Acute hypoxic and hypercapnic respiratory failure due to COPD exacerbation and CHF, + IVY + likely obesity hypoventilation syndrome-- resolved - BiPAP qhs - off oxygen      # Acute exacerbation of COPD: resolved. # Acute on chronic diastolic CHF: NYHA class 3 on admit, class 3 on dc. - TTE: preserved ef.- Patient with lower extremity edema, likely lymphedema  - Diuretics- Entresto     # Atrial fibrillation- Paroxysmal- Stable on amiodarone, Eliquis  # Hypertension- Continue Imdur  # Dyslipidemia- On statin  # Diabetes- On insulin. A1c 8.3, Monitor closely. # Cellulitis of the left upper extremity- Completed antibiotics  #. HypoKalemia: replace, monitor. Encourage po intake.      FOLLOW UP APPOINTMENTS:    Follow-up Information     Follow up With Specialties Details Why Paul [facility]   [address] [facility]  [phone]         ADDITIONAL CARE RECOMMENDATIONS:  Follow up with PCP and Cardiology, wound care. DIET: Resume previous diet    ACTIVITY: Activity as tolerated    DISCHARGE MEDICATIONS:  Current Discharge Medication List      CONTINUE these medications which have NOT CHANGED    Details   torsemide (DEMADEX) 20 mg tablet Take 60 mg by mouth daily. Indications: high blood pressure      pregabalin (LYRICA) 75 mg capsule Take 75 mg by mouth two (2) times a day. Indications: nerve pain after herpes      apixaban (ELIQUIS) 5 mg tablet Take 5 mg by mouth two (2) times a day. Indications: treatment to prevent blood clots in chronic atrial fibrillation      aspirin delayed-release 81 mg tablet Take  by mouth daily. Indications: treatment to prevent a heart attack      amiodarone (PACERONE) 100 mg tablet Take 100 mg by mouth daily. ezetimibe (ZETIA) 10 mg tablet Take 10 mg by mouth daily. pantoprazole (PROTONIX) 40 mg tablet Take 40 mg by mouth daily. Indications: gastroesophageal reflux disease      empagliflozin (Jardiance) 25 mg tablet Take 25 mg by mouth daily. Indications: type 2 diabetes mellitus      isosorbide mononitrate ER (IMDUR) 30 mg tablet Take 30 mg by mouth daily. Indications: prevention of anginal chest pain associated with coronary artery disease      sacubitriL-valsartan (Entresto) 49-51 mg tab tablet Take 1 Tablet by mouth two (2) times a day. rosuvastatin (CRESTOR) 5 mg tablet Take 5 mg by mouth daily. spironolactone (ALDACTONE) 25 mg tablet Take 25 mg by mouth daily. montelukast (SINGULAIR) 10 mg tablet Take 10 mg by mouth nightly. insulin glargine (LANTUS) 100 unit/mL injection 18 Units by SubCUTAneous route daily.            NOTIFY YOUR PHYSICIAN FOR ANY OF THE FOLLOWING:   Fever over 101 degrees for 24 hours. Chest pain, shortness of breath, fever, chills, nausea, vomiting, diarrhea, change in mentation, falling, weakness, bleeding. Severe pain or pain not relieved by medications. Or, any other signs or symptoms that you may have questions about. DISPOSITION:    Home With:   OT  PT  HH  RN       Long term SNF/Inpatient Rehab   x Independent/assisted living    Hospice    Other:     PATIENT CONDITION AT DISCHARGE:     Functional status    Poor     Deconditioned     Independent      Cognition     Lucid     Forgetful     Dementia      Catheters/lines (plus indication)    Foster     PICC     PEG     None      Code status     Full code     DNR      PHYSICAL EXAMINATION AT DISCHARGE:  Patient seen and examined at bedside, Condition stable, explained discharge and follow up plans. /80   Pulse 68   Temp 98.8 °F (37.1 °C)   Resp 16   Ht 6' 1\" (1.854 m)   Wt 142.9 kg (315 lb 1.6 oz)   SpO2 93%   BMI 41.57 kg/m²   General:  Alert, oriented, No acute distress. Chronically sick, obese WM  Resp:  No accessory muscle use, Good AE. Neuro:  Grossly normal, no focal neuro deficits, follows commands     CHRONIC MEDICAL DIAGNOSES:  Problem List as of 10/4/2021 Date Reviewed: 4/28/2013        Codes Class Noted - Resolved    Acute on chronic respiratory failure [facility]) ICD-10-CM: J96.20  ICD-9-CM: 518.84  9/9/2021 - Present              37 minutes were spent with the patient on counseling and coordination of care.     Signed:   Ayush Atwood MD  10/4/2021  10:31 AM

## 2021-10-04 NOTE — PROGRESS NOTES
Hospitalist Progress Note  Lizz Wolfe MD  Answering service: [phone] from in house phone      Date of Service:  10/4/2021  NAME:  Tejas Carreon  :  1965  MRN:  [id number]    Admission Summary:   Tejas Carreon is a 48 y.o. male who presents with sob and AMS      61-year-old gentleman history of diabetes, hypertension, dyslipidemia, likely history of CHF as well he uses oxygen as needed at home (particularly at night for obstructive sleep apnea), morbid obese  presents to the emergency department today with chief complaint of generalized not feeling well for the past 4 to 5 days. No fevers but increased shortness of breath. EMS reports room air hypoxia in the 70s. Patient complains of a rash to his left upper extremity consistent with cellulitis and is not currently on treatment. He has been up titrating his Lasix at home without significant relief of his symptoms. There is a conflicting history about whether or not he has been taking his blood thinner as prescribed. He is fully Covid vaccinated. He is lethargic in ER. Place on bipap now. Not able to get history for now     Interval history / Subjective:      Patient seen and examined this morning. Feels wells systemically, LLE looks improved little. Blood tests pending for today. Assessment & Plan:     # B/l LE wounds, Ulceration on LLE on the bases of stasis dermatitis/ venous insufficiency    - venous dopplers US negative for DVTs  - CHONG no significant arterial obstruction b/l- ID consulted, antibiotics initiated   - finished IV Cefepime and Vancomycin course  - continue wound care - elevate legs - IV diuretic- increased dose, monitor renal function.     # Episode of low BP- improved s/p albumin   # Acute hypoxic and hypercapnic respiratory failure due to COPD exacerbation and CHF, + IVY + likely obesity hypoventilation syndrome-- resolved - BiPAP qhs - off oxygen      # Acute exacerbation of COPD: resolved. # Acute on chronic diastolic CHF: NYHA class 3 on admit, class 3 on dc. - TTE: preserved ef.- Patient with lower extremity edema, likely lymphedema  - Diuretic changed back to furosemide iv- as leg edema is not improving- Entresto    # Atrial fibrillation- Paroxysmal- Stable on amiodarone, Eliquis  # Hypertension- Continue Imdur  # Dyslipidemia- On statin  # Diabetes- On NPH, premeal Humalog and insulin sliding scale coverage  # Cellulitis of the left upper extremity- Completed antibiotics  #. HypoKalemia: Acute, replace, monitor    Code: Full. Dispo: [facility] Problems  Date Reviewed: 4/28/2013        Codes Class Noted POA    Acute on chronic respiratory failure [facility]) ICD-10-CM: J96.20  ICD-9-CM: 518.84  9/9/2021 Unknown            Review of Systems:   Pertinent positive mentioned in interval hx/HPI. Other systems reviewed and negative     Vital Signs:    Last 24hrs VS reviewed since prior progress note. Most recent are:  Visit Vitals  /68   Pulse 71   Temp 98.1 °F (36.7 °C)   Resp 16   Ht 6' 1\" (1.854 m)   Wt 142.9 kg (315 lb 1.6 oz)   SpO2 96%   BMI 41.57 kg/m²         Intake/Output Summary (Last 24 hours) at 10/4/2021 1996  Last data filed at 10/3/2021 2120  Gross per 24 hour   Intake    Output 650 ml   Net -650 ml        Physical Examination:             Constitutional:  No acute distress, in pain, chronically sick looking, obese     ENT:  Oral mucous moist   Resp:  CTA bilaterally. No wheezing/rhonchi/rales. No accessory muscle use   CV:  Regular rhythm, normal rate, no murmurs, gallops, rubs    GI:  Soft, non distended, non tender. normoactive bowel sounds, no hepatosplenomegaly     Musculoskeletal:  bilateral lower leg edema + 3, clean dressing covering the shin , small wound on the left leg     Neurologic:  Moves all extremities.                   Data Review:    I personally reviewed labs and imaging     Labs:     No results for input(s): WBC, HGB, HCT, PLT, HGBEXT, HCTEXT, PLTEXT, HGBEXT, HCTEXT, PLTEXT in the last 72 hours. Recent Labs     10/03/21  0106 10/02/21  0244    137   K 3.4* 3.0*    99   CO2 36* 34*   BUN 17 22*   CREA 0.73 0.84   * 184*   CA 8.7 8.4*   MG 2.1 2.0   PHOS 2.4* 2.8     No results for input(s): ALT, AP, TBIL, TBILI, TP, ALB, GLOB, GGT, AML, LPSE in the last 72 hours. No lab exists for component: SGOT, GPT, AMYP, HLPSE  No results for input(s): INR, PTP, APTT, INREXT, INREXT in the last 72 hours. No results for input(s): FE, TIBC, PSAT, FERR in the last 72 hours. No results found for: FOL, RBCF   No results for input(s): PH, PCO2, PO2 in the last 72 hours. No results for input(s): CPK, CKNDX, TROIQ in the last 72 hours.     No lab exists for component: CPKMB  Lab Results   Component Value Date/Time    Cholesterol, total 170 01/23/2013 11:08 AM    HDL Cholesterol 37 (L) 01/23/2013 11:08 AM    LDL, calculated 97 01/23/2013 11:08 AM    Triglyceride 180 (H) 01/23/2013 11:08 AM    CHOL/HDL Ratio 6.0 (H) 10/06/2010 03:18 PM     Lab Results   Component Value Date/Time    Glucose (POC) 182 (H) 10/04/2021 06:23 AM    Glucose (POC) 178 (H) 10/03/2021 09:24 PM    Glucose (POC) 132 (H) 10/03/2021 04:38 PM    Glucose (POC) 179 (H) 10/03/2021 11:15 AM    Glucose (POC) 163 (H) 10/03/2021 06:12 AM     No results found for: COLOR, APPRN, SPGRU, REFSG, ELIZA, PROTU, GLUCU, KETU, BILU, UROU, RON, LEUKU, GLUKE, EPSU, BACTU, WBCU, RBCU, CASTS, UCRY      Medications Reviewed:     Current Facility-Administered Medications   Medication Dose Route Frequency    potassium chloride SR (KLOR-CON 10) tablet 40 mEq  40 mEq Oral BID    chlorothiazide (DIURIL) 250 mg in sterile water (preservative free) 9 mL injection  250 mg IntraVENous Q24H    furosemide (LASIX) injection 80 mg  80 mg IntraVENous Q8H    insulin NPH (NOVOLIN N, HUMULIN N) injection 20 Units  20 Units SubCUTAneous DAILY WITH BREAKFAST    insulin NPH (NOVOLIN N, HUMULIN N) injection 10 Units  10 Units SubCUTAneous QHS    insulin lispro (HUMALOG) injection 5 Units  5 Units SubCUTAneous TIDAC    oxyCODONE IR (ROXICODONE) tablet 7.5 mg  7.5 mg Oral Q6H PRN    calcium carbonate (TUMS) chewable tablet 200 mg [elemental]  200 mg Oral TID WITH MEALS    insulin lispro (HUMALOG) injection   SubCUTAneous AC&HS    dextrose (D50W) injection syrg 12.5-25 g  12.5-25 g IntraVENous PRN    albuterol-ipratropium (DUO-NEB) 2.5 MG-0.5 MG/3 ML  3 mL Nebulization Q6H PRN    alteplase (CATHFLO) 1 mg in sterile water (preservative free) 1 mL injection  1 mg InterCATHeter PRN    pregabalin (LYRICA) capsule 75 mg  75 mg Oral BID    montelukast (SINGULAIR) tablet 10 mg  10 mg Oral QHS    sacubitriL-valsartan (ENTRESTO) 49-51 mg tablet 1 Tablet  1 Tablet Oral Q12H    apixaban (ELIQUIS) tablet 5 mg  5 mg Oral BID    amiodarone (CORDARONE) tablet 100 mg  100 mg Oral DAILY    ezetimibe (ZETIA) tablet 10 mg  10 mg Oral DAILY    pantoprazole (PROTONIX) tablet 40 mg  40 mg Oral ACB    isosorbide mononitrate ER (IMDUR) tablet 30 mg  30 mg Oral DAILY    aspirin delayed-release tablet 81 mg  81 mg Oral DAILY    rosuvastatin (CRESTOR) tablet 5 mg  5 mg Oral DAILY    [Held by provider] spironolactone (ALDACTONE) tablet 25 mg  25 mg Oral DAILY    benzocaine-menthoL (CEPACOL) lozenge 1 Lozenge  1 Lozenge Mucous Membrane PRN    fentaNYL citrate (PF) injection 50 mcg  50 mcg IntraVENous Q4H PRN    hydrALAZINE (APRESOLINE) 20 mg/mL injection 10 mg  10 mg IntraVENous Q6H PRN    glucose chewable tablet 16 g  4 Tablet Oral PRN    dextrose (D50W) injection syrg 12.5-25 g  25-50 mL IntraVENous PRN    glucagon (GLUCAGEN) injection 1 mg  1 mg IntraMUSCular PRN    sodium chloride (NS) flush 5-40 mL  5-40 mL IntraVENous Q8H    sodium chloride (NS) flush 5-40 mL  5-40 mL IntraVENous PRN    acetaminophen (TYLENOL) tablet 650 mg  650 mg Oral Q6H PRN    Or    acetaminophen (TYLENOL) suppository 650 mg 650 mg Rectal Q6H PRN    polyethylene glycol (MIRALAX) packet 17 g  17 g Oral DAILY PRN    ondansetron (ZOFRAN ODT) tablet 4 mg  4 mg Oral Q8H PRN    Or    ondansetron (ZOFRAN) injection 4 mg  4 mg IntraVENous Q6H PRN    arformoteroL (BROVANA) neb solution 15 mcg  15 mcg Nebulization BID RT    And    budesonide (PULMICORT) 500 mcg/2 ml nebulizer suspension  500 mcg Nebulization BID RT    miconazole (MICOTIN) 2 % powder   Topical BID    sodium chloride (NS) flush 5-40 mL  5-40 mL IntraVENous Q8H    sodium chloride (NS) flush 5-40 mL  5-40 mL IntraVENous PRN     ______________________________________________________________________  EXPECTED LENGTH OF STAY: 3d 14h  ACTUAL LENGTH OF STAY:          25                 Nikunj Patel MD   Patient has given Verbal permission to discuss medical care with   persons present in the room and and also with contact as listed on face sheet. Please note that this dictation was completed with [employer], the computer voice recognition software. Quite often unanticipated grammatical, syntax, homophones, and other interpretive errors are inadvertently transcribed by the computer software. Please disregard these errors. Please excuse any errors that have escaped final proofreading. Thank you.

## 2021-10-04 NOTE — PROGRESS NOTES
Transition of Care: to a SNF; [facility] has accepted     Transport plan: [facility] is scheduled to  today (10/4) at 3pm     RUR: 20%     DX: acute on chronic respiratory failure     Main contact is brother Hector Resendiz [phone]     CM noted discharge order     NOTE: patient is alert and oriented x 4; patient normally lives alone in an apartment     1100: this CM spoke with Mildred at [facility]; they are ready to accept him today; they need a rapid covid screen; order placed and bedside RN aware to do it; this CM set up [facility] via allscripts for a 3pm  time    1100: faxed [facility] the dc summary to [phone]: this CM updated patient at bedside on discharge plan today; he verbalized understanding    Transition of Care Plan to SNF/Rehab    SNF/Rehab Transition:  Patient has been accepted to [facility] and meets criteria for admission. Patient will transported by [facility] and expected to leave at 3pm.    Communication to Patient/Family:  Met with patient and he is agreeable to the transition plan. Communication to SNF/Rehab:  Bedside RN, Bill Le, has been notified to update the transition plan to the facility and call report (phone number [phone]). Discharge information has been updated on the AVS.     Discharge instructions to be fax'd to facility at (Fax # [phone]). Nursing Please include all hard scripts for controlled substances, med rec and dc summary, and AVS in packet.      Reviewed and confirmed with facility, [facility], can manage the patient care needs for the following:     SNF/Rehab Transition:  Patient to follow-up with Veda Rondon and confirmed with facility, [facility] they can manage the patient care needs for the following:     Igor Chew with (X) only those applicable:    Medication:  []  Medications will be available at the facility  []  IV Antibiotics  []  Controlled Substance - hard copy to be sent with patient   []  Weekly Labs   Documents:  [] Hard RX  [x] MAR  [x] Kardex  [x] AVS  [x]Transfer Summary  [x]Discharge   Equipment:  []  CPAP/BiPAP  []  Wound Vacuum  []  Foster or Urinary Device  []  PICC/Central Line  []  Nebulizer  []  Ventilator   Treatment:  []Isolation (for MRSA, VRE, etc.)  []Surgical Drain Management  []Tracheostomy Care  []Dressing Changes  []Dialysis with transportation and chair time . []PEG Care  [x]Oxygen  []Daily Weights for Heart Failure   Dietary:  []Any diet limitations  []Tube Feedings   []Total Parenteral Management (TPN)   Eligible for Medicaid Long Term Services and Supports  Yes:  [] Eligible for medical assistance or will become eligible within 180 days and UAI completed. [] Provider/Patient and/or support system has requested screening. [] UAI copy provided to patient or responsible party,.  [] UAI unavailable at discharge will send once processed to SNF provider. [] UAI unavailable at discharged mailed to patient  No:   [x] Private pay and is not financially eligible for Medicaid within the next 180 days. [] Reside out-of-state. [] A residents of a state owned/operated facility that is licensed  by [place] [address] [employer] or [facility]  [] Enrollment in [facility] hospice services  [] [address]  [] Patient /Family declines to have screening completed or provide financial information for screening     Financial Resources:  Medicaid    [] Initiated and application pending   [] Full coverage     Advanced Care Plan:  []Surrogate Decision Maker of Care  []POA  []Communicated Code Status  (DDNR\", \"Full\")    Other     Financial Resources:  Medicaid    [] Initiated and application pending   [] Full coverage     Advanced Care Plan:  []Surrogate Decision Maker of Care  []POA  [x]Communicated Code Status ( \"Full\")    Other   . Medicare pt has received, reviewed, and signed 2nd IM letter informing them of their right to appeal the discharge.   Signed copy has been placed on pt bedside chart.     CM following  Osmany Jordan RN, CRM      Revision History

## 2021-10-05 ENCOUNTER — PATIENT OUTREACH (OUTPATIENT)
Dept: CASE MANAGEMENT | Age: 56
End: 2021-10-05

## 2021-10-13 ENCOUNTER — PATIENT OUTREACH (OUTPATIENT)
Dept: CASE MANAGEMENT | Age: 56
End: 2021-10-13

## 2021-10-14 NOTE — PROGRESS NOTES
Post Acute Facility Update     *The information contained in this note was received during a weekly care coordination call with the post acute facility*    Current Facility: [address] [place] [address] ([facility])  Anticipated Discharge Date: TBD    Facility Nursing Update: No current updates  Facility Social Work Update: No current updates  Bundle Program Status: none  Upper Extremity Assistance: Independent  Lower Extremity Assistance:  Moderate Assistance   Bed Mobility: Independent  Transfers: Stand by Assist - Presence and Cueing  Ambulation: Minimal Assistance   How far (in feet) is the patient ambulating? 27  Device Used: Walker  Barriers to Discharge: TBD  Other: Using a rollator; flexed posture due to LBP      FELICIA Selby

## 2021-10-19 ENCOUNTER — PATIENT OUTREACH (OUTPATIENT)
Dept: CASE MANAGEMENT | Age: 56
End: 2021-10-19

## 2021-10-20 ENCOUNTER — PATIENT OUTREACH (OUTPATIENT)
Dept: CASE MANAGEMENT | Age: 56
End: 2021-10-20

## 2021-10-21 NOTE — PROGRESS NOTES
Post Acute Facility Update     *The information contained in this note was received during a weekly care coordination call with the post acute facility*    Current Facility: [address] [place] [address] ([facility])  Anticipated Discharge Date: 10/28/2021    Facility Nursing Update: No current updates   Facility Social Work Update: No current updates  Bundle Program Status: none  Upper Extremity Assistance: Independent  Lower Extremity Assistance: Minimal Assistance   Bed Mobility: Independent  Transfers: Stand by Assist - Presence and Cueing  Ambulation: Contact Guard Assist - hands on patient for balance   How far (in feet) is the patient ambulating?  Up to 44 ft  Device Used: Rollator  Barriers to Discharge: MARIA ALEJANDRA Parham, MSW  [facility]

## 2021-10-27 ENCOUNTER — PATIENT OUTREACH (OUTPATIENT)
Dept: CASE MANAGEMENT | Age: 56
End: 2021-10-27

## 2021-10-29 ENCOUNTER — PATIENT OUTREACH (OUTPATIENT)
Dept: CASE MANAGEMENT | Age: 56
End: 2021-10-29

## 2021-10-29 NOTE — PROGRESS NOTES
Angel call placed to patient, no answer message left requesting return call. Will follow up in 1 day.

## 2021-10-29 NOTE — PROGRESS NOTES
[address] [place] [address] Dr Discharge Note    *The information contained in this note was received during a weekly care coordination call with the post acute facility*      Patient was discharged from [address] [place] [address] ([facility]) on 10/28/2021. PCP: None  DEISY appointment: No future appointments. Home Health/Outpatient orders at discharge: home health care  [address] ordered at discharge: None  [address] received prior to discharge: Raudel Jones Coordinator managing patient: LILY Montero. Community Care Team will sign off at this time. Medications were not reconciled and general patient assessment was not completed during this skilled nursing facility outreach.

## 2021-11-02 ENCOUNTER — PATIENT OUTREACH (OUTPATIENT)
Dept: CASE MANAGEMENT | Age: 56
End: 2021-11-02

## 2021-11-08 ENCOUNTER — PATIENT OUTREACH (OUTPATIENT)
Dept: CASE MANAGEMENT | Age: 56
End: 2021-11-08

## 2021-11-08 NOTE — LETTER
11/8/2021 1:09 PM    Mr. Gus Kan  [address]  [address]        My name is Marvin Hamm. I am a [facility]OD[facility] Care Coordinator  with [facility]. I often work with patients who could benefit from additional support understanding and managing their health. We are committed to providing you excellent care. I have been unable to reach you on this at [phone] or at [phone]. Please contact me at [phone] if you would like additional help with community resources. We appreciate the confidence you've shown by selecting us to provide your healthcare needs and I look forward to hearing from you soon.      Sincerely,         Marvin Hamm LPN, Care Coordinator [phone]          Sincerely,      Imtiaz Hartley LPN

## 2022-01-01 NOTE — PROGRESS NOTES
Transition Plan of Care  RUR 18%-Med  Disposition- recommendations are for SNF level rehab. Remains on hi-flow 45lpm. Staff continues to wean as able. Pending progress will likely discharge to SNF level. BCPI-A letter regarding Heart Failure has been delivered to the patient/caregiver.     Yuriy Rios RN CRM  Ext [phone] N/A

## 2022-03-18 PROBLEM — J96.20 ACUTE ON CHRONIC RESPIRATORY FAILURE (HCC): Status: ACTIVE | Noted: 2021-09-09

## 2022-04-25 ENCOUNTER — APPOINTMENT (OUTPATIENT)
Dept: GENERAL RADIOLOGY | Age: 57
DRG: 291 | End: 2022-04-25
Attending: EMERGENCY MEDICINE
Payer: MEDICARE

## 2022-04-25 ENCOUNTER — HOSPITAL ENCOUNTER (INPATIENT)
Age: 57
LOS: 7 days | Discharge: NURSING, REHAB OR LONG-TERM CARE | DRG: 291 | End: 2022-05-02
Attending: EMERGENCY MEDICINE | Admitting: HOSPITALIST
Payer: MEDICARE

## 2022-04-25 DIAGNOSIS — R09.02 HYPOXIA: Primary | ICD-10-CM

## 2022-04-25 DIAGNOSIS — M79.89 LEG SWELLING: ICD-10-CM

## 2022-04-25 PROBLEM — R06.02 SHORTNESS OF BREATH: Status: ACTIVE | Noted: 2022-04-25

## 2022-04-25 LAB
ALBUMIN SERPL-MCNC: 3.2 G/DL (ref 3.5–5)
ALBUMIN/GLOB SERPL: 0.7 {RATIO} (ref 1.1–2.2)
ALP SERPL-CCNC: 145 U/L (ref 45–117)
ALT SERPL-CCNC: 11 U/L (ref 12–78)
ANION GAP SERPL CALC-SCNC: 2 MMOL/L (ref 5–15)
APPEARANCE UR: CLEAR
AST SERPL-CCNC: 14 U/L (ref 15–37)
BACTERIA URNS QL MICRO: NEGATIVE /HPF
BASOPHILS # BLD: 0.1 K/UL (ref 0–0.1)
BASOPHILS NFR BLD: 1 % (ref 0–1)
BILIRUB SERPL-MCNC: 0.4 MG/DL (ref 0.2–1)
BILIRUB UR QL: NEGATIVE
BNP SERPL-MCNC: 414 PG/ML
BUN SERPL-MCNC: 30 MG/DL (ref 6–20)
BUN/CREAT SERPL: 32 (ref 12–20)
CALCIUM SERPL-MCNC: 8.7 MG/DL (ref 8.5–10.1)
CHLORIDE SERPL-SCNC: 102 MMOL/L (ref 97–108)
CO2 SERPL-SCNC: 38 MMOL/L (ref 21–32)
COLOR UR: ABNORMAL
COMMENT, HOLDF: NORMAL
COVID-19 RAPID TEST, COVR: NOT DETECTED
CREAT SERPL-MCNC: 0.94 MG/DL (ref 0.7–1.3)
DIFFERENTIAL METHOD BLD: ABNORMAL
EOSINOPHIL # BLD: 0.3 K/UL (ref 0–0.4)
EOSINOPHIL NFR BLD: 4 % (ref 0–7)
EPITH CASTS URNS QL MICRO: ABNORMAL /LPF
ERYTHROCYTE [DISTWIDTH] IN BLOOD BY AUTOMATED COUNT: 21.6 % (ref 11.5–14.5)
EST. AVERAGE GLUCOSE BLD GHB EST-MCNC: 180 MG/DL
GLOBULIN SER CALC-MCNC: 4.5 G/DL (ref 2–4)
GLUCOSE BLD STRIP.AUTO-MCNC: 121 MG/DL (ref 65–117)
GLUCOSE SERPL-MCNC: 139 MG/DL (ref 65–100)
GLUCOSE UR STRIP.AUTO-MCNC: >1000 MG/DL
HBA1C MFR BLD: 7.9 % (ref 4–5.6)
HCT VFR BLD AUTO: 46.8 % (ref 36.6–50.3)
HGB BLD-MCNC: 12.7 G/DL (ref 12.1–17)
HGB UR QL STRIP: ABNORMAL
HYALINE CASTS URNS QL MICRO: ABNORMAL /LPF (ref 0–5)
IMM GRANULOCYTES # BLD AUTO: 0.1 K/UL (ref 0–0.04)
IMM GRANULOCYTES NFR BLD AUTO: 1 % (ref 0–0.5)
KETONES UR QL STRIP.AUTO: NEGATIVE MG/DL
LEUKOCYTE ESTERASE UR QL STRIP.AUTO: ABNORMAL
LYMPHOCYTES # BLD: 1.1 K/UL (ref 0.8–3.5)
LYMPHOCYTES NFR BLD: 13 % (ref 12–49)
MCH RBC QN AUTO: 21.9 PG (ref 26–34)
MCHC RBC AUTO-ENTMCNC: 27.1 G/DL (ref 30–36.5)
MCV RBC AUTO: 80.6 FL (ref 80–99)
MONOCYTES # BLD: 0.8 K/UL (ref 0–1)
MONOCYTES NFR BLD: 10 % (ref 5–13)
NEUTS SEG # BLD: 5.8 K/UL (ref 1.8–8)
NEUTS SEG NFR BLD: 71 % (ref 32–75)
NITRITE UR QL STRIP.AUTO: NEGATIVE
NRBC # BLD: 0 K/UL (ref 0–0.01)
NRBC BLD-RTO: 0 PER 100 WBC
PH UR STRIP: 5.5 [PH] (ref 5–8)
PLATELET # BLD AUTO: 217 K/UL (ref 150–400)
PMV BLD AUTO: 10.5 FL (ref 8.9–12.9)
POTASSIUM SERPL-SCNC: 3.7 MMOL/L (ref 3.5–5.1)
PROT SERPL-MCNC: 7.7 G/DL (ref 6.4–8.2)
PROT UR STRIP-MCNC: NEGATIVE MG/DL
RBC # BLD AUTO: 5.81 M/UL (ref 4.1–5.7)
RBC #/AREA URNS HPF: ABNORMAL /HPF (ref 0–5)
RBC MORPH BLD: ABNORMAL
SAMPLES BEING HELD,HOLD: NORMAL
SERVICE CMNT-IMP: ABNORMAL
SODIUM SERPL-SCNC: 142 MMOL/L (ref 136–145)
SOURCE, COVRS: NORMAL
SP GR UR REFRACTOMETRY: 1.02 (ref 1–1.03)
TROPONIN-HIGH SENSITIVITY: 17 NG/L (ref 0–76)
UR CULT HOLD, URHOLD: NORMAL
UROBILINOGEN UR QL STRIP.AUTO: 0.2 EU/DL (ref 0.2–1)
WBC # BLD AUTO: 8.2 K/UL (ref 4.1–11.1)
WBC URNS QL MICRO: ABNORMAL /HPF (ref 0–4)

## 2022-04-25 PROCEDURE — 71045 X-RAY EXAM CHEST 1 VIEW: CPT

## 2022-04-25 PROCEDURE — 85025 COMPLETE CBC W/AUTO DIFF WBC: CPT

## 2022-04-25 PROCEDURE — 96374 THER/PROPH/DIAG INJ IV PUSH: CPT

## 2022-04-25 PROCEDURE — 84484 ASSAY OF TROPONIN QUANT: CPT

## 2022-04-25 PROCEDURE — 74011000250 HC RX REV CODE- 250: Performed by: HOSPITALIST

## 2022-04-25 PROCEDURE — 93005 ELECTROCARDIOGRAM TRACING: CPT

## 2022-04-25 PROCEDURE — 74011250637 HC RX REV CODE- 250/637: Performed by: HOSPITALIST

## 2022-04-25 PROCEDURE — 94640 AIRWAY INHALATION TREATMENT: CPT

## 2022-04-25 PROCEDURE — 74011250637 HC RX REV CODE- 250/637: Performed by: NURSE PRACTITIONER

## 2022-04-25 PROCEDURE — 36415 COLL VENOUS BLD VENIPUNCTURE: CPT

## 2022-04-25 PROCEDURE — 87635 SARS-COV-2 COVID-19 AMP PRB: CPT

## 2022-04-25 PROCEDURE — 83880 ASSAY OF NATRIURETIC PEPTIDE: CPT

## 2022-04-25 PROCEDURE — 74011250636 HC RX REV CODE- 250/636: Performed by: EMERGENCY MEDICINE

## 2022-04-25 PROCEDURE — 99285 EMERGENCY DEPT VISIT HI MDM: CPT

## 2022-04-25 PROCEDURE — 74011250636 HC RX REV CODE- 250/636: Performed by: HOSPITALIST

## 2022-04-25 PROCEDURE — 82962 GLUCOSE BLOOD TEST: CPT

## 2022-04-25 PROCEDURE — 81001 URINALYSIS AUTO W/SCOPE: CPT

## 2022-04-25 PROCEDURE — 83036 HEMOGLOBIN GLYCOSYLATED A1C: CPT

## 2022-04-25 PROCEDURE — 65270000046 HC RM TELEMETRY

## 2022-04-25 PROCEDURE — 80053 COMPREHEN METABOLIC PANEL: CPT

## 2022-04-25 RX ORDER — MAGNESIUM SULFATE 100 %
4 CRYSTALS MISCELLANEOUS AS NEEDED
Status: DISCONTINUED | OUTPATIENT
Start: 2022-04-25 | End: 2022-05-02 | Stop reason: HOSPADM

## 2022-04-25 RX ORDER — SODIUM CHLORIDE 0.9 % (FLUSH) 0.9 %
5-40 SYRINGE (ML) INJECTION AS NEEDED
Status: DISCONTINUED | OUTPATIENT
Start: 2022-04-25 | End: 2022-05-02 | Stop reason: HOSPADM

## 2022-04-25 RX ORDER — AMIODARONE HYDROCHLORIDE 200 MG/1
100 TABLET ORAL DAILY
Status: DISCONTINUED | OUTPATIENT
Start: 2022-04-26 | End: 2022-05-02 | Stop reason: HOSPADM

## 2022-04-25 RX ORDER — INSULIN GLARGINE 100 [IU]/ML
18 INJECTION, SOLUTION SUBCUTANEOUS DAILY
Status: DISCONTINUED | OUTPATIENT
Start: 2022-04-26 | End: 2022-05-02 | Stop reason: HOSPADM

## 2022-04-25 RX ORDER — TORSEMIDE 20 MG/1
60 TABLET ORAL DAILY
Status: DISCONTINUED | OUTPATIENT
Start: 2022-04-26 | End: 2022-04-29

## 2022-04-25 RX ORDER — BUDESONIDE 0.5 MG/2ML
250 INHALANT ORAL
Status: DISCONTINUED | OUTPATIENT
Start: 2022-04-25 | End: 2022-05-02 | Stop reason: HOSPADM

## 2022-04-25 RX ORDER — PREGABALIN 25 MG/1
75 CAPSULE ORAL 2 TIMES DAILY
Status: DISCONTINUED | OUTPATIENT
Start: 2022-04-25 | End: 2022-05-02 | Stop reason: HOSPADM

## 2022-04-25 RX ORDER — FUROSEMIDE 10 MG/ML
60 INJECTION INTRAMUSCULAR; INTRAVENOUS ONCE
Status: COMPLETED | OUTPATIENT
Start: 2022-04-25 | End: 2022-04-25

## 2022-04-25 RX ORDER — ISOSORBIDE MONONITRATE 30 MG/1
30 TABLET, EXTENDED RELEASE ORAL DAILY
Status: DISCONTINUED | OUTPATIENT
Start: 2022-04-26 | End: 2022-05-02 | Stop reason: HOSPADM

## 2022-04-25 RX ORDER — INSULIN LISPRO 100 [IU]/ML
INJECTION, SOLUTION INTRAVENOUS; SUBCUTANEOUS
Status: DISCONTINUED | OUTPATIENT
Start: 2022-04-25 | End: 2022-05-02 | Stop reason: HOSPADM

## 2022-04-25 RX ORDER — SODIUM CHLORIDE 0.9 % (FLUSH) 0.9 %
5-40 SYRINGE (ML) INJECTION EVERY 8 HOURS
Status: DISCONTINUED | OUTPATIENT
Start: 2022-04-25 | End: 2022-05-02 | Stop reason: HOSPADM

## 2022-04-25 RX ORDER — SPIRONOLACTONE 25 MG/1
25 TABLET ORAL DAILY
Status: DISCONTINUED | OUTPATIENT
Start: 2022-04-26 | End: 2022-05-02 | Stop reason: HOSPADM

## 2022-04-25 RX ORDER — ASPIRIN 81 MG/1
81 TABLET ORAL DAILY
Status: DISCONTINUED | OUTPATIENT
Start: 2022-04-26 | End: 2022-05-02 | Stop reason: HOSPADM

## 2022-04-25 RX ORDER — ONDANSETRON 2 MG/ML
4 INJECTION INTRAMUSCULAR; INTRAVENOUS
Status: DISCONTINUED | OUTPATIENT
Start: 2022-04-25 | End: 2022-05-02 | Stop reason: HOSPADM

## 2022-04-25 RX ORDER — DOCUSATE SODIUM 100 MG/1
200 CAPSULE, LIQUID FILLED ORAL 2 TIMES DAILY
Status: DISCONTINUED | OUTPATIENT
Start: 2022-04-25 | End: 2022-05-02 | Stop reason: HOSPADM

## 2022-04-25 RX ORDER — FUROSEMIDE 10 MG/ML
20 INJECTION INTRAMUSCULAR; INTRAVENOUS ONCE
Status: COMPLETED | OUTPATIENT
Start: 2022-04-25 | End: 2022-04-25

## 2022-04-25 RX ORDER — HYDROCORTISONE 25 MG/G
CREAM TOPICAL
Status: DISCONTINUED | OUTPATIENT
Start: 2022-04-25 | End: 2022-05-02 | Stop reason: HOSPADM

## 2022-04-25 RX ORDER — PANTOPRAZOLE SODIUM 40 MG/1
40 TABLET, DELAYED RELEASE ORAL DAILY
Status: DISCONTINUED | OUTPATIENT
Start: 2022-04-26 | End: 2022-05-02 | Stop reason: HOSPADM

## 2022-04-25 RX ORDER — ACETAMINOPHEN 325 MG/1
650 TABLET ORAL
Status: DISCONTINUED | OUTPATIENT
Start: 2022-04-25 | End: 2022-05-02 | Stop reason: HOSPADM

## 2022-04-25 RX ORDER — MONTELUKAST SODIUM 10 MG/1
10 TABLET ORAL
Status: DISCONTINUED | OUTPATIENT
Start: 2022-04-25 | End: 2022-05-02 | Stop reason: HOSPADM

## 2022-04-25 RX ORDER — EZETIMIBE 10 MG/1
10 TABLET ORAL DAILY
Status: DISCONTINUED | OUTPATIENT
Start: 2022-04-26 | End: 2022-05-02 | Stop reason: HOSPADM

## 2022-04-25 RX ORDER — IPRATROPIUM BROMIDE AND ALBUTEROL SULFATE 2.5; .5 MG/3ML; MG/3ML
3 SOLUTION RESPIRATORY (INHALATION)
Status: DISCONTINUED | OUTPATIENT
Start: 2022-04-25 | End: 2022-05-02 | Stop reason: HOSPADM

## 2022-04-25 RX ORDER — ROSUVASTATIN CALCIUM 10 MG/1
5 TABLET, COATED ORAL DAILY
Status: DISCONTINUED | OUTPATIENT
Start: 2022-04-26 | End: 2022-05-02 | Stop reason: HOSPADM

## 2022-04-25 RX ADMIN — SACUBITRIL AND VALSARTAN 1 TABLET: 49; 51 TABLET, FILM COATED ORAL at 22:25

## 2022-04-25 RX ADMIN — PREGABALIN 75 MG: 25 CAPSULE ORAL at 20:33

## 2022-04-25 RX ADMIN — MONTELUKAST 10 MG: 10 TABLET, FILM COATED ORAL at 22:18

## 2022-04-25 RX ADMIN — ACETAMINOPHEN 650 MG: 325 TABLET ORAL at 22:17

## 2022-04-25 RX ADMIN — DOCUSATE SODIUM 200 MG: 100 CAPSULE, LIQUID FILLED ORAL at 20:32

## 2022-04-25 RX ADMIN — METHYLPREDNISOLONE SODIUM SUCCINATE 40 MG: 40 INJECTION, POWDER, FOR SOLUTION INTRAMUSCULAR; INTRAVENOUS at 20:33

## 2022-04-25 RX ADMIN — FUROSEMIDE 20 MG: 10 INJECTION, SOLUTION INTRAMUSCULAR; INTRAVENOUS at 20:33

## 2022-04-25 RX ADMIN — APIXABAN 5 MG: 5 TABLET, FILM COATED ORAL at 20:32

## 2022-04-25 RX ADMIN — SODIUM CHLORIDE, PRESERVATIVE FREE 10 ML: 5 INJECTION INTRAVENOUS at 20:36

## 2022-04-25 RX ADMIN — FUROSEMIDE 60 MG: 10 INJECTION, SOLUTION INTRAMUSCULAR; INTRAVENOUS at 14:21

## 2022-04-25 NOTE — ED PROVIDER NOTES
Della Meza is a 56 yo M with h/o DM, HTN, CHF who presents to the ED with increased shortness of breath. He has been feeling increased dyspnea with exertion and his O2 sats have been low into the 70's at home. He sometimes is on 2 L O2 but today he was only in the high 80's on 6L and so called EMS to bring patient to the ED. PAtient also notes that he has not had a BM in 4 days. Past Medical History:   Diagnosis Date    DM (diabetes mellitus) ([facility] [address]     Dyslipidemia     Hypertension        No past surgical history on file. No family history on file. Social History     Socioeconomic History    Marital status: SINGLE     Spouse name: Not on file    Number of children: Not on file    Years of education: Not on file    Highest education level: Not on file   Occupational History    Not on file   Tobacco Use    Smoking status: Never Smoker    Smokeless tobacco: Not on file   Substance and Sexual Activity    Alcohol use: No    Drug use: No    Sexual activity: Not on file   Other Topics Concern    Not on file   Social History Narrative    Not on file     Social Determinants of Health     Financial Resource Strain:     Difficulty of Paying Living Expenses: Not on file   Food Insecurity:     Worried About Running Out of Food in the Last Year: Not on file    Ben of Food in the Last Year: Not on file   Transportation Needs:     Lack of Transportation (Medical): Not on file    Lack of Transportation (Non-Medical):  Not on file   Physical Activity:     Days of Exercise per Week: Not on file    Minutes of Exercise per Session: Not on file   Stress:     Feeling of Stress : Not on file   Social Connections:     Frequency of Communication with Friends and Family: Not on file    Frequency of Social Gatherings with Friends and Family: Not on file    Attends Religion Services: Not on file    Active Member of Clubs or Organizations: Not on file    Attends Club or Organization Meetings: Not on file    Marital Status: Not on file   Intimate Partner Violence:     Fear of Current or Ex-Partner: Not on file    Emotionally Abused: Not on file    Physically Abused: Not on file    Sexually Abused: Not on file   Housing Stability:     Unable to Pay for Housing in the Last Year: Not on file    Number of [place] in the Last Year: Not on file    Unstable Housing in the Last Year: Not on file         ALLERGIES: Patient has no known allergies. Review of Systems   Constitutional: Negative for fever. HENT: Negative for sore throat. Eyes: Negative for visual disturbance. Respiratory: Positive for shortness of breath. Negative for cough. Cardiovascular: Negative for chest pain. Gastrointestinal: Positive for rectal pain. Negative for abdominal pain. Genitourinary: Negative for dysuria. Musculoskeletal: Negative for back pain. Skin: Negative for rash. Neurological: Negative for headaches. Vitals:    04/25/22 1251   BP: 135/88   Pulse: 70   Resp: 24   Temp: 98.1 °F (36.7 °C)   SpO2: 92%   Weight: 142.9 kg (315 lb 0.6 oz)   Height: 6' 1\" (1.854 m)            Physical Exam  Vitals and nursing note reviewed. Constitutional:       General: He is not in acute distress. Appearance: He is well-developed. HENT:      Head: Normocephalic and atraumatic. Eyes:      Conjunctiva/sclera: Conjunctivae normal.   Neck:      Trachea: Phonation normal.   Cardiovascular:      Rate and Rhythm: Normal rate. Pulmonary:      Effort: Pulmonary effort is normal. No respiratory distress. Breath sounds: Rales present. Abdominal:      General: There is no distension. Musculoskeletal:         General: No tenderness. Normal range of motion. Cervical back: Normal range of motion. Right lower leg: Edema present. Left lower leg: Edema present. Skin:     General: Skin is warm and dry. Neurological:      Mental Status: He is alert. He is not disoriented. Motor: No abnormal muscle tone. MDM     Perfect Serve Consult for Admission  3:55 PM    ED Room Number: ER27/27  Patient Name and age:  Duncan Ma 55 y.o.  male  Working Diagnosis:   1. Hypoxia    2. Leg swelling        COVID-19 Suspicion:  no  Sepsis present:  no  Reassessment needed: N/A  Code Status:  Full Code  Readmission: no  Isolation Requirements:  no  Recommended Level of Care:  telemetry  Department:[facility] Adult ED - 21   Other:  Patient with h/o CHF, DM, HTN and COPD with increased O2 requirement. Normally has home O2 which he uses 2 L PRN but for the past 2 days has increased shortness of breath and O2 requirement. Reports O2 sat mid 80s on 6 L. O2 sat 94% on 6L NC in ED. Rapid COVID negative. Had rales on exam and have given 60mg Lasix but , trop 17. CXR chronic hypoinflation and atelectasis.       Procedures

## 2022-04-26 LAB
ALBUMIN SERPL-MCNC: 3.2 G/DL (ref 3.5–5)
ALBUMIN/GLOB SERPL: 0.6 {RATIO} (ref 1.1–2.2)
ALP SERPL-CCNC: 128 U/L (ref 45–117)
ALT SERPL-CCNC: 14 U/L (ref 12–78)
ANION GAP SERPL CALC-SCNC: 3 MMOL/L (ref 5–15)
AST SERPL-CCNC: 14 U/L (ref 15–37)
ATRIAL RATE: 60 BPM
BILIRUB SERPL-MCNC: 0.3 MG/DL (ref 0.2–1)
BUN SERPL-MCNC: 27 MG/DL (ref 6–20)
BUN/CREAT SERPL: 28 (ref 12–20)
CALCIUM SERPL-MCNC: 9.3 MG/DL (ref 8.5–10.1)
CALCULATED P AXIS, ECG09: 57 DEGREES
CALCULATED R AXIS, ECG10: -58 DEGREES
CALCULATED T AXIS, ECG11: 56 DEGREES
CHLORIDE SERPL-SCNC: 102 MMOL/L (ref 97–108)
CO2 SERPL-SCNC: 37 MMOL/L (ref 21–32)
CREAT SERPL-MCNC: 0.97 MG/DL (ref 0.7–1.3)
DIAGNOSIS, 93000: NORMAL
ERYTHROCYTE [DISTWIDTH] IN BLOOD BY AUTOMATED COUNT: 21.2 % (ref 11.5–14.5)
GLOBULIN SER CALC-MCNC: 5 G/DL (ref 2–4)
GLUCOSE BLD STRIP.AUTO-MCNC: 170 MG/DL (ref 65–117)
GLUCOSE BLD STRIP.AUTO-MCNC: 176 MG/DL (ref 65–117)
GLUCOSE BLD STRIP.AUTO-MCNC: 254 MG/DL (ref 65–117)
GLUCOSE SERPL-MCNC: 155 MG/DL (ref 65–100)
HCT VFR BLD AUTO: 52 % (ref 36.6–50.3)
HGB BLD-MCNC: 14 G/DL (ref 12.1–17)
MCH RBC QN AUTO: 21.5 PG (ref 26–34)
MCHC RBC AUTO-ENTMCNC: 26.9 G/DL (ref 30–36.5)
MCV RBC AUTO: 79.8 FL (ref 80–99)
NRBC # BLD: 0 K/UL (ref 0–0.01)
NRBC BLD-RTO: 0 PER 100 WBC
P-R INTERVAL, ECG05: 230 MS
PLATELET # BLD AUTO: 219 K/UL (ref 150–400)
PMV BLD AUTO: 10 FL (ref 8.9–12.9)
POTASSIUM SERPL-SCNC: 4 MMOL/L (ref 3.5–5.1)
PROT SERPL-MCNC: 8.2 G/DL (ref 6.4–8.2)
Q-T INTERVAL, ECG07: 476 MS
QRS DURATION, ECG06: 94 MS
QTC CALCULATION (BEZET), ECG08: 476 MS
RBC # BLD AUTO: 6.52 M/UL (ref 4.1–5.7)
SERVICE CMNT-IMP: ABNORMAL
SODIUM SERPL-SCNC: 142 MMOL/L (ref 136–145)
VENTRICULAR RATE, ECG03: 60 BPM
WBC # BLD AUTO: 7.8 K/UL (ref 4.1–11.1)

## 2022-04-26 PROCEDURE — 77010033678 HC OXYGEN DAILY

## 2022-04-26 PROCEDURE — 94640 AIRWAY INHALATION TREATMENT: CPT

## 2022-04-26 PROCEDURE — 94760 N-INVAS EAR/PLS OXIMETRY 1: CPT

## 2022-04-26 PROCEDURE — 74011636637 HC RX REV CODE- 636/637: Performed by: HOSPITALIST

## 2022-04-26 PROCEDURE — 74011250636 HC RX REV CODE- 250/636: Performed by: STUDENT IN AN ORGANIZED HEALTH CARE EDUCATION/TRAINING PROGRAM

## 2022-04-26 PROCEDURE — 74011250637 HC RX REV CODE- 250/637: Performed by: HOSPITALIST

## 2022-04-26 PROCEDURE — 74011000250 HC RX REV CODE- 250: Performed by: HOSPITALIST

## 2022-04-26 PROCEDURE — 74011250637 HC RX REV CODE- 250/637: Performed by: STUDENT IN AN ORGANIZED HEALTH CARE EDUCATION/TRAINING PROGRAM

## 2022-04-26 PROCEDURE — 82962 GLUCOSE BLOOD TEST: CPT

## 2022-04-26 PROCEDURE — 85027 COMPLETE CBC AUTOMATED: CPT

## 2022-04-26 PROCEDURE — 74011250636 HC RX REV CODE- 250/636: Performed by: HOSPITALIST

## 2022-04-26 PROCEDURE — 36415 COLL VENOUS BLD VENIPUNCTURE: CPT

## 2022-04-26 PROCEDURE — 94664 DEMO&/EVAL PT USE INHALER: CPT

## 2022-04-26 PROCEDURE — 65270000046 HC RM TELEMETRY

## 2022-04-26 PROCEDURE — 74011250637 HC RX REV CODE- 250/637: Performed by: NURSE PRACTITIONER

## 2022-04-26 PROCEDURE — 77030029684 HC NEB SM VOL KT MONA -A

## 2022-04-26 PROCEDURE — 80053 COMPREHEN METABOLIC PANEL: CPT

## 2022-04-26 RX ORDER — FUROSEMIDE 10 MG/ML
40 INJECTION INTRAMUSCULAR; INTRAVENOUS 2 TIMES DAILY
Status: DISCONTINUED | OUTPATIENT
Start: 2022-04-26 | End: 2022-05-02 | Stop reason: HOSPADM

## 2022-04-26 RX ORDER — MICONAZOLE NITRATE 2 %
POWDER (GRAM) TOPICAL 2 TIMES DAILY
Status: DISCONTINUED | OUTPATIENT
Start: 2022-04-26 | End: 2022-05-02 | Stop reason: HOSPADM

## 2022-04-26 RX ADMIN — APIXABAN 5 MG: 5 TABLET, FILM COATED ORAL at 12:07

## 2022-04-26 RX ADMIN — ASPIRIN 81 MG: 81 TABLET, COATED ORAL at 12:07

## 2022-04-26 RX ADMIN — BUDESONIDE 250 MCG: 0.5 INHALANT RESPIRATORY (INHALATION) at 08:19

## 2022-04-26 RX ADMIN — Medication 2 UNITS: at 18:16

## 2022-04-26 RX ADMIN — PREGABALIN 75 MG: 25 CAPSULE ORAL at 12:10

## 2022-04-26 RX ADMIN — ISOSORBIDE MONONITRATE 30 MG: 30 TABLET, EXTENDED RELEASE ORAL at 12:14

## 2022-04-26 RX ADMIN — METHYLPREDNISOLONE SODIUM SUCCINATE 40 MG: 40 INJECTION, POWDER, FOR SOLUTION INTRAMUSCULAR; INTRAVENOUS at 05:50

## 2022-04-26 RX ADMIN — Medication 5 UNITS: at 14:44

## 2022-04-26 RX ADMIN — METHYLPREDNISOLONE SODIUM SUCCINATE 40 MG: 40 INJECTION, POWDER, FOR SOLUTION INTRAMUSCULAR; INTRAVENOUS at 21:13

## 2022-04-26 RX ADMIN — SODIUM CHLORIDE, PRESERVATIVE FREE 10 ML: 5 INJECTION INTRAVENOUS at 21:26

## 2022-04-26 RX ADMIN — AMIODARONE HYDROCHLORIDE 100 MG: 200 TABLET ORAL at 12:14

## 2022-04-26 RX ADMIN — MONTELUKAST 10 MG: 10 TABLET, FILM COATED ORAL at 21:13

## 2022-04-26 RX ADMIN — PANTOPRAZOLE SODIUM 40 MG: 40 TABLET, DELAYED RELEASE ORAL at 12:10

## 2022-04-26 RX ADMIN — FUROSEMIDE 40 MG: 10 INJECTION, SOLUTION INTRAVENOUS at 14:45

## 2022-04-26 RX ADMIN — MICONAZOLE NITRATE 2 % TOPICAL POWDER: at 18:25

## 2022-04-26 RX ADMIN — ROSUVASTATIN 5 MG: 10 TABLET, FILM COATED ORAL at 12:11

## 2022-04-26 RX ADMIN — SACUBITRIL AND VALSARTAN 1 TABLET: 49; 51 TABLET, FILM COATED ORAL at 12:13

## 2022-04-26 RX ADMIN — SODIUM CHLORIDE, PRESERVATIVE FREE 10 ML: 5 INJECTION INTRAVENOUS at 14:51

## 2022-04-26 RX ADMIN — DOCUSATE SODIUM 200 MG: 100 CAPSULE, LIQUID FILLED ORAL at 12:07

## 2022-04-26 RX ADMIN — SPIRONOLACTONE 25 MG: 25 TABLET ORAL at 12:13

## 2022-04-26 RX ADMIN — ACETAMINOPHEN 650 MG: 325 TABLET ORAL at 21:13

## 2022-04-26 RX ADMIN — SODIUM CHLORIDE, PRESERVATIVE FREE 10 ML: 5 INJECTION INTRAVENOUS at 05:51

## 2022-04-26 RX ADMIN — FUROSEMIDE 40 MG: 10 INJECTION, SOLUTION INTRAVENOUS at 21:22

## 2022-04-26 RX ADMIN — EZETIMIBE 10 MG: 10 TABLET ORAL at 12:09

## 2022-04-26 NOTE — H&P
[address]   HISTORY AND PHYSICAL    Name:  Negro Madden  MR#:  [id number]  :  1965  ACCOUNT #:  [de-identified]  ADMIT DATE:  2022      PRIMARY CARE PROVIDER:  Not listed in the chart. SOURCE OF INFORMATION:  Patient. CHIEF COMPLAINT:  Progressive shortness of breath 2 days' duration. HISTORY OF PRESENTING ILLNESS:  This is a 81-year-old  male with past medical history significant for chronic respiratory failure, home oxygen dependent 2 liters per minute, COPD, diastolic congestive heart failure, hypertension, type 2 diabetes mellitus, paroxysmal AFib, dyslipidemia, chronic lower extremities wound, who presented to [facility] Emergency Department with progressive shortness of breath for the last 2 days. He stated that he gets short of breath and when he checked his pulse ox, it was in 80s, increased his oxygen, not improved that much, he used the breathing treatment but not felt better and decided to come to [facility] Emergency Department. No left-sided chest pain, palpitation, fever, nausea, vomiting, abdominal pain, urinary complaint, or headache. His last bowel movement was 1-1/2 weeks ago and he has lower extremity swelling. He said he takes his medications regularly. He has never smoked cigarettes. In the ER, his vital signs, blood pressure was 135/88, pulse 70, temperature 98.1, saturation of oxygen 92% on 6 liters. Chest x-ray was done. Study showed chronic hypoinflation and bibasilar atelectasis. Troponin high-sensitivity was 17. ProBNP was 414, and the patient referred to hospitalist service for further evaluation and management. REVIEW OF SYSTEMS:  Pertinent positive findings mentioned in the HPI. All systems reviewed, no any other positive finding. PAST MEDICAL HISTORY:  1. Chronic respiratory failure, home oxygen dependent 2 liters per minute. 2.  COPD. 3.  Diastolic heart failure. 4.  Paroxysmal AFib. 5.  Hypertension.   6.  Type 2 diabetes mellitus. 7.  Dyslipidemia. MEDICATIONS:  Prior to admission medications include:  1. Torsemide 60 mg daily. 2.  Lyrica 75 mg p.o. b.i.d.  3.  Eliquis 5 mg p.o. b.i.d.  4.  Aspirin 81 mg p.o. daily. 5.  Amiodarone 100 mg p.o. daily. 6.  Zetia 10 mg p.o. daily. 7.  Protonix 40 mg p.o. daily. 8.  Jardiance 25 mg daily. 9.  Imdur 30 mg p.o. daily. 10.  Entresto 1 tablet 2 times daily. 11.  Crestor 5 mg p.o. daily. 12.  Spironolactone 25 mg p.o. daily. 13.  Singulair 10 mg p.o. daily. 14.  Lantus 18 units subcutaneous daily. ALLERGIES:  NO KNOWN DRUG ALLERGIES. SOCIAL HISTORY:  Lives by himself. No tobacco or alcohol abuse. Ambulates with a Rollator. Code status, full code. PHYSICAL EXAMINATION:  VITAL SIGNS:  Blood pressure 121/77, pulse 68, temperature 98.1, respiratory rate 20, and saturation of oxygen 94% on 6 liters. GENERAL APPEARANCE:  The patient is alert, cooperative, not in distress. He appears his stated age. HEENT:  Pink conjunctivae and anicteric sclerae. Moist tongue and buccal mucosa. LUNGS:  Decreased bronchial breath sounds to auscultation bilaterally. Few expiratory wheezes. CHEST:  No tenderness. CARDIOVASCULAR:  Irregularly irregular rhythm, regular rate, S1 and S2. No murmur or gallop. ABDOMEN:  Obese, soft, nontender. Bowel sounds normal.  No mass or organomegaly. EXTREMITIES:  He has bilateral pretibial and pedal edema. SKIN:  He has lower extremity chronic wound. CENTRAL NERVOUS SYSTEM:  Conscious, well oriented to time, place, and person. Motor 5/5. Sensation intact. Cranial nerves II through XII grossly intact. DIAGNOSTIC DATA:  EKG not done. Chest x-ray, chronic hypoinflation and bibasilar atelectasis. LABORATORY DATA:  CBC, white blood cell count 8.2, hemoglobin 12.7, hematocrit 46.8, MCV 80.6, and platelet count 865. Urinalysis, no evidence of infection.   Chemistry, sodium 142, potassium 3.7, chloride 102, CO2 of 38, anion gap 2, glucose 139, BUN 30, creatinine 0.94, BUN/creatinine ratio 32, calcium 8.7, albumin 3.2, ALT 11, AST 14, alkaline phosphatase 145. Troponin 17. ProBNP 414. ASSESSMENT:  1. Acute-on-chronic hypoxic hypercapnic respiratory failure due to chronic obstructive pulmonary disease exacerbation and diastolic congestive heart failure exacerbation. 2.  Acute chronic obstructive pulmonary disease exacerbation. 3.  Acute-on-chronic diastolic congestive heart failure, New York Heart Association class III to IV. 4.  Paroxysmal atrial fibrillation, rate controlled. 5.  Hypertension. 6.  Type 2 diabetes mellitus. 7.  Hypertension. 8.  Chronic lower extremity wound. 9.  Morbid obesity. PLAN:  1. Acute-on-chronic hypoxic hypercapnic respiratory failure due to COPD exacerbation and diastolic CHF exacerbation. Admit the patient to telemetry floor. start on IV Solu-Medrol 40 mg IV q.8 h., oxygen support, 20 mg IV Lasix x1, Pulmicort, p.r.n. DuoNeb neb treatment. Monitor pulse oximetry and wean down oxygen. Rapid COVID testing is negative. 2.  Acute COPD exacerbation. Solu-Medrol IV, Pulmicort nebulizer treatment, p.r.n. DuoNeb, oxygen support, and monitor pulse oximetry. 3.  Acute-on-chronic diastolic congestive heart failure, New York Heart Association class III to IV. give one dose of Lasix 20 mg x1 and continue home Demadex, Imdur, Entresto, and spironolactone. Monitor intake and output, daily weight. If it does not improve, will involve cardiologist.  4.  Paroxysmal AFib, rate controlled. Continue home amiodarone and Eliquis and continue cardiac monitoring. 5.  Hypertension. Blood pressure is normal.  Continue home medications Imdur, Entresto, spironolactone, Demadex, and monitor blood pressure. 6.  Type 2 diabetes mellitus. Check hemoglobin A1c. Continue Lantus 18 sliding scale and monitor fingerstick glucose. 7.  Chronic lower extremity wound. to consult wound care nurse.   Continue local wound care.  8.  Morbid obesity. His BMI is 41. 56. Advised healthy diet choice, weight loss, and exercise. DVT prophylaxis. The patient is on Eliquis. FUNCTIONAL STATUS PRIOR TO ADMISSION:  The patient ambulates with a walker.         Rowan Briggs MD      [id number]  D:  04/25/2022 17:36  T:  04/25/2022 22:58  JOB #:  [id number]

## 2022-04-26 NOTE — PROGRESS NOTES
Transition of Care Plan   RUR- 12% Moderate Risk   DISPOSITION: The disposition plan is pending medical progression and recommendation.  F/U with PCP/Specialist     Transport: [facility] - [phone]    Reason for Admission:  \"02 too low\"                   RUR Score:  12% Moderate Risk                  Plan for utilizing home health:  N/A        PCP: First and Last name:  Dr. Carl Forman    Name of Practice: [facility]   Are you a current patient: Yes/No: Yes   Approximate date of last visit: 6 months ago   Can you participate in a virtual visit with your PCP: N/A                    Current Advanced Directive/Advance Care Plan: Full Code  Has no ACP documentation on file at this time. Healthcare Decision Maker:   Click here to complete [address] including selection of the Healthcare Decision Maker Relationship (ie \"Primary\")   Brother                        Transition of Care Plan: Pending recommendation. Reviewed chart for transitions of care and discussed in rounds. CM met with patient at bedside to explain role and offer support. Patient is alert and oriented x4 and confirmed demographics. Baseline: DME - wheelchair, cane, walker and rollator   ADLs/IDALS: Independent   Previous Home Health: Yes, via [facility]  Previous SNF/IPR: Yes, [facility] (IPR), [address] [place] [address] (SNF) and [facility] (SNF)  ER Contact: Brother Olvin Tolbert    Patient lives in an apartment alone. Patient is independent with ADLs/IDALs. Patient uses DMEs (wheelchair, cane, walker and rollator) to ambulate. Patient's preferred pharmacy is via [id number]. [facility] is expected to transport at discharge. Care Management Interventions  PCP Verified by CM:  Yes  Palliative Care Criteria Met (RRAT>21 & CHF Dx)?: No  Transition of Care Consult (CM Consult): Discharge Planning  MyChart Signup: No  Discharge Durable Medical Equipment: No  Physical Therapy Consult: No  Occupational Therapy Consult: No  Speech Therapy Consult: No  Support Systems: Other Family Member(s)  Confirm Follow Up Transport: Other (see comment)  The Patient and/or Patient Representative was Provided with a Choice of Provider and Agrees with the Discharge Plan?: Yes  Freedom of Choice List was Provided with Basic Dialogue that Supports the Patient's Individualized Plan of Care/Goals, Treatment Preferences and Shares the Quality Data Associated with the Providers?: Yes  The [employer] Information Provided?: No  Discharge Location  Patient Expects to be Discharged to[de-identified] [address] [facility] [address], FELICIA, CRM, LM[facility]-e  Available in Perfect Serve

## 2022-04-26 NOTE — WOUND CARE
WOCN Note:     New consult for lower extremity wounds. Chart shows:  Admitted for SOB with hypoxia and AMS  History of DM, CHF, morbid obesity    Assessment:   A&O x 4 and reports pain under pannus and in groin. Moved from bedside commode to bed independently. Large formed BM  Surface: za gel mattress  315 lbs    Bilateral heels intact and without erythema. Prefers to sit up rather than keep legs elevated in bed. 1. POA extensive red rash under pannus, in groin, in gluteal cleft and on scrotum  Consistent with candidiasis     2. POA left lower leg - 2 wounds likely venous stasis  Lateral = 1 x 1 x 0.1 cm 100% pink  Anterior = 6 x 2 x 0.3 cm mixed maceration, red, and yellow tissues  Moderate amount of serous exudate; no malodor or purulence - no gross S&S of infection  Waxy plaques and hemosiderin staining noted to both lower legs    Tx: removed kerlix and calamine wrap, cleaned wounds with saline, apply Opticel AG and foam dressing to cover. Both legs wrapped with stretch gauze and then loose ACE wrap. Wound Recommendations:    LLL: clean with saline, apply Opticel AG and re-wrap legs loosely with ACE. Change daily. Antifungal powder under pannus, in groin, gluteal cleft, and scrotum    PI Prevention:  Turn/reposition approximately every 2 hours  Offload heels with heels hanging off end of pillow at all times while in bed. Elevate legs as much as possible.       Transition of Care: Plan to follow weekly and as needed while admitted to hospital.    ARISTEO Marie, RN, [facility]  Certified Wound, Ostomy, Continence Nurse  office [phone]  Available via [facility]

## 2022-04-26 NOTE — ROUTINE PROCESS
TRANSFER - OUT REPORT:    Verbal report given to [facility], RN (name) on Rob Roca  being transferred to [facility] (unit) for routine progression of care       Report consisted of patients Situation, Background, Assessment and   Recommendations(SBAR). Information from the following report(s) SBAR, ED Summary, Intake/Output, MAR, Recent Results, Cardiac Rhythm sinus sam, Alarm Parameters  and Quality Measures was reviewed with the receiving nurse. Lines:   Peripheral IV 04/25/22 Anterior; Left Wrist (Active)        Opportunity for questions and clarification was provided.       Patient transported with:   transportation with belongings

## 2022-04-26 NOTE — PROGRESS NOTES
[address] [facility] Adult  Hospitalist Group                                                                                          Hospitalist Progress Note  Charmaine Mckeon DO  Answering service: [phone] from in house phone        Date of Service:  2022  NAME:  Randal Cruz  :  1965  MRN:  [id number]      Admission Summary: This is a 81-year-old  male with past medical history significant for chronic respiratory failure, home oxygen dependent 2 liters per minute, COPD, diastolic congestive heart failure, hypertension, type 2 diabetes mellitus, paroxysmal AFib, dyslipidemia, chronic lower extremities wound, who presented to [facility] Emergency Department with progressive shortness of breath for the last 2 days. He stated that he gets short of breath and when he checked his pulse ox, it was in 80s, increased his oxygen, not improved that much, he used the breathing treatment but not felt better and decided to come to [facility] Emergency Department. No left-sided chest pain, palpitation, fever, nausea, vomiting, abdominal pain, urinary complaint, or headache. His last bowel movement was 1-1/2 weeks ago and he has lower extremity swelling. He said he takes his medications regularly. He has never smoked cigarettes. In the ER, his vital signs, blood pressure was 135/88, pulse 70, temperature 98.1, saturation of oxygen 92% on 6 liters. Chest x-ray was done. Study showed chronic hypoinflation and bibasilar atelectasis. Troponin high-sensitivity was 17. ProBNP was 414, and the patient referred to hospitalist service for further evaluation and management. Interval history / Subjective:     Patient was seen and examined this morning. He denies any new complaints. Reports that he uses 2 L of oxygen at night only at home and found himself hypoxic to the low 60s which prompted him to present for evaluation.   Patient denies fever, chills, chest pain,  abdominal pain, nausea, vomiting and diarrhea. Assessment & Plan:      Acute hypoxic respiratory failure  --Continue O2, wean as tolerated  --Incentive spirometry use    Acute COPD exacerbation-improving  --Continue to wean Solu-Medrol, will decrease the dose to 40 mg twice daily  --Incentive spirometer  --DuoNebs  --Wean oxygen as tolerated    Acute diastolic CHF  --IV Lasix 40 mg twice daily  --Fluid restriction to 1.5 L daily, sodium restricted diet, strict I&O  -- Continue home medications. Imdur, Entresto and Aldactone  --monitor labs and replace electrolytes    Paroxysmal A. Fib  --We will continue with home medications amiodarone/Eliquis    Hypertension  --Continue with home antihypertensive medications    -Type 2 diabetes, continue with home treatment with Lantus/sliding scale insulin    Morbid obesity  -Encourage diet/exercise and weight loss          Code status:  Full code  Prophylaxis: Eliquis  Care Plan discussed with: Patient  Anticipated Disposition: 2 days     Hospital Problems  Date Reviewed: 4/28/2013          Codes Class Noted POA    Shortness of breath ICD-10-CM: R06.02  ICD-9-CM: 786.05  4/25/2022 Unknown                Review of Systems:   A comprehensive review of systems was negative except for that written in the HPI. Vital Signs:    Last 24hrs VS reviewed since prior progress note. Most recent are:  Visit Vitals  BP (!) 160/96 (BP 1 Location: Left upper arm, BP Patient Position: At rest)   Pulse 65   Temp 97.7 °F (36.5 °C)   Resp 17   Ht 6' 1\" (1.854 m)   Wt 142.9 kg (315 lb 0.6 oz)   SpO2 96%   BMI 41.56 kg/m²       No intake or output data in the 24 hours ending 04/26/22 1148     Physical Examination:     I had a face to face encounter with this patient and independently examined them on 4/26/2022 as outlined below:          Constitutional:  No acute distress, cooperative, pleasant    ENT:  Oral mucosa moist, oropharynx benign.     Resp:  Hypoxia, decreased breath sounds, on oxygen, no accessory muscle use. CV:  Regular rhythm, normal rate, no murmurs, gallops, rubs    GI:  Soft, non distended, non tender. normoactive bowel sounds, no hepatosplenomegaly     Musculoskeletal:  No edema, warm, 2+ pulses throughout    Neurologic:  Moves all extremities. AAOx3, CN II-XII reviewed            Data Review:    Review and/or order of clinical lab test      Labs:     Recent Labs     04/26/22  0549 04/25/22  1420   WBC 7.8 8.2   HGB 14.0 12.7   HCT 52.0* 46.8    217     Recent Labs     04/26/22  0549 04/25/22  1420    142   K 4.0 3.7    102   CO2 37* 38*   BUN 27* 30*   CREA 0.97 0.94   * 139*   CA 9.3 8.7     Recent Labs     04/26/22  0549 04/25/22  1420   ALT 14 11*   * 145*   TBILI 0.3 0.4   TP 8.2 7.7   ALB 3.2* 3.2*   GLOB 5.0* 4.5*     No results for input(s): INR, PTP, APTT, INREXT in the last 72 hours. No results for input(s): FE, TIBC, PSAT, FERR in the last 72 hours. No results found for: FOL, RBCF   No results for input(s): PH, PCO2, PO2 in the last 72 hours. No results for input(s): CPK, CKNDX, TROIQ in the last 72 hours.     No lab exists for component: CPKMB  Lab Results   Component Value Date/Time    Cholesterol, total 170 01/23/2013 11:08 AM    HDL Cholesterol 37 (L) 01/23/2013 11:08 AM    LDL, calculated 97 01/23/2013 11:08 AM    Triglyceride 180 (H) 01/23/2013 11:08 AM    CHOL/HDL Ratio 6.0 (H) 10/06/2010 03:18 PM     Lab Results   Component Value Date/Time    Glucose (POC) 254 (H) 04/26/2022 11:18 AM    Glucose (POC) 121 (H) 04/25/2022 10:19 PM    Glucose (POC) 199 (H) 10/04/2021 11:10 AM    Glucose (POC) 182 (H) 10/04/2021 06:23 AM    Glucose (POC) 178 (H) 10/03/2021 09:24 PM     Lab Results   Component Value Date/Time    Color YELLOW/STRAW 04/25/2022 02:54 PM    Appearance CLEAR 04/25/2022 02:54 PM    Specific gravity 1.018 04/25/2022 02:54 PM    pH (UA) 5.5 04/25/2022 02:54 PM    Protein Negative 04/25/2022 02:54 PM    Glucose >1,000 (A) 04/25/2022 02:54 PM    Ketone Negative 04/25/2022 02:54 PM    Bilirubin Negative 04/25/2022 02:54 PM    Urobilinogen 0.2 04/25/2022 02:54 PM    Nitrites Negative 04/25/2022 02:54 PM    Leukocyte Esterase TRACE (A) 04/25/2022 02:54 PM    Epithelial cells FEW 04/25/2022 02:54 PM    Bacteria Negative 04/25/2022 02:54 PM    WBC 0-4 04/25/2022 02:54 PM    RBC 0-5 04/25/2022 02:54 PM         Medications Reviewed:     Current Facility-Administered Medications   Medication Dose Route Frequency    amiodarone (CORDARONE) tablet 100 mg  100 mg Oral DAILY    apixaban (ELIQUIS) tablet 5 mg  5 mg Oral BID    aspirin delayed-release tablet 81 mg  81 mg Oral DAILY    ezetimibe (ZETIA) tablet 10 mg  10 mg Oral DAILY    insulin glargine (LANTUS) injection 18 Units  18 Units SubCUTAneous DAILY    insulin lispro (HUMALOG) injection   SubCUTAneous AC&HS    glucose chewable tablet 16 g  4 Tablet Oral PRN    glucagon (GLUCAGEN) injection 1 mg  1 mg IntraMUSCular PRN    dextrose 10 % infusion 0-250 mL  0-250 mL IntraVENous PRN    isosorbide mononitrate ER (IMDUR) tablet 30 mg  30 mg Oral DAILY    montelukast (SINGULAIR) tablet 10 mg  10 mg Oral QHS    pantoprazole (PROTONIX) tablet 40 mg  40 mg Oral DAILY    pregabalin (LYRICA) capsule 75 mg  75 mg Oral BID    rosuvastatin (CRESTOR) tablet 5 mg  5 mg Oral DAILY    sacubitriL-valsartan (ENTRESTO) 49-51 mg tablet 1 Tablet  1 Tablet Oral BID    spironolactone (ALDACTONE) tablet 25 mg  25 mg Oral DAILY    torsemide (DEMADEX) tablet 60 mg  60 mg Oral DAILY    sodium chloride (NS) flush 5-40 mL  5-40 mL IntraVENous Q8H    sodium chloride (NS) flush 5-40 mL  5-40 mL IntraVENous PRN    methylPREDNISolone (PF) (Solu-MEDROL) injection 40 mg  40 mg IntraVENous Q6H    ondansetron (ZOFRAN) injection 4 mg  4 mg IntraVENous Q4H PRN    docusate sodium (COLACE) capsule 200 mg  200 mg Oral BID    lactulose (CHRONULAC) 10 gram/15 mL solution 30 mL  20 g Oral DAILY PRN    budesonide (PULMICORT) 500 mcg/2 ml nebulizer suspension  250 mcg Nebulization BID RT    albuterol-ipratropium (DUO-NEB) 2.5 MG-0.5 MG/3 ML  3 mL Nebulization Q4H PRN    acetaminophen (TYLENOL) tablet 650 mg  650 mg Oral Q4H PRN    hydrocortisone (ANUSOL-HC) 2.5 % rectal cream   PeriANAL QID PRN     ______________________________________________________________________  EXPECTED LENGTH OF STAY: - - -  ACTUAL LENGTH OF STAY:          1                 Lisbeth Rizvi DO

## 2022-04-26 NOTE — NURSE NAVIGATOR
Heart Failure Nurse Navigator rounds completed. HF NN provided introduction to self and nurse navigator role. Living With Heart Failure booklet given to patient, along with weight calendar, magnet, and HF Support Group flyer. Introduced patient to Preparing for Successful Discharge - Heart Failure check list and explained that knowledge of these topics will help facilitate successful self management upon discharge. Reviewed low salt diet, daily weights and signs and symptoms of heart failure magnet. Advised patient that follow up appointment will be scheduled and placed on Discharge Instructions prior to discharge. [facility] with [facility] and has [facility]. Will continue to follow until discharge. Chart reviewed by Heart Failure Nurse Navigator. Heart Failure database completed. EF:  50% 9/20/21     ACE[facility]ARB/ARNi: [facility]    BB: not indicated    Aldosterone Antagonist: Aldactne    Obstructive Sleep Apnea Screening: N/1   Referred to Sleep Medicine:     CRT not indicated    NYHA Functional Class requested via provider message on [employer]     Heart Failure Teach Back in Patient Education. Heart Failure Avoiding Triggers on Discharge Instructions. Cardiologist: **      Post discharge follow up phone call to be made within 48-72 hours of discharge.

## 2022-04-27 LAB
ALBUMIN SERPL-MCNC: 2.9 G/DL (ref 3.5–5)
ALBUMIN/GLOB SERPL: 0.7 {RATIO} (ref 1.1–2.2)
ALP SERPL-CCNC: 102 U/L (ref 45–117)
ALT SERPL-CCNC: 11 U/L (ref 12–78)
ANION GAP SERPL CALC-SCNC: 3 MMOL/L (ref 5–15)
AST SERPL-CCNC: 13 U/L (ref 15–37)
BILIRUB SERPL-MCNC: 0.5 MG/DL (ref 0.2–1)
BUN SERPL-MCNC: 33 MG/DL (ref 6–20)
BUN/CREAT SERPL: 33 (ref 12–20)
CALCIUM SERPL-MCNC: 9.3 MG/DL (ref 8.5–10.1)
CHLORIDE SERPL-SCNC: 99 MMOL/L (ref 97–108)
CO2 SERPL-SCNC: 37 MMOL/L (ref 21–32)
CREAT SERPL-MCNC: 1 MG/DL (ref 0.7–1.3)
ERYTHROCYTE [DISTWIDTH] IN BLOOD BY AUTOMATED COUNT: 21.2 % (ref 11.5–14.5)
GLOBULIN SER CALC-MCNC: 4.2 G/DL (ref 2–4)
GLUCOSE BLD STRIP.AUTO-MCNC: 167 MG/DL (ref 65–117)
GLUCOSE BLD STRIP.AUTO-MCNC: 188 MG/DL (ref 65–117)
GLUCOSE BLD STRIP.AUTO-MCNC: 193 MG/DL (ref 65–117)
GLUCOSE BLD STRIP.AUTO-MCNC: 204 MG/DL (ref 65–117)
GLUCOSE SERPL-MCNC: 184 MG/DL (ref 65–100)
HCT VFR BLD AUTO: 46.7 % (ref 36.6–50.3)
HGB BLD-MCNC: 12.7 G/DL (ref 12.1–17)
MCH RBC QN AUTO: 21.8 PG (ref 26–34)
MCHC RBC AUTO-ENTMCNC: 27.2 G/DL (ref 30–36.5)
MCV RBC AUTO: 80.2 FL (ref 80–99)
NRBC # BLD: 0 K/UL (ref 0–0.01)
NRBC BLD-RTO: 0 PER 100 WBC
PLATELET # BLD AUTO: 231 K/UL (ref 150–400)
PMV BLD AUTO: 10.3 FL (ref 8.9–12.9)
POTASSIUM SERPL-SCNC: 3.8 MMOL/L (ref 3.5–5.1)
PROT SERPL-MCNC: 7.1 G/DL (ref 6.4–8.2)
RBC # BLD AUTO: 5.82 M/UL (ref 4.1–5.7)
SERVICE CMNT-IMP: ABNORMAL
SODIUM SERPL-SCNC: 139 MMOL/L (ref 136–145)
WBC # BLD AUTO: 11.4 K/UL (ref 4.1–11.1)

## 2022-04-27 PROCEDURE — 97535 SELF CARE MNGMENT TRAINING: CPT

## 2022-04-27 PROCEDURE — 82962 GLUCOSE BLOOD TEST: CPT

## 2022-04-27 PROCEDURE — 77010033678 HC OXYGEN DAILY

## 2022-04-27 PROCEDURE — 74011000250 HC RX REV CODE- 250: Performed by: HOSPITALIST

## 2022-04-27 PROCEDURE — 65270000046 HC RM TELEMETRY

## 2022-04-27 PROCEDURE — 74011250637 HC RX REV CODE- 250/637: Performed by: NURSE PRACTITIONER

## 2022-04-27 PROCEDURE — 74011250636 HC RX REV CODE- 250/636: Performed by: STUDENT IN AN ORGANIZED HEALTH CARE EDUCATION/TRAINING PROGRAM

## 2022-04-27 PROCEDURE — 97165 OT EVAL LOW COMPLEX 30 MIN: CPT

## 2022-04-27 PROCEDURE — 74011250637 HC RX REV CODE- 250/637: Performed by: HOSPITALIST

## 2022-04-27 PROCEDURE — 85027 COMPLETE CBC AUTOMATED: CPT

## 2022-04-27 PROCEDURE — 97530 THERAPEUTIC ACTIVITIES: CPT

## 2022-04-27 PROCEDURE — 74011636637 HC RX REV CODE- 636/637: Performed by: HOSPITALIST

## 2022-04-27 PROCEDURE — 97161 PT EVAL LOW COMPLEX 20 MIN: CPT

## 2022-04-27 PROCEDURE — 36415 COLL VENOUS BLD VENIPUNCTURE: CPT

## 2022-04-27 PROCEDURE — 94760 N-INVAS EAR/PLS OXIMETRY 1: CPT

## 2022-04-27 PROCEDURE — 80053 COMPREHEN METABOLIC PANEL: CPT

## 2022-04-27 PROCEDURE — 94640 AIRWAY INHALATION TREATMENT: CPT

## 2022-04-27 RX ADMIN — ACETAMINOPHEN 650 MG: 325 TABLET ORAL at 21:19

## 2022-04-27 RX ADMIN — BUDESONIDE 250 MCG: 0.5 INHALANT RESPIRATORY (INHALATION) at 23:15

## 2022-04-27 RX ADMIN — ROSUVASTATIN 5 MG: 10 TABLET, FILM COATED ORAL at 09:11

## 2022-04-27 RX ADMIN — METHYLPREDNISOLONE SODIUM SUCCINATE 40 MG: 40 INJECTION, POWDER, FOR SOLUTION INTRAMUSCULAR; INTRAVENOUS at 09:11

## 2022-04-27 RX ADMIN — BUDESONIDE 250 MCG: 0.5 INHALANT RESPIRATORY (INHALATION) at 01:03

## 2022-04-27 RX ADMIN — MICONAZOLE NITRATE 2 % TOPICAL POWDER: at 09:13

## 2022-04-27 RX ADMIN — SODIUM CHLORIDE, PRESERVATIVE FREE 10 ML: 5 INJECTION INTRAVENOUS at 06:59

## 2022-04-27 RX ADMIN — SACUBITRIL AND VALSARTAN 1 TABLET: 49; 51 TABLET, FILM COATED ORAL at 17:22

## 2022-04-27 RX ADMIN — DOCUSATE SODIUM 200 MG: 100 CAPSULE, LIQUID FILLED ORAL at 09:11

## 2022-04-27 RX ADMIN — PANTOPRAZOLE SODIUM 40 MG: 40 TABLET, DELAYED RELEASE ORAL at 09:12

## 2022-04-27 RX ADMIN — ASPIRIN 81 MG: 81 TABLET, COATED ORAL at 09:11

## 2022-04-27 RX ADMIN — PREGABALIN 75 MG: 25 CAPSULE ORAL at 17:23

## 2022-04-27 RX ADMIN — SPIRONOLACTONE 25 MG: 25 TABLET ORAL at 09:12

## 2022-04-27 RX ADMIN — Medication 2 UNITS: at 06:59

## 2022-04-27 RX ADMIN — Medication 3 UNITS: at 12:50

## 2022-04-27 RX ADMIN — INSULIN GLARGINE 18 UNITS: 100 INJECTION, SOLUTION SUBCUTANEOUS at 09:11

## 2022-04-27 RX ADMIN — DOCUSATE SODIUM 200 MG: 100 CAPSULE, LIQUID FILLED ORAL at 17:22

## 2022-04-27 RX ADMIN — EZETIMIBE 10 MG: 10 TABLET ORAL at 09:11

## 2022-04-27 RX ADMIN — APIXABAN 5 MG: 5 TABLET, FILM COATED ORAL at 09:11

## 2022-04-27 RX ADMIN — BUDESONIDE 250 MCG: 0.5 INHALANT RESPIRATORY (INHALATION) at 09:37

## 2022-04-27 RX ADMIN — MONTELUKAST 10 MG: 10 TABLET, FILM COATED ORAL at 21:19

## 2022-04-27 RX ADMIN — SACUBITRIL AND VALSARTAN 1 TABLET: 49; 51 TABLET, FILM COATED ORAL at 09:13

## 2022-04-27 RX ADMIN — FUROSEMIDE 40 MG: 10 INJECTION, SOLUTION INTRAVENOUS at 09:11

## 2022-04-27 RX ADMIN — SODIUM CHLORIDE, PRESERVATIVE FREE 10 ML: 5 INJECTION INTRAVENOUS at 21:19

## 2022-04-27 RX ADMIN — PREGABALIN 75 MG: 25 CAPSULE ORAL at 09:11

## 2022-04-27 RX ADMIN — MICONAZOLE NITRATE 2 % TOPICAL POWDER: at 17:23

## 2022-04-27 RX ADMIN — AMIODARONE HYDROCHLORIDE 100 MG: 200 TABLET ORAL at 09:12

## 2022-04-27 RX ADMIN — FUROSEMIDE 40 MG: 10 INJECTION, SOLUTION INTRAVENOUS at 17:23

## 2022-04-27 RX ADMIN — SODIUM CHLORIDE, PRESERVATIVE FREE 10 ML: 5 INJECTION INTRAVENOUS at 12:50

## 2022-04-27 RX ADMIN — Medication 2 UNITS: at 17:23

## 2022-04-27 RX ADMIN — ISOSORBIDE MONONITRATE 30 MG: 30 TABLET, EXTENDED RELEASE ORAL at 09:11

## 2022-04-27 NOTE — PROGRESS NOTES
[address] [facility] Adult  Hospitalist Group                                                                                          Hospitalist Progress Note  Laxmi Melvin DO  Answering service: [phone] from in house phone        Date of Service:  2022  NAME:  Katty Benson  :  1965  MRN:  [id number]      Admission Summary: This is a 60-year-old  male with past medical history significant for chronic respiratory failure, home oxygen dependent 2 liters per minute, COPD, diastolic congestive heart failure, hypertension, type 2 diabetes mellitus, paroxysmal AFib, dyslipidemia, chronic lower extremities wound, who presented to [facility] Emergency Department with progressive shortness of breath for the last 2 days. He stated that he gets short of breath and when he checked his pulse ox, it was in 80s, increased his oxygen, not improved that much, he used the breathing treatment but not felt better and decided to come to [facility] Emergency Department. No left-sided chest pain, palpitation, fever, nausea, vomiting, abdominal pain, urinary complaint, or headache. His last bowel movement was 1-1/2 weeks ago and he has lower extremity swelling. He said he takes his medications regularly. He has never smoked cigarettes. In the ER, his vital signs, blood pressure was 135/88, pulse 70, temperature 98.1, saturation of oxygen 92% on 6 liters. Chest x-ray was done. Study showed chronic hypoinflation and bibasilar atelectasis. Troponin high-sensitivity was 17. ProBNP was 414, and the patient referred to hospitalist service for further evaluation and management. Interval history / Subjective:     Patient was seen and examined this morning. He denies any new complaints. Reports that he uses 2 L of oxygen at night only at home and found himself hypoxic to the low 60s which prompted him to present for evaluation.   Patient denies fever, chills, chest pain,  abdominal pain, nausea, vomiting and diarrhea. Assessment & Plan:      Acute hypoxic respiratory failure  --Continue O2, wean as tolerated  --Incentive spirometry use    Acute COPD exacerbation-improving  --Continue to wean to Solu-Medrol 40 mg daily  --Incentive spirometer  --DuoNebs  --Wean oxygen as tolerated  -- Pulmonary consulted, cleared recommendations    Acute diastolic CHF, nyha class 2  --IV Lasix 40 mg twice daily  --Fluid restriction to 1.5 L daily, sodium restricted diet, strict I&O  -- Continue home medications. Imdur, Entresto and Aldactone  --monitor labs and replace electrolytes    Paroxysmal A. Fib  --We will continue with home medications amiodarone/Eliquis    Hypertension  --Continue with home antihypertensive medications    -Type 2 diabetes, continue with home treatment with Lantus/sliding scale insulin    Morbid obesity  -Encourage diet/exercise and weight loss    Patient will need home O2 eval prior to DC    PT/OT          Code status:  Full code  Prophylaxis: [address] discussed with: Patient  Anticipated Disposition: 2 days     Hospital Problems  Date Reviewed: 4/28/2013          Codes Class Noted POA    Shortness of breath ICD-10-CM: R06.02  ICD-9-CM: 786.05  4/25/2022 Unknown                Review of Systems:   A comprehensive review of systems was negative except for that written in the HPI. Vital Signs:    Last 24hrs VS reviewed since prior progress note.  Most recent are:  Visit Vitals  /64 (BP 1 Location: Left upper arm, BP Patient Position: Sitting)   Pulse 62   Temp 97.3 °F (36.3 °C)   Resp 20   Ht 6' 1\" (1.854 m)   Wt 151.5 kg (334 lb)   SpO2 90%   BMI 44.07 kg/m²         Intake/Output Summary (Last 24 hours) at 4/27/2022 1103  Last data filed at 4/27/2022 1051  Gross per 24 hour   Intake 740 ml   Output 600 ml   Net 140 ml        Physical Examination:     I had a face to face encounter with this patient and independently examined them on 4/27/2022 as outlined below:          Constitutional:  No acute distress, cooperative, pleasant    ENT:  Oral mucosa moist, oropharynx benign. Resp:  Hypoxia, decreased breath sounds, on oxygen, no accessory muscle use. CV:  Regular rhythm, normal rate, no murmurs, gallops, rubs    GI:  Soft, non distended, non tender. normoactive bowel sounds, no hepatosplenomegaly     Musculoskeletal:  No edema, warm, 2+ pulses throughout    Neurologic:  Moves all extremities. AAOx3, CN II-XII reviewed            Data Review:    Review and/or order of clinical lab test      Labs:     Recent Labs     04/27/22  0400 04/26/22  0549   WBC 11.4* 7.8   HGB 12.7 14.0   HCT 46.7 52.0*    219     Recent Labs     04/27/22  0400 04/26/22  0549 04/25/22  1420    142 142   K 3.8 4.0 3.7   CL 99 102 102   CO2 37* 37* 38*   BUN 33* 27* 30*   CREA 1.00 0.97 0.94   * 155* 139*   CA 9.3 9.3 8.7     Recent Labs     04/27/22  0400 04/26/22  0549 04/25/22  1420   ALT 11* 14 11*    128* 145*   TBILI 0.5 0.3 0.4   TP 7.1 8.2 7.7   ALB 2.9* 3.2* 3.2*   GLOB 4.2* 5.0* 4.5*     No results for input(s): INR, PTP, APTT, INREXT, INREXT in the last 72 hours. No results for input(s): FE, TIBC, PSAT, FERR in the last 72 hours. No results found for: FOL, RBCF   No results for input(s): PH, PCO2, PO2 in the last 72 hours. No results for input(s): CPK, CKNDX, TROIQ in the last 72 hours.     No lab exists for component: CPKMB  Lab Results   Component Value Date/Time    Cholesterol, total 170 01/23/2013 11:08 AM    HDL Cholesterol 37 (L) 01/23/2013 11:08 AM    LDL, calculated 97 01/23/2013 11:08 AM    Triglyceride 180 (H) 01/23/2013 11:08 AM    CHOL/HDL Ratio 6.0 (H) 10/06/2010 03:18 PM     Lab Results   Component Value Date/Time    Glucose (POC) 167 (H) 04/27/2022 06:04 AM    Glucose (POC) 170 (H) 04/26/2022 09:32 PM    Glucose (POC) 176 (H) 04/26/2022 06:10 PM    Glucose (POC) 254 (H) 04/26/2022 11:18 AM    Glucose (POC) 121 (H) 04/25/2022 10:19 PM Lab Results   Component Value Date/Time    Color YELLOW/STRAW 04/25/2022 02:54 PM    Appearance CLEAR 04/25/2022 02:54 PM    Specific gravity 1.018 04/25/2022 02:54 PM    pH (UA) 5.5 04/25/2022 02:54 PM    Protein Negative 04/25/2022 02:54 PM    Glucose >1,000 (A) 04/25/2022 02:54 PM    Ketone Negative 04/25/2022 02:54 PM    Bilirubin Negative 04/25/2022 02:54 PM    Urobilinogen 0.2 04/25/2022 02:54 PM    Nitrites Negative 04/25/2022 02:54 PM    Leukocyte Esterase TRACE (A) 04/25/2022 02:54 PM    Epithelial cells FEW 04/25/2022 02:54 PM    Bacteria Negative 04/25/2022 02:54 PM    WBC 0-4 04/25/2022 02:54 PM    RBC 0-5 04/25/2022 02:54 PM         Medications Reviewed:     Current Facility-Administered Medications   Medication Dose Route Frequency    methylPREDNISolone (PF) (SOLU-MEDROL) injection 40 mg  40 mg IntraVENous Q12H    furosemide (LASIX) injection 40 mg  40 mg IntraVENous BID    miconazole (MICOTIN) 2 % powder   Topical BID    amiodarone (CORDARONE) tablet 100 mg  100 mg Oral DAILY    apixaban (ELIQUIS) tablet 5 mg  5 mg Oral BID    aspirin delayed-release tablet 81 mg  81 mg Oral DAILY    ezetimibe (ZETIA) tablet 10 mg  10 mg Oral DAILY    insulin glargine (LANTUS) injection 18 Units  18 Units SubCUTAneous DAILY    insulin lispro (HUMALOG) injection   SubCUTAneous AC&HS    glucose chewable tablet 16 g  4 Tablet Oral PRN    glucagon (GLUCAGEN) injection 1 mg  1 mg IntraMUSCular PRN    dextrose 10 % infusion 0-250 mL  0-250 mL IntraVENous PRN    isosorbide mononitrate ER (IMDUR) tablet 30 mg  30 mg Oral DAILY    montelukast (SINGULAIR) tablet 10 mg  10 mg Oral QHS    pantoprazole (PROTONIX) tablet 40 mg  40 mg Oral DAILY    pregabalin (LYRICA) capsule 75 mg  75 mg Oral BID    rosuvastatin (CRESTOR) tablet 5 mg  5 mg Oral DAILY    sacubitriL-valsartan (ENTRESTO) 49-51 mg tablet 1 Tablet  1 Tablet Oral BID    spironolactone (ALDACTONE) tablet 25 mg  25 mg Oral DAILY    [Held by provider] torsemide (DEMADEX) tablet 60 mg  60 mg Oral DAILY    sodium chloride (NS) flush 5-40 mL  5-40 mL IntraVENous Q8H    sodium chloride (NS) flush 5-40 mL  5-40 mL IntraVENous PRN    ondansetron (ZOFRAN) injection 4 mg  4 mg IntraVENous Q4H PRN    docusate sodium (COLACE) capsule 200 mg  200 mg Oral BID    lactulose (CHRONULAC) 10 gram/15 mL solution 30 mL  20 g Oral DAILY PRN    budesonide (PULMICORT) 500 mcg/2 ml nebulizer suspension  250 mcg Nebulization BID RT    albuterol-ipratropium (DUO-NEB) 2.5 MG-0.5 MG/3 ML  3 mL Nebulization Q4H PRN    acetaminophen (TYLENOL) tablet 650 mg  650 mg Oral Q4H PRN    hydrocortisone (ANUSOL-HC) 2.5 % rectal cream   PeriANAL QID PRN     ______________________________________________________________________  EXPECTED LENGTH OF STAY: - - -  ACTUAL LENGTH OF STAY:          2                 Lisbeth Rizvi DO

## 2022-04-27 NOTE — PROGRESS NOTES
Problem: Self Care Deficits Care Plan (Adult)  Goal: *Therapy Goal (Edit Goal, Insert Text)  Description: FUNCTIONAL STATUS PRIOR TO ADMISSION: Patient was modified independent for basic ADL tasks and light meal prep and was using a rollator for functional mobility. [facility]- he uses w/c there due to difficulty getting [facility]/off [facility] bus. Patient used supplemental O2 during sleep only. HOME SUPPORT: The patient lived alone. He has a [facility] provided caregiver M-F for 1 hr that assists primarily with home management/cleaning activities. Occupational Therapy  Initiated 4/27/20022  1. Patient will perform grooming in stand with modified independence within 7 day(s). 2.  Patient will perform bathing with modified independence within 7 day(s). 3.  Patient will perform lower body dressing with modified independence within 7 day(s). 4.  Patient will perform toilet transfers with modified independence within 7 day(s). 5.  Patient will perform all aspects of toileting with modified independence within 7 day(s). 6.  Patient will participate in upper extremity therapeutic exercise/activities with modified independence for 10 minutes within 7 day(s). 7.  Patient will utilize energy conservation techniques during functional activities with verbal cues within 7 day(s). Outcome: Not Met   OCCUPATIONAL THERAPY EVALUATION  Patient: Janae [facility]55 y.[facility]. male)  Date: 4/27/2022  Primary Diagnosis: Shortness of breath [R06.02]       Precautions: fall       ASSESSMENT  Based on the objective data described below, the patient presents with decreased ADL and mobility independence due to general weakness and fatigue, impaired standing tolerance, and decreased activity tolerance with need for supplemental O2 (4L/min). Patient further impacted by scrotal edema and irritation in groin area causing pain with ROM/mobility related to LE ADL. Patient is below baseline independence during ADL/IADL.  He may benefit from SNF level rehab services prior to return home     Current Level of Function Impacting Discharge (ADLs/self-care): max assist for footwear management, bathing/dressing at distal LE    Functional Outcome Measure: The patient scored Total: 60/100 on the Barthel Index outcome measure       Other factors to consider for discharge: Patient has good cognition      Patient will benefit from skilled therapy intervention to address the above noted impairments. PLAN :  Recommendations and Planned Interventions: self care training, functional mobility training, therapeutic exercise, balance training, therapeutic activities, endurance activities, patient education, home safety training and family training/education    Frequency/Duration: Patient will be followed by occupational therapy 4 times a week to address goals. Recommendation for discharge: (in order for the patient to meet his/her long term goals)  Therapy up to 5 days/week in SNF setting    This discharge recommendation:  Has been made in collaboration with the attending provider and/or case management    IF patient discharges home will need the following DME: patient owns DME required for discharge       SUBJECTIVE:   Patient stated I didn't sleep well all last week. I feel like I'm still catching up.     OBJECTIVE DATA SUMMARY:   HISTORY:   Past Medical History:   Diagnosis Date    DM (diabetes mellitus) ([facility] [address]     Dyslipidemia     Hypertension    No past surgical history on file. Expanded or extensive additional review of patient history:     Home Situation  Support Systems: Other Family Member(s)  Patient Expects to be Discharged to[de-identified] Home  Tub or Shower Type: Tub/Shower combination    Hand dominance: Right    EXAMINATION OF PERFORMANCE DEFICITS:  Cognitive/Behavioral Status:  Neurologic State: Alert  Orientation Level: Oriented X4  Cognition: Follows commands; Appropriate decision making        Edema: scrotal edema    Hearing: Auditory  Auditory Impairment: None    Vision/Perceptual:                                     Range of Motion:    AROM: Generally decreased, functional                         Strength:    Strength: Generally decreased, functional                Coordination:  Coordination: Within functional limits  Fine Motor Skills-Upper: Left Intact; Right Intact    Gross Motor Skills-Upper: Left Intact; Right Intact    Tone & Sensation:    Tone: Normal                         Balance:  Sitting: Intact; Without support  Standing: Impaired; Without support  Standing - Static: Good; Unsupported  Standing - Dynamic : Fair;Constant support    Functional Mobility and Transfers for ADLs:  Bed Mobility:       Transfers:  Sit to Stand: Contact guard assistance; Additional time  Stand to Sit: Supervision    ADL Assessment:  Feeding: Independent    Oral Facial Hygiene/Grooming: Setup    Bathing: Moderate assistance    Upper Body Dressing: Setup    Lower Body Dressing: Maximum assistance    Toileting: Moderate assistance                ADL Intervention and task modifications:           Functional Measure:    Barthel Index:  Bathin  Bladder: 10  Bowels: 10  Groomin  Dressin  Feeding: 10  Mobility: 5  Stairs: 0  Toilet Use: 5  Transfer (Bed to Chair and Back): 10  Total: 60/100      The Barthel ADL Index: Guidelines  1. The index should be used as a record of what a patient does, not as a record of what a patient could do. 2. The main aim is to establish degree of independence from any help, physical or verbal, however minor and for whatever reason. 3. The need for supervision renders the patient not independent. 4. A patient's performance should be established using the best available evidence. Asking the patient, friends/relatives and nurses are the usual sources, but direct observation and common sense are also important. However direct testing is not needed.   5. Usually the patient's performance over the preceding 24-48 hours is important, but occasionally longer periods will be relevant. 6. Middle categories imply that the patient supplies over 50 per cent of the effort. 7. Use of aids to be independent is allowed. Score Interpretation (from [facility])    Independent   60-79 Minimally independent   40-59 Partially dependent   20-39 Very dependent   <20 Totally dependent     -Ike Persaud., Barthel, D.W. (1965). Functional evaluation: the Barthel Index. [address] ([address] (1997). The Barthel activities of daily living index: self-reporting versus actual performance in the old (> or = 75 years). Journal of [address] [place] [address] 45(7), [facility], NIRU, Luis Manuel Reese., [place] Javier Cardona. (1999). Measuring the change in disability after inpatient rehabilitation; comparison of the responsiveness of the Barthel Index and Functional [place] Measure. Journal of Neurology, Neurosurgery, and Psychiatry, 66(4), 907-256. AUDRA Wilson.TOMAS, KYRA Mi, & Caren Ventura MJOJO. (2004) Assessment of post-stroke quality of life in cost-effectiveness studies: The usefulness of the Barthel Index and the EuroQoL-5D.  Quality of Life Research, 15, 993-00     Occupational Therapy Evaluation Charge Determination   History Examination Decision-Making   MEDIUM Complexity : Expanded review of history including physical, cognitive and psychosocial  history  LOW Complexity : 1-3 performance deficits relating to physical, cognitive , or psychosocial skils that result in activity limitations and / or participation restrictions  LOW Complexity : No comorbidities that affect functional and no verbal or physical assistance needed to complete eval tasks       Based on the above components, the patient evaluation is determined to be of the following complexity level: LOW   Pain Rating:  Pain in groin area with movement     Activity Tolerance:   desaturates with exertion and requires oxygen. Desat to 89% with transfer into stand on 4L/min    After treatment patient left in no apparent distress:    Sitting in chair and Call bell within reach    COMMUNICATION/EDUCATION:   The patients plan of care was discussed with: Registered nurse. Home safety education was provided and the patient/caregiver indicated understanding. and Patient/family have participated as able in goal setting and plan of care. This patients plan of care is appropriate for delegation to ERIN.     Thank you for this referral.  Saundra Lovett OT  Time Calculation: 24 mins

## 2022-04-27 NOTE — CONSULTS
PULMONARY MEDICINE    Initial Physician Consultation Note    Name: Soha Sequeira   : 1965   MRN: [id number]   Date: 2022      Subjective:   Consult Note: 2022   Requesting Physician: Dr. Nolan Tatum  Reason for consult: chronic resp failure, ? OHS    Medical records and data reviewed. Patient is a 55 y.o. male who presented to the hospital with shortness of breath. Patient has a history of multiple comorbidities with limited mobility who developed worsening shortness of breath and noted lower oxygen saturation on his home oximeter and presented to the emergency room for further evaluation. He has not had a history of high-grade fevers or chills. He is vaccinated for COVID-19. He uses oxygen at home. He does not report a formal diagnosis of COPD. He is not on maintenance bronchodilators at home. He has underlying history of organic cardiac disease and is therapeutically anticoagulated and is on amiodarone on a chronic basis. He reports he was diagnosed with sleep apnea at [facility] and further management as well as repeat titration study has been limited by lack of transportation as well as canceled appointments. He reports he has underlying claustrophobia and has been unable to tolerate Pap therapy more than 1 to 2 hours at night. The sleep clinic at [facility] has been troubleshooting his intolerance with trying to use nasal pillows as well as masks, he is a mouth breather and this has not worked as well. He is on home oxygen which he uses at 2 L every night. CT of the chest that was done here showed bibasilar atelectasis with poor inspiratory effort. ABG was not done this admission.   Last echocardiogram that was done in 2021 was limited by poor images and technical difficulty    Review of Systems:     A comprehensive 12 system review of systems was negative except for as documented in HPI    Assessment:   Acute on chronic hypercarbic and hypoxic respiratory failure  Underlying obstructive sleep apnea syndrome/obesity hypoventilation -followed at [facility] sleep clinic, intolerance to mask and he has been unable to comply with nocturnal PAP therapy  Organic cardiac disease with history of atrial fibrillation and CHF  Chronic immobility with venous stasis  Does not recall prior history of obstructive lung disease  He reports he is a non-smoker  Other medical problems per chart      Recommendations:   Wean oxygen as tolerated  Incentive spirometer  He reports intolerance to nocturnal PAP therapy -he has been counseled to make an appointment at [facility] sleep clinic to troubleshoot tolerance issues for treatment of IVY/OHS  Agree with diuretics  He does not appear to be bronchospastic and steroids can be stopped on discharge  Outpatient PFTs would be helpful to screen for underlying airflow abnormality  We will be available to see him again as needed in the hospital    Active Problem List:     Problem List  Date Reviewed: 4/28/2013          Codes Class    Shortness of breath ICD-10-CM: R06.02  ICD-9-CM: 786.05         Acute on chronic respiratory failure (HCC) ICD-10-CM: J96.20  ICD-9-CM: 518.84               Past Medical History:      has a past medical history of DM (diabetes mellitus) ([facility] [address] Dyslipidemia, and Hypertension. Past Surgical History:      has no past surgical history on file. Home Medications:     Prior to Admission medications    Medication Sig Start Date End Date Taking? Authorizing Provider   torsemide (DEMADEX) 20 mg tablet Take 60 mg by mouth daily. Indications: high blood pressure   Yes Provider, Historical   pregabalin (LYRICA) 75 mg capsule Take 75 mg by mouth two (2) times a day. Indications: nerve pain after herpes   Yes Provider, Historical   apixaban (ELIQUIS) 5 mg tablet Take 5 mg by mouth two (2) times a day.  Indications: treatment to prevent blood clots in chronic atrial fibrillation   Yes Provider, Historical   aspirin delayed-release 81 mg tablet Take  by mouth daily. Indications: treatment to prevent a heart attack   Yes Provider, Historical   amiodarone (PACERONE) 100 mg tablet Take 100 mg by mouth daily. Yes Provider, Historical   ezetimibe (ZETIA) 10 mg tablet Take 10 mg by mouth daily. Yes Provider, Historical   pantoprazole (PROTONIX) 40 mg tablet Take 40 mg by mouth daily. Indications: gastroesophageal reflux disease   Yes Provider, Historical   empagliflozin (Jardiance) 25 mg tablet Take 25 mg by mouth daily. Indications: type 2 diabetes mellitus   Yes Provider, Historical   isosorbide mononitrate ER (IMDUR) 30 mg tablet Take 30 mg by mouth daily. Indications: prevention of anginal chest pain associated with coronary artery disease   Yes Provider, Historical   sacubitriL-valsartan (Entresto) 49-51 mg tab tablet Take 1 Tablet by mouth two (2) times a day. Yes Provider, Historical   rosuvastatin (CRESTOR) 5 mg tablet Take 5 mg by mouth daily. Yes Provider, Historical   spironolactone (ALDACTONE) 25 mg tablet Take 25 mg by mouth daily. Yes Provider, Historical   montelukast (SINGULAIR) 10 mg tablet Take 10 mg by mouth nightly. Yes Provider, Historical   insulin glargine (LANTUS) 100 unit/mL injection 18 Units by SubCUTAneous route daily. Patient not taking: Reported on 2022    Provider, Historical       Allergies/Social/Family History:     No Known Allergies   Social History     Tobacco Use    Smoking status: Never Smoker    Smokeless tobacco: Not on file   Substance Use Topics    Alcohol use: No      No family history on file.          Objective:   Vital Signs:  Visit Vitals  /64 (BP 1 Location: Left upper arm, BP Patient Position: Sitting)   Pulse 62   Temp 97.3 °F (36.3 °C)   Resp 20   Ht 6' 1\" (1.854 m)   Wt 151.5 kg (334 lb)   SpO2 90%   BMI 44.07 kg/m²    O2 Flow Rate (L/min): 4 l/min O2 Device: Nasal cannula Temp (24hrs), Av.7 °F (36.5 °C), Min:97.3 °F (36.3 °C), Max:98.6 °F (37 °C)           Intake/Output:     Intake/Output Summary (Last 24 hours) at 4/27/2022 1049  Last data filed at 4/26/2022 2105  Gross per 24 hour   Intake 500 ml   Output    Net 500 ml       Physical Exam:   General:  Alert, cooperative, no distress, appears stated age. Head:  Normocephalic, without obvious abnormality, atraumatic. Eyes:  Conjunctivae/corneas clear. PERRL   Neck: Supple, symmetrical, no adenopathy, no carotid bruit and no JVD. Lungs:    Diminished breath sounds   Chest wall:  No tenderness or deformity. Heart:  Regular rate and rhythm, S1-S2 normal, no murmur, no click, rub or gallop. Abdomen:   Soft, non-tender. Bowel sounds present. No masses,  No organomegaly. Extremities:  Chronic lower extremity edema with venous stasis and superficial wounds dressed   Pulses: Palpable   Skin: No rashes or lesions   Neurologic: Grossly nonfocal        LABS AND  DATA: Personally reviewed  Recent Labs     04/27/22  0400 04/26/22  0549   WBC 11.4* 7.8   HGB 12.7 14.0   HCT 46.7 52.0*    219     Recent Labs     04/27/22  0400 04/26/22  0549    142   K 3.8 4.0   CL 99 102   CO2 37* 37*   BUN 33* 27*   CREA 1.00 0.97   * 155*   CA 9.3 9.3     Recent Labs     04/27/22  0400 04/26/22  0549    128*   TP 7.1 8.2   ALB 2.9* 3.2*   GLOB 4.2* 5.0*     No results for input(s): INR, PTP, APTT, INREXT in the last 72 hours. No results for input(s): PHI, PCO2I, PO2I, FIO2I in the last 72 hours. No results for input(s): CPK, CKMB, TROIQ, BNPP in the last 72 hours.     MEDS: Reviewed  Current Facility-Administered Medications   Medication Dose Route Frequency    methylPREDNISolone (PF) (SOLU-MEDROL) injection 40 mg  40 mg IntraVENous Q12H    furosemide (LASIX) injection 40 mg  40 mg IntraVENous BID    miconazole (MICOTIN) 2 % powder   Topical BID    amiodarone (CORDARONE) tablet 100 mg  100 mg Oral DAILY    apixaban (ELIQUIS) tablet 5 mg  5 mg Oral BID    aspirin delayed-release tablet 81 mg  81 mg Oral DAILY    ezetimibe (Sheila Boyd) tablet 10 mg  10 mg Oral DAILY    insulin glargine (LANTUS) injection 18 Units  18 Units SubCUTAneous DAILY    insulin lispro (HUMALOG) injection   SubCUTAneous AC&HS    glucose chewable tablet 16 g  4 Tablet Oral PRN    glucagon (GLUCAGEN) injection 1 mg  1 mg IntraMUSCular PRN    dextrose 10 % infusion 0-250 mL  0-250 mL IntraVENous PRN    isosorbide mononitrate ER (IMDUR) tablet 30 mg  30 mg Oral DAILY    montelukast (SINGULAIR) tablet 10 mg  10 mg Oral QHS    pantoprazole (PROTONIX) tablet 40 mg  40 mg Oral DAILY    pregabalin (LYRICA) capsule 75 mg  75 mg Oral BID    rosuvastatin (CRESTOR) tablet 5 mg  5 mg Oral DAILY    sacubitriL-valsartan (ENTRESTO) 49-51 mg tablet 1 Tablet  1 Tablet Oral BID    spironolactone (ALDACTONE) tablet 25 mg  25 mg Oral DAILY    [Held by provider] torsemide (DEMADEX) tablet 60 mg  60 mg Oral DAILY    sodium chloride (NS) flush 5-40 mL  5-40 mL IntraVENous Q8H    sodium chloride (NS) flush 5-40 mL  5-40 mL IntraVENous PRN    ondansetron (ZOFRAN) injection 4 mg  4 mg IntraVENous Q4H PRN    docusate sodium (COLACE) capsule 200 mg  200 mg Oral BID    lactulose (CHRONULAC) 10 gram/15 mL solution 30 mL  20 g Oral DAILY PRN    budesonide (PULMICORT) 500 mcg/2 ml nebulizer suspension  250 mcg Nebulization BID RT    albuterol-ipratropium (DUO-NEB) 2.5 MG-0.5 MG/3 ML  3 mL Nebulization Q4H PRN    acetaminophen (TYLENOL) tablet 650 mg  650 mg Oral Q4H PRN    hydrocortisone (ANUSOL-HC) 2.5 % rectal cream   PeriANAL QID PRN       Chest Imaging: personally reviewed and report checked  Results from Hospital Encounter encounter on 04/25/22    XR CHEST PORT    Narrative  PORTABLE CHEST RADIOGRAPH/S: 4/25/2022 2:08 PM    INDICATION: Hypoxia. COMPARISON: 9/25/2021, 9/9/2021. TECHNIQUE: Portable frontal upright radiograph/s of the chest.    FINDINGS:  The radiograph is underpenetrated secondary to patient body habitus and portable  technique.  The lungs are chronically hypoinflated and there is bibasilar  atelectasis. The central airways are patent. No pneumothorax and no large  pleural effusion. Impression  Chronic hypoinflation and bibasilar atelectasis. Results from Hospital Encounter encounter on 09/09/21    CTA CHEST W OR W WO CONT    Narrative  EXAM:  CTA CHEST W OR W WO CONT    INDICATION: Shortness of breath and rash. Diabetes and hypertension. Leukocytosis and elevated BNP. Negative COVID19 rapid test. Obesity. COMPARISON: No comparison CT. TECHNIQUE: Helical thin section chest CT following uneventful intravenous  administration of nonionic contrast 80 mL of isovue 370 according to  departmental PE protocol. Coronal and sagittal reformats were performed. 3D post  processing was performed. CT dose reduction was achieved through the use of a  standardized protocol tailored for this examination and automatic exposure  control for dose modulation. FINDINGS: This is a adequate quality study for the evaluation of pulmonary  embolism to the segmental arterial level. There is no pulmonary embolism to this  level. Limited by obesity. THYROID: No nodule. MEDIASTINUM: No mass or lymphadenopathy. JEREMIAH: No mass or lymphadenopathy. THORACIC AORTA: Normal.  HEART: Cardiomegaly. Extensive coronary artery calcific atherosclerosis includes  the left main coronary artery. Thin wall at the left ventricular apex. ESOPHAGUS: No wall thickening or dilatation. TRACHEA/BRONCHI: Patent. PLEURA: No effusion or pneumothorax. LUNGS: Bilateral lower lobe and right upper lobe perihilar opacities are  heterogeneous. No groundglass opacity. No suspicious nodule or lung mass. No  fibrosis. UPPER ABDOMEN: Limited evaluation, no evidence of acute process. BONES: No aggressive bone lesion or fracture. Impression  1. No pulmonary embolism to the segmental arterial level. 2. Cardiomegaly and coronary artery disease with evidence of old left  ventricular apical infarct.   3. Bilateral patchy atelectasis versus nonspecific pneumonia. No classic pattern  of COVID19. Tele- reviewed    Medical decision making:   I have reviewed the flowsheet and previous day's notes  Patient has acute or chronic illness that poses a threat to life or bodily function  Review and order of Clinical lab tests  Review and Order of Radiology tests  Independent visualization of Image  Reviewed Oxygen/ NiPPV  High Risk Drug therapy requiring intensive monitoring for toxicity: eg steroids, pressors, antibiotics        Thank you for allowing me to participate in this patient's care.     Mary Ellen Choudhary MD      [facility]

## 2022-04-27 NOTE — PROGRESS NOTES
04/27/22 0104   CPAP/BIPAP   CPAP/BIPAP Start/Stop Off  (pt denies need, pt stable on NC)   Device Mode CPAP   $$ CPAP Daily   (no EIR)

## 2022-04-27 NOTE — PROGRESS NOTES
Pulse oximetry assessment   87% at rest on room air (if 88% or less, skip next steps)  86% while standing on room air  92% at rest on 4LPM    Thank you for your consideration,    Carola Bundy, PT, DPT

## 2022-04-27 NOTE — PROGRESS NOTES
Problem: Mobility Impaired (Adult and Pediatric)  Goal: *Acute Goals and Plan of Care (Insert Text)  Description: FUNCTIONAL STATUS PRIOR TO ADMISSION: Patient was modified independent using a rollator and manual wheelchair for functional mobility. Patient attends [facility] for lymphedema management each Monday, uses wheelchair Renella Shone for transport     HOME SUPPORT PRIOR TO ADMISSION: The patient lived alone with no local support. Physical Therapy Goals  Initiated 4/27/2022  1. Patient will move from supine to sit and sit to supine , scoot up and down, and roll side to side in bed with independence within 7 day(s). 2.  Patient will transfer from bed to chair and chair to bed with independence using the least restrictive device within 7 day(s). 3.  Patient will perform sit to stand with independence within 7 day(s). 4.  Patient will ambulate with modified independence for 100 feet with the least restrictive device within 7 day(s). Outcome: Progressing Towards Goal     PHYSICAL THERAPY EVALUATION  Patient: Carloz Reyna [facility]55 y.o. male)  Date: 4/27/2022  Primary Diagnosis: Shortness of breath [R06.02]        Precautions: fall       ASSESSMENT  Based on the objective data described below, the patient presents with decline in transfers, standing tolerance, gait, increased BLE weakness, fall risk, aerobic capacity deficits requiring supplemental O2. This is a 55year old male who presented to the ED with increased SOB and O2 sats into the 70's at home. Patient diagnosed with acute-on-chronic hypoxic respiratory failure due to COPD and CHF exacerbation. Patient received in sitting agreeable to treatment. O2 assessment performed. Desaturating on room air in sitting, decreased with standing. See below. Unable to assess with gait due to patient refusing use of bariatric RW. Patient reports he can only walk if he bears full weight through bilateral forearms onto rollator at home.  Patient demonstrates forward trunk flexion at all times, siting an old back injury. Patient transfers with CGA after additional time and several attempts. Patient SPT with C[facility] to [facility] with use of bariatric RW. Patient lives alone in apartment, PLOF ambulating with rollator in apartment, use of wheelchair out of apartment to appointments. Unclear how high level of activity was prior. Due to significant deficit with mobility during evaluation, will likely require SNF at discharge vs. Home with [facility] pending progress and improvement. Current Level of Function Impacting Discharge (mobility/balance): CGA for transfers, unable to ambulate today, unable to stand statically without support    Pulse oximetry assessment   87% at rest on room air (if 88% or less, skip next steps)  86% while standing on room air  92% at rest on 4LPM     Functional Outcome Measure: The patient scored 60/100 on the Barthel outcome measure which is indicative of moderate disability. Other factors to consider for discharge: PLOF not using supplemental O2 during the day     Patient will benefit from skilled therapy intervention to address the above noted impairments. PLAN :  Recommendations and Planned Interventions: bed mobility training, transfer training, gait training, therapeutic exercises, neuromuscular re-education, patient and family training/education, and therapeutic activities      Frequency/Duration: Patient will be followed by physical therapy:  5 times a week to address goals.     Recommendation for discharge: (in order for the patient to meet his/her long term goals)  To be determined: Due to current status may require SNF vs home with [facility] [place] pending progress    This discharge recommendation:  Has not yet been discussed the attending provider and/or case management    IF patient discharges home will need the following DME: to be determined (TBD) on AD , supplemental O2          SUBJECTIVE:   Patient stated I have pain every day that makes me not be able to stand up straight.     OBJECTIVE DATA SUMMARY:   HISTORY:    Past Medical History:   Diagnosis Date    DM (diabetes mellitus) ([facility] [address]     Dyslipidemia     Hypertension    No past surgical history on file. Personal factors and/or comorbidities impacting plan of care: A+O x 4    Home Situation  Home Environment: Apartment  # Steps to Enter: 0  Wheelchair Ramp: Yes  One/Two Story Residence: One story  Living Alone: Yes  Support Systems: Other Family Member(s)  Patient Expects to be Discharged to[de-identified] Home  Current DME Used/Available at Home: Wheelchair,Shower chair,Walker, rollator,Safety frame [employer], straight  Tub or Shower Type: Tub/Shower combination    EXAMINATION/PRESENTATION/DECISION MAKING:   Critical Behavior:  Neurologic State: Alert  Orientation Level: Oriented X4  Cognition: Follows commands,Appropriate decision making     Hearing: Auditory  Auditory Impairment: None  Skin:  bleeding noted from scrotum and bilateral folds in abdomen, nursing aware and treating  Edema: BLE, lymphedema wraps present   Range Of Motion:  AROM: Generally decreased, functional       Strength:    Strength: Generally decreased, functional    Tone & Sensation:   Tone: Normal       Coordination:  Coordination: Within functional limits  Vision:      Functional Mobility:  Bed Mobility:  Received in sitting      Transfers:  Sit to Stand: Contact guard assistance;Assist x1  Stand to Sit: Contact guard assistance;Assist x1  Stand Pivot Transfers: Contact guard assistance;Assist x1       Balance:   Sitting: Intact; Without support  Standing: Impaired; Without support  Standing - Static: Good; Unsupported  Standing - Dynamic : Fair;Constant support  Ambulation/Gait Training:     Refused ambulation due to bariatric RW provided, stating he needs a rollator ro lean into his forearms        Functional Measure:  Barthel Index:    Bathin  Bladder: 10  Bowels: 10  Groomin  Dressin  Feeding: 10  Mobility: 5  Stairs: 0  Toilet Use: 5  Transfer (Bed to Chair and Back): 10  Total: 60/100       The Barthel ADL Index: Guidelines  1. The index should be used as a record of what a patient does, not as a record of what a patient could do. 2. The main aim is to establish degree of independence from any help, physical or verbal, however minor and for whatever reason. 3. The need for supervision renders the patient not independent. 4. A patient's performance should be established using the best available evidence. Asking the patient, friends/relatives and nurses are the usual sources, but direct observation and common sense are also important. However direct testing is not needed. 5. Usually the patient's performance over the preceding 24-48 hours is important, but occasionally longer periods will be relevant. 6. Middle categories imply that the patient supplies over 50 per cent of the effort. 7. Use of aids to be independent is allowed. Score Interpretation (from [address] [id number])    Independent   60-79 Minimally independent   40-59 Partially dependent   20-39 Very dependent   <20 Totally dependent     -Ike Persaud., Barthel, D.W. (1965). Functional evaluation: the Barthel Index. [facility] ([address] (1997). The Barthel activities of daily living index: self-reporting versus actual performance in the old (> or = 75 years). Journal [address] 45(7), [facility], .LIZ, Delfina Rodriguez., Maggie Short. (1999). Measuring the change in disability after inpatient rehabilitation; comparison of the responsiveness of the Barthel Index and Functional [place]ift Measure. Journal of Neurology, Neurosurgery, and Psychiatry, 66(4), 381-155. Madelyn Camp N.J.A, KYRA Mi, & Tianna Schaffer, M.A. (2004) Assessment of post-stroke quality of life in cost-effectiveness studies: The usefulness of the Barthel Index and the EuroQoL-5D.  [facility], 13, 406-57 Physical Therapy Evaluation Charge Determination   History Examination Presentation Decision-Making   HIGH Complexity :3+ comorbidities / personal factors will impact the outcome/ POC  LOW Complexity : 1-2 Standardized tests and measures addressing body structure, function, activity limitation and / or participation in recreation  LOW Complexity : Stable, uncomplicated  LOW Complexity : FOTO score of       Based on the above components, the patient evaluation is determined to be of the following complexity level: LOW     Pain Rating:  Patient reports pain in bilateral hands/feet, lower back    Activity Tolerance:   Fair, desaturates with exertion and requires oxygen, requires rest breaks, and observed SOB with activity    After treatment patient left in no apparent distress:   Sitting in chair and Call bell within reach    COMMUNICATION/EDUCATION:   The patients plan of care was discussed with: Occupational therapist and Registered nurse. Fall prevention education was provided and the patient/caregiver indicated understanding. and Patient/family agree to work toward stated goals and plan of care.     Thank you for this referral.  Harlan Romero, PT   Time Calculation: 31 mins

## 2022-04-27 NOTE — PROGRESS NOTES
Problem: Diabetes Self-Management  Goal: *Disease process and treatment process  Description: Define diabetes and identify own type of diabetes; list 3 options for treating diabetes. Outcome: Progressing Towards Goal  Goal: *Incorporating nutritional management into lifestyle  Description: Describe effect of type, amount and timing of food on blood glucose; list 3 methods for planning meals. Outcome: Progressing Towards Goal  Goal: *Incorporating physical activity into lifestyle  Description: State effect of exercise on blood glucose levels. Outcome: Progressing Towards Goal  Goal: *Developing strategies to promote health/change behavior  Description: Define the ABC's of diabetes; identify appropriate screenings, schedule and personal plan for screenings. Outcome: Progressing Towards Goal  Goal: *Using medications safely  Description: State effect of diabetes medications on diabetes; name diabetes medication taking, action and side effects. Outcome: Progressing Towards Goal  Goal: *Monitoring blood glucose, interpreting and using results  Description: Identify recommended blood glucose targets  and personal targets. Outcome: Progressing Towards Goal  Goal: *Prevention, detection, treatment of acute complications  Description: List symptoms of hyper- and hypoglycemia; describe how to treat low blood sugar and actions for lowering  high blood glucose level. Outcome: Progressing Towards Goal  Goal: *Prevention, detection and treatment of chronic complications  Description: Define the natural course of diabetes and describe the relationship of blood glucose levels to long term complications of diabetes.   Outcome: Progressing Towards Goal  Goal: *Developing strategies to address psychosocial issues  Description: Describe feelings about living with diabetes; identify support needed and support network  Outcome: Progressing Towards Goal  Goal: *Insulin pump training  Outcome: Progressing Towards Goal  Goal: *Sick day guidelines  Outcome: Progressing Towards Goal  Goal: *Patient Specific Goal (EDIT GOAL, INSERT TEXT)  Outcome: Progressing Towards Goal     Problem: Patient Education: Go to Patient Education Activity  Goal: Patient/Family Education  Outcome: Progressing Towards Goal     Problem: Falls - Risk of  Goal: *Absence of Falls  Description: Document Doreen Elkins Fall Risk and appropriate interventions in the flowsheet. Outcome: Progressing Towards Goal  Note: Fall Risk Interventions:  Mobility Interventions: Patient to call before getting OOB    Medication Interventions: Evaluate medications/consider consulting pharmacy,Patient to call before getting OOB,Teach patient to arise slowly    Elimination Interventions: Call light in reach,Patient to call for help with toileting needs    History of Falls Interventions: Door open when patient unattended,Room close to nurse's station    Problem: Patient Education: Go to Patient Education Activity  Goal: Patient/Family Education  Outcome: Progressing Towards Goal     Patient Aox4, VSS, c/o sacral/buttock pain (drainage continued) - PRN Tylenol administered, relief noted. .. no adverse events noted - will continue to monitor.

## 2022-04-27 NOTE — PROGRESS NOTES
Shift change report given to Ilana Kaye RN (oncoming nurse) by Lesley Ordoñez RN (offgoing nurse). Report included the following information SBAR, Intake/Output, MAR, Recent Results and Cardiac Rhythm sinus bradycardic.

## 2022-04-28 LAB
ALBUMIN SERPL-MCNC: 2.7 G/DL (ref 3.5–5)
ALBUMIN/GLOB SERPL: 0.7 {RATIO} (ref 1.1–2.2)
ALP SERPL-CCNC: 90 U/L (ref 45–117)
ALT SERPL-CCNC: 11 U/L (ref 12–78)
ANION GAP SERPL CALC-SCNC: 5 MMOL/L (ref 5–15)
AST SERPL-CCNC: 6 U/L (ref 15–37)
BILIRUB SERPL-MCNC: 0.5 MG/DL (ref 0.2–1)
BUN SERPL-MCNC: 32 MG/DL (ref 6–20)
BUN/CREAT SERPL: 35 (ref 12–20)
CALCIUM SERPL-MCNC: 8.1 MG/DL (ref 8.5–10.1)
CHLORIDE SERPL-SCNC: 101 MMOL/L (ref 97–108)
CO2 SERPL-SCNC: 34 MMOL/L (ref 21–32)
CREAT SERPL-MCNC: 0.92 MG/DL (ref 0.7–1.3)
ERYTHROCYTE [DISTWIDTH] IN BLOOD BY AUTOMATED COUNT: 20.7 % (ref 11.5–14.5)
GLOBULIN SER CALC-MCNC: 3.9 G/DL (ref 2–4)
GLUCOSE BLD STRIP.AUTO-MCNC: 144 MG/DL (ref 65–117)
GLUCOSE BLD STRIP.AUTO-MCNC: 165 MG/DL (ref 65–117)
GLUCOSE BLD STRIP.AUTO-MCNC: 172 MG/DL (ref 65–117)
GLUCOSE BLD STRIP.AUTO-MCNC: 176 MG/DL (ref 65–117)
GLUCOSE SERPL-MCNC: 188 MG/DL (ref 65–100)
HCT VFR BLD AUTO: 46.9 % (ref 36.6–50.3)
HGB BLD-MCNC: 12.8 G/DL (ref 12.1–17)
MCH RBC QN AUTO: 21.5 PG (ref 26–34)
MCHC RBC AUTO-ENTMCNC: 27.3 G/DL (ref 30–36.5)
MCV RBC AUTO: 78.7 FL (ref 80–99)
NRBC # BLD: 0 K/UL (ref 0–0.01)
NRBC BLD-RTO: 0 PER 100 WBC
PLATELET # BLD AUTO: 257 K/UL (ref 150–400)
PMV BLD AUTO: 10.3 FL (ref 8.9–12.9)
POTASSIUM SERPL-SCNC: 4 MMOL/L (ref 3.5–5.1)
PROT SERPL-MCNC: 6.6 G/DL (ref 6.4–8.2)
RBC # BLD AUTO: 5.96 M/UL (ref 4.1–5.7)
SERVICE CMNT-IMP: ABNORMAL
SODIUM SERPL-SCNC: 140 MMOL/L (ref 136–145)
WBC # BLD AUTO: 11.4 K/UL (ref 4.1–11.1)

## 2022-04-28 PROCEDURE — 94640 AIRWAY INHALATION TREATMENT: CPT

## 2022-04-28 PROCEDURE — 74011250636 HC RX REV CODE- 250/636: Performed by: STUDENT IN AN ORGANIZED HEALTH CARE EDUCATION/TRAINING PROGRAM

## 2022-04-28 PROCEDURE — 74011250637 HC RX REV CODE- 250/637: Performed by: NURSE PRACTITIONER

## 2022-04-28 PROCEDURE — 94760 N-INVAS EAR/PLS OXIMETRY 1: CPT

## 2022-04-28 PROCEDURE — 36415 COLL VENOUS BLD VENIPUNCTURE: CPT

## 2022-04-28 PROCEDURE — 74011250637 HC RX REV CODE- 250/637: Performed by: HOSPITALIST

## 2022-04-28 PROCEDURE — 82962 GLUCOSE BLOOD TEST: CPT

## 2022-04-28 PROCEDURE — 65270000046 HC RM TELEMETRY

## 2022-04-28 PROCEDURE — 80053 COMPREHEN METABOLIC PANEL: CPT

## 2022-04-28 PROCEDURE — 74011636637 HC RX REV CODE- 636/637: Performed by: HOSPITALIST

## 2022-04-28 PROCEDURE — 85027 COMPLETE CBC AUTOMATED: CPT

## 2022-04-28 PROCEDURE — 74011000250 HC RX REV CODE- 250: Performed by: HOSPITALIST

## 2022-04-28 PROCEDURE — 77010033678 HC OXYGEN DAILY

## 2022-04-28 RX ORDER — TAMSULOSIN HYDROCHLORIDE 0.4 MG/1
0.4 CAPSULE ORAL DAILY
Status: DISCONTINUED | OUTPATIENT
Start: 2022-04-28 | End: 2022-05-02 | Stop reason: HOSPADM

## 2022-04-28 RX ADMIN — SPIRONOLACTONE 25 MG: 25 TABLET ORAL at 09:07

## 2022-04-28 RX ADMIN — EZETIMIBE 10 MG: 10 TABLET ORAL at 09:07

## 2022-04-28 RX ADMIN — PREGABALIN 75 MG: 25 CAPSULE ORAL at 17:24

## 2022-04-28 RX ADMIN — DOCUSATE SODIUM 200 MG: 100 CAPSULE, LIQUID FILLED ORAL at 09:07

## 2022-04-28 RX ADMIN — FUROSEMIDE 40 MG: 10 INJECTION, SOLUTION INTRAVENOUS at 17:24

## 2022-04-28 RX ADMIN — METHYLPREDNISOLONE SODIUM SUCCINATE 40 MG: 40 INJECTION, POWDER, FOR SOLUTION INTRAMUSCULAR; INTRAVENOUS at 09:06

## 2022-04-28 RX ADMIN — SACUBITRIL AND VALSARTAN 1 TABLET: 49; 51 TABLET, FILM COATED ORAL at 17:30

## 2022-04-28 RX ADMIN — APIXABAN 5 MG: 5 TABLET, FILM COATED ORAL at 17:24

## 2022-04-28 RX ADMIN — ROSUVASTATIN 5 MG: 10 TABLET, FILM COATED ORAL at 09:08

## 2022-04-28 RX ADMIN — ISOSORBIDE MONONITRATE 30 MG: 30 TABLET, EXTENDED RELEASE ORAL at 09:08

## 2022-04-28 RX ADMIN — MICONAZOLE NITRATE 2 % TOPICAL POWDER: at 17:25

## 2022-04-28 RX ADMIN — Medication 2 UNITS: at 12:07

## 2022-04-28 RX ADMIN — SODIUM CHLORIDE, PRESERVATIVE FREE 10 ML: 5 INJECTION INTRAVENOUS at 21:56

## 2022-04-28 RX ADMIN — MICONAZOLE NITRATE 2 % TOPICAL POWDER: at 09:16

## 2022-04-28 RX ADMIN — FUROSEMIDE 40 MG: 10 INJECTION, SOLUTION INTRAVENOUS at 09:07

## 2022-04-28 RX ADMIN — DOCUSATE SODIUM 200 MG: 100 CAPSULE, LIQUID FILLED ORAL at 17:25

## 2022-04-28 RX ADMIN — SACUBITRIL AND VALSARTAN 1 TABLET: 49; 51 TABLET, FILM COATED ORAL at 10:19

## 2022-04-28 RX ADMIN — ACETAMINOPHEN 650 MG: 325 TABLET ORAL at 21:56

## 2022-04-28 RX ADMIN — PREGABALIN 75 MG: 25 CAPSULE ORAL at 09:07

## 2022-04-28 RX ADMIN — SODIUM CHLORIDE, PRESERVATIVE FREE 10 ML: 5 INJECTION INTRAVENOUS at 06:34

## 2022-04-28 RX ADMIN — Medication 2 UNITS: at 17:25

## 2022-04-28 RX ADMIN — TAMSULOSIN HYDROCHLORIDE 0.4 MG: 0.4 CAPSULE ORAL at 14:43

## 2022-04-28 RX ADMIN — MONTELUKAST 10 MG: 10 TABLET, FILM COATED ORAL at 21:56

## 2022-04-28 RX ADMIN — AMIODARONE HYDROCHLORIDE 100 MG: 200 TABLET ORAL at 09:07

## 2022-04-28 RX ADMIN — BUDESONIDE 250 MCG: 0.5 INHALANT RESPIRATORY (INHALATION) at 08:18

## 2022-04-28 RX ADMIN — PANTOPRAZOLE SODIUM 40 MG: 40 TABLET, DELAYED RELEASE ORAL at 09:07

## 2022-04-28 RX ADMIN — Medication 2 UNITS: at 07:08

## 2022-04-28 RX ADMIN — ASPIRIN 81 MG: 81 TABLET, COATED ORAL at 09:07

## 2022-04-28 RX ADMIN — INSULIN GLARGINE 18 UNITS: 100 INJECTION, SOLUTION SUBCUTANEOUS at 09:00

## 2022-04-28 RX ADMIN — APIXABAN 5 MG: 5 TABLET, FILM COATED ORAL at 09:08

## 2022-04-28 NOTE — PROGRESS NOTES
Shift change report given to NIK Lei (oncoming nurse) by Jamari Sosa RN (offgoing nurse). Report included the following information SBAR, MAR, Recent Results, Med Rec Status and Cardiac Rhythm Bradycardia.

## 2022-04-28 NOTE — PROGRESS NOTES
Spiritual Care Assessment/Progress Note  [facility]      NAME: Severo Corona      MRN: [id number]  AGE: 55 y.o.  SEX: male  Muslim Affiliation: Unknown   Language: English     4/28/2022     Total Time (in minutes): 10     Spiritual Assessment begun in [facility] through conversation with:         [x]Patient        [] Family    [] Friend(s)        Reason for Consult: Palliative Care, Initial/Spiritual Assessment     Spiritual beliefs: (Please include comment if needed)     [] Identifies with a nanda tradition:         [] Supported by a nanda community:            [] Claims no spiritual orientation:           [] Seeking spiritual identity:                [] Adheres to an individual form of spirituality:           [x] Not able to assess:                           Identified resources for coping:      [] Prayer                               [] Music                  [] Guided Imagery     [x] Family/friends                 [] Pet visits     [] Devotional reading                         [] Unknown     [] Other:                                               Interventions offered during this visit: (See comments for more details)    Patient Interventions: Affirmation of emotions/emotional suffering,Coping skills reviewed/reinforced,Initial/Spiritual assessment, patient floor,Life review/legacy,Prayer (assurance of)           Plan of Care:     [] Support spiritual and/or cultural needs    [] Support AMD and/or advance care planning process      [] Support grieving process   [] Coordinate Rites and/or Rituals    [] Coordination with community clergy   [] No spiritual needs identified at this time   [] Detailed Plan of Care below (See Comments)  [] Make referral to Music Therapy  [] Make referral to Pet Therapy     [] Make referral to Addiction services  [] Make referral to [facility]  [] Make referral to Spiritual Care Partner  [] No future visits requested        [x] Contact Spiritual Care for further referrals     Comments: Provided support for this pt in [facility] 209/1. Facilitated life review to assess potential support needs or coping strategies. Pt offered review of current situation. Pt expressed no emotional or spiritual support needs at the time of this visit. Assured pt of prayers. Everardo Coughlin MDiv.  Staff   Request  Support/Spiritual Care Services on [phone])

## 2022-04-28 NOTE — PROGRESS NOTES
Problem: Diabetes Self-Management  Goal: *Disease process and treatment process  Description: Define diabetes and identify own type of diabetes; list 3 options for treating diabetes. Outcome: Progressing Towards Goal  Goal: *Incorporating nutritional management into lifestyle  Description: Describe effect of type, amount and timing of food on blood glucose; list 3 methods for planning meals. Outcome: Progressing Towards Goal  Goal: *Incorporating physical activity into lifestyle  Description: State effect of exercise on blood glucose levels. Outcome: Progressing Towards Goal  Goal: *Developing strategies to promote health/change behavior  Description: Define the ABC's of diabetes; identify appropriate screenings, schedule and personal plan for screenings. Outcome: Progressing Towards Goal  Goal: *Using medications safely  Description: State effect of diabetes medications on diabetes; name diabetes medication taking, action and side effects. Outcome: Progressing Towards Goal  Goal: *Monitoring blood glucose, interpreting and using results  Description: Identify recommended blood glucose targets  and personal targets. Outcome: Progressing Towards Goal  Goal: *Prevention, detection, treatment of acute complications  Description: List symptoms of hyper- and hypoglycemia; describe how to treat low blood sugar and actions for lowering  high blood glucose level. Outcome: Progressing Towards Goal  Goal: *Prevention, detection and treatment of chronic complications  Description: Define the natural course of diabetes and describe the relationship of blood glucose levels to long term complications of diabetes.   Outcome: Progressing Towards Goal  Goal: *Developing strategies to address psychosocial issues  Description: Describe feelings about living with diabetes; identify support needed and support network  Outcome: Progressing Towards Goal  Goal: *Insulin pump training  Outcome: Progressing Towards Goal  Goal: *Sick day guidelines  Outcome: Progressing Towards Goal  Goal: *Patient Specific Goal (EDIT GOAL, INSERT TEXT)  Outcome: Progressing Towards Goal     Problem: Patient Education: Go to Patient Education Activity  Goal: Patient/Family Education  Outcome: Progressing Towards Goal     Problem: Falls - Risk of  Goal: *Absence of Falls  Description: Document Joaquina Khan Fall Risk and appropriate interventions in the flowsheet. Outcome: Progressing Towards Goal  Note: Fall Risk Interventions:  Mobility Interventions: Communicate number of staff needed for ambulation/transfer,Patient to call before getting OOB    Medication Interventions: Patient to call before getting OOB,Teach patient to arise slowly    Elimination Interventions: Call light in reach    History of Falls Interventions: Door open when patient unattended,Room close to nurse's station    Problem: Patient Education: Go to Patient Education Activity  Goal: Patient/Family Education  Outcome: Progressing Towards Goal     Problem: Patient Education: Go to Patient Education Activity  Goal: Patient/Family Education  Outcome: Progressing Towards Goal     Problem: Patient Education: Go to Patient Education Activity  Goal: Patient/Family Education  Outcome: Progressing Towards Goal     Problem: Pressure Injury - Risk of  Goal: *Prevention of pressure injury  Description: Document Ben Scale and appropriate interventions in the flowsheet.   Outcome: Progressing Towards Goal  Note: Pressure Injury Interventions:  Sensory Interventions: Chair cushion,Keep linens dry and wrinkle-free,Minimize linen layers    Moisture Interventions: Absorbent underpads    Activity Interventions: Increase time out of bed,Pressure redistribution bed/mattress(bed type),PT/OT evaluation    Mobility Interventions: Pressure redistribution bed/mattress (bed type),PT/OT evaluation    Nutrition Interventions: Document food/fluid/supplement intake    Friction and Shear Interventions: Apply protective barrier, creams and emollients    Problem: Patient Education: Go to Patient Education Activity  Goal: Patient/Family Education  Outcome: Progressing Towards Goal    Patient Aox4, hypertensive, c/o sacral pain - relief noted from PRN Tylenol. .. patient expressed \"feeling better than day before\". .. no adverse events noted - will continue to monitor.

## 2022-04-28 NOTE — PROGRESS NOTES
Night shift RN non-administered 1800 scheduled Eliquis. .. unsure why med was not given during scheduled day shift, and patient does not remember if he took it or not. .. med was pulled from [employer], but not scanned in patient MAR - both patient and RN agreed to hold dose until next scheduled AM dose.

## 2022-04-28 NOTE — PROGRESS NOTES
[facility] [facility] Adult  Hospitalist Group                                                                                          Hospitalist Progress Note  Vick Bhagat MD  Answering service: [phone] from in house phone        Date of Service:  2022  NAME:  Lisa Minor  :  1965  MRN:  [id number]      Admission Summary: This is a 60-year-old  male with past medical history significant for chronic respiratory failure, home oxygen dependent 2 liters per minute, COPD, diastolic congestive heart failure, hypertension, type 2 diabetes mellitus, paroxysmal AFib, dyslipidemia, chronic lower extremities wound, who presented to [facility] Emergency Department with progressive shortness of breath for the last 2 days. He stated that he gets short of breath and when he checked his pulse ox, it was in 80s, increased his oxygen, not improved that much, he used the breathing treatment but not felt better and decided to come to [facility] Emergency Department. No left-sided chest pain, palpitation, fever, nausea, vomiting, abdominal pain, urinary complaint, or headache. His last bowel movement was 1-1/2 weeks ago and he has lower extremity swelling. He said he takes his medications regularly. He has never smoked cigarettes. In the ER, his vital signs, blood pressure was 135/88, pulse 70, temperature 98.1, saturation of oxygen 92% on 6 liters. Chest x-ray was done. Study showed chronic hypoinflation and bibasilar atelectasis. Troponin high-sensitivity was 17. ProBNP was 414, and the patient referred to hospitalist service for further evaluation and management. Interval history / Subjective:     Patient was seen and examined this morning. He denies any new complaints. Reports that he uses 2 L of oxygen at night only at home and found himself hypoxic to the low 60s which prompted him to present for evaluation.   Patient denies fever, chills, chest pain,  abdominal pain, nausea, vomiting and diarrhea. Assessment & Plan:      Acute hypoxic respiratory failure  --Continue O2, wean as tolerated  --Incentive spirometry use    Acute COPD exacerbation-improving  --Continue to wean to Solu-Medrol 40 mg daily  --Incentive spirometer  --DuoNebs  --Wean oxygen as tolerated  -- Pulmonary consulted, cleared recommendations    Acute diastolic CHF, nyha class 2  --IV Lasix 40 mg twice daily  --Fluid restriction to 1.5 L daily, sodium restricted diet, strict I&O  -- Continue home medications. Imdur, Entresto and Aldactone  --monitor labs and replace electrolytes    Paroxysmal A. Fib  --We will continue with home medications amiodarone/Eliquis    Hypertension  --Continue with home antihypertensive medications    -Type 2 diabetes, continue with home treatment with Lantus/sliding scale insulin    Morbid obesity  -Encourage diet/exercise and weight loss    Patient will need home O2 eval prior to DC    PT/OT          Code status:  Full code  Prophylaxis: [address] discussed with: Patient  Anticipated Disposition: [address] [facility] Problems  Date Reviewed: 4/28/2013          Codes Class Noted POA    Shortness of breath ICD-10-CM: R06.02  ICD-9-CM: 786.05  4/25/2022 Unknown                Review of Systems:   A comprehensive review of systems was negative except for that written in the HPI. Vital Signs:    Last 24hrs VS reviewed since prior progress note.  Most recent are:  Visit Vitals  /74 (BP 1 Location: Left upper arm, BP Patient Position: Sitting)   Pulse 64   Temp 98.2 °F (36.8 °C)   Resp 20   Ht 6' 1\" (1.854 m)   Wt 151.5 kg (334 lb)   SpO2 91%   BMI 44.07 kg/m²         Intake/Output Summary (Last 24 hours) at 4/28/2022 1433  Last data filed at 4/28/2022 1208  Gross per 24 hour   Intake 840 ml   Output 3000 ml   Net -2160 ml        Physical Examination:     I had a face to face encounter with this patient and independently examined them on 4/28/2022 as outlined below:          Constitutional:  No acute distress, cooperative, pleasant    ENT:  Oral mucosa moist, oropharynx benign. Resp:  Hypoxia, decreased breath sounds, on oxygen, no accessory muscle use. CV:  Regular rhythm, normal rate, no murmurs, gallops, rubs    GI:  Soft, non distended, non tender. normoactive bowel sounds, no hepatosplenomegaly     Musculoskeletal:  No edema, warm, 2+ pulses throughout    Neurologic:  Moves all extremities. AAOx3, CN II-XII reviewed            Data Review:    Review and/or order of clinical lab test      Labs:     Recent Labs     04/28/22 0137 04/27/22  0400   WBC 11.4* 11.4*   HGB 12.8 12.7   HCT 46.9 46.7    231     Recent Labs     04/28/22 0137 04/27/22  0400 04/26/22  0549    139 142   K 4.0 3.8 4.0    99 102   CO2 34* 37* 37*   BUN 32* 33* 27*   CREA 0.92 1.00 0.97   * 184* 155*   CA 8.1* 9.3 9.3     Recent Labs     04/28/22 0137 04/27/22  0400 04/26/22  0549   ALT 11* 11* 14   AP 90 102 128*   TBILI 0.5 0.5 0.3   TP 6.6 7.1 8.2   ALB 2.7* 2.9* 3.2*   GLOB 3.9 4.2* 5.0*     No results for input(s): INR, PTP, APTT, INREXT, INREXT in the last 72 hours. No results for input(s): FE, TIBC, PSAT, FERR in the last 72 hours. No results found for: FOL, RBCF   No results for input(s): PH, PCO2, PO2 in the last 72 hours. No results for input(s): CPK, CKNDX, TROIQ in the last 72 hours.     No lab exists for component: CPKMB  Lab Results   Component Value Date/Time    Cholesterol, total 170 01/23/2013 11:08 AM    HDL Cholesterol 37 (L) 01/23/2013 11:08 AM    LDL, calculated 97 01/23/2013 11:08 AM    Triglyceride 180 (H) 01/23/2013 11:08 AM    CHOL/HDL Ratio 6.0 (H) 10/06/2010 03:18 PM     Lab Results   Component Value Date/Time    Glucose (POC) 165 (H) 04/28/2022 11:55 AM    Glucose (POC) 144 (H) 04/28/2022 06:32 AM    Glucose (POC) 193 (H) 04/27/2022 09:18 PM    Glucose (POC) 188 (H) 04/27/2022 05:22 PM    Glucose (POC) 204 (H) 04/27/2022 11:30 AM Lab Results   Component Value Date/Time    Color YELLOW/STRAW 04/25/2022 02:54 PM    Appearance CLEAR 04/25/2022 02:54 PM    Specific gravity 1.018 04/25/2022 02:54 PM    pH (UA) 5.5 04/25/2022 02:54 PM    Protein Negative 04/25/2022 02:54 PM    Glucose >1,000 (A) 04/25/2022 02:54 PM    Ketone Negative 04/25/2022 02:54 PM    Bilirubin Negative 04/25/2022 02:54 PM    Urobilinogen 0.2 04/25/2022 02:54 PM    Nitrites Negative 04/25/2022 02:54 PM    Leukocyte Esterase TRACE (A) 04/25/2022 02:54 PM    Epithelial cells FEW 04/25/2022 02:54 PM    Bacteria Negative 04/25/2022 02:54 PM    WBC 0-4 04/25/2022 02:54 PM    RBC 0-5 04/25/2022 02:54 PM         Medications Reviewed:     Current Facility-Administered Medications   Medication Dose Route Frequency    tamsulosin (FLOMAX) capsule 0.4 mg  0.4 mg Oral DAILY    methylPREDNISolone (PF) (SOLU-MEDROL) injection 40 mg  40 mg IntraVENous DAILY    furosemide (LASIX) injection 40 mg  40 mg IntraVENous BID    miconazole (MICOTIN) 2 % powder   Topical BID    amiodarone (CORDARONE) tablet 100 mg  100 mg Oral DAILY    apixaban (ELIQUIS) tablet 5 mg  5 mg Oral BID    aspirin delayed-release tablet 81 mg  81 mg Oral DAILY    ezetimibe (ZETIA) tablet 10 mg  10 mg Oral DAILY    insulin glargine (LANTUS) injection 18 Units  18 Units SubCUTAneous DAILY    insulin lispro (HUMALOG) injection   SubCUTAneous AC&HS    glucose chewable tablet 16 g  4 Tablet Oral PRN    glucagon (GLUCAGEN) injection 1 mg  1 mg IntraMUSCular PRN    dextrose 10 % infusion 0-250 mL  0-250 mL IntraVENous PRN    isosorbide mononitrate ER (IMDUR) tablet 30 mg  30 mg Oral DAILY    montelukast (SINGULAIR) tablet 10 mg  10 mg Oral QHS    pantoprazole (PROTONIX) tablet 40 mg  40 mg Oral DAILY    pregabalin (LYRICA) capsule 75 mg  75 mg Oral BID    rosuvastatin (CRESTOR) tablet 5 mg  5 mg Oral DAILY    sacubitriL-valsartan (ENTRESTO) 49-51 mg tablet 1 Tablet  1 Tablet Oral BID    spironolactone (ALDACTONE) tablet 25 mg  25 mg Oral DAILY    [Held by provider] torsemide (DEMADEX) tablet 60 mg  60 mg Oral DAILY    sodium chloride (NS) flush 5-40 mL  5-40 mL IntraVENous Q8H    sodium chloride (NS) flush 5-40 mL  5-40 mL IntraVENous PRN    ondansetron (ZOFRAN) injection 4 mg  4 mg IntraVENous Q4H PRN    docusate sodium (COLACE) capsule 200 mg  200 mg Oral BID    lactulose (CHRONULAC) 10 gram/15 mL solution 30 mL  20 g Oral DAILY PRN    budesonide (PULMICORT) 500 mcg/2 ml nebulizer suspension  250 mcg Nebulization BID RT    albuterol-ipratropium (DUO-NEB) 2.5 MG-0.5 MG/3 ML  3 mL Nebulization Q4H PRN    acetaminophen (TYLENOL) tablet 650 mg  650 mg Oral Q4H PRN    hydrocortisone (ANUSOL-HC) 2.5 % rectal cream   PeriANAL QID PRN     ______________________________________________________________________  EXPECTED LENGTH OF STAY: 3d 19h  ACTUAL LENGTH OF STAY:          3                 Colette Woods MD

## 2022-04-28 NOTE — PROGRESS NOTES
Transition of Care Plan   RUR- 14% Moderate Risk   DISPOSITION: The disposition plan is to transition to a SNF - awaiting confirmation from agency coordinator    F/U with PCP/Specialist     Transport: [facility] [phone]    Patient has been recommended for SNF. The Plan for Transition of Care is related to the following treatment goals: SNF    The Patient and/or patient representative was provided with a choice of provider and agrees   with the discharge plan. [x] Yes [] No    Freedom of choice list was provided with basic dialogue that supports the patient's individualized plan of care/goals, treatment preferences and shares the quality data associated with the providers. [x] Yes [] No    At 9:10am - CM contacted patients [facility] Danika Beck [phone] to follow up regarding recommendation. At 1:36pm - CM attempted to contact patients Juli[facility] Schaffer coordinator - Santos Castro to follow up regarding recommendation. Santos Castro instructed CM not to send referrals until she spoke with her team. CM left VM awaiting callback. Below are a list of SNF's in which this agency is affiliated with:  formerly [facility] [address]    CM encouraged to wait to send referrals unt [facility] coordinator follows up with team.    At 3:57pm - CM received a call from [place] [facility] coordinator who informed CM to send referrals provide above for review. CM to assist with DESIY needs as they arise.     Nik Denson, MSW, CRM, LMHP-e  Available in [employer]

## 2022-04-29 LAB
ALBUMIN SERPL-MCNC: 2.8 G/DL (ref 3.5–5)
ALBUMIN/GLOB SERPL: 0.7 {RATIO} (ref 1.1–2.2)
ALP SERPL-CCNC: 85 U/L (ref 45–117)
ALT SERPL-CCNC: 12 U/L (ref 12–78)
ANION GAP SERPL CALC-SCNC: 5 MMOL/L (ref 5–15)
AST SERPL-CCNC: 6 U/L (ref 15–37)
BASOPHILS # BLD: 0 K/UL (ref 0–0.1)
BASOPHILS NFR BLD: 0 % (ref 0–1)
BILIRUB SERPL-MCNC: 0.5 MG/DL (ref 0.2–1)
BUN SERPL-MCNC: 32 MG/DL (ref 6–20)
BUN/CREAT SERPL: 33 (ref 12–20)
CALCIUM SERPL-MCNC: 8.8 MG/DL (ref 8.5–10.1)
CHLORIDE SERPL-SCNC: 97 MMOL/L (ref 97–108)
CO2 SERPL-SCNC: 38 MMOL/L (ref 21–32)
CREAT SERPL-MCNC: 0.98 MG/DL (ref 0.7–1.3)
DIFFERENTIAL METHOD BLD: ABNORMAL
EOSINOPHIL # BLD: 0 K/UL (ref 0–0.4)
EOSINOPHIL NFR BLD: 0 % (ref 0–7)
ERYTHROCYTE [DISTWIDTH] IN BLOOD BY AUTOMATED COUNT: 21.1 % (ref 11.5–14.5)
GLOBULIN SER CALC-MCNC: 3.9 G/DL (ref 2–4)
GLUCOSE BLD STRIP.AUTO-MCNC: 110 MG/DL (ref 65–117)
GLUCOSE BLD STRIP.AUTO-MCNC: 138 MG/DL (ref 65–117)
GLUCOSE BLD STRIP.AUTO-MCNC: 176 MG/DL (ref 65–117)
GLUCOSE BLD STRIP.AUTO-MCNC: 186 MG/DL (ref 65–117)
GLUCOSE SERPL-MCNC: 127 MG/DL (ref 65–100)
HCT VFR BLD AUTO: 45.6 % (ref 36.6–50.3)
HGB BLD-MCNC: 12.6 G/DL (ref 12.1–17)
IMM GRANULOCYTES # BLD AUTO: 0 K/UL (ref 0–0.04)
IMM GRANULOCYTES NFR BLD AUTO: 0 % (ref 0–0.5)
LYMPHOCYTES # BLD: 1.1 K/UL (ref 0.8–3.5)
LYMPHOCYTES NFR BLD: 11 % (ref 12–49)
MAGNESIUM SERPL-MCNC: 2.6 MG/DL (ref 1.6–2.4)
MCH RBC QN AUTO: 21.6 PG (ref 26–34)
MCHC RBC AUTO-ENTMCNC: 27.6 G/DL (ref 30–36.5)
MCV RBC AUTO: 78.1 FL (ref 80–99)
MONOCYTES # BLD: 0.8 K/UL (ref 0–1)
MONOCYTES NFR BLD: 8 % (ref 5–13)
NEUTS SEG # BLD: 7.8 K/UL (ref 1.8–8)
NEUTS SEG NFR BLD: 81 % (ref 32–75)
NRBC # BLD: 0 K/UL (ref 0–0.01)
NRBC BLD-RTO: 0 PER 100 WBC
PHOSPHATE SERPL-MCNC: 3.1 MG/DL (ref 2.6–4.7)
PLATELET # BLD AUTO: 221 K/UL (ref 150–400)
PMV BLD AUTO: 9.7 FL (ref 8.9–12.9)
POTASSIUM SERPL-SCNC: 3.8 MMOL/L (ref 3.5–5.1)
PROT SERPL-MCNC: 6.7 G/DL (ref 6.4–8.2)
RBC # BLD AUTO: 5.84 M/UL (ref 4.1–5.7)
RBC MORPH BLD: ABNORMAL
SERVICE CMNT-IMP: ABNORMAL
SERVICE CMNT-IMP: NORMAL
SODIUM SERPL-SCNC: 140 MMOL/L (ref 136–145)
WBC # BLD AUTO: 9.7 K/UL (ref 4.1–11.1)

## 2022-04-29 PROCEDURE — 94640 AIRWAY INHALATION TREATMENT: CPT

## 2022-04-29 PROCEDURE — 82962 GLUCOSE BLOOD TEST: CPT

## 2022-04-29 PROCEDURE — 74011250637 HC RX REV CODE- 250/637: Performed by: HOSPITALIST

## 2022-04-29 PROCEDURE — 74011250636 HC RX REV CODE- 250/636: Performed by: STUDENT IN AN ORGANIZED HEALTH CARE EDUCATION/TRAINING PROGRAM

## 2022-04-29 PROCEDURE — 80053 COMPREHEN METABOLIC PANEL: CPT

## 2022-04-29 PROCEDURE — 83735 ASSAY OF MAGNESIUM: CPT

## 2022-04-29 PROCEDURE — 65270000046 HC RM TELEMETRY

## 2022-04-29 PROCEDURE — 74011636637 HC RX REV CODE- 636/637: Performed by: HOSPITALIST

## 2022-04-29 PROCEDURE — 36415 COLL VENOUS BLD VENIPUNCTURE: CPT

## 2022-04-29 PROCEDURE — 84100 ASSAY OF PHOSPHORUS: CPT

## 2022-04-29 PROCEDURE — 77010033678 HC OXYGEN DAILY

## 2022-04-29 PROCEDURE — 94761 N-INVAS EAR/PLS OXIMETRY MLT: CPT

## 2022-04-29 PROCEDURE — 97110 THERAPEUTIC EXERCISES: CPT

## 2022-04-29 PROCEDURE — 74011000250 HC RX REV CODE- 250: Performed by: HOSPITALIST

## 2022-04-29 PROCEDURE — 85025 COMPLETE CBC W/AUTO DIFF WBC: CPT

## 2022-04-29 RX ADMIN — DOCUSATE SODIUM 200 MG: 100 CAPSULE, LIQUID FILLED ORAL at 09:32

## 2022-04-29 RX ADMIN — SODIUM CHLORIDE, PRESERVATIVE FREE 10 ML: 5 INJECTION INTRAVENOUS at 17:20

## 2022-04-29 RX ADMIN — SACUBITRIL AND VALSARTAN 1 TABLET: 49; 51 TABLET, FILM COATED ORAL at 17:18

## 2022-04-29 RX ADMIN — MONTELUKAST 10 MG: 10 TABLET, FILM COATED ORAL at 21:25

## 2022-04-29 RX ADMIN — MICONAZOLE NITRATE 2 % TOPICAL POWDER: at 09:33

## 2022-04-29 RX ADMIN — Medication 2 UNITS: at 17:19

## 2022-04-29 RX ADMIN — ASPIRIN 81 MG: 81 TABLET, COATED ORAL at 09:32

## 2022-04-29 RX ADMIN — APIXABAN 5 MG: 5 TABLET, FILM COATED ORAL at 09:32

## 2022-04-29 RX ADMIN — BUDESONIDE 250 MCG: 0.5 INHALANT RESPIRATORY (INHALATION) at 08:37

## 2022-04-29 RX ADMIN — APIXABAN 5 MG: 5 TABLET, FILM COATED ORAL at 17:18

## 2022-04-29 RX ADMIN — ROSUVASTATIN 5 MG: 10 TABLET, FILM COATED ORAL at 09:32

## 2022-04-29 RX ADMIN — MICONAZOLE NITRATE 2 % TOPICAL POWDER: at 17:19

## 2022-04-29 RX ADMIN — SACUBITRIL AND VALSARTAN 1 TABLET: 49; 51 TABLET, FILM COATED ORAL at 09:31

## 2022-04-29 RX ADMIN — SPIRONOLACTONE 25 MG: 25 TABLET ORAL at 09:32

## 2022-04-29 RX ADMIN — TAMSULOSIN HYDROCHLORIDE 0.4 MG: 0.4 CAPSULE ORAL at 09:32

## 2022-04-29 RX ADMIN — SODIUM CHLORIDE, PRESERVATIVE FREE 10 ML: 5 INJECTION INTRAVENOUS at 21:29

## 2022-04-29 RX ADMIN — ISOSORBIDE MONONITRATE 30 MG: 30 TABLET, EXTENDED RELEASE ORAL at 09:32

## 2022-04-29 RX ADMIN — PANTOPRAZOLE SODIUM 40 MG: 40 TABLET, DELAYED RELEASE ORAL at 09:32

## 2022-04-29 RX ADMIN — DOCUSATE SODIUM 200 MG: 100 CAPSULE, LIQUID FILLED ORAL at 17:22

## 2022-04-29 RX ADMIN — PREGABALIN 75 MG: 25 CAPSULE ORAL at 09:32

## 2022-04-29 RX ADMIN — METHYLPREDNISOLONE SODIUM SUCCINATE 40 MG: 40 INJECTION, POWDER, FOR SOLUTION INTRAMUSCULAR; INTRAVENOUS at 09:32

## 2022-04-29 RX ADMIN — INSULIN GLARGINE 18 UNITS: 100 INJECTION, SOLUTION SUBCUTANEOUS at 09:31

## 2022-04-29 RX ADMIN — PREGABALIN 75 MG: 25 CAPSULE ORAL at 17:19

## 2022-04-29 RX ADMIN — FUROSEMIDE 40 MG: 10 INJECTION, SOLUTION INTRAVENOUS at 17:18

## 2022-04-29 RX ADMIN — AMIODARONE HYDROCHLORIDE 100 MG: 200 TABLET ORAL at 09:32

## 2022-04-29 RX ADMIN — EZETIMIBE 10 MG: 10 TABLET ORAL at 09:32

## 2022-04-29 RX ADMIN — FUROSEMIDE 40 MG: 10 INJECTION, SOLUTION INTRAVENOUS at 09:31

## 2022-04-29 NOTE — PROGRESS NOTES
Problem: Diabetes Self-Management  Goal: *Disease process and treatment process  Description: Define diabetes and identify own type of diabetes; list 3 options for treating diabetes. Outcome: Progressing Towards Goal  Goal: *Incorporating nutritional management into lifestyle  Description: Describe effect of type, amount and timing of food on blood glucose; list 3 methods for planning meals. Outcome: Progressing Towards Goal  Goal: *Incorporating physical activity into lifestyle  Description: State effect of exercise on blood glucose levels. Outcome: Progressing Towards Goal  Goal: *Developing strategies to promote health/change behavior  Description: Define the ABC's of diabetes; identify appropriate screenings, schedule and personal plan for screenings. Outcome: Progressing Towards Goal  Goal: *Using medications safely  Description: State effect of diabetes medications on diabetes; name diabetes medication taking, action and side effects. Outcome: Progressing Towards Goal  Goal: *Monitoring blood glucose, interpreting and using results  Description: Identify recommended blood glucose targets  and personal targets. Outcome: Progressing Towards Goal  Goal: *Prevention, detection, treatment of acute complications  Description: List symptoms of hyper- and hypoglycemia; describe how to treat low blood sugar and actions for lowering  high blood glucose level. Outcome: Progressing Towards Goal  Goal: *Prevention, detection and treatment of chronic complications  Description: Define the natural course of diabetes and describe the relationship of blood glucose levels to long term complications of diabetes.   Outcome: Progressing Towards Goal  Goal: *Developing strategies to address psychosocial issues  Description: Describe feelings about living with diabetes; identify support needed and support network  Outcome: Progressing Towards Goal  Goal: *Insulin pump training  Outcome: Progressing Towards Goal  Goal: *Sick day guidelines  Outcome: Progressing Towards Goal  Goal: *Patient Specific Goal (EDIT GOAL, INSERT TEXT)  Outcome: Progressing Towards Goal     Problem: Patient Education: Go to Patient Education Activity  Goal: Patient/Family Education  Outcome: Progressing Towards Goal     Problem: Falls - Risk of  Goal: *Absence of Falls  Description: Document Lauren Roberson Fall Risk and appropriate interventions in the flowsheet. Outcome: Progressing Towards Goal  Note: Fall Risk Interventions:  Mobility Interventions: Communicate number of staff needed for ambulation/transfer,Patient to call before getting OOB    Medication Interventions: Patient to call before getting OOB,Teach patient to arise slowly    Elimination Interventions: Call light in reach    History of Falls Interventions: Door open when patient unattended,Room close to nurse's station    Problem: Patient Education: Go to Patient Education Activity  Goal: Patient/Family Education  Outcome: Progressing Towards Goal     Problem: Patient Education: Go to Patient Education Activity  Goal: Patient/Family Education  Outcome: Progressing Towards Goal     Problem: Patient Education: Go to Patient Education Activity  Goal: Patient/Family Education  Outcome: Progressing Towards Goal     Problem: Pressure Injury - Risk of  Goal: *Prevention of pressure injury  Description: Document Ben Scale and appropriate interventions in the flowsheet.   Outcome: Progressing Towards Goal  Note: Pressure Injury Interventions:  Sensory Interventions: Chair cushion,Keep linens dry and wrinkle-free,Minimize linen layers    Moisture Interventions: Absorbent underpads    Activity Interventions: Increase time out of bed,Pressure redistribution bed/mattress(bed type)    Mobility Interventions: Pressure redistribution bed/mattress (bed type),PT/OT evaluation    Nutrition Interventions: Document food/fluid/supplement intake    Friction and Shear Interventions: Apply protective barrier, creams and emollients    Problem: Patient Education: Go to Patient Education Activity  Goal: Patient/Family Education  Outcome: Progressing Towards Goal     Problem: Breathing Pattern - Ineffective  Goal: *Absence of hypoxia  Outcome: Progressing Towards Goal  Goal: *Use of effective breathing techniques  Outcome: Progressing Towards Goal     Problem: Patient Education: Go to Patient Education Activity  Goal: Patient/Family Education  Outcome: Progressing Towards Goal     Patient Aox4, VSS, PRN Tylenol administered for c/o 4/10 sacral pain. .. no adverse events noted - will continue to monitor.

## 2022-04-29 NOTE — PROGRESS NOTES
Problem: Self Care Deficits Care Plan (Adult)  Goal: *Therapy Goal (Edit Goal, Insert Text)  Description: FUNCTIONAL STATUS PRIOR TO ADMISSION: Patient was modified independent for basic ADL tasks and light meal prep and was using a rollator for functional mobility. [facility]- he uses w/c there due to difficulty getting [facility]/off [facility] bus. Patient used supplemental O2 during sleep only. HOME SUPPORT: The patient lived alone. He has a [facility] provided caregiver M-F for 1 hr that assists primarily with home management/cleaning activities. Occupational Therapy  Initiated 4/27/20022  1. Patient will perform grooming in stand with modified independence within 7 day(s). 2.  Patient will perform bathing with modified independence within 7 day(s). 3.  Patient will perform lower body dressing with modified independence within 7 day(s). 4.  Patient will perform toilet transfers with modified independence within 7 day(s). 5.  Patient will perform all aspects of toileting with modified independence within 7 day(s). 6.  Patient will participate in upper extremity therapeutic exercise/activities with modified independence for 10 minutes within 7 day(s). 7.  Patient will utilize energy conservation techniques during functional activities with verbal cues within 7 day(s). Outcome: Progressing Towards Goal   OCCUPATIONAL THERAPY TREATMENT  Patient: Shaista Adames (28 y.o. male)  Date: 4/29/2022  Diagnosis: Shortness of breath [R06.02] <principal problem not specified>       Precautions:  Fall  Chart, occupational therapy assessment, plan of care, and goals were reviewed. ASSESSMENT  Patient continues with skilled OT services and is progressing towards goals. Nursing cleared for therapy. Received in chair. He deferred ADL tasks but was agreeable for BUE exercises. Completed seated in chair with green theraband with good tolerance.   He reports R lateral flank discomfort but denies it is related to exercises and L shoulder mild pain with attempts for overhead shoulder flexion, decreased reps. He states chronic L shoulder weakness, denies injury. Current Level of Function Impacting Discharge (ADLs): supervision for BUE exercises    Other factors to consider for discharge: Patient lives at home alone with caregiver for IADl tasks. REcommend SNF vs HH pending progression with acute hospitalization. PLAN :  Patient continues to benefit from skilled intervention to address the above impairments. Continue treatment per established plan of care to address goals. Recommend with staff: up to chair for meals-- previous sessions CGA for mobility    Recommend next OT session: per POC    Recommendation for discharge: (in order for the patient to meet his/her long term goals)  To be determined: SNf vs HH pending progression    This discharge recommendation:  Has not yet been discussed the attending provider and/or case management    IF patient discharges home will need the following DME: has needed DME       SUBJECTIVE:   Patient stated .    OBJECTIVE DATA SUMMARY:   Cognitive/Behavioral Status:           Balance:  Sitting: Intact; Without support      Therapeutic Exercises:     EXERCISE   Sets   Reps   Active Active Assist   Passive   Comments   Horizontal Abduction 1 10 [x]           []           []           Green theraband   Shoulder overhead flexion 1 10-R  5-L [x]           []           []           Green theraband   Bicep flexion/ext 1 10- each UE [x]           []           []           Green theraband   Tricep flexion/ext 1 10- each UE []           []           []           Green theraband        Pain:  He reports R lateral flank discomfort but denies it is related to exercises and L shoulder mild pain with attempts for overhead shoulder flexion. He states chronic L shoulder weakness, denies injury.      Activity Tolerance:   Fair--4 L O2    After treatment patient left in no apparent distress:   Sitting in chair and Call bell within reach    COMMUNICATION/COLLABORATION:   The patients plan of care was discussed with: Physical therapist and Registered nurse.      Ole Meneses OT  Time Calculation: 24 mins

## 2022-04-29 NOTE — PROGRESS NOTES
[address] [facility] Adult  Hospitalist Group                                                                                          Hospitalist Progress Note  Ander Smalls MD  Answering service: [phone] from in house phone        Date of Service:  2022  NAME:  Naaman Brittle  :  1965  MRN:  [id number]      Admission Summary: This is a 60-year-old  male with past medical history significant for chronic respiratory failure, home oxygen dependent 2 liters per minute, COPD, diastolic congestive heart failure, hypertension, type 2 diabetes mellitus, paroxysmal AFib, dyslipidemia, chronic lower extremities wound, who presented to [facility] Emergency Department with progressive shortness of breath for the last 2 days. He stated that he gets short of breath and when he checked his pulse ox, it was in 80s, increased his oxygen, not improved that much, he used the breathing treatment but not felt better and decided to come to [facility] Emergency Department. No left-sided chest pain, palpitation, fever, nausea, vomiting, abdominal pain, urinary complaint, or headache. His last bowel movement was 1-1/2 weeks ago and he has lower extremity swelling. He said he takes his medications regularly. He has never smoked cigarettes. In the ER, his vital signs, blood pressure was 135/88, pulse 70, temperature 98.1, saturation of oxygen 92% on 6 liters. Chest x-ray was done. Study showed chronic hypoinflation and bibasilar atelectasis. Troponin high-sensitivity was 17. ProBNP was 414, and the patient referred to hospitalist service for further evaluation and management. Interval history / Subjective:     Patient was seen and examined this morning. He denies any new complaints. Reports that he uses 2 L of oxygen at night only at home and found himself hypoxic to the low 60s which prompted him to present for evaluation.   Patient denies fever, chills, chest pain,  abdominal pain, nausea, vomiting and diarrhea. Assessment & Plan:      Acute hypoxic respiratory failure  --Continue O2, wean as tolerated  --Incentive spirometry use    Acute COPD exacerbation-improving  --Continue to wean to Solu-Medrol 40 mg daily  --Incentive spirometer  --DuoNebs  --Wean oxygen as tolerated  -- Pulmonary consulted, cleared recommendations    Acute diastolic CHF, nyha class 2  --IV Lasix 40 mg twice daily  --Fluid restriction to 1.5 L daily, sodium restricted diet, strict I&O  -- Continue home medications. Imdur, Entresto and Aldactone  --monitor labs and replace electrolytes    Paroxysmal A. Fib  --We will continue with home medications amiodarone/Eliquis    Hypertension  --Continue with home antihypertensive medications    -Type 2 diabetes, continue with home treatment with Lantus/sliding scale insulin    Morbid obesity  -Encourage diet/exercise and weight loss    Patient will need home O2 eval prior to DC    PT/OT          Code status:  Full code  Prophylaxis: [address] discussed with: Patient  Anticipated Disposition: SNF, awaiting ins [facility] Problems  Date Reviewed: 4/28/2013          Codes Class Noted POA    Shortness of breath ICD-10-CM: R06.02  ICD-9-CM: 786.05  4/25/2022 Unknown                Review of Systems:   A comprehensive review of systems was negative except for that written in the HPI. Vital Signs:    Last 24hrs VS reviewed since prior progress note.  Most recent are:  Visit Vitals  /79 (BP 1 Location: Right upper arm, BP Patient Position: Sitting)   Pulse 66   Temp 98.1 °F (36.7 °C)   Resp 20   Ht 6' 1\" (1.854 m)   Wt 150.1 kg (330 lb 14.4 oz)   SpO2 90%   BMI 43.66 kg/m²         Intake/Output Summary (Last 24 hours) at 4/29/2022 1532  Last data filed at 4/29/2022 1220  Gross per 24 hour   Intake    Output 3300 ml   Net -3300 ml        Physical Examination:     I had a face to face encounter with this patient and independently examined them on 4/29/2022 as outlined below:          Constitutional:  No acute distress, cooperative, pleasant    ENT:  Oral mucosa moist, oropharynx benign. Resp:  Hypoxia, decreased breath sounds, on oxygen, no accessory muscle use. CV:  Regular rhythm, normal rate, no murmurs, gallops, rubs    GI:  Soft, non distended, non tender. normoactive bowel sounds, no hepatosplenomegaly     Musculoskeletal:  No edema, warm, 2+ pulses throughout    Neurologic:  Moves all extremities. AAOx3, CN II-XII reviewed            Data Review:    Review and/or order of clinical lab test      Labs:     Recent Labs     04/29/22 0342 04/28/22 0137   WBC 9.7 11.4*   HGB 12.6 12.8   HCT 45.6 46.9    257     Recent Labs     04/29/22 0342 04/28/22 0137 04/27/22  0400    140 139   K 3.8 4.0 3.8   CL 97 101 99   CO2 38* 34* 37*   BUN 32* 32* 33*   CREA 0.98 0.92 1.00   * 188* 184*   CA 8.8 8.1* 9.3   MG 2.6*  --   --    PHOS 3.1  --   --      Recent Labs     04/29/22 0342 04/28/22 0137 04/27/22  0400   ALT 12 11* 11*   AP 85 90 102   TBILI 0.5 0.5 0.5   TP 6.7 6.6 7.1   ALB 2.8* 2.7* 2.9*   GLOB 3.9 3.9 4.2*     No results for input(s): INR, PTP, APTT, INREXT, INREXT in the last 72 hours. No results for input(s): FE, TIBC, PSAT, FERR in the last 72 hours. No results found for: FOL, RBCF   No results for input(s): PH, PCO2, PO2 in the last 72 hours. No results for input(s): CPK, CKNDX, TROIQ in the last 72 hours.     No lab exists for component: CPKMB  Lab Results   Component Value Date/Time    Cholesterol, total 170 01/23/2013 11:08 AM    HDL Cholesterol 37 (L) 01/23/2013 11:08 AM    LDL, calculated 97 01/23/2013 11:08 AM    Triglyceride 180 (H) 01/23/2013 11:08 AM    CHOL/HDL Ratio 6.0 (H) 10/06/2010 03:18 PM     Lab Results   Component Value Date/Time    Glucose (POC) 138 (H) 04/29/2022 11:04 AM    Glucose (POC) 110 04/29/2022 06:20 AM    Glucose (POC) 172 (H) 04/28/2022 09:02 PM    Glucose (POC) 176 (H) 04/28/2022 04:30 PM Glucose (POC) 165 (H) 04/28/2022 11:55 AM     Lab Results   Component Value Date/Time    Color YELLOW/STRAW 04/25/2022 02:54 PM    Appearance CLEAR 04/25/2022 02:54 PM    Specific gravity 1.018 04/25/2022 02:54 PM    pH (UA) 5.5 04/25/2022 02:54 PM    Protein Negative 04/25/2022 02:54 PM    Glucose >1,000 (A) 04/25/2022 02:54 PM    Ketone Negative 04/25/2022 02:54 PM    Bilirubin Negative 04/25/2022 02:54 PM    Urobilinogen 0.2 04/25/2022 02:54 PM    Nitrites Negative 04/25/2022 02:54 PM    Leukocyte Esterase TRACE (A) 04/25/2022 02:54 PM    Epithelial cells FEW 04/25/2022 02:54 PM    Bacteria Negative 04/25/2022 02:54 PM    WBC 0-4 04/25/2022 02:54 PM    RBC 0-5 04/25/2022 02:54 PM         Medications Reviewed:     Current Facility-Administered Medications   Medication Dose Route Frequency    tamsulosin (FLOMAX) capsule 0.4 mg  0.4 mg Oral DAILY    methylPREDNISolone (PF) (SOLU-MEDROL) injection 40 mg  40 mg IntraVENous DAILY    furosemide (LASIX) injection 40 mg  40 mg IntraVENous BID    miconazole (MICOTIN) 2 % powder   Topical BID    amiodarone (CORDARONE) tablet 100 mg  100 mg Oral DAILY    apixaban (ELIQUIS) tablet 5 mg  5 mg Oral BID    aspirin delayed-release tablet 81 mg  81 mg Oral DAILY    ezetimibe (ZETIA) tablet 10 mg  10 mg Oral DAILY    insulin glargine (LANTUS) injection 18 Units  18 Units SubCUTAneous DAILY    insulin lispro (HUMALOG) injection   SubCUTAneous AC&HS    glucose chewable tablet 16 g  4 Tablet Oral PRN    glucagon (GLUCAGEN) injection 1 mg  1 mg IntraMUSCular PRN    dextrose 10 % infusion 0-250 mL  0-250 mL IntraVENous PRN    isosorbide mononitrate ER (IMDUR) tablet 30 mg  30 mg Oral DAILY    montelukast (SINGULAIR) tablet 10 mg  10 mg Oral QHS    pantoprazole (PROTONIX) tablet 40 mg  40 mg Oral DAILY    pregabalin (LYRICA) capsule 75 mg  75 mg Oral BID    rosuvastatin (CRESTOR) tablet 5 mg  5 mg Oral DAILY    sacubitriL-valsartan (ENTRESTO) 49-51 mg tablet 1 Tablet 1 Tablet Oral BID    spironolactone (ALDACTONE) tablet 25 mg  25 mg Oral DAILY    [Held by provider] torsemide (DEMADEX) tablet 60 mg  60 mg Oral DAILY    sodium chloride (NS) flush 5-40 mL  5-40 mL IntraVENous Q8H    sodium chloride (NS) flush 5-40 mL  5-40 mL IntraVENous PRN    ondansetron (ZOFRAN) injection 4 mg  4 mg IntraVENous Q4H PRN    docusate sodium (COLACE) capsule 200 mg  200 mg Oral BID    lactulose (CHRONULAC) 10 gram/15 mL solution 30 mL  20 g Oral DAILY PRN    budesonide (PULMICORT) 500 mcg/2 ml nebulizer suspension  250 mcg Nebulization BID RT    albuterol-ipratropium (DUO-NEB) 2.5 MG-0.5 MG/3 ML  3 mL Nebulization Q4H PRN    acetaminophen (TYLENOL) tablet 650 mg  650 mg Oral Q4H PRN    hydrocortisone (ANUSOL-HC) 2.5 % rectal cream   PeriANAL QID PRN     ______________________________________________________________________  EXPECTED LENGTH OF STAY: 3d 19h  ACTUAL LENGTH OF STAY:          4                 Natali Jose MD

## 2022-04-29 NOTE — PROGRESS NOTES
DEISY: anticipate d/c to [facility] when medically stable;     Pt is currently on 4L/NC 02    Will need a Rapid Covid test for SNF placement    Pt is a [facility] pt and insurance Shahana Fernandez is being handled by Lenny Oneill [phone]     Maris Guzman is in network with [facility] [phone]  CM will need to set up transport and mention that pt is the [employer] to [employer] financial responsibility for transport    RUR: 14%    -Pt is currently on 4L/NC 02, weaning as tolerated  -Plan for IV diuresis  -PT/OT following recs SNF vs HH pending progress    -1500-CM reviewed pt chart & was contacted by Mike Everett of [facility] advising that [facility] had contacted her about starting insurance auth. CM met with pt at bedside and confirmed that he would like to d/c to [facility]. CM informed Serjio Dickson and she stated she is starting insurance auth today, 4/29. CM also advised [employer] [phone] of plan. CM to follow. Ho Livingston RN BSN CCM  Transition of Care Plan:     The Plan for Transition of Care is related to the following treatment goals: [facility]     The Patient and/or patient representative was provided with a choice of provider and agrees  with the discharge plan. Yes [x] No []    A Freedom of choice list was provided with basic dialogue that supports the patient's individualized plan of care/goals and shares the quality data associated with the providers.        Yes [x] No []

## 2022-04-29 NOTE — PROGRESS NOTES
[address] [place]. [place], [address]   Tel.  [phone]   Fax. [address] [facility] [address]   [place], [address] [facility] [address]   Tel.  [phone]   Fax. [phone] [address]   Tel.  [phone]   Fax. [phone]         Subjective:     Soha Sequeira is a 55y.o. year old male seen in-patient for sleep evaluation in collaboration with the Heart Failure Nurse Navigator. He reports he has tried CPAP in the past with minimal success. Active Problems List    NYHA Class II with an EF of 50%   HTN   DM2   Afib    Assessment:     Hutto Sleepiness Score: 17    Stop Bang 4/29/2022   Does the patient snore loudly (louder than talking or loud enough to be heard through closed doors)? 1   Does the patient often feel tired, fatigued, or sleepy during the daytime, even after a \"good\" night's sleep? 1   Has anyone ever observed the patient stop breathing during their sleep? 1   Does the patient have or are they being treated for high blood pressure? 1   Is the patient's BMI greater than 35? 1   Is your neck circumference greater than 17 inches (Male) or 16 inches (Female)? 1   Is the patient older than 48? 1   Is the patient male? 1   IVY Score 8       Plan:      Patient encouraged to follow-up with his previous sleep provider to discuss therapy options for IVY.  He was provided contact information for the [address] should he need further assistance.  Corresponding risk factors for sleep apnea and the importance of proper treatment were reviewed.  Reviewed how treating sleep apnea can help improve heart function     The patient was counseled regarding proper sleep hygiene, with emphasis on ensuring sufficient total sleep time; safe driving and the benefits of exercise and weight loss.  All of his questions were addressed. JACK Snowden  Electronically signed.  04/29/22

## 2022-04-29 NOTE — PROGRESS NOTES
Problem: Diabetes Self-Management  Goal: *Disease process and treatment process  Description: Define diabetes and identify own type of diabetes; list 3 options for treating diabetes. Outcome: Progressing Towards Goal  Goal: *Incorporating nutritional management into lifestyle  Description: Describe effect of type, amount and timing of food on blood glucose; list 3 methods for planning meals. Outcome: Progressing Towards Goal  Goal: *Incorporating physical activity into lifestyle  Description: State effect of exercise on blood glucose levels. Outcome: Progressing Towards Goal  Goal: *Developing strategies to promote health/change behavior  Description: Define the ABC's of diabetes; identify appropriate screenings, schedule and personal plan for screenings. Outcome: Progressing Towards Goal  Goal: *Using medications safely  Description: State effect of diabetes medications on diabetes; name diabetes medication taking, action and side effects. Outcome: Progressing Towards Goal  Goal: *Monitoring blood glucose, interpreting and using results  Description: Identify recommended blood glucose targets  and personal targets. Outcome: Progressing Towards Goal  Goal: *Prevention, detection, treatment of acute complications  Description: List symptoms of hyper- and hypoglycemia; describe how to treat low blood sugar and actions for lowering  high blood glucose level. Outcome: Progressing Towards Goal  Goal: *Prevention, detection and treatment of chronic complications  Description: Define the natural course of diabetes and describe the relationship of blood glucose levels to long term complications of diabetes.   Outcome: Progressing Towards Goal  Goal: *Developing strategies to address psychosocial issues  Description: Describe feelings about living with diabetes; identify support needed and support network  Outcome: Progressing Towards Goal  Goal: *Insulin pump training  Outcome: Progressing Towards Goal  Goal: *Sick day guidelines  Outcome: Progressing Towards Goal  Goal: *Patient Specific Goal (EDIT GOAL, INSERT TEXT)  Outcome: Progressing Towards Goal     Problem: Patient Education: Go to Patient Education Activity  Goal: Patient/Family Education  Outcome: Progressing Towards Goal     Problem: Falls - Risk of  Goal: *Absence of Falls  Description: Document Star Painter Fall Risk and appropriate interventions in the flowsheet. Outcome: Progressing Towards Goal  Note: Fall Risk Interventions:  Mobility Interventions: Communicate number of staff needed for ambulation/transfer,Patient to call before getting OOB         Medication Interventions: Patient to call before getting OOB,Teach patient to arise slowly    Elimination Interventions: Call light in reach    History of Falls Interventions: Door open when patient unattended,Room close to nurse's station         Problem: Patient Education: Go to Patient Education Activity  Goal: Patient/Family Education  Outcome: Progressing Towards Goal     Problem: Patient Education: Go to Patient Education Activity  Goal: Patient/Family Education  Outcome: Progressing Towards Goal     Problem: Patient Education: Go to Patient Education Activity  Goal: Patient/Family Education  Outcome: Progressing Towards Goal     Problem: Pressure Injury - Risk of  Goal: *Prevention of pressure injury  Description: Document Ben Scale and appropriate interventions in the flowsheet. Outcome: Progressing Towards Goal     Problem: Patient Education: Go to Patient Education Activity  Goal: Patient/Family Education  Outcome: Progressing Towards Goal     Problem: Breathing Pattern - Ineffective  Goal: *Absence of hypoxia  Outcome: Progressing Towards Goal  Goal: *Use of effective breathing techniques  Outcome: Progressing Towards Goal     Problem: Patient Education: Go to Patient Education Activity  Goal: Patient/Family Education  Outcome: Progressing Towards Goal   Mr. Hany Parada is alert and oriented.  He is sitting up in the chair where he had breakfast, lunch, and dinner. He is very weak and requires two assist and a walker to go to the commode and back to the chair. His BLE dressing was changed today. He has a stage II in his sacrum covered with mepilex. He has skin tears on abdominal folds and RLQ bleeding. His scrotum is swollen. He complains of chronic generalized pain.  Will continue to monitor

## 2022-04-30 LAB
ALBUMIN SERPL-MCNC: 3 G/DL (ref 3.5–5)
ALBUMIN/GLOB SERPL: 0.7 {RATIO} (ref 1.1–2.2)
ALP SERPL-CCNC: 89 U/L (ref 45–117)
ALT SERPL-CCNC: 13 U/L (ref 12–78)
ANION GAP SERPL CALC-SCNC: 8 MMOL/L (ref 5–15)
AST SERPL-CCNC: 7 U/L (ref 15–37)
BASOPHILS # BLD: 0 K/UL (ref 0–0.1)
BASOPHILS NFR BLD: 0 % (ref 0–1)
BILIRUB SERPL-MCNC: 0.6 MG/DL (ref 0.2–1)
BUN SERPL-MCNC: 34 MG/DL (ref 6–20)
BUN/CREAT SERPL: 33 (ref 12–20)
CALCIUM SERPL-MCNC: 9 MG/DL (ref 8.5–10.1)
CHLORIDE SERPL-SCNC: 94 MMOL/L (ref 97–108)
CO2 SERPL-SCNC: 35 MMOL/L (ref 21–32)
CREAT SERPL-MCNC: 1.02 MG/DL (ref 0.7–1.3)
DIFFERENTIAL METHOD BLD: ABNORMAL
EOSINOPHIL # BLD: 0 K/UL (ref 0–0.4)
EOSINOPHIL NFR BLD: 0 % (ref 0–7)
ERYTHROCYTE [DISTWIDTH] IN BLOOD BY AUTOMATED COUNT: 21.1 % (ref 11.5–14.5)
GLOBULIN SER CALC-MCNC: 4.2 G/DL (ref 2–4)
GLUCOSE BLD STRIP.AUTO-MCNC: 131 MG/DL (ref 65–117)
GLUCOSE BLD STRIP.AUTO-MCNC: 249 MG/DL (ref 65–117)
GLUCOSE BLD STRIP.AUTO-MCNC: 257 MG/DL (ref 65–117)
GLUCOSE BLD STRIP.AUTO-MCNC: 328 MG/DL (ref 65–117)
GLUCOSE SERPL-MCNC: 137 MG/DL (ref 65–100)
HCT VFR BLD AUTO: 47.3 % (ref 36.6–50.3)
HGB BLD-MCNC: 13.3 G/DL (ref 12.1–17)
IMM GRANULOCYTES # BLD AUTO: 0 K/UL (ref 0–0.04)
IMM GRANULOCYTES NFR BLD AUTO: 0 % (ref 0–0.5)
LYMPHOCYTES # BLD: 0.9 K/UL (ref 0.8–3.5)
LYMPHOCYTES NFR BLD: 9 % (ref 12–49)
MCH RBC QN AUTO: 21.7 PG (ref 26–34)
MCHC RBC AUTO-ENTMCNC: 28.1 G/DL (ref 30–36.5)
MCV RBC AUTO: 77.3 FL (ref 80–99)
MONOCYTES # BLD: 1 K/UL (ref 0–1)
MONOCYTES NFR BLD: 10 % (ref 5–13)
NEUTS SEG # BLD: 8.1 K/UL (ref 1.8–8)
NEUTS SEG NFR BLD: 81 % (ref 32–75)
NRBC # BLD: 0 K/UL (ref 0–0.01)
NRBC BLD-RTO: 0 PER 100 WBC
PHOSPHATE SERPL-MCNC: 2.7 MG/DL (ref 2.6–4.7)
PLATELET # BLD AUTO: 219 K/UL (ref 150–400)
PMV BLD AUTO: 10.5 FL (ref 8.9–12.9)
POTASSIUM SERPL-SCNC: 4.3 MMOL/L (ref 3.5–5.1)
PROT SERPL-MCNC: 7.2 G/DL (ref 6.4–8.2)
RBC # BLD AUTO: 6.12 M/UL (ref 4.1–5.7)
RBC MORPH BLD: ABNORMAL
SERVICE CMNT-IMP: ABNORMAL
SODIUM SERPL-SCNC: 137 MMOL/L (ref 136–145)
WBC # BLD AUTO: 10 K/UL (ref 4.1–11.1)

## 2022-04-30 PROCEDURE — 74011250637 HC RX REV CODE- 250/637: Performed by: HOSPITALIST

## 2022-04-30 PROCEDURE — 65270000046 HC RM TELEMETRY

## 2022-04-30 PROCEDURE — 36415 COLL VENOUS BLD VENIPUNCTURE: CPT

## 2022-04-30 PROCEDURE — 77010033678 HC OXYGEN DAILY

## 2022-04-30 PROCEDURE — 82962 GLUCOSE BLOOD TEST: CPT

## 2022-04-30 PROCEDURE — 94760 N-INVAS EAR/PLS OXIMETRY 1: CPT

## 2022-04-30 PROCEDURE — 80053 COMPREHEN METABOLIC PANEL: CPT

## 2022-04-30 PROCEDURE — 84100 ASSAY OF PHOSPHORUS: CPT

## 2022-04-30 PROCEDURE — 74011636637 HC RX REV CODE- 636/637: Performed by: HOSPITALIST

## 2022-04-30 PROCEDURE — 94640 AIRWAY INHALATION TREATMENT: CPT

## 2022-04-30 PROCEDURE — 74011250636 HC RX REV CODE- 250/636: Performed by: STUDENT IN AN ORGANIZED HEALTH CARE EDUCATION/TRAINING PROGRAM

## 2022-04-30 PROCEDURE — 74011250637 HC RX REV CODE- 250/637: Performed by: NURSE PRACTITIONER

## 2022-04-30 PROCEDURE — 74011000250 HC RX REV CODE- 250: Performed by: HOSPITALIST

## 2022-04-30 PROCEDURE — 85025 COMPLETE CBC W/AUTO DIFF WBC: CPT

## 2022-04-30 RX ADMIN — MICONAZOLE NITRATE 2 % TOPICAL POWDER: at 18:00

## 2022-04-30 RX ADMIN — INSULIN GLARGINE 18 UNITS: 100 INJECTION, SOLUTION SUBCUTANEOUS at 11:17

## 2022-04-30 RX ADMIN — PREGABALIN 75 MG: 25 CAPSULE ORAL at 19:09

## 2022-04-30 RX ADMIN — MICONAZOLE NITRATE 2 % TOPICAL POWDER: at 09:00

## 2022-04-30 RX ADMIN — BUDESONIDE 250 MCG: 0.5 INHALANT RESPIRATORY (INHALATION) at 18:14

## 2022-04-30 RX ADMIN — EZETIMIBE 10 MG: 10 TABLET ORAL at 11:18

## 2022-04-30 RX ADMIN — ISOSORBIDE MONONITRATE 30 MG: 30 TABLET, EXTENDED RELEASE ORAL at 11:17

## 2022-04-30 RX ADMIN — AMIODARONE HYDROCHLORIDE 100 MG: 200 TABLET ORAL at 11:18

## 2022-04-30 RX ADMIN — Medication 7 UNITS: at 18:10

## 2022-04-30 RX ADMIN — MONTELUKAST 10 MG: 10 TABLET, FILM COATED ORAL at 22:35

## 2022-04-30 RX ADMIN — IPRATROPIUM BROMIDE AND ALBUTEROL SULFATE 3 ML: .5; 3 SOLUTION RESPIRATORY (INHALATION) at 18:14

## 2022-04-30 RX ADMIN — TAMSULOSIN HYDROCHLORIDE 0.4 MG: 0.4 CAPSULE ORAL at 11:17

## 2022-04-30 RX ADMIN — METHYLPREDNISOLONE SODIUM SUCCINATE 40 MG: 40 INJECTION, POWDER, FOR SOLUTION INTRAMUSCULAR; INTRAVENOUS at 11:19

## 2022-04-30 RX ADMIN — DOCUSATE SODIUM 200 MG: 100 CAPSULE, LIQUID FILLED ORAL at 19:09

## 2022-04-30 RX ADMIN — PREGABALIN 75 MG: 25 CAPSULE ORAL at 11:18

## 2022-04-30 RX ADMIN — IPRATROPIUM BROMIDE AND ALBUTEROL SULFATE 3 ML: .5; 3 SOLUTION RESPIRATORY (INHALATION) at 07:21

## 2022-04-30 RX ADMIN — ROSUVASTATIN 5 MG: 10 TABLET, FILM COATED ORAL at 11:19

## 2022-04-30 RX ADMIN — BUDESONIDE 250 MCG: 0.5 INHALANT RESPIRATORY (INHALATION) at 07:21

## 2022-04-30 RX ADMIN — ACETAMINOPHEN 650 MG: 325 TABLET ORAL at 11:27

## 2022-04-30 RX ADMIN — FUROSEMIDE 40 MG: 10 INJECTION, SOLUTION INTRAVENOUS at 19:09

## 2022-04-30 RX ADMIN — SODIUM CHLORIDE, PRESERVATIVE FREE 10 ML: 5 INJECTION INTRAVENOUS at 22:36

## 2022-04-30 RX ADMIN — APIXABAN 5 MG: 5 TABLET, FILM COATED ORAL at 11:18

## 2022-04-30 RX ADMIN — Medication 3 UNITS: at 13:17

## 2022-04-30 RX ADMIN — SODIUM CHLORIDE, PRESERVATIVE FREE 10 ML: 5 INJECTION INTRAVENOUS at 06:23

## 2022-04-30 RX ADMIN — SODIUM CHLORIDE, PRESERVATIVE FREE 10 ML: 5 INJECTION INTRAVENOUS at 13:18

## 2022-04-30 RX ADMIN — Medication 3 UNITS: at 22:36

## 2022-04-30 RX ADMIN — FUROSEMIDE 40 MG: 10 INJECTION, SOLUTION INTRAVENOUS at 11:19

## 2022-04-30 RX ADMIN — SACUBITRIL AND VALSARTAN 1 TABLET: 49; 51 TABLET, FILM COATED ORAL at 11:17

## 2022-04-30 RX ADMIN — PANTOPRAZOLE SODIUM 40 MG: 40 TABLET, DELAYED RELEASE ORAL at 11:19

## 2022-04-30 RX ADMIN — APIXABAN 5 MG: 5 TABLET, FILM COATED ORAL at 19:09

## 2022-04-30 RX ADMIN — ASPIRIN 81 MG: 81 TABLET, COATED ORAL at 11:17

## 2022-04-30 RX ADMIN — SPIRONOLACTONE 25 MG: 25 TABLET ORAL at 11:19

## 2022-04-30 RX ADMIN — SACUBITRIL AND VALSARTAN 1 TABLET: 49; 51 TABLET, FILM COATED ORAL at 19:09

## 2022-04-30 RX ADMIN — DOCUSATE SODIUM 200 MG: 100 CAPSULE, LIQUID FILLED ORAL at 11:17

## 2022-04-30 NOTE — PROGRESS NOTES
[address] [facility] Adult  Hospitalist Group                                                                                          Hospitalist Progress Note  Stanley Tatum MD  Answering service: [phone] from in house phone        Date of Service:  2022  NAME:  Katty Benson  :  1965  MRN:  [id number]      Admission Summary: This is a 62-year-old  male with past medical history significant for chronic respiratory failure, home oxygen dependent 2 liters per minute, COPD, diastolic congestive heart failure, hypertension, type 2 diabetes mellitus, paroxysmal AFib, dyslipidemia, chronic lower extremities wound, who presented to [facility] Emergency Department with progressive shortness of breath for the last 2 days. He stated that he gets short of breath and when he checked his pulse ox, it was in 80s, increased his oxygen, not improved that much, he used the breathing treatment but not felt better and decided to come to [facility] Emergency Department. No left-sided chest pain, palpitation, fever, nausea, vomiting, abdominal pain, urinary complaint, or headache. His last bowel movement was 1-1/2 weeks ago and he has lower extremity swelling. He said he takes his medications regularly. He has never smoked cigarettes. In the ER, his vital signs, blood pressure was 135/88, pulse 70, temperature 98.1, saturation of oxygen 92% on 6 liters. Chest x-ray was done. Study showed chronic hypoinflation and bibasilar atelectasis. Troponin high-sensitivity was 17. ProBNP was 414, and the patient referred to hospitalist service for further evaluation and management. Interval history / Subjective:   Patient seen and examined for the first time, chart was reviewed briefly. Patient was sitting in the chair without any respiratory distress. He said he started feeling better but has not ambulated much.   Needing 4 L nasal cannula oxygen but was not on home oxygen prior to admission. No other acute issues reported to me by patient or staff at this time. Assessment & Plan:      Acute hypoxic respiratory failure from COPD ex  --Continue O2, wean as tolerated  --Continue to wean to Solu-Medrol 40 mg daily  --Incentive spirometer  --DuoNebs  -- Pulmonary consulted, cleared recommendations    Acute diastolic CHF, nyha class 2  --IV Lasix 40 mg twice daily  --Fluid restriction to 1.5 L daily, sodium restricted diet, strict I&O  -- Continue home medications. Imdur, Entresto and Aldactone  --monitor labs and replace electrolytes    Paroxysmal A. Fib-- amiodarone/Eliquis    HTN--Continue   home  medications    DMT2-continue with home   Lantus/sliding scale insulin    Morbid obesity-Encourage diet/exercise and weight loss       Code status:  Full code  Prophylaxis: Eliquis  Anticipated Disposition: SNF, awaiting ins auth        Vital Signs:    Last 24hrs VS reviewed since prior progress note. Most recent are:  Visit Vitals  /63 (BP 1 Location: Left upper arm, BP Patient Position: At rest)   Pulse 66   Temp 97.5 °F (36.4 °C)   Resp 20   Ht 6' 1\" (1.854 m)   Wt 150.1 kg (330 lb 14.4 oz)   SpO2 92%   BMI 43.66 kg/m²         Intake/Output Summary (Last 24 hours) at 4/30/2022 1011  Last data filed at 4/29/2022 2351  Gross per 24 hour   Intake 360 ml   Output 4400 ml   Net -4040 ml        Physical Examination:     I had a face to face encounter with this patient and independently examined them on 4/30/2022 as outlined below:          Constitutional:  Morbidly obese    ENT:  Oral mucosa moist.    Resp:   no accessory muscle use.     CV:  Regular normal rate,    GI:  Soft, obese      Musculoskeletal:  Pitting edema    Neurologic:  AAOx3,             Labs:     Recent Labs     04/30/22  0338 04/29/22  0342   WBC 10.0 9.7   HGB 13.3 12.6   HCT 47.3 45.6    221     Recent Labs     04/30/22  0338 04/29/22  0342 04/28/22  0137    140 140   K 4.3 3.8 4.0   CL 94* 97 101   CO2 35* 38* 34*   BUN 34* 32* 32*   CREA 1.02 0.98 0.92   * 127* 188*   CA 9.0 8.8 8.1*   MG  --  2.6*  --    PHOS 2.7 3.1  --      Recent Labs     04/30/22  0338 04/29/22  0342 04/28/22  0137   ALT 13 12 11*   AP 89 85 90   TBILI 0.6 0.5 0.5   TP 7.2 6.7 6.6   ALB 3.0* 2.8* 2.7*   GLOB 4.2* 3.9 3.9       Medications Reviewed:     Current Facility-Administered Medications   Medication Dose Route Frequency    tamsulosin (FLOMAX) capsule 0.4 mg  0.4 mg Oral DAILY    methylPREDNISolone (PF) (SOLU-MEDROL) injection 40 mg  40 mg IntraVENous DAILY    furosemide (LASIX) injection 40 mg  40 mg IntraVENous BID    miconazole (MICOTIN) 2 % powder   Topical BID    amiodarone (CORDARONE) tablet 100 mg  100 mg Oral DAILY    apixaban (ELIQUIS) tablet 5 mg  5 mg Oral BID    aspirin delayed-release tablet 81 mg  81 mg Oral DAILY    ezetimibe (ZETIA) tablet 10 mg  10 mg Oral DAILY    insulin glargine (LANTUS) injection 18 Units  18 Units SubCUTAneous DAILY    insulin lispro (HUMALOG) injection   SubCUTAneous AC&HS    glucose chewable tablet 16 g  4 Tablet Oral PRN    glucagon (GLUCAGEN) injection 1 mg  1 mg IntraMUSCular PRN    dextrose 10 % infusion 0-250 mL  0-250 mL IntraVENous PRN    isosorbide mononitrate ER (IMDUR) tablet 30 mg  30 mg Oral DAILY    montelukast (SINGULAIR) tablet 10 mg  10 mg Oral QHS    pantoprazole (PROTONIX) tablet 40 mg  40 mg Oral DAILY    pregabalin (LYRICA) capsule 75 mg  75 mg Oral BID    rosuvastatin (CRESTOR) tablet 5 mg  5 mg Oral DAILY    sacubitriL-valsartan (ENTRESTO) 49-51 mg tablet 1 Tablet  1 Tablet Oral BID    spironolactone (ALDACTONE) tablet 25 mg  25 mg Oral DAILY    sodium chloride (NS) flush 5-40 mL  5-40 mL IntraVENous Q8H    sodium chloride (NS) flush 5-40 mL  5-40 mL IntraVENous PRN    ondansetron (ZOFRAN) injection 4 mg  4 mg IntraVENous Q4H PRN    docusate sodium (COLACE) capsule 200 mg  200 mg Oral BID    lactulose (CHRONULAC) 10 gram/15 mL solution 30 mL  20 g Oral DAILY PRN    budesonide (PULMICORT) 500 mcg/2 ml nebulizer suspension  250 mcg Nebulization BID RT    albuterol-ipratropium (DUO-NEB) 2.5 MG-0.5 MG/3 ML  3 mL Nebulization Q4H PRN    acetaminophen (TYLENOL) tablet 650 mg  650 mg Oral Q4H PRN    hydrocortisone (ANUSOL-HC) 2.5 % rectal cream   PeriANAL QID PRN     ______________________________________________________________________  EXPECTED LENGTH OF STAY: 3d 19h  ACTUAL LENGTH OF STAY:          5                 Stanley Kim MD

## 2022-05-01 LAB
ALBUMIN SERPL-MCNC: 2.9 G/DL (ref 3.5–5)
ANION GAP SERPL CALC-SCNC: 5 MMOL/L (ref 5–15)
BNP SERPL-MCNC: 264 PG/ML
BUN SERPL-MCNC: 32 MG/DL (ref 6–20)
BUN/CREAT SERPL: 39 (ref 12–20)
CALCIUM SERPL-MCNC: 8.8 MG/DL (ref 8.5–10.1)
CHLORIDE SERPL-SCNC: 95 MMOL/L (ref 97–108)
CO2 SERPL-SCNC: 38 MMOL/L (ref 21–32)
CREAT SERPL-MCNC: 0.82 MG/DL (ref 0.7–1.3)
GLUCOSE BLD STRIP.AUTO-MCNC: 131 MG/DL (ref 65–117)
GLUCOSE BLD STRIP.AUTO-MCNC: 193 MG/DL (ref 65–117)
GLUCOSE BLD STRIP.AUTO-MCNC: 213 MG/DL (ref 65–117)
GLUCOSE BLD STRIP.AUTO-MCNC: 234 MG/DL (ref 65–117)
GLUCOSE SERPL-MCNC: 140 MG/DL (ref 65–100)
MAGNESIUM SERPL-MCNC: 2.8 MG/DL (ref 1.6–2.4)
PHOSPHATE SERPL-MCNC: 3.7 MG/DL (ref 2.6–4.7)
POTASSIUM SERPL-SCNC: 4 MMOL/L (ref 3.5–5.1)
SERVICE CMNT-IMP: ABNORMAL
SODIUM SERPL-SCNC: 138 MMOL/L (ref 136–145)

## 2022-05-01 PROCEDURE — 74011250637 HC RX REV CODE- 250/637: Performed by: HOSPITALIST

## 2022-05-01 PROCEDURE — 74011250637 HC RX REV CODE- 250/637: Performed by: NURSE PRACTITIONER

## 2022-05-01 PROCEDURE — 82962 GLUCOSE BLOOD TEST: CPT

## 2022-05-01 PROCEDURE — 83735 ASSAY OF MAGNESIUM: CPT

## 2022-05-01 PROCEDURE — 74011000250 HC RX REV CODE- 250: Performed by: HOSPITALIST

## 2022-05-01 PROCEDURE — 65270000046 HC RM TELEMETRY

## 2022-05-01 PROCEDURE — 94640 AIRWAY INHALATION TREATMENT: CPT

## 2022-05-01 PROCEDURE — 74011250636 HC RX REV CODE- 250/636: Performed by: STUDENT IN AN ORGANIZED HEALTH CARE EDUCATION/TRAINING PROGRAM

## 2022-05-01 PROCEDURE — 94760 N-INVAS EAR/PLS OXIMETRY 1: CPT

## 2022-05-01 PROCEDURE — 83880 ASSAY OF NATRIURETIC PEPTIDE: CPT

## 2022-05-01 PROCEDURE — 74011636637 HC RX REV CODE- 636/637: Performed by: HOSPITALIST

## 2022-05-01 PROCEDURE — 80069 RENAL FUNCTION PANEL: CPT

## 2022-05-01 RX ORDER — POLYETHYLENE GLYCOL 3350 17 G/17G
17 POWDER, FOR SOLUTION ORAL DAILY
Status: DISCONTINUED | OUTPATIENT
Start: 2022-05-02 | End: 2022-05-01

## 2022-05-01 RX ORDER — POLYETHYLENE GLYCOL 3350 17 G/17G
17 POWDER, FOR SOLUTION ORAL DAILY
Status: DISCONTINUED | OUTPATIENT
Start: 2022-05-01 | End: 2022-05-02 | Stop reason: HOSPADM

## 2022-05-01 RX ADMIN — ACETAMINOPHEN 650 MG: 325 TABLET ORAL at 10:30

## 2022-05-01 RX ADMIN — PREGABALIN 75 MG: 25 CAPSULE ORAL at 19:08

## 2022-05-01 RX ADMIN — SODIUM CHLORIDE, PRESERVATIVE FREE 10 ML: 5 INJECTION INTRAVENOUS at 14:00

## 2022-05-01 RX ADMIN — Medication 2 UNITS: at 14:10

## 2022-05-01 RX ADMIN — METHYLPREDNISOLONE SODIUM SUCCINATE 40 MG: 40 INJECTION, POWDER, FOR SOLUTION INTRAMUSCULAR; INTRAVENOUS at 10:29

## 2022-05-01 RX ADMIN — SACUBITRIL AND VALSARTAN 1 TABLET: 49; 51 TABLET, FILM COATED ORAL at 10:31

## 2022-05-01 RX ADMIN — SPIRONOLACTONE 25 MG: 25 TABLET ORAL at 09:00

## 2022-05-01 RX ADMIN — DOCUSATE SODIUM 200 MG: 100 CAPSULE, LIQUID FILLED ORAL at 19:08

## 2022-05-01 RX ADMIN — DOCUSATE SODIUM 200 MG: 100 CAPSULE, LIQUID FILLED ORAL at 10:31

## 2022-05-01 RX ADMIN — ISOSORBIDE MONONITRATE 30 MG: 30 TABLET, EXTENDED RELEASE ORAL at 10:31

## 2022-05-01 RX ADMIN — SODIUM CHLORIDE, PRESERVATIVE FREE 10 ML: 5 INJECTION INTRAVENOUS at 21:59

## 2022-05-01 RX ADMIN — INSULIN GLARGINE 18 UNITS: 100 INJECTION, SOLUTION SUBCUTANEOUS at 10:29

## 2022-05-01 RX ADMIN — AMIODARONE HYDROCHLORIDE 100 MG: 200 TABLET ORAL at 10:30

## 2022-05-01 RX ADMIN — TAMSULOSIN HYDROCHLORIDE 0.4 MG: 0.4 CAPSULE ORAL at 10:31

## 2022-05-01 RX ADMIN — POLYETHYLENE GLYCOL 3350 17 G: 17 POWDER, FOR SOLUTION ORAL at 14:10

## 2022-05-01 RX ADMIN — BUDESONIDE 250 MCG: 0.5 INHALANT RESPIRATORY (INHALATION) at 07:31

## 2022-05-01 RX ADMIN — PREGABALIN 75 MG: 25 CAPSULE ORAL at 10:30

## 2022-05-01 RX ADMIN — MICONAZOLE NITRATE 2 % TOPICAL POWDER: at 09:00

## 2022-05-01 RX ADMIN — MONTELUKAST 10 MG: 10 TABLET, FILM COATED ORAL at 21:59

## 2022-05-01 RX ADMIN — APIXABAN 5 MG: 5 TABLET, FILM COATED ORAL at 19:08

## 2022-05-01 RX ADMIN — ROSUVASTATIN 5 MG: 10 TABLET, FILM COATED ORAL at 10:31

## 2022-05-01 RX ADMIN — FUROSEMIDE 40 MG: 10 INJECTION, SOLUTION INTRAVENOUS at 10:29

## 2022-05-01 RX ADMIN — APIXABAN 5 MG: 5 TABLET, FILM COATED ORAL at 10:31

## 2022-05-01 RX ADMIN — ASPIRIN 81 MG: 81 TABLET, COATED ORAL at 10:30

## 2022-05-01 RX ADMIN — SACUBITRIL AND VALSARTAN 1 TABLET: 49; 51 TABLET, FILM COATED ORAL at 19:08

## 2022-05-01 RX ADMIN — MICONAZOLE NITRATE 2 % TOPICAL POWDER: at 18:00

## 2022-05-01 RX ADMIN — Medication 3 UNITS: at 19:09

## 2022-05-01 RX ADMIN — SODIUM CHLORIDE, PRESERVATIVE FREE 10 ML: 5 INJECTION INTRAVENOUS at 06:19

## 2022-05-01 RX ADMIN — FUROSEMIDE 40 MG: 10 INJECTION, SOLUTION INTRAVENOUS at 19:08

## 2022-05-01 RX ADMIN — SODIUM CHLORIDE, PRESERVATIVE FREE 10 ML: 5 INJECTION INTRAVENOUS at 19:08

## 2022-05-01 RX ADMIN — EZETIMIBE 10 MG: 10 TABLET ORAL at 10:31

## 2022-05-01 RX ADMIN — Medication 2 UNITS: at 23:19

## 2022-05-01 RX ADMIN — PANTOPRAZOLE SODIUM 40 MG: 40 TABLET, DELAYED RELEASE ORAL at 10:30

## 2022-05-01 NOTE — PROGRESS NOTES
Bedside shift change report given to [employer] (oncoming nurse) by Lio Chin RN (offgoing nurse). Report included the following information SBAR, Kardex, Intake/Output, MAR and Recent Results.

## 2022-05-01 NOTE — PROGRESS NOTES
[facility]                                                                                          Hospitalist Progress Note  Stef Baires NP  Answering service: [phone] OR [phone] from in house phone        Date of Service:  2022  NAME:  Naaman Brittle  :  1965  MRN:  [id number]      Admission Summary:   Per H&P, This is a 15-year-old  male with past medical history significant for chronic respiratory failure, home oxygen dependent 2 liters per minute, COPD, diastolic congestive heart failure, hypertension, type 2 diabetes mellitus, paroxysmal AFib, dyslipidemia, chronic lower extremities wound, who presented to [facility] Emergency Department with progressive shortness of breath for the last 2 days. Liu Valero stated that he gets short of breath and when he checked his pulse ox, it was in 80s, increased his oxygen, not improved that much, he used the breathing treatment but not felt better and decided to come to [facility] Emergency Department. Latricia Ogke left-sided chest pain, palpitation, fever, nausea, vomiting, abdominal pain, urinary complaint, or headache.  His last bowel movement was 1-1/2 weeks ago and he has lower extremity swelling.  He said he takes his medications regularly. Liu Valero has never smoked cigarettes.  In the ER, his vital signs, blood pressure was 135/88, pulse 70, temperature 98.1, saturation of oxygen 92% on 6 liters.  Chest x-ray was done. Garrick Foil showed chronic hypoinflation and bibasilar atelectasis.  Troponin high-sensitivity was 17.  ProBNP was 414, and the patient referred to hospitalist service for further evaluation and management. Interval history / Subjective:   Patient seen today on rounds . With complaints of constipation.   Remains on 4 L of oxygen     Assessment & Plan:     COPD exacerbation  Improving  Continuing methylprednisolone  Will wean this in the coming days  Continue nebulizer treatments  Continue ICS    Acute on chronic diastolic congestive heart failure, NYHA class II  Still with fluid overload  Daily weights  Continue furosemide twice daily  Continue sacubitril/valsartan  Continue spironolactone  Beta-blocker on hold due to bradycardia    Acute hypoxic respiratory failure   Due to the above  Continuing the aforementioned treatments  Currently on 4 L/min of supplemental oxygen, normally on 2 L. Wean oxygen as tolerated    Paroxysmal atrial fibrillation  Heart rate currently controlled  Continuing amiodarone  Continuing apixaban    Type 2 diabetes  Blood sugars currently controlled  Holding novel insulins  Continuing basal bolus insulin regimen    Hypertension  Blood pressure currently controlled  Continuing isosorbide  Continuing spironolactone  Continue ARB    Obesity  BMI of 43  Counseled on healthy dietary choices    Constipation  Patient stating has not had a bowel movement in several days. Complains of constipation  I have ordered daily MiraLAX  He does have as needed lactulose ordered  When I informed the nurse, she tells me he had several large bowel movements yesterday  Hold bowel regimen for diarrhea    Hyperlipidemia  Stable  Continue rosuvastatin  Continue Zetia    Code status: FULL  DVT prophylaxis: [address] discussed with: Patient/Family and Nurse  Anticipated Disposition: SNF/LTC  Anticipated Discharge: Greater than 48 hours     Hospital Problems  Date Reviewed: 4/28/2013          Codes Class Noted POA    Shortness of breath ICD-10-CM: R06.02  ICD-9-CM: 786.05  4/25/2022 Unknown                Review of Systems:   A comprehensive review of systems was negative except for that written in the HPI. Vital Signs:    Last 24hrs VS reviewed since prior progress note.  Most recent are:  Visit Vitals  /74 (BP 1 Location: Left upper arm, BP Patient Position: At rest)   Pulse 60   Temp 98.3 °F (36.8 °C)   Resp 16   Ht 6' 1\" (1.854 m)   Wt 150.1 kg (330 lb 14.4 oz)   SpO2 91%   BMI 43.66 kg/m² Intake/Output Summary (Last 24 hours) at 5/1/2022 1312  Last data filed at 5/1/2022 1142  Gross per 24 hour   Intake    Output 4500 ml   Net -4500 ml        Physical Examination:     I had a face to face encounter with this patient and independently examined them on 5/1/2022 as outlined below:          Constitutional:  No acute distress, cooperative, pleasant    ENT:  Oral mucosa moist, oropharynx benign. Resp:  Few crackles/wheeze. No accessory muscle use   CV:  Regular rhythm, normal rate, no murmurs, gallops, rubs    GI:  Soft, non distended, non tender. normoactive bowel sounds, no hepatosplenomegaly     Musculoskeletal:  No edema, warm, 2+ pulses throughout    Neurologic:  Moves all extremities. AAOx3, CN II-XII reviewed            Data Review:    Review and/or order of clinical lab test  Review and/or order of tests in the radiology section of [facility]      Labs:     Recent Labs     04/30/22 0338 04/29/22 0342   WBC 10.0 9.7   HGB 13.3 12.6   HCT 47.3 45.6    221     Recent Labs     05/01/22  0631 04/30/22  0338 04/29/22 0342    137 140   K 4.0 4.3 3.8   CL 95* 94* 97   CO2 38* 35* 38*   BUN 32* 34* 32*   CREA 0.82 1.02 0.98   * 137* 127*   CA 8.8 9.0 8.8   MG 2.8*  --  2.6*   PHOS 3.7 2.7 3.1     Recent Labs     05/01/22 0631 04/30/22 0338 04/29/22 0342   ALT  --  13 12   AP  --  89 85   TBILI  --  0.6 0.5   TP  --  7.2 6.7   ALB 2.9* 3.0* 2.8*   GLOB  --  4.2* 3.9     No results for input(s): INR, PTP, APTT, INREXT in the last 72 hours. No results for input(s): FE, TIBC, PSAT, FERR in the last 72 hours. No results found for: FOL, RBCF   No results for input(s): PH, PCO2, PO2 in the last 72 hours. No results for input(s): CPK, CKNDX, TROIQ in the last 72 hours.     No lab exists for component: CPKMB  Lab Results   Component Value Date/Time    Cholesterol, total 170 01/23/2013 11:08 AM    HDL Cholesterol 37 (L) 01/23/2013 11:08 AM    LDL, calculated 97 01/23/2013 11:08 AM Triglyceride 180 (H) 01/23/2013 11:08 AM    CHOL/HDL Ratio 6.0 (H) 10/06/2010 03:18 PM     Lab Results   Component Value Date/Time    Glucose (POC) 193 (H) 05/01/2022 11:35 AM    Glucose (POC) 131 (H) 05/01/2022 06:54 AM    Glucose (POC) 257 (H) 04/30/2022 10:14 PM    Glucose (POC) 328 (H) 04/30/2022 05:31 PM    Glucose (POC) 249 (H) 04/30/2022 12:35 PM     Lab Results   Component Value Date/Time    Color YELLOW/STRAW 04/25/2022 02:54 PM    Appearance CLEAR 04/25/2022 02:54 PM    Specific gravity 1.018 04/25/2022 02:54 PM    pH (UA) 5.5 04/25/2022 02:54 PM    Protein Negative 04/25/2022 02:54 PM    Glucose >1,000 (A) 04/25/2022 02:54 PM    Ketone Negative 04/25/2022 02:54 PM    Bilirubin Negative 04/25/2022 02:54 PM    Urobilinogen 0.2 04/25/2022 02:54 PM    Nitrites Negative 04/25/2022 02:54 PM    Leukocyte Esterase TRACE (A) 04/25/2022 02:54 PM    Epithelial cells FEW 04/25/2022 02:54 PM    Bacteria Negative 04/25/2022 02:54 PM    WBC 0-4 04/25/2022 02:54 PM    RBC 0-5 04/25/2022 02:54 PM         Medications Reviewed:     Current Facility-Administered Medications   Medication Dose Route Frequency    polyethylene glycol (MIRALAX) packet 17 g  17 g Oral DAILY    tamsulosin (FLOMAX) capsule 0.4 mg  0.4 mg Oral DAILY    methylPREDNISolone (PF) (SOLU-MEDROL) injection 40 mg  40 mg IntraVENous DAILY    furosemide (LASIX) injection 40 mg  40 mg IntraVENous BID    miconazole (MICOTIN) 2 % powder   Topical BID    amiodarone (CORDARONE) tablet 100 mg  100 mg Oral DAILY    apixaban (ELIQUIS) tablet 5 mg  5 mg Oral BID    aspirin delayed-release tablet 81 mg  81 mg Oral DAILY    ezetimibe (ZETIA) tablet 10 mg  10 mg Oral DAILY    insulin glargine (LANTUS) injection 18 Units  18 Units SubCUTAneous DAILY    insulin lispro (HUMALOG) injection   SubCUTAneous AC&HS    glucose chewable tablet 16 g  4 Tablet Oral PRN    glucagon (GLUCAGEN) injection 1 mg  1 mg IntraMUSCular PRN    dextrose 10 % infusion 0-250 mL 0-250 mL IntraVENous PRN    isosorbide mononitrate ER (IMDUR) tablet 30 mg  30 mg Oral DAILY    montelukast (SINGULAIR) tablet 10 mg  10 mg Oral QHS    pantoprazole (PROTONIX) tablet 40 mg  40 mg Oral DAILY    pregabalin (LYRICA) capsule 75 mg  75 mg Oral BID    rosuvastatin (CRESTOR) tablet 5 mg  5 mg Oral DAILY    sacubitriL-valsartan (ENTRESTO) 49-51 mg tablet 1 Tablet  1 Tablet Oral BID    spironolactone (ALDACTONE) tablet 25 mg  25 mg Oral DAILY    sodium chloride (NS) flush 5-40 mL  5-40 mL IntraVENous Q8H    sodium chloride (NS) flush 5-40 mL  5-40 mL IntraVENous PRN    ondansetron (ZOFRAN) injection 4 mg  4 mg IntraVENous Q4H PRN    docusate sodium (COLACE) capsule 200 mg  200 mg Oral BID    lactulose (CHRONULAC) 10 gram/15 mL solution 30 mL  20 g Oral DAILY PRN    budesonide (PULMICORT) 500 mcg/2 ml nebulizer suspension  250 mcg Nebulization BID RT    albuterol-ipratropium (DUO-NEB) 2.5 MG-0.5 MG/3 ML  3 mL Nebulization Q4H PRN    acetaminophen (TYLENOL) tablet 650 mg  650 mg Oral Q4H PRN    hydrocortisone (ANUSOL-HC) 2.5 % rectal cream   PeriANAL QID PRN     ______________________________________________________________________  EXPECTED LENGTH OF STAY: 3d 19h  ACTUAL LENGTH OF STAY:          6                 Jason Thacker NP

## 2022-05-02 ENCOUNTER — APPOINTMENT (OUTPATIENT)
Dept: GENERAL RADIOLOGY | Age: 57
DRG: 291 | End: 2022-05-02
Attending: HOSPITALIST
Payer: MEDICARE

## 2022-05-02 VITALS
HEART RATE: 63 BPM | TEMPERATURE: 97.5 F | OXYGEN SATURATION: 94 % | BODY MASS INDEX: 41.75 KG/M2 | WEIGHT: 315 LBS | HEIGHT: 73 IN | DIASTOLIC BLOOD PRESSURE: 83 MMHG | RESPIRATION RATE: 18 BRPM | SYSTOLIC BLOOD PRESSURE: 143 MMHG

## 2022-05-02 LAB
ALBUMIN SERPL-MCNC: 2.9 G/DL (ref 3.5–5)
ALBUMIN/GLOB SERPL: 0.7 {RATIO} (ref 1.1–2.2)
ALP SERPL-CCNC: 86 U/L (ref 45–117)
ALT SERPL-CCNC: 12 U/L (ref 12–78)
ANION GAP SERPL CALC-SCNC: 5 MMOL/L (ref 5–15)
AST SERPL-CCNC: 6 U/L (ref 15–37)
BASOPHILS # BLD: 0 K/UL (ref 0–0.1)
BASOPHILS NFR BLD: 0 % (ref 0–1)
BILIRUB SERPL-MCNC: 0.5 MG/DL (ref 0.2–1)
BUN SERPL-MCNC: 32 MG/DL (ref 6–20)
BUN/CREAT SERPL: 35 (ref 12–20)
CALCIUM SERPL-MCNC: 8.7 MG/DL (ref 8.5–10.1)
CHLORIDE SERPL-SCNC: 96 MMOL/L (ref 97–108)
CO2 SERPL-SCNC: 38 MMOL/L (ref 21–32)
CREAT SERPL-MCNC: 0.91 MG/DL (ref 0.7–1.3)
DIFFERENTIAL METHOD BLD: ABNORMAL
EOSINOPHIL # BLD: 0.1 K/UL (ref 0–0.4)
EOSINOPHIL NFR BLD: 1 % (ref 0–7)
ERYTHROCYTE [DISTWIDTH] IN BLOOD BY AUTOMATED COUNT: 21.3 % (ref 11.5–14.5)
GLOBULIN SER CALC-MCNC: 4 G/DL (ref 2–4)
GLUCOSE BLD STRIP.AUTO-MCNC: 132 MG/DL (ref 65–117)
GLUCOSE BLD STRIP.AUTO-MCNC: 154 MG/DL (ref 65–117)
GLUCOSE SERPL-MCNC: 143 MG/DL (ref 65–100)
HCT VFR BLD AUTO: 47.4 % (ref 36.6–50.3)
HGB BLD-MCNC: 13 G/DL (ref 12.1–17)
IMM GRANULOCYTES # BLD AUTO: 0.1 K/UL (ref 0–0.04)
IMM GRANULOCYTES NFR BLD AUTO: 1 % (ref 0–0.5)
LYMPHOCYTES # BLD: 1 K/UL (ref 0.8–3.5)
LYMPHOCYTES NFR BLD: 9 % (ref 12–49)
MCH RBC QN AUTO: 21.5 PG (ref 26–34)
MCHC RBC AUTO-ENTMCNC: 27.4 G/DL (ref 30–36.5)
MCV RBC AUTO: 78.2 FL (ref 80–99)
MONOCYTES # BLD: 0.8 K/UL (ref 0–1)
MONOCYTES NFR BLD: 7 % (ref 5–13)
NEUTS SEG # BLD: 8.9 K/UL (ref 1.8–8)
NEUTS SEG NFR BLD: 82 % (ref 32–75)
NRBC # BLD: 0 K/UL (ref 0–0.01)
NRBC BLD-RTO: 0 PER 100 WBC
PLATELET # BLD AUTO: 199 K/UL (ref 150–400)
PLATELET COMMENTS,PCOM: ABNORMAL
PMV BLD AUTO: 10.2 FL (ref 8.9–12.9)
POTASSIUM SERPL-SCNC: 4.2 MMOL/L (ref 3.5–5.1)
PROT SERPL-MCNC: 6.9 G/DL (ref 6.4–8.2)
RBC # BLD AUTO: 6.06 M/UL (ref 4.1–5.7)
RBC MORPH BLD: ABNORMAL
RBC MORPH BLD: ABNORMAL
SERVICE CMNT-IMP: ABNORMAL
SERVICE CMNT-IMP: ABNORMAL
SODIUM SERPL-SCNC: 139 MMOL/L (ref 136–145)
WBC # BLD AUTO: 10.9 K/UL (ref 4.1–11.1)

## 2022-05-02 PROCEDURE — 74011250636 HC RX REV CODE- 250/636: Performed by: STUDENT IN AN ORGANIZED HEALTH CARE EDUCATION/TRAINING PROGRAM

## 2022-05-02 PROCEDURE — 71045 X-RAY EXAM CHEST 1 VIEW: CPT

## 2022-05-02 PROCEDURE — 97535 SELF CARE MNGMENT TRAINING: CPT

## 2022-05-02 PROCEDURE — 74011636637 HC RX REV CODE- 636/637: Performed by: HOSPITALIST

## 2022-05-02 PROCEDURE — 82962 GLUCOSE BLOOD TEST: CPT

## 2022-05-02 PROCEDURE — 80053 COMPREHEN METABOLIC PANEL: CPT

## 2022-05-02 PROCEDURE — 94760 N-INVAS EAR/PLS OXIMETRY 1: CPT

## 2022-05-02 PROCEDURE — 36415 COLL VENOUS BLD VENIPUNCTURE: CPT

## 2022-05-02 PROCEDURE — 74011000250 HC RX REV CODE- 250: Performed by: HOSPITALIST

## 2022-05-02 PROCEDURE — 74011250637 HC RX REV CODE- 250/637: Performed by: HOSPITALIST

## 2022-05-02 PROCEDURE — 85025 COMPLETE CBC W/AUTO DIFF WBC: CPT

## 2022-05-02 PROCEDURE — 74011250637 HC RX REV CODE- 250/637: Performed by: NURSE PRACTITIONER

## 2022-05-02 PROCEDURE — 77010033678 HC OXYGEN DAILY

## 2022-05-02 RX ORDER — MICONAZOLE NITRATE 2 %
POWDER (GRAM) TOPICAL 2 TIMES DAILY
Qty: 1 EACH | Refills: 0 | Status: SHIPPED
Start: 2022-05-02

## 2022-05-02 RX ORDER — PREDNISONE 20 MG/1
40 TABLET ORAL
Status: DISCONTINUED | OUTPATIENT
Start: 2022-05-02 | End: 2022-05-02 | Stop reason: HOSPADM

## 2022-05-02 RX ORDER — POLYETHYLENE GLYCOL 3350 17 G/17G
17 POWDER, FOR SOLUTION ORAL DAILY
Qty: 30 EACH | Refills: 0 | Status: SHIPPED
Start: 2022-05-03

## 2022-05-02 RX ORDER — IPRATROPIUM BROMIDE AND ALBUTEROL SULFATE 2.5; .5 MG/3ML; MG/3ML
3 SOLUTION RESPIRATORY (INHALATION)
Qty: 30 NEBULE | Refills: 0 | Status: SHIPPED
Start: 2022-05-02

## 2022-05-02 RX ORDER — TAMSULOSIN HYDROCHLORIDE 0.4 MG/1
0.4 CAPSULE ORAL DAILY
Qty: 30 CAPSULE | Refills: 0 | Status: SHIPPED
Start: 2022-05-03

## 2022-05-02 RX ORDER — DOCUSATE SODIUM 100 MG/1
200 CAPSULE, LIQUID FILLED ORAL 2 TIMES DAILY
Qty: 120 CAPSULE | Refills: 2 | Status: SHIPPED
Start: 2022-05-02 | End: 2022-07-31

## 2022-05-02 RX ORDER — BUDESONIDE AND FORMOTEROL FUMARATE DIHYDRATE 160; 4.5 UG/1; UG/1
2 AEROSOL RESPIRATORY (INHALATION) 2 TIMES DAILY
Qty: 10.2 G | Refills: 2 | Status: SHIPPED
Start: 2022-05-02

## 2022-05-02 RX ORDER — PREDNISONE 10 MG/1
TABLET ORAL
Qty: 30 TABLET | Refills: 0 | Status: SHIPPED
Start: 2022-05-02

## 2022-05-02 RX ADMIN — MICONAZOLE NITRATE 2 % TOPICAL POWDER: at 09:00

## 2022-05-02 RX ADMIN — SODIUM CHLORIDE, PRESERVATIVE FREE 10 ML: 5 INJECTION INTRAVENOUS at 14:00

## 2022-05-02 RX ADMIN — TAMSULOSIN HYDROCHLORIDE 0.4 MG: 0.4 CAPSULE ORAL at 11:00

## 2022-05-02 RX ADMIN — POLYETHYLENE GLYCOL 3350 17 G: 17 POWDER, FOR SOLUTION ORAL at 11:01

## 2022-05-02 RX ADMIN — DOCUSATE SODIUM 200 MG: 100 CAPSULE, LIQUID FILLED ORAL at 11:01

## 2022-05-02 RX ADMIN — INSULIN GLARGINE 18 UNITS: 100 INJECTION, SOLUTION SUBCUTANEOUS at 11:01

## 2022-05-02 RX ADMIN — ASPIRIN 81 MG: 81 TABLET, COATED ORAL at 11:00

## 2022-05-02 RX ADMIN — EZETIMIBE 10 MG: 10 TABLET ORAL at 11:00

## 2022-05-02 RX ADMIN — ISOSORBIDE MONONITRATE 30 MG: 30 TABLET, EXTENDED RELEASE ORAL at 11:00

## 2022-05-02 RX ADMIN — ROSUVASTATIN 5 MG: 10 TABLET, FILM COATED ORAL at 11:00

## 2022-05-02 RX ADMIN — APIXABAN 5 MG: 5 TABLET, FILM COATED ORAL at 11:00

## 2022-05-02 RX ADMIN — SODIUM CHLORIDE, PRESERVATIVE FREE 10 ML: 5 INJECTION INTRAVENOUS at 06:01

## 2022-05-02 RX ADMIN — FUROSEMIDE 40 MG: 10 INJECTION, SOLUTION INTRAVENOUS at 11:01

## 2022-05-02 RX ADMIN — AMIODARONE HYDROCHLORIDE 100 MG: 200 TABLET ORAL at 11:00

## 2022-05-02 RX ADMIN — SACUBITRIL AND VALSARTAN 1 TABLET: 49; 51 TABLET, FILM COATED ORAL at 11:00

## 2022-05-02 RX ADMIN — SPIRONOLACTONE 25 MG: 25 TABLET ORAL at 11:00

## 2022-05-02 RX ADMIN — Medication 2 UNITS: at 11:30

## 2022-05-02 RX ADMIN — PREDNISONE 40 MG: 20 TABLET ORAL at 11:00

## 2022-05-02 RX ADMIN — PANTOPRAZOLE SODIUM 40 MG: 40 TABLET, DELAYED RELEASE ORAL at 11:00

## 2022-05-02 RX ADMIN — PREGABALIN 75 MG: 25 CAPSULE ORAL at 10:59

## 2022-05-02 NOTE — DISCHARGE SUMMARY
Discharge Summary     Patient:  Janae Rm       MRN: [id number]       YOB: 1965       Age: 55 y.o. Date of admission:  4/25/2022    Date of discharge:  5/2/2022    Primary care provider: Dr. Palma Bright    Admitting provider:  Javier Thompson MD    Discharging provider:  Lyna Fothergill, MD - [phone]  If unavailable, call [phone] and ask the  to page the triage hospitalist.    Consultations  · IP CONSULT TO PULMONOLOGY    Procedures  · * No surgery found *    Discharge destination: SNF. The patient is stable for discharge. Admission diagnosis  · Shortness of breath [R06.02]    Current Discharge Medication List      START taking these medications    Details   albuterol-ipratropium (DUO-NEB) 2.5 mg-0.5 mg/3 ml nebu 3 mL by Nebulization route every four to six (4-6) hours as needed for Wheezing or Shortness of Breath. Qty: 30 Nebule, Refills: 0  Start date: 5/2/2022      docusate sodium (COLACE) 100 mg capsule Take 2 Capsules by mouth two (2) times a day for 90 days. Qty: 120 Capsule, Refills: 2  Start date: 5/2/2022, End date: 7/31/2022      miconazole (MICOTIN) 2 % topical powder Apply  to affected area two (2) times a day. Antifungal powder under pannus, in groin, gluteal cleft, and scrotum  Qty: 1 Each, Refills: 0  Start date: 5/2/2022      polyethylene glycol (MIRALAX) 17 gram packet Take 1 Packet by mouth daily. Qty: 30 Each, Refills: 0  Start date: 5/3/2022      predniSONE (DELTASONE) 10 mg tablet Take 4 tab daily X 3 days then 3 tabs daily X 3 days then 2 tabs daily X 3 days then 1 tab daily X 3 days  Qty: 30 Tablet, Refills: 0  Start date: 5/2/2022      tamsulosin (FLOMAX) 0.4 mg capsule Take 1 Capsule by mouth daily.   Qty: 30 Capsule, Refills: 0  Start date: 5/3/2022      budesonide-formoteroL (Symbicort) 160-4.5 mcg/actuation HFAA Take 2 Puffs by inhalation two (2) times a day.  Qty: 10.2 g, Refills: 2  Start date: 5/2/2022         CONTINUE these medications which have NOT CHANGED    Details   torsemide (DEMADEX) 20 mg tablet Take 60 mg by mouth daily. Indications: high blood pressure      pregabalin (LYRICA) 75 mg capsule Take 75 mg by mouth two (2) times a day. Indications: nerve pain after herpes      apixaban (ELIQUIS) 5 mg tablet Take 5 mg by mouth two (2) times a day. Indications: treatment to prevent blood clots in chronic atrial fibrillation      aspirin delayed-release 81 mg tablet Take  by mouth daily. Indications: treatment to prevent a heart attack      amiodarone (PACERONE) 100 mg tablet Take 100 mg by mouth daily. ezetimibe (ZETIA) 10 mg tablet Take 10 mg by mouth daily. pantoprazole (PROTONIX) 40 mg tablet Take 40 mg by mouth daily. Indications: gastroesophageal reflux disease      empagliflozin (Jardiance) 25 mg tablet Take 25 mg by mouth daily. Indications: type 2 diabetes mellitus      isosorbide mononitrate ER (IMDUR) 30 mg tablet Take 30 mg by mouth daily. Indications: prevention of anginal chest pain associated with coronary artery disease      sacubitriL-valsartan (Entresto) 49-51 mg tab tablet Take 1 Tablet by mouth two (2) times a day. rosuvastatin (CRESTOR) 5 mg tablet Take 5 mg by mouth daily. spironolactone (ALDACTONE) 25 mg tablet Take 25 mg by mouth daily. montelukast (SINGULAIR) 10 mg tablet Take 10 mg by mouth nightly. insulin glargine (LANTUS) 100 unit/mL injection 18 Units by SubCUTAneous route daily.              Follow-up Information     Follow up With Specialties Details Why [facility]  On 5/1/2022 [address]  [phone]    Your PCP   In 2 weeks Discharge follow up      Jina Teran MD Pulmonary Disease In 3 months discharge follow up  [address] [facility]  [address] [facility],[address] [phone]            Final discharge diagnoses and brief hospital course  Please also refer to the admission H&P for details on the presenting problem. 73-year-old  male with past medical history significant for chronic respiratory failure, home oxygen dependent 2 liters per minute, COPD, diastolic congestive heart failure, hypertension, type 2 diabetes mellitus, paroxysmal AFib, dyslipidemia, chronic lower extremities wound, who presented to [facility] with progressive shortness of breath for the last 2 days. In Er, required 6L to maintain O2> 88%      Acute on chronic  hypoxic respiratory failure   COPD exacerbation  Acute on chronic diastolic congestive heart failure, NYHA class II on admission and discharge   Improved with IV diuretics, IV steroids, nebs, IS  C/w Entresto, Diuretics  Add nebs q6h PRN, Symbicort BID  Wean o2, home O2 requirement 2L  Daily wts    Paroxysmal atrial fibrillation  Heart rate currently controlled  Continuing amiodarone  Continuing apixaban     Type 2 diabetes  - c/w home regimen      Hypertension  Blood pressure currently controlled  C/w imdur, entresto, diuretics      Obesity  BMI of 43  Counseled on healthy dietary choices     Constipation  Patient stating has not had a bowel movement in several days. Complains of constipation  I have ordered daily MiraLAX  He does have as needed lactulose ordered  When I informed the nurse, she tells me he had several large bowel movements yesterday  Hold bowel regimen for diarrhea  Resolved      Hyperlipidemia  Stable  Continue rosuvastatin  Continue Zetia    FOLLOW-UP CARE RECOMMENDATIONS:        FOLLOW-UP TESTS RECOMMENDED:   · Outpatient PFTs with pulmonary team     ONGOING TREATMENT PLAN: as above     PENDING TEST RESULTS:  At the time of discharge the following test results are still pending: none . Please review these results as they become available.     Specific symptoms to watch for: chest pain, shortness of breath, fever, chills, nausea, vomiting, diarrhea, change in mentation, falling, weakness, bleeding. DIET:  Cardiac Diet and Diabetic Diet    ACTIVITY:  Activity as tolerated    WOUND CARE:  Wound Recommendations:    LLL: clean with saline, apply Opticel AG and re-wrap legs loosely with ACE. Change daily. Antifungal powder under pannus, in groin, gluteal cleft, and scrotum      EQUIPMENT needed:  TAMMY    INCIDENTAL FINDINGS:  NONE    GOALS OF CARE:  X  Eventual return to home/independent/assisted living     Long term SNF      Hospice     No rehospitalization     Patient condition at discharge:   Functional status    Poor    X  Deconditioned      Independent   Cognition  X  Lucid     Forgetful (some sensescence)     Dementia   Catheters/lines (plus indication)    Foster     PICC      PEG    X     Code status  X  Full code      DNR        Physical examination at discharge  Visit Vitals  BP (!) 143/83 (BP 1 Location: Right upper arm, BP Patient Position: Sitting)   Pulse (!) 57   Temp 97.5 °F (36.4 °C)   Resp 18   Ht 6' 1\" (1.854 m)   Wt 147 kg (324 lb)   SpO2 94%   BMI 42.75 kg/m²     Ao3  Lung improved air entry , no wheezing  CVS: RRR  ABd: soft NT ND   Ext: no edema     Pertinent imaging studies:      Recent Labs     05/02/22  0506 04/30/22  0338   WBC 10.9 10.0   HGB 13.0 13.3   HCT 47.4 47.3    219     Recent Labs     05/02/22  0506 05/01/22  0631 04/30/22  0338    138 137   K 4.2 4.0 4.3   CL 96* 95* 94*   CO2 38* 38* 35*   BUN 32* 32* 34*   CREA 0.91 0.82 1.02   * 140* 137*   CA 8.7 8.8 9.0   MG  --  2.8*  --    PHOS  --  3.7 2.7     Recent Labs     05/02/22  0506 05/01/22  0631 04/30/22  0338   AP 86  --  89   TP 6.9  --  7.2   ALB 2.9* 2.9* 3.0*   GLOB 4.0  --  4.2*     No results for input(s): INR, PTP, APTT, INREXT in the last 72 hours. No results for input(s): FE, TIBC, PSAT, FERR in the last 72 hours. No results for input(s): PH, PCO2, PO2 in the last 72 hours.   No results for input(s): CPK, CKMB in the last 72 hours. No lab exists for component: TROPONINI  No components found for: [place]    Chronic Diagnoses:    Problem List as of 5/2/2022 Date Reviewed: 4/28/2013          Codes Class Noted - Resolved    Shortness of breath ICD-10-CM: R06.02  ICD-9-CM: 786.05  4/25/2022 - Present        Acute on chronic respiratory failure [facility]) ICD-10-CM: J96.20  ICD-9-CM: 518.84  9/9/2021 - Present              Time spent on discharge related activities today greater than 30 minutes.       Signed:  Tricia Joe MD                 Hospitalist, Internal Medicine      Cc: None

## 2022-05-02 NOTE — PROGRESS NOTES
Problem: Self Care Deficits Care Plan (Adult)  Goal: *Therapy Goal (Edit Goal, Insert Text)  Description: FUNCTIONAL STATUS PRIOR TO ADMISSION: Patient was modified independent for basic ADL tasks and light meal prep and was using a rollator for functional mobility. [facility]- he uses w/c there due to [facility] getting [facility]/off [facility] bus. Patient used supplemental O2 during sleep only. HOME SUPPORT: The patient lived alone. He has a [facility] provided caregiver M-F for 1 hr that assists primarily with home management/cleaning activities. Occupational Therapy  Initiated 4/27/20022  1. Patient will perform grooming in stand with modified independence within 7 day(s). 2.  Patient will perform bathing with modified independence within 7 day(s). 3.  Patient will perform lower body dressing with modified independence within 7 day(s). 4.  Patient will perform toilet transfers with modified independence within 7 day(s). 5.  Patient will perform all aspects of toileting with modified independence within 7 day(s). 6.  Patient will participate in upper extremity therapeutic exercise/activities with modified independence for 10 minutes within 7 day(s). 7.  Patient will utilize energy conservation techniques during functional activities with verbal cues within 7 day(s). Outcome: Progressing Towards Goal     OCCUPATIONAL THERAPY TREATMENT  Patient: Lisa Minor [de-identified]55 y.o. male)  Date: 5/2/2022  Diagnosis: Shortness of breath [R06.02] <principal problem not specified>       Precautions:    Chart, occupational therapy assessment, plan of care, and goals were reviewed. ASSESSMENT  Patient continues with skilled OT services and is progressing towards goals, however, remains limited by impaired functional mobility, low activity tolerance, and pain at groin. Pt cleared for therapy by nursing and received on 4L O2 via NC, sitting on BS reporting it felt comfortable to sit on the open surface. Pt interested in returning to chair. Pt uses rollator at home and did not want to use RW for transfer to chair. Pt utilized furniture in environment for standing pivot demo'ing flexed posture (CGA for sit<>stand). At end of session pt in chair with all needs met and starting UE exercises with green theraband. Continue to recommend SNF to maximize pt outcomes. Current Level of Function Impacting Discharge (ADLs): CGA for functional mobility    Other factors to consider for discharge: Mod I at baseline          PLAN :  Patient continues to benefit from skilled intervention to address the above impairments. Continue treatment per established plan of care to address goals. Recommend with staff: OOB to chair, off load to side, pt participation in self care    Recommend next OT session: continue POC    Recommendation for discharge: (in order for the patient to meet his/her long term goals)  Therapy up to 5 days/week in SNF setting    This discharge recommendation:  Has been made in collaboration with the attending provider and/or case management    IF patient discharges home will need the following DME: tbd       SUBJECTIVE:   Patient stated Don't help me. It throws me off.     OBJECTIVE DATA SUMMARY:   Cognitive/Behavioral Status:  Neurologic State: Alert  Orientation Level: Oriented X4  Cognition: Appropriate decision making; Appropriate for age attention/concentration; Follows commands     Functional Mobility and Transfers for ADLs:  Transfers:  Sit to Stand: Contact guard assistance          Balance:  Sitting: Intact  Standing: Impaired  Standing - Static: Fair;Constant support  Standing - Dynamic : Fair;Constant support       Pain:  At groin and scrotum     Activity Tolerance:   Fair    After treatment patient left in no apparent distress:   Sitting in chair and Call bell within reach    COMMUNICATION/COLLABORATION:   The patients plan of care was discussed with: Registered nurse.      [facility], OT  Time Calculation: 8 mins

## 2022-05-02 NOTE — DISCHARGE INSTRUCTIONS
Discharging provider: Mariah Guzman MD    Primary care provider: None      FINAL [facility] COURSE:    78-year-old  male with past medical history significant for chronic respiratory failure, home oxygen dependent 2 liters per minute, COPD, diastolic congestive heart failure, hypertension, type 2 diabetes mellitus, paroxysmal AFib, dyslipidemia, chronic lower extremities wound, who presented to [address] [place] [address] Emergency Department with progressive shortness of breath for the last 2 days. In Er, required 6L to maintain O2> 88%      Acute on chronic  hypoxic respiratory failure   COPD exacerbation  Acute on chronic diastolic congestive heart failure, NYHA class II on admission and discharge   Improved with IV diuretics, IV steroids, nebs, IS  C/w Entresto, Diuretics  Add nebs q6h PRN, Symbicort BID  Wean o2, home O2 requirement 2L  Daily wts    Paroxysmal atrial fibrillation  Heart rate currently controlled  Continuing amiodarone  Continuing apixaban     Type 2 diabetes  - c/w home regimen      Hypertension  Blood pressure currently controlled  C/w imdur, entresto, diuretics      Obesity  BMI of 43  Counseled on healthy dietary choices     Constipation  Patient stating has not had a bowel movement in several days.   Complains of constipation  I have ordered daily MiraLAX  He does have as needed lactulose ordered  When I informed the nurse, she tells me he had several large bowel movements yesterday  Hold bowel regimen for diarrhea  Resolved      Hyperlipidemia  Stable  Continue rosuvastatin  Continue Zetia    FOLLOW-UP CARE RECOMMENDATIONS:    APPOINTMENTS:  Follow-up Information     Follow up With Specialties Details Why [facility]  On 5/1/2022 [facility] [address] [facility]  [phone]    Your PCP   In 2 weeks Discharge follow up      Giles Yao MD Pulmonary Disease In 3 months discharge follow up  [address] [facility] [address]  [address] [address] [facility]  [phone]            FOLLOW-UP TESTS RECOMMENDED:   · Outpatient PFTs with pulmonary team     ONGOING TREATMENT PLAN: as above     PENDING TEST RESULTS:  At the time of discharge the following test results are still pending: none . Please review these results as they become available. Specific symptoms to watch for: chest pain, shortness of breath, fever, chills, nausea, vomiting, diarrhea, change in mentation, falling, weakness, bleeding. DIET:  Cardiac Diet and Diabetic Diet    ACTIVITY:  Activity as tolerated    WOUND CARE:  Wound Recommendations:    LLL: clean with saline, apply Opticel AG and re-wrap legs loosely with ACE. Change daily. Antifungal powder under pannus, in groin, gluteal cleft, and scrotum      EQUIPMENT needed:  NOEN    INCIDENTAL FINDINGS:  NONE    GOALS OF CARE:  X  Eventual return to home/independent/assisted living     Long term SNF      Hospice     No rehospitalization     Patient condition at discharge:   Functional status    Poor    X  Deconditioned      Independent   Cognition  X  Lucid     Forgetful (some sensescence)     Dementia   Catheters/lines (plus indication)    Foster     PICC      PEG    X     Code status  X  Full code      DNR    . . . . . . . . . . . . . . . . . . . . . . . . . . . . . . . . . . . . . . . . . . . . . . . . . . . . . . . . . . . . . . . . . . . . . . . Arthur Garcia      CHRONIC MEDICAL CONDITIONS:  Problem List as of 5/2/2022 Date Reviewed: 4/28/2013          Codes Class Noted - Resolved    Shortness of breath ICD-10-CM: R06.02  ICD-9-CM: 786.05  4/25/2022 - Present        Acute on chronic respiratory failure ([facility] [address] ICD-10-CM: J96.20  ICD-9-CM: 518.84  9/9/2021 - Present

## 2022-05-02 NOTE — PROGRESS NOTES
Called report to Dread Javier LPN at Health system. Pt going to room 114b. Questions were answered and callback number provided.

## 2022-05-02 NOTE — PROGRESS NOTES
Transition of Care Plan   RUR- 12% Moderate Risk   DISPOSITION: The disposition plan is to transition to a SNF- [facility] ([address]) Phone: [phone] - patient accepted. [employer] Insurance auth - completed - 5/2/22   F/U with PCP/Specialist     Transport: [facility] - Transport - [phone] - 3:00pm today    Patients - [facility] Coordinator - Vivienne Serrano - [phone]    Patient has been recommended for SNF. Patient has been accepted to:    [facility] ([address]) Phone: [phone]. Insurance [place] was started on 4/29/22. At 9:57am -  messaged Kaitlyn admin coordinator at facility to follow up regarding completion of the insurance auth. CM awaiting response. At 11:33am -  received a call from Vivienne Serrano who reports that the insurance authorization has been completed. At 11:51am -  attempted to contact admin coordinator to follow up regarding the insurance auth. Admin coordinator reports patient can come today. CM encouraged to upload dc summary to all scripts. At 11:57am -  perfect served attending the information below. At 12:03pm -  attempted to contact Vivienne Serrano patients inovage coordinator to provide the below information, also to coordinate with transport. CM left a VM awaiting call back. At 12:08apm -  attempted to contact [facility] to set up transport for patient. Left VM awaiting call back. At 12:11pm -  attempted to contact Vivienne Serrano to coordinator discharge transport. CM received call back that she Vivienne Serrano will arrange transport. At 1:07pm -  fax attached discharge summary per request via all scripts. At 1:52pm -  received call from Vivienne Serrano who confirmed 3:00pm pick (with tender [facility] ambulance). Room Number: 114B  Transport: 3:00pm  Call to report: [phone]  [facility]S has been updated.   [facility] packet has been placed at bedside chart. CM to assist with DEISY needs as they arise.   FELICIA Herron, CRM, LMHP-e  Available in Perfect Serve

## 2022-05-03 ENCOUNTER — PATIENT OUTREACH (OUTPATIENT)
Dept: CASE MANAGEMENT | Age: 57
End: 2022-05-03

## 2022-05-03 NOTE — PROGRESS NOTES
Transition of care outreach postponed for 14 days due to patient's discharge to SNF.   D/C to [facility] 5/2/22 f/u 14d

## 2022-05-04 NOTE — ADT AUTH CERT NOTES
Chronic Obstructive Pulmonary Disease - Care Day 6 (4/30/2022) by Alba Morel RN       Review Entered Review Status   5/3/2022 10:09 Completed      Criteria Review      Care Day: 6 Care Date: 4/30/2022 Level of Care: Telemetry    Guideline Day 2    Level Of Care    (X) Floor or intermediate care    5/3/2022 10:06:46 EDT by Rashid Vicente      4/30 tele    Clinical Status    ( ) * Hemodynamic stability    5/3/2022 10:06:46 EDT by Rashid Vicente      98.4 65 144/88 20 92% 4l nc   bp 147/71 105/63 112/69    (X) * Mechanical and noninvasive ventilation absent    5/3/2022 10:06:46 EDT by Rashid Vicente      na    ( ) * Uncompensated acidosis absent    5/3/2022 10:06:46 EDT by Rashid Vicente      gluc 137 131 249 328 257    Routes    (X) Diet as tolerated    5/3/2022 10:09:00 EDT by Rashid Vicente      1500 ml fr    5/3/2022 10:06:46 EDT by Rashid Vicente      reg    (X) IV medications    5/3/2022 10:06:46 EDT by Rashid Vicente      iv lasix 40mg 2x daily   lantus sc 18 units daily   lispro sc given 3 units x2 7 units x1    Interventions    (X) Oxygen    5/3/2022 10:06:46 EDT by Kj cool (baseline 2l nc at nite)    (X) DVT prophylaxis    5/3/2022 10:06:46 EDT by Rashid Vicente      po eliquis 5 mg 2x daily    Medications    (X) Systemic corticosteroids    5/3/2022 10:06:46 EDT by Rashid Vicente      iv solumedrol 40mg daily    ( ) Inhaled bronchodilators    5/3/2022 10:06:46 EDT by Rashid Vicente      dounebs q4h prn given x2    * Milestone   Additional Notes   4/30/22 LOC IP TELE       Per attending    He said he started feeling better but has not ambulated much.  Needing 4 L nasal cannula oxygen    Constitutional: Morbidly obese    ENT: Oral mucosa moist.    Resp:  no accessory muscle use.     CV: Regular normal rate,    GI: Soft, obese      Musculoskeletal: Pitting edema    Neurologic: AAOx3   --Continue O2, wean as tolerated   --Continue to wean to Solu-Medrol 40 mg daily --Incentive spirometer   --Stacie   -- Pulmonary consulted, cleared recommendations       Acute diastolic CHF, nyha class 2   --IV Lasix 40 mg twice daily   --Fluid restriction to 1.5 L daily, sodium restricted diet, strict I&O   -- Continue home medications.  Imdur, Entresto and Aldactone   --monitor labs and replace electrolytes       Paroxysmal A.  Fib-- amiodarone/Eliquis       HTN--Continue   home  medications       DMT2-continue with home   Lantus/sliding scale insulin       Morbid obesity-Encourage diet/exercise and weight loss   Code status:  Full code   Prophylaxis: Eliquis   Anticipated Disposition: SNF, awaiting ins auth

## 2022-05-18 ENCOUNTER — APPOINTMENT (OUTPATIENT)
Dept: CT IMAGING | Age: 57
DRG: 871 | End: 2022-05-18
Attending: HOSPITALIST
Payer: MEDICARE

## 2022-05-18 ENCOUNTER — PATIENT OUTREACH (OUTPATIENT)
Dept: CASE MANAGEMENT | Age: 57
End: 2022-05-18

## 2022-05-18 ENCOUNTER — APPOINTMENT (OUTPATIENT)
Dept: GENERAL RADIOLOGY | Age: 57
DRG: 871 | End: 2022-05-18
Attending: HOSPITALIST
Payer: MEDICARE

## 2022-05-18 ENCOUNTER — APPOINTMENT (OUTPATIENT)
Dept: GENERAL RADIOLOGY | Age: 57
DRG: 871 | End: 2022-05-18
Attending: EMERGENCY MEDICINE
Payer: MEDICARE

## 2022-05-18 ENCOUNTER — HOSPITAL ENCOUNTER (INPATIENT)
Age: 57
LOS: 16 days | Discharge: NURSING, REHAB OR LONG-TERM CARE | DRG: 871 | End: 2022-06-03
Attending: EMERGENCY MEDICINE | Admitting: INTERNAL MEDICINE
Payer: MEDICARE

## 2022-05-18 DIAGNOSIS — N17.9 AKI (ACUTE KIDNEY INJURY) (HCC): ICD-10-CM

## 2022-05-18 DIAGNOSIS — R65.20 SEVERE SEPSIS (HCC): Primary | ICD-10-CM

## 2022-05-18 DIAGNOSIS — L89.90 PRESSURE INJURY OF SKIN, UNSPECIFIED INJURY STAGE, UNSPECIFIED LOCATION: ICD-10-CM

## 2022-05-18 DIAGNOSIS — A41.9 SEVERE SEPSIS (HCC): Primary | ICD-10-CM

## 2022-05-18 LAB
ALBUMIN SERPL-MCNC: 2.8 G/DL (ref 3.5–5)
ALBUMIN/GLOB SERPL: 0.7 {RATIO} (ref 1.1–2.2)
ALP SERPL-CCNC: 89 U/L (ref 45–117)
ALT SERPL-CCNC: 12 U/L (ref 12–78)
ANION GAP SERPL CALC-SCNC: 5 MMOL/L (ref 5–15)
APPEARANCE UR: CLEAR
ARTERIAL PATENCY WRIST A: POSITIVE
AST SERPL-CCNC: 11 U/L (ref 15–37)
B PERT DNA SPEC QL NAA+PROBE: NOT DETECTED
BACTERIA URNS QL MICRO: NEGATIVE /HPF
BASE EXCESS BLD CALC-SCNC: 4.4 MMOL/L
BASE EXCESS BLD CALC-SCNC: 4.4 MMOL/L
BASOPHILS # BLD: 0 K/UL (ref 0–0.1)
BASOPHILS NFR BLD: 0 % (ref 0–1)
BDY SITE: ABNORMAL
BILIRUB SERPL-MCNC: 0.8 MG/DL (ref 0.2–1)
BILIRUB UR QL: NEGATIVE
BNP SERPL-MCNC: 576 PG/ML
BORDETELLA PARAPERTUSSIS PCR, BORPAR: NOT DETECTED
BUN SERPL-MCNC: 63 MG/DL (ref 6–20)
BUN/CREAT SERPL: 32 (ref 12–20)
C PNEUM DNA SPEC QL NAA+PROBE: NOT DETECTED
CA-I BLD-MCNC: 1.07 MMOL/L (ref 1.12–1.32)
CALCIUM SERPL-MCNC: 9 MG/DL (ref 8.5–10.1)
CHLORIDE BLD-SCNC: 90 MMOL/L (ref 100–108)
CHLORIDE SERPL-SCNC: 92 MMOL/L (ref 97–108)
CO2 BLD-SCNC: 32 MMOL/L (ref 19–24)
CO2 SERPL-SCNC: 35 MMOL/L (ref 21–32)
COLOR UR: ABNORMAL
COMMENT, HOLDF: NORMAL
CREAT SERPL-MCNC: 2 MG/DL (ref 0.7–1.3)
CREAT UR-MCNC: 2 MG/DL (ref 0.6–1.3)
DIFFERENTIAL METHOD BLD: ABNORMAL
EOSINOPHIL # BLD: 0 K/UL (ref 0–0.4)
EOSINOPHIL NFR BLD: 0 % (ref 0–7)
EPITH CASTS URNS QL MICRO: ABNORMAL /LPF
ERYTHROCYTE [DISTWIDTH] IN BLOOD BY AUTOMATED COUNT: 22.6 % (ref 11.5–14.5)
FLUAV H1 2009 PAND RNA SPEC QL NAA+PROBE: NOT DETECTED
FLUAV H1 RNA SPEC QL NAA+PROBE: NOT DETECTED
FLUAV H3 RNA SPEC QL NAA+PROBE: NOT DETECTED
FLUAV SUBTYP SPEC NAA+PROBE: NOT DETECTED
FLUBV RNA SPEC QL NAA+PROBE: NOT DETECTED
GAS FLOW.O2 O2 DELIVERY SYS: ABNORMAL L/MIN
GLOBULIN SER CALC-MCNC: 4 G/DL (ref 2–4)
GLUCOSE BLD STRIP.AUTO-MCNC: 220 MG/DL (ref 65–117)
GLUCOSE BLD STRIP.AUTO-MCNC: 252 MG/DL (ref 74–106)
GLUCOSE SERPL-MCNC: 271 MG/DL (ref 65–100)
GLUCOSE UR STRIP.AUTO-MCNC: >1000 MG/DL
HADV DNA SPEC QL NAA+PROBE: NOT DETECTED
HCO3 BLD-SCNC: 33 MMOL/L (ref 22–26)
HCO3 BLDA-SCNC: 31 MMOL/L
HCOV 229E RNA SPEC QL NAA+PROBE: NOT DETECTED
HCOV HKU1 RNA SPEC QL NAA+PROBE: NOT DETECTED
HCOV NL63 RNA SPEC QL NAA+PROBE: NOT DETECTED
HCOV OC43 RNA SPEC QL NAA+PROBE: NOT DETECTED
HCT VFR BLD AUTO: 44.1 % (ref 36.6–50.3)
HGB BLD-MCNC: 12.9 G/DL (ref 12.1–17)
HGB UR QL STRIP: ABNORMAL
HMPV RNA SPEC QL NAA+PROBE: NOT DETECTED
HPIV1 RNA SPEC QL NAA+PROBE: NOT DETECTED
HPIV2 RNA SPEC QL NAA+PROBE: NOT DETECTED
HPIV3 RNA SPEC QL NAA+PROBE: NOT DETECTED
HPIV4 RNA SPEC QL NAA+PROBE: NOT DETECTED
HYALINE CASTS URNS QL MICRO: ABNORMAL /LPF (ref 0–5)
IMM GRANULOCYTES # BLD AUTO: 0.2 K/UL (ref 0–0.04)
IMM GRANULOCYTES NFR BLD AUTO: 1 % (ref 0–0.5)
KETONES UR QL STRIP.AUTO: NEGATIVE MG/DL
LACTATE BLD-SCNC: 2.56 MMOL/L (ref 0.4–2)
LACTATE SERPL-SCNC: 2.3 MMOL/L (ref 0.4–2)
LACTATE SERPL-SCNC: 3.1 MMOL/L (ref 0.4–2)
LEUKOCYTE ESTERASE UR QL STRIP.AUTO: NEGATIVE
LYMPHOCYTES # BLD: 0.6 K/UL (ref 0.8–3.5)
LYMPHOCYTES NFR BLD: 3 % (ref 12–49)
M PNEUMO DNA SPEC QL NAA+PROBE: NOT DETECTED
MCH RBC QN AUTO: 22.6 PG (ref 26–34)
MCHC RBC AUTO-ENTMCNC: 29.3 G/DL (ref 30–36.5)
MCV RBC AUTO: 77.1 FL (ref 80–99)
MONOCYTES # BLD: 0.6 K/UL (ref 0–1)
MONOCYTES NFR BLD: 3 % (ref 5–13)
NEUTS SEG # BLD: 19.6 K/UL (ref 1.8–8)
NEUTS SEG NFR BLD: 93 % (ref 32–75)
NITRITE UR QL STRIP.AUTO: NEGATIVE
NRBC # BLD: 0 K/UL (ref 0–0.01)
NRBC BLD-RTO: 0 PER 100 WBC
O2/TOTAL GAS SETTING VFR VENT: 11 %
PCO2 BLD: 67.1 MMHG (ref 35–45)
PCO2 BLDV: 54.7 MMHG (ref 41–51)
PH BLD: 7.3 [PH] (ref 7.35–7.45)
PH BLDV: 7.37 [PH] (ref 7.32–7.42)
PH UR STRIP: 5 [PH] (ref 5–8)
PLATELET # BLD AUTO: 140 K/UL (ref 150–400)
PO2 BLD: 96 MMHG (ref 80–100)
PO2 BLDV: 32 MMHG (ref 25–40)
POTASSIUM BLD-SCNC: 5.6 MMOL/L (ref 3.5–5.5)
POTASSIUM SERPL-SCNC: 4.4 MMOL/L (ref 3.5–5.1)
PROT SERPL-MCNC: 6.8 G/DL (ref 6.4–8.2)
PROT UR STRIP-MCNC: NEGATIVE MG/DL
RBC # BLD AUTO: 5.72 M/UL (ref 4.1–5.7)
RBC #/AREA URNS HPF: ABNORMAL /HPF (ref 0–5)
RBC MORPH BLD: ABNORMAL
RSV RNA SPEC QL NAA+PROBE: NOT DETECTED
RV+EV RNA SPEC QL NAA+PROBE: NOT DETECTED
SAMPLES BEING HELD,HOLD: NORMAL
SAO2 % BLD: 96.3 % (ref 92–97)
SARS-COV-2 PCR, COVPCR: NOT DETECTED
SERVICE CMNT-IMP: ABNORMAL
SODIUM BLD-SCNC: 133 MMOL/L (ref 136–145)
SODIUM SERPL-SCNC: 132 MMOL/L (ref 136–145)
SP GR UR REFRACTOMETRY: 1.01 (ref 1–1.03)
SPECIMEN SITE: ABNORMAL
SPECIMEN TYPE: ABNORMAL
UR CULT HOLD, URHOLD: NORMAL
UROBILINOGEN UR QL STRIP.AUTO: 0.2 EU/DL (ref 0.2–1)
WBC # BLD AUTO: 21 K/UL (ref 4.1–11.1)
WBC URNS QL MICRO: ABNORMAL /HPF (ref 0–4)

## 2022-05-18 PROCEDURE — 74011250637 HC RX REV CODE- 250/637: Performed by: INTERNAL MEDICINE

## 2022-05-18 PROCEDURE — 74011000258 HC RX REV CODE- 258: Performed by: HOSPITALIST

## 2022-05-18 PROCEDURE — 87040 BLOOD CULTURE FOR BACTERIA: CPT

## 2022-05-18 PROCEDURE — 85025 COMPLETE CBC W/AUTO DIFF WBC: CPT

## 2022-05-18 PROCEDURE — 36415 COLL VENOUS BLD VENIPUNCTURE: CPT

## 2022-05-18 PROCEDURE — 82947 ASSAY GLUCOSE BLOOD QUANT: CPT

## 2022-05-18 PROCEDURE — 83605 ASSAY OF LACTIC ACID: CPT

## 2022-05-18 PROCEDURE — 74176 CT ABD & PELVIS W/O CONTRAST: CPT

## 2022-05-18 PROCEDURE — 82962 GLUCOSE BLOOD TEST: CPT

## 2022-05-18 PROCEDURE — 81001 URINALYSIS AUTO W/SCOPE: CPT

## 2022-05-18 PROCEDURE — 74011000250 HC RX REV CODE- 250: Performed by: INTERNAL MEDICINE

## 2022-05-18 PROCEDURE — 83880 ASSAY OF NATRIURETIC PEPTIDE: CPT

## 2022-05-18 PROCEDURE — 93005 ELECTROCARDIOGRAM TRACING: CPT

## 2022-05-18 PROCEDURE — 51702 INSERT TEMP BLADDER CATH: CPT

## 2022-05-18 PROCEDURE — 74011000250 HC RX REV CODE- 250: Performed by: EMERGENCY MEDICINE

## 2022-05-18 PROCEDURE — 96375 TX/PRO/DX INJ NEW DRUG ADDON: CPT

## 2022-05-18 PROCEDURE — 74011636637 HC RX REV CODE- 636/637: Performed by: INTERNAL MEDICINE

## 2022-05-18 PROCEDURE — 36600 WITHDRAWAL OF ARTERIAL BLOOD: CPT

## 2022-05-18 PROCEDURE — 96365 THER/PROPH/DIAG IV INF INIT: CPT

## 2022-05-18 PROCEDURE — 74011000250 HC RX REV CODE- 250: Performed by: HOSPITALIST

## 2022-05-18 PROCEDURE — 74011000258 HC RX REV CODE- 258: Performed by: INTERNAL MEDICINE

## 2022-05-18 PROCEDURE — 65610000006 HC RM INTENSIVE CARE

## 2022-05-18 PROCEDURE — 0202U NFCT DS 22 TRGT SARS-COV-2: CPT

## 2022-05-18 PROCEDURE — 74011250636 HC RX REV CODE- 250/636: Performed by: INTERNAL MEDICINE

## 2022-05-18 PROCEDURE — 82803 BLOOD GASES ANY COMBINATION: CPT

## 2022-05-18 PROCEDURE — 74011250636 HC RX REV CODE- 250/636: Performed by: EMERGENCY MEDICINE

## 2022-05-18 PROCEDURE — 99285 EMERGENCY DEPT VISIT HI MDM: CPT

## 2022-05-18 PROCEDURE — 80053 COMPREHEN METABOLIC PANEL: CPT

## 2022-05-18 PROCEDURE — 94640 AIRWAY INHALATION TREATMENT: CPT

## 2022-05-18 PROCEDURE — 71045 X-RAY EXAM CHEST 1 VIEW: CPT

## 2022-05-18 RX ORDER — TAMSULOSIN HYDROCHLORIDE 0.4 MG/1
0.4 CAPSULE ORAL DAILY
Status: DISCONTINUED | OUTPATIENT
Start: 2022-05-19 | End: 2022-06-03 | Stop reason: HOSPADM

## 2022-05-18 RX ORDER — BISACODYL 5 MG
5 TABLET, DELAYED RELEASE (ENTERIC COATED) ORAL DAILY
Status: DISCONTINUED | OUTPATIENT
Start: 2022-05-19 | End: 2022-06-03 | Stop reason: HOSPADM

## 2022-05-18 RX ORDER — MAGNESIUM SULFATE 100 %
4 CRYSTALS MISCELLANEOUS AS NEEDED
Status: DISCONTINUED | OUTPATIENT
Start: 2022-05-18 | End: 2022-05-18 | Stop reason: SDUPTHER

## 2022-05-18 RX ORDER — ACETAMINOPHEN 325 MG/1
650 TABLET ORAL
Status: DISCONTINUED | OUTPATIENT
Start: 2022-05-18 | End: 2022-05-19

## 2022-05-18 RX ORDER — POLYETHYLENE GLYCOL 3350 17 G/17G
17 POWDER, FOR SOLUTION ORAL DAILY PRN
Status: DISCONTINUED | OUTPATIENT
Start: 2022-05-18 | End: 2022-06-03 | Stop reason: HOSPADM

## 2022-05-18 RX ORDER — IPRATROPIUM BROMIDE AND ALBUTEROL SULFATE 2.5; .5 MG/3ML; MG/3ML
3 SOLUTION RESPIRATORY (INHALATION)
Status: DISCONTINUED | OUTPATIENT
Start: 2022-05-18 | End: 2022-05-21

## 2022-05-18 RX ORDER — INSULIN LISPRO 100 [IU]/ML
INJECTION, SOLUTION INTRAVENOUS; SUBCUTANEOUS
Status: DISCONTINUED | OUTPATIENT
Start: 2022-05-18 | End: 2022-05-18 | Stop reason: SDUPTHER

## 2022-05-18 RX ORDER — MAGNESIUM SULFATE 100 %
4 CRYSTALS MISCELLANEOUS AS NEEDED
Status: DISCONTINUED | OUTPATIENT
Start: 2022-05-18 | End: 2022-06-03 | Stop reason: HOSPADM

## 2022-05-18 RX ORDER — AMIODARONE HYDROCHLORIDE 200 MG/1
100 TABLET ORAL DAILY
Status: DISCONTINUED | OUTPATIENT
Start: 2022-05-19 | End: 2022-06-03 | Stop reason: HOSPADM

## 2022-05-18 RX ORDER — SODIUM CHLORIDE 0.9 % (FLUSH) 0.9 %
5-40 SYRINGE (ML) INJECTION EVERY 8 HOURS
Status: DISCONTINUED | OUTPATIENT
Start: 2022-05-18 | End: 2022-06-03 | Stop reason: HOSPADM

## 2022-05-18 RX ORDER — SODIUM CHLORIDE 0.9 % (FLUSH) 0.9 %
5-40 SYRINGE (ML) INJECTION AS NEEDED
Status: DISCONTINUED | OUTPATIENT
Start: 2022-05-18 | End: 2022-06-03 | Stop reason: HOSPADM

## 2022-05-18 RX ORDER — FENTANYL CITRATE 50 UG/ML
25 INJECTION, SOLUTION INTRAMUSCULAR; INTRAVENOUS
Status: DISCONTINUED | OUTPATIENT
Start: 2022-05-18 | End: 2022-05-19

## 2022-05-18 RX ORDER — NOREPINEPHRINE BITARTRATE/D5W 8 MG/250ML
.5-2 PLASTIC BAG, INJECTION (ML) INTRAVENOUS
Status: DISCONTINUED | OUTPATIENT
Start: 2022-05-18 | End: 2022-05-19

## 2022-05-18 RX ORDER — ONDANSETRON 4 MG/1
4 TABLET, ORALLY DISINTEGRATING ORAL
Status: DISCONTINUED | OUTPATIENT
Start: 2022-05-18 | End: 2022-05-18

## 2022-05-18 RX ORDER — INSULIN LISPRO 100 [IU]/ML
INJECTION, SOLUTION INTRAVENOUS; SUBCUTANEOUS EVERY 6 HOURS
Status: DISCONTINUED | OUTPATIENT
Start: 2022-05-18 | End: 2022-05-19

## 2022-05-18 RX ORDER — ACETAMINOPHEN 650 MG/1
650 SUPPOSITORY RECTAL
Status: DISCONTINUED | OUTPATIENT
Start: 2022-05-18 | End: 2022-05-19

## 2022-05-18 RX ORDER — PANTOPRAZOLE SODIUM 40 MG/1
40 TABLET, DELAYED RELEASE ORAL DAILY
Status: DISCONTINUED | OUTPATIENT
Start: 2022-05-19 | End: 2022-06-03 | Stop reason: HOSPADM

## 2022-05-18 RX ORDER — ONDANSETRON 4 MG/1
4 TABLET, ORALLY DISINTEGRATING ORAL
Status: DISCONTINUED | OUTPATIENT
Start: 2022-05-18 | End: 2022-06-03 | Stop reason: HOSPADM

## 2022-05-18 RX ORDER — ONDANSETRON 2 MG/ML
4 INJECTION INTRAMUSCULAR; INTRAVENOUS
Status: DISCONTINUED | OUTPATIENT
Start: 2022-05-18 | End: 2022-05-18

## 2022-05-18 RX ORDER — POLYETHYLENE GLYCOL 3350 17 G/17G
17 POWDER, FOR SOLUTION ORAL DAILY
Status: DISCONTINUED | OUTPATIENT
Start: 2022-05-19 | End: 2022-05-20

## 2022-05-18 RX ORDER — ROSUVASTATIN CALCIUM 10 MG/1
5 TABLET, COATED ORAL DAILY
Status: DISCONTINUED | OUTPATIENT
Start: 2022-05-19 | End: 2022-06-03 | Stop reason: HOSPADM

## 2022-05-18 RX ORDER — ASPIRIN 81 MG/1
81 TABLET ORAL DAILY
Status: DISCONTINUED | OUTPATIENT
Start: 2022-05-19 | End: 2022-06-03 | Stop reason: HOSPADM

## 2022-05-18 RX ORDER — ACETAMINOPHEN 325 MG/1
650 TABLET ORAL
Status: DISCONTINUED | OUTPATIENT
Start: 2022-05-18 | End: 2022-05-18 | Stop reason: SDUPTHER

## 2022-05-18 RX ORDER — MIDODRINE HYDROCHLORIDE 5 MG/1
15 TABLET ORAL EVERY 8 HOURS
Status: DISCONTINUED | OUTPATIENT
Start: 2022-05-18 | End: 2022-05-21

## 2022-05-18 RX ORDER — MONTELUKAST SODIUM 10 MG/1
10 TABLET ORAL
Status: DISCONTINUED | OUTPATIENT
Start: 2022-05-18 | End: 2022-06-03 | Stop reason: HOSPADM

## 2022-05-18 RX ORDER — HYDROCORTISONE SODIUM SUCCINATE 100 MG/2ML
50 INJECTION, POWDER, FOR SOLUTION INTRAMUSCULAR; INTRAVENOUS EVERY 6 HOURS
Status: DISCONTINUED | OUTPATIENT
Start: 2022-05-18 | End: 2022-05-20

## 2022-05-18 RX ORDER — ACETAMINOPHEN 650 MG/1
650 SUPPOSITORY RECTAL
Status: DISCONTINUED | OUTPATIENT
Start: 2022-05-18 | End: 2022-05-18 | Stop reason: SDUPTHER

## 2022-05-18 RX ORDER — LEVOFLOXACIN 5 MG/ML
750 INJECTION, SOLUTION INTRAVENOUS ONCE
Status: COMPLETED | OUTPATIENT
Start: 2022-05-18 | End: 2022-05-18

## 2022-05-18 RX ORDER — ONDANSETRON 2 MG/ML
4 INJECTION INTRAMUSCULAR; INTRAVENOUS
Status: DISCONTINUED | OUTPATIENT
Start: 2022-05-18 | End: 2022-06-03 | Stop reason: HOSPADM

## 2022-05-18 RX ORDER — ALBUMIN HUMAN 250 G/1000ML
25 SOLUTION INTRAVENOUS ONCE
Status: COMPLETED | OUTPATIENT
Start: 2022-05-19 | End: 2022-05-19

## 2022-05-18 RX ORDER — POLYETHYLENE GLYCOL 3350 17 G/17G
17 POWDER, FOR SOLUTION ORAL DAILY PRN
Status: DISCONTINUED | OUTPATIENT
Start: 2022-05-18 | End: 2022-05-18

## 2022-05-18 RX ADMIN — MONTELUKAST 10 MG: 10 TABLET, FILM COATED ORAL at 21:00

## 2022-05-18 RX ADMIN — Medication 3 UNITS: at 17:00

## 2022-05-18 RX ADMIN — VANCOMYCIN HYDROCHLORIDE 2500 MG: 10 INJECTION, POWDER, LYOPHILIZED, FOR SOLUTION INTRAVENOUS at 16:54

## 2022-05-18 RX ADMIN — SODIUM CHLORIDE, PRESERVATIVE FREE 10 ML: 5 INJECTION INTRAVENOUS at 20:59

## 2022-05-18 RX ADMIN — NOREPINEPHRINE BITARTRATE 0.5 MCG/MIN: 1 SOLUTION INTRAVENOUS at 17:54

## 2022-05-18 RX ADMIN — IPRATROPIUM BROMIDE AND ALBUTEROL SULFATE 3 ML: .5; 3 SOLUTION RESPIRATORY (INHALATION) at 16:36

## 2022-05-18 RX ADMIN — ARFORMOTEROL TARTRATE: 15 SOLUTION RESPIRATORY (INHALATION) at 20:28

## 2022-05-18 RX ADMIN — ACETAMINOPHEN 650 MG: 325 TABLET ORAL at 20:51

## 2022-05-18 RX ADMIN — CEFEPIME 2 G: 2 INJECTION, POWDER, FOR SOLUTION INTRAVENOUS at 21:00

## 2022-05-18 RX ADMIN — IPRATROPIUM BROMIDE AND ALBUTEROL SULFATE 3 ML: .5; 3 SOLUTION RESPIRATORY (INHALATION) at 20:58

## 2022-05-18 RX ADMIN — SODIUM CHLORIDE 1000 ML: 9 INJECTION, SOLUTION INTRAVENOUS at 14:06

## 2022-05-18 RX ADMIN — MIDODRINE HYDROCHLORIDE 15 MG: 5 TABLET ORAL at 16:56

## 2022-05-18 RX ADMIN — SODIUM CHLORIDE 1000 ML: 9 INJECTION, SOLUTION INTRAVENOUS at 13:39

## 2022-05-18 RX ADMIN — CEFEPIME 2 G: 2 INJECTION, POWDER, FOR SOLUTION INTRAVENOUS at 14:13

## 2022-05-18 RX ADMIN — SODIUM CHLORIDE 400 ML: 9 INJECTION, SOLUTION INTRAVENOUS at 14:04

## 2022-05-18 RX ADMIN — SODIUM CHLORIDE, PRESERVATIVE FREE 10 ML: 5 INJECTION INTRAVENOUS at 20:58

## 2022-05-18 RX ADMIN — LEVOFLOXACIN 750 MG: 750 INJECTION, SOLUTION INTRAVENOUS at 14:12

## 2022-05-18 RX ADMIN — HYDROCORTISONE SODIUM SUCCINATE 50 MG: 100 INJECTION, POWDER, FOR SOLUTION INTRAMUSCULAR; INTRAVENOUS at 17:54

## 2022-05-18 NOTE — ED PROVIDER NOTES
81-year-old male with history of diabetes, dyslipidemia, hypertension, obesity, COPD presents to the emergency department by EMS for shortness of breath. He has been at a nursing facility recently for wounds on his backside as well as his legs. He was scheduled to be discharged home today. He lives alone. EMS was called for shortness of breath at the facility. Patient tells me he feels his breathing is at baseline but that he has not had a bowel movement in several weeks with abdominal distention. Numerous reported SaO2 in the 80s which corrected to high 90s on nonrebreather. The history is provided by the patient, the EMS personnel and medical records. Shortness of Breath  This is a chronic problem. The problem has not changed since onset. Associated symptoms include leg swelling (Chronic). Pertinent negatives include no fever, no headaches, no sore throat, no cough, no chest pain, no vomiting, no abdominal pain and no rash. Associated medical issues include COPD. Past Medical History:   Diagnosis Date    DM (diabetes mellitus) ([facility] [address]     Dyslipidemia     Hypertension        No past surgical history on file. No family history on file.     Social History     Socioeconomic History    Marital status: SINGLE     Spouse name: Not on file    Number of children: Not on file    Years of education: Not on file    Highest education level: Not on file   Occupational History    Not on file   Tobacco Use    Smoking status: Never Smoker    Smokeless tobacco: Not on file   Substance and Sexual Activity    Alcohol use: No    Drug use: No    Sexual activity: Not on file   Other Topics Concern    Not on file   Social History Narrative    Not on file     Social Determinants of Health     Financial Resource Strain:     Difficulty of Paying Living Expenses: Not on file   Food Insecurity:     Worried About Running Out of Food in the Last Year: Not on file    Ben of Food in the Last Year: Not on file   Transportation Needs:     Lack of Transportation (Medical): Not on file    Lack of Transportation (Non-Medical): Not on file   Physical Activity:     Days of Exercise per Week: Not on file    Minutes of Exercise per Session: Not on file   Stress:     Feeling of Stress : Not on file   Social Connections:     Frequency of Communication with Friends and Family: Not on file    Frequency of Social Gatherings with Friends and Family: Not on file    Attends Christianity Services: Not on file    Active Member of [address] [place] [address] [employer] or Organizations: Not on file    Attends Club or Organization Meetings: Not on file    Marital Status: Not on file   Intimate Partner Violence:     Fear of Current or Ex-Partner: Not on file    Emotionally Abused: Not on file    Physically Abused: Not on file    Sexually Abused: Not on file   Housing Stability:     Unable to Pay for Housing in the Last Year: Not on file    Number of [place] in the Last Year: Not on file    Unstable Housing in the Last Year: Not on file         ALLERGIES: Patient has no known allergies. Review of Systems   Constitutional: Negative for fatigue and fever. HENT: Negative for sneezing and sore throat. Respiratory: Positive for shortness of breath. Negative for cough. Cardiovascular: Positive for leg swelling (Chronic). Negative for chest pain. Gastrointestinal: Positive for abdominal distention and constipation. Negative for abdominal pain, diarrhea, nausea and vomiting. Genitourinary: Negative for difficulty urinating and dysuria. Musculoskeletal: Negative for arthralgias and myalgias. Skin: Negative for color change and rash. Neurological: Negative for weakness and headaches. Psychiatric/Behavioral: Negative for agitation and behavioral problems. There were no vitals filed for this visit. Physical Exam  Vitals and nursing note reviewed. Constitutional:       General: He is not in acute distress.      Appearance: He is well-developed. He is obese. He is ill-appearing. He is not toxic-appearing or diaphoretic. HENT:      Head: Normocephalic and atraumatic. Nose: Nose normal.      Mouth/Throat:      Mouth: Mucous membranes are moist.      Pharynx: Oropharynx is clear. Eyes:      Extraocular Movements: Extraocular movements intact. Conjunctiva/sclera: Conjunctivae normal.      Pupils: Pupils are equal, round, and reactive to light. Cardiovascular:      Rate and Rhythm: Normal rate and regular rhythm. Pulses: Normal pulses. Heart sounds: No murmur heard. Pulmonary:      Effort: Pulmonary effort is normal. No respiratory distress. Breath sounds: Normal breath sounds. No wheezing. Chest:      Chest wall: No mass or tenderness. Abdominal:      General: There is distension. Palpations: Abdomen is soft. Tenderness: There is no abdominal tenderness. There is no guarding or rebound. Musculoskeletal:         General: No swelling, tenderness, deformity or signs of injury. Normal range of motion. Cervical back: Normal range of motion and neck supple. No rigidity. No muscular tenderness. Right lower leg: No tenderness. Edema present. Left lower leg: No tenderness. Edema present. Skin:     General: Skin is warm and dry. Capillary Refill: Capillary refill takes less than 2 seconds. Findings: Erythema present. Comments: Scattered decubitus wounds with drainage across his back and legs   Neurological:      General: No focal deficit present. Mental Status: He is alert and oriented to person, place, and time.    Psychiatric:         Mood and Affect: Mood normal.         Behavior: Behavior normal.          MDM  Number of Diagnoses or Management Options  MADELAINE (acute kidney injury) ([facility] [address]  Pressure injury of skin, unspecified injury stage, unspecified location  Severe sepsis [facility])  Diagnosis management comments: 78-year-old male presents as above with complaint of abdominal discomfort. Initial call out was for shortness of breath and is noted to be mildly hypoxic. Significant hypotension upon arrival in the emergency department which has been fluid responsive. Started on antibiotics for presumed sepsis, potential urine versus skin wounds has source. Will admit to the hospital for further management. Amount and/or Complexity of Data Reviewed  Clinical lab tests: reviewed  Tests in the radiology section of CPT®: reviewed  Tests in the medicine section of CPT®: reviewed  Decide to obtain previous medical records or to obtain history from someone other than the patient: yes    Risk of Complications, Morbidity, and/or Mortality  General comments: 3:05 PM  I have spent 35 minutes of critical care time involved in lab review, consultations with specialist, family decision-making, and documentation. During this entire length of time I was immediately available to the patient. Critical Care: The reason for providing this level of medical care for this critically ill patient was due a critical illness that impaired one or more vital organ systems such that there was a high probability of imminent or life threatening deterioration in the patients condition. This care involved high complexity decision making to assess, manipulate, and support vital system functions, to treat this degreee vital organ system failure and to prevent further life threatening deterioration of the patients condition. ED Course as of 05/18/22 1340   Wed May 18, 2022   1318 ED EKG interpretation:Rhythm: normal sinus rhythm. Rate (approx.): 68.  Axis: Leftward. ST segment:  No concerning ST elevations or depressions. This EKG was interpreted by Alexander Schumacher MD,ED Provider. [JM]      ED Course User Index  [JM] Brenda Liz MD       Procedures        Patient hypotensive with elevated lactate. 30 cc/kg of ideal body weight ordered.   Patient has significant respiratory symptoms and 30 cc per actual body weight is not indicated. Perfect Serve Consult for Admission  2:38 PM    ED Room Number: ER24/24  Patient Name and age:  Nicole Luna 55 y.o.  male  Working Diagnosis:   1. Severe sepsis ([facility] [address]    2. MADELAINE (acute kidney injury) ([facility] [address]    3.  Pressure injury of skin, unspecified injury stage, unspecified location        COVID-19 Suspicion:  no  Sepsis present:  yes  Reassessment needed: yes  Code Status:  Full Code  Readmission: yes  Isolation Requirements:  no  Recommended Level of Care:  step down  Department:[facility] Adult ED - 21   Other: Chronic O2 dependence, concern for skin breakdown as source of infection versus urine versus other      Code Sepsis Reassessment & Plan    - Sepsis order set entered: NO  - Broad Spectrum Antibiotics given: Cefepime and  Levofloxacin  - Repeat lactic acid ordered for time see orders  - Re-assessment performed at time 2:40 PM  and clinical condition improving.    - Actions taken: admit.  - Hypotension or Lactic Acidosis present (SBP<90, MAP<65, Lactate >4): YES   - IVF: 30 cc/kg ideal Body Weight, pt obese with BMI >30  - Persistent Septic Shock present (Hypotension despite IVF resuscitation): NO  Vasopressors: Not indicated due to septic shock not present  - Disposition: Admit to Step down     Repeat Sepsis focused physical exam at time 2:40 PM   Vital signs   Visit Vitals  BP 99/64   Pulse 68   Temp 98.6 °F (37 °C)   Resp 17   Ht 6' (1.829 m)   Wt 150 kg (330 lb 11 oz)   SpO2 92%   BMI 44.85 kg/m²         Cardiopulmonary exam  Regular rate and rhythm, normal cardiac and pulmonary auscultation, normal pulmonary exam    Capillary refill  Less than 2 seconds    Peripheral pulse evaluation  Strong and equal x4    Skin exam  Warm, dry, normal color, well-perfused, significant bilateral lower extremity edema with skin breakdown    Luciana Hsieh MD

## 2022-05-18 NOTE — H&P
[facility]    ICU TEAM history and physical    Name: William Naylor   : 1965   MRN: [id number]   Date: 2022      Reason for ICU Admission: Sepsis    HPI:   This is a 50-year-old gentleman who was recently discharged from our facility who is being sent to the ER from rehab facility with low blood pressure and decreasing responsiveness. Patient has multiple complicated medical problem including diastolic heart failure, severe COPD with chronic supplemental oxygen, A. fib on amiodarone and chronic anticoagulation, diabetes mellitus type 2, obstructive sleep apnea and morbid obesity. As mentioned above recently was discharged from our facility after being treated for COPD exacerbation and diastolic heart failure exacerbation. Patient is a poor historian and he cannot tell me about the progress of his situation in the rehab facility. He stated that nothing bothering him at this time, he stated that his breathing is at baseline, when I asked him about his legs and if they always look like this he said \"I do not know Zainab Medina knows that he is at [address]. In the ER he was found to be hypotensive but this has responded to fluid, lactic acid acid elevated but this is also trending down with fluid, and his kidney function acutely worsened compared to few days ago while in the hospital.  Unable to insert a Foster catheter in the ER. Initially hospitalist assess the patient for admission however given his tenuous status I consulted ICU. Active Problem List:     Problem List  Date Reviewed: 2013          Codes Class    * (Principal) Sepsis ([facility] [[email or web address] 75.) ICD-10-CM: A41.9  ICD-9-CM: 038.9, 995.91         Shortness of breath ICD-10-CM: R06.02  ICD-9-CM: 786.05         Acute on chronic respiratory failure (HCC) ICD-10-CM: J96.20  ICD-9-CM: 518.84               Past Medical History:      has a past medical history of DM (diabetes mellitus) ([facility] Dyslipidemia, and Hypertension.     Past Surgical History:      has no past surgical history on file. Home Medications:     Prior to Admission medications    Medication Sig Start Date End Date Taking? Authorizing Provider   albuterol-ipratropium (DUO-NEB) 2.5 mg-0.5 mg/3 ml nebu 3 mL by Nebulization route every four to six (4-6) hours as needed for Wheezing or Shortness of Breath. 5/2/22   Betty Krishna MD   docusate sodium (COLACE) 100 mg capsule Take 2 Capsules by mouth two (2) times a day for 90 days. 5/2/22 7/31/22  Maicol Cha MD   miconazole (MICOTIN) 2 % topical powder Apply  to affected area two (2) times a day. Antifungal powder under pannus, in groin, gluteal cleft, and scrotum 5/2/22   Maicol Cha MD   polyethylene glycol (MIRALAX) 17 gram packet Take 1 Packet by mouth daily. 5/3/22   Maicol Cha MD   predniSONE (DELTASONE) 10 mg tablet Take 4 tab daily X 3 days then 3 tabs daily X 3 days then 2 tabs daily X 3 days then 1 tab daily X 3 days 5/2/22   Maicol Cha MD   tamsulosin (FLOMAX) 0.4 mg capsule Take 1 Capsule by mouth daily. 5/3/22   Maicol Cha MD   budesonide-formoteroL (Symbicort) 160-4.5 mcg/actuation HFAA Take 2 Puffs by inhalation two (2) times a day. 5/2/22   Maicol Cha MD   torsemide (DEMADEX) 20 mg tablet Take 60 mg by mouth daily. Indications: high blood pressure    Provider, Historical   pregabalin (LYRICA) 75 mg capsule Take 75 mg by mouth two (2) times a day. Indications: nerve pain after herpes    Provider, Historical   apixaban (ELIQUIS) 5 mg tablet Take 5 mg by mouth two (2) times a day. Indications: treatment to prevent blood clots in chronic atrial fibrillation    Provider, Historical   aspirin delayed-release 81 mg tablet Take  by mouth daily. Indications: treatment to prevent a heart attack    Provider, Historical   amiodarone (PACERONE) 100 mg tablet Take 100 mg by mouth daily.     Provider, Historical   ezetimibe (ZETIA) 10 mg tablet Take 10 mg by mouth daily. Provider, Historical   pantoprazole (PROTONIX) 40 mg tablet Take 40 mg by mouth daily. Indications: gastroesophageal reflux disease    Provider, Historical   empagliflozin (Jardiance) 25 mg tablet Take 25 mg by mouth daily. Indications: type 2 diabetes mellitus    Provider, Historical   isosorbide mononitrate ER (IMDUR) 30 mg tablet Take 30 mg by mouth daily. Indications: prevention of anginal chest pain associated with coronary artery disease    Provider, Historical   sacubitriL-valsartan (Entresto) 49-51 mg tab tablet Take 1 Tablet by mouth two (2) times a day. Provider, Historical   rosuvastatin (CRESTOR) 5 mg tablet Take 5 mg by mouth daily. Provider, Historical   spironolactone (ALDACTONE) 25 mg tablet Take 25 mg by mouth daily. Provider, Historical   montelukast (SINGULAIR) 10 mg tablet Take 10 mg by mouth nightly. Provider, Historical   insulin glargine (LANTUS) 100 unit/mL injection 18 Units by SubCUTAneous route daily. Patient not taking: Reported on 2022    Provider, Historical       Allergies/Social/Family History:     No Known Allergies   Social History     Tobacco Use    Smoking status: Never Smoker    Smokeless tobacco: Not on file   Substance Use Topics    Alcohol use: No      No family history on file.     Review of Systems:   Difficult obtain given patient medical condition    Objective:   Vital Signs:  Visit Vitals  /68   Pulse 67   Temp 98.6 °F (37 °C)   Resp 15   Ht 6' (1.829 m)   Wt 150 kg (330 lb 11 oz)   SpO2 90%   BMI 44.85 kg/m²    O2 Flow Rate (L/min): 13 l/min O2 Device: Hi flow nasal cannula Temp (24hrs), Av.6 °F (37 °C), Min:98.6 °F (37 °C), Max:98.6 °F (37 °C)           Intake/Output:     Intake/Output Summary (Last 24 hours) at 2022 1605  Last data filed at 2022 1542  Gross per 24 hour   Intake 2550 ml   Output --   Net 2550 ml       Physical Exam:    General:  Alert, cooperative, chronically ill looking not in distress   Eyes: Sclera anicteric. Pupils equally round and reactive to light. Mouth/Throat: Mucous membranes normal, oral pharynx clear   Neck: Supple   Lungs:    Decreased air entry bilaterally due to body habitus, could not appreciate wheezing   CV:  Regular rate and rhythm,no murmur, click, rub or gallop   Abdomen:   Soft, non-tender. Obese, could not appreciate bowel sound   Extremities:  Chronic bilateral lower limb changes with skin demarcation and multiple small wound with erythema and edema   Skin:  Reported small sacral skin breakdown by nurses   Neuro:  Alert oriented to self and place, answer question appropriately, moves 4 limbs with generalized weakness, face symmetrical speech is clear       LABS AND  DATA: Personally reviewed  Recent Labs     05/18/22  1316   WBC 21.0*   HGB 12.9   HCT 44.1   *     Recent Labs     05/18/22  1316   *   K 4.4   CL 92*   CO2 35*   BUN 63*   CREA 2.00*   *   CA 9.0     Recent Labs     05/18/22  1316   AP 89   TP 6.8   ALB 2.8*   GLOB 4.0     No results for input(s): INR, PTP, APTT, INREXT in the last 72 hours. Recent Labs     05/18/22  1512   PHI 7.30*   PCO2I 67.1*   PO2I 96   FIO2I 11     No results for input(s): CPK, CKMB, TROIQ, BNPP in the last 72 hours. Hemodynamics:   PAP:   CO:     Wedge:   CI:     CVP:    SVR:       PVR:       Ventilator Settings:  Mode Rate Tidal Volume Pressure FiO2 PEEP                    Peak airway pressure:      Minute ventilation:          MEDS: Reviewed    Chest X-Ray:  CXR Results  (Last 48 hours)               05/18/22 1404  XR CHEST PORT Final result    Impression:  No acute finding or change. Narrative:  EXAM: Portable CXR. 1358 hours. COMPARISON: 5/2/2022. INDICATION: SOB       FINDINGS:   There is no acute airspace disease or change. Lung volumes remain low, with   elevated diaphragm particularly on the right. Heart is large. There is no overt   pulmonary edema. There is no pneumothorax. Assessment and Plan:   -Severe sepsis:  Source is unclear, x-ray not highly suggestive of infectious process. Pending viral panel. Chronic lower limb changes seems to be superimposed with an acute process. Continue resuscitation, received 2.4 L fluid in the ER, may use albumin if needed, maintain MAP above 65, broad-spectrum antibiotic in form of cefepime and vancomycin. Follow-up on culture and sensitivity, wound care consult. Trend lactic acid. I will start patient on stress dose steroids given his ongoing brenna dose of prednisone  -Acute kidney injury:  Likely due to the above with element of dehydration. Fluid resuscitation as stated above, hold diuretics and ACE inhibitor as the patient does not seems to be volume overloaded at this time. We may need to consult urology given the difficulty of inserting Foster catheter for accurate measurement of urine output  - Chronic hypoxic hypercapnic respiratory failure due to severe COPD emphysema:  Continue nebulization and home medication. As mentioned above patient was on top of her dose of prednisone. We will stop stress dose steroid. Titrate FiO2 to maintain saturation above 88%  - Heart failure with preserved ejection fraction:  Keep diuretics including furosemide and Aldactone on hold. Keep Entresto on hold. - Chronic A. fib on amiodarone and chronic anticoagulation with Eliquis:  Resume home medication  - Obstructive sleep apnea:  CPAP at night and as needed  -Chronic GERD:  Continue on PPI    Patient is critically ill. His condition is tenuous. At risk for worsening. Will admit to the intensive care unit    Addendum at 1900-hour:  Sepsis reassessment:  Patient seems to be improving.   Completed 30 cc/kg fluid bolus  Blood pressure improved and maintaining map above 65 without pressors  Lactic acid trending down and around 1600 it was 2.3  No tachypnea no tachycardia, capillary refill in fingers improved [not able to assess toes due to chronic changes]    CRITICAL CARE CONSULTANT NOTE  I had a face to face encounter with the patient, reviewed and interpreted patient data including clinical events, labs, images, vital signs, I/O's, and examined patient. I have discussed the case and the plan and management of the patient's care with the consulting services, the bedside nurses and the respiratory therapist.      NOTE OF PERSONAL INVOLVEMENT IN CARE   This patient has a high probability of imminent, clinically significant deterioration, which requires the highest level of preparedness to intervene urgently. I participated in the decision-making and personally managed or directed the management of the following life and organ supporting interventions that required my frequent assessment to treat or prevent imminent deterioration. I personally spent 75 minutes of critical care time. This is time spent at this critically ill patient's bedside actively involved in patient care as well as the coordination of care and discussions with the patient's family. This does not include any procedural time which has been billed separately. Leelee Shirley M.D.   Staff Intensivist/Pulmonologist  [facility]  5/18/2022

## 2022-05-18 NOTE — ED TRIAGE NOTES
Patient from [address] [place] [address] complaints of sob and hypoxia and hypotension.

## 2022-05-18 NOTE — ED NOTES
TRANSFER - OUT REPORT:    Verbal report given to NIK Marmolejo(name) on Severo Corona  being transferred to [facility](unit) for routine progression of care       Report consisted of patients Situation, Background, Assessment and   Recommendations(SBAR). Information from the following report(s) SBAR, ED Summary, Procedure Summary, Intake/Output, MAR and Recent Results was reviewed with the receiving nurse. Lines:   Peripheral IV 05/18/22 Left Forearm (Active)   Site Assessment Clean, dry, & intact 05/18/22 1329   Phlebitis Assessment 0 05/18/22 1329   Infiltration Assessment 0 05/18/22 1329   Dressing Status Clean, dry, & intact 05/18/22 1329   Dressing Type 4 X 4 05/18/22 1329       Peripheral IV 05/18/22 Left Antecubital (Active)   Site Assessment Clean, dry, & intact 05/18/22 1329   Phlebitis Assessment 0 05/18/22 1329   Infiltration Assessment 0 05/18/22 1329   Dressing Status Clean, dry, & intact 05/18/22 1329   Dressing Type 4 X 4 05/18/22 1329        Opportunity for questions and clarification was provided.       Patient transported with:   Monitor  O2 @ 13 liters  Registered Nurse

## 2022-05-18 NOTE — PROGRESS NOTES
+                                                                                                                                                                                     Critical Care Documentation    Name: Fili Hdz  YOB: 1965  MRN: [id number]  Admission Date: 5/18/2022  1:07 PM    Date of service: 5/18/2022    Active Diagnoses:    Hospital Problems  Date Reviewed: 4/28/2013          Codes Class Noted POA    * (Principal) Sepsis ([facility] ICD-10-CM: A41.9  ICD-9-CM: 038.9, 995.91  5/18/2022 Unknown              Chief Complaint: Shortness of breath      Clinical Presentation: Fili Hdz is a 55 y.o. male who presents with shortness of breath. Patient has past medical history of diabetes dyslipidemia hypertension obesity COPD was discharged from [facility] on 4/25 to 5/2 to skilled nursing facility. He was scheduled to be discharged home today. He lives alone. EMS was called for shortness of breath at the facility. Patient has wounds in both bilateral lower extremities with chronic skin changes and redness and swelling and bleeding from the nails he also has wounds in his back sites as well as his legs. Physical Exam:     General: Patient is drowsy wake up with stimulus  Chest clear to auscultation bilateral  S1-S2 normal hypotensive normal rate and rhythm  Abdomen distended bowel sound equivocal  Lower extremity redness swollen both legs with open wounds  Patient apparently has back wounds as well not examined  Neuro alert oriented x0      Data Reviewed: All diagnostic labs and studies have been reviewed.       Assessment and Plan:    Sepsis with septic shock  --WBC 22 blood pressure 85 / 42, lactate 3.1  --Septic shock bundle initiated in the ED patient got 2 L of IV fluid briefly was on pressors  --Hold all antihypertensive medication continue fluids  --Requested ICU admission for pressor support  -- Antibiotics initiated blood culture drawn  -- Recent hospital admission and discharge request RT-PCR COVID test with respiratory viral panel    Acute on chronic hypoxic respiratory failure  --COPD no ABG drawn patient was briefly on nonrebreather currently recorded on high flow nasal cannula 13 L  --ABG stat requested patient will need BiPAP continuous for now    Acute on chronic CHF diastolic NYHA III-IV  --Patient was on beta-blocker and Entresto holding medication    Paroxysmal atrial fibrillation  Heart rate currently controlled  On amiodarone and apixaban    MADELAINE on CKD 2  -- Probably due to overdiuresis with torsemide and Entresto holding  -- Reevaluate after fluid resuscitation may need nephrology consultation    Diabetes type 2 uncontrolled  -- Patient is on long-acting insulin we will hold continue with sliding scale coverage    Hugely distended abdomen with equivocal bowel sound no bowel movement in 1 week reported  -- Get CT abdomen and x-ray as well stat    High risk of deterioration with multiorgan failure requested ICU admission and evaluation    Medications Administered:   Sedation: [ ] yes [ x] no   Anxiolytics: [ ] yes [ x] no   Antiarrhythmics: [ ] yes [x ] no   Antihypertensives: [ ] yes [x ] no   Pressors: [ x] yes [ ] no   IVF's: [ x] yes [ ] no       Critical Care Attestation: This patient is unstable and critically ill. Due to a high probability of clinically significant, life threatening deterioration, the patient required my highest level of preparedness to intervene emergently and I personally spent this critical care time directly and personally managing the patient. This critical care time included obtaining a history; examining the patient; pulse oximetry; ordering and review of studies; arranging urgent treatment with development of a management plan; evaluation of patient's response to treatment; frequent reassessment; and, discussions with other providers and/or family.  This critical care time was performed to assess and manage the high probability of imminent, life-threatening deterioration that could result in multi-organ failure and death. It was exclusive of separately billable procedures, treating other patients, and teaching time. Time Spent:     I personally spent 45  minutes in providing critical care time.     Andres Peterson MD  5/18/2022  3:14 PM

## 2022-05-18 NOTE — PROGRESS NOTES
Pharmacist Note - Vancomycin Dosing    Consult provided for this 55 y.o. male for indication of sepsis. -patient with MADELAINE (baseline SrCr <1)  Antibiotic regimen(s): Vanc + Cefepime    Recent Labs     22  1316   WBC 21.0*   CREA 2.00*   BUN 63*     Frequency of BMP: daily x 3   Height: 183 cm  Weight: 150 kg  Est CrCl: 63 ml/min; UO  Temp (24hrs), Av.6 °F (37 °C), Min:98.6 °F (37 °C), Max:98.6 °F (37 °C)    The plan below is expected to result in a target range of Trough 10-15 mcg/mL    Therapy will be initiated with a loading dose of 2500 mg IV   Will dose by levels due to MADELAINE. Pharmacy to follow patient daily and order levels / make dose adjustments as appropriate.

## 2022-05-18 NOTE — ED NOTES
Patient complaining of pain on his bottom. With assistance of another RN the patient's bottom and noted bandages from patients facility. Bandages removed and noted a small amount of breakdown. New bandage replaced.

## 2022-05-18 NOTE — Clinical Note
Status[de-identified] INPATIENT [101]   Type of Bed: Intermediate Care [34]   Cardiac Monitoring Required?: Yes   Inpatient Hospitalization Certified Necessary for the Following Reasons: 9.  Other (further clarification in H&P documentation)   Admitting Diagnosis: Sepsis [facility]) [[id number]]   Admitting Physician: David Davis [[id number]]   Attending Physician: Odette Finnegan   Estimated Length of Stay: 2 Midnights   Discharge Plan[de-identified] Home with Office Follow-up

## 2022-05-18 NOTE — PROGRESS NOTES
Day #1 of cefepime  Indication:  sepsis  Current regimen:  1 gm q8h  Abx regimen: cefepime  Recent Labs     22  1316   WBC 21.0*   CREA 2.00*   BUN 63*     Est CrCl: 60-70 ml/min  Temp (24hrs), Av.6 °F (37 °C), Min:98.6 °F (37 °C), Max:98.6 °F (37 °C)        Plan: Change to 2 gm q8h per extended infusion beta lactam  policy for indication of sepsis and BMI >40

## 2022-05-18 NOTE — ED NOTES
Patient in CT on cardiac monitor with RN. Patient not tolerating being on CT stretcher well. Shouting in pain from lower back.

## 2022-05-19 LAB
ANION GAP SERPL CALC-SCNC: 7 MMOL/L (ref 5–15)
BACTERIA SPEC CULT: ABNORMAL
BUN SERPL-MCNC: 53 MG/DL (ref 6–20)
BUN/CREAT SERPL: 38 (ref 12–20)
CALCIUM SERPL-MCNC: 9.3 MG/DL (ref 8.5–10.1)
CHLORIDE SERPL-SCNC: 97 MMOL/L (ref 97–108)
CO2 SERPL-SCNC: 35 MMOL/L (ref 21–32)
CREAT SERPL-MCNC: 1.38 MG/DL (ref 0.7–1.3)
ERYTHROCYTE [DISTWIDTH] IN BLOOD BY AUTOMATED COUNT: 22.9 % (ref 11.5–14.5)
GLUCOSE BLD STRIP.AUTO-MCNC: 179 MG/DL (ref 65–117)
GLUCOSE BLD STRIP.AUTO-MCNC: 215 MG/DL (ref 65–117)
GLUCOSE BLD STRIP.AUTO-MCNC: 241 MG/DL (ref 65–117)
GLUCOSE BLD STRIP.AUTO-MCNC: 251 MG/DL (ref 65–117)
GLUCOSE BLD STRIP.AUTO-MCNC: 261 MG/DL (ref 65–117)
GLUCOSE SERPL-MCNC: 194 MG/DL (ref 65–100)
HCT VFR BLD AUTO: 43.3 % (ref 36.6–50.3)
HGB BLD-MCNC: 12.6 G/DL (ref 12.1–17)
LACTATE SERPL-SCNC: 1.5 MMOL/L (ref 0.4–2)
MAGNESIUM SERPL-MCNC: 2.8 MG/DL (ref 1.6–2.4)
MCH RBC QN AUTO: 22.7 PG (ref 26–34)
MCHC RBC AUTO-ENTMCNC: 29.1 G/DL (ref 30–36.5)
MCV RBC AUTO: 78.2 FL (ref 80–99)
NRBC # BLD: 0 K/UL (ref 0–0.01)
NRBC BLD-RTO: 0 PER 100 WBC
PHOSPHATE SERPL-MCNC: 4.5 MG/DL (ref 2.6–4.7)
PLATELET # BLD AUTO: 143 K/UL (ref 150–400)
PMV BLD AUTO: 10.5 FL (ref 8.9–12.9)
POTASSIUM SERPL-SCNC: 3.5 MMOL/L (ref 3.5–5.1)
RBC # BLD AUTO: 5.54 M/UL (ref 4.1–5.7)
SERVICE CMNT-IMP: ABNORMAL
SODIUM SERPL-SCNC: 139 MMOL/L (ref 136–145)
WBC # BLD AUTO: 18.4 K/UL (ref 4.1–11.1)

## 2022-05-19 PROCEDURE — P9045 ALBUMIN (HUMAN), 5%, 250 ML: HCPCS | Performed by: INTERNAL MEDICINE

## 2022-05-19 PROCEDURE — 94640 AIRWAY INHALATION TREATMENT: CPT

## 2022-05-19 PROCEDURE — 74011636637 HC RX REV CODE- 636/637: Performed by: INTERNAL MEDICINE

## 2022-05-19 PROCEDURE — 74011000250 HC RX REV CODE- 250: Performed by: INTERNAL MEDICINE

## 2022-05-19 PROCEDURE — 84100 ASSAY OF PHOSPHORUS: CPT

## 2022-05-19 PROCEDURE — 74011250637 HC RX REV CODE- 250/637: Performed by: INTERNAL MEDICINE

## 2022-05-19 PROCEDURE — 82962 GLUCOSE BLOOD TEST: CPT

## 2022-05-19 PROCEDURE — 36415 COLL VENOUS BLD VENIPUNCTURE: CPT

## 2022-05-19 PROCEDURE — 74011250636 HC RX REV CODE- 250/636: Performed by: NURSE PRACTITIONER

## 2022-05-19 PROCEDURE — 74011000258 HC RX REV CODE- 258: Performed by: INTERNAL MEDICINE

## 2022-05-19 PROCEDURE — 74011250636 HC RX REV CODE- 250/636: Performed by: INTERNAL MEDICINE

## 2022-05-19 PROCEDURE — P9047 ALBUMIN (HUMAN), 25%, 50ML: HCPCS | Performed by: NURSE PRACTITIONER

## 2022-05-19 PROCEDURE — 83605 ASSAY OF LACTIC ACID: CPT

## 2022-05-19 PROCEDURE — 83735 ASSAY OF MAGNESIUM: CPT

## 2022-05-19 PROCEDURE — 85027 COMPLETE CBC AUTOMATED: CPT

## 2022-05-19 PROCEDURE — 80048 BASIC METABOLIC PNL TOTAL CA: CPT

## 2022-05-19 PROCEDURE — 65660000001 HC RM ICU INTERMED STEPDOWN

## 2022-05-19 PROCEDURE — 74011000250 HC RX REV CODE- 250: Performed by: HOSPITALIST

## 2022-05-19 RX ORDER — INSULIN LISPRO 100 [IU]/ML
INJECTION, SOLUTION INTRAVENOUS; SUBCUTANEOUS
Status: DISCONTINUED | OUTPATIENT
Start: 2022-05-19 | End: 2022-06-03 | Stop reason: HOSPADM

## 2022-05-19 RX ORDER — ACETAMINOPHEN 650 MG/1
650 SUPPOSITORY RECTAL
Status: DISCONTINUED | OUTPATIENT
Start: 2022-05-19 | End: 2022-06-03 | Stop reason: HOSPADM

## 2022-05-19 RX ORDER — ACETAMINOPHEN 500 MG
1000 TABLET ORAL
Status: DISCONTINUED | OUTPATIENT
Start: 2022-05-19 | End: 2022-06-03 | Stop reason: HOSPADM

## 2022-05-19 RX ORDER — ALBUMIN HUMAN 50 G/1000ML
12.5 SOLUTION INTRAVENOUS ONCE
Status: COMPLETED | OUTPATIENT
Start: 2022-05-19 | End: 2022-05-19

## 2022-05-19 RX ORDER — METAXALONE 800 MG/1
800 TABLET ORAL 3 TIMES DAILY
Status: COMPLETED | OUTPATIENT
Start: 2022-05-19 | End: 2022-05-20

## 2022-05-19 RX ORDER — VANCOMYCIN 1.75 GRAM/500 ML IN 0.9 % SODIUM CHLORIDE INTRAVENOUS
1750 EVERY 24 HOURS
Status: DISCONTINUED | OUTPATIENT
Start: 2022-05-19 | End: 2022-05-20

## 2022-05-19 RX ORDER — PREGABALIN 25 MG/1
75 CAPSULE ORAL 2 TIMES DAILY
Status: DISCONTINUED | OUTPATIENT
Start: 2022-05-19 | End: 2022-06-03 | Stop reason: HOSPADM

## 2022-05-19 RX ADMIN — HYDROCORTISONE SODIUM SUCCINATE 50 MG: 100 INJECTION, POWDER, FOR SOLUTION INTRAMUSCULAR; INTRAVENOUS at 19:14

## 2022-05-19 RX ADMIN — HYDROCORTISONE SODIUM SUCCINATE 50 MG: 100 INJECTION, POWDER, FOR SOLUTION INTRAMUSCULAR; INTRAVENOUS at 11:49

## 2022-05-19 RX ADMIN — IPRATROPIUM BROMIDE AND ALBUTEROL SULFATE 3 ML: .5; 3 SOLUTION RESPIRATORY (INHALATION) at 02:19

## 2022-05-19 RX ADMIN — CEFEPIME 2 G: 2 INJECTION, POWDER, FOR SOLUTION INTRAVENOUS at 05:03

## 2022-05-19 RX ADMIN — Medication 5 UNITS: at 17:02

## 2022-05-19 RX ADMIN — METAXALONE 800 MG: 800 TABLET ORAL at 21:21

## 2022-05-19 RX ADMIN — PANTOPRAZOLE SODIUM 40 MG: 40 TABLET, DELAYED RELEASE ORAL at 08:33

## 2022-05-19 RX ADMIN — IPRATROPIUM BROMIDE AND ALBUTEROL SULFATE 3 ML: .5; 3 SOLUTION RESPIRATORY (INHALATION) at 10:04

## 2022-05-19 RX ADMIN — SODIUM CHLORIDE, PRESERVATIVE FREE 10 ML: 5 INJECTION INTRAVENOUS at 14:09

## 2022-05-19 RX ADMIN — MONTELUKAST 10 MG: 10 TABLET, FILM COATED ORAL at 21:13

## 2022-05-19 RX ADMIN — TAMSULOSIN HYDROCHLORIDE 0.4 MG: 0.4 CAPSULE ORAL at 08:32

## 2022-05-19 RX ADMIN — MIDODRINE HYDROCHLORIDE 15 MG: 5 TABLET ORAL at 23:55

## 2022-05-19 RX ADMIN — ALBUMIN (HUMAN) 12.5 G: 12.5 INJECTION, SOLUTION INTRAVENOUS at 13:18

## 2022-05-19 RX ADMIN — SODIUM CHLORIDE, PRESERVATIVE FREE 10 ML: 5 INJECTION INTRAVENOUS at 14:08

## 2022-05-19 RX ADMIN — IPRATROPIUM BROMIDE AND ALBUTEROL SULFATE 3 ML: .5; 3 SOLUTION RESPIRATORY (INHALATION) at 20:20

## 2022-05-19 RX ADMIN — Medication 2 UNITS: at 21:21

## 2022-05-19 RX ADMIN — MIDODRINE HYDROCHLORIDE 15 MG: 5 TABLET ORAL at 01:14

## 2022-05-19 RX ADMIN — IPRATROPIUM BROMIDE AND ALBUTEROL SULFATE 3 ML: .5; 3 SOLUTION RESPIRATORY (INHALATION) at 15:42

## 2022-05-19 RX ADMIN — Medication 5 UNITS: at 12:00

## 2022-05-19 RX ADMIN — VANCOMYCIN HYDROCHLORIDE 1750 MG: 10 INJECTION, POWDER, LYOPHILIZED, FOR SOLUTION INTRAVENOUS at 18:16

## 2022-05-19 RX ADMIN — MIDODRINE HYDROCHLORIDE 15 MG: 5 TABLET ORAL at 16:56

## 2022-05-19 RX ADMIN — ACETAMINOPHEN 1000 MG: 500 TABLET ORAL at 19:19

## 2022-05-19 RX ADMIN — AMIODARONE HYDROCHLORIDE 100 MG: 200 TABLET ORAL at 08:32

## 2022-05-19 RX ADMIN — HYDROCORTISONE SODIUM SUCCINATE 50 MG: 100 INJECTION, POWDER, FOR SOLUTION INTRAMUSCULAR; INTRAVENOUS at 23:55

## 2022-05-19 RX ADMIN — CEFEPIME 2 G: 2 INJECTION, POWDER, FOR SOLUTION INTRAVENOUS at 14:08

## 2022-05-19 RX ADMIN — APIXABAN 5 MG: 5 TABLET, FILM COATED ORAL at 17:03

## 2022-05-19 RX ADMIN — ACETAMINOPHEN 650 MG: 325 TABLET ORAL at 13:17

## 2022-05-19 RX ADMIN — Medication 2 UNITS: at 01:32

## 2022-05-19 RX ADMIN — ASPIRIN 81 MG: 81 TABLET, COATED ORAL at 08:32

## 2022-05-19 RX ADMIN — SODIUM CHLORIDE, PRESERVATIVE FREE 10 ML: 5 INJECTION INTRAVENOUS at 05:03

## 2022-05-19 RX ADMIN — ARFORMOTEROL TARTRATE: 15 SOLUTION RESPIRATORY (INHALATION) at 10:04

## 2022-05-19 RX ADMIN — SODIUM CHLORIDE, PRESERVATIVE FREE 10 ML: 5 INJECTION INTRAVENOUS at 21:13

## 2022-05-19 RX ADMIN — ROSUVASTATIN 5 MG: 10 TABLET, FILM COATED ORAL at 08:33

## 2022-05-19 RX ADMIN — ARFORMOTEROL TARTRATE: 15 SOLUTION RESPIRATORY (INHALATION) at 20:20

## 2022-05-19 RX ADMIN — CEFEPIME 2 G: 2 INJECTION, POWDER, FOR SOLUTION INTRAVENOUS at 21:13

## 2022-05-19 RX ADMIN — BISACODYL 5 MG: 5 TABLET, COATED ORAL at 08:32

## 2022-05-19 RX ADMIN — Medication 3 UNITS: at 06:11

## 2022-05-19 RX ADMIN — POLYETHYLENE GLYCOL 3350 17 G: 17 POWDER, FOR SOLUTION ORAL at 08:32

## 2022-05-19 RX ADMIN — MIDODRINE HYDROCHLORIDE 15 MG: 5 TABLET ORAL at 08:33

## 2022-05-19 RX ADMIN — APIXABAN 5 MG: 5 TABLET, FILM COATED ORAL at 08:32

## 2022-05-19 RX ADMIN — PREGABALIN 75 MG: 25 CAPSULE ORAL at 14:37

## 2022-05-19 RX ADMIN — METAXALONE 800 MG: 800 TABLET ORAL at 19:14

## 2022-05-19 RX ADMIN — HYDROCORTISONE SODIUM SUCCINATE 50 MG: 100 INJECTION, POWDER, FOR SOLUTION INTRAMUSCULAR; INTRAVENOUS at 06:00

## 2022-05-19 RX ADMIN — WHITE PETROLATUM: 1.75 OINTMENT TOPICAL at 19:15

## 2022-05-19 RX ADMIN — HYDROCORTISONE SODIUM SUCCINATE 50 MG: 100 INJECTION, POWDER, FOR SOLUTION INTRAMUSCULAR; INTRAVENOUS at 01:14

## 2022-05-19 RX ADMIN — ACETAMINOPHEN 650 MG: 325 TABLET ORAL at 07:20

## 2022-05-19 RX ADMIN — ALBUMIN (HUMAN) 25 G: 0.25 INJECTION, SOLUTION INTRAVENOUS at 01:14

## 2022-05-19 NOTE — PROGRESS NOTES
0730: Bedside and Verbal shift change report given to Kasandra SHAFER RN (oncoming nurse) by [facility] (offgoing nurse). Report included the following information SBAR, Kardex, Intake/Output, MAR, Recent Results and Cardiac Rhythm NSR.     1400: Patient c/o 10/10 pain with turns, wound care, and bathing, MD notified. Orders received for tylenol and lyrica (see MAR). Primary Nurse Caitlin Rocha and Raffaele Berry RN performed a dual skin assessment on this patient Impairment noted- see wound doc flow sheet  Ben score is 13      1930: Bedside and Verbal shift change report given to [address] [place] [address] (oncoming nurse) by Krzysztof Aleman (offgoing nurse). Report included the following information SBAR, Kardex, Intake/Output, MAR, Recent Results and Cardiac Rhythm NSR.

## 2022-05-19 NOTE — PROGRESS NOTES
[facility] Adult  Hospitalist Group     ICU Transfer/Accept Summary     [place] [address] [place]  DATE OF TRANSFER: 5/19/2022       PATIENT ID: Miah Wood  MRN: [id number]   YOB: 1965    PRIMARY CARE PROVIDER: None   DATE OF ADMISSION: 5/18/2022  1:07 PM    ATTENDING PHYSICIAN: Alivia Girard MD  CONSULTATIONS:   None    PROCEDURES/SURGERIES:   * No surgery found *    REASON FOR ADMISSION: Sepsis [facility])     HOSPITAL PROBLEM LIST:  Patient Active Problem List   Diagnosis Code    Acute on chronic respiratory failure ([facility] [address] J96.20    Shortness of breath R06.02    Sepsis ([facility] [address] A41.9         Brief HPI and Hospital Course:      Reason for ICU Admission: Sepsis  Interval history: From critical care HPI on May 25 55year-old gentleman who was recently discharged from our facility who is being sent to the ER from rehab facility with low blood pressure and decreasing responsiveness. Patient has multiple complicated medical problem including diastolic heart failure, severe COPD with chronic supplemental oxygen, A. fib on amiodarone and chronic anticoagulation, diabetes mellitus type 2, obstructive sleep apnea and morbid obesity. As mentioned above recently was discharged from our facility after being treated for COPD exacerbation and diastolic heart failure exacerbation.  Patient is a poor historian and he cannot tell me about the progress of his situation in the rehab facility.   He stated that nothing bothering him at this time, he stated that his breathing is at baseline, when I asked him about his legs and if they always look like this he said \"I do not know Davie Thomas knows that he is at [facility] ER.   In the ER he was found to be hypotensive but this has responded to fluid, lactic acid acid elevated but this is also trending down with fluid, and his kidney function acutely worsened compared to few days ago while in the hospital.  Unable to insert a Foster catheter in the ER.  Initially hospitalist assess the patient for admission however given his tenuous status I consulted ICU.  5/19: No acute event overnight, looks much better this morning, ate his breakfast tray, needed to be on very low-dose of norepinephrine overnight for short period of time. No nausea no vomiting. Transferred out of ICU  Assessment and Plan:  -Severe sepsis:  Source is unclear, x-ray not highly suggestive of infectious process  Viral panel is negative  Chronic lower limb changes seems to be superimposed with an acute process  received 2.4 L fluid in the ER, may use albumin if needed, maintain MAP above 65  broad-spectrum antibiotic in form of cefepime and vancomycin  Follow-up on culture and sensitivity, wound care consult  Lactic acid normalized  He is currently on stress dose hydrocortisone given his recent steroid use  This may be changed in a day or so to oral steroid with another taper course  -Acute kidney injury:  Likely due to the above with element of dehydration.    Improving. Diuretics remain on hold as well as Entresto. Need to be reassessed for the need for restarting diuretics and antifailure medication. No maintenance fluid, maintaining adequate oral intake  - Chronic hypoxic hypercapnic respiratory failure due to severe COPD emphysema:  Continue nebulization and home medication.  As mentioned above patient was on taper  dose of prednisone.    Wean stress dose steroid.  Titrate FiO2 to maintain saturation above 88%  - Heart failure with preserved ejection fraction:  Keep diuretics including furosemide and Aldactone on hold. Aissatou Correa on hold.   - Chronic A. fib on amiodarone and chronic anticoagulation with Eliquis:  Resume home medication  --back pain non-focal, likely musculoskeletal  Try short term skelaxin  - Obstructive sleep apnea:  CPAP at night and as needed  -Chronic GERD:  Continue on PPI       PHYSICAL EXAMINATION:  Visit Vitals  BP (!) 103/54 (BP 1 Location: Left arm, BP Patient Position: At rest)   Pulse 70   Temp 97.7 °F (36.5 °C)   Resp 15   Ht 6' (1.829 m)   Wt 129.8 kg (286 lb 2.5 oz)   SpO2 94%   BMI 38.81 kg/m²       General:          Alert, no acute distress, appears in discomfort when moving  HEENT:           Atraumatic, MMM            Neck:               Supple, symmetrical  Lungs:             Clear to auscultation bilaterally. No Wheezing or Rhonchi. No rales. Heart:              RRR, chronic LE swelling a/w venous stasis  Abdomen:       Soft, non-tender. Not distended. No CVA tenderness  Extremities:     chronic b/l LE skin changes c/w venous stasis dermatitis  Skin:                Not pale. Not Jaundiced. No focal spinal tenderness. Generalized back tenderness on lower right  Psych:             Not anxious or agitated  Neurologic:      Alert, moves all extremities    Labs:     Recent Labs     05/19/22  0451 05/18/22  1316   WBC 18.4* 21.0*   HGB 12.6 12.9   HCT 43.3 44.1   * 140*     Recent Labs     05/19/22  0451 05/18/22  1316    132*   K 3.5 4.4   CL 97 92*   CO2 35* 35*   BUN 53* 63*   CREA 1.38* 2.00*   * 271*   CA 9.3 9.0   MG 2.8*  --    PHOS 4.5  --      Recent Labs     05/18/22  1316   ALT 12   AP 89   TBILI 0.8   TP 6.8   ALB 2.8*   GLOB 4.0     No results for input(s): INR, PTP, APTT, INREXT in the last 72 hours. No results for input(s): FE, TIBC, PSAT, FERR in the last 72 hours. No results found for: FOL, RBCF   No results for input(s): PH, PCO2, PO2 in the last 72 hours. No results for input(s): CPK, CKNDX, TROIQ in the last 72 hours.     No lab exists for component: CPKMB  Lab Results   Component Value Date/Time    Cholesterol, total 170 01/23/2013 11:08 AM    HDL Cholesterol 37 (L) 01/23/2013 11:08 AM    LDL, calculated 97 01/23/2013 11:08 AM    Triglyceride 180 (H) 01/23/2013 11:08 AM    CHOL/HDL Ratio 6.0 (H) 10/06/2010 03:18 PM     Lab Results   Component Value Date/Time    Glucose (POC) 251 (H) 05/19/2022 04:58 PM    Glucose (POC) 261 (H) 05/19/2022 11:54 AM    Glucose (POC) 215 (H) 05/19/2022 05:00 AM    Glucose (POC) 179 (H) 05/19/2022 01:23 AM    Glucose (POC) 220 (H) 05/18/2022 04:47 PM    Glucose,  (H) 05/18/2022 01:36 PM     Lab Results   Component Value Date/Time    Color YELLOW/STRAW 05/18/2022 03:54 PM    Appearance CLEAR 05/18/2022 03:54 PM    Specific gravity 1.011 05/18/2022 03:54 PM    pH (UA) 5.0 05/18/2022 03:54 PM    Protein Negative 05/18/2022 03:54 PM    Glucose >1,000 (A) 05/18/2022 03:54 PM    Ketone Negative 05/18/2022 03:54 PM    Bilirubin Negative 05/18/2022 03:54 PM    Urobilinogen 0.2 05/18/2022 03:54 PM    Nitrites Negative 05/18/2022 03:54 PM    Leukocyte Esterase Negative 05/18/2022 03:54 PM    Epithelial cells FEW 05/18/2022 03:54 PM    Bacteria Negative 05/18/2022 03:54 PM    WBC 0-4 05/18/2022 03:54 PM    RBC 20-50 05/18/2022 03:54 PM         CODE STATUS:  x Full Code    DNR    Partial    Comfort Care       Signed:   Dipak Garner MD  Date of Service:  5/19/2022  5:53 PM

## 2022-05-19 NOTE — PROGRESS NOTES
Day #2 of Vancomycin - Dosing Update  Indication:  Sepsis 2/2 unknown etiology  Current regimen:  2.5 gm loading dose x 1 (given  @ 1654)  Abx regimen:  Cefepime + Vancomycin  ID Following ?: NO  Concomitant nephrotoxic drugs (requires more frequent monitoring): None  Frequency of BMP?: Daily through     Recent Labs     22  0451 22  1316   WBC 18.4* 21.0*   CREA 1.38* 2.00*   BUN 53* 63*     Est CrCl: ~85-90 ml/min; UO: >1 ml/kg/hr  Temp (24hrs), Av.2 °F (36.8 °C), Min:97.5 °F (36.4 °C), Max:98.6 °F (37 °C)    Cultures:    Blood: NGTD   MRSA screen: pending   Resp viral panel: (-)    MRSA Swab ordered (if applicable)? YES    Goal target range AUC/GISEL 400-600    Recent level history:  Date/Time Dose & Interval Measured Level (mcg/mL) Associated AUC/GISEL Dose Adjustment                                                 Plan: Given the improvement in the patient's SCr, a maintenance dose of 1750 mg IV Q 24 hr will be started this afternoon for a predicted AUC/GISEL of 510. I will also order a random level for tomorrow morning. Pharmacy will continue to monitor patient daily and will make dosage adjustments based upon changing renal function.

## 2022-05-19 NOTE — PROGRESS NOTES
2000: TRANSFER - IN REPORT:    Verbal report received from Eleni Canavan (name) on Saurav Araujo  being received from ED (unit) for routine progression of care      Report consisted of patients Situation, Background, Assessment and   Recommendations(SBAR). Information from the following report(s) SBAR, Kardex, Intake/Output and MAR was reviewed with the receiving nurse. Opportunity for questions and clarification was provided. Assessment completed upon patients arrival to unit and care assumed. 2030: Pt arrived from ER. Pt on 0.5 mcg of levo. VSS    0730: Bedside shift change report given to Kasandra  (oncoming nurse) by Juli Rousseau  (offgoing nurse). Report included the following information SBAR, Kardex, Intake/Output and MAR.

## 2022-05-19 NOTE — PROGRESS NOTES
[facility]    ICU TEAM Progress Note    Name: Chester Ga   : 1965   MRN: [id number]   Date: 2022      I  Subjective:   Progress Note: 2022      Reason for ICU Admission: Sepsis    Interval history: From critical care HPI on May 25 55year-old gentleman who was recently discharged from our facility who is being sent to the ER from rehab facility with low blood pressure and decreasing responsiveness. Patient has multiple complicated medical problem including diastolic heart failure, severe COPD with chronic supplemental oxygen, A. fib on amiodarone and chronic anticoagulation, diabetes mellitus type 2, obstructive sleep apnea and morbid obesity. As mentioned above recently was discharged from our facility after being treated for COPD exacerbation and diastolic heart failure exacerbation. Patient is a poor historian and he cannot tell me about the progress of his situation in the rehab facility. He stated that nothing bothering him at this time, he stated that his breathing is at baseline, when I asked him about his legs and if they always look like this he said \"I do not know Franchesca Ogden knows that he is at [address]. In the ER he was found to be hypotensive but this has responded to fluid, lactic acid acid elevated but this is also trending down with fluid, and his kidney function acutely worsened compared to few days ago while in the hospital.  Unable to insert a Foster catheter in the ER. Initially hospitalist assess the patient for admission however given his tenuous status I consulted ICU. Overnight Events:   : No acute event overnight, looks much better this morning, ate his breakfast tray, needed to be on very low-dose of norepinephrine overnight for short period of time.   No nausea no vomiting    Active Problem List:     Problem List  Date Reviewed: 2013          Codes Class    * (Principal) Sepsis ([email or web address] ICD-10-CM: A41.9  ICD-9-CM: 038.9, 995.91         Shortness of breath ICD-10-CM: R06.02  ICD-9-CM: 786.05         Acute on chronic respiratory failure (HCC) ICD-10-CM: J96.20  ICD-9-CM: 518.84               Past Medical History:      has a past medical history of DM (diabetes mellitus) ([address] Dyslipidemia, and Hypertension. Past Surgical History:      has no past surgical history on file. Home Medications:     Prior to Admission medications    Medication Sig Start Date End Date Taking? Authorizing Provider   albuterol-ipratropium (DUO-NEB) 2.5 mg-0.5 mg/3 ml nebu 3 mL by Nebulization route every four to six (4-6) hours as needed for Wheezing or Shortness of Breath. 5/2/22   Dereje Krishna MD   docusate sodium (COLACE) 100 mg capsule Take 2 Capsules by mouth two (2) times a day for 90 days. 5/2/22 7/31/22  Monica Orellana MD   miconazole (MICOTIN) 2 % topical powder Apply  to affected area two (2) times a day. Antifungal powder under pannus, in groin, gluteal cleft, and scrotum 5/2/22   Monica Orellana MD   polyethylene glycol (MIRALAX) 17 gram packet Take 1 Packet by mouth daily. 5/3/22   Monica Orellana MD   predniSONE (DELTASONE) 10 mg tablet Take 4 tab daily X 3 days then 3 tabs daily X 3 days then 2 tabs daily X 3 days then 1 tab daily X 3 days 5/2/22   Monica Orellana MD   tamsulosin (FLOMAX) 0.4 mg capsule Take 1 Capsule by mouth daily. 5/3/22   Monica Orellana MD   budesonide-formoteroL (Symbicort) 160-4.5 mcg/actuation HFAA Take 2 Puffs by inhalation two (2) times a day. 5/2/22   Monica Orellana MD   torsemide (DEMADEX) 20 mg tablet Take 60 mg by mouth daily. Indications: high blood pressure    Provider, Historical   pregabalin (LYRICA) 75 mg capsule Take 75 mg by mouth two (2) times a day. Indications: nerve pain after herpes    Provider, Historical   apixaban (ELIQUIS) 5 mg tablet Take 5 mg by mouth two (2) times a day.  Indications: treatment to prevent blood clots in chronic atrial fibrillation    Provider, Historical   aspirin delayed-release 81 mg tablet Take  by mouth daily. Indications: treatment to prevent a heart attack    Provider, Historical   amiodarone (PACERONE) 100 mg tablet Take 100 mg by mouth daily. Provider, Historical   ezetimibe (ZETIA) 10 mg tablet Take 10 mg by mouth daily. Provider, Historical   pantoprazole (PROTONIX) 40 mg tablet Take 40 mg by mouth daily. Indications: gastroesophageal reflux disease    Provider, Historical   empagliflozin (Jardiance) 25 mg tablet Take 25 mg by mouth daily. Indications: type 2 diabetes mellitus    Provider, Historical   isosorbide mononitrate ER (IMDUR) 30 mg tablet Take 30 mg by mouth daily. Indications: prevention of anginal chest pain associated with coronary artery disease    Provider, Historical   sacubitriL-valsartan (Entresto) 49-51 mg tab tablet Take 1 Tablet by mouth two (2) times a day. Provider, Historical   rosuvastatin (CRESTOR) 5 mg tablet Take 5 mg by mouth daily. Provider, Historical   spironolactone (ALDACTONE) 25 mg tablet Take 25 mg by mouth daily. Provider, Historical   montelukast (SINGULAIR) 10 mg tablet Take 10 mg by mouth nightly. Provider, Historical   insulin glargine (LANTUS) 100 unit/mL injection 18 Units by SubCUTAneous route daily. Patient not taking: Reported on 2022    Provider, Historical       Allergies/Social/Family History:     No Known Allergies   Social History     Tobacco Use    Smoking status: Never Smoker    Smokeless tobacco: Not on file   Substance Use Topics    Alcohol use: No      No family history on file.     Review of Systems:     10 system reviewed and negative but for chronic back pain and chronic constipation    Objective:   Vital Signs:  Visit Vitals  /64   Pulse 78   Temp 98.4 °F (36.9 °C)   Resp 15   Ht 6' (1.829 m)   Wt 144.1 kg (317 lb 10.9 oz)   SpO2 98%   BMI 43.09 kg/m²    O2 Flow Rate (L/min): 10 l/min O2 Device: Nasal cannula (midflow) Temp (24hrs), Av.2 °F (36.8 °C), Min:97.5 °F (36.4 °C), Max:98.6 °F (37 °C)           Intake/Output:     Intake/Output Summary (Last 24 hours) at 5/19/2022 0905  Last data filed at 5/19/2022 0800  Gross per 24 hour   Intake 3449.93 ml   Output 7600 ml   Net -4150.07 ml       Physical Exam:    General:  Alert, cooperative, chronically ill looking not in distress   Eyes:  Sclera anicteric. Pupils equally round and reactive to light. Mouth/Throat: Mucous membranes normal, oral pharynx clear   Neck: Supple   Lungs:    Decreased air entry bilaterally due to body habitus, could not appreciate wheezing   CV:  Regular rate and rhythm,no murmur, click, rub or gallop   Abdomen:   Soft, non-tender. Obese, could not appreciate bowel sound   Extremities:  Chronic bilateral lower limb changes with skin demarcation and multiple small wound with erythema and edema   Skin:  Reported small sacral skin breakdown by nurses   Neuro:  Alert oriented to self and place, answer question appropriately, moves 4 limbs with generalized weakness, face symmetrical speech is clear         LABS AND  DATA: Personally reviewed  Recent Labs     05/19/22  0451 05/18/22  1316   WBC 18.4* 21.0*   HGB 12.6 12.9   HCT 43.3 44.1   * 140*     Recent Labs     05/19/22  0451 05/18/22  1316    132*   K 3.5 4.4   CL 97 92*   CO2 35* 35*   BUN 53* 63*   CREA 1.38* 2.00*   * 271*   CA 9.3 9.0   MG 2.8*  --    PHOS 4.5  --      Recent Labs     05/18/22  1316   AP 89   TP 6.8   ALB 2.8*   GLOB 4.0     No results for input(s): INR, PTP, APTT, INREXT in the last 72 hours. Recent Labs     05/18/22  1512   PHI 7.30*   PCO2I 67.1*   PO2I 96   FIO2I 11     No results for input(s): CPK, CKMB, TROIQ, BNPP in the last 72 hours.     Hemodynamics:   PAP:   CO:     Wedge:   CI:     CVP:    SVR:       PVR:       Ventilator Settings:  Mode Rate Tidal Volume Pressure FiO2 PEEP                    Peak airway pressure:      Minute ventilation:          MEDS: Reviewed    Chest X-Ray:  CXR Results  (Last 48 hours)               05/18/22 1404  XR CHEST PORT Final result    Impression:  No acute finding or change. Narrative:  EXAM: Portable CXR. 1358 hours. COMPARISON: 5/2/2022. INDICATION: SOB       FINDINGS:   There is no acute airspace disease or change. Lung volumes remain low, with   elevated diaphragm particularly on the right. Heart is large. There is no overt   pulmonary edema. There is no pneumothorax. Assessment and Plan:   -Severe sepsis:  Source is unclear, x-ray not highly suggestive of infectious process. Viral panel is negative. Chronic lower limb changes seems to be superimposed with an acute process. received 2.4 L fluid in the ER, may use albumin if needed, maintain MAP above 65, broad-spectrum antibiotic in form of cefepime and vancomycin. Follow-up on culture and sensitivity, wound care consult. Lactic acid normalized. He is currently on stress dose hydrocortisone given his recent steroid use. This may be changed in a day or so to oral steroid with another taper course  -Acute kidney injury:  Likely due to the above with element of dehydration. Improving. Diuretics remain on hold as well as Entresto. Need to be reassessed for the need for restarting diuretics and antifailure medication. No maintenance fluid, maintaining adequate oral intake  - Chronic hypoxic hypercapnic respiratory failure due to severe COPD emphysema:  Continue nebulization and home medication. As mentioned above patient was on taper  dose of prednisone. We will stop stress dose steroid. Titrate FiO2 to maintain saturation above 88%  - Heart failure with preserved ejection fraction:  Keep diuretics including furosemide and Aldactone on hold. Keep Entresto on hold.   - Chronic A. fib on amiodarone and chronic anticoagulation with Eliquis:  Resume home medication  - Obstructive sleep apnea:  CPAP at night and as needed  -Chronic GERD:  Continue on PPI    DISPOSITION  At this time there is no immediate need for ICU level of care. Will consult hospitalist to transfer care to their service    CRITICAL CARE CONSULTANT NOTE  I had a face to face encounter with the patient, reviewed and interpreted patient data including clinical events, labs, images, vital signs, I/O's, and examined patient. I have discussed the case and the plan and management of the patient's care with the consulting services, the bedside nurses and the respiratory therapist.      NOTE OF PERSONAL INVOLVEMENT IN CARE   This patient has a high probability of imminent, clinically significant deterioration, which requires the highest level of preparedness to intervene urgently. I participated in the decision-making and personally managed or directed the management of the following life and organ supporting interventions that required my frequent assessment to treat or prevent imminent deterioration. I personally spent 40 minutes of critical care time. This is time spent at this critically ill patient's bedside actively involved in patient care as well as the coordination of care and discussions with the patient's family. This does not include any procedural time which has been billed separately. Sara Gallegos M.D.   Staff Intensivist/Pulmonologist  [facility]  5/19/2022

## 2022-05-19 NOTE — PROGRESS NOTES
Problem: Diabetes Self-Management  Goal: *Disease process and treatment process  Description: Define diabetes and identify own type of diabetes; list 3 options for treating diabetes. Outcome: Progressing Towards Goal  Goal: *Incorporating nutritional management into lifestyle  Description: Describe effect of type, amount and timing of food on blood glucose; list 3 methods for planning meals. Outcome: Progressing Towards Goal  Goal: *Incorporating physical activity into lifestyle  Description: State effect of exercise on blood glucose levels. Outcome: Progressing Towards Goal  Goal: *Developing strategies to promote health/change behavior  Description: Define the ABC's of diabetes; identify appropriate screenings, schedule and personal plan for screenings. Outcome: Progressing Towards Goal  Goal: *Using medications safely  Description: State effect of diabetes medications on diabetes; name diabetes medication taking, action and side effects. Outcome: Progressing Towards Goal  Goal: *Monitoring blood glucose, interpreting and using results  Description: Identify recommended blood glucose targets  and personal targets. Outcome: Progressing Towards Goal  Goal: *Prevention, detection, treatment of acute complications  Description: List symptoms of hyper- and hypoglycemia; describe how to treat low blood sugar and actions for lowering  high blood glucose level. Outcome: Progressing Towards Goal  Goal: *Prevention, detection and treatment of chronic complications  Description: Define the natural course of diabetes and describe the relationship of blood glucose levels to long term complications of diabetes.   Outcome: Progressing Towards Goal  Goal: *Developing strategies to address psychosocial issues  Description: Describe feelings about living with diabetes; identify support needed and support network  Outcome: Progressing Towards Goal  Goal: *Insulin pump training  Outcome: Progressing Towards Goal  Goal: *Sick day guidelines  Outcome: Progressing Towards Goal  Goal: *Patient Specific Goal (EDIT GOAL, INSERT TEXT)  Outcome: Progressing Towards Goal     Problem: Patient Education: Go to Patient Education Activity  Goal: Patient/Family Education  Outcome: Progressing Towards Goal     Problem: Pressure Injury - Risk of  Goal: *Prevention of pressure injury  Description: Document Ben Scale and appropriate interventions in the flowsheet.   Outcome: Progressing Towards Goal  Note: Pressure Injury Interventions:  Sensory Interventions: Assess changes in LOC    Moisture Interventions: Absorbent underpads    Activity Interventions: Assess need for specialty bed    Mobility Interventions: Assess need for specialty bed    Nutrition Interventions: Document food/fluid/supplement intake    Friction and Shear Interventions: Apply protective barrier, creams and emollients                Problem: Patient Education: Go to Patient Education Activity  Goal: Patient/Family Education  Outcome: Progressing Towards Goal

## 2022-05-19 NOTE — PROGRESS NOTES
Reason for Readmission:   Sepsis            RUR Score/Risk Level:    20%     PCP: First and Last name:  Dr Janis Rose   [facility]: [facility]   Are you a current patient: Yes/No: Yes   Approximate date of last visit:    Can you participate in a virtual visit with your PCP: No    Is a Care Conference indicated: No      Did you attend your follow up appointment (s): If not, why not:   Seen at SNF         Resources/supports as identified by patient/family:   Brother Lilian Arreguin facing patient (as identified by patient/family and CM): Finances/Medication cost?    None    Transportation    Will need BLS    Support system or lack thereof? Brother   Living arrangements? Lives independently in an apartment        Self-care/ADLs/Cognition? A&O x 4       Current Advanced Directive/Advance Care Plan:             Plan for utilizing home health: No               Transition of Care Plan:    Based on readmission, the patient's previous Plan of Care   has been evaluated and/or modified. The current Transition of Care Plan is:    TBD    Care manager met with patient to introduce self and explain role. Patient lives independently in his own apartment has a caregiver that comes for 1 hour a day. Patient is apart of the [employer] program. DME includes WC,walker, cane, rollator and a shower bench. Per patient he has been to [facility] to manor care, [facility]. and [facility] in the past. Per previous notes [facility] contracts with [id number] [place] [address] for transportation [phone]. CM left a VM message for Chepe Chauhan with [facility] [phone] to inform her patient has been admitted. Mel Brown RN,Care Management  Care Management Interventions  PCP Verified by CM:  Yes (Dr Radha Mcknight with [facility])  Mode of Transport at Discharge: BLS  MyChart Signup: No  Discharge Durable Medical Equipment: No  Physical Therapy Consult: No  Occupational Therapy Consult: No  Speech Therapy Consult: No  Support Systems: Other Family Member(s) (Brother Robert Hylton [phone])     Medicare pt has received, reviewed, and signed 1 st IM letter informing them of their right to appeal the discharge. Signed copy has been placed on pt bedside chart.       Patient/family seen: Yes       Informed patient/family of BPCI-A Bundle Program. Also advised of potential outreach by Care Transitions Team.    [address] [facility] Beneficiary Letter Delivered to Beneficiary or Representative: Yes  Date BCPI -5/19/22      Dane Ritchie RN,Care Management

## 2022-05-19 NOTE — ED NOTES
TRANSFER - OUT REPORT:    Verbal report given to Cortney Lamar RN(name) on Soha Sequeira  being transferred to [facility](unit) for routine progression of care       Report consisted of patients Situation, Background, Assessment and   Recommendations(SBAR). Information from the following report(s) SBAR, ED Summary, Intake/Output, MAR and Recent Results was reviewed with the receiving nurse. Lines:   Peripheral IV 05/18/22 Left Forearm (Active)   Site Assessment Clean, dry, & intact 05/18/22 1329   Phlebitis Assessment 0 05/18/22 1329   Infiltration Assessment 0 05/18/22 1329   Dressing Status Clean, dry, & intact 05/18/22 1329   Dressing Type 4 X 4 05/18/22 1329       Peripheral IV 05/18/22 Left Antecubital (Active)   Site Assessment Clean, dry, & intact 05/18/22 1329   Phlebitis Assessment 0 05/18/22 1329   Infiltration Assessment 0 05/18/22 1329   Dressing Status Clean, dry, & intact 05/18/22 1329   Dressing Type 4 X 4 05/18/22 1329        Opportunity for questions and clarification was provided.       Patient transported with:   Monitor  O2 @ 13 liters  Registered Nurse

## 2022-05-20 LAB
ANION GAP SERPL CALC-SCNC: 5 MMOL/L (ref 5–15)
ATRIAL RATE: 68 BPM
BASOPHILS # BLD: 0 K/UL (ref 0–0.1)
BASOPHILS NFR BLD: 0 % (ref 0–1)
BUN SERPL-MCNC: 43 MG/DL (ref 6–20)
BUN/CREAT SERPL: 36 (ref 12–20)
CALCIUM SERPL-MCNC: 8.9 MG/DL (ref 8.5–10.1)
CALCULATED P AXIS, ECG09: 99 DEGREES
CALCULATED R AXIS, ECG10: -77 DEGREES
CALCULATED T AXIS, ECG11: 60 DEGREES
CHLORIDE SERPL-SCNC: 99 MMOL/L (ref 97–108)
CO2 SERPL-SCNC: 36 MMOL/L (ref 21–32)
CREAT SERPL-MCNC: 1.18 MG/DL (ref 0.7–1.3)
DIAGNOSIS, 93000: NORMAL
DIFFERENTIAL METHOD BLD: ABNORMAL
EOSINOPHIL # BLD: 0 K/UL (ref 0–0.4)
EOSINOPHIL NFR BLD: 0 % (ref 0–7)
ERYTHROCYTE [DISTWIDTH] IN BLOOD BY AUTOMATED COUNT: 22.7 % (ref 11.5–14.5)
GLUCOSE BLD STRIP.AUTO-MCNC: 213 MG/DL (ref 65–117)
GLUCOSE BLD STRIP.AUTO-MCNC: 290 MG/DL (ref 65–117)
GLUCOSE BLD STRIP.AUTO-MCNC: 291 MG/DL (ref 65–117)
GLUCOSE BLD STRIP.AUTO-MCNC: 349 MG/DL (ref 65–117)
GLUCOSE SERPL-MCNC: 286 MG/DL (ref 65–100)
HCT VFR BLD AUTO: 43.1 % (ref 36.6–50.3)
HGB BLD-MCNC: 12.4 G/DL (ref 12.1–17)
IMM GRANULOCYTES # BLD AUTO: 0.2 K/UL (ref 0–0.04)
IMM GRANULOCYTES NFR BLD AUTO: 1 % (ref 0–0.5)
LYMPHOCYTES # BLD: 0.2 K/UL (ref 0.8–3.5)
LYMPHOCYTES NFR BLD: 1 % (ref 12–49)
MCH RBC QN AUTO: 22.5 PG (ref 26–34)
MCHC RBC AUTO-ENTMCNC: 28.8 G/DL (ref 30–36.5)
MCV RBC AUTO: 78.2 FL (ref 80–99)
MONOCYTES # BLD: 0.5 K/UL (ref 0–1)
MONOCYTES NFR BLD: 3 % (ref 5–13)
NEUTS SEG # BLD: 14.8 K/UL (ref 1.8–8)
NEUTS SEG NFR BLD: 95 % (ref 32–75)
NRBC # BLD: 0 K/UL (ref 0–0.01)
NRBC BLD-RTO: 0 PER 100 WBC
P-R INTERVAL, ECG05: 202 MS
PHOSPHATE SERPL-MCNC: 3.3 MG/DL (ref 2.6–4.7)
PLATELET # BLD AUTO: 127 K/UL (ref 150–400)
PMV BLD AUTO: 11 FL (ref 8.9–12.9)
POTASSIUM SERPL-SCNC: 3.4 MMOL/L (ref 3.5–5.1)
Q-T INTERVAL, ECG07: 442 MS
QRS DURATION, ECG06: 86 MS
QTC CALCULATION (BEZET), ECG08: 469 MS
RBC # BLD AUTO: 5.51 M/UL (ref 4.1–5.7)
RBC MORPH BLD: ABNORMAL
SERVICE CMNT-IMP: ABNORMAL
SODIUM SERPL-SCNC: 140 MMOL/L (ref 136–145)
VANCOMYCIN SERPL-MCNC: 17.2 UG/ML
VENTRICULAR RATE, ECG03: 68 BPM
WBC # BLD AUTO: 15.7 K/UL (ref 4.1–11.1)

## 2022-05-20 PROCEDURE — 77010033678 HC OXYGEN DAILY

## 2022-05-20 PROCEDURE — 74011250637 HC RX REV CODE- 250/637: Performed by: INTERNAL MEDICINE

## 2022-05-20 PROCEDURE — 80202 ASSAY OF VANCOMYCIN: CPT

## 2022-05-20 PROCEDURE — 74011000250 HC RX REV CODE- 250: Performed by: HOSPITALIST

## 2022-05-20 PROCEDURE — 74011636637 HC RX REV CODE- 636/637: Performed by: INTERNAL MEDICINE

## 2022-05-20 PROCEDURE — 97161 PT EVAL LOW COMPLEX 20 MIN: CPT

## 2022-05-20 PROCEDURE — 97530 THERAPEUTIC ACTIVITIES: CPT

## 2022-05-20 PROCEDURE — 97165 OT EVAL LOW COMPLEX 30 MIN: CPT

## 2022-05-20 PROCEDURE — 74011000250 HC RX REV CODE- 250: Performed by: INTERNAL MEDICINE

## 2022-05-20 PROCEDURE — 97535 SELF CARE MNGMENT TRAINING: CPT

## 2022-05-20 PROCEDURE — 82962 GLUCOSE BLOOD TEST: CPT

## 2022-05-20 PROCEDURE — 84100 ASSAY OF PHOSPHORUS: CPT

## 2022-05-20 PROCEDURE — 74011250636 HC RX REV CODE- 250/636: Performed by: INTERNAL MEDICINE

## 2022-05-20 PROCEDURE — 94640 AIRWAY INHALATION TREATMENT: CPT

## 2022-05-20 PROCEDURE — 85025 COMPLETE CBC W/AUTO DIFF WBC: CPT

## 2022-05-20 PROCEDURE — 74011000258 HC RX REV CODE- 258: Performed by: INTERNAL MEDICINE

## 2022-05-20 PROCEDURE — 36415 COLL VENOUS BLD VENIPUNCTURE: CPT

## 2022-05-20 PROCEDURE — 80048 BASIC METABOLIC PNL TOTAL CA: CPT

## 2022-05-20 PROCEDURE — 65270000046 HC RM TELEMETRY

## 2022-05-20 RX ORDER — VANCOMYCIN 1.75 GRAM/500 ML IN 0.9 % SODIUM CHLORIDE INTRAVENOUS
1750
Status: DISCONTINUED | OUTPATIENT
Start: 2022-05-20 | End: 2022-05-22

## 2022-05-20 RX ORDER — POLYETHYLENE GLYCOL 3350 17 G/17G
17 POWDER, FOR SOLUTION ORAL 2 TIMES DAILY
Status: DISCONTINUED | OUTPATIENT
Start: 2022-05-20 | End: 2022-06-03 | Stop reason: HOSPADM

## 2022-05-20 RX ORDER — ALBUTEROL SULFATE 0.83 MG/ML
2.5 SOLUTION RESPIRATORY (INHALATION)
Status: DISCONTINUED | OUTPATIENT
Start: 2022-05-20 | End: 2022-06-03 | Stop reason: HOSPADM

## 2022-05-20 RX ORDER — TRAMADOL HYDROCHLORIDE 50 MG/1
50 TABLET ORAL
Status: DISCONTINUED | OUTPATIENT
Start: 2022-05-20 | End: 2022-06-03 | Stop reason: HOSPADM

## 2022-05-20 RX ORDER — HYDROCORTISONE SODIUM SUCCINATE 100 MG/2ML
25 INJECTION, POWDER, FOR SOLUTION INTRAMUSCULAR; INTRAVENOUS EVERY 8 HOURS
Status: DISCONTINUED | OUTPATIENT
Start: 2022-05-20 | End: 2022-05-21

## 2022-05-20 RX ADMIN — BISACODYL 5 MG: 5 TABLET, COATED ORAL at 09:14

## 2022-05-20 RX ADMIN — HYDROCORTISONE SODIUM SUCCINATE 50 MG: 100 INJECTION, POWDER, FOR SOLUTION INTRAMUSCULAR; INTRAVENOUS at 12:18

## 2022-05-20 RX ADMIN — Medication 3 UNITS: at 22:00

## 2022-05-20 RX ADMIN — IPRATROPIUM BROMIDE AND ALBUTEROL SULFATE 3 ML: .5; 3 SOLUTION RESPIRATORY (INHALATION) at 01:29

## 2022-05-20 RX ADMIN — TAMSULOSIN HYDROCHLORIDE 0.4 MG: 0.4 CAPSULE ORAL at 09:15

## 2022-05-20 RX ADMIN — VANCOMYCIN HYDROCHLORIDE 1750 MG: 10 INJECTION, POWDER, LYOPHILIZED, FOR SOLUTION INTRAVENOUS at 12:24

## 2022-05-20 RX ADMIN — MIDODRINE HYDROCHLORIDE 15 MG: 5 TABLET ORAL at 09:14

## 2022-05-20 RX ADMIN — APIXABAN 5 MG: 5 TABLET, FILM COATED ORAL at 17:13

## 2022-05-20 RX ADMIN — IPRATROPIUM BROMIDE AND ALBUTEROL SULFATE 3 ML: .5; 3 SOLUTION RESPIRATORY (INHALATION) at 07:15

## 2022-05-20 RX ADMIN — POLYETHYLENE GLYCOL 3350 17 G: 17 POWDER, FOR SOLUTION ORAL at 17:13

## 2022-05-20 RX ADMIN — SODIUM CHLORIDE, PRESERVATIVE FREE 10 ML: 5 INJECTION INTRAVENOUS at 13:34

## 2022-05-20 RX ADMIN — MONTELUKAST 10 MG: 10 TABLET, FILM COATED ORAL at 22:17

## 2022-05-20 RX ADMIN — Medication 5 UNITS: at 17:13

## 2022-05-20 RX ADMIN — CEFEPIME 2 G: 2 INJECTION, POWDER, FOR SOLUTION INTRAVENOUS at 13:34

## 2022-05-20 RX ADMIN — ACETAMINOPHEN 1000 MG: 500 TABLET ORAL at 18:43

## 2022-05-20 RX ADMIN — SODIUM CHLORIDE, PRESERVATIVE FREE 10 ML: 5 INJECTION INTRAVENOUS at 22:17

## 2022-05-20 RX ADMIN — SODIUM CHLORIDE, PRESERVATIVE FREE 10 ML: 5 INJECTION INTRAVENOUS at 09:16

## 2022-05-20 RX ADMIN — Medication 10 UNITS: at 17:14

## 2022-05-20 RX ADMIN — ASPIRIN 81 MG: 81 TABLET, COATED ORAL at 09:14

## 2022-05-20 RX ADMIN — IPRATROPIUM BROMIDE AND ALBUTEROL SULFATE 3 ML: .5; 3 SOLUTION RESPIRATORY (INHALATION) at 13:12

## 2022-05-20 RX ADMIN — METAXALONE 800 MG: 800 TABLET ORAL at 22:17

## 2022-05-20 RX ADMIN — WHITE PETROLATUM: 1.75 OINTMENT TOPICAL at 09:15

## 2022-05-20 RX ADMIN — CEFEPIME 2 G: 2 INJECTION, POWDER, FOR SOLUTION INTRAVENOUS at 06:48

## 2022-05-20 RX ADMIN — Medication 5 UNITS: at 09:15

## 2022-05-20 RX ADMIN — MIDODRINE HYDROCHLORIDE 15 MG: 5 TABLET ORAL at 16:10

## 2022-05-20 RX ADMIN — TRAMADOL HYDROCHLORIDE 50 MG: 50 TABLET, COATED ORAL at 13:33

## 2022-05-20 RX ADMIN — ACETAMINOPHEN 1000 MG: 500 TABLET ORAL at 09:33

## 2022-05-20 RX ADMIN — HYDROCORTISONE SODIUM SUCCINATE 50 MG: 100 INJECTION, POWDER, FOR SOLUTION INTRAMUSCULAR; INTRAVENOUS at 06:48

## 2022-05-20 RX ADMIN — PREGABALIN 75 MG: 25 CAPSULE ORAL at 17:14

## 2022-05-20 RX ADMIN — HYDROCORTISONE SODIUM SUCCINATE 25 MG: 100 INJECTION, POWDER, FOR SOLUTION INTRAMUSCULAR; INTRAVENOUS at 22:17

## 2022-05-20 RX ADMIN — METAXALONE 800 MG: 800 TABLET ORAL at 16:10

## 2022-05-20 RX ADMIN — APIXABAN 5 MG: 5 TABLET, FILM COATED ORAL at 09:15

## 2022-05-20 RX ADMIN — IPRATROPIUM BROMIDE AND ALBUTEROL SULFATE 3 ML: .5; 3 SOLUTION RESPIRATORY (INHALATION) at 20:58

## 2022-05-20 RX ADMIN — ROSUVASTATIN 5 MG: 10 TABLET, FILM COATED ORAL at 09:14

## 2022-05-20 RX ADMIN — POLYETHYLENE GLYCOL 3350 17 G: 17 POWDER, FOR SOLUTION ORAL at 09:15

## 2022-05-20 RX ADMIN — PREGABALIN 75 MG: 25 CAPSULE ORAL at 09:14

## 2022-05-20 RX ADMIN — ARFORMOTEROL TARTRATE: 15 SOLUTION RESPIRATORY (INHALATION) at 20:58

## 2022-05-20 RX ADMIN — AMIODARONE HYDROCHLORIDE 100 MG: 200 TABLET ORAL at 09:14

## 2022-05-20 RX ADMIN — CEFEPIME 2 G: 2 INJECTION, POWDER, FOR SOLUTION INTRAVENOUS at 22:17

## 2022-05-20 RX ADMIN — METAXALONE 800 MG: 800 TABLET ORAL at 09:13

## 2022-05-20 RX ADMIN — ARFORMOTEROL TARTRATE: 15 SOLUTION RESPIRATORY (INHALATION) at 07:15

## 2022-05-20 RX ADMIN — TRAMADOL HYDROCHLORIDE 50 MG: 50 TABLET, COATED ORAL at 22:17

## 2022-05-20 RX ADMIN — PANTOPRAZOLE SODIUM 40 MG: 40 TABLET, DELAYED RELEASE ORAL at 09:14

## 2022-05-20 RX ADMIN — Medication 7 UNITS: at 12:18

## 2022-05-20 NOTE — WOUND CARE
WOCN Note:     New wound care consult placed for sacral wound, lower leg wounds  Assessed in room 7109    Chart shows:  Patient admitted on 5/18/22 with Sepsis  Past Medical History:   Diagnosis Date    DM (diabetes mellitus) ([address]     Dyslipidemia     Hypertension       5/19/22  WBC = 18.4  Hgb = 12.6    Assessment:   A&O x 4  Mobility: max assistance with turning and repositioning  Continence: Foster catheter, Incontinent of stool   Last Ben Score: 12  Surface: [place] in-touch mattress  Diet: regular    Bilateral heels skin intact and without erythema   Heels offloaded with pillows     Bilateral lower extremities with hemosiderin staining, dry flaky skin, edema. Left lower leg with swelling ( compared to right lower leg), redness to foot and lower leg, painful to touch, petechiae present. Patient states this is new    1. POA stage 3 pressure injury to left ischium   4.5 x 2 x 0.1 cm  Wound bed red non granulating tissue   small amount serosang exudate  no odor   defined edges  Periwound intact & without erythema   Treatment: Cleansed with saline, skin prep to periwound, hydrocolloid    2. POA stage 3 pressure injury to right ischium   1 x 1.5 x 0.1 cm  Wound bed red non granulating tissue   small amount serosang exudate  no odor   defined edges  Periwound intact & without erythema   Treatment: Cleansed with saline, skin prep to periwound, hydrocolloid    3. POA Left anterior lower leg wound  Etiology: venous  Full thickness   2 x 1 x <0.1 cm  Wound bed 50% yellow, 50% pink   small amount serous exudate  no odor   defined edges  Periwound intact & with erythema and swelling  Treatment: Cleansed with saline, Honey gel, honey sheet, foam border dressing    4.  POA Left medial lower leg wound  Etiology: venous  Full thickness   1 x 0.5 x <0.1 cm  Wound bed 30% yellow, 70% pink   small amount serous exudate  no odor   defined edges  Periwound intact & with erythema and swelling  Treatment: Cleansed with saline, Honey gel, honey sheet, foam border dressing    5. POA left lateral lower leg wound proximal  Etiology: venous  Partial thickness   1 x 1.2 x <0.1 cm  Wound bed pink   moderate amount serous exudate  no odor   defined edges  Periwound intact & with erythema and swelling  Treatment: Cleansed with saline, Honey gel, honey sheet, foam border dressing    6. POA left lateral lower leg wound distal  Etiology: venous  Partial thickness   0.9 x 1 x <0.1 cm  Wound bed pink   moderate amount serous exudate  no odor   defined edges  Periwound intact & with erythema and swelling  Treatment: Cleansed with saline, Honey gel, honey sheet, foam border dressing    7. POA right posterior lower leg wound  Etiology: venous  Full thickness   2.5 x 2 x <0.1 cm  Wound bed 80% slough, 20% pink  moderate amount serous exudate  no odor   defined edges  Periwound intact & with erythema and swelling  Treatment: Cleansed with saline, Honey gel, honey sheet, foam border dressing    8.  POA partial thickness skin breakdown under left pannus, right pannus, bilateral groin  Etiology: moisture, poor hygiene  Moderate amount serous exudate to left pannus and right groin skin breakdown, no odor  Treatment: Cleansed with remedy foaming cleanser, Opticel AG    Wound Recommendations:      Cleanse wounds to right posterior lower leg and left anterior, medial, lateral lower leg with normal saline, apply honey gel then apply honey sheet, cover with foam border dressing, every other day and prn if becomes soiled    1) Cleanse wounds to right and left buttocks with normal saline, pat dry   2) Apply skin barrier wipe to periwound skin and allow to dry   3) Fold  hydrocolloid dressing and apply to area   4) Smooth dressing from center outward and hold dressing in place to improve adhesion   Change dressing weekly and prn if becomes soiled or loosens/ edges curl    Cleanse skin breakdown to abdominal folds and bilateral groin with normal saline, place Opticel AG to areas of skin breakdown, change daily     Aquaphor to bilateral lower legs and feet daily    PI Prevention:  Turn/reposition approximately every 2 hours  Offload heels with pillows at all times in bed. Sacral Foam dressing: lift to assess regularly; change as needed. Discontinue if incontinent. Z-guard cream to buttocks and sacrum TID  and as needed with incontinence care   Keep HOB 30 degrees or less to decrease shearing and pressure unless medically contraindicated.  If HOB is to be over 30 degrees, raise knees first then [facility] to prevent sliding   Minimize layers of linen/pads under patient to optimize support surface to one flat sheet and one incontinence pad     Orders received from Dr. Robledo  Discussed with RN    Transition of Care: Plan to follow weekly and as needed while admitted to hospital.      Cris ROJAS, RN, [employer]  Certified Wound and Ostomy Nurse  office [phone]  Can be reached through [address] [place] [address]

## 2022-05-20 NOTE — PROGRESS NOTES
Bedside shift change report given to Liam Bradley RN  (oncoming nurse) by Daniel Ramírez RN (offgoing nurse). Report included the following information SBAR, Intake/Output, MAR and Cardiac Rhythm NSR.       1058: Patient working with PT, assisted to a chair. 1200: Back pain 7 out of 10, perfect serve sent to Dr. Randal Lugo for additional pain medications. BS ranging from 250 - 350, patient only on sliding scale at this point. Dr. Randal Lugo made aware. 1333: Back pain treaded with Tramdol. 1727: SBP in 130-150 perfect serve sent to Dr. Randal Lugo about tapering down midodrine, current regiment 15 mg Q 8    Bedside shift change report given to Joseluis Licona RN  (oncoming nurse) by Liam Bradley RN (offgoing nurse). Report included the following information SBAR, Intake/Output, MAR and Cardiac Rhythm NSR.

## 2022-05-20 NOTE — PROGRESS NOTES
Problem: Mobility Impaired (Adult and Pediatric)  Goal: *Acute Goals and Plan of Care (Insert Text)  Description: FUNCTIONAL STATUS PRIOR TO ADMISSION: Patient was modified independent using a rollator for functional mobility. Also had standard wheelchair to use to go to/from appointments in wheelchair Charly Moffett [address]: The patient lived alone with caregiver 1 hour per day M-F to provide assistance. Physical Therapy Goals  Initiated 5/20/2022  1. Patient will move from supine to sit and sit to supine , scoot up and down, and roll side to side in bed with modified independence within 7 day(s). 2.  Patient will transfer from bed to chair and chair to bed with modified independence using the least restrictive device within 7 day(s). 3.  Patient will perform sit to stand with modified independence within 7 day(s). 4.  Patient will ambulate with minimal assistance/contact guard assist for 50 feet with the least restrictive device within 7 day(s). Outcome: Progressing Towards Goal     PHYSICAL THERAPY EVALUATION  Patient: Naaman Brittle [de-identified]55 y.o. male)  Date: 5/20/2022  Primary Diagnosis: Sepsis ([facility] [address] [A41.9]       Precautions: fall  Fall,Skin    ASSESSMENT  Based on the objective data described below, the patient presents with decline in mobility, BLE weakness, poor activity tolerance, chronic LBP limiting mobility, decreased standing tolerance, decline in ambulation. This is a 55year old male who was recently transferred from a stay at SNF due to sepsis. Patient received in supine on 6L supplemental O2 via nasal cannula stating he was in a lot of pain in his back. Planned PT evaluation around medication schedule. Patient eventually agreeable to get OOB as patient reported needing to use BSC. Patient with slow movement and activity pacing however able to get EOB with min-mod A x 2. Sitting EOB, patient reporting increased LBP that he states is somewhat normal for him.  He requires approximately 15 minutes to perform one sit to stand transfer, only requiring CGA x 2 for the transfer. Once standing, he feels stable and requires CGA x 2 for SPT to [facility]. SpO2 desaturates to 90% on 6L during transfers however quickly returns to 94%. Patient able to sit on [facility] with CGA. Patient spends several minutes there and when unsuccessful, states that he is ready to stand. Again, takes several minutes (up to 10) to be able to stand up however only requiring CGA from [facility] to standing at bariatric RW. PT removes BSC and places recliner behind him to sit. Tobias lift pad placed under patient incase he is unable to stand later due to pain. No gait training initiated today. Cues throughout transfers for upright posture and increased weight through UE, patient prefers to have forward trunk flexion due to back pain with forearms on walker. At baseline patient has rollator and wheelchair at home. Will likely progress quickly once able to control LBP and improved activity tolerance. Will follow in acute setting however recommend return to SNF for rehab at discharge. Current Level of Function Impacting Discharge (mobility/balance): min-mod A for bed mobility, CGA for transfers, requires constant support for standing balance, unable to assess gait today. Functional Outcome Measure: The patient scored 25/100 on the Barthel outcome measure which is indicative of significant disability. Other factors to consider for discharge: per patient, making good progress at rehab before hospital readmission     Patient will benefit from skilled therapy intervention to address the above noted impairments.        PLAN :  Recommendations and Planned Interventions: bed mobility training, transfer training, gait training, therapeutic exercises, neuromuscular re-education, patient and family training/education and therapeutic activities      Frequency/Duration: Patient will be followed by physical therapy:  3 times a week to address goals. Recommendation for discharge: (in order for the patient to meet his/her long term goals)  Therapy up to 5 days/week in SNF setting    This discharge recommendation:  Has been made in collaboration with the attending provider and/or case management    IF patient discharges home will need the following DME: to be determined (TBD)         SUBJECTIVE:   Patient stated I was about to go home from rehab before I came back here.     OBJECTIVE DATA SUMMARY:   HISTORY:    Past Medical History:   Diagnosis Date    DM (diabetes mellitus) ([facility] [address]     Dyslipidemia     Hypertension    No past surgical history on file. Personal factors and/or comorbidities impacting plan of care: alert and oriented     Home Situation  Home Environment: Apartment  One/Two Story Residence: One story  Living Alone: Yes  Support Systems: Caregiver/Home Care Staff (caregiver 1 hour a day M-F)  Patient Expects to be Discharged to[de-identified] Rehab Unit Subacute  Current DME Used/Available at Home: Caleen Hensen, rollator,Wheelchair,Grab bars,Transfer bench  Tub or Shower Type: Tub/Shower combination    EXAMINATION/PRESENTATION/DECISION MAKING:   Critical Behavior:  Neurologic State: Alert  Orientation Level: Oriented X4  Cognition: Appropriate for age attention/concentration,Follows commands  Safety/Judgement: Awareness of environment,Decreased insight into deficits,Fall prevention  Hearing:     Skin:  Several open areas to bilateral LE, abdomen being monitored by nursing staff   Edema: significant BLE edema   Range Of Motion:  AROM: Generally decreased, functional     PROM: Generally decreased, functional  Strength:    Strength: Generally decreased, functional     Tone & Sensation:   Tone: Normal    Coordination:  Coordination: Within functional limits  Vision:   Tracking: Able to track stimulus in all quadrants w/o difficulty  Diplopia: No  Acuity: Within Defined Limits  Functional Mobility:  Bed Mobility:  Rolling:  Moderate assistance;Assist x2  Supine to Sit: Minimum assistance;Assist x2  Transfers:  Sit to Stand: Contact guard assistance; Additional time;Assist x2  Stand to Sit: Contact guard assistance; Additional time;Assist x2  Stand Pivot Transfers: Contact guard assistance;Assist x2     Balance:   Sitting: Intact  Standing: Impaired; Without support  Standing - Static: Constant support;Good  Standing - Dynamic : Constant support; Fair  Ambulation/Gait Training:     NT   Stairs:     N/A      Functional Measure:  Barthel Index:    Bathin  Bladder: 0  Bowels: 5  Groomin  Dressin  Feeding: 10  Mobility: 0  Stairs: 0  Toilet Use: 0  Transfer (Bed to Chair and Back): 10  Total: 25/100       The Barthel ADL Index: Guidelines  1. The index should be used as a record of what a patient does, not as a record of what a patient could do. 2. The main aim is to establish degree of independence from any help, physical or verbal, however minor and for whatever reason. 3. The need for supervision renders the patient not independent. 4. A patient's performance should be established using the best available evidence. Asking the patient, friends/relatives and nurses are the usual sources, but direct observation and common sense are also important. However direct testing is not needed. 5. Usually the patient's performance over the preceding 24-48 hours is important, but occasionally longer periods will be relevant. 6. Middle categories imply that the patient supplies over 50 per cent of the effort. 7. Use of aids to be independent is allowed. Score Interpretation (from [address] [address])    Independent   60-79 Minimally independent   40-59 Partially dependent   20-39 Very dependent   <20 Totally dependent     -Tyson Persaud, Barthel, D.W. (1965). Functional evaluation: the Barthel Index. [facility] ([address] (1997).  The Barthel activities of daily living index: self-reporting versus actual performance in the old (> or = 75 years). Journal of [address] [place] [address] 457, [facility], .HARVINDERF, Cailni Gunter., Kervin Painter. (1999). Measuring the change in disability after inpatient rehabilitation; comparison of the responsiveness of the Barthel Index and Functional [place] Measure. Journal of Neurology, Neurosurgery, and Psychiatry, 66(4), 356-724. JIE Yoo, KYRA Mi, & Guillermo Mcknight M.A. (2004) Assessment of post-stroke quality of life in cost-effectiveness studies: The usefulness of the Barthel Index and the EuroQoL-5D. Quality of Life Research, 15, 303-09           Physical Therapy Evaluation Charge Determination   History Examination Presentation Decision-Making   HIGH Complexity :3+ comorbidities / personal factors will impact the outcome/ POC  LOW Complexity : 1-2 Standardized tests and measures addressing body structure, function, activity limitation and / or participation in recreation  LOW Complexity : Stable, uncomplicated  LOW Complexity : FOTO score of       Based on the above components, the patient evaluation is determined to be of the following complexity level: LOW     Pain Rating:  Does not rate pain but reports LBP throughout session, able to perform mobility despite complaints    Activity Tolerance:   Fair, desaturates with exertion and requires oxygen and requires frequent rest breaks    After treatment patient left in no apparent distress:   Sitting in chair and Call bell within reach    COMMUNICATION/EDUCATION:   The patients plan of care was discussed with: Occupational therapist and Registered nurse. Fall prevention education was provided and the patient/caregiver indicated understanding. and Patient/family have participated as able in goal setting and plan of care.     Thank you for this referral.  Sharath Weaver, PT   Time Calculation: 56 mins

## 2022-05-20 NOTE — PROGRESS NOTES
Orders received, chart reviewed and patient evaluated by occupational therapy. Pending progression with skilled acute occupational therapy, recommend:  Therapy up to 5 days/week in SNF setting     Recommend with nursing ADLs with supervision/setup, OOB to chair 3x/day and toileting via beside commode with 2 assist and walker. Thank you for completing as able in order to maintain patient strength, endurance and independence. Full evaluation to follow.

## 2022-05-20 NOTE — PROGRESS NOTES
Transition of Care Plan   RUR-  High Risk   DISPOSITION: Return to [place] for short term rehab   F/U with PCP/Specialist     Transport: [facility]/ BLS  Care manager met with patient to discuss transitions of care. Therapy is recommending SNF for short term rehab. Patient would like to return to [place]. Referral made through [facility].  Will continue to follow  Veronica Velasquez RN,Care Management

## 2022-05-20 NOTE — PROGRESS NOTES
Day #3 of Vancomycin - Dosing Update  Indication:  Sepsis 2/2 unknown etiology  Current regimen:  1750 mg IV Q 24 hr  Abx regimen:  Cefepime + Vancomycin  ID Following ?: NO  Concomitant nephrotoxic drugs (requires more frequent monitoring): None  Frequency of BMP?: Daily through     Recent Labs     22  0417 22  0451 22  1316   WBC 15.7* 18.4* 21.0*   CREA 1.18 1.38* 2.00*   BUN 43* 53* 63*     Est CrCl: ~ ml/min; UO: >1 ml/kg/hr  Temp (24hrs), Av.3 °F (36.8 °C), Min:97.7 °F (36.5 °C), Max:98.7 °F (37.1 °C)    Cultures:    Blood: NGTD   MRSA screen: (+)   Resp viral panel: (-)    MRSA Swab ordered (if applicable)? YES    Goal target range AUC/GISEL 400-600    Recent level history:  Date/Time Dose & Interval Measured Level (mcg/mL) Associated AUC/GISEL Dose Adjustment     @ 0417 1750 mg IV Q24H 17.2 mcg/mL (~10 hr post-dose) 353 Change to 1750 mg IV Q18H                                         Plan: The random vancomycin level drawn this morning correlates with an AUC/GSIEL of 353 (likely 2/2 improving renal function), which is less than the goal range. Plan will be to adjust the dose to 1750 mg IV Q 18 hr for an estimated AUC/GISEL of 470. Pharmacy will continue to monitor patient daily and will make dosage adjustments based upon changing renal function. *Random vancomycin levels are used to calculate AUC/GISEL, this level should not be interpreted as a trough. Vancomycin has been dosed using Bayesian kinetics software to target an AUC24:GISEL of 400-600, which provides adequate exposure for as assumed infection due to MRSA with an GISEL of 1 or less while reducing the risk of nephrotoxicity as seen with traditional trough based dosing goals.

## 2022-05-20 NOTE — PROGRESS NOTES
[facility] [facility] Adult  Hospitalist Group                                                                                          Hospitalist Progress Note  [facility],   Answering service: [phone] OR [phone] from in house phone        Date of Service:  2022  NAME:  Dalton Negron  :  1965  MRN:  [id number]      Admission Summary:   Interval history: From critical care [facility] on May 25 55year-old gentleman who was recently discharged from our facility who is being sent to the ER from rehab facility with low blood pressure and decreasing responsiveness. Patient has multiple complicated medical problem including diastolic heart failure, severe COPD with chronic supplemental oxygen, A. fib on amiodarone and chronic anticoagulation, diabetes mellitus type 2, obstructive sleep apnea and morbid obesity. As mentioned above recently was discharged from our facility after being treated for COPD exacerbation and diastolic heart failure exacerbation.  Patient is a poor historian and he cannot tell me about the progress of his situation in the rehab facility. He stated that nothing bothering him at this time, he stated that his breathing is at baseline, when I asked him about his legs and if they always look like this he said \"I do not know Riya Dixon knows that he is at [facility] ER.   In the ER he was found to be hypotensive but this has responded to fluid, lactic acid acid elevated but this is also trending down with fluid, and his kidney function acutely worsened compared to few days ago while in the hospital.  Unable to insert a Foster catheter in the ER. Initially hospitalist assess the patient for admission however given his tenuous status I consulted ICU.  : No acute event overnight, looks much better this morning, ate his breakfast tray, needed to be on very low-dose of norepinephrine overnight for short period of time.  No nausea no vomiting.  Transferred out of ICU       Interval history / Subjective: Follow up sepsis. Patient seen and examined. BP improved. Complains of low back pain, not relieved by tylenol. Oxygenation improved. Cr improved     Assessment & Plan:     -Severe sepsis:  Source is unclear, x-ray not highly suggestive of infectious process  Viral panel is negative  Chronic lower limb changes seems to be superimposed with an acute process  received 2.4 L fluid in the ER, may use albumin if needed, maintain MAP above 65  broad-spectrum antibiotic in form of cefepime and vancomycin  Follow-up on culture and sensitivity, wound care consult  Lactic acid normalized  Wean hydrocortisone   This may be changed in a day or so to oral steroid with another taper course  -Acute kidney injury: improving   Likely due to the above with element of dehydration.  Improving.  Continue hold Diuretics as well as Daved Mussel to be reassessed for the need for restarting diuretics and antifailure medication.  No maintenance fluid, maintaining adequate oral intake  - Chronic hypoxic hypercapnic respiratory failure due to severe COPD emphysema:  Continue nebulization and home medication.  As mentioned above patient was on taper  dose of prednisone.    Wean stress dose steroid.  Titrate FiO2 to maintain saturation above 88%  Diabetes mellitus type II with hyperglycemia:   - Add NPH BID with steroids, continue SSI  - Heart failure with preserved ejection fraction:  Keep diuretics including furosemide and Aldactone on hold. Jennyfer Balk on hold.   - Chronic A. fib on amiodarone and chronic anticoagulation with Eliquis:  Resume home medication  --back pain non-focal, likely musculoskeletal  Try short term skelaxin  - Obstructive sleep apnea:  CPAP at night and as needed  -Chronic GERD:  Continue on PPI    Will evaluate for restarting home diuretics and heart failure medications           Code status: full   Prophylaxis: [address] discussed with: patient  Anticipated Disposition: >48 hours     Hospital Problems  Date Reviewed: 4/28/2013          Codes Class Noted POA    * (Principal) Sepsis ([facility] [address] ICD-10-CM: A41.9  ICD-9-CM: 038.9, 995.91  5/18/2022 Unknown                Review of Systems:   Negative unless stated above      Vital Signs:    Last 24hrs VS reviewed since prior progress note. Most recent are:  Visit Vitals  /76   Pulse 68   Temp 97.8 °F (36.6 °C)   Resp 15   Ht 6' (1.829 m)   Wt 129.8 kg (286 lb 2.5 oz)   SpO2 98%   BMI 38.81 kg/m²         Intake/Output Summary (Last 24 hours) at 5/20/2022 1810  Last data filed at 5/20/2022 6187  Gross per 24 hour   Intake 2600 ml   Output 6725 ml   Net -4125 ml        Physical Examination:     I had a face to face encounter with this patient and independently examined them on 5/20/2022 as outlined below:          Constitutional:  No acute distress, cooperative, pleasant, morbidly obese     ENT:  Oral mucosa moist, oropharynx benign. Resp:  CTA bilaterally. No wheezing/rhonchi/rales. No accessory muscle use. CV:  Regular rhythm, normal rate, no murmurs, gallops, rubs    GI:  Soft, non distended, non tender.  normoactive bowel sounds, no hepatosplenomegaly     Musculoskeletal:  lower extremity venous stasis with +edema     Neurologic:  Moves all extremities            Data Review:    Review and/or order of clinical lab test  Review and/or order of tests in the radiology section of CPT  Review and/or order of tests in the medicine section of CPT      Labs:     Recent Labs     05/20/22 0417 05/19/22 0451   WBC 15.7* 18.4*   HGB 12.4 12.6   HCT 43.1 43.3   * 143*     Recent Labs     05/20/22  0417 05/19/22  0451 05/18/22  1316    139 132*   K 3.4* 3.5 4.4   CL 99 97 92*   CO2 36* 35* 35*   BUN 43* 53* 63*   CREA 1.18 1.38* 2.00*   * 194* 271*   CA 8.9 9.3 9.0   MG  --  2.8*  --    PHOS 3.3 4.5  --      Recent Labs     05/18/22  1316   ALT 12   AP 89   TBILI 0.8   TP 6.8   ALB 2.8*   GLOB 4.0     No results for input(s): INR, PTP, APTT, INREXT in the last 72 hours. No results for input(s): FE, TIBC, PSAT, FERR in the last 72 hours. No results found for: FOL, RBCF   No results for input(s): PH, PCO2, PO2 in the last 72 hours. No results for input(s): CPK, CKNDX, TROIQ in the last 72 hours.     No lab exists for component: CPKMB  Lab Results   Component Value Date/Time    Cholesterol, total 170 01/23/2013 11:08 AM    HDL Cholesterol 37 (L) 01/23/2013 11:08 AM    LDL, calculated 97 01/23/2013 11:08 AM    Triglyceride 180 (H) 01/23/2013 11:08 AM    CHOL/HDL Ratio 6.0 (H) 10/06/2010 03:18 PM     Lab Results   Component Value Date/Time    Glucose (POC) 291 (H) 05/20/2022 04:15 PM    Glucose (POC) 349 (H) 05/20/2022 12:01 PM    Glucose (POC) 213 (H) 05/20/2022 09:02 AM    Glucose (POC) 241 (H) 05/19/2022 09:12 PM    Glucose (POC) 251 (H) 05/19/2022 04:58 PM     Lab Results   Component Value Date/Time    Color YELLOW/STRAW 05/18/2022 03:54 PM    Appearance CLEAR 05/18/2022 03:54 PM    Specific gravity 1.011 05/18/2022 03:54 PM    pH (UA) 5.0 05/18/2022 03:54 PM    Protein Negative 05/18/2022 03:54 PM    Glucose >1,000 (A) 05/18/2022 03:54 PM    Ketone Negative 05/18/2022 03:54 PM    Bilirubin Negative 05/18/2022 03:54 PM    Urobilinogen 0.2 05/18/2022 03:54 PM    Nitrites Negative 05/18/2022 03:54 PM    Leukocyte Esterase Negative 05/18/2022 03:54 PM    Epithelial cells FEW 05/18/2022 03:54 PM    Bacteria Negative 05/18/2022 03:54 PM    WBC 0-4 05/18/2022 03:54 PM    RBC 20-50 05/18/2022 03:54 PM         Medications Reviewed:     Current Facility-Administered Medications   Medication Dose Route Frequency    vancomycin (VANCOCIN) 1750 mg in  ml infusion  1,750 mg IntraVENous Q18H    traMADoL (ULTRAM) tablet 50 mg  50 mg Oral Q6H PRN    hydrocortisone Sod Succ (PF) (SOLU-CORTEF) injection 25 mg  25 mg IntraVENous Q8H    polyethylene glycol (MIRALAX) packet 17 g  17 g Oral BID    insulin NPH (NOVOLIN N, HUMULIN N) injection 10 Units  10 Units SubCUTAneous ACB&D    insulin lispro (HUMALOG) injection   SubCUTAneous AC&HS    pregabalin (LYRICA) capsule 75 mg  75 mg Oral BID    acetaminophen (TYLENOL) tablet 1,000 mg  1,000 mg Oral Q6H PRN    Or    acetaminophen (TYLENOL) suppository 650 mg  650 mg Rectal Q6H PRN    pantothenic ac-min oil-pet,hyd (AQUAPHOR) 41 % ointment   Topical DAILY    metaxalone (SKELAXIN) tablet 800 mg  800 mg Oral TID    sodium chloride (NS) flush 5-40 mL  5-40 mL IntraVENous Q8H    sodium chloride (NS) flush 5-40 mL  5-40 mL IntraVENous PRN    glucose chewable tablet 16 g  4 Tablet Oral PRN    glucagon (GLUCAGEN) injection 1 mg  1 mg IntraMUSCular PRN    dextrose 10 % infusion 0-250 mL  0-250 mL IntraVENous PRN    [Held by provider] midodrine (PROAMATINE) tablet 15 mg  15 mg Oral Q8H    sodium chloride (NS) flush 5-40 mL  5-40 mL IntraVENous Q8H    sodium chloride (NS) flush 5-40 mL  5-40 mL IntraVENous PRN    ondansetron (ZOFRAN ODT) tablet 4 mg  4 mg Oral Q8H PRN    Or    ondansetron (ZOFRAN) injection 4 mg  4 mg IntraVENous Q6H PRN    bisacodyL (DULCOLAX) tablet 5 mg  5 mg Oral DAILY    polyethylene glycol (MIRALAX) packet 17 g  17 g Oral DAILY PRN    albuterol-ipratropium (DUO-NEB) 2.5 MG-0.5 MG/3 ML  3 mL Nebulization Q6H RT    amiodarone (CORDARONE) tablet 100 mg  100 mg Oral DAILY    apixaban (ELIQUIS) tablet 5 mg  5 mg Oral BID    aspirin delayed-release tablet 81 mg  81 mg Oral DAILY    arformoterol 15 mcg/budesonide 0.5 mg neb solution   Nebulization BID RT    montelukast (SINGULAIR) tablet 10 mg  10 mg Oral QHS    pantoprazole (PROTONIX) tablet 40 mg  40 mg Oral DAILY    rosuvastatin (CRESTOR) tablet 5 mg  5 mg Oral DAILY    tamsulosin (FLOMAX) capsule 0.4 mg  0.4 mg Oral DAILY    cefepime (MAXIPIME) 2 g in 0.9% sodium chloride (MBP/ADV) 100 mL MBP  2 g IntraVENous Q8H    Vancomycin- pharmacy to dose   Other Rx Dosing/Monitoring ______________________________________________________________________  EXPECTED LENGTH OF STAY: 4d 19h  ACTUAL LENGTH OF STAY:          2                 Keysha Severo Hi DO

## 2022-05-20 NOTE — PROGRESS NOTES
Problem: Self Care Deficits Care Plan (Adult)  Goal: *Acute Goals and Plan of Care (Insert Text)  Description: FUNCTIONAL STATUS PRIOR TO ADMISSION: Patient was modified independent using a rollator for functional mobility. HOME SUPPORT: The patient lived alone with caregiver who comes in to provide assistance with IADLs. Occupational Therapy Goals  Initiated 5/20/2022  1. Patient will perform grooming in sitting with supervision/set-up within 7 day(s). 2.  Patient will perform bathing using most appropriate DME with moderate assistance within 7 day(s). 3.  Patient will perform lower body dressing using most appropriate DME with moderate assistance within 7 day(s). 4.  Patient will perform toilet transfers with supervision/set-up within 7 day(s). 5.  Patient will perform all aspects of toileting with moderate assistance within 7 day(s). 6.  Patient will participate in upper extremity therapeutic exercise/activities with supervision/set-up for 5 minutes within 7 day(s). 7.  Patient will utilize energy conservation techniques during functional activities with verbal cues within 7 day(s). Outcome: Progressing Towards Goal    OCCUPATIONAL THERAPY EVALUATION  Patient: Chester Ga [de-identified]55 y.o. male)  Date: 5/20/2022  Primary Diagnosis: [facility] [A41.9]        Precautions:  Fall,Skin    ASSESSMENT  Based on the objective data described below, the patient presents with limited ADL performance s/p admission for sepsis. Patient ADLs limited by pain, volition, decreased functional activity tolerance, body habitus, limited lower body access, generalized weakness and impaired cardiopulmonary endurance (on 6L NC). Patient was living alone prior to recent hospitalization/rehab stay and mod I for ADLs; has a caregiver who assists with IADLs 1 hr/day (M-F). Today, patient received in bed and agreeable to therapy - required significantly increased time for all mobility tasks.  Patient with min/mod A to roll onto side to have RN remove bed pan. Patient required mod A to come to sitting on EOB. Total A to mitch socks. Patient required CGA x2 to stand and transfer to [facility]. Once there, patient unable to have BM - placed in chair with CGA x2. Patient left sitting up with call bell in reach, RN aware, all needs met. Anticipate return to SNF at D/C. Current Level of Function Impacting Discharge (ADLs/self-care): CGA-total A for ADLs    Functional Outcome Measure: The patient scored Total: 25/100 on the Barthel Index outcome measure which is indicative of 75% impairment in basic self-care. Other factors to consider for discharge: wounds, lives alone, caregiver only 1 hour a day     Patient will benefit from skilled therapy intervention to address the above noted impairments. PLAN :  Recommendations and Planned Interventions: self care training, functional mobility training, therapeutic exercise, balance training, therapeutic activities, endurance activities, patient education, home safety training, and family training/education    Frequency/Duration: Patient will be followed by occupational therapy 3 times a week to address goals. Recommendation for discharge: (in order for the patient to meet his/her long term goals)  Therapy up to 5 days/week in SNF setting    This discharge recommendation:  Has been made in collaboration with the attending provider and/or case management    IF patient discharges home will need the following DME: patient owns DME required for discharge       SUBJECTIVE:   Patient stated That bed is terrible.     OBJECTIVE DATA SUMMARY:   HISTORY:   Past Medical History:   Diagnosis Date    DM (diabetes mellitus) ([facility] [address]     Dyslipidemia     Hypertension    No past surgical history on file.     Expanded or extensive additional review of patient history:     Home Situation  Home Environment: Apartment  One/Two Story Residence: One story  Living Alone: Yes  Support Systems: Caregiver/Home Care Staff (caregiver 1 hour a day M-F)  Patient Expects to be Discharged to[de-identified] Rehab Unit Subacute  Current DME Used/Available at Home: [address], rollator,Wheelchair,Grab bars,Transfer bench  Tub or Shower Type: Tub/Shower combination    Hand dominance: Right    EXAMINATION OF PERFORMANCE DEFICITS:  Cognitive/Behavioral Status:  Neurologic State: Alert  Orientation Level: Oriented X4  Cognition: Appropriate for age attention/concentration; Follows commands  Perception: Appears intact  Perseveration: Perseverates during conversation (on back pain)  Safety/Judgement: Awareness of environment;Decreased insight into deficits; Fall prevention    Skin: wounds on BLE    Edema: mild in BUE    Hearing:       Vision/Perceptual:    Tracking: Able to track stimulus in all quadrants w/o difficulty                 Diplopia: No    Acuity: Within Defined Limits         Range of Motion:  In BUEs  AROM: Generally decreased, functional  PROM: Generally decreased, functional    Strength: In BUEs  Strength: Generally decreased, functional    Coordination:  Coordination: Within functional limits  Fine Motor Skills-Upper: Left Intact; Right Intact    Gross Motor Skills-Upper: Left Intact; Right Intact    Tone & Sensation:  In BUEs  Tone: Normal  Sensation:  (hypersensitive to touch)    Balance:  Sitting: Intact  Standing: Impaired; Without support  Standing - Static: Constant support;Good  Standing - Dynamic : Constant support; Fair    Functional Mobility and Transfers for ADLs:  Bed Mobility:  Rolling: Moderate assistance;Assist x2  Supine to Sit: Minimum assistance;Assist x2  Scooting: Contact guard assistance    Transfers:  Sit to Stand: Contact guard assistance; Additional time;Assist x2  Stand to Sit: Contact guard assistance; Additional time;Assist x2  Toilet Transfer : Minimum assistance; Additional time;Assist x2 (2/2 low surface)  Assistive Device : Walker, rolling    ADL Assessment:  Feeding: Setup    Oral Facial Hygiene/Grooming: Minimum assistance    Bathing: Moderate assistance    Upper Body Dressing: Minimum assistance    Lower Body Dressing: Maximum assistance    Toileting: Maximum assistance     ADL Intervention and task modifications:  Feeding  Drink to Mouth: Set-up    Upper Body [address] Gown: Maximum assistance (2/2 lines and leads)    Lower Body Dressing Assistance  Socks: Total assistance (dependent)  Leg Crossed Method Used: No  Position Performed: Seated edge of bed  Cues: Don;Physical assistance;Verbal cues provided    Cognitive Retraining  Safety/Judgement: Awareness of environment;Decreased insight into deficits; Fall prevention    Functional Measure:    Barthel Index:  Bathin  Bladder: 0  Bowels: 5  Groomin  Dressin  Feeding: 10  Mobility: 0  Stairs: 0  Toilet Use: 0  Transfer (Bed to Chair and Back): 10  Total: 25/100      The Barthel ADL Index: Guidelines  1. The index should be used as a record of what a patient does, not as a record of what a patient could do. 2. The main aim is to establish degree of independence from any help, physical or verbal, however minor and for whatever reason. 3. The need for supervision renders the patient not independent. 4. A patient's performance should be established using the best available evidence. Asking the patient, friends/relatives and nurses are the usual sources, but direct observation and common sense are also important. However direct testing is not needed. 5. Usually the patient's performance over the preceding 24-48 hours is important, but occasionally longer periods will be relevant. 6. Middle categories imply that the patient supplies over 50 per cent of the effort. 7. Use of aids to be independent is allowed. Score Interpretation (from [address] [address])    Independent   60-79 Minimally independent   40-59 Partially dependent   20-39 Very dependent   <20 Totally dependent     -Ike Persaud., Barthel, D.W. (1965).  Functional evaluation: the Barthel Index. [address] [facility] ([address] (1997). The Barthel activities of daily living index: self-reporting versus actual performance in the old (> or = 75 years). Journal of [address] [place] [address] 45(7), [facility], NIRU, Ken Dear., Osmel Munoz. (1999). Measuring the change in disability after inpatient rehabilitation; comparison of the responsiveness of the Barthel Index and Functional Edgar Measure. Journal of Neurology, Neurosurgery, and Psychiatry, 66(4), 285-161. JIE Crockett, KYRA Mi, & Siri Ramos M.A. (2004) Assessment of post-stroke quality of life in cost-effectiveness studies: The usefulness of the Barthel Index and the EuroQoL-5D. Quality of Life Research, 15, 776-31     Occupational Therapy Evaluation Charge Determination   History Examination Decision-Making   LOW Complexity : Brief history review  MEDIUM Complexity : 3-5 performance deficits relating to physical, cognitive , or psychosocial skils that result in activity limitations and / or participation restrictions HIGH Complexity : Patient presents with comorbidities that affect occupational performance. Signifigant modification of tasks or assistance (eg, physical or verbal) with assessment (s) is necessary to enable patient to complete evaluation       Based on the above components, the patient evaluation is determined to be of the following complexity level: MEDIUM  Pain Rating:  Reporting significant pain to touch in back    Activity Tolerance:   Fair, desaturates with exertion and requires oxygen, and requires rest breaks    After treatment patient left in no apparent distress:    Sitting in chair, Call bell within reach, and RN aware    COMMUNICATION/EDUCATION:   The patients plan of care was discussed with: Physical therapist and Registered nurse. Home safety education was provided and the patient/caregiver indicated understanding.  and Patient/family have participated as able in goal setting and plan of care. This patients plan of care is appropriate for delegation to ERIN.     Thank you for this referral.  Sharath Goldstein, OT  Time Calculation: 55 mins

## 2022-05-21 LAB
ANION GAP SERPL CALC-SCNC: 3 MMOL/L (ref 5–15)
BNP SERPL-MCNC: 972 PG/ML
BUN SERPL-MCNC: 36 MG/DL (ref 6–20)
BUN/CREAT SERPL: 35 (ref 12–20)
CALCIUM SERPL-MCNC: 9.3 MG/DL (ref 8.5–10.1)
CHLORIDE SERPL-SCNC: 97 MMOL/L (ref 97–108)
CO2 SERPL-SCNC: 38 MMOL/L (ref 21–32)
CREAT SERPL-MCNC: 1.04 MG/DL (ref 0.7–1.3)
ERYTHROCYTE [DISTWIDTH] IN BLOOD BY AUTOMATED COUNT: 22.7 % (ref 11.5–14.5)
GLUCOSE BLD STRIP.AUTO-MCNC: 162 MG/DL (ref 65–117)
GLUCOSE BLD STRIP.AUTO-MCNC: 181 MG/DL (ref 65–117)
GLUCOSE BLD STRIP.AUTO-MCNC: 195 MG/DL (ref 65–117)
GLUCOSE BLD STRIP.AUTO-MCNC: 306 MG/DL (ref 65–117)
GLUCOSE SERPL-MCNC: 237 MG/DL (ref 65–100)
HCT VFR BLD AUTO: 43.7 % (ref 36.6–50.3)
HGB BLD-MCNC: 12.4 G/DL (ref 12.1–17)
MAGNESIUM SERPL-MCNC: 2.8 MG/DL (ref 1.6–2.4)
MCH RBC QN AUTO: 22.3 PG (ref 26–34)
MCHC RBC AUTO-ENTMCNC: 28.4 G/DL (ref 30–36.5)
MCV RBC AUTO: 78.7 FL (ref 80–99)
NRBC # BLD: 0 K/UL (ref 0–0.01)
NRBC BLD-RTO: 0 PER 100 WBC
PHOSPHATE SERPL-MCNC: 2.8 MG/DL (ref 2.6–4.7)
PLATELET # BLD AUTO: 133 K/UL (ref 150–400)
POTASSIUM SERPL-SCNC: 3.4 MMOL/L (ref 3.5–5.1)
PROCALCITONIN SERPL-MCNC: 1.05 NG/ML
RBC # BLD AUTO: 5.55 M/UL (ref 4.1–5.7)
SERVICE CMNT-IMP: ABNORMAL
SODIUM SERPL-SCNC: 138 MMOL/L (ref 136–145)
WBC # BLD AUTO: 15.1 K/UL (ref 4.1–11.1)

## 2022-05-21 PROCEDURE — 84100 ASSAY OF PHOSPHORUS: CPT

## 2022-05-21 PROCEDURE — 74011250636 HC RX REV CODE- 250/636: Performed by: INTERNAL MEDICINE

## 2022-05-21 PROCEDURE — 74011000258 HC RX REV CODE- 258: Performed by: INTERNAL MEDICINE

## 2022-05-21 PROCEDURE — 94664 DEMO&/EVAL PT USE INHALER: CPT

## 2022-05-21 PROCEDURE — 74011636637 HC RX REV CODE- 636/637: Performed by: INTERNAL MEDICINE

## 2022-05-21 PROCEDURE — 82962 GLUCOSE BLOOD TEST: CPT

## 2022-05-21 PROCEDURE — 83880 ASSAY OF NATRIURETIC PEPTIDE: CPT

## 2022-05-21 PROCEDURE — 74011000250 HC RX REV CODE- 250: Performed by: INTERNAL MEDICINE

## 2022-05-21 PROCEDURE — 74011250637 HC RX REV CODE- 250/637: Performed by: INTERNAL MEDICINE

## 2022-05-21 PROCEDURE — 77010033678 HC OXYGEN DAILY

## 2022-05-21 PROCEDURE — 74011000250 HC RX REV CODE- 250: Performed by: HOSPITALIST

## 2022-05-21 PROCEDURE — 84145 PROCALCITONIN (PCT): CPT

## 2022-05-21 PROCEDURE — 94760 N-INVAS EAR/PLS OXIMETRY 1: CPT

## 2022-05-21 PROCEDURE — 65270000046 HC RM TELEMETRY

## 2022-05-21 PROCEDURE — 94640 AIRWAY INHALATION TREATMENT: CPT

## 2022-05-21 PROCEDURE — 83735 ASSAY OF MAGNESIUM: CPT

## 2022-05-21 PROCEDURE — 80048 BASIC METABOLIC PNL TOTAL CA: CPT

## 2022-05-21 PROCEDURE — 36415 COLL VENOUS BLD VENIPUNCTURE: CPT

## 2022-05-21 PROCEDURE — 85027 COMPLETE CBC AUTOMATED: CPT

## 2022-05-21 RX ORDER — HYDROCORTISONE SODIUM SUCCINATE 100 MG/2ML
25 INJECTION, POWDER, FOR SOLUTION INTRAMUSCULAR; INTRAVENOUS EVERY 12 HOURS
Status: DISCONTINUED | OUTPATIENT
Start: 2022-05-21 | End: 2022-05-22

## 2022-05-21 RX ORDER — IPRATROPIUM BROMIDE AND ALBUTEROL SULFATE 2.5; .5 MG/3ML; MG/3ML
3 SOLUTION RESPIRATORY (INHALATION)
Status: DISCONTINUED | OUTPATIENT
Start: 2022-05-21 | End: 2022-06-03 | Stop reason: HOSPADM

## 2022-05-21 RX ORDER — BUMETANIDE 0.25 MG/ML
1 INJECTION INTRAMUSCULAR; INTRAVENOUS DAILY
Status: DISCONTINUED | OUTPATIENT
Start: 2022-05-21 | End: 2022-05-22

## 2022-05-21 RX ADMIN — SODIUM CHLORIDE, PRESERVATIVE FREE 10 ML: 5 INJECTION INTRAVENOUS at 06:00

## 2022-05-21 RX ADMIN — POLYETHYLENE GLYCOL 3350 17 G: 17 POWDER, FOR SOLUTION ORAL at 17:36

## 2022-05-21 RX ADMIN — Medication 10 UNITS: at 09:29

## 2022-05-21 RX ADMIN — APIXABAN 5 MG: 5 TABLET, FILM COATED ORAL at 17:37

## 2022-05-21 RX ADMIN — ARFORMOTEROL TARTRATE: 15 SOLUTION RESPIRATORY (INHALATION) at 20:26

## 2022-05-21 RX ADMIN — BISACODYL 5 MG: 5 TABLET, COATED ORAL at 10:39

## 2022-05-21 RX ADMIN — POLYETHYLENE GLYCOL 3350 17 G: 17 POWDER, FOR SOLUTION ORAL at 10:38

## 2022-05-21 RX ADMIN — PANTOPRAZOLE SODIUM 40 MG: 40 TABLET, DELAYED RELEASE ORAL at 10:39

## 2022-05-21 RX ADMIN — Medication 7 UNITS: at 12:36

## 2022-05-21 RX ADMIN — MONTELUKAST 10 MG: 10 TABLET, FILM COATED ORAL at 22:26

## 2022-05-21 RX ADMIN — ASPIRIN 81 MG: 81 TABLET, COATED ORAL at 10:39

## 2022-05-21 RX ADMIN — SODIUM CHLORIDE, PRESERVATIVE FREE 10 ML: 5 INJECTION INTRAVENOUS at 14:04

## 2022-05-21 RX ADMIN — TRAMADOL HYDROCHLORIDE 50 MG: 50 TABLET, COATED ORAL at 22:33

## 2022-05-21 RX ADMIN — CEFEPIME 2 G: 2 INJECTION, POWDER, FOR SOLUTION INTRAVENOUS at 22:26

## 2022-05-21 RX ADMIN — ACETAMINOPHEN 1000 MG: 500 TABLET ORAL at 10:42

## 2022-05-21 RX ADMIN — AMIODARONE HYDROCHLORIDE 100 MG: 200 TABLET ORAL at 10:38

## 2022-05-21 RX ADMIN — ROSUVASTATIN 5 MG: 10 TABLET, FILM COATED ORAL at 10:38

## 2022-05-21 RX ADMIN — WHITE PETROLATUM: 1.75 OINTMENT TOPICAL at 10:55

## 2022-05-21 RX ADMIN — SODIUM CHLORIDE, PRESERVATIVE FREE 10 ML: 5 INJECTION INTRAVENOUS at 14:05

## 2022-05-21 RX ADMIN — IPRATROPIUM BROMIDE AND ALBUTEROL SULFATE 3 ML: .5; 3 SOLUTION RESPIRATORY (INHALATION) at 06:59

## 2022-05-21 RX ADMIN — PREGABALIN 75 MG: 25 CAPSULE ORAL at 10:39

## 2022-05-21 RX ADMIN — Medication 2 UNITS: at 17:36

## 2022-05-21 RX ADMIN — BUMETANIDE 1 MG: 0.25 INJECTION INTRAMUSCULAR; INTRAVENOUS at 14:03

## 2022-05-21 RX ADMIN — HYDROCORTISONE SODIUM SUCCINATE 25 MG: 100 INJECTION, POWDER, FOR SOLUTION INTRAMUSCULAR; INTRAVENOUS at 22:26

## 2022-05-21 RX ADMIN — CEFEPIME 2 G: 2 INJECTION, POWDER, FOR SOLUTION INTRAVENOUS at 06:57

## 2022-05-21 RX ADMIN — SODIUM CHLORIDE, PRESERVATIVE FREE 10 ML: 5 INJECTION INTRAVENOUS at 22:26

## 2022-05-21 RX ADMIN — VANCOMYCIN HYDROCHLORIDE 1750 MG: 10 INJECTION, POWDER, LYOPHILIZED, FOR SOLUTION INTRAVENOUS at 07:18

## 2022-05-21 RX ADMIN — TRAMADOL HYDROCHLORIDE 50 MG: 50 TABLET, COATED ORAL at 06:56

## 2022-05-21 RX ADMIN — CEFEPIME 2 G: 2 INJECTION, POWDER, FOR SOLUTION INTRAVENOUS at 14:01

## 2022-05-21 RX ADMIN — Medication 10 UNITS: at 17:37

## 2022-05-21 RX ADMIN — HYDROCORTISONE SODIUM SUCCINATE 25 MG: 100 INJECTION, POWDER, FOR SOLUTION INTRAMUSCULAR; INTRAVENOUS at 06:56

## 2022-05-21 RX ADMIN — ARFORMOTEROL TARTRATE: 15 SOLUTION RESPIRATORY (INHALATION) at 06:59

## 2022-05-21 RX ADMIN — SODIUM CHLORIDE, PRESERVATIVE FREE 10 ML: 5 INJECTION INTRAVENOUS at 22:27

## 2022-05-21 RX ADMIN — PREGABALIN 75 MG: 25 CAPSULE ORAL at 17:37

## 2022-05-21 RX ADMIN — APIXABAN 5 MG: 5 TABLET, FILM COATED ORAL at 10:39

## 2022-05-21 RX ADMIN — TAMSULOSIN HYDROCHLORIDE 0.4 MG: 0.4 CAPSULE ORAL at 10:38

## 2022-05-21 NOTE — PROGRESS NOTES
[address] [facility] Adult  Hospitalist Group                                                                                          Hospitalist Progress Note  [facility],   Answering service: [phone] OR [phone] from in house phone        Date of Service:  2022  NAME:  Janae Rm  :  1965  MRN:  [id number]      Admission Summary:   Interval history: From critical care [facility] on May 25 55year-old gentleman who was recently discharged from our facility who is being sent to the ER from rehab facility with low blood pressure and decreasing responsiveness. Patient has multiple complicated medical problem including diastolic heart failure, severe COPD with chronic supplemental oxygen, A. fib on amiodarone and chronic anticoagulation, diabetes mellitus type 2, obstructive sleep apnea and morbid obesity. As mentioned above recently was discharged from our facility after being treated for COPD exacerbation and diastolic heart failure exacerbation.  Patient is a poor historian and he cannot tell me about the progress of his situation in the rehab facility. He stated that nothing bothering him at this time, he stated that his breathing is at baseline, when I asked him about his legs and if they always look like this he said \"I do not know Andrea Constantino knows that he is at [facility] ER.   In the ER he was found to be hypotensive but this has responded to fluid, lactic acid acid elevated but this is also trending down with fluid, and his kidney function acutely worsened compared to few days ago while in the hospital.  Unable to insert a Foster catheter in the ER. Initially hospitalist assess the patient for admission however given his tenuous status I consulted ICU.  : No acute event overnight, looks much better this morning, ate his breakfast tray, needed to be on very low-dose of norepinephrine overnight for short period of time.  No nausea no vomiting.  Transferred out of ICU       Interval history / Subjective: Follow up sepsis. Patient seen and examined earlier today. Has worsening left lower extremity edema. Has not been on diuretics due to hypotension. Has maintained on Eliquis. Oxygenation improving. Initial plan for Mclaughlin catheter removal, however, patient reported clots. Assessment & Plan:     Severe sepsis due to possible lower extremity cellulitis:  Source is unclear, x-ray not highly suggestive of infectious process  Viral panel is negative  Chronic lower limb changes seems to be superimposed with an acute process  Continue broad-spectrum antibiotic in form of cefepime and vancomycin  Follow-up on culture and sensitivity, wound care consult  Lactic acid normalized  Wean hydrocortisone - q12. May need oral taper course due to recent steroid use    Acute kidney injury: improving   Resume diuretics.  Hold home entresto and consider reinitiating pending clinical improvement    Chronic hypoxic hypercapnic respiratory failure due to severe COPD emphysema:  Continue nebulization and home medication.  As mentioned above patient was on taper  dose of prednisone.    Wean stress dose steroid.  Titrate FiO2 to maintain saturation above 88%    Diabetes mellitus type II with hyperglycemia:   -Continue NPH BID with steroids, continue SSI     Heart failure with preserved ejection fraction:  Initiate Bumex, hold Entresto and spironolactone- consider reinitiating 5/22    Chronic A. fib on amiodarone and chronic anticoagulation with Eliquis:  Continue home medication     Back pain non-focal, likely musculoskeletal  As needed medication    Obstructive sleep apnea:  CPAP at night and as needed    Chronic GERD:  Continue on PPI    Readdress mclaughlin daily    Reevaluate heart failure medications daily           Code status: full   Prophylaxis: [address] discussed with: patient  Anticipated Disposition: >48 hours     Hospital Problems  Date Reviewed: 4/28/2013          Codes Class Noted POA    * (Principal) Sepsis [facility]) ICD-10-CM: A41.9  ICD-9-CM: 038.9, 995.91  5/18/2022 Unknown                Review of Systems:   Negative unless stated above      Vital Signs:    Last 24hrs VS reviewed since prior progress note. Most recent are:  Visit Vitals  /81 (BP 1 Location: Right upper arm)   Pulse 63   Temp 97.6 °F (36.4 °C)   Resp 16   Ht 6' (1.829 m)   Wt 129.8 kg (286 lb 2.5 oz)   SpO2 100%   BMI 38.81 kg/m²         Intake/Output Summary (Last 24 hours) at 5/21/2022 1759  Last data filed at 5/21/2022 1417  Gross per 24 hour   Intake 640 ml   Output 3000 ml   Net -2360 ml        Physical Examination:     I had a face to face encounter with this patient and independently examined them on 5/21/2022 as outlined below:          Constitutional:  No acute distress, cooperative, pleasant, morbidly obese     ENT:  Oral mucosa moist, oropharynx benign. Resp:  CTA bilaterally. No wheezing/rhonchi/rales. No accessory muscle use. CV:  Regular rhythm, normal rate, no murmurs, gallops, rubs    GI:  Soft, non distended, non tender. normoactive bowel sounds, no hepatosplenomegaly     Musculoskeletal:  lower extremity venous stasis with worsening edema left leg +pitting, right lower extremity with chronic venous changes     Neurologic:  Moves all extremities            Data Review:    Review and/or order of clinical lab test  Review and/or order of tests in the radiology section of CPT  Review and/or order of tests in the medicine section of CPT      Labs:     Recent Labs     05/21/22  0156 05/20/22 0417   WBC 15.1* 15.7*   HGB 12.4 12.4   HCT 43.7 43.1   * 127*     Recent Labs     05/21/22  0156 05/20/22 0417 05/19/22  0451    140 139   K 3.4* 3.4* 3.5   CL 97 99 97   CO2 38* 36* 35*   BUN 36* 43* 53*   CREA 1.04 1.18 1.38*   * 286* 194*   CA 9.3 8.9 9.3   MG 2.8*  --  2.8*   PHOS 2.8 3.3 4.5     No results for input(s): ALT, AP, TBIL, TBILI, TP, ALB, GLOB, GGT, AML, LPSE in the last 72 hours.     No lab exists for component: SGOT, GPT, AMYP, HLPSE  No results for input(s): INR, PTP, APTT, INREXT, INREXT in the last 72 hours. No results for input(s): FE, TIBC, PSAT, FERR in the last 72 hours. No results found for: FOL, RBCF   No results for input(s): PH, PCO2, PO2 in the last 72 hours. No results for input(s): CPK, CKNDX, TROIQ in the last 72 hours.     No lab exists for component: CPKMB  Lab Results   Component Value Date/Time    Cholesterol, total 170 01/23/2013 11:08 AM    HDL Cholesterol 37 (L) 01/23/2013 11:08 AM    LDL, calculated 97 01/23/2013 11:08 AM    Triglyceride 180 (H) 01/23/2013 11:08 AM    CHOL/HDL Ratio 6.0 (H) 10/06/2010 03:18 PM     Lab Results   Component Value Date/Time    Glucose (POC) 195 (H) 05/21/2022 05:08 PM    Glucose (POC) 306 (H) 05/21/2022 11:44 AM    Glucose (POC) 181 (H) 05/21/2022 08:43 AM    Glucose (POC) 290 (H) 05/20/2022 10:10 PM    Glucose (POC) 291 (H) 05/20/2022 04:15 PM     Lab Results   Component Value Date/Time    Color YELLOW/STRAW 05/18/2022 03:54 PM    Appearance CLEAR 05/18/2022 03:54 PM    Specific gravity 1.011 05/18/2022 03:54 PM    pH (UA) 5.0 05/18/2022 03:54 PM    Protein Negative 05/18/2022 03:54 PM    Glucose >1,000 (A) 05/18/2022 03:54 PM    Ketone Negative 05/18/2022 03:54 PM    Bilirubin Negative 05/18/2022 03:54 PM    Urobilinogen 0.2 05/18/2022 03:54 PM    Nitrites Negative 05/18/2022 03:54 PM    Leukocyte Esterase Negative 05/18/2022 03:54 PM    Epithelial cells FEW 05/18/2022 03:54 PM    Bacteria Negative 05/18/2022 03:54 PM    WBC 0-4 05/18/2022 03:54 PM    RBC 20-50 05/18/2022 03:54 PM         Medications Reviewed:     Current Facility-Administered Medications   Medication Dose Route Frequency    albuterol-ipratropium (DUO-NEB) 2.5 MG-0.5 MG/3 ML  3 mL Nebulization Q6H PRN    hydrocortisone Sod Succ (PF) (SOLU-CORTEF) injection 25 mg  25 mg IntraVENous Q12H    bumetanide (BUMEX) injection 1 mg  1 mg IntraVENous DAILY    vancomycin (VANCOCIN) 1750 mg in  ml infusion  1,750 mg IntraVENous Q18H    traMADoL (ULTRAM) tablet 50 mg  50 mg Oral Q6H PRN    polyethylene glycol (MIRALAX) packet 17 g  17 g Oral BID    insulin NPH (NOVOLIN N, HUMULIN N) injection 10 Units  10 Units SubCUTAneous ACB&D    albuterol (PROVENTIL VENTOLIN) nebulizer solution 2.5 mg  2.5 mg Nebulization Q4H PRN    insulin lispro (HUMALOG) injection   SubCUTAneous AC&HS    pregabalin (LYRICA) capsule 75 mg  75 mg Oral BID    acetaminophen (TYLENOL) tablet 1,000 mg  1,000 mg Oral Q6H PRN    Or    acetaminophen (TYLENOL) suppository 650 mg  650 mg Rectal Q6H PRN    pantothenic ac-min oil-pet,hyd (AQUAPHOR) 41 % ointment   Topical DAILY    sodium chloride (NS) flush 5-40 mL  5-40 mL IntraVENous Q8H    sodium chloride (NS) flush 5-40 mL  5-40 mL IntraVENous PRN    glucose chewable tablet 16 g  4 Tablet Oral PRN    glucagon (GLUCAGEN) injection 1 mg  1 mg IntraMUSCular PRN    dextrose 10 % infusion 0-250 mL  0-250 mL IntraVENous PRN    sodium chloride (NS) flush 5-40 mL  5-40 mL IntraVENous Q8H    sodium chloride (NS) flush 5-40 mL  5-40 mL IntraVENous PRN    ondansetron (ZOFRAN ODT) tablet 4 mg  4 mg Oral Q8H PRN    Or    ondansetron (ZOFRAN) injection 4 mg  4 mg IntraVENous Q6H PRN    bisacodyL (DULCOLAX) tablet 5 mg  5 mg Oral DAILY    polyethylene glycol (MIRALAX) packet 17 g  17 g Oral DAILY PRN    amiodarone (CORDARONE) tablet 100 mg  100 mg Oral DAILY    apixaban (ELIQUIS) tablet 5 mg  5 mg Oral BID    aspirin delayed-release tablet 81 mg  81 mg Oral DAILY    arformoterol 15 mcg/budesonide 0.5 mg neb solution   Nebulization BID RT    montelukast (SINGULAIR) tablet 10 mg  10 mg Oral QHS    pantoprazole (PROTONIX) tablet 40 mg  40 mg Oral DAILY    rosuvastatin (CRESTOR) tablet 5 mg  5 mg Oral DAILY    tamsulosin (FLOMAX) capsule 0.4 mg  0.4 mg Oral DAILY    cefepime (MAXIPIME) 2 g in 0.9% sodium chloride (MBP/ADV) 100 mL MBP  2 g IntraVENous Q8H    Vancomycin- pharmacy to dose   Other Rx Dosing/Monitoring     ______________________________________________________________________  EXPECTED LENGTH OF STAY: 4d 19h  ACTUAL LENGTH OF STAY:          [address], DO

## 2022-05-21 NOTE — PROGRESS NOTES
Patient reported bright red blood in Foster tubing while sitting on the bedside commode attempting a BM. There are a few small clots visible in the tubing. The urine is now pink tinged, it was previously clear and yellow. Reported findings to Dr. Ariela Oshea, she ordered to leave the Foster in until further notice.

## 2022-05-21 NOTE — PROGRESS NOTES
1930: Bedside and Verbal shift change report given to Lizz Soto (oncoming nurse) by Judit Starr (offgoing nurse). Report included the following information SBAR, Kardex, ED Summary, Procedure Summary, MAR, Recent Results and Cardiac Rhythm NSR.     2000: Shift assessment completed. 2200: TRANSFER - OUT REPORT:    Verbal report given to [address] [place] [address] (name) on Randal Cruz  being transferred to [facility]) for routine progression of care       Report consisted of patients Situation, Background, Assessment and   Recommendations(SBAR). Information from the following report(s) SBAR, Kardex, ED Summary, Procedure Summary, MAR, Recent Results and Cardiac Rhythm NSR/SB was reviewed with the receiving nurse. Lines:   Peripheral IV 05/18/22 Left Forearm (Active)   Site Assessment Clean, dry, & intact 05/20/22 2000   Phlebitis Assessment 0 05/20/22 2000   Infiltration Assessment 0 05/20/22 2000   Dressing Status Clean, dry, & intact 05/20/22 2000   Dressing Type Transparent;Tape 05/20/22 2000   Hub Color/Line Status Pink; Infusing 05/20/22 2000   Action Taken Open ports on tubing capped 05/20/22 2000   Alcohol Cap Used Yes 05/20/22 2000        Opportunity for questions and clarification was provided.       Patient transported with:   Monitor  O2 @ 6 liters  Patient-specific medications from Pharmacy  Registered Nurse  Tech

## 2022-05-21 NOTE — PROGRESS NOTES
Day #4 of Vancomycin  Indication:  Sepsis 2/2 unknown etiology  Current regimen:  1750 mg IV Q 18 hr  Abx regimen:  Cefepime + Vancomycin  ID Following ?: NO  Concomitant nephrotoxic drugs (requires more frequent monitoring): None  Frequency of BMP?: Daily    Recent Labs     22  0156 22  0417 22  0451   WBC 15.1* 15.7* 18.4*   CREA 1.04 1.18 1.38*   BUN 36* 43* 53*     Est CrCl: 100-110 ml/min; UO: >1 ml/kg/hr  Temp (24hrs), Av.7 °F (36.5 °C), Min:97.3 °F (36.3 °C), Max:98.4 °F (36.9 °C)    Cultures:    Blood: NGTD   MRSA screen: (+)   Resp viral panel: (-)    MRSA Swab ordered (if applicable)? YES    Goal target range AUC/GISEL 400-600    Recent level history:  Date/Time Dose & Interval Measured Level (mcg/mL) Associated AUC/GISEL Dose Adjustment     @ 0417 1750 mg IV Q24H 17.2 mcg/mL (~10 hr post-dose) 353 Change to 1750 mg IV Q18H                                         Plan: Continue current regimen for predicted AUC/GISEL within goal range. *Random vancomycin levels are used to calculate AUC/GISEL, this level should not be interpreted as a trough. Vancomycin has been dosed using Bayesian kinetics software to target an AUC24:GISEL of 400-600, which provides adequate exposure for as assumed infection due to MRSA with an GISEL of 1 or less while reducing the risk of nephrotoxicity as seen with traditional trough based dosing goals.

## 2022-05-22 LAB
ANION GAP SERPL CALC-SCNC: 6 MMOL/L (ref 5–15)
BUN SERPL-MCNC: 35 MG/DL (ref 6–20)
BUN/CREAT SERPL: 43 (ref 12–20)
CALCIUM SERPL-MCNC: 8.9 MG/DL (ref 8.5–10.1)
CHLORIDE SERPL-SCNC: 97 MMOL/L (ref 97–108)
CO2 SERPL-SCNC: 33 MMOL/L (ref 21–32)
CREAT SERPL-MCNC: 0.82 MG/DL (ref 0.7–1.3)
ERYTHROCYTE [DISTWIDTH] IN BLOOD BY AUTOMATED COUNT: 22.7 % (ref 11.5–14.5)
GLUCOSE BLD STRIP.AUTO-MCNC: 117 MG/DL (ref 65–117)
GLUCOSE BLD STRIP.AUTO-MCNC: 149 MG/DL (ref 65–117)
GLUCOSE BLD STRIP.AUTO-MCNC: 154 MG/DL (ref 65–117)
GLUCOSE BLD STRIP.AUTO-MCNC: 286 MG/DL (ref 65–117)
GLUCOSE SERPL-MCNC: 165 MG/DL (ref 65–100)
HCT VFR BLD AUTO: 46.4 % (ref 36.6–50.3)
HGB BLD-MCNC: 13.2 G/DL (ref 12.1–17)
MAGNESIUM SERPL-MCNC: 2.6 MG/DL (ref 1.6–2.4)
MCH RBC QN AUTO: 22.6 PG (ref 26–34)
MCHC RBC AUTO-ENTMCNC: 28.4 G/DL (ref 30–36.5)
MCV RBC AUTO: 79.5 FL (ref 80–99)
NRBC # BLD: 0 K/UL (ref 0–0.01)
NRBC BLD-RTO: 0 PER 100 WBC
PHOSPHATE SERPL-MCNC: 3.2 MG/DL (ref 2.6–4.7)
PLATELET # BLD AUTO: 144 K/UL (ref 150–400)
POTASSIUM SERPL-SCNC: 3.9 MMOL/L (ref 3.5–5.1)
RBC # BLD AUTO: 5.84 M/UL (ref 4.1–5.7)
SERVICE CMNT-IMP: ABNORMAL
SERVICE CMNT-IMP: NORMAL
SODIUM SERPL-SCNC: 136 MMOL/L (ref 136–145)
WBC # BLD AUTO: 9 K/UL (ref 4.1–11.1)

## 2022-05-22 PROCEDURE — 74011250636 HC RX REV CODE- 250/636: Performed by: INTERNAL MEDICINE

## 2022-05-22 PROCEDURE — 94664 DEMO&/EVAL PT USE INHALER: CPT

## 2022-05-22 PROCEDURE — 82962 GLUCOSE BLOOD TEST: CPT

## 2022-05-22 PROCEDURE — 65270000046 HC RM TELEMETRY

## 2022-05-22 PROCEDURE — 74011000250 HC RX REV CODE- 250: Performed by: INTERNAL MEDICINE

## 2022-05-22 PROCEDURE — 84100 ASSAY OF PHOSPHORUS: CPT

## 2022-05-22 PROCEDURE — 74011636637 HC RX REV CODE- 636/637: Performed by: INTERNAL MEDICINE

## 2022-05-22 PROCEDURE — 36415 COLL VENOUS BLD VENIPUNCTURE: CPT

## 2022-05-22 PROCEDURE — 74011250637 HC RX REV CODE- 250/637: Performed by: INTERNAL MEDICINE

## 2022-05-22 PROCEDURE — 74011250637 HC RX REV CODE- 250/637: Performed by: NURSE PRACTITIONER

## 2022-05-22 PROCEDURE — 83735 ASSAY OF MAGNESIUM: CPT

## 2022-05-22 PROCEDURE — 80048 BASIC METABOLIC PNL TOTAL CA: CPT

## 2022-05-22 PROCEDURE — 94760 N-INVAS EAR/PLS OXIMETRY 1: CPT

## 2022-05-22 PROCEDURE — 94640 AIRWAY INHALATION TREATMENT: CPT

## 2022-05-22 PROCEDURE — 77010033678 HC OXYGEN DAILY

## 2022-05-22 PROCEDURE — 74011000258 HC RX REV CODE- 258: Performed by: INTERNAL MEDICINE

## 2022-05-22 PROCEDURE — 85027 COMPLETE CBC AUTOMATED: CPT

## 2022-05-22 PROCEDURE — 74011000250 HC RX REV CODE- 250: Performed by: HOSPITALIST

## 2022-05-22 RX ORDER — BUMETANIDE 0.25 MG/ML
1 INJECTION INTRAMUSCULAR; INTRAVENOUS 2 TIMES DAILY
Status: DISCONTINUED | OUTPATIENT
Start: 2022-05-22 | End: 2022-05-23

## 2022-05-22 RX ORDER — AMOXICILLIN 250 MG
1 CAPSULE ORAL 2 TIMES DAILY
Status: DISCONTINUED | OUTPATIENT
Start: 2022-05-22 | End: 2022-06-03 | Stop reason: HOSPADM

## 2022-05-22 RX ORDER — CLINDAMYCIN PHOSPHATE 900 MG/50ML
900 INJECTION INTRAVENOUS EVERY 8 HOURS
Status: COMPLETED | OUTPATIENT
Start: 2022-05-22 | End: 2022-05-25

## 2022-05-22 RX ORDER — BUMETANIDE 0.25 MG/ML
1 INJECTION INTRAMUSCULAR; INTRAVENOUS 2 TIMES DAILY
Status: DISCONTINUED | OUTPATIENT
Start: 2022-05-22 | End: 2022-05-22 | Stop reason: SDUPTHER

## 2022-05-22 RX ORDER — VANCOMYCIN 1.75 GRAM/500 ML IN 0.9 % SODIUM CHLORIDE INTRAVENOUS
1750 EVERY 12 HOURS
Status: DISCONTINUED | OUTPATIENT
Start: 2022-05-22 | End: 2022-05-23

## 2022-05-22 RX ORDER — MAGNESIUM CITRATE
148 SOLUTION, ORAL ORAL
Status: DISCONTINUED | OUTPATIENT
Start: 2022-05-22 | End: 2022-06-03 | Stop reason: HOSPADM

## 2022-05-22 RX ORDER — FACIAL-BODY WIPES
10 EACH TOPICAL
Status: COMPLETED | OUTPATIENT
Start: 2022-05-22 | End: 2022-05-22

## 2022-05-22 RX ADMIN — TRAMADOL HYDROCHLORIDE 50 MG: 50 TABLET, COATED ORAL at 04:13

## 2022-05-22 RX ADMIN — Medication 2 UNITS: at 09:42

## 2022-05-22 RX ADMIN — BUMETANIDE 1 MG: 0.25 INJECTION, SOLUTION INTRAMUSCULAR; INTRAVENOUS at 18:03

## 2022-05-22 RX ADMIN — SODIUM CHLORIDE, PRESERVATIVE FREE 10 ML: 5 INJECTION INTRAVENOUS at 21:31

## 2022-05-22 RX ADMIN — POLYETHYLENE GLYCOL 3350 17 G: 17 POWDER, FOR SOLUTION ORAL at 18:04

## 2022-05-22 RX ADMIN — SODIUM CHLORIDE, PRESERVATIVE FREE 10 ML: 5 INJECTION INTRAVENOUS at 06:05

## 2022-05-22 RX ADMIN — VANCOMYCIN HYDROCHLORIDE 1750 MG: 10 INJECTION, POWDER, LYOPHILIZED, FOR SOLUTION INTRAVENOUS at 01:27

## 2022-05-22 RX ADMIN — SENNOSIDES AND DOCUSATE SODIUM 1 TABLET: 50; 8.6 TABLET ORAL at 09:41

## 2022-05-22 RX ADMIN — Medication 5 UNITS: at 13:42

## 2022-05-22 RX ADMIN — PREGABALIN 75 MG: 25 CAPSULE ORAL at 09:40

## 2022-05-22 RX ADMIN — BISACODYL 10 MG: 10 SUPPOSITORY RECTAL at 06:04

## 2022-05-22 RX ADMIN — Medication 2 UNITS: at 18:03

## 2022-05-22 RX ADMIN — CEFEPIME 2 G: 2 INJECTION, POWDER, FOR SOLUTION INTRAVENOUS at 22:08

## 2022-05-22 RX ADMIN — TAMSULOSIN HYDROCHLORIDE 0.4 MG: 0.4 CAPSULE ORAL at 09:41

## 2022-05-22 RX ADMIN — TRAMADOL HYDROCHLORIDE 50 MG: 50 TABLET, COATED ORAL at 13:46

## 2022-05-22 RX ADMIN — MONTELUKAST 10 MG: 10 TABLET, FILM COATED ORAL at 21:26

## 2022-05-22 RX ADMIN — Medication 5 UNITS: at 18:03

## 2022-05-22 RX ADMIN — POLYETHYLENE GLYCOL 3350 17 G: 17 POWDER, FOR SOLUTION ORAL at 09:44

## 2022-05-22 RX ADMIN — VANCOMYCIN HYDROCHLORIDE 1750 MG: 10 INJECTION, POWDER, LYOPHILIZED, FOR SOLUTION INTRAVENOUS at 13:42

## 2022-05-22 RX ADMIN — SODIUM CHLORIDE, PRESERVATIVE FREE 10 ML: 5 INJECTION INTRAVENOUS at 15:53

## 2022-05-22 RX ADMIN — ARFORMOTEROL TARTRATE: 15 SOLUTION RESPIRATORY (INHALATION) at 08:01

## 2022-05-22 RX ADMIN — APIXABAN 5 MG: 5 TABLET, FILM COATED ORAL at 18:03

## 2022-05-22 RX ADMIN — BISACODYL 5 MG: 5 TABLET, COATED ORAL at 09:41

## 2022-05-22 RX ADMIN — CLINDAMYCIN IN 5 PERCENT DEXTROSE 900 MG: 18 INJECTION, SOLUTION INTRAVENOUS at 15:51

## 2022-05-22 RX ADMIN — ASPIRIN 81 MG: 81 TABLET, COATED ORAL at 09:40

## 2022-05-22 RX ADMIN — ARFORMOTEROL TARTRATE: 15 SOLUTION RESPIRATORY (INHALATION) at 20:34

## 2022-05-22 RX ADMIN — AMIODARONE HYDROCHLORIDE 100 MG: 200 TABLET ORAL at 09:41

## 2022-05-22 RX ADMIN — CEFEPIME 2 G: 2 INJECTION, POWDER, FOR SOLUTION INTRAVENOUS at 13:44

## 2022-05-22 RX ADMIN — PANTOPRAZOLE SODIUM 40 MG: 40 TABLET, DELAYED RELEASE ORAL at 09:41

## 2022-05-22 RX ADMIN — TRAMADOL HYDROCHLORIDE 50 MG: 50 TABLET, COATED ORAL at 21:26

## 2022-05-22 RX ADMIN — CLINDAMYCIN IN 5 PERCENT DEXTROSE 900 MG: 18 INJECTION, SOLUTION INTRAVENOUS at 21:28

## 2022-05-22 RX ADMIN — SODIUM CHLORIDE, PRESERVATIVE FREE 10 ML: 5 INJECTION INTRAVENOUS at 06:06

## 2022-05-22 RX ADMIN — SENNOSIDES AND DOCUSATE SODIUM 1 TABLET: 50; 8.6 TABLET ORAL at 18:03

## 2022-05-22 RX ADMIN — WHITE PETROLATUM: 1.75 OINTMENT TOPICAL at 09:48

## 2022-05-22 RX ADMIN — ONDANSETRON 4 MG: 4 TABLET, ORALLY DISINTEGRATING ORAL at 05:36

## 2022-05-22 RX ADMIN — SODIUM CHLORIDE, PRESERVATIVE FREE 10 ML: 5 INJECTION INTRAVENOUS at 21:26

## 2022-05-22 RX ADMIN — ROSUVASTATIN 5 MG: 10 TABLET, FILM COATED ORAL at 09:40

## 2022-05-22 RX ADMIN — CEFEPIME 2 G: 2 INJECTION, POWDER, FOR SOLUTION INTRAVENOUS at 05:58

## 2022-05-22 RX ADMIN — Medication 10 UNITS: at 09:43

## 2022-05-22 RX ADMIN — APIXABAN 5 MG: 5 TABLET, FILM COATED ORAL at 09:00

## 2022-05-22 RX ADMIN — PREGABALIN 75 MG: 25 CAPSULE ORAL at 18:02

## 2022-05-22 NOTE — PROGRESS NOTES
Day #5 of Vancomycin  Indication:  Sepsis 2/2 unknown etiology  Current regimen:  1750 mg IV Q 18 hr  Abx regimen:  Cefepime + Vancomycin  ID Following ?: NO  Concomitant nephrotoxic drugs (requires more frequent monitoring): None  Frequency of BMP?: Daily    Recent Labs     22  0434 22  0156 22  0417   WBC 9.0 15.1* 15.7*   CREA 0.82 1.04 1.18   BUN 35* 36* 43*     Est CrCl: >120 ml/min; UO: >1 ml/kg/hr  Temp (24hrs), Av.9 °F (36.6 °C), Min:97.4 °F (36.3 °C), Max:98.7 °F (37.1 °C)    Cultures:    Blood: NGTD   MRSA screen: (+)   Resp viral panel: (-)    MRSA Swab ordered (if applicable)? YES    Goal target range AUC/GISEL 400-600    Recent level history:  Date/Time Dose & Interval Measured Level (mcg/mL) Associated AUC/GISEL Dose Adjustment     @ 0417 1750 mg IV Q24H 17.2 mcg/mL (~10 hr post-dose) 353 Change to 1750 mg IV Q18H                                         Plan: Change to vanc 1.75g q12h now that renal function returned to baseline. Will assess with random level 24-48h after initiation of new dosing strategy. *Random vancomycin levels are used to calculate AUC/GISEL, this level should not be interpreted as a trough. Vancomycin has been dosed using Bayesian kinetics software to target an AUC24:GISEL of 400-600, which provides adequate exposure for as assumed infection due to MRSA with an GISEL of 1 or less while reducing the risk of nephrotoxicity as seen with traditional trough based dosing goals.

## 2022-05-22 NOTE — CONSULTS
Infectious Disease Consult    Today's Date: 5/22/2022   Admit Date: 5/18/2022    Impression:   · PVD  · Chronic LE wounds  · Cellulitis LLE  · Morbid obesity  · Leukocytosis--improved    Plan:   · Adjust antibiotic therapy  · Work up fevers  · Wound care/edema control    Anti-infectives:   · Cefepime   · Vancomycin     Subjective:   Date of Consultation:  May 22, 2022  Referring Physician: Dr Brown Carl    Patient is a 55 y.o. male, know to me from care given in the past, who is admitted with hypotension, AMS and picture of sepsis. He states that he has chronic edema of his legs, but states that the LLE is worse over the past few days. He has increasing erythema of the leg, also. He is on IV antibiotic therapy and we are asked to see him in consultation. Patient Active Problem List   Diagnosis Code    Acute on chronic respiratory failure (HCC) J96.20    Shortness of breath R06.02    Sepsis ([facility] A41.9     Past Medical History:   Diagnosis Date    DM (diabetes mellitus) ([facility]     Dyslipidemia     Hypertension       No family history on file. Social History     Tobacco Use    Smoking status: Never Smoker    Smokeless tobacco: Not on file   Substance Use Topics    Alcohol use: No     No past surgical history on file. Prior to Admission medications    Medication Sig Start Date End Date Taking? Authorizing Provider   albuterol-ipratropium (DUO-NEB) 2.5 mg-0.5 mg/3 ml nebu 3 mL by Nebulization route every four to six (4-6) hours as needed for Wheezing or Shortness of Breath. 5/2/22   Arminda Krishna MD   docusate sodium (COLACE) 100 mg capsule Take 2 Capsules by mouth two (2) times a day for 90 days. 5/2/22 7/31/22  Bandar Day MD   miconazole (MICOTIN) 2 % topical powder Apply  to affected area two (2) times a day.  Antifungal powder under pannus, in groin, gluteal cleft, and scrotum 5/2/22   Bandar Day MD   polyethylene glycol (MIRALAX) 17 gram packet Take 1 Packet by mouth daily. 5/3/22   Evy Burciaga MD   predniSONE (DELTASONE) 10 mg tablet Take 4 tab daily X 3 days then 3 tabs daily X 3 days then 2 tabs daily X 3 days then 1 tab daily X 3 days 5/2/22   Evy Burciaga MD   tamsulosin (FLOMAX) 0.4 mg capsule Take 1 Capsule by mouth daily. 5/3/22   Evy Burciaga MD   budesonide-formoteroL (Symbicort) 160-4.5 mcg/actuation HFAA Take 2 Puffs by inhalation two (2) times a day. 5/2/22   Evy Burciaga MD   torsemide (DEMADEX) 20 mg tablet Take 60 mg by mouth daily. Indications: high blood pressure    Provider, Historical   pregabalin (LYRICA) 75 mg capsule Take 75 mg by mouth two (2) times a day. Indications: nerve pain after herpes    Provider, Historical   apixaban (ELIQUIS) 5 mg tablet Take 5 mg by mouth two (2) times a day. Indications: treatment to prevent blood clots in chronic atrial fibrillation    Provider, Historical   aspirin delayed-release 81 mg tablet Take  by mouth daily. Indications: treatment to prevent a heart attack    Provider, Historical   amiodarone (PACERONE) 100 mg tablet Take 100 mg by mouth daily. Provider, Historical   ezetimibe (ZETIA) 10 mg tablet Take 10 mg by mouth daily. Provider, Historical   pantoprazole (PROTONIX) 40 mg tablet Take 40 mg by mouth daily. Indications: gastroesophageal reflux disease    Provider, Historical   empagliflozin (Jardiance) 25 mg tablet Take 25 mg by mouth daily. Indications: type 2 diabetes mellitus    Provider, Historical   isosorbide mononitrate ER (IMDUR) 30 mg tablet Take 30 mg by mouth daily. Indications: prevention of anginal chest pain associated with coronary artery disease    Provider, Historical   sacubitriL-valsartan (Entresto) 49-51 mg tab tablet Take 1 Tablet by mouth two (2) times a day. Provider, Historical   rosuvastatin (CRESTOR) 5 mg tablet Take 5 mg by mouth daily. Provider, Historical   spironolactone (ALDACTONE) 25 mg tablet Take 25 mg by mouth daily.     Provider, Historical   montelukast (SINGULAIR) 10 mg tablet Take 10 mg by mouth nightly. Provider, Historical   insulin glargine (LANTUS) 100 unit/mL injection 18 Units by SubCUTAneous route daily. Patient not taking: Reported on 2022    Provider, Historical       No Known Allergies     Review of Systems:  A comprehensive review of systems was negative except for that written in the History of Present Illness. Objective:     Visit Vitals  BP (!) 146/83 (BP 1 Location: Right upper arm, BP Patient Position: Sitting)   Pulse 73   Temp 98 °F (36.7 °C)   Resp 10   Ht 6' (1.829 m)   Wt 129.8 kg (286 lb 2.5 oz)   SpO2 100%   BMI 38.81 kg/m²     Temp (24hrs), Av.9 °F (36.6 °C), Min:97.4 °F (36.3 °C), Max:98.7 °F (37.1 °C)       Lines:  Peripheral IV:       Physical Exam:  Lungs:  clear to auscultation bilaterally  Heart:  regular rate and rhythm  Abdomen:  soft, non-tender. Bowel sounds normal. No masses,  no organomegaly  Significant peripheral edema, chronic LE wounds, tender LLE to palpation with faint proximal erythema    Data Review:     CBC:  Recent Labs     22   WBC 9.0 15.1* 15.7*   GRANS  --   --  95*   MONOS  --   --  3*   EOS  --   --  0   ANEU  --   --  14.8*   ABL  --   --  0.2*   HGB 13.2 12.4 12.4   HCT 46.4 43.7 43.1   * 133* 127*       BMP:  Recent Labs     22   CREA 0.82 1.04 1.18   BUN 35* 36* 43*    138 140   K 3.9 3.4* 3.4*   CL 97 97 99   CO2 33* 38* 36*   AGAP 6 3* 5   * 237* 286*       LFTS:  No results for input(s): TBILI, ALT, AP, TP, ALB in the last 72 hours.     No lab exists for component: SGOT    Microbiology:     All Micro Results     Procedure Component Value Units Date/Time    CULTURE, BLOOD, PAIRED [[id number]] Collected: 22 1316    Order Status: Completed Specimen: Blood Updated: 22 4847     Special Requests: NO SPECIAL REQUESTS        Culture result: NO GROWTH 4 DAYS CULTURE, MRSA [[id number]]  (Abnormal) Collected: 05/18/22 1706    Order Status: Completed Specimen: Nasal from Nares Updated: 05/19/22 1721     Special Requests: NO SPECIAL REQUESTS        Culture result: MRSA PRESENT               Screening of patient nares for MRSA is for surveillance purposes and, if positive, to facilitate isolation considerations in high risk settings. It is not intended for automatic decolonization interventions per se as regimens are not sufficiently effective to warrant routine use. (NOTE) CALLED POSITIVE MRSA TO TYLER ZARAGOZA AT 4372 ON 5/19/22. AT    RESPIRATORY VIRUS PANEL W/COVID-19, PCR [[id number]] Collected: 05/18/22 1542    Order Status: Completed Specimen: Nasopharyngeal Updated: 05/18/22 1737     Adenovirus Not detected        Coronavirus 229E Not detected        Coronavirus HKU1 Not detected        Coronavirus CVNL63 Not detected        Coronavirus OC43 Not detected        SARS-CoV-2, PCR Not detected        Metapneumovirus Not detected        Rhinovirus and Enterovirus Not detected        Influenza A Not detected        Influenza A, subtype H1 Not detected        Influenza A, subtype H3 Not detected        INFLUENZA A H1N1 PCR Not detected        Influenza B Not detected        Parainfluenza 1 Not detected        Parainfluenza 2 Not detected        Parainfluenza 3 Not detected        Parainfluenza virus 4 Not detected        RSV by PCR Not detected        B. parapertussis, PCR Not detected        Bordetella pertussis - PCR Not detected        Chlamydophila pneumoniae DNA, QL, PCR Not detected        Mycoplasma pneumoniae DNA, QL, PCR Not detected       URINE CULTURE HOLD SAMPLE [[id number]] Collected: 05/18/22 1554    Order Status: Completed Specimen: Serum Updated: 05/18/22 1619     Urine culture hold       Urine on hold in Microbiology dept for 2 days.   If unpreserved urine is submitted, it cannot be used for addtional testing after 24 hours, recollection will be required.                 Imaging:   Reviewed     Signed By: Maximino Mims MD     May 22, 2022

## 2022-05-22 NOTE — PROGRESS NOTES
Bedside and Verbal shift change report given to Meredith Leonardo (oncoming nurse) by Phil Mcmullen (offgoing nurse). Report included the following information SBAR, Kardex, ED Summary, Procedure Summary, MAR, Recent Results and Cardiac Rhythm.

## 2022-05-22 NOTE — PROGRESS NOTES
Pt up to [facility] attempting to have BM. Reported that last BM was about 5-6 days ago, pt claiming his last \"real\" BM was a month ago.  Jesus Avelar NP informed, waiting response

## 2022-05-22 NOTE — PROGRESS NOTES
Problem: Pressure Injury - Risk of  Goal: *Prevention of pressure injury  Description: Document Ben Scale and appropriate interventions in the flowsheet. Outcome: Progressing Towards Goal  Note: Pressure Injury Interventions:  Sensory Interventions: Assess need for specialty bed,Keep linens dry and wrinkle-free,Maintain/enhance activity level,Minimize linen layers,Pad between skin to skin,Monitor skin under medical devices    Moisture Interventions: Absorbent underpads,Apply protective barrier, creams and emollients,Assess need for specialty bed,Internal/External urinary devices,Limit adult briefs,Minimize layers    Activity Interventions: Increase time out of bed,Assess need for specialty bed,PT/OT evaluation    Mobility Interventions: Assess need for specialty bed,HOB 30 degrees or less,PT/OT evaluation    Nutrition Interventions: Document food/fluid/supplement intake    Friction and Shear Interventions: Apply protective barrier, creams and emollients,Foam dressings/transparent film/skin sealants,HOB 30 degrees or less,Minimize layers (pt prefers to sit up in chair)                Problem: Falls - Risk of  Goal: *Absence of Falls  Description: Document Nkechi Herrera Fall Risk and appropriate interventions in the flowsheet.   Outcome: Progressing Towards Goal  Note: Fall Risk Interventions:  Mobility Interventions: Communicate number of staff needed for ambulation/transfer,OT consult for ADLs,PT Consult for mobility concerns,PT Consult for assist device competence,Patient to call before getting OOB,Strengthening exercises (ROM-active/passive),Utilize gait belt for transfers/ambulation,Utilize walker, cane, or other assistive device    Mentation Interventions: Adequate sleep, hydration, pain control,Door open when patient unattended,Gait belt with transfers/ambulation,Increase mobility,More frequent rounding    Medication Interventions: Patient to call before getting OOB,Teach patient to arise slowly,Utilize gait belt for transfers/ambulation    Elimination Interventions: Call light in reach,Patient to call for help with toileting needs    History of Falls Interventions: Door open when patient unattended,Utilize gait belt for transfer/ambulation

## 2022-05-22 NOTE — PROGRESS NOTES
[facility] Adult  Hospitalist Group                                                                                          Hospitalist Progress Note  Monique Nugent DO  Answering service: [phone] OR [phone] from in house phone        Date of Service:  2022  NAME:  Fili Hdz  :  1965  MRN:  [id number]      Admission Summary:   Interval history: From critical care [facility] on May 25 55year-old gentleman who was recently discharged from our facility who is being sent to the ER from rehab facility with low blood pressure and decreasing responsiveness. Patient has multiple complicated medical problem including diastolic heart failure, severe COPD with chronic supplemental oxygen, A. fib on amiodarone and chronic anticoagulation, diabetes mellitus type 2, obstructive sleep apnea and morbid obesity. As mentioned above recently was discharged from our facility after being treated for COPD exacerbation and diastolic heart failure exacerbation.  Patient is a poor historian and he cannot tell me about the progress of his situation in the rehab facility. He stated that nothing bothering him at this time, he stated that his breathing is at baseline, when I asked him about his legs and if they always look like this he said \"I do not know Yoan Ritchie knows that he is at [facility] ER.   In the ER he was found to be hypotensive but this has responded to fluid, lactic acid acid elevated but this is also trending down with fluid, and his kidney function acutely worsened compared to few days ago while in the hospital.  Unable to insert a Foster catheter in the ER. Initially hospitalist assess the patient for admission however given his tenuous status I consulted ICU.  : No acute event overnight, looks much better this morning, ate his breakfast tray, needed to be on very low-dose of norepinephrine overnight for short period of time.  No nausea no vomiting.  Transferred out of ICU       Interval history / Subjective: Follow up sepsis. Patient seen and examined. Has slightly worsening left lower extremity cellulitis from day prior. Prominent swelling was not present 2 days prior. Continues on IV antibiotics. Diuresis added 5/21. Due to worsening symptoms, will ask ID to assist in management. Patient complains of pain at mclaughlin insertion site. Initially plans for removal 5/21 but held due to reported clots passing. No evidence of clots, possible slight hematuria. Assessment & Plan:     Severe sepsis due to lower extremity cellulitis:  -required ICU admission due to low BP, initiated on stress dose steroids, midodrine, does not appear to require pressors  -Suspect secondary to lower extremity cellulitis with chronic lower limb changes with superimposed infection, now appears worsening with component of volume overload  -continue cefepime, vancomycin  -Consult infectious disease due to worsening/persistent symptoms  -Left lower extremity doppler  -CXR clear, viral panel negative  -wean hydrocortisone 5/22. If remains hypotensive, consider adrenal insufficiency  -continue wound care    Acute kidney injury: resolved  -Increase diuretics.  Restart home entresto    Chronic hypoxic hypercapnic respiratory failure due to severe COPD emphysema:  -continue brovana/pulmicort BID, aa nebs as needed  -CXR clear  -wean oxygen as able     Diabetes mellitus type II with hyperglycemia:   -Decrease NPH5 units BID with steroids, continue SSI    Heart failure with preserved ejection fraction:  -increase bumex BID, initiate Entresto  -hold spironolactone, imdur and reevaluate for restarting daily     Chronic A. fib on amiodarone and chronic anticoagulation with Eliquis:  -Continue home medication     Back pain non-focal, likely musculoskeletal  -As needed medication    Obstructive sleep apnea:  -CPAP at night and as needed    Chronic GERD:  -Continue on PPI    Urinary retention:   -remove mclaughlin 5/22  Hematuria:   -mild, monitor Reevaluate heart failure medications daily           Code status: full   Prophylaxis: eliquis  Care Plan discussed with: patient, nurse  Anticipated Disposition: >48 hours     Hospital Problems  Date Reviewed: 4/28/2013          Codes Class Noted POA    * (Principal) Sepsis ([facility] [address] ICD-10-CM: A41.9  ICD-9-CM: 038.9, 995.91  5/18/2022 Unknown                Review of Systems:   Negative unless stated above      Vital Signs:    Last 24hrs VS reviewed since prior progress note. Most recent are:  Visit Vitals  BP (!) 144/96 (BP 1 Location: Left upper arm, BP Patient Position: At rest)   Pulse 67   Temp 98.7 °F (37.1 °C)   Resp 24   Ht 6' (1.829 m)   Wt 129.8 kg (286 lb 2.5 oz)   SpO2 97%   BMI 38.81 kg/m²         Intake/Output Summary (Last 24 hours) at 5/22/2022 7868  Last data filed at 5/22/2022 0435  Gross per 24 hour   Intake 1500 ml   Output 3150 ml   Net -1650 ml        Physical Examination:     I had a face to face encounter with this patient and independently examined them on 5/22/2022 as outlined below:          Constitutional:  No acute distress, cooperative, pleasant, morbidly obese     ENT:  Oral mucosa moist, oropharynx benign. Resp:  CTA bilaterally. No wheezing/rhonchi/rales. No accessory muscle use. CV:  Regular rhythm, normal rate, no murmurs, gallops, rubs    GI:  Soft, non distended, non tender.  normoactive bowel sounds, no hepatosplenomegaly     Musculoskeletal:  lower extremity venous stasis with worsening edema left leg +pitting and weeping, right lower extremity with chronic venous changes     Neurologic:  Moves all extremities            Data Review:    Review and/or order of clinical lab test  Review and/or order of tests in the radiology section of CPT  Review and/or order of tests in the medicine section of CPT      Labs:     Recent Labs     05/22/22 0434 05/21/22 0156   WBC 9.0 15.1*   HGB 13.2 12.4   HCT 46.4 43.7   * 133*     Recent Labs     05/22/22 0434 05/21/22 0156 05/20/22  0417    138 140   K 3.9 3.4* 3.4*   CL 97 97 99   CO2 33* 38* 36*   BUN 35* 36* 43*   CREA 0.82 1.04 1.18   * 237* 286*   CA 8.9 9.3 8.9   MG 2.6* 2.8*  --    PHOS 3.2 2.8 3.3     No results for input(s): ALT, AP, TBIL, TBILI, TP, ALB, GLOB, GGT, AML, LPSE in the last 72 hours. No lab exists for component: SGOT, GPT, AMYP, HLPSE  No results for input(s): INR, PTP, APTT, INREXT, INREXT in the last 72 hours. No results for input(s): FE, TIBC, PSAT, FERR in the last 72 hours. No results found for: FOL, RBCF   No results for input(s): PH, PCO2, PO2 in the last 72 hours. No results for input(s): CPK, CKNDX, TROIQ in the last 72 hours.     No lab exists for component: CPKMB  Lab Results   Component Value Date/Time    Cholesterol, total 170 01/23/2013 11:08 AM    HDL Cholesterol 37 (L) 01/23/2013 11:08 AM    LDL, calculated 97 01/23/2013 11:08 AM    Triglyceride 180 (H) 01/23/2013 11:08 AM    CHOL/HDL Ratio 6.0 (H) 10/06/2010 03:18 PM     Lab Results   Component Value Date/Time    Glucose (POC) 149 (H) 05/22/2022 08:45 AM    Glucose (POC) 162 (H) 05/21/2022 09:24 PM    Glucose (POC) 195 (H) 05/21/2022 05:08 PM    Glucose (POC) 306 (H) 05/21/2022 11:44 AM    Glucose (POC) 181 (H) 05/21/2022 08:43 AM     Lab Results   Component Value Date/Time    Color YELLOW/STRAW 05/18/2022 03:54 PM    Appearance CLEAR 05/18/2022 03:54 PM    Specific gravity 1.011 05/18/2022 03:54 PM    pH (UA) 5.0 05/18/2022 03:54 PM    Protein Negative 05/18/2022 03:54 PM    Glucose >1,000 (A) 05/18/2022 03:54 PM    Ketone Negative 05/18/2022 03:54 PM    Bilirubin Negative 05/18/2022 03:54 PM    Urobilinogen 0.2 05/18/2022 03:54 PM    Nitrites Negative 05/18/2022 03:54 PM    Leukocyte Esterase Negative 05/18/2022 03:54 PM    Epithelial cells FEW 05/18/2022 03:54 PM    Bacteria Negative 05/18/2022 03:54 PM    WBC 0-4 05/18/2022 03:54 PM    RBC 20-50 05/18/2022 03:54 PM         Medications Reviewed:     Current Facility-Administered Medications   Medication Dose Route Frequency    magnesium citrate solution 148 mL  148 mL Oral Q2H PRN    bumetanide (BUMEX) injection 1 mg  1 mg IntraVENous BID    senna-docusate (PERICOLACE) 8.6-50 mg per tablet 1 Tablet  1 Tablet Oral BID    vancomycin (VANCOCIN) 1750 mg in  ml infusion  1,750 mg IntraVENous Q12H    albuterol-ipratropium (DUO-NEB) 2.5 MG-0.5 MG/3 ML  3 mL Nebulization Q6H PRN    traMADoL (ULTRAM) tablet 50 mg  50 mg Oral Q6H PRN    polyethylene glycol (MIRALAX) packet 17 g  17 g Oral BID    insulin NPH (NOVOLIN N, HUMULIN N) injection 10 Units  10 Units SubCUTAneous ACB&D    albuterol (PROVENTIL VENTOLIN) nebulizer solution 2.5 mg  2.5 mg Nebulization Q4H PRN    insulin lispro (HUMALOG) injection   SubCUTAneous AC&HS    pregabalin (LYRICA) capsule 75 mg  75 mg Oral BID    acetaminophen (TYLENOL) tablet 1,000 mg  1,000 mg Oral Q6H PRN    Or    acetaminophen (TYLENOL) suppository 650 mg  650 mg Rectal Q6H PRN    pantothenic ac-min oil-pet,hyd (AQUAPHOR) 41 % ointment   Topical DAILY    sodium chloride (NS) flush 5-40 mL  5-40 mL IntraVENous Q8H    sodium chloride (NS) flush 5-40 mL  5-40 mL IntraVENous PRN    glucose chewable tablet 16 g  4 Tablet Oral PRN    glucagon (GLUCAGEN) injection 1 mg  1 mg IntraMUSCular PRN    dextrose 10 % infusion 0-250 mL  0-250 mL IntraVENous PRN    sodium chloride (NS) flush 5-40 mL  5-40 mL IntraVENous Q8H    sodium chloride (NS) flush 5-40 mL  5-40 mL IntraVENous PRN    ondansetron (ZOFRAN ODT) tablet 4 mg  4 mg Oral Q8H PRN    Or    ondansetron (ZOFRAN) injection 4 mg  4 mg IntraVENous Q6H PRN    bisacodyL (DULCOLAX) tablet 5 mg  5 mg Oral DAILY    polyethylene glycol (MIRALAX) packet 17 g  17 g Oral DAILY PRN    amiodarone (CORDARONE) tablet 100 mg  100 mg Oral DAILY    apixaban (ELIQUIS) tablet 5 mg  5 mg Oral BID    aspirin delayed-release tablet 81 mg  81 mg Oral DAILY    arformoterol 15 mcg/budesonide 0.5 mg neb solution   Nebulization BID RT    montelukast (SINGULAIR) tablet 10 mg  10 mg Oral QHS    pantoprazole (PROTONIX) tablet 40 mg  40 mg Oral DAILY    rosuvastatin (CRESTOR) tablet 5 mg  5 mg Oral DAILY    tamsulosin (FLOMAX) capsule 0.4 mg  0.4 mg Oral DAILY    cefepime (MAXIPIME) 2 g in 0.9% sodium chloride (MBP/ADV) 100 mL MBP  2 g IntraVENous Q8H    Vancomycin- pharmacy to dose   Other Rx Dosing/Monitoring     ______________________________________________________________________  EXPECTED LENGTH OF STAY: 4d 19h  ACTUAL LENGTH OF STAY:          [address], DO

## 2022-05-23 ENCOUNTER — APPOINTMENT (OUTPATIENT)
Dept: VASCULAR SURGERY | Age: 57
DRG: 871 | End: 2022-05-23
Attending: INTERNAL MEDICINE
Payer: MEDICARE

## 2022-05-23 PROBLEM — I50.30 (HFPEF) HEART FAILURE WITH PRESERVED EJECTION FRACTION (HCC): Status: ACTIVE | Noted: 2022-05-23

## 2022-05-23 LAB
ANION GAP SERPL CALC-SCNC: 4 MMOL/L (ref 5–15)
BACTERIA SPEC CULT: NORMAL
BUN SERPL-MCNC: 37 MG/DL (ref 6–20)
BUN/CREAT SERPL: 39 (ref 12–20)
CALCIUM SERPL-MCNC: 9 MG/DL (ref 8.5–10.1)
CHLORIDE SERPL-SCNC: 97 MMOL/L (ref 97–108)
CO2 SERPL-SCNC: 35 MMOL/L (ref 21–32)
CREAT SERPL-MCNC: 0.96 MG/DL (ref 0.7–1.3)
ERYTHROCYTE [DISTWIDTH] IN BLOOD BY AUTOMATED COUNT: 22.6 % (ref 11.5–14.5)
GLUCOSE BLD STRIP.AUTO-MCNC: 171 MG/DL (ref 65–117)
GLUCOSE BLD STRIP.AUTO-MCNC: 176 MG/DL (ref 65–117)
GLUCOSE BLD STRIP.AUTO-MCNC: 176 MG/DL (ref 65–117)
GLUCOSE BLD STRIP.AUTO-MCNC: 180 MG/DL (ref 65–117)
GLUCOSE BLD STRIP.AUTO-MCNC: 252 MG/DL (ref 65–117)
GLUCOSE SERPL-MCNC: 170 MG/DL (ref 65–100)
HCT VFR BLD AUTO: 46.1 % (ref 36.6–50.3)
HGB BLD-MCNC: 12.7 G/DL (ref 12.1–17)
MAGNESIUM SERPL-MCNC: 2.5 MG/DL (ref 1.6–2.4)
MCH RBC QN AUTO: 22.2 PG (ref 26–34)
MCHC RBC AUTO-ENTMCNC: 27.5 G/DL (ref 30–36.5)
MCV RBC AUTO: 80.7 FL (ref 80–99)
NRBC # BLD: 0 K/UL (ref 0–0.01)
NRBC BLD-RTO: 0 PER 100 WBC
PHOSPHATE SERPL-MCNC: 4.6 MG/DL (ref 2.6–4.7)
PLATELET # BLD AUTO: 135 K/UL (ref 150–400)
POTASSIUM SERPL-SCNC: 3.8 MMOL/L (ref 3.5–5.1)
RBC # BLD AUTO: 5.71 M/UL (ref 4.1–5.7)
SERVICE CMNT-IMP: ABNORMAL
SERVICE CMNT-IMP: NORMAL
SODIUM SERPL-SCNC: 136 MMOL/L (ref 136–145)
VANCOMYCIN SERPL-MCNC: 26.2 UG/ML
WBC # BLD AUTO: 8.3 K/UL (ref 4.1–11.1)

## 2022-05-23 PROCEDURE — 74011000250 HC RX REV CODE- 250: Performed by: HOSPITALIST

## 2022-05-23 PROCEDURE — 94640 AIRWAY INHALATION TREATMENT: CPT

## 2022-05-23 PROCEDURE — 65270000046 HC RM TELEMETRY

## 2022-05-23 PROCEDURE — 82962 GLUCOSE BLOOD TEST: CPT

## 2022-05-23 PROCEDURE — 74011250636 HC RX REV CODE- 250/636: Performed by: INTERNAL MEDICINE

## 2022-05-23 PROCEDURE — 74011000250 HC RX REV CODE- 250: Performed by: INTERNAL MEDICINE

## 2022-05-23 PROCEDURE — 36415 COLL VENOUS BLD VENIPUNCTURE: CPT

## 2022-05-23 PROCEDURE — 97530 THERAPEUTIC ACTIVITIES: CPT

## 2022-05-23 PROCEDURE — 83735 ASSAY OF MAGNESIUM: CPT

## 2022-05-23 PROCEDURE — 74011250637 HC RX REV CODE- 250/637: Performed by: INTERNAL MEDICINE

## 2022-05-23 PROCEDURE — 97535 SELF CARE MNGMENT TRAINING: CPT | Performed by: OCCUPATIONAL THERAPIST

## 2022-05-23 PROCEDURE — 74011000258 HC RX REV CODE- 258: Performed by: INTERNAL MEDICINE

## 2022-05-23 PROCEDURE — 84100 ASSAY OF PHOSPHORUS: CPT

## 2022-05-23 PROCEDURE — 74011636637 HC RX REV CODE- 636/637: Performed by: INTERNAL MEDICINE

## 2022-05-23 PROCEDURE — 97530 THERAPEUTIC ACTIVITIES: CPT | Performed by: OCCUPATIONAL THERAPIST

## 2022-05-23 PROCEDURE — 85027 COMPLETE CBC AUTOMATED: CPT

## 2022-05-23 PROCEDURE — 80202 ASSAY OF VANCOMYCIN: CPT

## 2022-05-23 PROCEDURE — 80048 BASIC METABOLIC PNL TOTAL CA: CPT

## 2022-05-23 PROCEDURE — 93971 EXTREMITY STUDY: CPT

## 2022-05-23 PROCEDURE — 94760 N-INVAS EAR/PLS OXIMETRY 1: CPT

## 2022-05-23 RX ORDER — BUMETANIDE 0.25 MG/ML
2 INJECTION INTRAMUSCULAR; INTRAVENOUS 2 TIMES DAILY
Status: DISCONTINUED | OUTPATIENT
Start: 2022-05-23 | End: 2022-06-03 | Stop reason: HOSPADM

## 2022-05-23 RX ADMIN — ASPIRIN 81 MG: 81 TABLET, COATED ORAL at 09:59

## 2022-05-23 RX ADMIN — CEFEPIME 2 G: 2 INJECTION, POWDER, FOR SOLUTION INTRAVENOUS at 22:42

## 2022-05-23 RX ADMIN — SODIUM CHLORIDE, PRESERVATIVE FREE 10 ML: 5 INJECTION INTRAVENOUS at 06:11

## 2022-05-23 RX ADMIN — TRAMADOL HYDROCHLORIDE 50 MG: 50 TABLET, COATED ORAL at 12:16

## 2022-05-23 RX ADMIN — APIXABAN 5 MG: 5 TABLET, FILM COATED ORAL at 18:11

## 2022-05-23 RX ADMIN — BISACODYL 5 MG: 5 TABLET, COATED ORAL at 10:00

## 2022-05-23 RX ADMIN — SENNOSIDES AND DOCUSATE SODIUM 1 TABLET: 50; 8.6 TABLET ORAL at 18:11

## 2022-05-23 RX ADMIN — PREGABALIN 75 MG: 25 CAPSULE ORAL at 09:59

## 2022-05-23 RX ADMIN — BUMETANIDE 2 MG: 0.25 INJECTION INTRAMUSCULAR; INTRAVENOUS at 10:00

## 2022-05-23 RX ADMIN — ACETAMINOPHEN 1000 MG: 500 TABLET ORAL at 18:11

## 2022-05-23 RX ADMIN — POLYETHYLENE GLYCOL 3350 17 G: 17 POWDER, FOR SOLUTION ORAL at 10:00

## 2022-05-23 RX ADMIN — TRAMADOL HYDROCHLORIDE 50 MG: 50 TABLET, COATED ORAL at 18:11

## 2022-05-23 RX ADMIN — VANCOMYCIN HYDROCHLORIDE 1750 MG: 10 INJECTION, POWDER, LYOPHILIZED, FOR SOLUTION INTRAVENOUS at 01:41

## 2022-05-23 RX ADMIN — CEFEPIME 2 G: 2 INJECTION, POWDER, FOR SOLUTION INTRAVENOUS at 06:09

## 2022-05-23 RX ADMIN — CEFEPIME 2 G: 2 INJECTION, POWDER, FOR SOLUTION INTRAVENOUS at 13:23

## 2022-05-23 RX ADMIN — ARFORMOTEROL TARTRATE: 15 SOLUTION RESPIRATORY (INHALATION) at 07:30

## 2022-05-23 RX ADMIN — POLYETHYLENE GLYCOL 3350 17 G: 17 POWDER, FOR SOLUTION ORAL at 18:11

## 2022-05-23 RX ADMIN — APIXABAN 5 MG: 5 TABLET, FILM COATED ORAL at 09:59

## 2022-05-23 RX ADMIN — CLINDAMYCIN IN 5 PERCENT DEXTROSE 900 MG: 18 INJECTION, SOLUTION INTRAVENOUS at 22:42

## 2022-05-23 RX ADMIN — SENNOSIDES AND DOCUSATE SODIUM 1 TABLET: 50; 8.6 TABLET ORAL at 10:00

## 2022-05-23 RX ADMIN — TRAMADOL HYDROCHLORIDE 50 MG: 50 TABLET, COATED ORAL at 05:44

## 2022-05-23 RX ADMIN — AMIODARONE HYDROCHLORIDE 100 MG: 200 TABLET ORAL at 09:59

## 2022-05-23 RX ADMIN — Medication 5 UNITS: at 09:49

## 2022-05-23 RX ADMIN — CLINDAMYCIN IN 5 PERCENT DEXTROSE 900 MG: 18 INJECTION, SOLUTION INTRAVENOUS at 05:37

## 2022-05-23 RX ADMIN — SODIUM CHLORIDE, PRESERVATIVE FREE 5 ML: 5 INJECTION INTRAVENOUS at 14:00

## 2022-05-23 RX ADMIN — ARFORMOTEROL TARTRATE: 15 SOLUTION RESPIRATORY (INHALATION) at 19:35

## 2022-05-23 RX ADMIN — SODIUM CHLORIDE, PRESERVATIVE FREE 10 ML: 5 INJECTION INTRAVENOUS at 22:42

## 2022-05-23 RX ADMIN — VANCOMYCIN HYDROCHLORIDE 1750 MG: 10 INJECTION, POWDER, LYOPHILIZED, FOR SOLUTION INTRAVENOUS at 15:00

## 2022-05-23 RX ADMIN — CLINDAMYCIN IN 5 PERCENT DEXTROSE 900 MG: 18 INJECTION, SOLUTION INTRAVENOUS at 15:33

## 2022-05-23 RX ADMIN — PANTOPRAZOLE SODIUM 40 MG: 40 TABLET, DELAYED RELEASE ORAL at 10:00

## 2022-05-23 RX ADMIN — MONTELUKAST 10 MG: 10 TABLET, FILM COATED ORAL at 22:42

## 2022-05-23 RX ADMIN — Medication 2 UNITS: at 13:24

## 2022-05-23 RX ADMIN — PREGABALIN 75 MG: 25 CAPSULE ORAL at 18:11

## 2022-05-23 RX ADMIN — BUMETANIDE 2 MG: 0.25 INJECTION INTRAMUSCULAR; INTRAVENOUS at 18:21

## 2022-05-23 RX ADMIN — ROSUVASTATIN 5 MG: 10 TABLET, FILM COATED ORAL at 09:59

## 2022-05-23 RX ADMIN — TAMSULOSIN HYDROCHLORIDE 0.4 MG: 0.4 CAPSULE ORAL at 10:00

## 2022-05-23 RX ADMIN — ACETAMINOPHEN 1000 MG: 500 TABLET ORAL at 12:16

## 2022-05-23 NOTE — PROGRESS NOTES
ID Progress Note  2022    Subjective:     Sitting up in chair  C/o generalized weakness  Review of Systems:            Symptom Y/N Comments   Symptom Y/N Comments   Fever/Chills n      Chest Pain n       Poor Appetite       Edema y       Cough       Abdominal Pain  n      Sputum       Joint Pain        SOB/MCCULLOUGH  *  no worse   Pruritis/Rash        Nausea/vomit  n     Tolerating PT/OT        Diarrhea  n     Tolerating Diet        Constipation  n     Other           Could NOT obtain due to:       Objective:     Vitals:   Visit Vitals  /67 (BP 1 Location: Right upper arm, BP Patient Position: At rest)   Pulse 74   Temp 98.1 °F (36.7 °C)   Resp 13   Ht 6' (1.829 m)   Wt 129.8 kg (286 lb 2.5 oz)   SpO2 96%   BMI 38.81 kg/m²        Tmax:  Temp (24hrs), Av °F (36.7 °C), Min:97.6 °F (36.4 °C), Max:98.3 °F (36.8 °C)      PHYSICAL EXAM:  General: Obese, chronically ill appearing, WD, WN. Alert, cooperative, no acute distress    EENT:  EOMI. Anicteric sclerae. MMM  Resp:  Clear in apex with decreased breath sounds at bases, no wheezing or rales. No accessory muscle use  CV:  Regular  rhythm,  + edema; LLE>RLE  GI:  Soft, obese, Non tender. +Bowel sounds  Neurologic:  Alert and oriented X 3, normal speech,   Psych:   Fair insight. Not anxious nor agitated  Skin:  No rashes.   No jaundice                          Stage III pressure injury to both hip, LLE wound; edematous, erythematous    RLE; chronic venous stasis  Labs:   Lab Results   Component Value Date/Time    WBC 8.3 2022 12:32 AM    HGB 12.7 2022 12:32 AM    HCT 46.1 2022 12:32 AM    PLATELET 676 (L)  12:32 AM    MCV 80.7 2022 12:32 AM     Lab Results   Component Value Date/Time    Sodium 136 2022 12:32 AM    Potassium 3.8 2022 12:32 AM    Chloride 97 2022 12:32 AM    CO2 35 (H) 2022 12:32 AM    Anion gap 4 (L) 2022 12:32 AM    Glucose 170 (H) 2022 12:32 AM    BUN 37 (H) 2022 12:32 AM    Creatinine 0.96 05/23/2022 12:32 AM    BUN/Creatinine ratio 39 (H) 05/23/2022 12:32 AM    GFR est AA >60 05/23/2022 12:32 AM    GFR est non-AA >60 05/23/2022 12:32 AM    Calcium 9.0 05/23/2022 12:32 AM    Bilirubin, total 0.8 05/18/2022 01:16 PM    Alk. phosphatase 89 05/18/2022 01:16 PM    Protein, total 6.8 05/18/2022 01:16 PM    Albumin 2.8 (L) 05/18/2022 01:16 PM    Globulin 4.0 05/18/2022 01:16 PM    A-G Ratio 0.7 (L) 05/18/2022 01:16 PM    ALT (SGPT) 12 05/18/2022 01:16 PM         Cultures:   Results     Procedure Component Value Units Date/Time    CULTURE, MRSA [[id number]]  (Abnormal) Collected: 05/18/22 1706    Order Status: Completed Specimen: Nasal from Nares Updated: 05/19/22 1721     Special Requests: NO SPECIAL REQUESTS        Culture result: MRSA PRESENT               Screening of patient nares for MRSA is for surveillance purposes and, if positive, to facilitate isolation considerations in high risk settings. It is not intended for automatic decolonization interventions per se as regimens are not sufficiently effective to warrant routine use. (NOTE) CALLED POSITIVE MRSA TO TYLER ZARAGOZA AT 2094 ON 5/19/22. AT    URINE CULTURE HOLD SAMPLE [[id number]] Collected: 05/18/22 1554    Order Status: Completed Specimen: Serum Updated: 05/18/22 1619     Urine culture hold       Urine on hold in Microbiology dept for 2 days. If unpreserved urine is submitted, it cannot be used for addtional testing after 24 hours, recollection will be required.           RESPIRATORY VIRUS PANEL W/COVID-19, PCR [[id number]] Collected: 05/18/22 1542    Order Status: Completed Specimen: Nasopharyngeal Updated: 05/18/22 1737     Adenovirus Not detected        Coronavirus 229E Not detected        Coronavirus HKU1 Not detected        Coronavirus CVNL63 Not detected        Coronavirus OC43 Not detected        SARS-CoV-2, PCR Not detected        Metapneumovirus Not detected        Rhinovirus and Enterovirus Not detected Influenza A Not detected        Influenza A, subtype H1 Not detected        Influenza A, subtype H3 Not detected        INFLUENZA A H1N1 PCR Not detected        Influenza B Not detected        Parainfluenza 1 Not detected        Parainfluenza 2 Not detected        Parainfluenza 3 Not detected        Parainfluenza virus 4 Not detected        RSV by PCR Not detected        B. parapertussis, PCR Not detected        Bordetella pertussis - PCR Not detected        Chlamydophila pneumoniae DNA, QL, PCR Not detected        Mycoplasma pneumoniae DNA, QL, PCR Not detected       CULTURE, BLOOD, PAIRED [[id number]] Collected: 05/18/22 1316    Order Status: Completed Specimen: Blood Updated: 05/23/22 5674     Special Requests: NO SPECIAL REQUESTS        Culture result: NO GROWTH 5 DAYS            55year old male with medical hx of type II diabetes, dyslipidemia, hypertension, obesity, COPD was admitted to the hospital on 5/18 with shortness of breath from SNF. Pt was hospitalized between 4/25 to 5/2 treated for resp failure secondary to COPD exacerbation and acute on chronic diastolic CHF then sent to SNF. Pt usually wears O2 @ 2L via n/c.     Impression:   PVD  Chronic LE wounds  Cellulitis LLE  Morbid obesity  Leukocytosis (resolved)  - afebrile, WBC 8.3    Blood cx (5/18) no growth    MRSA nare screen + 5/18    CXR (-) ASDZ    CT of abd/pel (5/18) no acute process    Plan:   · Continue with IV cefepime and vancomycin; recommend to complete total 2 weeks        May complete remaining ABX therapy with PO doxycyline upon discharge   · Adjust ABX prn  · Fever work up if temp >= 100.4  · Wound care per wound care team's recommendation    Above plan of care discussed and agreed with Dr. Eder Michelle, NP

## 2022-05-23 NOTE — PROGRESS NOTES
Problem: Mobility Impaired (Adult and Pediatric)  Goal: *Acute Goals and Plan of Care (Insert Text)  Description: FUNCTIONAL STATUS PRIOR TO ADMISSION: Patient was modified independent using a rollator for functional mobility. Also had standard wheelchair to use to go to/from appointments in wheelchair [address]: The patient lived alone with caregiver 1 hour per day M-F to provide assistance. Physical Therapy Goals  Initiated 5/20/2022  1. Patient will move from supine to sit and sit to supine , scoot up and down, and roll side to side in bed with modified independence within 7 day(s). 2.  Patient will transfer from bed to chair and chair to bed with modified independence using the least restrictive device within 7 day(s). 3.  Patient will perform sit to stand with modified independence within 7 day(s). 4.  Patient will ambulate with minimal assistance/contact guard assist for 50 feet with the least restrictive device within 7 day(s). Outcome: Progressing Towards Goal     PHYSICAL THERAPY TREATMENT  Patient: Naaman Brittle [de-identified]55 y.o. male)  Date: 5/23/2022  Diagnosis: Sepsis ([address] [A41.9] Sepsis ([address]       Precautions: Fall,Skin  Chart, physical therapy assessment, plan of care and goals were reviewed. ASSESSMENT  Patient continues with skilled PT services and is progressing towards goals. Pt received in sitting in the chair with BLE in dependent position, refusing to transfer back to bed for wound care. Pt noted to have significantly discolored RLE, purple and educated pt on need to elevate due to discoloration and swelling. Pt initially refusing but with increased rationale participated. Pt tolerates standing from chair to bed pivot transfer with high rationale for performing with proper technique and CGA. Pt able to bring legs into bed one at a time with min A, once in bed patient admits that he can now see the color is different than baseline.  Pt assisted with positioning in bed and educated on staying upright in bed and avoiding sliding down. Educated on proper elevation, AP and intrinsic foot flex to increase swelling mitigation. Will continue to follow and progress as able    Current Level of Function Impacting Discharge (mobility/balance): Other factors to consider for discharge:          PLAN :  Patient continues to benefit from skilled intervention to address the above impairments. Continue treatment per established plan of care. to address goals. Recommendation for discharge: (in order for the patient to meet his/her long term goals)  Physical therapy at least 2 days/week in the home     This discharge recommendation:  Has been made in collaboration with the attending provider and/or case management    IF patient discharges home will need the following DME: to be determined (TBD)       SUBJECTIVE:   Patient stated I hate that bed.     OBJECTIVE DATA SUMMARY:   Critical Behavior:  Neurologic State: Appropriate for age  Orientation Level: Oriented X4  Cognition: Appropriate decision making,Appropriate for age attention/concentration  Safety/Judgement: Awareness of environment,Decreased insight into deficits,Fall prevention  Functional Mobility Training:  Bed Mobility:        Sit to Supine: Minimum assistance  Scooting: Supervision        Transfers:  Sit to Stand: Contact guard assistance  Stand to Sit: Contact guard assistance; Additional time;Assist x1        Bed to Chair: Contact guard assistance; Additional time; Adaptive equipment;Assist x1                    Balance:  Sitting: Intact  Standing: Impaired; Without support  Standing - Static: Good;Constant support  Standing - Dynamic : Good;Constant support  Ambulation/Gait Training:                Therapeutic Exercises:   AP, intrinsic flex  Pain Rating:      Activity Tolerance:   Fair and requires rest breaks    After treatment patient left in no apparent distress:   Supine in bed with RN    COMMUNICATION/COLLABORATION:   The patients plan of care was discussed with: Registered nurse and Case management.      Freddie Flores, PT   Time Calculation: 30 mins

## 2022-05-23 NOTE — PROGRESS NOTES
Bedside shift change report given to NIK Burnham (oncoming nurse) by Ashok Estrada (offgoing nurse). Report included the following information SBAR, Procedure Summary, Intake/Output, MAR, Recent Results and Med Rec Status.

## 2022-05-23 NOTE — PROGRESS NOTES
[facility]ist Group                                                                                          Hospitalist Progress Note  [facility], DO  Answering service: [phone] OR [phone] from in house phone        Date of Service:  2022  NAME:  Sharonda Gutierrez  :  1965  MRN:  [id number]      Admission Summary:   Interval history: From critical care hospitals on May 25 55year-old gentleman who was recently discharged from our facility who is being sent to the ER from rehab facility with low blood pressure and decreasing responsiveness. Patient has multiple complicated medical problem including diastolic heart failure, severe COPD with chronic supplemental oxygen, A. fib on amiodarone and chronic anticoagulation, diabetes mellitus type 2, obstructive sleep apnea and morbid obesity. As mentioned above recently was discharged from our facility after being treated for COPD exacerbation and diastolic heart failure exacerbation.  Patient is a poor historian and he cannot tell me about the progress of his situation in the rehab facility. He stated that nothing bothering him at this time, he stated that his breathing is at baseline, when I asked him about his legs and if they always look like this he said \"I do not know Derl Ines knows that he is at [facility] ER.   In the ER he was found to be hypotensive but this has responded to fluid, lactic acid acid elevated but this is also trending down with fluid, and his kidney function acutely worsened compared to few days ago while in the hospital.  Unable to insert a Foster catheter in the ER. Initially hospitalist assess the patient for admission however given his tenuous status I consulted ICU.  : No acute event overnight, looks much better this morning, ate his breakfast tray, needed to be on very low-dose of norepinephrine overnight for short period of time.  No nausea no vomiting.  Transferred out of ICU       Interval history / Subjective: Follow up sepsis. Patient seen and examined. Drowsy and sleeping but awakens and communicates appropriately. Lower extremity swelling slightly improved but still unilateral. Dopplers negative. Assessment & Plan:     Severe sepsis due to lower extremity cellulitis:  -required ICU admission due to low BP, initiated on stress dose steroids, midodrine, does not appear to require pressors  -Suspect secondary to lower extremity cellulitis with chronic lower limb changes with superimposed infection, now appears worsening with component of volume overload  -continue cefepime, vancomycin. Plans for 7 days treatment   --Appreciate infectious disease  -Left lower extremity doppler negative   -CXR clear, viral panel negative  -s/p stress dose steroids with wean   -continue wound care    Acute kidney injury: resolved  -Increase diuretics.  Cotinnue entresto     Chronic hypoxic hypercapnic respiratory failure due to severe COPD emphysema:  -continue brovana/pulmicort BID, aa nebs as needed  -CXR clear  -wean oxygen as able     Diabetes mellitus type II with hyperglycemia:   -continue NPH5 units BID with steroids, continue SSI    Heart failure with preserved ejection fraction:  -increase bumex, continue Entresto  -hold spironolactone, imdur and reevaluate for restarting daily   -recheck echocardiogram     Chronic A. fib on amiodarone and chronic anticoagulation with Eliquis:  -Continue home medication     Back pain non-focal, likely musculoskeletal  -As needed medication    Obstructive sleep apnea:  -CPAP at night and as needed    Chronic GERD:  -Continue on PPI    Urinary retention:   -remove mclaughlin 5/22    Hematuria:   -mild, monitor     Reevaluate heart failure medications daily           Code status: full   Prophylaxis: eliquis  Care Plan discussed with: patient, nurse  Anticipated Disposition: >48 hours, SNF     Hospital Problems  Date Reviewed: 4/28/2013          Codes Class Noted POA    * (Principal) Sepsis [facility]) ICD-10-CM: A41.9  ICD-9-CM: 038.9, 995.91  5/18/2022 Unknown                Review of Systems:   Negative unless stated above      Vital Signs:    Last 24hrs VS reviewed since prior progress note. Most recent are:  Visit Vitals  /67 (BP 1 Location: Right upper arm, BP Patient Position: At rest)   Pulse 69   Temp 98.1 °F (36.7 °C)   Resp 13   Ht 6' (1.829 m)   Wt 129.8 kg (286 lb 2.5 oz)   SpO2 96%   BMI 38.81 kg/m²         Intake/Output Summary (Last 24 hours) at 5/23/2022 1533  Last data filed at 5/23/2022 0541  Gross per 24 hour   Intake 500 ml   Output 1900 ml   Net -1400 ml        Physical Examination:     I had a face to face encounter with this patient and independently examined them on 5/23/2022 as outlined below:          Constitutional:  No acute distress, cooperative, pleasant, morbidly obese     ENT:  Oral mucosa moist, oropharynx benign. Resp:  CTA bilaterally. No wheezing/rhonchi/rales. No accessory muscle use. CV:  Regular rhythm, normal rate, no murmurs, gallops, rubs    GI:  Soft, non distended, non tender.  normoactive bowel sounds, no hepatosplenomegaly     Musculoskeletal:  lower extremity venous stasis with worsening edema left leg +pitting and weeping, right lower extremity with chronic venous changes, some wrinkling of skin bilateral feet- consistent with improved edema     Neurologic:  Moves all extremities            Data Review:    Review and/or order of clinical lab test  Review and/or order of tests in the radiology section of CPT  Review and/or order of tests in the medicine section of CPT      Labs:     Recent Labs     05/23/22  0032 05/22/22  0434   WBC 8.3 9.0   HGB 12.7 13.2   HCT 46.1 46.4   * 144*     Recent Labs     05/23/22  0032 05/22/22  0434 05/21/22  0156    136 138   K 3.8 3.9 3.4*   CL 97 97 97   CO2 35* 33* 38*   BUN 37* 35* 36*   CREA 0.96 0.82 1.04   * 165* 237*   CA 9.0 8.9 9.3   MG 2.5* 2.6* 2.8*   PHOS 4.6 3.2 2.8     No results for input(s): ALT, AP, TBIL, TBILI, TP, ALB, GLOB, GGT, AML, LPSE in the last 72 hours. No lab exists for component: SGOT, GPT, AMYP, HLPSE  No results for input(s): INR, PTP, APTT, INREXT, INREXT in the last 72 hours. No results for input(s): FE, TIBC, PSAT, FERR in the last 72 hours. No results found for: FOL, RBCF   No results for input(s): PH, PCO2, PO2 in the last 72 hours. No results for input(s): CPK, CKNDX, TROIQ in the last 72 hours. No lab exists for component: CPKMB  Lab Results   Component Value Date/Time    Cholesterol, total 170 01/23/2013 11:08 AM    HDL Cholesterol 37 (L) 01/23/2013 11:08 AM    LDL, calculated 97 01/23/2013 11:08 AM    Triglyceride 180 (H) 01/23/2013 11:08 AM    CHOL/HDL Ratio 6.0 (H) 10/06/2010 03:18 PM     Lab Results   Component Value Date/Time    Glucose (POC) 176 (H) 05/23/2022 12:21 PM    Glucose (POC) 252 (H) 05/23/2022 09:35 AM    Glucose (POC) 180 (H) 05/23/2022 07:58 AM    Glucose (POC) 117 05/22/2022 09:24 PM    Glucose (POC) 154 (H) 05/22/2022 05:09 PM     Lab Results   Component Value Date/Time    Color YELLOW/STRAW 05/18/2022 03:54 PM    Appearance CLEAR 05/18/2022 03:54 PM    Specific gravity 1.011 05/18/2022 03:54 PM    pH (UA) 5.0 05/18/2022 03:54 PM    Protein Negative 05/18/2022 03:54 PM    Glucose >1,000 (A) 05/18/2022 03:54 PM    Ketone Negative 05/18/2022 03:54 PM    Bilirubin Negative 05/18/2022 03:54 PM    Urobilinogen 0.2 05/18/2022 03:54 PM    Nitrites Negative 05/18/2022 03:54 PM    Leukocyte Esterase Negative 05/18/2022 03:54 PM    Epithelial cells FEW 05/18/2022 03:54 PM    Bacteria Negative 05/18/2022 03:54 PM    WBC 0-4 05/18/2022 03:54 PM    RBC 20-50 05/18/2022 03:54 PM         Medications Reviewed:     Current Facility-Administered Medications   Medication Dose Route Frequency    bumetanide (BUMEX) injection 2 mg  2 mg IntraVENous BID    vancomycin level random level sometime prior to 14:00 dose on 5/23. Thanks!    Other ONCE    magnesium citrate solution 148 mL  148 mL Oral Q2H PRN    senna-docusate (PERICOLACE) 8.6-50 mg per tablet 1 Tablet  1 Tablet Oral BID    vancomycin (VANCOCIN) 1750 mg in  ml infusion  1,750 mg IntraVENous Q12H    insulin NPH (NOVOLIN N, HUMULIN N) injection 5 Units  5 Units SubCUTAneous ACB&D    clindamycin (CLEOCIN) 900mg D5W 50mL IVPB (premix)  900 mg IntraVENous Q8H    albuterol-ipratropium (DUO-NEB) 2.5 MG-0.5 MG/3 ML  3 mL Nebulization Q6H PRN    traMADoL (ULTRAM) tablet 50 mg  50 mg Oral Q6H PRN    polyethylene glycol (MIRALAX) packet 17 g  17 g Oral BID    albuterol (PROVENTIL VENTOLIN) nebulizer solution 2.5 mg  2.5 mg Nebulization Q4H PRN    insulin lispro (HUMALOG) injection   SubCUTAneous AC&HS    pregabalin (LYRICA) capsule 75 mg  75 mg Oral BID    acetaminophen (TYLENOL) tablet 1,000 mg  1,000 mg Oral Q6H PRN    Or    acetaminophen (TYLENOL) suppository 650 mg  650 mg Rectal Q6H PRN    pantothenic ac-min oil-pet,hyd (AQUAPHOR) 41 % ointment   Topical DAILY    sodium chloride (NS) flush 5-40 mL  5-40 mL IntraVENous Q8H    sodium chloride (NS) flush 5-40 mL  5-40 mL IntraVENous PRN    glucose chewable tablet 16 g  4 Tablet Oral PRN    glucagon (GLUCAGEN) injection 1 mg  1 mg IntraMUSCular PRN    dextrose 10 % infusion 0-250 mL  0-250 mL IntraVENous PRN    sodium chloride (NS) flush 5-40 mL  5-40 mL IntraVENous Q8H    sodium chloride (NS) flush 5-40 mL  5-40 mL IntraVENous PRN    ondansetron (ZOFRAN ODT) tablet 4 mg  4 mg Oral Q8H PRN    Or    ondansetron (ZOFRAN) injection 4 mg  4 mg IntraVENous Q6H PRN    bisacodyL (DULCOLAX) tablet 5 mg  5 mg Oral DAILY    polyethylene glycol (MIRALAX) packet 17 g  17 g Oral DAILY PRN    amiodarone (CORDARONE) tablet 100 mg  100 mg Oral DAILY    apixaban (ELIQUIS) tablet 5 mg  5 mg Oral BID    aspirin delayed-release tablet 81 mg  81 mg Oral DAILY    arformoterol 15 mcg/budesonide 0.5 mg neb solution   Nebulization BID RT    montelukast (SINGULAIR) tablet 10 mg  10 mg Oral QHS    pantoprazole (PROTONIX) tablet 40 mg  40 mg Oral DAILY    rosuvastatin (CRESTOR) tablet 5 mg  5 mg Oral DAILY    tamsulosin (FLOMAX) capsule 0.4 mg  0.4 mg Oral DAILY    cefepime (MAXIPIME) 2 g in 0.9% sodium chloride (MBP/ADV) 100 mL MBP  2 g IntraVENous Q8H    Vancomycin- pharmacy to dose   Other Rx Dosing/Monitoring     ______________________________________________________________________  EXPECTED LENGTH OF STAY: 4d 19h  ACTUAL LENGTH OF STAY:          [address], DO

## 2022-05-23 NOTE — PROGRESS NOTES
Pharmacist Note - Vancomycin Dosing  Therapy day 1  Indication: LLE cellulitis  Current regimen: 1750 mg q 12hrs    Recent Labs     05/23/22  0032 05/22/22  0434 05/21/22  0156   WBC 8.3 9.0 15.1*   CREA 0.96 0.82 1.04   BUN 37* 35* 36*       A random vancomycin level of 26.2 mcg/mL was obtained and from this level, the patient's AUC24 is calculated to be 906 with the current regimen. Goal target range AUC/GISEL 400-600      Plan: Hold for further follow up after morning labs. Pharmacy will continue to monitor this patient daily for changes in clinical status and renal function.    -Review morning labs for further assessment of timing for repeat level.

## 2022-05-23 NOTE — PROGRESS NOTES
Bedside and Verbal shift change report given to [address] [place] [address] Felisha RN (oncoming nurse) by Antoine Hicks RN (offgoing nurse).  Report included the following information SBAR, Kardex, MAR, Recent Results and Cardiac Rhythm SR.

## 2022-05-23 NOTE — PROGRESS NOTES
Transition of Care     RUR - 20% high    Disposition  -  Patient has been accepted to return to [facility]. Patient in agreement.    Transportation  - [facility]   Follow Up PCP/Specialist     Kristen Hernandez RN, [facility]

## 2022-05-23 NOTE — PROGRESS NOTES
Problem: Diabetes Self-Management  Goal: *Disease process and treatment process  Description: Define diabetes and identify own type of diabetes; list 3 options for treating diabetes. Outcome: Progressing Towards Goal     Problem: Pressure Injury - Risk of  Goal: *Prevention of pressure injury  Description: Document Ben Scale and appropriate interventions in the flowsheet. Outcome: Progressing Towards Goal  Note: Pressure Injury Interventions:  Sensory Interventions: Assess need for specialty bed,Check visual cues for pain,Discuss PT/OT consult with provider,Float heels,Keep linens dry and wrinkle-free,Maintain/enhance activity level,Minimize linen layers,Monitor skin under medical devices,Pad between skin to skin,Pressure redistribution bed/mattress (bed type)    Moisture Interventions: Absorbent underpads,Apply protective barrier, creams and emollients,Contain wound drainage,Minimize layers    Activity Interventions: Increase time out of bed,PT/OT evaluation    Mobility Interventions: Assess need for specialty bed,HOB 30 degrees or less,Float heels,PT/OT evaluation    Nutrition Interventions: Offer support with meals,snacks and hydration,Document food/fluid/supplement intake    Friction and Shear Interventions: Apply protective barrier, creams and emollients,Feet elevated on foot rest,HOB 30 degrees or less,Minimize layers                Problem: Falls - Risk of  Goal: *Absence of Falls  Description: Document Sanaz Fall Risk and appropriate interventions in the flowsheet.   Outcome: Progressing Towards Goal  Note: Fall Risk Interventions:  Mobility Interventions: Communicate number of staff needed for ambulation/transfer,Patient to call before getting OOB,PT Consult for assist device competence,PT Consult for mobility concerns,Strengthening exercises (ROM-active/passive)    Mentation Interventions: Adequate sleep, hydration, pain control,Bed/chair exit alarm,Door open when patient unattended,Toileting rounds,Increase mobility    Medication Interventions: Bed/chair exit alarm,Patient to call before getting OOB    Elimination Interventions: Call light in reach,Bed/chair exit alarm,Toilet paper/wipes in reach,Toileting schedule/hourly rounds    History of Falls Interventions: Bed/chair exit alarm,Door open when patient unattended         Problem: Risk for Spread of Infection  Goal: Prevent transmission of infectious organism to others  Description: Prevent the transmission of infectious organisms to other patients, staff members, and visitors.   Outcome: Progressing Towards Goal

## 2022-05-23 NOTE — PROGRESS NOTES
Problem: Self Care Deficits Care Plan (Adult)  Goal: *Acute Goals and Plan of Care (Insert Text)  Description: FUNCTIONAL STATUS PRIOR TO ADMISSION: Patient was modified independent using a rollator for functional mobility. HOME SUPPORT: The patient lived alone with caregiver who comes in to provide assistance with IADLs. Occupational Therapy Goals  Initiated 5/20/2022  1. Patient will perform grooming in sitting with supervision/set-up within 7 day(s). 2.  Patient will perform bathing using most appropriate DME with moderate assistance within 7 day(s). 3.  Patient will perform lower body dressing using most appropriate DME with moderate assistance within 7 day(s). 4.  Patient will perform toilet transfers with supervision/set-up within 7 day(s). 5.  Patient will perform all aspects of toileting with moderate assistance within 7 day(s). 6.  Patient will participate in upper extremity therapeutic exercise/activities with supervision/set-up for 5 minutes within 7 day(s). 7.  Patient will utilize energy conservation techniques during functional activities with verbal cues within 7 day(s). Outcome: Progressing Towards Goal   OCCUPATIONAL THERAPY TREATMENT  Patient: Shaista Adames [de-identified]55 y.o. male)  Date: 5/23/2022  Diagnosis: Sepsis ([facility] [address] [A41.9] Sepsis ([address]       Precautions: Fall,Skin  Chart, occupational therapy assessment, plan of care, and goals were reviewed. ASSESSMENT  Patient continues with skilled OT services and is progressing towards goals. Patient with primary complaint of back pain, required max encouragement and education on need to transfer to bed for wound care, noted decreased circulation right distal LE with dressing placed on 5/21/22. Patient reported his \"legs are always purple\" however upon returning to bed admitted to be darker in color than normal. Overall moving with CGA and increased time with rolling walker.       Current Level of Function Impacting Discharge (ADLs): CGA stand pivot transfers    Other factors to consider for discharge:          PLAN :  Patient continues to benefit from skilled intervention to address the above impairments. Continue treatment per established plan of care to address goals. Recommend with staff: encourage change in position and LE's elevated    Recommend next OT session: toilet transfer, standing tolerance/ADL's    Recommendation for discharge: (in order for the patient to meet his/her long term goals)  Therapy up to 5 days/week in SNF setting    This discharge recommendation:  Has been made in collaboration with the attending provider and/or case management    IF patient discharges home will need the following DME:        SUBJECTIVE:   Patient stated I can't lay in that bed.     OBJECTIVE DATA SUMMARY:   Cognitive/Behavioral Status:  Neurologic State: Appropriate for age  Orientation Level: Oriented X4  Cognition: Appropriate decision making; Appropriate for age attention/concentration  Perception: Appears intact  Perseveration: Perseverates during conversation (on back pain and inability to get in bed)       Functional Mobility and Transfers for ADLs:  Bed Mobility:  Sit to Supine: Minimum assistance; Additional time;Bed Modified (head of bed elevated ~45 degrees)  Scooting: Supervision    Transfers:  Sit to Stand: Contact guard assistance;Assist x1;Additional time  Functional Transfers  Toilet Transfer : Contact guard assistance;Assist x1;Additional time; Adaptive equipment (stand pivot to bedside commode)  Bed to Chair: Contact guard assistance; Additional time; Adaptive equipment;Assist x1    Balance:  Sitting: Intact  Standing: Impaired; Without support  Standing - Static: Constant support;Good    ADL Intervention:     Patient seated in recliner on arrival, bilateral feet on floor and leaning forward, primarily complaint of back pain, discussed with RN prior to tx need to perform wound care on bilateral LE's.  Noted increased edema right LE and dressing in place resulting in decreased circulation distal to dressing and LE purple in color. Educated on need to return to bed, elevate LE's and remove dressing. Patient with perseveration on inability to tolerate being in bed due to back pain. Educated on various positions he could trial to increase back support and potential positioning of bed in chair position. With much encouragement agreeable to transfer back to bed. Lower Body Dressing Assistance  Dressing Assistance: Total assistance(dependent)  Socks: Total assistance (dependent)  Leg Crossed Method Used: No  Position Performed: Supine  Cues: Physical assistance (socks sticking to skin, increased edema, decreased skin integrity)    Toileting  Toileting Assistance: Maximum assistance  Bowel Hygiene: Total assistance (dependent) (in standing with bilateral UE support on walker)   Educated on optimal height of bedside commode and adjusted height to increase ability to transfer sit to stand and stand to sit     Educated on benefit of changes in position for LE's, back  pain and sacral discomfort due to skin breakdown      Pain:  Not rated, complaint of back pain, RN aware    Activity Tolerance:   Poor and requires frequent rest breaks    After treatment patient left in no apparent distress:   Supine in bed, Call bell within reach, and head of bed elevated    COMMUNICATION/COLLABORATION:   The patients plan of care was discussed with: Physical therapist and Registered nurse.      Guerrero Meng, NICOLER/L  Time Calculation: 32 mins

## 2022-05-24 LAB
ANION GAP SERPL CALC-SCNC: 1 MMOL/L (ref 5–15)
BUN SERPL-MCNC: 35 MG/DL (ref 6–20)
BUN/CREAT SERPL: 35 (ref 12–20)
CALCIUM SERPL-MCNC: 9.5 MG/DL (ref 8.5–10.1)
CHLORIDE SERPL-SCNC: 100 MMOL/L (ref 97–108)
CK SERPL-CCNC: 30 U/L (ref 39–308)
CO2 SERPL-SCNC: 38 MMOL/L (ref 21–32)
CREAT SERPL-MCNC: 1 MG/DL (ref 0.7–1.3)
GLUCOSE BLD STRIP.AUTO-MCNC: 133 MG/DL (ref 65–117)
GLUCOSE BLD STRIP.AUTO-MCNC: 194 MG/DL (ref 65–117)
GLUCOSE BLD STRIP.AUTO-MCNC: 207 MG/DL (ref 65–117)
GLUCOSE BLD STRIP.AUTO-MCNC: 207 MG/DL (ref 65–117)
GLUCOSE SERPL-MCNC: 153 MG/DL (ref 65–100)
POTASSIUM SERPL-SCNC: 4.2 MMOL/L (ref 3.5–5.1)
SERVICE CMNT-IMP: ABNORMAL
SODIUM SERPL-SCNC: 139 MMOL/L (ref 136–145)

## 2022-05-24 PROCEDURE — 74011000258 HC RX REV CODE- 258: Performed by: NURSE PRACTITIONER

## 2022-05-24 PROCEDURE — 74011250636 HC RX REV CODE- 250/636: Performed by: INTERNAL MEDICINE

## 2022-05-24 PROCEDURE — 94760 N-INVAS EAR/PLS OXIMETRY 1: CPT

## 2022-05-24 PROCEDURE — 74011636637 HC RX REV CODE- 636/637: Performed by: INTERNAL MEDICINE

## 2022-05-24 PROCEDURE — 94664 DEMO&/EVAL PT USE INHALER: CPT

## 2022-05-24 PROCEDURE — 77010033678 HC OXYGEN DAILY

## 2022-05-24 PROCEDURE — 74011250636 HC RX REV CODE- 250/636: Performed by: NURSE PRACTITIONER

## 2022-05-24 PROCEDURE — 74011250637 HC RX REV CODE- 250/637: Performed by: INTERNAL MEDICINE

## 2022-05-24 PROCEDURE — 65270000046 HC RM TELEMETRY

## 2022-05-24 PROCEDURE — 74011000250 HC RX REV CODE- 250: Performed by: INTERNAL MEDICINE

## 2022-05-24 PROCEDURE — 94640 AIRWAY INHALATION TREATMENT: CPT

## 2022-05-24 PROCEDURE — P9047 ALBUMIN (HUMAN), 25%, 50ML: HCPCS | Performed by: NURSE PRACTITIONER

## 2022-05-24 PROCEDURE — 80048 BASIC METABOLIC PNL TOTAL CA: CPT

## 2022-05-24 PROCEDURE — 82550 ASSAY OF CK (CPK): CPT

## 2022-05-24 PROCEDURE — 74011000258 HC RX REV CODE- 258: Performed by: INTERNAL MEDICINE

## 2022-05-24 PROCEDURE — P9047 ALBUMIN (HUMAN), 25%, 50ML: HCPCS | Performed by: INTERNAL MEDICINE

## 2022-05-24 PROCEDURE — 74011000250 HC RX REV CODE- 250: Performed by: HOSPITALIST

## 2022-05-24 PROCEDURE — 82962 GLUCOSE BLOOD TEST: CPT

## 2022-05-24 PROCEDURE — 36415 COLL VENOUS BLD VENIPUNCTURE: CPT

## 2022-05-24 RX ORDER — METOLAZONE 2.5 MG/1
2.5 TABLET ORAL ONCE
Status: COMPLETED | OUTPATIENT
Start: 2022-05-24 | End: 2022-05-24

## 2022-05-24 RX ORDER — EZETIMIBE 10 MG/1
10 TABLET ORAL DAILY
Status: DISCONTINUED | OUTPATIENT
Start: 2022-05-24 | End: 2022-06-03 | Stop reason: HOSPADM

## 2022-05-24 RX ORDER — ALBUMIN HUMAN 250 G/1000ML
12.5 SOLUTION INTRAVENOUS ONCE
Status: COMPLETED | OUTPATIENT
Start: 2022-05-25 | End: 2022-05-25

## 2022-05-24 RX ORDER — ALBUMIN HUMAN 250 G/1000ML
12.5 SOLUTION INTRAVENOUS 2 TIMES DAILY
Status: COMPLETED | OUTPATIENT
Start: 2022-05-24 | End: 2022-05-25

## 2022-05-24 RX ADMIN — BISACODYL 5 MG: 5 TABLET, COATED ORAL at 09:21

## 2022-05-24 RX ADMIN — DAPTOMYCIN 600 MG: 500 INJECTION, POWDER, LYOPHILIZED, FOR SOLUTION INTRAVENOUS at 13:41

## 2022-05-24 RX ADMIN — PANTOPRAZOLE SODIUM 40 MG: 40 TABLET, DELAYED RELEASE ORAL at 09:32

## 2022-05-24 RX ADMIN — ALBUMIN (HUMAN) 12.5 G: 0.25 INJECTION, SOLUTION INTRAVENOUS at 17:28

## 2022-05-24 RX ADMIN — ALBUMIN (HUMAN) 12.5 G: 0.25 INJECTION, SOLUTION INTRAVENOUS at 23:43

## 2022-05-24 RX ADMIN — BUMETANIDE 2 MG: 0.25 INJECTION INTRAMUSCULAR; INTRAVENOUS at 10:59

## 2022-05-24 RX ADMIN — BUMETANIDE 2 MG: 0.25 INJECTION INTRAMUSCULAR; INTRAVENOUS at 17:28

## 2022-05-24 RX ADMIN — SACUBITRIL AND VALSARTAN 1 TABLET: 49; 51 TABLET, FILM COATED ORAL at 11:49

## 2022-05-24 RX ADMIN — ASPIRIN 81 MG: 81 TABLET, COATED ORAL at 09:21

## 2022-05-24 RX ADMIN — Medication 2 UNITS: at 22:05

## 2022-05-24 RX ADMIN — METOLAZONE 2.5 MG: 2.5 TABLET ORAL at 11:39

## 2022-05-24 RX ADMIN — Medication 5 UNITS: at 09:33

## 2022-05-24 RX ADMIN — Medication 5 UNITS: at 17:54

## 2022-05-24 RX ADMIN — POLYETHYLENE GLYCOL 3350 17 G: 17 POWDER, FOR SOLUTION ORAL at 17:28

## 2022-05-24 RX ADMIN — ROSUVASTATIN 5 MG: 10 TABLET, FILM COATED ORAL at 09:21

## 2022-05-24 RX ADMIN — TAMSULOSIN HYDROCHLORIDE 0.4 MG: 0.4 CAPSULE ORAL at 09:21

## 2022-05-24 RX ADMIN — CLINDAMYCIN IN 5 PERCENT DEXTROSE 900 MG: 18 INJECTION, SOLUTION INTRAVENOUS at 06:45

## 2022-05-24 RX ADMIN — SENNOSIDES AND DOCUSATE SODIUM 1 TABLET: 50; 8.6 TABLET ORAL at 09:21

## 2022-05-24 RX ADMIN — Medication 2 UNITS: at 09:33

## 2022-05-24 RX ADMIN — MONTELUKAST 10 MG: 10 TABLET, FILM COATED ORAL at 22:05

## 2022-05-24 RX ADMIN — ARFORMOTEROL TARTRATE: 15 SOLUTION RESPIRATORY (INHALATION) at 20:11

## 2022-05-24 RX ADMIN — CLINDAMYCIN IN 5 PERCENT DEXTROSE 900 MG: 18 INJECTION, SOLUTION INTRAVENOUS at 22:05

## 2022-05-24 RX ADMIN — SODIUM CHLORIDE, PRESERVATIVE FREE 10 ML: 5 INJECTION INTRAVENOUS at 16:13

## 2022-05-24 RX ADMIN — SENNOSIDES AND DOCUSATE SODIUM 1 TABLET: 50; 8.6 TABLET ORAL at 17:28

## 2022-05-24 RX ADMIN — APIXABAN 5 MG: 5 TABLET, FILM COATED ORAL at 17:28

## 2022-05-24 RX ADMIN — SODIUM CHLORIDE, PRESERVATIVE FREE 10 ML: 5 INJECTION INTRAVENOUS at 06:45

## 2022-05-24 RX ADMIN — WHITE PETROLATUM: 1.75 OINTMENT TOPICAL at 09:34

## 2022-05-24 RX ADMIN — SODIUM CHLORIDE, PRESERVATIVE FREE 10 ML: 5 INJECTION INTRAVENOUS at 16:14

## 2022-05-24 RX ADMIN — CLINDAMYCIN IN 5 PERCENT DEXTROSE 900 MG: 18 INJECTION, SOLUTION INTRAVENOUS at 16:13

## 2022-05-24 RX ADMIN — ARFORMOTEROL TARTRATE: 15 SOLUTION RESPIRATORY (INHALATION) at 07:47

## 2022-05-24 RX ADMIN — Medication 2 UNITS: at 11:49

## 2022-05-24 RX ADMIN — PREGABALIN 75 MG: 25 CAPSULE ORAL at 17:27

## 2022-05-24 RX ADMIN — CEFEPIME 2 G: 2 INJECTION, POWDER, FOR SOLUTION INTRAVENOUS at 22:05

## 2022-05-24 RX ADMIN — EZETIMIBE 10 MG: 10 TABLET ORAL at 11:39

## 2022-05-24 RX ADMIN — SODIUM CHLORIDE, PRESERVATIVE FREE 10 ML: 5 INJECTION INTRAVENOUS at 22:06

## 2022-05-24 RX ADMIN — CEFEPIME 2 G: 2 INJECTION, POWDER, FOR SOLUTION INTRAVENOUS at 16:13

## 2022-05-24 RX ADMIN — POLYETHYLENE GLYCOL 3350 17 G: 17 POWDER, FOR SOLUTION ORAL at 09:21

## 2022-05-24 RX ADMIN — CEFEPIME 2 G: 2 INJECTION, POWDER, FOR SOLUTION INTRAVENOUS at 06:45

## 2022-05-24 RX ADMIN — SODIUM CHLORIDE, PRESERVATIVE FREE 10 ML: 5 INJECTION INTRAVENOUS at 22:07

## 2022-05-24 RX ADMIN — ALBUMIN (HUMAN) 12.5 G: 0.25 INJECTION, SOLUTION INTRAVENOUS at 11:39

## 2022-05-24 RX ADMIN — SACUBITRIL AND VALSARTAN 1 TABLET: 49; 51 TABLET, FILM COATED ORAL at 17:27

## 2022-05-24 RX ADMIN — APIXABAN 5 MG: 5 TABLET, FILM COATED ORAL at 09:32

## 2022-05-24 RX ADMIN — PREGABALIN 75 MG: 25 CAPSULE ORAL at 09:21

## 2022-05-24 RX ADMIN — AMIODARONE HYDROCHLORIDE 100 MG: 200 TABLET ORAL at 09:21

## 2022-05-24 NOTE — PROGRESS NOTES
Bedside shift change report given to Antelmo Nicole RN (oncoming nurse) by Destin Whittaker RN (offgoing nurse). Report included the following information SBAR, Kardex, Intake/Output, MAR, Recent Results and Cardiac Rhythm NSR.

## 2022-05-24 NOTE — PROGRESS NOTES
[facility] [facility]                                                                                          Hospitalist Progress Note  [address] [facility],   Answering service: [phone] OR [phone] from in house phone        Date of Service:  2022  NAME:  Duncan Ma  :  1965  MRN:  [id number]      Admission Summary:   Interval history: From critical care [facility] on May 25 55year-old gentleman who was recently discharged from our facility who is being sent to the ER from rehab facility with low blood pressure and decreasing responsiveness. Patient has multiple complicated medical problem including diastolic heart failure, severe COPD with chronic supplemental oxygen, A. fib on amiodarone and chronic anticoagulation, diabetes mellitus type 2, obstructive sleep apnea and morbid obesity. As mentioned above recently was discharged from our facility after being treated for COPD exacerbation and diastolic heart failure exacerbation.  Patient is a poor historian and he cannot tell me about the progress of his situation in the rehab facility. He stated that nothing bothering him at this time, he stated that his breathing is at baseline, when I asked him about his legs and if they always look like this he said \"I do not know Desi Elena knows that he is at [facility] ER.   In the ER he was found to be hypotensive but this has responded to fluid, lactic acid acid elevated but this is also trending down with fluid, and his kidney function acutely worsened compared to few days ago while in the hospital.  Unable to insert a Foster catheter in the ER. Initially hospitalist assess the patient for admission however given his tenuous status I consulted ICU.  : No acute event overnight, looks much better this morning, ate his breakfast tray, needed to be on very low-dose of norepinephrine overnight for short period of time.  No nausea no vomiting.  Transferred out of ICU       Interval history / Subjective: Follow up sepsis. Patient seen and examined. Sitting in chair. Drowsy but conversational. Reports ongoing back pain. Has dark urine and reports continuing to pass clots. Lower extremity edema worse than day prior- although reported 3 liters output. Assessment & Plan:     Severe sepsis due to lower extremity cellulitis:  -required ICU admission due to low BP, initiated on stress dose steroids, midodrine, does not appear to require pressors  -Suspect secondary to lower extremity cellulitis with chronic lower limb changes with superimposed infection, now appears worsening with component of volume overload  -ID consulted. Continues on cefepime, clindamycin, daptomycin  --Appreciate infectious disease  -Left lower extremity doppler negative   -CXR clear, viral panel negative  -s/p stress dose steroids with wean   -continue wound care    Lower extremity edema:   -combination of acute cellulitis, chronic venous stasis  -continue bumex BID, add metolazone and albumin    Heart failure with preserved ejection fraction:  -continue bumex, continue Entresto  -hold spironolactone, imdur and reevaluate for restarting daily   -recheck echocardiogram     Acute kidney injury: resolved  -Continue diuretics, entresto     Chronic hypoxic hypercapnic respiratory failure due to severe COPD emphysema:  -continue brovana/pulmicort BID, aa nebs as needed  -CXR clear  -wean oxygen as able  - placed on 3 liters    Urinary retention:   Hematuria  -remove mclaughlin 5/22   -?hematuria and blood clots secondary to trauma from mclaughlin insertion. Due to ongoing symptoms, will ask urology to evaluate.  Continue monitor for now    Diabetes mellitus type II with hyperglycemia:   -continue NPH5 units BID with steroids, continue SSI    Chronic A. fib on amiodarone and chronic anticoagulation with Eliquis:  -Continue home medication     Back pain non-focal, likely musculoskeletal  -As needed medication    Obstructive sleep apnea:  -CPAP at night and as needed    Chronic GERD:  -Continue on PPI      Reevaluate heart failure medications daily           Code status: full   Prophylaxis: eliquis  Care Plan discussed with: patient, nurse  Anticipated Disposition: >48 hours, SNF     Hospital Problems  Date Reviewed: 4/28/2013          Codes Class Noted POA    * (Principal) Sepsis ([address] ICD-10-CM: A41.9  ICD-9-CM: 038.9, 995.91  5/18/2022 Unknown                Review of Systems:   Negative unless stated above      Vital Signs:    Last 24hrs VS reviewed since prior progress note. Most recent are:  Visit Vitals  /74 (BP 1 Location: Right upper arm, BP Patient Position: At rest)   Pulse 73   Temp 98.2 °F (36.8 °C)   Resp 12   Ht 6' (1.829 m)   Wt 129.8 kg (286 lb 2.5 oz)   SpO2 99%   BMI 38.81 kg/m²         Intake/Output Summary (Last 24 hours) at 5/24/2022 1117  Last data filed at 5/24/2022 1040  Gross per 24 hour   Intake --   Output 3210 ml   Net -3210 ml        Physical Examination:     I had a face to face encounter with this patient and independently examined them on 5/24/2022 as outlined below:          Constitutional:  No acute distress, cooperative, pleasant, morbidly obese, drowsy     ENT:  Oral mucosa moist, oropharynx benign. Resp:  CTA bilaterally. No wheezing/rhonchi/rales. No accessory muscle use. CV:  Regular rhythm, normal rate, no murmurs, gallops, rubs    GI:  Soft, non distended, non tender.  normoactive bowel sounds, no hepatosplenomegaly     Musculoskeletal:  lower extremity venous stasis with persistent edema left leg +pitting and weeping, right lower extremity with chronic venous changes and pitting edema     Neurologic:  Moves all extremities            Data Review:    Review and/or order of clinical lab test  Review and/or order of tests in the radiology section of CPT  Review and/or order of tests in the medicine section of CPT      Labs:     Recent Labs     05/23/22  0032 05/22/22  0434   WBC 8.3 9.0   HGB 12.7 13.2   HCT 46.1 46.4   * 144*     Recent Labs     05/24/22  0458 05/23/22  0032 05/22/22  0434    136 136   K 4.2 3.8 3.9    97 97   CO2 38* 35* 33*   BUN 35* 37* 35*   CREA 1.00 0.96 0.82   * 170* 165*   CA 9.5 9.0 8.9   MG  --  2.5* 2.6*   PHOS  --  4.6 3.2     No results for input(s): ALT, AP, TBIL, TBILI, TP, ALB, GLOB, GGT, AML, LPSE in the last 72 hours. No lab exists for component: SGOT, GPT, AMYP, HLPSE  No results for input(s): INR, PTP, APTT, INREXT, INREXT in the last 72 hours. No results for input(s): FE, TIBC, PSAT, FERR in the last 72 hours. No results found for: FOL, RBCF   No results for input(s): PH, PCO2, PO2 in the last 72 hours. No results for input(s): CPK, CKNDX, TROIQ in the last 72 hours.     No lab exists for component: CPKMB  Lab Results   Component Value Date/Time    Cholesterol, total 170 01/23/2013 11:08 AM    HDL Cholesterol 37 (L) 01/23/2013 11:08 AM    LDL, calculated 97 01/23/2013 11:08 AM    Triglyceride 180 (H) 01/23/2013 11:08 AM    CHOL/HDL Ratio 6.0 (H) 10/06/2010 03:18 PM     Lab Results   Component Value Date/Time    Glucose (POC) 207 (H) 05/24/2022 09:20 AM    Glucose (POC) 171 (H) 05/23/2022 09:25 PM    Glucose (POC) 176 (H) 05/23/2022 05:25 PM    Glucose (POC) 176 (H) 05/23/2022 12:21 PM    Glucose (POC) 252 (H) 05/23/2022 09:35 AM     Lab Results   Component Value Date/Time    Color YELLOW/STRAW 05/18/2022 03:54 PM    Appearance CLEAR 05/18/2022 03:54 PM    Specific gravity 1.011 05/18/2022 03:54 PM    pH (UA) 5.0 05/18/2022 03:54 PM    Protein Negative 05/18/2022 03:54 PM    Glucose >1,000 (A) 05/18/2022 03:54 PM    Ketone Negative 05/18/2022 03:54 PM    Bilirubin Negative 05/18/2022 03:54 PM    Urobilinogen 0.2 05/18/2022 03:54 PM    Nitrites Negative 05/18/2022 03:54 PM    Leukocyte Esterase Negative 05/18/2022 03:54 PM    Epithelial cells FEW 05/18/2022 03:54 PM    Bacteria Negative 05/18/2022 03:54 PM    WBC 0-4 05/18/2022 03:54 PM    RBC 20-50 05/18/2022 03:54 PM         Medications Reviewed:     Current Facility-Administered Medications   Medication Dose Route Frequency    ezetimibe (ZETIA) tablet 10 mg  10 mg Oral DAILY    sacubitriL-valsartan (ENTRESTO) 49-51 mg tablet 1 Tablet  1 Tablet Oral BID    DAPTOmycin (CUBICIN) 600 mg in 0.9% sodium chloride 50 mL IVPB  600 mg IntraVENous Q24H    albumin human 25% (BUMINATE) solution 12.5 g  12.5 g IntraVENous BID    metOLazone (ZAROXOLYN) tablet 2.5 mg  2.5 mg Oral ONCE    bumetanide (BUMEX) injection 2 mg  2 mg IntraVENous BID    magnesium citrate solution 148 mL  148 mL Oral Q2H PRN    senna-docusate (PERICOLACE) 8.6-50 mg per tablet 1 Tablet  1 Tablet Oral BID    insulin NPH (NOVOLIN N, HUMULIN N) injection 5 Units  5 Units SubCUTAneous ACB&D    clindamycin (CLEOCIN) 900mg D5W 50mL IVPB (premix)  900 mg IntraVENous Q8H    albuterol-ipratropium (DUO-NEB) 2.5 MG-0.5 MG/3 ML  3 mL Nebulization Q6H PRN    traMADoL (ULTRAM) tablet 50 mg  50 mg Oral Q6H PRN    polyethylene glycol (MIRALAX) packet 17 g  17 g Oral BID    albuterol (PROVENTIL VENTOLIN) nebulizer solution 2.5 mg  2.5 mg Nebulization Q4H PRN    insulin lispro (HUMALOG) injection   SubCUTAneous AC&HS    pregabalin (LYRICA) capsule 75 mg  75 mg Oral BID    acetaminophen (TYLENOL) tablet 1,000 mg  1,000 mg Oral Q6H PRN    Or    acetaminophen (TYLENOL) suppository 650 mg  650 mg Rectal Q6H PRN    pantothenic ac-min oil-pet,hyd (AQUAPHOR) 41 % ointment   Topical DAILY    sodium chloride (NS) flush 5-40 mL  5-40 mL IntraVENous Q8H    sodium chloride (NS) flush 5-40 mL  5-40 mL IntraVENous PRN    glucose chewable tablet 16 g  4 Tablet Oral PRN    glucagon (GLUCAGEN) injection 1 mg  1 mg IntraMUSCular PRN    dextrose 10 % infusion 0-250 mL  0-250 mL IntraVENous PRN    sodium chloride (NS) flush 5-40 mL  5-40 mL IntraVENous Q8H    sodium chloride (NS) flush 5-40 mL  5-40 mL IntraVENous PRN    ondansetron (ZOFRAN ODT) tablet 4 mg  4 mg Oral Q8H PRN    Or    ondansetron (ZOFRAN) injection 4 mg  4 mg IntraVENous Q6H PRN    bisacodyL (DULCOLAX) tablet 5 mg  5 mg Oral DAILY    polyethylene glycol (MIRALAX) packet 17 g  17 g Oral DAILY PRN    amiodarone (CORDARONE) tablet 100 mg  100 mg Oral DAILY    apixaban (ELIQUIS) tablet 5 mg  5 mg Oral BID    aspirin delayed-release tablet 81 mg  81 mg Oral DAILY    arformoterol 15 mcg/budesonide 0.5 mg neb solution   Nebulization BID RT    montelukast (SINGULAIR) tablet 10 mg  10 mg Oral QHS    pantoprazole (PROTONIX) tablet 40 mg  40 mg Oral DAILY    [Held by provider] rosuvastatin (CRESTOR) tablet 5 mg  5 mg Oral DAILY    tamsulosin (FLOMAX) capsule 0.4 mg  0.4 mg Oral DAILY    cefepime (MAXIPIME) 2 g in 0.9% sodium chloride (MBP/ADV) 100 mL MBP  2 g IntraVENous Q8H     ______________________________________________________________________  EXPECTED LENGTH OF STAY: 4d 19h  ACTUAL LENGTH OF STAY:          [address] [facility]

## 2022-05-24 NOTE — PROGRESS NOTES
Problem: Diabetes Self-Management  Goal: *Disease process and treatment process  Description: Define diabetes and identify own type of diabetes; list 3 options for treating diabetes. Outcome: Progressing Towards Goal  Goal: *Incorporating nutritional management into lifestyle  Description: Describe effect of type, amount and timing of food on blood glucose; list 3 methods for planning meals. Outcome: Progressing Towards Goal  Goal: *Incorporating physical activity into lifestyle  Description: State effect of exercise on blood glucose levels. Outcome: Progressing Towards Goal  Goal: *Developing strategies to promote health/change behavior  Description: Define the ABC's of diabetes; identify appropriate screenings, schedule and personal plan for screenings. Outcome: Progressing Towards Goal  Goal: *Using medications safely  Description: State effect of diabetes medications on diabetes; name diabetes medication taking, action and side effects. Outcome: Progressing Towards Goal  Goal: *Monitoring blood glucose, interpreting and using results  Description: Identify recommended blood glucose targets  and personal targets. Outcome: Progressing Towards Goal  Goal: *Prevention, detection, treatment of acute complications  Description: List symptoms of hyper- and hypoglycemia; describe how to treat low blood sugar and actions for lowering  high blood glucose level. Outcome: Progressing Towards Goal  Goal: *Prevention, detection and treatment of chronic complications  Description: Define the natural course of diabetes and describe the relationship of blood glucose levels to long term complications of diabetes.   Outcome: Progressing Towards Goal  Goal: *Developing strategies to address psychosocial issues  Description: Describe feelings about living with diabetes; identify support needed and support network  Outcome: Progressing Towards Goal  Goal: *Insulin pump training  Outcome: Progressing Towards Goal  Goal: *Sick day guidelines  Outcome: Progressing Towards Goal  Goal: *Patient Specific Goal (EDIT GOAL, INSERT TEXT)  Outcome: Progressing Towards Goal     Problem: Patient Education: Go to Patient Education Activity  Goal: Patient/Family Education  Outcome: Progressing Towards Goal     Problem: Pressure Injury - Risk of  Goal: *Prevention of pressure injury  Description: Document Ben Scale and appropriate interventions in the flowsheet. Outcome: Progressing Towards Goal  Note: Pressure Injury Interventions:  Sensory Interventions: Assess changes in LOC,Assess need for specialty bed,Check visual cues for pain,Discuss PT/OT consult with provider,Keep linens dry and wrinkle-free,Minimize linen layers,Pad between skin to skin    Moisture Interventions: Absorbent underpads,Apply protective barrier, creams and emollients,Assess need for specialty bed,Minimize layers,Moisture barrier    Activity Interventions: Increase time out of bed,Pressure redistribution bed/mattress(bed type),PT/OT evaluation    Mobility Interventions: Chair cushion,Float heels,Pressure redistribution bed/mattress (bed type),PT/OT evaluation    Nutrition Interventions: Document food/fluid/supplement intake    Friction and Shear Interventions: Apply protective barrier, creams and emollients,HOB 30 degrees or less,Lift team/patient mobility team,Minimize layers,Transferring/repositioning devices                Problem: Patient Education: Go to Patient Education Activity  Goal: Patient/Family Education  Outcome: Progressing Towards Goal     Problem: Falls - Risk of  Goal: *Absence of Falls  Description: Document Sanaz Fall Risk and appropriate interventions in the flowsheet.   Outcome: Progressing Towards Goal  Note: Fall Risk Interventions:  Mobility Interventions: Assess mobility with egress test,Bed/chair exit alarm,Communicate number of staff needed for ambulation/transfer,OT consult for ADLs,PT Consult for mobility concerns,Patient to call before getting OOB    Mentation Interventions: Adequate sleep, hydration, pain control,Bed/chair exit alarm,Evaluate medications/consider consulting pharmacy,More frequent rounding,Reorient patient    Medication Interventions: Bed/chair exit alarm,Evaluate medications/consider consulting pharmacy,Patient to call before getting OOB,Teach patient to arise slowly    Elimination Interventions: Call light in reach,Patient to call for help with toileting needs,Toileting schedule/hourly rounds,Urinal in reach    History of Falls Interventions: Bed/chair exit alarm,Door open when patient unattended,Room close to nurse's station         Problem: Patient Education: Go to Patient Education Activity  Goal: Patient/Family Education  Outcome: Progressing Towards Goal     Problem: Risk for Spread of Infection  Goal: Prevent transmission of infectious organism to others  Description: Prevent the transmission of infectious organisms to other patients, staff members, and visitors.   Outcome: Progressing Towards Goal     Problem: Patient Education:  Go to Education Activity  Goal: Patient/Family Education  Outcome: Progressing Towards Goal     Problem: Patient Education: Go to Patient Education Activity  Goal: Patient/Family Education  Outcome: Progressing Towards Goal     Problem: Patient Education: Go to Patient Education Activity  Goal: Patient/Family Education  Outcome: Progressing Towards Goal

## 2022-05-24 NOTE — CONSULTS
Requesting Provider: Cristian Geller DO - Reason for Consultation: \"hematuria\"  Pre-existing [facility] Patient:   No                Patient: Desi Eaton MRN: [id number]  SSN: [id number]    YOB: 1965  Age: 55 y.o. Sex: male     Location: [facility]/       Code Status: Full Code   PCP: None  - None   Emergency Contact:  Primary Emergency ContactScott Corbett, Nik Phone: [phone]   Race/Protestant/Language: Hubert Solis / Edie Walton / Emerson Bamberger   Payor: Payor: Lee Simmons / Plan: [place] [employer] / Product Type: Medicare /    Prior Admission Data: 5/2/22 [facility] 2N MED SURG Genene Favre R   Hospitalized:  Hospital Day: 7 - Admitted 5/18/2022  1:07 PM     CONSULTANTS  IP CONSULT TO INFECTIOUS DISEASES  IP CONSULT TO [address] [place] [address]    ICD-10-CM ICD-9-CM   1. Severe sepsis (HCC)  A41.9 038.9    R65.20 995.92   2. MADELAINE (acute kidney injury) ([facility] [address]  N17.9 584.9   3. Pressure injury of skin, unspecified injury stage, unspecified location  L89.90 707.00     707.20         Assessment/Plan:       · Gross Hematuria  · Non-obstructing Left renal stone     - Hematuria resolved, urine clear yellow. Unclear etiology- Possibly secondary to traumatic mclaughlin insertion, on chronic AC. Non-con CT showed non-obstructing left renal stone; negative for obstructing stones, hydro, or obvious renal masses. Discussed outpatient follow up for further workup with cysto and dedicated CT IVP once he overcomes his acute medical problems. Okay to continue to void independently, PVR bladder scan 3 cc. F/U arranged and details in the chart. Please call for further questions. Supervising MD, Dr. Travis Mack      CC: Shortness of Breath   HPI: He is a 55 y.o. male who is seen in consult by Urology for gross hematuria as a new patient to . He was seen once in 2008 at our office by Dr. Kemar Cano for the same.  At that time his non-contrast CT showed tiny nonobstructing calyceal stones w/o evidence of ureteral calculus or hydronephrosis. No definite renal mass seen. He failed to follow up for further w/u. He has other PMH of diastolic heart failure, severe COPD with chronic supplemental oxygen, A. fib on amiodarone and chronic anticoagulation, diabetes mellitus type 2, obstructive sleep apnea and morbid obesity. The patient was admitted to [facility] from rehab for sepsis 2/2 lower extremity cellulitis following a recent admission for COPD/ CHF exacerbation. Notes indicate that staff had difficulty placing a mclaughlin catheter on admission on . A mclaughlin was eventually placed by staff with initial recorded UOP of 1700 cc. No documented bladder scans. He tells me that he had some hematuria following mclaughlin placement. The catheter was then removed on . He reports the return of hematuria again 1 day ago. Admits prior hx of hematuria. Denies flank or abd pain, dysuria, or incomplete emptying. Denies personal or FH of renal, bladder, or prostate CA. Denies tobacco use. On chronic Eliquis and Aspirin. Non-con CT of the abdomen and pelvis on  revealed a 5 mm nonobstructing left renal stone. No hydro or ureteral stones. No obvious renal masses. The bladder is empty with a mclaughlin. I reviewed the images and agree with the findings. Over the course of his admission, his WBC has improved to 8 from 21. Hgb has been stable in the 12 range. His creatinine was elevated at 2.0 on arrival and has trended down to 1.0. (b/l ~1.0). His urinalysis was negative for infection, however, showed 20-50 RBCs. On exam his urine is clear yellow. PVR bladder scan 3 cc. Problem: hematuria;  Location: unknown; Quality: microscopic, Severity: mild; Timing: ongoing intermittently for years, Context: recent catheter; Better/Worse: improved, Associated s/s: none      Temp (24hrs), Av °F (36.7 °C), Min:97.4 °F (36.3 °C), Max:98.6 °F (37 °C)    Urinary Status: Voiding  Creatinine   Date/Time Value Ref Range Status 05/24/2022 04:58 AM 1.00 0.70 - 1.30 MG/DL Final   05/23/2022 12:32 AM 0.96 0.70 - 1.30 MG/DL Final   05/22/2022 04:34 AM 0.82 0.70 - 1.30 MG/DL Final   05/21/2022 01:56 AM 1.04 0.70 - 1.30 MG/DL Final   05/20/2022 04:17 AM 1.18 0.70 - 1.30 MG/DL Final     Current Antimicrobial Therapy (168h ago, onward)     Ordered     Start Stop    05/24/22 1031  DAPTOmycin (CUBICIN) 600 mg in 0.9% sodium chloride 50 mL IVPB  600 mg,   IntraVENous,   EVERY 24 HOURS        References:    Lexicomp    05/24/22 1200 --    05/22/22 1343  clindamycin (CLEOCIN) 900mg D5W 50mL IVPB (premix)  900 mg,   IntraVENous,   EVERY 8 HOURS        References:    Lexicomp    05/22/22 1400 05/25/22 0559    05/18/22 1626  cefepime (MAXIPIME) 2 g in 0.9% sodium chloride (MBP/ADV) 100 mL MBP  2 g,   IntraVENous,   EVERY 8 HOURS        References:    Lexicomp    05/18/22 2200 --              Key Anti-Platelet Anticoagulant Meds             apixaban (ELIQUIS) 5 mg tablet Take 5 mg by mouth two (2) times a day. Indications: treatment to prevent blood clots in chronic atrial fibrillation    aspirin delayed-release 81 mg tablet Take  by mouth daily.  Indications: treatment to prevent a heart attack        Diet: ADULT DIET Regular  DIET ONE TIME MESSAGE -       Labs     Lab Results   Component Value Date/Time    Lactic acid 1.5 05/19/2022 05:01 AM    Lactic acid 2.3 (HH) 05/18/2022 03:42 PM    Lactic acid 3.1 (HH) 05/18/2022 01:35 PM    WBC 8.3 05/23/2022 12:32 AM    HCT 46.1 05/23/2022 12:32 AM    PLATELET 515 (L) 40/45/2096 12:32 AM    Sodium 139 05/24/2022 04:58 AM    Potassium 4.2 05/24/2022 04:58 AM    Chloride 100 05/24/2022 04:58 AM    CO2 38 (H) 05/24/2022 04:58 AM    BUN 35 (H) 05/24/2022 04:58 AM    Creatinine 1.00 05/24/2022 04:58 AM    Glucose 153 (H) 05/24/2022 04:58 AM    Calcium 9.5 05/24/2022 04:58 AM    Magnesium 2.5 (H) 05/23/2022 12:32 AM     UA:   Lab Results   Component Value Date/Time    Color YELLOW/STRAW 05/18/2022 03:54 PM Appearance CLEAR 05/18/2022 03:54 PM    Specific gravity 1.011 05/18/2022 03:54 PM    pH (UA) 5.0 05/18/2022 03:54 PM    Protein Negative 05/18/2022 03:54 PM    Glucose >1,000 (A) 05/18/2022 03:54 PM    Ketone Negative 05/18/2022 03:54 PM    Bilirubin Negative 05/18/2022 03:54 PM    Urobilinogen 0.2 05/18/2022 03:54 PM    Nitrites Negative 05/18/2022 03:54 PM    Leukocyte Esterase Negative 05/18/2022 03:54 PM    Epithelial cells FEW 05/18/2022 03:54 PM    Bacteria Negative 05/18/2022 03:54 PM    WBC 0-4 05/18/2022 03:54 PM    RBC 20-50 05/18/2022 03:54 PM     Imaging     Results for orders placed during the hospital encounter of 05/18/22    CT ABD PELV WO CONT    Narrative  INDICATION: acute abd    EXAM: CT Abdomen and Pelvis without IV contrast. No oral contrast.  CT dose reduction was achieved through use of a standardized protocol tailored  for this examination and automatic exposure control for dose modulation. FINDINGS:  There is a 5 mm nonobstructing left intrarenal stone. No other urinary tract  stones are seen. There is no hydroureteronephrosis. The kidneys are normal in  size. There is no perirenal fluid or ascites. Liver shows no apparent significant finding without contrast. Pancreas, adrenal  glands, and spleen are unremarkable. Aorta is atherosclerotic without aneurysm. No inflammation is seen. There is no pneumoperitoneum or significant adenopathy. Bowels are not dilated. Appendix is normal.    The bladder is empty with catheter in place. There is a fat-containing umbilical hernia. Impression  No Acute Disease. US Results (most recent):  No results found for this or any previous visit.       Cultures     All Micro Results     Procedure Component Value Units Date/Time    CULTURE, BLOOD, PAIRED [[id number]] Collected: 05/18/22 1316    Order Status: Completed Specimen: Blood Updated: 05/23/22 0821     Special Requests: NO SPECIAL REQUESTS        Culture result: NO GROWTH 5 DAYS CULTURE, MRSA [[id number]]  (Abnormal) Collected: 05/18/22 1706    Order Status: Completed Specimen: Nasal from Nares Updated: 05/19/22 1721     Special Requests: NO SPECIAL REQUESTS        Culture result: MRSA PRESENT               Screening of patient nares for MRSA is for surveillance purposes and, if positive, to facilitate isolation considerations in high risk settings. It is not intended for automatic decolonization interventions per se as regimens are not sufficiently effective to warrant routine use. (NOTE) CALLED POSITIVE MRSA TO TYLER ZARAGOZA AT 1806 ON 5/19/22. AT    RESPIRATORY VIRUS PANEL W/COVID-19, PCR [[id number]] Collected: 05/18/22 1542    Order Status: Completed Specimen: Nasopharyngeal Updated: 05/18/22 1737     Adenovirus Not detected        Coronavirus 229E Not detected        Coronavirus HKU1 Not detected        Coronavirus CVNL63 Not detected        Coronavirus OC43 Not detected        SARS-CoV-2, PCR Not detected        Metapneumovirus Not detected        Rhinovirus and Enterovirus Not detected        Influenza A Not detected        Influenza A, subtype H1 Not detected        Influenza A, subtype H3 Not detected        INFLUENZA A H1N1 PCR Not detected        Influenza B Not detected        Parainfluenza 1 Not detected        Parainfluenza 2 Not detected        Parainfluenza 3 Not detected        Parainfluenza virus 4 Not detected        RSV by PCR Not detected        B. parapertussis, PCR Not detected        Bordetella pertussis - PCR Not detected        Chlamydophila pneumoniae DNA, QL, PCR Not detected        Mycoplasma pneumoniae DNA, QL, PCR Not detected       URINE CULTURE HOLD SAMPLE [[id number]] Collected: 05/18/22 1554    Order Status: Completed Specimen: Serum Updated: 05/18/22 1619     Urine culture hold       Urine on hold in Microbiology dept for 2 days.   If unpreserved urine is submitted, it cannot be used for addtional testing after 24 hours, recollection will be required.                  Past History: (Complete 2+/3 areas)   No Known Allergies   Current Facility-Administered Medications   Medication Dose Route Frequency    ezetimibe (ZETIA) tablet 10 mg  10 mg Oral DAILY    sacubitriL-valsartan (ENTRESTO) 49-51 mg tablet 1 Tablet  1 Tablet Oral BID    DAPTOmycin (CUBICIN) 600 mg in 0.9% sodium chloride 50 mL IVPB  600 mg IntraVENous Q24H    albumin human 25% (BUMINATE) solution 12.5 g  12.5 g IntraVENous BID    bumetanide (BUMEX) injection 2 mg  2 mg IntraVENous BID    magnesium citrate solution 148 mL  148 mL Oral Q2H PRN    senna-docusate (PERICOLACE) 8.6-50 mg per tablet 1 Tablet  1 Tablet Oral BID    insulin NPH (NOVOLIN N, HUMULIN N) injection 5 Units  5 Units SubCUTAneous ACB&D    clindamycin (CLEOCIN) 900mg D5W 50mL IVPB (premix)  900 mg IntraVENous Q8H    albuterol-ipratropium (DUO-NEB) 2.5 MG-0.5 MG/3 ML  3 mL Nebulization Q6H PRN    traMADoL (ULTRAM) tablet 50 mg  50 mg Oral Q6H PRN    polyethylene glycol (MIRALAX) packet 17 g  17 g Oral BID    albuterol (PROVENTIL VENTOLIN) nebulizer solution 2.5 mg  2.5 mg Nebulization Q4H PRN    insulin lispro (HUMALOG) injection   SubCUTAneous AC&HS    pregabalin (LYRICA) capsule 75 mg  75 mg Oral BID    acetaminophen (TYLENOL) tablet 1,000 mg  1,000 mg Oral Q6H PRN    Or    acetaminophen (TYLENOL) suppository 650 mg  650 mg Rectal Q6H PRN    pantothenic ac-min oil-pet,hyd (AQUAPHOR) 41 % ointment   Topical DAILY    sodium chloride (NS) flush 5-40 mL  5-40 mL IntraVENous Q8H    sodium chloride (NS) flush 5-40 mL  5-40 mL IntraVENous PRN    glucose chewable tablet 16 g  4 Tablet Oral PRN    glucagon (GLUCAGEN) injection 1 mg  1 mg IntraMUSCular PRN    dextrose 10 % infusion 0-250 mL  0-250 mL IntraVENous PRN    sodium chloride (NS) flush 5-40 mL  5-40 mL IntraVENous Q8H    sodium chloride (NS) flush 5-40 mL  5-40 mL IntraVENous PRN    ondansetron (ZOFRAN ODT) tablet 4 mg  4 mg Oral Q8H PRN    Or    ondansetron (ZOFRAN) injection 4 mg  4 mg IntraVENous Q6H PRN    bisacodyL (DULCOLAX) tablet 5 mg  5 mg Oral DAILY    polyethylene glycol (MIRALAX) packet 17 g  17 g Oral DAILY PRN    amiodarone (CORDARONE) tablet 100 mg  100 mg Oral DAILY    apixaban (ELIQUIS) tablet 5 mg  5 mg Oral BID    aspirin delayed-release tablet 81 mg  81 mg Oral DAILY    arformoterol 15 mcg/budesonide 0.5 mg neb solution   Nebulization BID RT    montelukast (SINGULAIR) tablet 10 mg  10 mg Oral QHS    pantoprazole (PROTONIX) tablet 40 mg  40 mg Oral DAILY    [Held by provider] rosuvastatin (CRESTOR) tablet 5 mg  5 mg Oral DAILY    tamsulosin (FLOMAX) capsule 0.4 mg  0.4 mg Oral DAILY    cefepime (MAXIPIME) 2 g in 0.9% sodium chloride (MBP/ADV) 100 mL MBP  2 g IntraVENous Q8H    Prior to Admission medications    Medication Sig Start Date End Date Taking? Authorizing Provider   albuterol-ipratropium (DUO-NEB) 2.5 mg-0.5 mg/3 ml nebu 3 mL by Nebulization route every four to six (4-6) hours as needed for Wheezing or Shortness of Breath. 5/2/22   Krishna Krishna MD   docusate sodium (COLACE) 100 mg capsule Take 2 Capsules by mouth two (2) times a day for 90 days. 5/2/22 7/31/22  Sonia Finch MD   miconazole (MICOTIN) 2 % topical powder Apply  to affected area two (2) times a day. Antifungal powder under pannus, in groin, gluteal cleft, and scrotum 5/2/22   Sonia Finch MD   polyethylene glycol (MIRALAX) 17 gram packet Take 1 Packet by mouth daily. 5/3/22   Sonia Finch MD   predniSONE (DELTASONE) 10 mg tablet Take 4 tab daily X 3 days then 3 tabs daily X 3 days then 2 tabs daily X 3 days then 1 tab daily X 3 days 5/2/22   Sonia Finch MD   tamsulosin (FLOMAX) 0.4 mg capsule Take 1 Capsule by mouth daily. 5/3/22   Sonia Finch MD   budesonide-formoteroL (Symbicort) 160-4.5 mcg/actuation HFAA Take 2 Puffs by inhalation two (2) times a day.  5/2/22   Sonia Finch MD   torsemide (DEMADEX) 20 mg tablet Take 60 mg by mouth daily. Indications: high blood pressure    Provider, Historical   pregabalin (LYRICA) 75 mg capsule Take 75 mg by mouth two (2) times a day. Indications: nerve pain after herpes    Provider, Historical   apixaban (ELIQUIS) 5 mg tablet Take 5 mg by mouth two (2) times a day. Indications: treatment to prevent blood clots in chronic atrial fibrillation    Provider, Historical   aspirin delayed-release 81 mg tablet Take  by mouth daily. Indications: treatment to prevent a heart attack    Provider, Historical   amiodarone (PACERONE) 100 mg tablet Take 100 mg by mouth daily. Provider, Historical   ezetimibe (ZETIA) 10 mg tablet Take 10 mg by mouth daily. Provider, Historical   pantoprazole (PROTONIX) 40 mg tablet Take 40 mg by mouth daily. Indications: gastroesophageal reflux disease    Provider, Historical   empagliflozin (Jardiance) 25 mg tablet Take 25 mg by mouth daily. Indications: type 2 diabetes mellitus    Provider, Historical   isosorbide mononitrate ER (IMDUR) 30 mg tablet Take 30 mg by mouth daily. Indications: prevention of anginal chest pain associated with coronary artery disease    Provider, Historical   sacubitriL-valsartan (Entresto) 49-51 mg tab tablet Take 1 Tablet by mouth two (2) times a day. Provider, Historical   rosuvastatin (CRESTOR) 5 mg tablet Take 5 mg by mouth daily. Provider, Historical   spironolactone (ALDACTONE) 25 mg tablet Take 25 mg by mouth daily. Provider, Historical   montelukast (SINGULAIR) 10 mg tablet Take 10 mg by mouth nightly. Provider, Historical   insulin glargine (LANTUS) 100 unit/mL injection 18 Units by SubCUTAneous route daily. Patient not taking: Reported on 4/25/2022    Provider, Historical        PMHx:  has a past medical history of DM (diabetes mellitus) ([facility] [address] Dyslipidemia, and Hypertension. PSurgHx:  has no past surgical history on file. PSocHx:  reports that he has never smoked.  He does not have any smokeless tobacco history on file. He reports that he does not drink alcohol and does not use drugs.    ROS:  (Complete - 10 systems) - DENIES: Weightloss (Constitutional), Dry mouth (ENMT), Chest pain (CV), SOB (Respiratory), Constipation (GI), Weakness (MS), Pallor (Skin), TIA Sx (Neuro), Confusion (Psych), Easy bruising (Heme)    Physical Exam: (Comprehesive - 8+ 1995 Systems)     (1) Constitutional:  NAD; pleasant  FIO2:   on SpO2: O2 Sat (%): 99 %  O2 Device: Nasal cannula O2 Flow Rate (L/min): 4 l/min  Patient Vitals for the past 24 hrs:   BP Temp Pulse Resp SpO2   05/24/22 1000 -- -- 73 -- --   05/24/22 0644 118/74 98.2 °F (36.8 °C) 69 12 99 %   05/24/22 0550 -- -- 68 -- --   05/24/22 0441 116/77 98.6 °F (37 °C) 67 12 99 %   05/24/22 0356 -- -- 68 -- --   05/24/22 0200 -- -- 65 -- --   05/23/22 2329 107/68 98.2 °F (36.8 °C) 71 14 --   05/23/22 2200 -- -- 62 -- --   05/23/22 2041 117/71 97.7 °F (36.5 °C) 68 11 99 %   05/23/22 1956 -- -- 66 -- --   05/23/22 1947 130/74 97.4 °F (36.3 °C) 67 10 99 %   05/23/22 1935 -- -- -- -- 99 %   05/23/22 1800 -- -- 69 -- --   05/23/22 1622 114/69 97.9 °F (36.6 °C) (!) 59 11 97 %   05/23/22 1400 -- -- 69 -- --   05/23/22 1200 120/68 98.1 °F (36.7 °C) 72 15 98 %       Date 05/23/22 0700 - 05/24/22 0659 05/24/22 0700 - 05/25/22 0659   Shift 0700-1859 1900-0659 24 Hour Total 1075-8285 1221-8708 24 Hour Total   INTAKE   Shift Total(mL/kg)         OUTPUT   Urine(mL/kg/hr) 900(0.6) 2110(1.4) 3010(1) 650  650     Urine Voided 900 2110 3010 650  650   Shift Total(mL/kg) 900(6.9) 2110(16.3) 3010(23.2) 650(5)  650(5)   NET -900 -2110 -3010 -650  -650   Weight (kg) 129.8 129.8 129.8 129.8 129.8 129.8      (2) ENMT:   moist mucous membranes, normal sinuses   (3) Respiratory:  breathing easily, no distress   (4) GI:  obese, no tenderness   (5) :   Voiding independently, yellow UA, no CVA tenderness   (6) Lymphatic:  no adenopathy, neck supple   (7) Muscloskeletal:  no gross deformity, normal ROM   (8) Skin:  LE cellulitis, warm & dry   (9) Neuro:  Alert, no focal deficits, normal speech      Signed By: Margaret Reagan NP  - May 24, 2022

## 2022-05-24 NOTE — PROGRESS NOTES
I agree with Talita Galindo RN charting for this patient per her orienting with me for the last 12 hrs.

## 2022-05-24 NOTE — PROGRESS NOTES
ID Progress Note  2022    Subjective:     Sitting up in chair  C/o generalized weakness  Review of Systems:            Symptom Y/N Comments   Symptom Y/N Comments   Fever/Chills n      Chest Pain n       Poor Appetite       Edema y       Cough       Abdominal Pain  n      Sputum       Joint Pain        SOB/MCCULLOUGH  *  no worse   Pruritis/Rash        Nausea/vomit  n     Tolerating PT/OT        Diarrhea  n     Tolerating Diet        Constipation  n     Other           Could NOT obtain due to:       Objective:     Vitals:   Visit Vitals  /74 (BP 1 Location: Right upper arm, BP Patient Position: At rest)   Pulse 69   Temp 98.2 °F (36.8 °C)   Resp 12   Ht 6' (1.829 m)   Wt 129.8 kg (286 lb 2.5 oz)   SpO2 99%   BMI 38.81 kg/m²        Tmax:  Temp (24hrs), Av °F (36.7 °C), Min:97.4 °F (36.3 °C), Max:98.6 °F (37 °C)      PHYSICAL EXAM:  General: Obese, chronically ill appearing, WD, WN. Alert, cooperative, no acute distress    EENT:  EOMI. Anicteric sclerae. MMM  Resp:  Clear in apex with decreased breath sounds at bases, no wheezing or rales. No accessory muscle use  CV:  Regular  rhythm,  + edema; LLE>RLE  GI:  Soft, obese, Non tender. +Bowel sounds  Neurologic:  Alert and oriented X 3, normal speech,   Psych:   Fair insight. Not anxious nor agitated  Skin:  No rashes.   No jaundice                          Stage III pressure injury to both hip, LLE wound; edematous, erythematous    RLE; chronic venous stasis  Labs:   Lab Results   Component Value Date/Time    WBC 8.3 2022 12:32 AM    HGB 12.7 2022 12:32 AM    HCT 46.1 2022 12:32 AM    PLATELET 835 (L)  12:32 AM    MCV 80.7 2022 12:32 AM     Lab Results   Component Value Date/Time    Sodium 139 2022 04:58 AM    Potassium 4.2 2022 04:58 AM    Chloride 100 2022 04:58 AM    CO2 38 (H) 2022 04:58 AM    Anion gap 1 (L) 2022 04:58 AM    Glucose 153 (H) 2022 04:58 AM    BUN 35 (H) 2022 04:58 AM    Creatinine 1.00 05/24/2022 04:58 AM    BUN/Creatinine ratio 35 (H) 05/24/2022 04:58 AM    GFR est AA >60 05/24/2022 04:58 AM    GFR est non-AA >60 05/24/2022 04:58 AM    Calcium 9.5 05/24/2022 04:58 AM    Bilirubin, total 0.8 05/18/2022 01:16 PM    Alk. phosphatase 89 05/18/2022 01:16 PM    Protein, total 6.8 05/18/2022 01:16 PM    Albumin 2.8 (L) 05/18/2022 01:16 PM    Globulin 4.0 05/18/2022 01:16 PM    A-G Ratio 0.7 (L) 05/18/2022 01:16 PM    ALT (SGPT) 12 05/18/2022 01:16 PM         Cultures:   Results     Procedure Component Value Units Date/Time    CULTURE, MRSA [[id number]]  (Abnormal) Collected: 05/18/22 1706    Order Status: Completed Specimen: Nasal from Nares Updated: 05/19/22 1721     Special Requests: NO SPECIAL REQUESTS        Culture result: MRSA PRESENT               Screening of patient nares for MRSA is for surveillance purposes and, if positive, to facilitate isolation considerations in high risk settings. It is not intended for automatic decolonization interventions per se as regimens are not sufficiently effective to warrant routine use. (NOTE) CALLED POSITIVE MRSA TO TYLER ZARAGOZA AT 1129 ON 5/19/22. AT    URINE CULTURE HOLD SAMPLE [[id number]] Collected: 05/18/22 1554    Order Status: Completed Specimen: Serum Updated: 05/18/22 1619     Urine culture hold       Urine on hold in Microbiology dept for 2 days. If unpreserved urine is submitted, it cannot be used for addtional testing after 24 hours, recollection will be required.           RESPIRATORY VIRUS PANEL W/COVID-19, PCR [[id number]] Collected: 05/18/22 1542    Order Status: Completed Specimen: Nasopharyngeal Updated: 05/18/22 1737     Adenovirus Not detected        Coronavirus 229E Not detected        Coronavirus HKU1 Not detected        Coronavirus CVNL63 Not detected        Coronavirus OC43 Not detected        SARS-CoV-2, PCR Not detected        Metapneumovirus Not detected        Rhinovirus and Enterovirus Not detected Influenza A Not detected        Influenza A, subtype H1 Not detected        Influenza A, subtype H3 Not detected        INFLUENZA A H1N1 PCR Not detected        Influenza B Not detected        Parainfluenza 1 Not detected        Parainfluenza 2 Not detected        Parainfluenza 3 Not detected        Parainfluenza virus 4 Not detected        RSV by PCR Not detected        B. parapertussis, PCR Not detected        Bordetella pertussis - PCR Not detected        Chlamydophila pneumoniae DNA, QL, PCR Not detected        Mycoplasma pneumoniae DNA, QL, PCR Not detected       CULTURE, BLOOD, PAIRED [[id number]] Collected: 05/18/22 1316    Order Status: Completed Specimen: Blood Updated: 05/23/22 0857     Special Requests: NO SPECIAL REQUESTS        Culture result: NO GROWTH 5 DAYS            55year old male with medical hx of type II diabetes, dyslipidemia, hypertension, obesity, COPD was admitted to the hospital on 5/18 with shortness of breath from SNF. Pt was hospitalized between 4/25 to 5/2 treated for resp failure secondary to COPD exacerbation and acute on chronic diastolic CHF then sent to SNF. Pt usually wears O2 @ 2L via n/c.     Impression:   PVD  Chronic LE wounds  Cellulitis LLE  Morbid obesity  Leukocytosis (resolved)  - afebrile, WBC 8.3    Blood cx (5/18) no growth    MRSA nare screen + 5/18    CXR (-) ASDZ    CT of abd/pel (5/18) no acute process    Duplex study (-) for DVT LLE    Plan:   · Continue with IV cefepime and daptomycin; recommend to complete total 2 weeks        May complete remaining ABX therapy with PO doxycyline upon discharge   · Adjust ABX prn  · Fever work up if temp >= 100.4  · Wound care per wound care team's recommendation    Above plan of care discussed and agreed with Dr. Patricia Holloway, NP

## 2022-05-25 LAB
ANION GAP SERPL CALC-SCNC: 1 MMOL/L (ref 5–15)
BUN SERPL-MCNC: 39 MG/DL (ref 6–20)
BUN/CREAT SERPL: 36 (ref 12–20)
CALCIUM SERPL-MCNC: 9.6 MG/DL (ref 8.5–10.1)
CHLORIDE SERPL-SCNC: 100 MMOL/L (ref 97–108)
CK SERPL-CCNC: 27 U/L (ref 39–308)
CO2 SERPL-SCNC: 37 MMOL/L (ref 21–32)
CREAT SERPL-MCNC: 1.08 MG/DL (ref 0.7–1.3)
ERYTHROCYTE [DISTWIDTH] IN BLOOD BY AUTOMATED COUNT: 22.4 % (ref 11.5–14.5)
GLUCOSE BLD STRIP.AUTO-MCNC: 138 MG/DL (ref 65–117)
GLUCOSE BLD STRIP.AUTO-MCNC: 186 MG/DL (ref 65–117)
GLUCOSE BLD STRIP.AUTO-MCNC: 192 MG/DL (ref 65–117)
GLUCOSE BLD STRIP.AUTO-MCNC: 202 MG/DL (ref 65–117)
GLUCOSE SERPL-MCNC: 126 MG/DL (ref 65–100)
HCT VFR BLD AUTO: 42.3 % (ref 36.6–50.3)
HGB BLD-MCNC: 11.9 G/DL (ref 12.1–17)
MAGNESIUM SERPL-MCNC: 2.4 MG/DL (ref 1.6–2.4)
MCH RBC QN AUTO: 22.4 PG (ref 26–34)
MCHC RBC AUTO-ENTMCNC: 28.1 G/DL (ref 30–36.5)
MCV RBC AUTO: 79.7 FL (ref 80–99)
NRBC # BLD: 0 K/UL (ref 0–0.01)
NRBC BLD-RTO: 0 PER 100 WBC
PHOSPHATE SERPL-MCNC: 2.6 MG/DL (ref 2.6–4.7)
PLATELET # BLD AUTO: 152 K/UL (ref 150–400)
PMV BLD AUTO: 10.3 FL (ref 8.9–12.9)
POTASSIUM SERPL-SCNC: 4 MMOL/L (ref 3.5–5.1)
RBC # BLD AUTO: 5.31 M/UL (ref 4.1–5.7)
SERVICE CMNT-IMP: ABNORMAL
SODIUM SERPL-SCNC: 138 MMOL/L (ref 136–145)
WBC # BLD AUTO: 7.3 K/UL (ref 4.1–11.1)

## 2022-05-25 PROCEDURE — 83735 ASSAY OF MAGNESIUM: CPT

## 2022-05-25 PROCEDURE — 97535 SELF CARE MNGMENT TRAINING: CPT

## 2022-05-25 PROCEDURE — 65270000046 HC RM TELEMETRY

## 2022-05-25 PROCEDURE — 74011000250 HC RX REV CODE- 250: Performed by: INTERNAL MEDICINE

## 2022-05-25 PROCEDURE — 84100 ASSAY OF PHOSPHORUS: CPT

## 2022-05-25 PROCEDURE — 74011250637 HC RX REV CODE- 250/637: Performed by: INTERNAL MEDICINE

## 2022-05-25 PROCEDURE — 74011000258 HC RX REV CODE- 258: Performed by: INTERNAL MEDICINE

## 2022-05-25 PROCEDURE — 80048 BASIC METABOLIC PNL TOTAL CA: CPT

## 2022-05-25 PROCEDURE — 94640 AIRWAY INHALATION TREATMENT: CPT

## 2022-05-25 PROCEDURE — 82550 ASSAY OF CK (CPK): CPT

## 2022-05-25 PROCEDURE — 74011000258 HC RX REV CODE- 258: Performed by: NURSE PRACTITIONER

## 2022-05-25 PROCEDURE — 74011250636 HC RX REV CODE- 250/636: Performed by: NURSE PRACTITIONER

## 2022-05-25 PROCEDURE — P9047 ALBUMIN (HUMAN), 25%, 50ML: HCPCS | Performed by: INTERNAL MEDICINE

## 2022-05-25 PROCEDURE — 74011250636 HC RX REV CODE- 250/636: Performed by: INTERNAL MEDICINE

## 2022-05-25 PROCEDURE — 74011636637 HC RX REV CODE- 636/637: Performed by: INTERNAL MEDICINE

## 2022-05-25 PROCEDURE — 94664 DEMO&/EVAL PT USE INHALER: CPT

## 2022-05-25 PROCEDURE — 94760 N-INVAS EAR/PLS OXIMETRY 1: CPT

## 2022-05-25 PROCEDURE — 74011000250 HC RX REV CODE- 250: Performed by: HOSPITALIST

## 2022-05-25 PROCEDURE — 77010033678 HC OXYGEN DAILY

## 2022-05-25 PROCEDURE — 85027 COMPLETE CBC AUTOMATED: CPT

## 2022-05-25 PROCEDURE — 82962 GLUCOSE BLOOD TEST: CPT

## 2022-05-25 PROCEDURE — 36415 COLL VENOUS BLD VENIPUNCTURE: CPT

## 2022-05-25 RX ORDER — METOLAZONE 2.5 MG/1
2.5 TABLET ORAL ONCE
Status: COMPLETED | OUTPATIENT
Start: 2022-05-25 | End: 2022-05-25

## 2022-05-25 RX ADMIN — BUMETANIDE 2 MG: 0.25 INJECTION INTRAMUSCULAR; INTRAVENOUS at 17:24

## 2022-05-25 RX ADMIN — METOLAZONE 2.5 MG: 2.5 TABLET ORAL at 11:22

## 2022-05-25 RX ADMIN — CEFEPIME 2 G: 2 INJECTION, POWDER, FOR SOLUTION INTRAVENOUS at 22:17

## 2022-05-25 RX ADMIN — APIXABAN 5 MG: 5 TABLET, FILM COATED ORAL at 08:37

## 2022-05-25 RX ADMIN — CEFEPIME 2 G: 2 INJECTION, POWDER, FOR SOLUTION INTRAVENOUS at 06:00

## 2022-05-25 RX ADMIN — SODIUM CHLORIDE, PRESERVATIVE FREE 10 ML: 5 INJECTION INTRAVENOUS at 14:00

## 2022-05-25 RX ADMIN — ARFORMOTEROL TARTRATE: 15 SOLUTION RESPIRATORY (INHALATION) at 07:29

## 2022-05-25 RX ADMIN — SACUBITRIL AND VALSARTAN 1 TABLET: 49; 51 TABLET, FILM COATED ORAL at 08:37

## 2022-05-25 RX ADMIN — POLYETHYLENE GLYCOL 3350 17 G: 17 POWDER, FOR SOLUTION ORAL at 08:38

## 2022-05-25 RX ADMIN — SODIUM CHLORIDE, PRESERVATIVE FREE 10 ML: 5 INJECTION INTRAVENOUS at 06:00

## 2022-05-25 RX ADMIN — BISACODYL 5 MG: 5 TABLET, COATED ORAL at 08:37

## 2022-05-25 RX ADMIN — BUMETANIDE 2 MG: 0.25 INJECTION INTRAMUSCULAR; INTRAVENOUS at 08:49

## 2022-05-25 RX ADMIN — DAPTOMYCIN 600 MG: 500 INJECTION, POWDER, LYOPHILIZED, FOR SOLUTION INTRAVENOUS at 17:17

## 2022-05-25 RX ADMIN — TRAMADOL HYDROCHLORIDE 50 MG: 50 TABLET, COATED ORAL at 17:23

## 2022-05-25 RX ADMIN — PREGABALIN 75 MG: 25 CAPSULE ORAL at 08:38

## 2022-05-25 RX ADMIN — Medication 2 UNITS: at 17:31

## 2022-05-25 RX ADMIN — ACETAMINOPHEN 1000 MG: 500 TABLET ORAL at 23:33

## 2022-05-25 RX ADMIN — PANTOPRAZOLE SODIUM 40 MG: 40 TABLET, DELAYED RELEASE ORAL at 08:37

## 2022-05-25 RX ADMIN — EZETIMIBE 10 MG: 10 TABLET ORAL at 08:38

## 2022-05-25 RX ADMIN — SODIUM CHLORIDE, PRESERVATIVE FREE 10 ML: 5 INJECTION INTRAVENOUS at 22:17

## 2022-05-25 RX ADMIN — ARFORMOTEROL TARTRATE: 15 SOLUTION RESPIRATORY (INHALATION) at 20:25

## 2022-05-25 RX ADMIN — SACUBITRIL AND VALSARTAN 1 TABLET: 49; 51 TABLET, FILM COATED ORAL at 17:24

## 2022-05-25 RX ADMIN — APIXABAN 5 MG: 5 TABLET, FILM COATED ORAL at 17:23

## 2022-05-25 RX ADMIN — Medication 2 UNITS: at 12:08

## 2022-05-25 RX ADMIN — MONTELUKAST 10 MG: 10 TABLET, FILM COATED ORAL at 22:16

## 2022-05-25 RX ADMIN — CEFEPIME 2 G: 2 INJECTION, POWDER, FOR SOLUTION INTRAVENOUS at 13:03

## 2022-05-25 RX ADMIN — TAMSULOSIN HYDROCHLORIDE 0.4 MG: 0.4 CAPSULE ORAL at 08:37

## 2022-05-25 RX ADMIN — WHITE PETROLATUM: 1.75 OINTMENT TOPICAL at 09:00

## 2022-05-25 RX ADMIN — ACETAMINOPHEN 1000 MG: 500 TABLET ORAL at 03:27

## 2022-05-25 RX ADMIN — Medication 5 UNITS: at 17:31

## 2022-05-25 RX ADMIN — Medication 5 UNITS: at 08:39

## 2022-05-25 RX ADMIN — Medication 2 UNITS: at 22:16

## 2022-05-25 RX ADMIN — AMIODARONE HYDROCHLORIDE 100 MG: 200 TABLET ORAL at 08:37

## 2022-05-25 RX ADMIN — ASPIRIN 81 MG: 81 TABLET, COATED ORAL at 08:37

## 2022-05-25 RX ADMIN — PREGABALIN 75 MG: 25 CAPSULE ORAL at 17:23

## 2022-05-25 RX ADMIN — ALBUMIN (HUMAN) 12.5 G: 0.25 INJECTION, SOLUTION INTRAVENOUS at 08:37

## 2022-05-25 RX ADMIN — TRAMADOL HYDROCHLORIDE 50 MG: 50 TABLET, COATED ORAL at 01:44

## 2022-05-25 RX ADMIN — TRAMADOL HYDROCHLORIDE 50 MG: 50 TABLET, COATED ORAL at 08:38

## 2022-05-25 RX ADMIN — SENNOSIDES AND DOCUSATE SODIUM 1 TABLET: 50; 8.6 TABLET ORAL at 08:38

## 2022-05-25 NOTE — PROGRESS NOTES
Transition of Care     RUR - 19% medium    Disposition  -  Patient admitted from [facility], accepted to return upon d/c. Anticipated d/c early next week. Transportation  -[facility]   Follow Up PCP/Specialist     1:35p  MEGHANA spoke with Rina at [facility].   Pt will need rapid COVID test and updated clinicals needed prior to d/c.     PT/OT rec continued SNF, Attending physician in agreement.      -Urology following       Primary Contact - Brother Yasmine Garcia [phone]    Daly Lincoln RN BSN Camille Palacio Detail Level: Generalized Recommendations (Free Text): Patient is a runner, increasing water intake suggested

## 2022-05-25 NOTE — PROGRESS NOTES
Problem: Diabetes Self-Management  Goal: *Disease process and treatment process  Description: Define diabetes and identify own type of diabetes; list 3 options for treating diabetes. Outcome: Progressing Towards Goal  Goal: *Incorporating nutritional management into lifestyle  Description: Describe effect of type, amount and timing of food on blood glucose; list 3 methods for planning meals. Outcome: Progressing Towards Goal  Goal: *Incorporating physical activity into lifestyle  Description: State effect of exercise on blood glucose levels. Outcome: Progressing Towards Goal  Goal: *Developing strategies to promote health/change behavior  Description: Define the ABC's of diabetes; identify appropriate screenings, schedule and personal plan for screenings. Outcome: Progressing Towards Goal  Goal: *Using medications safely  Description: State effect of diabetes medications on diabetes; name diabetes medication taking, action and side effects. Outcome: Progressing Towards Goal  Goal: *Monitoring blood glucose, interpreting and using results  Description: Identify recommended blood glucose targets  and personal targets. Outcome: Progressing Towards Goal  Goal: *Prevention, detection, treatment of acute complications  Description: List symptoms of hyper- and hypoglycemia; describe how to treat low blood sugar and actions for lowering  high blood glucose level. Outcome: Progressing Towards Goal  Goal: *Prevention, detection and treatment of chronic complications  Description: Define the natural course of diabetes and describe the relationship of blood glucose levels to long term complications of diabetes.   Outcome: Progressing Towards Goal  Goal: *Developing strategies to address psychosocial issues  Description: Describe feelings about living with diabetes; identify support needed and support network  Outcome: Progressing Towards Goal  Goal: *Insulin pump training  Outcome: Progressing Towards Goal  Goal: *Sick day guidelines  Outcome: Progressing Towards Goal  Goal: *Patient Specific Goal (EDIT GOAL, INSERT TEXT)  Outcome: Progressing Towards Goal     Problem: Patient Education: Go to Patient Education Activity  Goal: Patient/Family Education  Outcome: Progressing Towards Goal     Problem: Pressure Injury - Risk of  Goal: *Prevention of pressure injury  Description: Document Ben Scale and appropriate interventions in the flowsheet. Outcome: Progressing Towards Goal  Note: Pressure Injury Interventions:  Sensory Interventions: Assess changes in LOC,Avoid rigorous massage over bony prominences,Check visual cues for pain,Keep linens dry and wrinkle-free,Minimize linen layers    Moisture Interventions: Apply protective barrier, creams and emollients,Minimize layers,Moisture barrier,Assess need for specialty bed    Activity Interventions: Increase time out of bed,Pressure redistribution bed/mattress(bed type),PT/OT evaluation    Mobility Interventions: HOB 30 degrees or less,PT/OT evaluation,Pressure redistribution bed/mattress (bed type)    Nutrition Interventions: Document food/fluid/supplement intake    Friction and Shear Interventions: Feet elevated on foot rest,HOB 30 degrees or less,Minimize layers,Transferring/repositioning devices                Problem: Patient Education: Go to Patient Education Activity  Goal: Patient/Family Education  Outcome: Progressing Towards Goal     Problem: Falls - Risk of  Goal: *Absence of Falls  Description: Document Sanaz Fall Risk and appropriate interventions in the flowsheet.   Outcome: Progressing Towards Goal  Note: Fall Risk Interventions:  Mobility Interventions: Bed/chair exit alarm,OT consult for ADLs,Patient to call before getting OOB,PT Consult for mobility concerns    Mentation Interventions: Bed/chair exit alarm,Door open when patient unattended,More frequent rounding,Reorient patient,Toileting rounds    Medication Interventions: Bed/chair exit alarm,Evaluate medications/consider consulting pharmacy,Patient to call before getting OOB    Elimination Interventions: Call light in reach,Patient to call for help with toileting needs,Toileting schedule/hourly rounds    History of Falls Interventions: Bed/chair exit alarm,Door open when patient unattended,Room close to nurse's station,Utilize gait belt for transfer/ambulation         Problem: Patient Education: Go to Patient Education Activity  Goal: Patient/Family Education  Outcome: Progressing Towards Goal     Problem: Risk for Spread of Infection  Goal: Prevent transmission of infectious organism to others  Description: Prevent the transmission of infectious organisms to other patients, staff members, and visitors.   Outcome: Progressing Towards Goal     Problem: Patient Education:  Go to Education Activity  Goal: Patient/Family Education  Outcome: Progressing Towards Goal     Problem: Patient Education: Go to Patient Education Activity  Goal: Patient/Family Education  Outcome: Progressing Towards Goal     Problem: Patient Education: Go to Patient Education Activity  Goal: Patient/Family Education  Outcome: Progressing Towards Goal

## 2022-05-25 NOTE — PROGRESS NOTES
I agree with all of Shemar Larson RN charting for this patient per her orienting with me for the last 12 hrs.

## 2022-05-25 NOTE — PROGRESS NOTES
Problem: Self Care Deficits Care Plan (Adult)  Goal: *Acute Goals and Plan of Care (Insert Text)  Description: FUNCTIONAL STATUS PRIOR TO ADMISSION: Patient was modified independent using a rollator for functional mobility. HOME SUPPORT: The patient lived alone with caregiver who comes in to provide assistance with IADLs. Occupational Therapy Goals  Initiated 5/20/2022  1. Patient will perform grooming in sitting with supervision/set-up within 7 day(s). 2.  Patient will perform bathing using most appropriate DME with moderate assistance within 7 day(s). 3.  Patient will perform lower body dressing using most appropriate DME with moderate assistance within 7 day(s). 4.  Patient will perform toilet transfers with supervision/set-up within 7 day(s). 5.  Patient will perform all aspects of toileting with moderate assistance within 7 day(s). 6.  Patient will participate in upper extremity therapeutic exercise/activities with supervision/set-up for 5 minutes within 7 day(s). 7.  Patient will utilize energy conservation techniques during functional activities with verbal cues within 7 day(s). Outcome: Progressing Towards Goal   OCCUPATIONAL THERAPY TREATMENT  Patient: Katty Benson [de-identified]55 y.o. male)  Date: 5/25/2022  Diagnosis: Sepsis ([address] [A41.9] Sepsis ([address]       Precautions: Fall,Skin  Chart, occupational therapy assessment, plan of care, and goals were reviewed. ASSESSMENT  Patient continues with skilled OT services and is slowly progressing towards goals. Patient received sitting on BSC. Received and left on 4L. Sit > stand with CGA with additional time secondary to extended time on commode. Cues for hand placement and RW use. Standing with BUE forearm support on RW, total A for BM hygiene secondary to need for UE support and impaired reach with body habitus. Discussed toileting aide to increase indep with task. Transferred to chair with CGA at RW level and slow pace.   Total A for sock mgmt(need for bariatric sock aide), noted R great toe bleeding- no toe nail present which he reports is chronic, nursing aware. L ankle/foot weeping. Issued waffle cushion for chair. Current Level of Function Impacting Discharge (ADLs): Toileting hygiene total A, self care transfer CGA at  level    Other factors to consider for discharge: Patient with recent hospital admission and dc to IPR then readmission. He is requiring total A for toileting hygiene tasks, unsafe to dc home unless he has additional physical assistance. Recommend SNF for additional rehab. PLAN :  Patient continues to benefit from skilled intervention to address the above impairments. Continue treatment per established plan of care to address goals. Recommend with staff: CGA for self care transfers    Recommend next OT session: per POC    Recommendation for discharge: (in order for the patient to meet his/her long term goals)  Therapy up to 5 days/week in SNF setting    This discharge recommendation:  Has been made in collaboration with the attending provider and/or case management    IF patient discharges home will need the following DME: bedside commode, shower chair, and walker: rolling       SUBJECTIVE:   Patient stated I have not pooped in a month.     OBJECTIVE DATA SUMMARY:   Cognitive/Behavioral Status:  Neurologic State: Alert; Appropriate for age       Functional Mobility and Transfers for ADLs:  Bed Mobility:       Transfers:  Sit to Stand: Contact guard assistance; Additional time (rocking from [facility])  Functional Transfers  Toilet Transfer : Contact guard assistance; Adaptive equipment;Assist x1;Additional time  Cues: Verbal cues provided  Adaptive Equipment: Walker (comment)       Balance:  Sitting: Intact  Standing: Impaired; With support  Standing - Static: Constant support;Good  Standing - Dynamic : Constant support;Good    ADL Intervention:   Patient instructed and indicated understanding the benefits of maintaining activity tolerance, functional mobility, and independence with self care tasks during acute stay  to ensure safe return home and to baseline. Encouraged patient to increase frequency and duration OOB, be out of bed for all meals, perform daily ADLs (as approved by RN/MD regarding bathing etc), and performing functional mobility to/from bathroom. Provided education with patient on fall prevention for hospital and at home. This includes not getting OOB/chair/toilet without staff assistance, good lighting, good footwear, and recommended AD use. Patient with good understanding. He sleeps in a recliner chair at home. Encouraged elevating BLE, requesting to keep feet down at this time. Grooming  Position Performed: Seated in chair  Washing Hands: Set-up    Toileting  Bowel Hygiene: Total assistance (dependent) (standing, forward flexed- B forearms on RW)    Socks total A- need for bariatric sock aide       Pain:  He reports chronic back pain    Activity Tolerance:   Fair and 4L O2- stable    After treatment patient left in no apparent distress:   Sitting in chair and Call bell within reach    COMMUNICATION/COLLABORATION:   The patients plan of care was discussed with: Physical therapist and Registered nurse.      Vlad Candelario OT  Time Calculation: 28 mins

## 2022-05-25 NOTE — PROGRESS NOTES
Bedside shift change report given to Zo Reyes (oncoming nurse) by Robert Mckeon RN (offgoing nurse). Report included the following information SBAR, Kardex, MAR, Recent Results and Cardiac Rhythm NSR.

## 2022-05-25 NOTE — PROGRESS NOTES
Physical Therapy    Chart reviewed, cleared by nurse. On first attempt patient on [facility] stating he needed more time. On second attempt, patient had just finished session with OT and was fatigued, requested to defer PT at this time. Will follow up later as able/appropriate.     Thank you for your consideration,    Kristen Major, PT, DPT

## 2022-05-25 NOTE — PROGRESS NOTES
[facility]  Hospitalist Group                                                                                          Hospitalist Progress Note  [address] [facility], [place]  Answering service: [phone] OR [phone] from in house phone        Date of Service:  2022  NAME:  Desi Eaton  :  1965  MRN:  [id number]      Admission Summary:   Interval history: From critical care [facility] on May 25 55year-old gentleman who was recently discharged from our facility who is being sent to the ER from rehab facility with low blood pressure and decreasing responsiveness. Patient has multiple complicated medical problem including diastolic heart failure, severe COPD with chronic supplemental oxygen, A. fib on amiodarone and chronic anticoagulation, diabetes mellitus type 2, obstructive sleep apnea and morbid obesity. As mentioned above recently was discharged from our facility after being treated for COPD exacerbation and diastolic heart failure exacerbation.  Patient is a poor historian and he cannot tell me about the progress of his situation in the rehab facility. He stated that nothing bothering him at this time, he stated that his breathing is at baseline, when I asked him about his legs and if they always look like this he said \"I do not know Marilee Lord knows that he is at [facility] ER.   In the ER he was found to be hypotensive but this has responded to fluid, lactic acid acid elevated but this is also trending down with fluid, and his kidney function acutely worsened compared to few days ago while in the hospital.  Unable to insert a Foster catheter in the ER. Initially hospitalist assess the patient for admission however given his tenuous status I consulted ICU.  : No acute event overnight, looks much better this morning, ate his breakfast tray, needed to be on very low-dose of norepinephrine overnight for short period of time.  No nausea no vomiting.  Transferred out of ICU       Interval history / Subjective: Follow up sepsis. Patient seen and examined. Sitting in chair, drowsy. Patient with persistent lower extremity edema, possibly worse from day prior. Patient reports he does not use CPAP or BIPAP at night. Per nursing, has been in chair for past day. Appears very fatigued during my encounter. Assessment & Plan:     Severe sepsis due to lower extremity cellulitis:  -required ICU admission due to low BP, initiated on stress dose steroids, midodrine, does not appear to require pressors  -Suspect secondary to lower extremity cellulitis with chronic lower limb changes with superimposed infection, now appears worsening with component of volume overload  -ID consulted.  Continues on cefepime, clindamycin, daptomycin  --Appreciate infectious disease  -Left lower extremity doppler negative   -CXR clear, viral panel negative  -s/p stress dose steroids with wean   -continue wound care  -aggressive diuresis     Lower extremity edema:   -combination of acute cellulitis, chronic venous stasis  -continue bumex BID, albumin, additional dose metolazone 5/25  -needs elevation legs- discussed with nursing to get patient bariatric bed with legs raised    Acute metabolic encephalopathy:   -possibly from hospital fatigue (has been only sitting forward in recliner for past few days) vs obesity hypoventilation.   -place patient in bed, low threshold for ABG- although completely alert when talking with him     Acute kidney injury: resolved  -Continue diuretics, entresto     Heart failure with preserved ejection fraction:  -continue bumex, continue Entresto  -hold spironolactone, imdur and reevaluate for restarting daily   -recheck echocardiogram     Chronic hypoxic hypercapnic respiratory failure due to severe COPD emphysema:  -continue brovana/pulmicort BID, aa nebs as needed  -CXR clear  -wean oxygen as able  - placed on 3 liters    Urinary retention:   Hematuria- resolved  -remove mclaughlin 5/22   -appreciate urology, hematuria resolved    Diabetes mellitus type II with hyperglycemia:   -continue NPH5 units BID with steroids, continue SSI    Chronic A. fib on amiodarone and chronic anticoagulation with Eliquis:  -Continue home medication     Back pain non-focal, likely musculoskeletal  -As needed medication    Obstructive sleep apnea:  -CPAP at night and as needed    Chronic GERD:  -Continue on PPI      Reevaluate heart failure medications daily           Code status: full   Prophylaxis: eliquis  Care Plan discussed with: patient, nurse  Anticipated Disposition: >48 hours, SNF     Hospital Problems  Date Reviewed: 4/28/2013          Codes Class Noted POA    * (Principal) Sepsis ([facility] [address] ICD-10-CM: A41.9  ICD-9-CM: 038.9, 995.91  5/18/2022 Unknown                Review of Systems:   Negative unless stated above      Vital Signs:    Last 24hrs VS reviewed since prior progress note. Most recent are:  Visit Vitals  /71 (BP 1 Location: Right upper arm)   Pulse 64   Temp 97.4 °F (36.3 °C)   Resp 15   Ht 6' (1.829 m)   Wt 129.8 kg (286 lb 2.5 oz)   SpO2 98%   BMI 38.81 kg/m²         Intake/Output Summary (Last 24 hours) at 5/25/2022 1308  Last data filed at 5/25/2022 1215  Gross per 24 hour   Intake --   Output 3200 ml   Net -3200 ml        Physical Examination:     I had a face to face encounter with this patient and independently examined them on 5/25/2022 as outlined below:          Constitutional:  No acute distress, cooperative, pleasant, morbidly obese, drowsy     ENT:  Oral mucosa moist, oropharynx benign. Resp:  CTA bilaterally. No wheezing/rhonchi/rales. No accessory muscle use. CV:  Regular rhythm, normal rate, no murmurs, gallops, rubs    GI:  Soft, non distended, non tender.  normoactive bowel sounds, no hepatosplenomegaly     Musculoskeletal:  lower extremity venous stasis with persistent edema left leg +pitting and weeping, right lower extremity with chronic venous changes and pitting edema     Neurologic:  Moves all extremities            Data Review:    Review and/or order of clinical lab test  Review and/or order of tests in the radiology section of CPT  Review and/or order of tests in the medicine section of CPT      Labs:     Recent Labs     05/25/22 0205 05/23/22 0032   WBC 7.3 8.3   HGB 11.9* 12.7   HCT 42.3 46.1    135*     Recent Labs     05/25/22 0205 05/24/22 0458 05/23/22 0032    139 136   K 4.0 4.2 3.8    100 97   CO2 37* 38* 35*   BUN 39* 35* 37*   CREA 1.08 1.00 0.96   * 153* 170*   CA 9.6 9.5 9.0   MG 2.4  --  2.5*   PHOS 2.6  --  4.6     No results for input(s): ALT, AP, TBIL, TBILI, TP, ALB, GLOB, GGT, AML, LPSE in the last 72 hours. No lab exists for component: SGOT, GPT, AMYP, HLPSE  No results for input(s): INR, PTP, APTT, INREXT, INREXT in the last 72 hours. No results for input(s): FE, TIBC, PSAT, FERR in the last 72 hours. No results found for: FOL, RBCF   No results for input(s): PH, PCO2, PO2 in the last 72 hours.   Recent Labs     05/25/22 0205 05/24/22 0458   CPK 27* 30*     Lab Results   Component Value Date/Time    Cholesterol, total 170 01/23/2013 11:08 AM    HDL Cholesterol 37 (L) 01/23/2013 11:08 AM    LDL, calculated 97 01/23/2013 11:08 AM    Triglyceride 180 (H) 01/23/2013 11:08 AM    CHOL/HDL Ratio 6.0 (H) 10/06/2010 03:18 PM     Lab Results   Component Value Date/Time    Glucose (POC) 186 (H) 05/25/2022 12:04 PM    Glucose (POC) 138 (H) 05/25/2022 08:28 AM    Glucose (POC) 207 (H) 05/24/2022 09:22 PM    Glucose (POC) 133 (H) 05/24/2022 05:43 PM    Glucose (POC) 194 (H) 05/24/2022 11:45 AM     Lab Results   Component Value Date/Time    Color YELLOW/STRAW 05/18/2022 03:54 PM    Appearance CLEAR 05/18/2022 03:54 PM    Specific gravity 1.011 05/18/2022 03:54 PM    pH (UA) 5.0 05/18/2022 03:54 PM    Protein Negative 05/18/2022 03:54 PM    Glucose >1,000 (A) 05/18/2022 03:54 PM    Ketone Negative 05/18/2022 03:54 PM    Bilirubin Negative 05/18/2022 03:54 PM    Urobilinogen 0.2 05/18/2022 03:54 PM    Nitrites Negative 05/18/2022 03:54 PM    Leukocyte Esterase Negative 05/18/2022 03:54 PM    Epithelial cells FEW 05/18/2022 03:54 PM    Bacteria Negative 05/18/2022 03:54 PM    WBC 0-4 05/18/2022 03:54 PM    RBC 20-50 05/18/2022 03:54 PM         Medications Reviewed:     Current Facility-Administered Medications   Medication Dose Route Frequency    ezetimibe (ZETIA) tablet 10 mg  10 mg Oral DAILY    sacubitriL-valsartan (ENTRESTO) 49-51 mg tablet 1 Tablet  1 Tablet Oral BID    DAPTOmycin (CUBICIN) 600 mg in 0.9% sodium chloride 50 mL IVPB  600 mg IntraVENous Q24H    bumetanide (BUMEX) injection 2 mg  2 mg IntraVENous BID    magnesium citrate solution 148 mL  148 mL Oral Q2H PRN    senna-docusate (PERICOLACE) 8.6-50 mg per tablet 1 Tablet  1 Tablet Oral BID    insulin NPH (NOVOLIN N, HUMULIN N) injection 5 Units  5 Units SubCUTAneous ACB&D    albuterol-ipratropium (DUO-NEB) 2.5 MG-0.5 MG/3 ML  3 mL Nebulization Q6H PRN    traMADoL (ULTRAM) tablet 50 mg  50 mg Oral Q6H PRN    polyethylene glycol (MIRALAX) packet 17 g  17 g Oral BID    albuterol (PROVENTIL VENTOLIN) nebulizer solution 2.5 mg  2.5 mg Nebulization Q4H PRN    insulin lispro (HUMALOG) injection   SubCUTAneous AC&HS    pregabalin (LYRICA) capsule 75 mg  75 mg Oral BID    acetaminophen (TYLENOL) tablet 1,000 mg  1,000 mg Oral Q6H PRN    Or    acetaminophen (TYLENOL) suppository 650 mg  650 mg Rectal Q6H PRN    pantothenic ac-min oil-pet,hyd (AQUAPHOR) 41 % ointment   Topical DAILY    sodium chloride (NS) flush 5-40 mL  5-40 mL IntraVENous Q8H    sodium chloride (NS) flush 5-40 mL  5-40 mL IntraVENous PRN    glucose chewable tablet 16 g  4 Tablet Oral PRN    glucagon (GLUCAGEN) injection 1 mg  1 mg IntraMUSCular PRN    dextrose 10 % infusion 0-250 mL  0-250 mL IntraVENous PRN    sodium chloride (NS) flush 5-40 mL  5-40 mL IntraVENous Q8H    sodium chloride (NS) flush 5-40 mL  5-40 mL IntraVENous PRN    ondansetron (ZOFRAN ODT) tablet 4 mg  4 mg Oral Q8H PRN    Or    ondansetron (ZOFRAN) injection 4 mg  4 mg IntraVENous Q6H PRN    bisacodyL (DULCOLAX) tablet 5 mg  5 mg Oral DAILY    polyethylene glycol (MIRALAX) packet 17 g  17 g Oral DAILY PRN    amiodarone (CORDARONE) tablet 100 mg  100 mg Oral DAILY    apixaban (ELIQUIS) tablet 5 mg  5 mg Oral BID    aspirin delayed-release tablet 81 mg  81 mg Oral DAILY    arformoterol 15 mcg/budesonide 0.5 mg neb solution   Nebulization BID RT    montelukast (SINGULAIR) tablet 10 mg  10 mg Oral QHS    pantoprazole (PROTONIX) tablet 40 mg  40 mg Oral DAILY    [Held by provider] rosuvastatin (CRESTOR) tablet 5 mg  5 mg Oral DAILY    tamsulosin (FLOMAX) capsule 0.4 mg  0.4 mg Oral DAILY    cefepime (MAXIPIME) 2 g in 0.9% sodium chloride (MBP/ADV) 100 mL MBP  2 g IntraVENous Q8H     ______________________________________________________________________  EXPECTED LENGTH OF STAY: 4d 19h  ACTUAL LENGTH OF STAY:          Charly Galvez 1313,

## 2022-05-25 NOTE — PROGRESS NOTES
ID Progress Note  2022    Subjective:     Sitting up in chair  C/o generalized weakness  Review of Systems:            Symptom Y/N Comments   Symptom Y/N Comments   Fever/Chills n      Chest Pain n       Poor Appetite       Edema y       Cough       Abdominal Pain  n      Sputum       Joint Pain        SOB/MCCULLOUGH  *  no worse   Pruritis/Rash        Nausea/vomit  n     Tolerating PT/OT        Diarrhea  n     Tolerating Diet        Constipation  n     Other           Could NOT obtain due to:       Objective:     Vitals:   Visit Vitals  /65 (BP 1 Location: Right upper arm, BP Patient Position: At rest)   Pulse 66   Temp 98.9 °F (37.2 °C)   Resp 14   Ht 6' (1.829 m)   Wt 129.8 kg (286 lb 2.5 oz)   SpO2 100%   BMI 38.81 kg/m²        Tmax:  Temp (24hrs), Av.9 °F (37.2 °C), Min:98.3 °F (36.8 °C), Max:99.3 °F (37.4 °C)      PHYSICAL EXAM:  General: Obese, chronically ill appearing, WD, WN. Alert, cooperative, no acute distress    EENT:  EOMI. Anicteric sclerae. MMM  Resp:  Clear in apex with decreased breath sounds at bases, no wheezing or rales. No accessory muscle use  CV:  Regular  rhythm,  + edema; LLE>RLE  GI:  Soft, obese, Non tender. +Bowel sounds  Neurologic:  Alert and oriented X 3, normal speech,   Psych:   Fair insight. Not anxious nor agitated  Skin:  No rashes.   No jaundice                          Stage III pressure injury to both hip,     LLE wound; edematous, erythematous below the knee (about the same as yesterday)    RLE; chronic venous stasis  Labs:   Lab Results   Component Value Date/Time    WBC 7.3 2022 02:05 AM    HGB 11.9 (L) 2022 02:05 AM    HCT 42.3 2022 02:05 AM    PLATELET 404  02:05 AM    MCV 79.7 (L) 2022 02:05 AM     Lab Results   Component Value Date/Time    Sodium 138 2022 02:05 AM    Potassium 4.0 2022 02:05 AM    Chloride 100 2022 02:05 AM    CO2 37 (H) 2022 02:05 AM    Anion gap 1 (L) 2022 02:05 AM    Glucose 126 (H) 05/25/2022 02:05 AM    BUN 39 (H) 05/25/2022 02:05 AM    Creatinine 1.08 05/25/2022 02:05 AM    BUN/Creatinine ratio 36 (H) 05/25/2022 02:05 AM    GFR est AA >60 05/25/2022 02:05 AM    GFR est non-AA >60 05/25/2022 02:05 AM    Calcium 9.6 05/25/2022 02:05 AM    Bilirubin, total 0.8 05/18/2022 01:16 PM    Alk. phosphatase 89 05/18/2022 01:16 PM    Protein, total 6.8 05/18/2022 01:16 PM    Albumin 2.8 (L) 05/18/2022 01:16 PM    Globulin 4.0 05/18/2022 01:16 PM    A-G Ratio 0.7 (L) 05/18/2022 01:16 PM    ALT (SGPT) 12 05/18/2022 01:16 PM         Cultures:   Results     Procedure Component Value Units Date/Time    CULTURE, MRSA [[id number]]  (Abnormal) Collected: 05/18/22 1706    Order Status: Completed Specimen: Nasal from Nares Updated: 05/19/22 1721     Special Requests: NO SPECIAL REQUESTS        Culture result: MRSA PRESENT               Screening of patient nares for MRSA is for surveillance purposes and, if positive, to facilitate isolation considerations in high risk settings. It is not intended for automatic decolonization interventions per se as regimens are not sufficiently effective to warrant routine use. (NOTE) CALLED POSITIVE MRSA TO TYLER ZARAGOZA AT [id number] ON 5/19/22. AT    URINE CULTURE HOLD SAMPLE [[id number]] Collected: 05/18/22 1554    Order Status: Completed Specimen: Serum Updated: 05/18/22 1619     Urine culture hold       Urine on hold in Microbiology dept for 2 days. If unpreserved urine is submitted, it cannot be used for addtional testing after 24 hours, recollection will be required.           RESPIRATORY VIRUS PANEL W/COVID-19, PCR [[id number]] Collected: 05/18/22 1542    Order Status: Completed Specimen: Nasopharyngeal Updated: 05/18/22 1737     Adenovirus Not detected        Coronavirus 229E Not detected        Coronavirus HKU1 Not detected        Coronavirus CVNL63 Not detected        Coronavirus OC43 Not detected        SARS-CoV-2, PCR Not detected        Metapneumovirus Not detected        Rhinovirus and Enterovirus Not detected        Influenza A Not detected        Influenza A, subtype H1 Not detected        Influenza A, subtype H3 Not detected        INFLUENZA A H1N1 PCR Not detected        Influenza B Not detected        Parainfluenza 1 Not detected        Parainfluenza 2 Not detected        Parainfluenza 3 Not detected        Parainfluenza virus 4 Not detected        RSV by PCR Not detected        B. parapertussis, PCR Not detected        Bordetella pertussis - PCR Not detected        Chlamydophila pneumoniae DNA, QL, PCR Not detected        Mycoplasma pneumoniae DNA, QL, PCR Not detected       CULTURE, BLOOD, PAIRED [[id number]] Collected: 05/18/22 1316    Order Status: Completed Specimen: Blood Updated: 05/23/22 0821     Special Requests: NO SPECIAL REQUESTS        Culture result: NO GROWTH 5 DAYS            55year old male with medical hx of type II diabetes, dyslipidemia, hypertension, obesity, COPD was admitted to the hospital on 5/18 with shortness of breath from SNF. Pt was hospitalized between 4/25 to 5/2 treated for resp failure secondary to COPD exacerbation and acute on chronic diastolic CHF then sent to SNF. Pt usually wears O2 @ 2L via n/c. Impression:   PVD  Chronic LE wounds  Cellulitis LLE  Morbid obesity  Leukocytosis (resolved)  - afebrile, WBC 8.3    Blood cx (5/18) no growth    MRSA nare screen + 5/18    CXR (-) ASDZ    CT of abd/pel (5/18) no acute process    Duplex study (-) for DVT LLE    Plan:   · Continue with IV cefepime and daptomycin (HV18); recommend to complete total 2 weeks        May complete remaining ABX therapy with PO doxycyline upon discharge   · Adjust ABX prn  · Fever work up if temp >= 100.4  · Wound care per wound care team's recommendation  · Spoke with wound care team to get the pt bariatric bed with trapeze bar; pt needs to elevated his legs.  Pt is unable to elevate legs while sitting in the chair      Above plan of care discussed and agreed with Dr. Lazarus [facility], NP

## 2022-05-26 ENCOUNTER — APPOINTMENT (OUTPATIENT)
Dept: NON INVASIVE DIAGNOSTICS | Age: 57
DRG: 871 | End: 2022-05-26
Attending: INTERNAL MEDICINE
Payer: MEDICARE

## 2022-05-26 LAB
ANION GAP SERPL CALC-SCNC: 3 MMOL/L (ref 5–15)
BUN SERPL-MCNC: 42 MG/DL (ref 6–20)
BUN/CREAT SERPL: 39 (ref 12–20)
CALCIUM SERPL-MCNC: 9.6 MG/DL (ref 8.5–10.1)
CHLORIDE SERPL-SCNC: 96 MMOL/L (ref 97–108)
CO2 SERPL-SCNC: 37 MMOL/L (ref 21–32)
CREAT SERPL-MCNC: 1.09 MG/DL (ref 0.7–1.3)
ERYTHROCYTE [DISTWIDTH] IN BLOOD BY AUTOMATED COUNT: 22.3 % (ref 11.5–14.5)
GLUCOSE BLD STRIP.AUTO-MCNC: 136 MG/DL (ref 65–117)
GLUCOSE BLD STRIP.AUTO-MCNC: 139 MG/DL (ref 65–117)
GLUCOSE BLD STRIP.AUTO-MCNC: 158 MG/DL (ref 65–117)
GLUCOSE BLD STRIP.AUTO-MCNC: 189 MG/DL (ref 65–117)
GLUCOSE SERPL-MCNC: 174 MG/DL (ref 65–100)
HCT VFR BLD AUTO: 42.8 % (ref 36.6–50.3)
HGB BLD-MCNC: 12.2 G/DL (ref 12.1–17)
MAGNESIUM SERPL-MCNC: 2.3 MG/DL (ref 1.6–2.4)
MCH RBC QN AUTO: 22.6 PG (ref 26–34)
MCHC RBC AUTO-ENTMCNC: 28.5 G/DL (ref 30–36.5)
MCV RBC AUTO: 79.1 FL (ref 80–99)
NRBC # BLD: 0 K/UL (ref 0–0.01)
NRBC BLD-RTO: 0 PER 100 WBC
PHOSPHATE SERPL-MCNC: 3.2 MG/DL (ref 2.6–4.7)
PLATELET # BLD AUTO: 162 K/UL (ref 150–400)
PMV BLD AUTO: 10.5 FL (ref 8.9–12.9)
POTASSIUM SERPL-SCNC: 3.7 MMOL/L (ref 3.5–5.1)
RBC # BLD AUTO: 5.41 M/UL (ref 4.1–5.7)
SERVICE CMNT-IMP: ABNORMAL
SODIUM SERPL-SCNC: 136 MMOL/L (ref 136–145)
WBC # BLD AUTO: 8 K/UL (ref 4.1–11.1)

## 2022-05-26 PROCEDURE — 65270000046 HC RM TELEMETRY

## 2022-05-26 PROCEDURE — 94640 AIRWAY INHALATION TREATMENT: CPT

## 2022-05-26 PROCEDURE — 74011636637 HC RX REV CODE- 636/637: Performed by: INTERNAL MEDICINE

## 2022-05-26 PROCEDURE — 84100 ASSAY OF PHOSPHORUS: CPT

## 2022-05-26 PROCEDURE — 83735 ASSAY OF MAGNESIUM: CPT

## 2022-05-26 PROCEDURE — 74011250636 HC RX REV CODE- 250/636: Performed by: FAMILY MEDICINE

## 2022-05-26 PROCEDURE — 94664 DEMO&/EVAL PT USE INHALER: CPT

## 2022-05-26 PROCEDURE — 74011000250 HC RX REV CODE- 250: Performed by: INTERNAL MEDICINE

## 2022-05-26 PROCEDURE — 74011250637 HC RX REV CODE- 250/637: Performed by: INTERNAL MEDICINE

## 2022-05-26 PROCEDURE — 74011250636 HC RX REV CODE- 250/636: Performed by: NURSE PRACTITIONER

## 2022-05-26 PROCEDURE — 77010033678 HC OXYGEN DAILY

## 2022-05-26 PROCEDURE — 74011000250 HC RX REV CODE- 250: Performed by: HOSPITALIST

## 2022-05-26 PROCEDURE — 74011000258 HC RX REV CODE- 258: Performed by: NURSE PRACTITIONER

## 2022-05-26 PROCEDURE — 74011000258 HC RX REV CODE- 258: Performed by: FAMILY MEDICINE

## 2022-05-26 PROCEDURE — 85027 COMPLETE CBC AUTOMATED: CPT

## 2022-05-26 PROCEDURE — 82962 GLUCOSE BLOOD TEST: CPT

## 2022-05-26 PROCEDURE — 97116 GAIT TRAINING THERAPY: CPT

## 2022-05-26 PROCEDURE — 36415 COLL VENOUS BLD VENIPUNCTURE: CPT

## 2022-05-26 PROCEDURE — 80048 BASIC METABOLIC PNL TOTAL CA: CPT

## 2022-05-26 PROCEDURE — 74011250636 HC RX REV CODE- 250/636: Performed by: INTERNAL MEDICINE

## 2022-05-26 PROCEDURE — 74011000258 HC RX REV CODE- 258: Performed by: INTERNAL MEDICINE

## 2022-05-26 RX ADMIN — CEFEPIME 2 G: 2 INJECTION, POWDER, FOR SOLUTION INTRAVENOUS at 17:29

## 2022-05-26 RX ADMIN — SODIUM CHLORIDE, PRESERVATIVE FREE 10 ML: 5 INJECTION INTRAVENOUS at 14:00

## 2022-05-26 RX ADMIN — Medication 2 UNITS: at 17:30

## 2022-05-26 RX ADMIN — Medication 5 UNITS: at 08:55

## 2022-05-26 RX ADMIN — PANTOPRAZOLE SODIUM 40 MG: 40 TABLET, DELAYED RELEASE ORAL at 08:56

## 2022-05-26 RX ADMIN — SODIUM CHLORIDE, PRESERVATIVE FREE 20 ML: 5 INJECTION INTRAVENOUS at 21:01

## 2022-05-26 RX ADMIN — TAMSULOSIN HYDROCHLORIDE 0.4 MG: 0.4 CAPSULE ORAL at 08:53

## 2022-05-26 RX ADMIN — BUMETANIDE 2 MG: 0.25 INJECTION INTRAMUSCULAR; INTRAVENOUS at 17:28

## 2022-05-26 RX ADMIN — SACUBITRIL AND VALSARTAN 1 TABLET: 49; 51 TABLET, FILM COATED ORAL at 08:53

## 2022-05-26 RX ADMIN — MONTELUKAST 10 MG: 10 TABLET, FILM COATED ORAL at 21:01

## 2022-05-26 RX ADMIN — APIXABAN 5 MG: 5 TABLET, FILM COATED ORAL at 08:54

## 2022-05-26 RX ADMIN — DAPTOMYCIN 600 MG: 500 INJECTION, POWDER, LYOPHILIZED, FOR SOLUTION INTRAVENOUS at 17:28

## 2022-05-26 RX ADMIN — ACETAMINOPHEN 1000 MG: 500 TABLET ORAL at 17:32

## 2022-05-26 RX ADMIN — ARFORMOTEROL TARTRATE: 15 SOLUTION RESPIRATORY (INHALATION) at 20:45

## 2022-05-26 RX ADMIN — SODIUM CHLORIDE, PRESERVATIVE FREE 10 ML: 5 INJECTION INTRAVENOUS at 05:58

## 2022-05-26 RX ADMIN — BISACODYL 5 MG: 5 TABLET, COATED ORAL at 08:54

## 2022-05-26 RX ADMIN — SODIUM CHLORIDE, PRESERVATIVE FREE 10 ML: 5 INJECTION INTRAVENOUS at 21:01

## 2022-05-26 RX ADMIN — POLYETHYLENE GLYCOL 3350 17 G: 17 POWDER, FOR SOLUTION ORAL at 08:53

## 2022-05-26 RX ADMIN — WHITE PETROLATUM: 1.75 OINTMENT TOPICAL at 09:00

## 2022-05-26 RX ADMIN — CEFEPIME 2 G: 2 INJECTION, POWDER, FOR SOLUTION INTRAVENOUS at 05:59

## 2022-05-26 RX ADMIN — Medication 5 UNITS: at 17:30

## 2022-05-26 RX ADMIN — SODIUM CHLORIDE, PRESERVATIVE FREE 10 ML: 5 INJECTION INTRAVENOUS at 05:59

## 2022-05-26 RX ADMIN — SENNOSIDES AND DOCUSATE SODIUM 1 TABLET: 50; 8.6 TABLET ORAL at 08:54

## 2022-05-26 RX ADMIN — BUMETANIDE 2 MG: 0.25 INJECTION INTRAMUSCULAR; INTRAVENOUS at 08:53

## 2022-05-26 RX ADMIN — AMIODARONE HYDROCHLORIDE 100 MG: 200 TABLET ORAL at 08:54

## 2022-05-26 RX ADMIN — PREGABALIN 75 MG: 25 CAPSULE ORAL at 17:32

## 2022-05-26 RX ADMIN — SACUBITRIL AND VALSARTAN 1 TABLET: 49; 51 TABLET, FILM COATED ORAL at 17:32

## 2022-05-26 RX ADMIN — TRAMADOL HYDROCHLORIDE 50 MG: 50 TABLET, COATED ORAL at 08:58

## 2022-05-26 RX ADMIN — APIXABAN 5 MG: 5 TABLET, FILM COATED ORAL at 17:32

## 2022-05-26 RX ADMIN — ASPIRIN 81 MG: 81 TABLET, COATED ORAL at 08:53

## 2022-05-26 RX ADMIN — PREGABALIN 75 MG: 25 CAPSULE ORAL at 08:53

## 2022-05-26 RX ADMIN — EZETIMIBE 10 MG: 10 TABLET ORAL at 08:54

## 2022-05-26 NOTE — PROGRESS NOTES
Renal Dosing/Monitoring  Medication: Cefepime   Current regimen:  2 grams IV every 8 hr  Recent Labs     05/26/22  0316 05/25/22  0205 05/24/22  0458   CREA 1.09 1.08 1.00   BUN 42* 39* 35*     Estimated CrCl:  >100 ml/min  Plan: Change to 2 grams IV every 12 hours per [facility] P&T Committee Protocol with respect to indication/renal function. Pharmacy will continue to monitor patient daily and will make dosage adjustments based upon changing renal function.     Omero Peña, PharmD, BCPS

## 2022-05-26 NOTE — WOUND CARE
For wound care follow up visit. Patient up in the chair. Unable to assess buttock wounds. RN changed leg wound dressing recently. Will follow up tomorrow.     Cris Gambino MSN, RN, [facility]  Certified Wound and Ostomy Nurse  office [phone]  Can be reached through [address] [place] [address]

## 2022-05-26 NOTE — PROGRESS NOTES
Problem: Mobility Impaired (Adult and Pediatric)  Goal: *Acute Goals and Plan of Care (Insert Text)  Description: FUNCTIONAL STATUS PRIOR TO ADMISSION: Patient was modified independent using a rollator for functional mobility. Also had standard wheelchair to use to go to/from appointments in wheelchair [address]: The patient lived alone with caregiver 1 hour per day M-F to provide assistance. Physical Therapy Goals  Initiated 5/20/2022  1. Patient will move from supine to sit and sit to supine , scoot up and down, and roll side to side in bed with modified independence within 7 day(s). 2.  Patient will transfer from bed to chair and chair to bed with modified independence using the least restrictive device within 7 day(s). 3.  Patient will perform sit to stand with modified independence within 7 day(s). 4.  Patient will ambulate with minimal assistance/contact guard assist for 50 feet with the least restrictive device within 7 day(s). Outcome: Progressing Towards Goal   PHYSICAL THERAPY TREATMENT  Patient: Della Meza [de-identified]55 y.o. male)  Date: 5/26/2022  Diagnosis: Sepsis ([address] [A41.9] Sepsis ([address]       Precautions: Fall,Skin (provided him with a bariatric waffle cushion)   Chart, physical therapy assessment, plan of care and goals were reviewed. ASSESSMENT  Patient continues with skilled PT services and is progressing towards goals. Pt is moving well for sit <> stand and for amb using a rolling walker. He required additional time to complete sit to stand but was safe in doing so. He was able to amb today a total of 64 feet that included 2 seated rest breaks. He was on 3 liters of 02 and at one point his Sp02 did drop to the mid 80s but quickly recovered with a seated rest break. Anticipate slow steady gains and a need for return to rehab to complete the program with a goal of mod I (pt lives alone and has one hour of help M-F).      Current Level of Function Impacting Discharge (mobility/balance): contact guard to stand by assist for sit <> stand and amb using a walker. Other factors to consider for discharge: baseline is room air. PLAN :  Patient continues to benefit from skilled intervention to address the above impairments. Continue treatment per established plan of care. to address goals. Recommendation for discharge: (in order for the patient to meet his/her long term goals)  Therapy up to 5 days/week in SNF setting    This discharge recommendation:  A follow-up discussion with the attending provider and/or case management is planned    IF patient discharges home will need the following DME: to be determined (TBD)       SUBJECTIVE:   Patient stated don't help me, it throws me off.     OBJECTIVE DATA SUMMARY:   Chart checked, pt cleared by nursing  Critical Behavior:  Neurologic State: Alert  Orientation Level: Oriented X4  Cognition: Appropriate decision making,Appropriate for age attention/concentration,Appropriate safety awareness,Follows commands  Safety/Judgement: Awareness of environment,Decreased insight into deficits,Fall prevention  Functional Mobility Training:  Bed Mobility:            Not assessed         Transfers:  Sit to Stand: Contact guard assistance  Stand to Sit: Stand-by assistance                             Balance:  Sitting: Intact; Without support  Standing: Impaired  Standing - Static: Constant support;Good  Standing - Dynamic : Constant support;Good  Ambulation/Gait Training:  Distance (ft): 64 Feet (ft) (16 feet, 32 feet, 16 feet)  Assistive Device: Gait belt;Walker, rolling  Ambulation - Level of Assistance: Contact guard assistance        Gait Abnormalities: Decreased step clearance        Base of Support: Widened     Speed/Maral: Slow;Pace decreased (<100 feet/min)  Step Length: Left shortened;Right shortened                    Stairs: Therapeutic Exercises:   Pursed lip breathing  Pain Rating:  None rated.  Did ask pt about his back pain (chronic in nature) and he reported that it was OK    Activity Tolerance:   desaturates with exertion and requires oxygen, requires rest breaks, and see assessment above    After treatment patient left in no apparent distress:   Sitting in chair and Call bell within reach    COMMUNICATION/COLLABORATION:   The patients plan of care was discussed with: Occupational therapist and Registered nurse.      [place] Councilman   Time Calculation: 27 mins

## 2022-05-26 NOTE — PROGRESS NOTES
Problem: Diabetes Self-Management  Goal: *Disease process and treatment process  Description: Define diabetes and identify own type of diabetes; list 3 options for treating diabetes. Outcome: Progressing Towards Goal  Goal: *Incorporating nutritional management into lifestyle  Description: Describe effect of type, amount and timing of food on blood glucose; list 3 methods for planning meals. Outcome: Progressing Towards Goal  Goal: *Incorporating physical activity into lifestyle  Description: State effect of exercise on blood glucose levels. Outcome: Progressing Towards Goal  Goal: *Developing strategies to promote health/change behavior  Description: Define the ABC's of diabetes; identify appropriate screenings, schedule and personal plan for screenings. Outcome: Progressing Towards Goal  Goal: *Using medications safely  Description: State effect of diabetes medications on diabetes; name diabetes medication taking, action and side effects. Outcome: Progressing Towards Goal  Goal: *Monitoring blood glucose, interpreting and using results  Description: Identify recommended blood glucose targets  and personal targets. Outcome: Progressing Towards Goal  Goal: *Prevention, detection, treatment of acute complications  Description: List symptoms of hyper- and hypoglycemia; describe how to treat low blood sugar and actions for lowering  high blood glucose level. Outcome: Progressing Towards Goal  Goal: *Prevention, detection and treatment of chronic complications  Description: Define the natural course of diabetes and describe the relationship of blood glucose levels to long term complications of diabetes.   Outcome: Progressing Towards Goal  Goal: *Developing strategies to address psychosocial issues  Description: Describe feelings about living with diabetes; identify support needed and support network  Outcome: Progressing Towards Goal  Goal: *Insulin pump training  Outcome: Progressing Towards Goal  Goal: *Sick day guidelines  Outcome: Progressing Towards Goal  Goal: *Patient Specific Goal (EDIT GOAL, INSERT TEXT)  Outcome: Progressing Towards Goal     Problem: Patient Education: Go to Patient Education Activity  Goal: Patient/Family Education  Outcome: Progressing Towards Goal     Problem: Pressure Injury - Risk of  Goal: *Prevention of pressure injury  Description: Document Ben Scale and appropriate interventions in the flowsheet. Outcome: Progressing Towards Goal  Note: Pressure Injury Interventions:  Sensory Interventions: Assess changes in LOC,Avoid rigorous massage over bony prominences,Check visual cues for pain,Discuss PT/OT consult with provider,Float heels,Minimize linen layers    Moisture Interventions: Absorbent underpads,Apply protective barrier, creams and emollients,Contain wound drainage,Minimize layers    Activity Interventions: Increase time out of bed,Pressure redistribution bed/mattress(bed type),PT/OT evaluation    Mobility Interventions: HOB 30 degrees or less,Float heels,PT/OT evaluation,Pressure redistribution bed/mattress (bed type)    Nutrition Interventions: Document food/fluid/supplement intake    Friction and Shear Interventions: Feet elevated on foot rest,HOB 30 degrees or less,Minimize layers                Problem: Patient Education: Go to Patient Education Activity  Goal: Patient/Family Education  Outcome: Progressing Towards Goal     Problem: Falls - Risk of  Goal: *Absence of Falls  Description: Document Sanaz Fall Risk and appropriate interventions in the flowsheet.   Outcome: Progressing Towards Goal  Note: Fall Risk Interventions:  Mobility Interventions: Assess mobility with egress test,Bed/chair exit alarm,Communicate number of staff needed for ambulation/transfer,OT consult for ADLs,Patient to call before getting OOB,PT Consult for mobility concerns    Mentation Interventions: Adequate sleep, hydration, pain control,Door open when patient unattended,Toileting rounds    Medication Interventions: Bed/chair exit alarm,Evaluate medications/consider consulting pharmacy,Patient to call before getting OOB,Teach patient to arise slowly    Elimination Interventions: Call light in reach,Patient to call for help with toileting needs,Toileting schedule/hourly rounds,Stay With Me (per policy)    History of Falls Interventions: Bed/chair exit alarm,Door open when patient unattended,Room close to nurse's station         Problem: Patient Education: Go to Patient Education Activity  Goal: Patient/Family Education  Outcome: Progressing Towards Goal     Problem: Risk for Spread of Infection  Goal: Prevent transmission of infectious organism to others  Description: Prevent the transmission of infectious organisms to other patients, staff members, and visitors.   Outcome: Progressing Towards Goal     Problem: Patient Education:  Go to Education Activity  Goal: Patient/Family Education  Outcome: Progressing Towards Goal     Problem: Patient Education: Go to Patient Education Activity  Goal: Patient/Family Education  Outcome: Progressing Towards Goal     Problem: Patient Education: Go to Patient Education Activity  Goal: Patient/Family Education  Outcome: Progressing Towards Goal

## 2022-05-26 NOTE — PROGRESS NOTES
[facility] [facility] Adult  Hospitalist Group                                                                                          Hospitalist Progress Note  Priyanka Soto MD  Answering service: [phone] from in house phone        Date of Service:  2022  NAME:  Alyssa Hodges  :  1965  MRN:  [id number]      Admission Summary:     Patient  presents with sepsis due to lower extremity cellulitis, on initial presentation patient was admitted to ICU and require low-dose of norepinephrine overnight for short period of time. Later patient was transferred out of ICU. Currently followed by ID. Patient also with acute metabolic encephalopathy and generalized fatigue, overall mental status is better. Interval history / Subjective:     Patient denies any shortness of breath, denies any pain in the leg. Started working with physical therapy.      Assessment & Plan:     Severe sepsis with septic shock  -Sepsis due to lower extremity cellulitis  -Patient initially required ICU admission and vasopressors temporarily  -Sepsis is now resolved  and hemodynamically stable, blood cultures no growth    Lower extremity cellulitis  -Patient with chronic lower extremity edema, culprit for cellulitis  -Lower extremity venous Doppler negative for DVT  -ID following, on cefepime and daptomycin, plan for total of 2 weeks of antibiotic, plan changing to p.o. on discharge    Chronic leg edema with stasis ulcers  -Continue mobility with PT, keep legs antigravity while sitting  -Patient received bariatric bed today  -Wound care following    Acute on chronic diastolic CHF  -NYHA class III on admission  -Continue diuresis with Bumex, continue Entresto (unclear if patient's EF was low previously, no documentations available), echo on 2021 shows EF of 50%    Hematuria  -Patient with hematuria and urinary retention  -Now resolved, Foster was removed on 2022  -Urology previously evaluated the patient    MADELAINE  -Now resolved, stable on diuretics    Acute metabolic encephalopathy  -ABGs previously was unremarkable  -Likely possible hospital fatigue, overall improving  -Patient to continue to work with physical therapy    Chronic atrial fibrillation  -Continue amiodarone and Eliquis for anticoagulation  -Stable    COPD  -Patient with chronic hypoxic and hypercapnic respiratory failure due to COPD  -Continue bronchodilator, breathing treatment as needed  -On 3 L of oxygen    Diabetes type 2  -Continue NPH 5 units twice daily, continue sennosides coverage    Dyslipidemia  -Continue Zetia    BPH  -Continue Flomax    Obstructive sleep apnea  -CPAP nightly    Morbid obesity  -With BMI of 38.78  -Outpatient follow-up for weight management     Code status:  Full  Prophylaxis: Eliquis  Care Plan discussed with: Patient  Anticipated Disposition: 24 to 48 hours     Hospital Problems  Date Reviewed: 4/28/2013          Codes Class Noted POA    * (Principal) Sepsis ([facility] [address] ICD-10-CM: A41.9  ICD-9-CM: 038.9, 995.91  5/18/2022 Unknown                Review of Systems:   A comprehensive review of systems was negative except for that written in the HPI. Vital Signs:    Last 24hrs VS reviewed since prior progress note. Most recent are:  Visit Vitals  BP (!) 93/57 (BP 1 Location: Right upper arm, BP Patient Position: Sitting)   Pulse 64   Temp 98.2 °F (36.8 °C)   Resp 16   Ht 6' (1.829 m)   Wt 129.8 kg (286 lb 2.5 oz)   SpO2 93%   BMI 38.81 kg/m²         Intake/Output Summary (Last 24 hours) at 5/26/2022 1548  Last data filed at 5/26/2022 1220  Gross per 24 hour   Intake 100 ml   Output 2580 ml   Net -2480 ml        Physical Examination:     I had a face to face encounter with this patient and independently examined them on 5/26/2022 as outlined below:          Constitutional:  No acute distress, cooperative, pleasant    ENT:  Oral mucosa moist, oropharynx benign. Resp:  CTA bilaterally. No wheezing/rhonchi/rales.  No accessory muscle use. CV:  Regular rhythm, normal rate, no murmurs, gallops, rubs    GI:  Soft, non distended, non tender. normoactive bowel sounds, no hepatosplenomegaly     Musculoskeletal:  Bilateral lower extremity pitting edema, lower extremity ulcers covered in dressing    Neurologic:  Moves all extremities. AAOx3, CN II-XII reviewed            Data Review:    Review and/or order of clinical lab test      Labs:     Recent Labs     05/26/22 0316 05/25/22 0205   WBC 8.0 7.3   HGB 12.2 11.9*   HCT 42.8 42.3    152     Recent Labs     05/26/22 0316 05/25/22 0205 05/24/22 0458    138 139   K 3.7 4.0 4.2   CL 96* 100 100   CO2 37* 37* 38*   BUN 42* 39* 35*   CREA 1.09 1.08 1.00   * 126* 153*   CA 9.6 9.6 9.5   MG 2.3 2.4  --    PHOS 3.2 2.6  --      No results for input(s): ALT, AP, TBIL, TBILI, TP, ALB, GLOB, GGT, AML, LPSE in the last 72 hours. No lab exists for component: SGOT, GPT, AMYP, HLPSE  No results for input(s): INR, PTP, APTT, INREXT in the last 72 hours. No results for input(s): FE, TIBC, PSAT, FERR in the last 72 hours. No results found for: FOL, RBCF   No results for input(s): PH, PCO2, PO2 in the last 72 hours.   Recent Labs     05/25/22 0205 05/24/22 0458   CPK 27* 30*     Lab Results   Component Value Date/Time    Cholesterol, total 170 01/23/2013 11:08 AM    HDL Cholesterol 37 (L) 01/23/2013 11:08 AM    LDL, calculated 97 01/23/2013 11:08 AM    Triglyceride 180 (H) 01/23/2013 11:08 AM    CHOL/HDL Ratio 6.0 (H) 10/06/2010 03:18 PM     Lab Results   Component Value Date/Time    Glucose (POC) 136 (H) 05/26/2022 12:26 PM    Glucose (POC) 139 (H) 05/26/2022 07:51 AM    Glucose (POC) 202 (H) 05/25/2022 09:57 PM    Glucose (POC) 192 (H) 05/25/2022 05:27 PM    Glucose (POC) 186 (H) 05/25/2022 12:04 PM     Lab Results   Component Value Date/Time    Color YELLOW/STRAW 05/18/2022 03:54 PM    Appearance CLEAR 05/18/2022 03:54 PM    Specific gravity 1.011 05/18/2022 03:54 PM    pH (UA) 5.0 05/18/2022 03:54 PM    Protein Negative 05/18/2022 03:54 PM    Glucose >1,000 (A) 05/18/2022 03:54 PM    Ketone Negative 05/18/2022 03:54 PM    Bilirubin Negative 05/18/2022 03:54 PM    Urobilinogen 0.2 05/18/2022 03:54 PM    Nitrites Negative 05/18/2022 03:54 PM    Leukocyte Esterase Negative 05/18/2022 03:54 PM    Epithelial cells FEW 05/18/2022 03:54 PM    Bacteria Negative 05/18/2022 03:54 PM    WBC 0-4 05/18/2022 03:54 PM    RBC 20-50 05/18/2022 03:54 PM         Medications Reviewed:     Current Facility-Administered Medications   Medication Dose Route Frequency    cefepime (MAXIPIME) 2 g in 0.9% sodium chloride (MBP/ADV) 100 mL MBP  2 g IntraVENous Q12H    ezetimibe (ZETIA) tablet 10 mg  10 mg Oral DAILY    sacubitriL-valsartan (ENTRESTO) 49-51 mg tablet 1 Tablet  1 Tablet Oral BID    DAPTOmycin (CUBICIN) 600 mg in 0.9% sodium chloride 50 mL IVPB  600 mg IntraVENous Q24H    bumetanide (BUMEX) injection 2 mg  2 mg IntraVENous BID    magnesium citrate solution 148 mL  148 mL Oral Q2H PRN    senna-docusate (PERICOLACE) 8.6-50 mg per tablet 1 Tablet  1 Tablet Oral BID    insulin NPH (NOVOLIN N, HUMULIN N) injection 5 Units  5 Units SubCUTAneous ACB&D    albuterol-ipratropium (DUO-NEB) 2.5 MG-0.5 MG/3 ML  3 mL Nebulization Q6H PRN    traMADoL (ULTRAM) tablet 50 mg  50 mg Oral Q6H PRN    polyethylene glycol (MIRALAX) packet 17 g  17 g Oral BID    albuterol (PROVENTIL VENTOLIN) nebulizer solution 2.5 mg  2.5 mg Nebulization Q4H PRN    insulin lispro (HUMALOG) injection   SubCUTAneous AC&HS    pregabalin (LYRICA) capsule 75 mg  75 mg Oral BID    acetaminophen (TYLENOL) tablet 1,000 mg  1,000 mg Oral Q6H PRN    Or    acetaminophen (TYLENOL) suppository 650 mg  650 mg Rectal Q6H PRN    pantothenic ac-min oil-pet,hyd (AQUAPHOR) 41 % ointment   Topical DAILY    sodium chloride (NS) flush 5-40 mL  5-40 mL IntraVENous Q8H    sodium chloride (NS) flush 5-40 mL  5-40 mL IntraVENous PRN    glucose chewable tablet 16 g  4 Tablet Oral PRN    glucagon (GLUCAGEN) injection 1 mg  1 mg IntraMUSCular PRN    dextrose 10 % infusion 0-250 mL  0-250 mL IntraVENous PRN    sodium chloride (NS) flush 5-40 mL  5-40 mL IntraVENous Q8H    sodium chloride (NS) flush 5-40 mL  5-40 mL IntraVENous PRN    ondansetron (ZOFRAN ODT) tablet 4 mg  4 mg Oral Q8H PRN    Or    ondansetron (ZOFRAN) injection 4 mg  4 mg IntraVENous Q6H PRN    bisacodyL (DULCOLAX) tablet 5 mg  5 mg Oral DAILY    polyethylene glycol (MIRALAX) packet 17 g  17 g Oral DAILY PRN    amiodarone (CORDARONE) tablet 100 mg  100 mg Oral DAILY    apixaban (ELIQUIS) tablet 5 mg  5 mg Oral BID    aspirin delayed-release tablet 81 mg  81 mg Oral DAILY    arformoterol 15 mcg/budesonide 0.5 mg neb solution   Nebulization BID RT    montelukast (SINGULAIR) tablet 10 mg  10 mg Oral QHS    pantoprazole (PROTONIX) tablet 40 mg  40 mg Oral DAILY    [Held by provider] rosuvastatin (CRESTOR) tablet 5 mg  5 mg Oral DAILY    tamsulosin (FLOMAX) capsule 0.4 mg  0.4 mg Oral DAILY     ______________________________________________________________________  EXPECTED LENGTH OF STAY: 4d 19h  ACTUAL LENGTH OF STAY:          8                 Franklin Shaffer MD

## 2022-05-26 NOTE — PROGRESS NOTES
NUTRITION  Reason for Assessment: LOS      Recommendations/Interventions/Plan:     Diet adjusted to consistent CHO 75 gm/meal, JUAN LUIS    Started Stanislav BID for wound healing         Past Medical History:   Diagnosis Date    DM (diabetes mellitus) ([facility]     Dyslipidemia     Hypertension      Pt screened for LOS. Chart/labs/meds reviewed. Admitted with Sepsis ([facility] [A41.9]   Met with pt at bedside. Reports a good appetite and eating well. Only preference was that he doesn't want them to send any tea or coffee - doesn't drink. Prefers juice or gingerale. Noted to be diabetic - not on carb restriction. On NPH 5 units BID and SSI coverage. Pt reports intentional wt loss in past year or so, but unclear. Admission weight was 330 lb, does still have quite a bit of edema. On agressive diuresis. Wounds as below - not previously triggered to this service. Re-discussed with pt, he would like to try Stanislav, fruit punch flavor mixed with diet gingerale. Nutrition Related Findings:   Edema: LLE: 4+; Pitting; Weeping (5/26/2022  8:00 AM)  LUE: Trace (5/25/2022  8:18 PM)  RLE: 4+; Pitting; Weeping (5/26/2022  8:00 AM)  RUE: Trace (5/26/2022  8:00 AM)      Last BM: 05/25/22, Soft    Skin: stage 3 to L&R ischium      Current Nutrition Therapies:  Diet: regular  Supplements: none  Meal intake:   Patient Vitals for the past 168 hrs:   % Diet Eaten   05/21/22 1100 76 - 100%   05/20/22 1808 76 - 100%   05/20/22 1200 76 - 100%   05/20/22 0800 51 - 75%   05/19/22 1400 76 - 100%     Supplement intake: No data found.       Weight Hx:  Wt Readings from Last 10 Encounters:   05/19/22 129.8 kg (286 lb 2.5 oz)   05/02/22 147 kg (324 lb)   09/30/21 142.9 kg (315 lb 1.6 oz)   04/25/13 138.3 kg (305 lb)   01/22/13 139.3 kg (307 lb 3.2 oz)   10/06/10 156.2 kg (344 lb 6.4 oz)       Nutrition Diagnosis:   · Increased nutrient needs related to increased demand for energy/nutrients as evidenced by wounds    Nutrition Interventions:   Food and/or Nutrient Delivery: Modify current diet,Start oral nutrition supplement  Nutrition Education/Counseling: Counseling initiated  Coordination of Nutrition Care: Continue to monitor while inpatient         Goals:     Goals: other (specify)  Specify Other Goals: continued PO intake >/=75% of meals with 1 high protein choice at each meal and consumption of Stanislav BID over next 7 days    Nutrition Monitoring and Evaluation:   Behavioral-Environmental Outcomes: None identified  Food/Nutrient Intake Outcomes: Food and nutrient intake,Supplement intake  Physical Signs/Symptoms Outcomes: Biochemical data,Skin,Fluid status or edema,Weight        Recent Labs     05/26/22  0316 05/25/22  0205 05/24/22  0458   * 126* 153*   BUN 42* 39* 35*   CREA 1.09 1.08 1.00    138 139   K 3.7 4.0 4.2   CL 96* 100 100   CO2 37* 37* 38*   CA 9.6 9.6 9.5   PHOS 3.2 2.6  --    MG 2.3 2.4  --        Recent Labs     05/26/22  1226 05/26/22  0751 05/25/22  2157 05/25/22  1727 05/25/22  1204 05/25/22  0828 05/24/22  2122 05/24/22  1743 05/24/22  1145 05/24/22  0920 05/23/22 2125 05/23/22  1725   GLUCPOC 136* 139* 202* 192* 186* 138* 207* 133* 194* 207* 171* 176*       Lab Results   Component Value Date/Time    Hemoglobin A1c 7.9 (H) 04/25/2022 02:20 PM    Hemoglobin A1c 8.3 (H) 09/11/2021 03:53 PM    Hemoglobin A1c (POC) 10.8 04/25/2013 02:49 PM         Debo Avilez RD  Available via [address] [place] [address]

## 2022-05-26 NOTE — PROGRESS NOTES
DEISY: anticipate d/c to [facility] for rehab;     Pt will need insurance auth through [facility], contact is: Leticia Bailey [phone]    Pt will need a Rapid covid test for SNF    Pt is currently on 3L/NC 02    BLS Transport     RUR: 19%    -Pt awaiting Echo  -Continues IV diuresis  -Remains on NC 02 -1430-CM reviewed pt chart & was contacted by [facility] program asking for status update. CM provided up that pt continues IV diuresis and currently awaiting an echo. CM to follow.   Angeline Bonilla RN BSN CCM

## 2022-05-26 NOTE — PROGRESS NOTES
Bedside shift change report given to Roxanna Andersen (oncoming nurse) by Allison Becker RN (offgoing nurse). Report included the following information SBAR, Kardex, Intake/Output, MAR, Recent Results and Cardiac Rhythm NSR.

## 2022-05-26 NOTE — PROGRESS NOTES
ID Progress Note  2022    Subjective:     Sitting up in chair  C/o generalized weakness  Review of Systems:            Symptom Y/N Comments   Symptom Y/N Comments   Fever/Chills n      Chest Pain n       Poor Appetite       Edema y       Cough       Abdominal Pain  n      Sputum       Joint Pain        SOB/MCCULLOUGH  *  no worse   Pruritis/Rash        Nausea/vomit  n     Tolerating PT/OT        Diarrhea  n     Tolerating Diet        Constipation  n     Other           Could NOT obtain due to:       Objective:     Vitals:   Visit Vitals  /67 (BP 1 Location: Right upper arm, BP Patient Position: At rest;Sitting)   Pulse 65   Temp 98.5 °F (36.9 °C)   Resp 18   Ht 6' (1.829 m)   Wt 129.8 kg (286 lb 2.5 oz)   SpO2 97%   BMI 38.81 kg/m²        Tmax:  Temp (24hrs), Av.9 °F (36.6 °C), Min:97.2 °F (36.2 °C), Max:98.5 °F (36.9 °C)      PHYSICAL EXAM:  General: Obese, chronically ill appearing, WD, WN. Alert, cooperative, no acute distress    EENT:  EOMI. Anicteric sclerae. MMM  Resp:  Clear in apex with decreased breath sounds at bases, no wheezing or rales. No accessory muscle use  CV:  Regular  rhythm,  + edema; LLE>RLE  GI:  Soft, obese, Non tender. +Bowel sounds  Neurologic:  Alert and oriented X 3, normal speech,   Psych:   Fair insight. Not anxious nor agitated  Skin:  No rashes.   No jaundice                          Stage III pressure injury to both hip,     LLE wound; edematous, erythematous below the knee (mild improvement)  RLE; chronic venous stasis    LLE    RLE    Labs:   Lab Results   Component Value Date/Time    WBC 8.0 2022 03:16 AM    HGB 12.2 2022 03:16 AM    HCT 42.8 2022 03:16 AM    PLATELET 932  03:16 AM    MCV 79.1 (L) 2022 03:16 AM     Lab Results   Component Value Date/Time    Sodium 136 2022 03:16 AM    Potassium 3.7 2022 03:16 AM    Chloride 96 (L) 2022 03:16 AM    CO2 37 (H) 2022 03:16 AM    Anion gap 3 (L) 2022 03:16 AM Glucose 174 (H) 05/26/2022 03:16 AM    BUN 42 (H) 05/26/2022 03:16 AM    Creatinine 1.09 05/26/2022 03:16 AM    BUN/Creatinine ratio 39 (H) 05/26/2022 03:16 AM    GFR est AA >60 05/26/2022 03:16 AM    GFR est non-AA >60 05/26/2022 03:16 AM    Calcium 9.6 05/26/2022 03:16 AM    Bilirubin, total 0.8 05/18/2022 01:16 PM    Alk. phosphatase 89 05/18/2022 01:16 PM    Protein, total 6.8 05/18/2022 01:16 PM    Albumin 2.8 (L) 05/18/2022 01:16 PM    Globulin 4.0 05/18/2022 01:16 PM    A-G Ratio 0.7 (L) 05/18/2022 01:16 PM    ALT (SGPT) 12 05/18/2022 01:16 PM         Cultures:   Results     Procedure Component Value Units Date/Time    CULTURE, MRSA [[id number]]  (Abnormal) Collected: 05/18/22 1706    Order Status: Completed Specimen: Nasal from Nares Updated: 05/19/22 1721     Special Requests: NO SPECIAL REQUESTS        Culture result: MRSA PRESENT               Screening of patient nares for MRSA is for surveillance purposes and, if positive, to facilitate isolation considerations in high risk settings. It is not intended for automatic decolonization interventions per se as regimens are not sufficiently effective to warrant routine use. (NOTE) CALLED POSITIVE MRSA TO TYLER ZARAGOZA AT 4380 ON 5/19/22. AT    URINE CULTURE HOLD SAMPLE [[id number]] Collected: 05/18/22 1554    Order Status: Completed Specimen: Serum Updated: 05/18/22 1619     Urine culture hold       Urine on hold in Microbiology dept for 2 days. If unpreserved urine is submitted, it cannot be used for addtional testing after 24 hours, recollection will be required.           RESPIRATORY VIRUS PANEL W/COVID-19, PCR [[id number]] Collected: 05/18/22 1542    Order Status: Completed Specimen: Nasopharyngeal Updated: 05/18/22 1737     Adenovirus Not detected        Coronavirus 229E Not detected        Coronavirus HKU1 Not detected        Coronavirus CVNL63 Not detected        Coronavirus OC43 Not detected        SARS-CoV-2, PCR Not detected Patricia Aranda Not detected        Rhinovirus and Enterovirus Not detected        Influenza A Not detected        Influenza A, subtype H1 Not detected        Influenza A, subtype H3 Not detected        INFLUENZA A H1N1 PCR Not detected        Influenza B Not detected        Parainfluenza 1 Not detected        Parainfluenza 2 Not detected        Parainfluenza 3 Not detected        Parainfluenza virus 4 Not detected        RSV by PCR Not detected        B. parapertussis, PCR Not detected        Bordetella pertussis - PCR Not detected        Chlamydophila pneumoniae DNA, QL, PCR Not detected        Mycoplasma pneumoniae DNA, QL, PCR Not detected       CULTURE, BLOOD, PAIRED [[id number]] Collected: 05/18/22 1316    Order Status: Completed Specimen: Blood Updated: 05/23/22 0821     Special Requests: NO SPECIAL REQUESTS        Culture result: NO GROWTH 5 DAYS            55year old male with medical hx of type II diabetes, dyslipidemia, hypertension, obesity, COPD was admitted to the hospital on 5/18 with shortness of breath from SNF. Pt was hospitalized between 4/25 to 5/2 treated for resp failure secondary to COPD exacerbation and acute on chronic diastolic CHF then sent to SNF. Pt usually wears O2 @ 2L via n/c. Impression:   PVD  Chronic LE wounds  Cellulitis LLE  Morbid obesity  Leukocytosis (resolved)  - afebrile, WBC 8.3    Blood cx (5/18) no growth    MRSA nare screen + 5/18    CXR (-) ASDZ    CT of abd/pel (5/18) no acute process    Duplex study (-) for DVT LLE    Plan:   · Continue with IV cefepime and daptomycin (JT36); recommend to complete total 2 weeks        May complete remaining ABX therapy with PO doxycyline upon discharge   · Adjust ABX prn  · Fever work up if temp >= 100.4  · Wound care per wound care team's recommendation  · Spoke with wound care team to get the pt bariatric bed with trapeze bar; pt needs to elevated his legs.  Pt is unable to elevate legs while sitting in the chair      Above plan of care discussed and agreed with Dr. Goldy Powers, NP

## 2022-05-27 ENCOUNTER — APPOINTMENT (OUTPATIENT)
Dept: NON INVASIVE DIAGNOSTICS | Age: 57
DRG: 871 | End: 2022-05-27
Attending: INTERNAL MEDICINE
Payer: MEDICARE

## 2022-05-27 LAB
ANION GAP SERPL CALC-SCNC: 4 MMOL/L (ref 5–15)
BUN SERPL-MCNC: 50 MG/DL (ref 6–20)
BUN/CREAT SERPL: 40 (ref 12–20)
CALCIUM SERPL-MCNC: 9.3 MG/DL (ref 8.5–10.1)
CHLORIDE SERPL-SCNC: 93 MMOL/L (ref 97–108)
CO2 SERPL-SCNC: 35 MMOL/L (ref 21–32)
CREAT SERPL-MCNC: 1.26 MG/DL (ref 0.7–1.3)
ECHO AO ROOT DIAM: 4.3 CM
ECHO AO ROOT INDEX: 1.73 CM/M2
ECHO LA DIAMETER INDEX: 2.7 CM/M2
ECHO LA DIAMETER: 6.7 CM
ECHO LA TO AORTIC ROOT RATIO: 1.56
ECHO LV FRACTIONAL SHORTENING: 29 % (ref 28–44)
ECHO LV INTERNAL DIMENSION DIASTOLE INDEX: 2.26 CM/M2
ECHO LV INTERNAL DIMENSION DIASTOLIC: 5.6 CM (ref 4.2–5.9)
ECHO LV INTERNAL DIMENSION SYSTOLIC INDEX: 1.61 CM/M2
ECHO LV INTERNAL DIMENSION SYSTOLIC: 4 CM
ECHO LV IVSD: 1.5 CM (ref 0.6–1)
ECHO LV MASS 2D: 365.4 G (ref 88–224)
ECHO LV MASS INDEX 2D: 147.4 G/M2 (ref 49–115)
ECHO LV POSTERIOR WALL DIASTOLIC: 1.4 CM (ref 0.6–1)
ECHO LV RELATIVE WALL THICKNESS RATIO: 0.5
ECHO LVOT AREA: 3.5 CM2
ECHO LVOT DIAM: 2.1 CM
ECHO PV MAX VELOCITY: 0.8 M/S
ECHO PV PEAK GRADIENT: 2 MMHG
GLUCOSE BLD STRIP.AUTO-MCNC: 116 MG/DL (ref 65–117)
GLUCOSE BLD STRIP.AUTO-MCNC: 120 MG/DL (ref 65–117)
GLUCOSE BLD STRIP.AUTO-MCNC: 230 MG/DL (ref 65–117)
GLUCOSE BLD STRIP.AUTO-MCNC: 272 MG/DL (ref 65–117)
GLUCOSE SERPL-MCNC: 127 MG/DL (ref 65–100)
POTASSIUM SERPL-SCNC: 3.7 MMOL/L (ref 3.5–5.1)
SERVICE CMNT-IMP: ABNORMAL
SERVICE CMNT-IMP: NORMAL
SODIUM SERPL-SCNC: 132 MMOL/L (ref 136–145)

## 2022-05-27 PROCEDURE — 77030041076 HC DRSG AG OPTICELL MDII -A

## 2022-05-27 PROCEDURE — 97530 THERAPEUTIC ACTIVITIES: CPT

## 2022-05-27 PROCEDURE — 82962 GLUCOSE BLOOD TEST: CPT

## 2022-05-27 PROCEDURE — 74011250637 HC RX REV CODE- 250/637: Performed by: INTERNAL MEDICINE

## 2022-05-27 PROCEDURE — 74011000250 HC RX REV CODE- 250: Performed by: INTERNAL MEDICINE

## 2022-05-27 PROCEDURE — 93306 TTE W/DOPPLER COMPLETE: CPT

## 2022-05-27 PROCEDURE — 77010033678 HC OXYGEN DAILY

## 2022-05-27 PROCEDURE — 97116 GAIT TRAINING THERAPY: CPT

## 2022-05-27 PROCEDURE — 80048 BASIC METABOLIC PNL TOTAL CA: CPT

## 2022-05-27 PROCEDURE — 74011250636 HC RX REV CODE- 250/636: Performed by: NURSE PRACTITIONER

## 2022-05-27 PROCEDURE — 93306 TTE W/DOPPLER COMPLETE: CPT | Performed by: SPECIALIST

## 2022-05-27 PROCEDURE — 74011000258 HC RX REV CODE- 258: Performed by: FAMILY MEDICINE

## 2022-05-27 PROCEDURE — 94640 AIRWAY INHALATION TREATMENT: CPT

## 2022-05-27 PROCEDURE — 74011636637 HC RX REV CODE- 636/637: Performed by: INTERNAL MEDICINE

## 2022-05-27 PROCEDURE — 74011000250 HC RX REV CODE- 250: Performed by: HOSPITALIST

## 2022-05-27 PROCEDURE — 65270000046 HC RM TELEMETRY

## 2022-05-27 PROCEDURE — 36415 COLL VENOUS BLD VENIPUNCTURE: CPT

## 2022-05-27 PROCEDURE — 74011250636 HC RX REV CODE- 250/636: Performed by: FAMILY MEDICINE

## 2022-05-27 PROCEDURE — 74011000258 HC RX REV CODE- 258: Performed by: NURSE PRACTITIONER

## 2022-05-27 RX ADMIN — BUMETANIDE 2 MG: 0.25 INJECTION INTRAMUSCULAR; INTRAVENOUS at 09:11

## 2022-05-27 RX ADMIN — Medication 5 UNITS: at 18:04

## 2022-05-27 RX ADMIN — SODIUM CHLORIDE, PRESERVATIVE FREE 20 ML: 5 INJECTION INTRAVENOUS at 05:29

## 2022-05-27 RX ADMIN — Medication 5 UNITS: at 07:30

## 2022-05-27 RX ADMIN — PREGABALIN 75 MG: 25 CAPSULE ORAL at 09:10

## 2022-05-27 RX ADMIN — BISACODYL 5 MG: 5 TABLET, COATED ORAL at 09:10

## 2022-05-27 RX ADMIN — Medication 3 UNITS: at 22:13

## 2022-05-27 RX ADMIN — SACUBITRIL AND VALSARTAN 1 TABLET: 49; 51 TABLET, FILM COATED ORAL at 19:16

## 2022-05-27 RX ADMIN — TRAMADOL HYDROCHLORIDE 50 MG: 50 TABLET, COATED ORAL at 17:37

## 2022-05-27 RX ADMIN — SENNOSIDES AND DOCUSATE SODIUM 1 TABLET: 50; 8.6 TABLET ORAL at 09:10

## 2022-05-27 RX ADMIN — ACETAMINOPHEN 1000 MG: 500 TABLET ORAL at 09:47

## 2022-05-27 RX ADMIN — SENNOSIDES AND DOCUSATE SODIUM 1 TABLET: 50; 8.6 TABLET ORAL at 17:36

## 2022-05-27 RX ADMIN — ACETAMINOPHEN 1000 MG: 500 TABLET ORAL at 20:22

## 2022-05-27 RX ADMIN — TAMSULOSIN HYDROCHLORIDE 0.4 MG: 0.4 CAPSULE ORAL at 09:11

## 2022-05-27 RX ADMIN — SODIUM CHLORIDE, PRESERVATIVE FREE 10 ML: 5 INJECTION INTRAVENOUS at 14:00

## 2022-05-27 RX ADMIN — CEFEPIME 2 G: 2 INJECTION, POWDER, FOR SOLUTION INTRAVENOUS at 05:28

## 2022-05-27 RX ADMIN — PANTOPRAZOLE SODIUM 40 MG: 40 TABLET, DELAYED RELEASE ORAL at 09:11

## 2022-05-27 RX ADMIN — SACUBITRIL AND VALSARTAN 1 TABLET: 49; 51 TABLET, FILM COATED ORAL at 09:47

## 2022-05-27 RX ADMIN — PREGABALIN 75 MG: 25 CAPSULE ORAL at 17:36

## 2022-05-27 RX ADMIN — CEFEPIME 2 G: 2 INJECTION, POWDER, FOR SOLUTION INTRAVENOUS at 18:04

## 2022-05-27 RX ADMIN — POLYETHYLENE GLYCOL 3350 17 G: 17 POWDER, FOR SOLUTION ORAL at 09:11

## 2022-05-27 RX ADMIN — SODIUM CHLORIDE, PRESERVATIVE FREE 10 ML: 5 INJECTION INTRAVENOUS at 05:30

## 2022-05-27 RX ADMIN — MONTELUKAST 10 MG: 10 TABLET, FILM COATED ORAL at 20:22

## 2022-05-27 RX ADMIN — APIXABAN 5 MG: 5 TABLET, FILM COATED ORAL at 17:37

## 2022-05-27 RX ADMIN — AMIODARONE HYDROCHLORIDE 100 MG: 200 TABLET ORAL at 09:10

## 2022-05-27 RX ADMIN — TRAMADOL HYDROCHLORIDE 50 MG: 50 TABLET, COATED ORAL at 05:26

## 2022-05-27 RX ADMIN — BUMETANIDE 2 MG: 0.25 INJECTION INTRAMUSCULAR; INTRAVENOUS at 17:36

## 2022-05-27 RX ADMIN — ARFORMOTEROL TARTRATE: 15 SOLUTION RESPIRATORY (INHALATION) at 08:48

## 2022-05-27 RX ADMIN — APIXABAN 5 MG: 5 TABLET, FILM COATED ORAL at 09:11

## 2022-05-27 RX ADMIN — DAPTOMYCIN 600 MG: 500 INJECTION, POWDER, LYOPHILIZED, FOR SOLUTION INTRAVENOUS at 19:16

## 2022-05-27 RX ADMIN — WHITE PETROLATUM: 1.75 OINTMENT TOPICAL at 09:12

## 2022-05-27 RX ADMIN — EZETIMIBE 10 MG: 10 TABLET ORAL at 09:10

## 2022-05-27 RX ADMIN — ACETAMINOPHEN 1000 MG: 500 TABLET ORAL at 01:40

## 2022-05-27 RX ADMIN — ASPIRIN 81 MG: 81 TABLET, COATED ORAL at 09:10

## 2022-05-27 RX ADMIN — Medication 3 UNITS: at 13:23

## 2022-05-27 NOTE — WOUND CARE
WOCN Note:     Follow up wound care visit  Assessed in room 418    Chart shows:  Patient admitted on 5/18/22 with Sepsis  Past Medical History:   Diagnosis Date    DM (diabetes mellitus) ([address]     Dyslipidemia     Hypertension       5/27/22  WBC = 8  Hgb = 12.2    Assessment:   A&O x 4  Mobility: no assistance with turning and repositioning  Continence: Foster catheter, continent of stool   Last Ben Score: 17  Surface: Prius bariatric mattress  Diet: Regular; 5 carb choices, oral nutritional supplements. Being followed by dietician     Bilateral heels skin intact and without erythema   Heels offloaded with pillows     Bilateral lower extremities with stasis dermatitis, edema    1. POA stage 3 pressure injury to left ischium   0.6 x 0.5 x <0.1 cm  Wound bed red  scant amount serosang exudate  no odor   defined edges  Periwound intact & without erythema   Treatment: Cleansed with saline, skin prep to periwound, hydrocolloid    2. POA stage 3 pressure injury to right ischium   0.5 x 0.9 x 0.1 cm  Wound bed red   scant amount serosang exudate  no odor   defined edges  Periwound intact & without erythema   Treatment: Cleansed with saline, skin prep to periwound, hydrocolloid    3. POA Left anterior lower leg wound  Etiology: venous  Full thickness   2 x 1 x <0.1 cm  Wound bed 50% yellow, 50% pink   large amount serous exudate  no odor   Treatment: Cleansed with saline, Opticel ag, abd pads, stretch roll gauze     4. POA Left medial lower leg wound  Etiology: venous  Full thickness   1 x 0.5 x <0.1 cm  Wound bed 30% yellow, 70% pink   large amount serous exudate  no odor   defined edges  Periwound intact & with erythema and swelling  Treatment: Cleansed with saline, Opticel ag, abd pads, stretch roll gauze     5.  POA left lateral lower leg wound proximal  Etiology: venous  Partial thickness   3 x 3 x <0.1 cm  Wound pink and macerated  copious amount serous exudate  no odor   undefined edges  Treatment: Cleansed with saline, Opticel ag, abd pads, stretch roll gauze     6. POA left lateral lower leg wound distal  Etiology: venous  Partial thickness   0.9 x 1 x <0.1 cm  Wound bed pink   moderate amount serous exudate  no odor   defined edges  Treatment: Cleansed with saline, Opticel ag, abd pads, stretch roll gauze     7. POA right posterior lower leg wound  Etiology: venous  partial thickness   1.8 x 1.4 x <0.1 cm  Wound bed  pink  moderate amount serous exudate  no odor   Edges with epithelization   Treatment: Cleansed with saline, Honey gel, honey sheet, foam border dressing    8. POA partial thickness skin breakdown under left pannus, right pannus, bilateral groin  Etiology: moisture, poor hygiene  Moderate amount serous exudate to left pannus and right groin skin breakdown, no odor  Treatment: Cleansed with remedy foaming cleanser, Opticel AG    9. Left lower leg with significant edema, constant weeping from distal lower leg, skin denuded   Leg cleansed with remedy foaming cleanser, Opticel AG, abd pads, stretch roll gauze     Wound Recommendations:      Cleanse right lower leg and right foot with Remedy foaming cleanser, pat dry, apply aquaphor ointment to intact skin, cover all wounds with opticel ag, cover with abd pads.  Secure with stretch roll gauze  normal saline, daily and prn if becomes soiled with draining    1) Cleanse wounds to right and left buttocks and right and left iscium with normal saline, pat dry   2) Apply skin barrier wipe to periwound skin and allow to dry   3) Fold  hydrocolloid dressing and apply to area   4) Smooth dressing from center outward and hold dressing in place to improve adhesion   Change dressing weekly and prn if becomes soiled or loosens/ edges curl    Cleanse skin breakdown to abdominal folds and bilateral groin with normal saline, place Opticel AG to areas of skin breakdown, change daily     Aquaphor to bilateral lower legs and feet daily    PI Prevention:  Turn/reposition approximately every 2 hours  Offload heels with pillows at all times in bed. Sacral Foam dressing: lift to assess regularly; change as needed. Discontinue if incontinent. Z-guard cream to buttocks and sacrum TID  and as needed with incontinence care   Keep HOB 30 degrees or less to decrease shearing and pressure unless medically contraindicated.  If HOB is to be over 30 degrees, raise knees first then [facility] to prevent sliding   Minimize layers of linen/pads under patient to optimize support surface to one flat sheet and one incontinence pad     Orders received from Dr. Brown Credit  Discussed with RN    Transition of Care: Plan to follow weekly and as needed while admitted to hospital.      Ursula Risk MSN, RN, [employer]  Certified Wound and Ostomy Nurse  office [phone]  Can be reached through Wanda Terese

## 2022-05-27 NOTE — PROGRESS NOTES
[address] [facility] Adult  Hospitalist Group                                                                                          Hospitalist Progress Note  Giovanni Sullivan MD  Answering service: [phone] from in house phone        Date of Service:  2022  NAME:  Aury Peralta  :  1965  MRN:  [id number]      Admission Summary:     Patient  presents with sepsis due to lower extremity cellulitis, on initial presentation patient was admitted to ICU and require low-dose of norepinephrine overnight for short period of time. Later patient was transferred out of ICU. Currently followed by ID. Patient also with acute metabolic encephalopathy and generalized fatigue, overall mental status is better. Interval history / Subjective:     Patient denies any shortness of breath, denies any pain in the leg. Started working with physical therapy.      Assessment & Plan:     Severe sepsis with septic shock  -Sepsis due to lower extremity cellulitis  -Patient initially required ICU admission and vasopressors temporarily  -Sepsis is now resolved  and hemodynamically stable, blood cultures no growth    Lower extremity cellulitis  -Patient with chronic lower extremity edema, culprit for cellulitis  -Lower extremity venous Doppler negative for DVT  -ID following, on cefepime and daptomycin, plan for total of 2 weeks of antibiotic, plan changing to p.o. on discharge    Chronic leg edema with stasis ulcers  -Continue mobility with PT, keep legs antigravity while sitting  -Patient received bariatric bed today  -Wound care following    Acute on chronic diastolic CHF  -NYHA class III on admission  -Continue diuresis with Bumex, continue Entresto (unclear if patient's EF was low previously, no documentations available), echo on 2021 shows EF of 50%    Hematuria  -Patient with hematuria and urinary retention  -Now resolved, Foster was removed on 2022  -Urology previously evaluated the patient    MADELAINE  -Now resolved, stable on diuretics    Acute metabolic encephalopathy  -ABGs previously was unremarkable  -Likely possible hospital fatigue, overall improving  -Patient to continue to work with physical therapy    Chronic atrial fibrillation  -Continue amiodarone and Eliquis for anticoagulation  -Stable    COPD  -Patient with chronic hypoxic and hypercapnic respiratory failure due to COPD  -Continue bronchodilator, breathing treatment as needed  -On 3 L of oxygen    Diabetes type 2  -Continue NPH 5 units twice daily, continue sennosides coverage    Dyslipidemia  -Continue Zetia    BPH  -Continue Flomax    Obstructive sleep apnea  -CPAP nightly    Morbid obesity  -With BMI of 38.78  -Outpatient follow-up for weight management     Code status:  Full  Prophylaxis: Eliquis  Care Plan discussed with: Patient  Anticipated Disposition: 24 to 48 hours     Hospital Problems  Date Reviewed: 4/28/2013          Codes Class Noted POA    * (Principal) Sepsis ([facility] [address] ICD-10-CM: A41.9  ICD-9-CM: 038.9, 995.91  5/18/2022 Unknown                Review of Systems:   A comprehensive review of systems was negative except for that written in the HPI. Vital Signs:    Last 24hrs VS reviewed since prior progress note. Most recent are:  Visit Vitals  BP (!) 106/58 (BP 1 Location: Left lower arm, BP Patient Position: At rest)   Pulse (!) 56   Temp (!) 96.3 °F (35.7 °C)   Resp 12   Ht 6' (1.829 m)   Wt 129.8 kg (286 lb 2.5 oz)   SpO2 95%   BMI 38.81 kg/m²       No intake or output data in the 24 hours ending 05/27/22 1508     Physical Examination:     I had a face to face encounter with this patient and independently examined them on 5/27/2022 as outlined below:          Constitutional:  No acute distress, cooperative, pleasant    ENT:  Oral mucosa moist, oropharynx benign. Resp:  CTA bilaterally. No wheezing/rhonchi/rales. No accessory muscle use.     CV:  Regular rhythm, normal rate, no murmurs, gallops, rubs    GI: Soft, non distended, non tender. normoactive bowel sounds, no hepatosplenomegaly     Musculoskeletal:  Bilateral lower extremity pitting edema, multiple bilateral lower extremity blisters    Neurologic:  Moves all extremities. AAOx3, CN II-XII reviewed            Data Review:    Review and/or order of clinical lab test      Labs:     Recent Labs     05/26/22 0316 05/25/22  0205   WBC 8.0 7.3   HGB 12.2 11.9*   HCT 42.8 42.3    152     Recent Labs     05/27/22 0522 05/26/22 0316 05/25/22  0205   * 136 138   K 3.7 3.7 4.0   CL 93* 96* 100   CO2 35* 37* 37*   BUN 50* 42* 39*   CREA 1.26 1.09 1.08   * 174* 126*   CA 9.3 9.6 9.6   MG  --  2.3 2.4   PHOS  --  3.2 2.6     No results for input(s): ALT, AP, TBIL, TBILI, TP, ALB, GLOB, GGT, AML, LPSE in the last 72 hours. No lab exists for component: SGOT, GPT, AMYP, HLPSE  No results for input(s): INR, PTP, APTT, INREXT, INREXT in the last 72 hours. No results for input(s): FE, TIBC, PSAT, FERR in the last 72 hours. No results found for: FOL, RBCF   No results for input(s): PH, PCO2, PO2 in the last 72 hours.   Recent Labs     05/25/22  0205   CPK 27*     Lab Results   Component Value Date/Time    Cholesterol, total 170 01/23/2013 11:08 AM    HDL Cholesterol 37 (L) 01/23/2013 11:08 AM    LDL, calculated 97 01/23/2013 11:08 AM    Triglyceride 180 (H) 01/23/2013 11:08 AM    CHOL/HDL Ratio 6.0 (H) 10/06/2010 03:18 PM     Lab Results   Component Value Date/Time    Glucose (POC) 230 (H) 05/27/2022 12:27 PM    Glucose (POC) 116 05/27/2022 08:19 AM    Glucose (POC) 158 (H) 05/26/2022 09:32 PM    Glucose (POC) 189 (H) 05/26/2022 04:25 PM    Glucose (POC) 136 (H) 05/26/2022 12:26 PM     Lab Results   Component Value Date/Time    Color YELLOW/STRAW 05/18/2022 03:54 PM    Appearance CLEAR 05/18/2022 03:54 PM    Specific gravity 1.011 05/18/2022 03:54 PM    pH (UA) 5.0 05/18/2022 03:54 PM    Protein Negative 05/18/2022 03:54 PM    Glucose >1,000 (A) 05/18/2022 03:54 PM    Ketone Negative 05/18/2022 03:54 PM    Bilirubin Negative 05/18/2022 03:54 PM    Urobilinogen 0.2 05/18/2022 03:54 PM    Nitrites Negative 05/18/2022 03:54 PM    Leukocyte Esterase Negative 05/18/2022 03:54 PM    Epithelial cells FEW 05/18/2022 03:54 PM    Bacteria Negative 05/18/2022 03:54 PM    WBC 0-4 05/18/2022 03:54 PM    RBC 20-50 05/18/2022 03:54 PM         Medications Reviewed:     Current Facility-Administered Medications   Medication Dose Route Frequency    cefepime (MAXIPIME) 2 g in 0.9% sodium chloride (MBP/ADV) 100 mL MBP  2 g IntraVENous Q12H    ezetimibe (ZETIA) tablet 10 mg  10 mg Oral DAILY    sacubitriL-valsartan (ENTRESTO) 49-51 mg tablet 1 Tablet  1 Tablet Oral BID    DAPTOmycin (CUBICIN) 600 mg in 0.9% sodium chloride 50 mL IVPB  600 mg IntraVENous Q24H    bumetanide (BUMEX) injection 2 mg  2 mg IntraVENous BID    magnesium citrate solution 148 mL  148 mL Oral Q2H PRN    senna-docusate (PERICOLACE) 8.6-50 mg per tablet 1 Tablet  1 Tablet Oral BID    insulin NPH (NOVOLIN N, HUMULIN N) injection 5 Units  5 Units SubCUTAneous ACB&D    albuterol-ipratropium (DUO-NEB) 2.5 MG-0.5 MG/3 ML  3 mL Nebulization Q6H PRN    traMADoL (ULTRAM) tablet 50 mg  50 mg Oral Q6H PRN    polyethylene glycol (MIRALAX) packet 17 g  17 g Oral BID    albuterol (PROVENTIL VENTOLIN) nebulizer solution 2.5 mg  2.5 mg Nebulization Q4H PRN    insulin lispro (HUMALOG) injection   SubCUTAneous AC&HS    pregabalin (LYRICA) capsule 75 mg  75 mg Oral BID    acetaminophen (TYLENOL) tablet 1,000 mg  1,000 mg Oral Q6H PRN    Or    acetaminophen (TYLENOL) suppository 650 mg  650 mg Rectal Q6H PRN    pantothenic ac-min oil-pet,hyd (AQUAPHOR) 41 % ointment   Topical DAILY    sodium chloride (NS) flush 5-40 mL  5-40 mL IntraVENous Q8H    sodium chloride (NS) flush 5-40 mL  5-40 mL IntraVENous PRN    glucose chewable tablet 16 g  4 Tablet Oral PRN    glucagon (GLUCAGEN) injection 1 mg  1 mg IntraMUSCular PRN    dextrose 10 % infusion 0-250 mL  0-250 mL IntraVENous PRN    sodium chloride (NS) flush 5-40 mL  5-40 mL IntraVENous Q8H    sodium chloride (NS) flush 5-40 mL  5-40 mL IntraVENous PRN    ondansetron (ZOFRAN ODT) tablet 4 mg  4 mg Oral Q8H PRN    Or    ondansetron (ZOFRAN) injection 4 mg  4 mg IntraVENous Q6H PRN    bisacodyL (DULCOLAX) tablet 5 mg  5 mg Oral DAILY    polyethylene glycol (MIRALAX) packet 17 g  17 g Oral DAILY PRN    amiodarone (CORDARONE) tablet 100 mg  100 mg Oral DAILY    apixaban (ELIQUIS) tablet 5 mg  5 mg Oral BID    aspirin delayed-release tablet 81 mg  81 mg Oral DAILY    arformoterol 15 mcg/budesonide 0.5 mg neb solution   Nebulization BID RT    montelukast (SINGULAIR) tablet 10 mg  10 mg Oral QHS    pantoprazole (PROTONIX) tablet 40 mg  40 mg Oral DAILY    [Held by provider] rosuvastatin (CRESTOR) tablet 5 mg  5 mg Oral DAILY    tamsulosin (FLOMAX) capsule 0.4 mg  0.4 mg Oral DAILY     ______________________________________________________________________  EXPECTED LENGTH OF STAY: 4d 19h  ACTUAL LENGTH OF STAY:          9                 Viviana Meneses MD

## 2022-05-27 NOTE — PROGRESS NOTES
Occupational Therapy    Patient chart reviewed up to date. Spoke with treating PT who reports patient just returned to bed and got comfortable after extensive repositioning, recommends deferring at this time. Will continue to follow as able and appropriate.     Renée Jordan, OTR/L

## 2022-05-27 NOTE — PROGRESS NOTES
Bedside and Verbal shift change report given to Simon Fabian (oncoming nurse) by Jameson Garcia (offgoing nurse). Report included the following information SBAR, Kardex, Intake/Output, Recent Results and Med Rec Status.

## 2022-05-27 NOTE — PROGRESS NOTES
ID Progress Note  2022    Subjective:     Sitting up in chair    Review of Systems:            Symptom Y/N Comments   Symptom Y/N Comments   Fever/Chills n      Chest Pain n       Poor Appetite       Edema y       Cough       Abdominal Pain  n      Sputum       Joint Pain        SOB/MCCULLOUGH  *  no change   Pruritis/Rash        Nausea/vomit  n     Tolerating PT/OT        Diarrhea  n     Tolerating Diet        Constipation  n     Other           Could NOT obtain due to:       Objective:     Vitals:   Visit Vitals  /71   Pulse 68   Temp 98.7 °F (37.1 °C)   Resp 16   Ht 6' (1.829 m)   Wt 129.8 kg (286 lb 2.5 oz)   SpO2 92%   BMI 38.81 kg/m²        Tmax:  Temp (24hrs), Av.3 °F (36.8 °C), Min:97.4 °F (36.3 °C), Max:98.7 °F (37.1 °C)      PHYSICAL EXAM:  General: Obese, chronically ill appearing, WD, WN. Alert, cooperative, no acute distress    EENT:  EOMI. Anicteric sclerae. MMM  Resp:  Clear in apex with decreased breath sounds at bases, no wheezing or rales. No accessory muscle use  CV:  Regular  rhythm,  + edema; LLE>RLE  GI:  Soft, obese, Non tender. +Bowel sounds  Neurologic:  Alert and oriented X 3, normal speech,   Psych:   Fair insight. Not anxious nor agitated  Skin:  No rashes.   No jaundice                          Stage III pressure injury to both hip,     LLE wound; edematous, erythematous below the knee (mild improvement)  RLE; chronic venous stasis    LLE    RLE    Labs:   Lab Results   Component Value Date/Time    WBC 8.0 2022 03:16 AM    HGB 12.2 2022 03:16 AM    HCT 42.8 2022 03:16 AM    PLATELET 298  03:16 AM    MCV 79.1 (L) 2022 03:16 AM     Lab Results   Component Value Date/Time    Sodium 132 (L) 2022 05:22 AM    Potassium 3.7 2022 05:22 AM    Chloride 93 (L) 2022 05:22 AM    CO2 35 (H) 2022 05:22 AM    Anion gap 4 (L) 2022 05:22 AM    Glucose 127 (H) 2022 05:22 AM    BUN 50 (H) 2022 05:22 AM    Creatinine 1.26 05/27/2022 05:22 AM    BUN/Creatinine ratio 40 (H) 05/27/2022 05:22 AM    GFR est AA >60 05/27/2022 05:22 AM    GFR est non-AA 59 (L) 05/27/2022 05:22 AM    Calcium 9.3 05/27/2022 05:22 AM    Bilirubin, total 0.8 05/18/2022 01:16 PM    Alk. phosphatase 89 05/18/2022 01:16 PM    Protein, total 6.8 05/18/2022 01:16 PM    Albumin 2.8 (L) 05/18/2022 01:16 PM    Globulin 4.0 05/18/2022 01:16 PM    A-G Ratio 0.7 (L) 05/18/2022 01:16 PM    ALT (SGPT) 12 05/18/2022 01:16 PM         Cultures:   Results     Procedure Component Value Units Date/Time    CULTURE, MRSA [[id number]]  (Abnormal) Collected: 05/18/22 1706    Order Status: Completed Specimen: Nasal from Nares Updated: 05/19/22 1721     Special Requests: NO SPECIAL REQUESTS        Culture result: MRSA PRESENT               Screening of patient nares for MRSA is for surveillance purposes and, if positive, to facilitate isolation considerations in high risk settings. It is not intended for automatic decolonization interventions per se as regimens are not sufficiently effective to warrant routine use. (NOTE) CALLED POSITIVE MRSA TO TYLER RN AT 7439 ON 5/19/22. AT    URINE CULTURE HOLD SAMPLE [[id number]] Collected: 05/18/22 1554    Order Status: Completed Specimen: Serum Updated: 05/18/22 1619     Urine culture hold       Urine on hold in Microbiology dept for 2 days. If unpreserved urine is submitted, it cannot be used for addtional testing after 24 hours, recollection will be required.           RESPIRATORY VIRUS PANEL W/COVID-19, PCR [[id number]] Collected: 05/18/22 1542    Order Status: Completed Specimen: Nasopharyngeal Updated: 05/18/22 1737     Adenovirus Not detected        Coronavirus 229E Not detected        Coronavirus HKU1 Not detected        Coronavirus CVNL63 Not detected        Coronavirus OC43 Not detected        SARS-CoV-2, PCR Not detected        Metapneumovirus Not detected        Rhinovirus and Enterovirus Not detected        Influenza A Not detected        Influenza A, subtype H1 Not detected        Influenza A, subtype H3 Not detected        INFLUENZA A H1N1 PCR Not detected        Influenza B Not detected        Parainfluenza 1 Not detected        Parainfluenza 2 Not detected        Parainfluenza 3 Not detected        Parainfluenza virus 4 Not detected        RSV by PCR Not detected        B. parapertussis, PCR Not detected        Bordetella pertussis - PCR Not detected        Chlamydophila pneumoniae DNA, QL, PCR Not detected        Mycoplasma pneumoniae DNA, QL, PCR Not detected       CULTURE, BLOOD, PAIRED [[id number]] Collected: 05/18/22 1316    Order Status: Completed Specimen: Blood Updated: 05/23/22 0848     Special Requests: NO SPECIAL REQUESTS        Culture result: NO GROWTH 5 DAYS            55year old male with medical hx of type II diabetes, dyslipidemia, hypertension, obesity, COPD was admitted to the hospital on 5/18 with shortness of breath from SNF. Pt was hospitalized between 4/25 to 5/2 treated for resp failure secondary to COPD exacerbation and acute on chronic diastolic CHF then sent to SNF. Pt usually wears O2 @ 2L via n/c. Impression:   PVD  Chronic LE wounds  Cellulitis LLE  Morbid obesity  Leukocytosis (resolved)  - afebrile, WBC 8.3    Blood cx (5/18) no growth    MRSA nare screen + 5/18    CXR (-) ASDZ    CT of abd/pel (5/18) no acute process    Duplex study (-) for DVT LLE    Plan:   · Continue with IV cefepime and daptomycin (YE83); recommend to complete total 2 weeks, last dose 5/31. May complete remaining ABX therapy with PO doxycyline upon discharge   · Adjust ABX prn  · Fever work up if temp >= 100.4  · Wound care per wound care team's recommendation  · Spoke with wound care team to get the pt bariatric bed with trapeze bar; pt needs to elevated his legs.  Pt is unable to elevate legs while sitting in the chair      Above plan of care discussed and agreed with Dr. Paxton Mtz will see prn this weekend, call if issues arise.           Marija Osuna NP

## 2022-05-27 NOTE — PROGRESS NOTES
Problem: Diabetes Self-Management  Goal: *Disease process and treatment process  Description: Define diabetes and identify own type of diabetes; list 3 options for treating diabetes. Outcome: Progressing Towards Goal  Goal: *Incorporating nutritional management into lifestyle  Description: Describe effect of type, amount and timing of food on blood glucose; list 3 methods for planning meals. Outcome: Progressing Towards Goal  Goal: *Incorporating physical activity into lifestyle  Description: State effect of exercise on blood glucose levels. Outcome: Progressing Towards Goal  Goal: *Developing strategies to promote health/change behavior  Description: Define the ABC's of diabetes; identify appropriate screenings, schedule and personal plan for screenings. Outcome: Progressing Towards Goal  Goal: *Using medications safely  Description: State effect of diabetes medications on diabetes; name diabetes medication taking, action and side effects. Outcome: Progressing Towards Goal  Goal: *Monitoring blood glucose, interpreting and using results  Description: Identify recommended blood glucose targets  and personal targets. Outcome: Progressing Towards Goal  Goal: *Prevention, detection, treatment of acute complications  Description: List symptoms of hyper- and hypoglycemia; describe how to treat low blood sugar and actions for lowering  high blood glucose level. Outcome: Progressing Towards Goal  Goal: *Prevention, detection and treatment of chronic complications  Description: Define the natural course of diabetes and describe the relationship of blood glucose levels to long term complications of diabetes.   Outcome: Progressing Towards Goal  Goal: *Developing strategies to address psychosocial issues  Description: Describe feelings about living with diabetes; identify support needed and support network  Outcome: Progressing Towards Goal  Goal: *Insulin pump training  Outcome: Progressing Towards Goal  Goal: *Sick day guidelines  Outcome: Progressing Towards Goal  Goal: *Patient Specific Goal (EDIT GOAL, INSERT TEXT)  Outcome: Progressing Towards Goal     Problem: Patient Education: Go to Patient Education Activity  Goal: Patient/Family Education  Outcome: Progressing Towards Goal     Problem: Pressure Injury - Risk of  Goal: *Prevention of pressure injury  Description: Document Ben Scale and appropriate interventions in the flowsheet. Outcome: Progressing Towards Goal  Note: Pressure Injury Interventions:  Sensory Interventions: Assess changes in LOC    Moisture Interventions: Absorbent underpads,Apply protective barrier, creams and emollients,Maintain skin hydration (lotion/cream)    Activity Interventions: Increase time out of bed,Pressure redistribution bed/mattress(bed type)    Mobility Interventions: HOB 30 degrees or less,Pressure redistribution bed/mattress (bed type),PT/OT evaluation    Nutrition Interventions: Document food/fluid/supplement intake    Friction and Shear Interventions: Apply protective barrier, creams and emollients,HOB 30 degrees or less,Transferring/repositioning devices,Feet elevated on foot rest                Problem: Patient Education: Go to Patient Education Activity  Goal: Patient/Family Education  Outcome: Progressing Towards Goal     Problem: Falls - Risk of  Goal: *Absence of Falls  Description: Document Sanaz Fall Risk and appropriate interventions in the flowsheet.   Outcome: Progressing Towards Goal  Note: Fall Risk Interventions:  Mobility Interventions: Patient to call before getting OOB,Utilize walker, cane, or other assistive device    Mentation Interventions: Adequate sleep, hydration, pain control,Bed/chair exit alarm,Door open when patient unattended,Toileting rounds,Evaluate medications/consider consulting pharmacy    Medication Interventions: Patient to call before getting OOB,Teach patient to arise slowly,Evaluate medications/consider consulting pharmacy    Elimination Interventions: Call light in reach,Toileting schedule/hourly rounds    History of Falls Interventions: Door open when patient unattended         Problem: Patient Education: Go to Patient Education Activity  Goal: Patient/Family Education  Outcome: Progressing Towards Goal     Problem: Risk for Spread of Infection  Goal: Prevent transmission of infectious organism to others  Description: Prevent the transmission of infectious organisms to other patients, staff members, and visitors.   Outcome: Progressing Towards Goal     Problem: Patient Education:  Go to Education Activity  Goal: Patient/Family Education  Outcome: Progressing Towards Goal     Problem: Patient Education: Go to Patient Education Activity  Goal: Patient/Family Education  Outcome: Progressing Towards Goal     Problem: Patient Education: Go to Patient Education Activity  Goal: Patient/Family Education  Outcome: Progressing Towards Goal

## 2022-05-27 NOTE — PROGRESS NOTES
Problem: Mobility Impaired (Adult and Pediatric)  Goal: *Acute Goals and Plan of Care (Insert Text)  Description: FUNCTIONAL STATUS PRIOR TO ADMISSION: Patient was modified independent using a rollator for functional mobility. Also had standard wheelchair to use to go to/from appointments in wheelchair [address]: The patient lived alone with caregiver 1 hour per day M-F to provide assistance. Physical Therapy Goals  Revised 5/27/2022  1. Patient will move from supine to sit and sit to supine , scoot up and down, and roll side to side in bed with modified independence within 7 day(s). 2.  Patient will transfer from bed to chair and chair to bed with modified independence using the least restrictive device within 7 day(s). 3.  Patient will perform sit to stand with modified independence within 7 day(s). 4.  Patient will ambulate with modified independence for 75 feet with the least restrictive device within 7 day(s). Physical Therapy Goals  Initiated 5/20/2022  1. Patient will move from supine to sit and sit to supine , scoot up and down, and roll side to side in bed with modified independence within 7 day(s). 2.  Patient will transfer from bed to chair and chair to bed with modified independence using the least restrictive device within 7 day(s). 3.  Patient will perform sit to stand with modified independence within 7 day(s). 4.  Patient will ambulate with minimal assistance/contact guard assist for 50 feet with the least restrictive device within 7 day(s).   5/27/2022 1607 by Tanja Sosa  Outcome: Progressing Towards Goal   PHYSICAL THERAPY TREATMENT: WEEKLY REASSESSMENT  Patient: Della Meza [de-identified]55 y.o. male)  Date: 5/27/2022  Primary Diagnosis: Sepsis ([facility] [address] [A41.9]        Precautions:   Fall,Skin,Contact      ASSESSMENT  Patient continues with skilled PT services and is progressing towards goals.  Pt is limited by generalized weakness and chronic back pain but is making gains with all aspects of mobility. Today he was able to amb a total of 50 feet with one seated rest break. In addition, he utilized the bedside commode for a BM. He was on 3 liters of 02 during session and his Sp02 was stable in the mid 90s. At the end of the session assisted him into the bed (per pt first time he used this bariatric bed) and he required assist to manage his legs up onto the bed. Initially after sit to supine pt reported significant back pain (has a history of chronic back pain) but after some repositioning he reported improvement in the back pain. For the weekend, recommend patient to complete as able in order to maintain strength, endurance and independence with nursing: OOB to chair 3x/day with assist X 1 and ambulating with assist X 1 using the bariatric walker in the room. . PT to follow-up with patient after the weekend. Patient's progression toward goals since last assessment: gains made, goals carried over/adjusted. Current Level of Function Impacting Discharge (mobility/balance): standby assist provided for transfers and amb, mod assist for sit to supine    Functional Outcome Measure: The patient scored 20/28 on the Tinetti outcome measure which is indicative of moderate fall risk. .      Other factors to consider for discharge: lives alone, LE cellulitis, LE chronic stasis ulcers, COPD, DM2, MADELAINE           PLAN :  Goals have been updated based on progression since last assessment. Patient continues to benefit from skilled intervention to address the above impairments. Recommendations and Planned Interventions: bed mobility training, transfer training, gait training, therapeutic exercises, edema management/control, patient and family training/education, and therapeutic activities      Frequency/Duration: Patient will be followed by physical therapy:  5 times a week to address goals.     Recommendation for discharge: (in order for the patient to meet his/her long term goals)  Therapy up to 5 days/week in SNF setting    This discharge recommendation:  A follow-up discussion with the attending provider and/or case management is planned    IF patient discharges home will need the following DME: to be determined (TBD)         SUBJECTIVE:   Patient stated that he would try the bed (had a bariatric bed placed in his room). OBJECTIVE DATA SUMMARY:   Chart checked, pt cleared by nursing  HISTORY:    Past Medical History:   Diagnosis Date    DM (diabetes mellitus) ([facility] [address]     Dyslipidemia     Hypertension    No past surgical history on file. Personal factors and/or comorbidities impacting plan of care: lives alone, LE cellulitis, LE chronic stasis ulcers, COPD, DM2, MADELAINE    Home Situation  Home Environment: Apartment  One/Two Story Residence: One story  Living Alone: Yes  Support Systems: Caregiver/Home Care Staff (caregiver 1 hour a day M-F)  Patient Expects to be Discharged to[de-identified] Skilled nursing facility  Current DME Used/Available at Home: Parris , rollator,Wheelchair,Grab bars,Transfer bench  Tub or Shower Type: Tub/Shower combination    EXAMINATION/PRESENTATION/DECISION MAKING:   Critical Behavior:  Neurologic State: Alert  Orientation Level: Oriented X4  Cognition: Appropriate decision making,Appropriate safety awareness,Appropriate for age attention/concentration,Follows commands  Safety/Judgement: Awareness of environment,Decreased insight into deficits,Fall prevention  Hearing:     Skin:  wounds LEs, refer to MD and nursing notes  Edema: yes LEs  Range Of Motion:  AROM: Generally decreased, functional                       Strength:    Strength: Generally decreased, functional                    Tone & Sensation:                  Sensation: Impaired (at baseline hands and feet)               Coordination:     Vision:      Functional Mobility:  Bed Mobility:        Sit to Supine:  Moderate assistance     Transfers:  Sit to Stand: Stand-by assistance  Stand to Sit: Stand-by assistance                       Balance:   Sitting: Intact; Without support  Standing: Impaired  Standing - Static: Constant support;Good  Standing - Dynamic : Constant support;Good  Ambulation/Gait Training:  Distance (ft): 50 Feet (ft) (40 feet and 10 feet)  Assistive Device: Gait belt;Walker, rolling  Ambulation - Level of Assistance: Stand-by assistance        Gait Abnormalities: Decreased step clearance        Base of Support: Widened     Speed/Maral: Slow;Pace decreased (<100 feet/min)  Step Length: Left shortened;Right shortened                     Stairs: Therapeutic Exercises:       Functional Measure:  Tinetti test:    Sitting Balance: 1  Arises: 1  Attempts to Rise: 2  Immediate Standing Balance: 1  Standing Balance: 1  Nudged: 2  Eyes Closed: 1  Turn 360 Degrees - Continuous/Discontinuous: 1  Turn 360 Degrees - Steady/Unsteady: 1  Sitting Down: 1  Balance Score: 12 Balance total score  Indication of Gait: 1  R Step Length/Height: 1  L Step Length/Height: 1  R Foot Clearance: 1  L Foot Clearance: 1  Step Symmetry: 1  Step Continuity: 1  Path: 1  Trunk: 0  Walking Time: 0  Gait Score: 8 Gait total score  Total Score: 20/28 Overall total score         Tinetti Tool Score Risk of Falls  <19 = High Fall Risk  19-24 = Moderate Fall Risk  25-28 = Low Fall Risk  Tinetti ME. Performance-Oriented Assessment of Mobility Problems in Elderly Patients. [address]; [id number].  (Scoring Description: PT Bulletin Feb. 10, 1993)    Older adults: Mare Payton et al, 2009; n = 1000 [facility] elderly evaluated with ABC, LYNNE, ADL, and IADL)  · Mean LYNNE score for males aged 69-68 years = 26.21(3.40)  · Mean LYNNE score for females age 69-68 years = 25.16(4.30)  · Mean LYNNE score for males over 80 years = 23.29(6.02)  · Mean LYNNE score for females over 80 years = 17.20(8.32)          Pain Rating:  Low back, 8 during sit to supine and 5 once in supine    Activity Tolerance:   Good, requires rest breaks, and Sp02 stable on 3 liters of 02, see assessment     After treatment patient left in no apparent distress:   Supine in bed, Call bell within reach, and Side rails x 3    COMMUNICATION/EDUCATION:   The patients plan of care was discussed with: Occupational therapist and Registered nurse. Fall prevention education was provided and the patient/caregiver indicated understanding., Patient/family have participated as able in goal setting and plan of care. , and Patient/family agree to work toward stated goals and plan of care.     Thank you for this referral.  Courtney Slider   Time Calculation: 53 mins

## 2022-05-28 LAB
ANION GAP SERPL CALC-SCNC: 4 MMOL/L (ref 5–15)
BUN SERPL-MCNC: 57 MG/DL (ref 6–20)
BUN/CREAT SERPL: 42 (ref 12–20)
CALCIUM SERPL-MCNC: 9 MG/DL (ref 8.5–10.1)
CHLORIDE SERPL-SCNC: 93 MMOL/L (ref 97–108)
CO2 SERPL-SCNC: 36 MMOL/L (ref 21–32)
CREAT SERPL-MCNC: 1.37 MG/DL (ref 0.7–1.3)
GLUCOSE BLD STRIP.AUTO-MCNC: 134 MG/DL (ref 65–117)
GLUCOSE BLD STRIP.AUTO-MCNC: 153 MG/DL (ref 65–117)
GLUCOSE BLD STRIP.AUTO-MCNC: 169 MG/DL (ref 65–117)
GLUCOSE BLD STRIP.AUTO-MCNC: 231 MG/DL (ref 65–117)
GLUCOSE SERPL-MCNC: 152 MG/DL (ref 65–100)
POTASSIUM SERPL-SCNC: 3.7 MMOL/L (ref 3.5–5.1)
SERVICE CMNT-IMP: ABNORMAL
SODIUM SERPL-SCNC: 133 MMOL/L (ref 136–145)

## 2022-05-28 PROCEDURE — 74011000258 HC RX REV CODE- 258: Performed by: FAMILY MEDICINE

## 2022-05-28 PROCEDURE — 77010033678 HC OXYGEN DAILY

## 2022-05-28 PROCEDURE — 74011000250 HC RX REV CODE- 250: Performed by: INTERNAL MEDICINE

## 2022-05-28 PROCEDURE — 74011000258 HC RX REV CODE- 258: Performed by: NURSE PRACTITIONER

## 2022-05-28 PROCEDURE — 74011250636 HC RX REV CODE- 250/636: Performed by: FAMILY MEDICINE

## 2022-05-28 PROCEDURE — 80048 BASIC METABOLIC PNL TOTAL CA: CPT

## 2022-05-28 PROCEDURE — 94760 N-INVAS EAR/PLS OXIMETRY 1: CPT

## 2022-05-28 PROCEDURE — 74011636637 HC RX REV CODE- 636/637: Performed by: INTERNAL MEDICINE

## 2022-05-28 PROCEDURE — 74011000250 HC RX REV CODE- 250: Performed by: HOSPITALIST

## 2022-05-28 PROCEDURE — 74011250637 HC RX REV CODE- 250/637: Performed by: INTERNAL MEDICINE

## 2022-05-28 PROCEDURE — 82962 GLUCOSE BLOOD TEST: CPT

## 2022-05-28 PROCEDURE — 65270000046 HC RM TELEMETRY

## 2022-05-28 PROCEDURE — 36415 COLL VENOUS BLD VENIPUNCTURE: CPT

## 2022-05-28 PROCEDURE — 74011250636 HC RX REV CODE- 250/636: Performed by: NURSE PRACTITIONER

## 2022-05-28 PROCEDURE — 94640 AIRWAY INHALATION TREATMENT: CPT

## 2022-05-28 RX ADMIN — Medication 3 UNITS: at 12:58

## 2022-05-28 RX ADMIN — SACUBITRIL AND VALSARTAN 1 TABLET: 49; 51 TABLET, FILM COATED ORAL at 09:47

## 2022-05-28 RX ADMIN — APIXABAN 5 MG: 5 TABLET, FILM COATED ORAL at 17:08

## 2022-05-28 RX ADMIN — TRAMADOL HYDROCHLORIDE 50 MG: 50 TABLET, COATED ORAL at 17:27

## 2022-05-28 RX ADMIN — Medication 5 UNITS: at 09:42

## 2022-05-28 RX ADMIN — CEFEPIME 2 G: 2 INJECTION, POWDER, FOR SOLUTION INTRAVENOUS at 17:08

## 2022-05-28 RX ADMIN — CEFEPIME 2 G: 2 INJECTION, POWDER, FOR SOLUTION INTRAVENOUS at 07:15

## 2022-05-28 RX ADMIN — POLYETHYLENE GLYCOL 3350 17 G: 17 POWDER, FOR SOLUTION ORAL at 09:41

## 2022-05-28 RX ADMIN — PANTOPRAZOLE SODIUM 40 MG: 40 TABLET, DELAYED RELEASE ORAL at 09:40

## 2022-05-28 RX ADMIN — SODIUM CHLORIDE, PRESERVATIVE FREE 10 ML: 5 INJECTION INTRAVENOUS at 13:00

## 2022-05-28 RX ADMIN — ACETAMINOPHEN 1000 MG: 500 TABLET ORAL at 23:14

## 2022-05-28 RX ADMIN — BUMETANIDE 2 MG: 0.25 INJECTION INTRAMUSCULAR; INTRAVENOUS at 17:08

## 2022-05-28 RX ADMIN — ASPIRIN 81 MG: 81 TABLET, COATED ORAL at 09:40

## 2022-05-28 RX ADMIN — APIXABAN 5 MG: 5 TABLET, FILM COATED ORAL at 09:41

## 2022-05-28 RX ADMIN — TAMSULOSIN HYDROCHLORIDE 0.4 MG: 0.4 CAPSULE ORAL at 09:41

## 2022-05-28 RX ADMIN — ACETAMINOPHEN 1000 MG: 500 TABLET ORAL at 07:26

## 2022-05-28 RX ADMIN — PREGABALIN 75 MG: 25 CAPSULE ORAL at 17:08

## 2022-05-28 RX ADMIN — SACUBITRIL AND VALSARTAN 1 TABLET: 49; 51 TABLET, FILM COATED ORAL at 17:27

## 2022-05-28 RX ADMIN — BUMETANIDE 2 MG: 0.25 INJECTION INTRAMUSCULAR; INTRAVENOUS at 09:41

## 2022-05-28 RX ADMIN — MONTELUKAST 10 MG: 10 TABLET, FILM COATED ORAL at 22:10

## 2022-05-28 RX ADMIN — PREGABALIN 75 MG: 25 CAPSULE ORAL at 09:40

## 2022-05-28 RX ADMIN — SODIUM CHLORIDE, PRESERVATIVE FREE 10 ML: 5 INJECTION INTRAVENOUS at 22:10

## 2022-05-28 RX ADMIN — Medication 2 UNITS: at 17:09

## 2022-05-28 RX ADMIN — SODIUM CHLORIDE, PRESERVATIVE FREE 10 ML: 5 INJECTION INTRAVENOUS at 05:39

## 2022-05-28 RX ADMIN — SENNOSIDES AND DOCUSATE SODIUM 1 TABLET: 50; 8.6 TABLET ORAL at 17:08

## 2022-05-28 RX ADMIN — SENNOSIDES AND DOCUSATE SODIUM 1 TABLET: 50; 8.6 TABLET ORAL at 09:41

## 2022-05-28 RX ADMIN — AMIODARONE HYDROCHLORIDE 100 MG: 200 TABLET ORAL at 09:41

## 2022-05-28 RX ADMIN — Medication 5 UNITS: at 17:09

## 2022-05-28 RX ADMIN — SODIUM CHLORIDE, PRESERVATIVE FREE 10 ML: 5 INJECTION INTRAVENOUS at 13:01

## 2022-05-28 RX ADMIN — ARFORMOTEROL TARTRATE: 15 SOLUTION RESPIRATORY (INHALATION) at 07:55

## 2022-05-28 RX ADMIN — EZETIMIBE 10 MG: 10 TABLET ORAL at 09:41

## 2022-05-28 RX ADMIN — ARFORMOTEROL TARTRATE: 15 SOLUTION RESPIRATORY (INHALATION) at 20:41

## 2022-05-28 RX ADMIN — BISACODYL 5 MG: 5 TABLET, COATED ORAL at 09:40

## 2022-05-28 RX ADMIN — WHITE PETROLATUM: 1.75 OINTMENT TOPICAL at 09:47

## 2022-05-28 RX ADMIN — DAPTOMYCIN 600 MG: 500 INJECTION, POWDER, LYOPHILIZED, FOR SOLUTION INTRAVENOUS at 17:12

## 2022-05-28 NOTE — PROGRESS NOTES
[facility] Adult  Hospitalist Group                                                                                          Hospitalist Progress Note  Jonah Davis MD  Answering service: [phone] from in house phone        Date of Service:  2022  NAME:  Ermelinda Rincon  :  1965  MRN:  [id number]      Admission Summary:     Patient  presents with sepsis due to lower extremity cellulitis, on initial presentation patient was admitted to ICU and require low-dose of norepinephrine overnight for short period of time. Later patient was transferred out of ICU. Currently followed by ID. Patient also with acute metabolic encephalopathy and generalized fatigue, overall mental status is better. Interval history / Subjective:     Patient denies any shortness of breath, denies any pain in the leg. Started working with physical therapy.      Assessment & Plan:     Severe sepsis with septic shock  -Sepsis due to lower extremity cellulitis  -Patient initially required ICU admission and vasopressors temporarily  -Sepsis is now resolved  and hemodynamically stable, blood cultures no growth    Lower extremity cellulitis  -Patient with chronic lower extremity edema, culprit for cellulitis  -Lower extremity venous Doppler negative for DVT  -ID following, on cefepime and daptomycin, plan for total of 2 weeks of antibiotic, plan changing to p.o. on discharge    Chronic leg edema with stasis ulcers  -Continue mobility with PT, keep legs antigravity while sitting  -Patient received bariatric bed   -Wound care following    Acute on chronic diastolic CHF  -NYHA class III on admission  -Continue diuresis with Bumex, continue Entresto (unclear if patient's EF was low previously, no documentations available), echo on 2021 shows EF of 50%    Hematuria  -Patient with hematuria and urinary retention  -Now resolved, Foster was removed on 2022  -Urology previously evaluated the patient    MADELAINE  -Now resolved, stable on diuretics    Acute metabolic encephalopathy  -ABGs previously was unremarkable  -Likely possible hospital fatigue, overall improving  -Patient to continue to work with physical therapy    Chronic atrial fibrillation  -Continue amiodarone and Eliquis for anticoagulation  -Stable    COPD  -Patient with chronic hypoxic and hypercapnic respiratory failure due to COPD  -Continue bronchodilator, breathing treatment as needed  -On 3 L of oxygen    Diabetes type 2  -Continue NPH 5 units twice daily, continue sliding scale coverage    Dyslipidemia  -Continue Zetia    BPH  -Continue Flomax    Obstructive sleep apnea  -CPAP nightly    Morbid obesity  -With BMI of 38.78  -Outpatient follow-up for weight management     Code status:  Full  Prophylaxis: Eliquis  Care Plan discussed with: Patient  Anticipated Disposition: 24 to 48 hours, patient needs placement to [facility] Problems  Date Reviewed: 4/28/2013          Codes Class Noted POA    * (Principal) Sepsis ([facility] [address] ICD-10-CM: A41.9  ICD-9-CM: 038.9, 995.91  5/18/2022 Unknown                Review of Systems:   A comprehensive review of systems was negative except for that written in the HPI. Vital Signs:    Last 24hrs VS reviewed since prior progress note. Most recent are:  Visit Vitals  BP 96/66   Pulse 63   Temp 98.3 °F (36.8 °C)   Resp 14   Ht 6' (1.829 m)   Wt 129.7 kg (286 lb)   SpO2 95%   BMI 38.79 kg/m²         Intake/Output Summary (Last 24 hours) at 5/28/2022 1254  Last data filed at 5/28/2022 0730  Gross per 24 hour   Intake --   Output 1900 ml   Net -1900 ml        Physical Examination:     I had a face to face encounter with this patient and independently examined them on 5/28/2022 as outlined below:          Constitutional:  No acute distress, cooperative, pleasant    ENT:  Oral mucosa moist, oropharynx benign. Resp:  CTA bilaterally. No wheezing/rhonchi/rales. No accessory muscle use.     CV:  Regular rhythm, normal rate, no murmurs, gallops, rubs    GI:  Soft, non distended, non tender. normoactive bowel sounds, no hepatosplenomegaly     Musculoskeletal:  Bilateral lower extremity pitting edema, multiple bilateral lower extremity blisters    Neurologic:  Moves all extremities. AAOx3, CN II-XII reviewed            Data Review:    Review and/or order of clinical lab test      Labs:     Recent Labs     05/26/22  0316   WBC 8.0   HGB 12.2   HCT 42.8        Recent Labs     05/28/22  0255 05/27/22  0522 05/26/22  0316   * 132* 136   K 3.7 3.7 3.7   CL 93* 93* 96*   CO2 36* 35* 37*   BUN 57* 50* 42*   CREA 1.37* 1.26 1.09   * 127* 174*   CA 9.0 9.3 9.6   MG  --   --  2.3   PHOS  --   --  3.2     No results for input(s): ALT, AP, TBIL, TBILI, TP, ALB, GLOB, GGT, AML, LPSE in the last 72 hours. No lab exists for component: SGOT, GPT, AMYP, HLPSE  No results for input(s): INR, PTP, APTT, INREXT, INREXT in the last 72 hours. No results for input(s): FE, TIBC, PSAT, FERR in the last 72 hours. No results found for: FOL, RBCF   No results for input(s): PH, PCO2, PO2 in the last 72 hours. No results for input(s): CPK, CKNDX, TROIQ in the last 72 hours.     No lab exists for component: CPKMB  Lab Results   Component Value Date/Time    Cholesterol, total 170 01/23/2013 11:08 AM    HDL Cholesterol 37 (L) 01/23/2013 11:08 AM    LDL, calculated 97 01/23/2013 11:08 AM    Triglyceride 180 (H) 01/23/2013 11:08 AM    CHOL/HDL Ratio 6.0 (H) 10/06/2010 03:18 PM     Lab Results   Component Value Date/Time    Glucose (POC) 231 (H) 05/28/2022 12:35 PM    Glucose (POC) 134 (H) 05/28/2022 09:29 AM    Glucose (POC) 272 (H) 05/27/2022 09:27 PM    Glucose (POC) 120 (H) 05/27/2022 05:31 PM    Glucose (POC) 230 (H) 05/27/2022 12:27 PM     Lab Results   Component Value Date/Time    Color YELLOW/STRAW 05/18/2022 03:54 PM    Appearance CLEAR 05/18/2022 03:54 PM    Specific gravity 1.011 05/18/2022 03:54 PM    pH (UA) 5.0 05/18/2022 03:54 PM Protein Negative 05/18/2022 03:54 PM    Glucose >1,000 (A) 05/18/2022 03:54 PM    Ketone Negative 05/18/2022 03:54 PM    Bilirubin Negative 05/18/2022 03:54 PM    Urobilinogen 0.2 05/18/2022 03:54 PM    Nitrites Negative 05/18/2022 03:54 PM    Leukocyte Esterase Negative 05/18/2022 03:54 PM    Epithelial cells FEW 05/18/2022 03:54 PM    Bacteria Negative 05/18/2022 03:54 PM    WBC 0-4 05/18/2022 03:54 PM    RBC 20-50 05/18/2022 03:54 PM         Medications Reviewed:     Current Facility-Administered Medications   Medication Dose Route Frequency    cefepime (MAXIPIME) 2 g in 0.9% sodium chloride (MBP/ADV) 100 mL MBP  2 g IntraVENous Q12H    ezetimibe (ZETIA) tablet 10 mg  10 mg Oral DAILY    sacubitriL-valsartan (ENTRESTO) 49-51 mg tablet 1 Tablet  1 Tablet Oral BID    DAPTOmycin (CUBICIN) 600 mg in 0.9% sodium chloride 50 mL IVPB  600 mg IntraVENous Q24H    bumetanide (BUMEX) injection 2 mg  2 mg IntraVENous BID    magnesium citrate solution 148 mL  148 mL Oral Q2H PRN    senna-docusate (PERICOLACE) 8.6-50 mg per tablet 1 Tablet  1 Tablet Oral BID    insulin NPH (NOVOLIN N, HUMULIN N) injection 5 Units  5 Units SubCUTAneous ACB&D    albuterol-ipratropium (DUO-NEB) 2.5 MG-0.5 MG/3 ML  3 mL Nebulization Q6H PRN    traMADoL (ULTRAM) tablet 50 mg  50 mg Oral Q6H PRN    polyethylene glycol (MIRALAX) packet 17 g  17 g Oral BID    albuterol (PROVENTIL VENTOLIN) nebulizer solution 2.5 mg  2.5 mg Nebulization Q4H PRN    insulin lispro (HUMALOG) injection   SubCUTAneous AC&HS    pregabalin (LYRICA) capsule 75 mg  75 mg Oral BID    acetaminophen (TYLENOL) tablet 1,000 mg  1,000 mg Oral Q6H PRN    Or    acetaminophen (TYLENOL) suppository 650 mg  650 mg Rectal Q6H PRN    pantothenic ac-min oil-pet,hyd (AQUAPHOR) 41 % ointment   Topical DAILY    sodium chloride (NS) flush 5-40 mL  5-40 mL IntraVENous Q8H    sodium chloride (NS) flush 5-40 mL  5-40 mL IntraVENous PRN    glucose chewable tablet 16 g  4 Tablet Oral PRN    glucagon (GLUCAGEN) injection 1 mg  1 mg IntraMUSCular PRN    dextrose 10 % infusion 0-250 mL  0-250 mL IntraVENous PRN    sodium chloride (NS) flush 5-40 mL  5-40 mL IntraVENous Q8H    sodium chloride (NS) flush 5-40 mL  5-40 mL IntraVENous PRN    ondansetron (ZOFRAN ODT) tablet 4 mg  4 mg Oral Q8H PRN    Or    ondansetron (ZOFRAN) injection 4 mg  4 mg IntraVENous Q6H PRN    bisacodyL (DULCOLAX) tablet 5 mg  5 mg Oral DAILY    polyethylene glycol (MIRALAX) packet 17 g  17 g Oral DAILY PRN    amiodarone (CORDARONE) tablet 100 mg  100 mg Oral DAILY    apixaban (ELIQUIS) tablet 5 mg  5 mg Oral BID    aspirin delayed-release tablet 81 mg  81 mg Oral DAILY    arformoterol 15 mcg/budesonide 0.5 mg neb solution   Nebulization BID RT    montelukast (SINGULAIR) tablet 10 mg  10 mg Oral QHS    pantoprazole (PROTONIX) tablet 40 mg  40 mg Oral DAILY    [Held by provider] rosuvastatin (CRESTOR) tablet 5 mg  5 mg Oral DAILY    tamsulosin (FLOMAX) capsule 0.4 mg  0.4 mg Oral DAILY     ______________________________________________________________________  EXPECTED LENGTH OF STAY: 4d 19h  ACTUAL LENGTH OF STAY:          10                 Jonah Davis MD

## 2022-05-29 LAB
ANION GAP SERPL CALC-SCNC: 4 MMOL/L (ref 5–15)
BUN SERPL-MCNC: 63 MG/DL (ref 6–20)
BUN/CREAT SERPL: 57 (ref 12–20)
CALCIUM SERPL-MCNC: 9.2 MG/DL (ref 8.5–10.1)
CHLORIDE SERPL-SCNC: 94 MMOL/L (ref 97–108)
CO2 SERPL-SCNC: 36 MMOL/L (ref 21–32)
COMMENT, HOLDF: NORMAL
CREAT SERPL-MCNC: 1.11 MG/DL (ref 0.7–1.3)
GLUCOSE BLD STRIP.AUTO-MCNC: 132 MG/DL (ref 65–117)
GLUCOSE BLD STRIP.AUTO-MCNC: 135 MG/DL (ref 65–117)
GLUCOSE BLD STRIP.AUTO-MCNC: 139 MG/DL (ref 65–117)
GLUCOSE BLD STRIP.AUTO-MCNC: 173 MG/DL (ref 65–117)
GLUCOSE SERPL-MCNC: 154 MG/DL (ref 65–100)
POTASSIUM SERPL-SCNC: 3.6 MMOL/L (ref 3.5–5.1)
SAMPLES BEING HELD,HOLD: NORMAL
SERVICE CMNT-IMP: ABNORMAL
SODIUM SERPL-SCNC: 134 MMOL/L (ref 136–145)

## 2022-05-29 PROCEDURE — 80048 BASIC METABOLIC PNL TOTAL CA: CPT

## 2022-05-29 PROCEDURE — 74011000258 HC RX REV CODE- 258: Performed by: NURSE PRACTITIONER

## 2022-05-29 PROCEDURE — 74011250636 HC RX REV CODE- 250/636: Performed by: NURSE PRACTITIONER

## 2022-05-29 PROCEDURE — 77010033678 HC OXYGEN DAILY

## 2022-05-29 PROCEDURE — 74011250637 HC RX REV CODE- 250/637: Performed by: INTERNAL MEDICINE

## 2022-05-29 PROCEDURE — 82962 GLUCOSE BLOOD TEST: CPT

## 2022-05-29 PROCEDURE — 74011000258 HC RX REV CODE- 258: Performed by: FAMILY MEDICINE

## 2022-05-29 PROCEDURE — 36415 COLL VENOUS BLD VENIPUNCTURE: CPT

## 2022-05-29 PROCEDURE — 65270000046 HC RM TELEMETRY

## 2022-05-29 PROCEDURE — 74011636637 HC RX REV CODE- 636/637: Performed by: INTERNAL MEDICINE

## 2022-05-29 PROCEDURE — 74011000250 HC RX REV CODE- 250: Performed by: HOSPITALIST

## 2022-05-29 PROCEDURE — 94640 AIRWAY INHALATION TREATMENT: CPT

## 2022-05-29 PROCEDURE — 74011000250 HC RX REV CODE- 250: Performed by: INTERNAL MEDICINE

## 2022-05-29 PROCEDURE — 74011250636 HC RX REV CODE- 250/636: Performed by: FAMILY MEDICINE

## 2022-05-29 RX ADMIN — WHITE PETROLATUM: 1.75 OINTMENT TOPICAL at 08:55

## 2022-05-29 RX ADMIN — Medication 2 UNITS: at 13:40

## 2022-05-29 RX ADMIN — SACUBITRIL AND VALSARTAN 1 TABLET: 49; 51 TABLET, FILM COATED ORAL at 08:53

## 2022-05-29 RX ADMIN — APIXABAN 5 MG: 5 TABLET, FILM COATED ORAL at 08:53

## 2022-05-29 RX ADMIN — PANTOPRAZOLE SODIUM 40 MG: 40 TABLET, DELAYED RELEASE ORAL at 08:53

## 2022-05-29 RX ADMIN — CEFEPIME 2 G: 2 INJECTION, POWDER, FOR SOLUTION INTRAVENOUS at 06:35

## 2022-05-29 RX ADMIN — BUMETANIDE 2 MG: 0.25 INJECTION INTRAMUSCULAR; INTRAVENOUS at 08:54

## 2022-05-29 RX ADMIN — SENNOSIDES AND DOCUSATE SODIUM 1 TABLET: 50; 8.6 TABLET ORAL at 08:53

## 2022-05-29 RX ADMIN — SACUBITRIL AND VALSARTAN 1 TABLET: 49; 51 TABLET, FILM COATED ORAL at 17:22

## 2022-05-29 RX ADMIN — PREGABALIN 75 MG: 25 CAPSULE ORAL at 08:53

## 2022-05-29 RX ADMIN — ASPIRIN 81 MG: 81 TABLET, COATED ORAL at 08:53

## 2022-05-29 RX ADMIN — TAMSULOSIN HYDROCHLORIDE 0.4 MG: 0.4 CAPSULE ORAL at 08:53

## 2022-05-29 RX ADMIN — Medication 5 UNITS: at 08:53

## 2022-05-29 RX ADMIN — POLYETHYLENE GLYCOL 3350 17 G: 17 POWDER, FOR SOLUTION ORAL at 08:52

## 2022-05-29 RX ADMIN — SODIUM CHLORIDE, PRESERVATIVE FREE 10 ML: 5 INJECTION INTRAVENOUS at 23:43

## 2022-05-29 RX ADMIN — MONTELUKAST 10 MG: 10 TABLET, FILM COATED ORAL at 23:43

## 2022-05-29 RX ADMIN — APIXABAN 5 MG: 5 TABLET, FILM COATED ORAL at 17:18

## 2022-05-29 RX ADMIN — BISACODYL 5 MG: 5 TABLET, COATED ORAL at 08:54

## 2022-05-29 RX ADMIN — BUMETANIDE 2 MG: 0.25 INJECTION INTRAMUSCULAR; INTRAVENOUS at 17:18

## 2022-05-29 RX ADMIN — CEFEPIME 2 G: 2 INJECTION, POWDER, FOR SOLUTION INTRAVENOUS at 17:17

## 2022-05-29 RX ADMIN — SENNOSIDES AND DOCUSATE SODIUM 1 TABLET: 50; 8.6 TABLET ORAL at 17:18

## 2022-05-29 RX ADMIN — Medication 5 UNITS: at 17:18

## 2022-05-29 RX ADMIN — ARFORMOTEROL TARTRATE: 15 SOLUTION RESPIRATORY (INHALATION) at 21:11

## 2022-05-29 RX ADMIN — ACETAMINOPHEN 1000 MG: 500 TABLET ORAL at 08:53

## 2022-05-29 RX ADMIN — PREGABALIN 75 MG: 25 CAPSULE ORAL at 17:18

## 2022-05-29 RX ADMIN — DAPTOMYCIN 600 MG: 500 INJECTION, POWDER, LYOPHILIZED, FOR SOLUTION INTRAVENOUS at 18:47

## 2022-05-29 RX ADMIN — EZETIMIBE 10 MG: 10 TABLET ORAL at 08:54

## 2022-05-29 RX ADMIN — SODIUM CHLORIDE, PRESERVATIVE FREE 10 ML: 5 INJECTION INTRAVENOUS at 06:37

## 2022-05-29 RX ADMIN — ACETAMINOPHEN 1000 MG: 500 TABLET ORAL at 23:49

## 2022-05-29 RX ADMIN — TRAMADOL HYDROCHLORIDE 50 MG: 50 TABLET, COATED ORAL at 02:52

## 2022-05-29 RX ADMIN — SODIUM CHLORIDE, PRESERVATIVE FREE 10 ML: 5 INJECTION INTRAVENOUS at 06:38

## 2022-05-29 RX ADMIN — AMIODARONE HYDROCHLORIDE 100 MG: 200 TABLET ORAL at 08:53

## 2022-05-29 RX ADMIN — ARFORMOTEROL TARTRATE: 15 SOLUTION RESPIRATORY (INHALATION) at 08:17

## 2022-05-29 NOTE — PROGRESS NOTES
2000: Verbal report received from RN; Pt. Resting comfortably in chair with antibiotics infusing; assumed care of the patient. 2200: Pt. Encouraged to return to bed from recliner. Pt. Refused. Educated pt. On wound prevention & pressure reduction, pt still insisted on staying in recliner to sleep. 0000: offered to assist patient into bed again. Pt refused again and states he would like to stay in the.     0730: Bedside and Verbal shift change report given to Mindy Rendon (oncoming nurse) by Manuelito Irene (offgoing nurse). Report included the following information SBAR, Intake/Output, MAR, Recent Results, Cardiac Rhythm NSR and Alarm Parameters . Problem: Diabetes Self-Management  Goal: *Disease process and treatment process  Description: Define diabetes and identify own type of diabetes; list 3 options for treating diabetes. Outcome: Progressing Towards Goal  Goal: *Incorporating nutritional management into lifestyle  Description: Describe effect of type, amount and timing of food on blood glucose; list 3 methods for planning meals. Outcome: Progressing Towards Goal  Goal: *Incorporating physical activity into lifestyle  Description: State effect of exercise on blood glucose levels. Outcome: Progressing Towards Goal  Goal: *Developing strategies to promote health/change behavior  Description: Define the ABC's of diabetes; identify appropriate screenings, schedule and personal plan for screenings. Outcome: Progressing Towards Goal  Goal: *Using medications safely  Description: State effect of diabetes medications on diabetes; name diabetes medication taking, action and side effects. Outcome: Progressing Towards Goal  Goal: *Monitoring blood glucose, interpreting and using results  Description: Identify recommended blood glucose targets  and personal targets.   Outcome: Progressing Towards Goal  Goal: *Prevention, detection, treatment of acute complications  Description: List symptoms of hyper- and hypoglycemia; describe how to treat low blood sugar and actions for lowering  high blood glucose level. Outcome: Progressing Towards Goal  Goal: *Prevention, detection and treatment of chronic complications  Description: Define the natural course of diabetes and describe the relationship of blood glucose levels to long term complications of diabetes. Outcome: Progressing Towards Goal  Goal: *Developing strategies to address psychosocial issues  Description: Describe feelings about living with diabetes; identify support needed and support network  Outcome: Progressing Towards Goal  Goal: *Insulin pump training  Outcome: Progressing Towards Goal  Goal: *Sick day guidelines  Outcome: Progressing Towards Goal  Goal: *Patient Specific Goal (EDIT GOAL, INSERT TEXT)  Outcome: Progressing Towards Goal     Problem: Patient Education: Go to Patient Education Activity  Goal: Patient/Family Education  Outcome: Progressing Towards Goal     Problem: Pressure Injury - Risk of  Goal: *Prevention of pressure injury  Description: Document Ben Scale and appropriate interventions in the flowsheet. Outcome: Progressing Towards Goal  Note: Pressure Injury Interventions:  Sensory Interventions: Assess changes in LOC,Avoid rigorous massage over bony prominences,Keep linens dry and wrinkle-free,Maintain/enhance activity level,Pad between skin to skin,Turn and reposition approx. every two hours (pillows and wedges if needed)    Moisture Interventions: Absorbent underpads,Apply protective barrier, creams and emollients,Check for incontinence Q2 hours and as needed,Maintain skin hydration (lotion/cream),Minimize layers    Activity Interventions: Increase time out of bed    Mobility Interventions: Float heels,Turn and reposition approx.  every two hours(pillow and wedges)    Nutrition Interventions: Document food/fluid/supplement intake    Friction and Shear Interventions: Apply protective barrier, creams and emollients,Minimize layers Problem: Patient Education: Go to Patient Education Activity  Goal: Patient/Family Education  Outcome: Progressing Towards Goal     Problem: Falls - Risk of  Goal: *Absence of Falls  Description: Document Coby Mike Fall Risk and appropriate interventions in the flowsheet. Outcome: Progressing Towards Goal  Note: Fall Risk Interventions:  Mobility Interventions: Communicate number of staff needed for ambulation/transfer,PT Consult for mobility concerns,PT Consult for assist device competence,Patient to call before getting OOB,Strengthening exercises (ROM-active/passive),Utilize gait belt for transfers/ambulation    Mentation Interventions: Adequate sleep, hydration, pain control,Door open when patient unattended,More frequent rounding    Medication Interventions: Teach patient to arise slowly,Assess postural VS orthostatic hypotension,Utilize gait belt for transfers/ambulation    Elimination Interventions: Call light in reach,Patient to call for help with toileting needs,Toileting schedule/hourly rounds    History of Falls Interventions: Door open when patient unattended         Problem: Patient Education: Go to Patient Education Activity  Goal: Patient/Family Education  Outcome: Progressing Towards Goal     Problem: Risk for Spread of Infection  Goal: Prevent transmission of infectious organism to others  Description: Prevent the transmission of infectious organisms to other patients, staff members, and visitors.   Outcome: Progressing Towards Goal     Problem: Patient Education:  Go to Education Activity  Goal: Patient/Family Education  Outcome: Progressing Towards Goal     Problem: Patient Education: Go to Patient Education Activity  Goal: Patient/Family Education  Outcome: Progressing Towards Goal     Problem: Patient Education: Go to Patient Education Activity  Goal: Patient/Family Education  Outcome: Progressing Towards Goal

## 2022-05-29 NOTE — PROGRESS NOTES
[facility]  [facility]ist Group                                                                                          Hospitalist Progress Note  Nadine Graves MD  Answering service: [phone] from in house phone        Date of Service:  2022  NAME:  Luly Laboy  :  1965  MRN:  [id number]      Admission Summary:     Patient  presents with sepsis due to lower extremity cellulitis, on initial presentation patient was admitted to ICU and require low-dose of norepinephrine overnight for short period of time. Later patient was transferred out of ICU. Currently followed by ID. Patient also with acute metabolic encephalopathy and generalized fatigue, overall mental status is better. Interval history / Subjective:     Patient denies any shortness of breath, denies any pain in the leg. Started working with physical therapy.      Assessment & Plan:     Severe sepsis with septic shock  -Sepsis due to lower extremity cellulitis  -Patient initially required ICU admission and vasopressors temporarily  -Sepsis is now resolved  and hemodynamically stable, blood cultures no growth    Lower extremity cellulitis  -Patient with chronic lower extremity edema, culprit for cellulitis  -Lower extremity venous Doppler negative for DVT  -ID following, on cefepime and daptomycin, plan for total of 2 weeks of antibiotic, plan changing to p.o. on discharge    Chronic leg edema with stasis ulcers  -Continue mobility with PT, keep legs antigravity while sitting  -Patient received bariatric bed   -Wound care following    Acute on chronic diastolic CHF  -NYHA class III on admission  -Continue diuresis with Bumex, continue Entresto (unclear if patient's EF was low previously, no documentations available), echo on 2021 shows EF of 50%    Hematuria  -Patient with hematuria and urinary retention  -Now resolved, Foster was removed on 2022  -Urology previously evaluated the patient    MADELAINE  -Now resolved, stable on diuretics    Acute metabolic encephalopathy  -ABGs previously was unremarkable  -Likely possible hospital fatigue, overall improving  -Patient to continue to work with physical therapy    Chronic atrial fibrillation  -Continue amiodarone and Eliquis for anticoagulation  -Stable    COPD  -Patient with chronic hypoxic and hypercapnic respiratory failure due to COPD  -Continue bronchodilator, breathing treatment as needed  -On 3 L of oxygen    Diabetes type 2  -Continue NPH 5 units twice daily, continue sliding scale coverage    Dyslipidemia  -Continue Zetia    BPH  -Continue Flomax    Obstructive sleep apnea  -CPAP nightly    Morbid obesity  -With BMI of 38.78  -Outpatient follow-up for weight management     Code status:  Full  Prophylaxis: Eliquis  Care Plan discussed with: Patient  Anticipated Disposition: 24 to 48 hours, patient needs placement to [facility] Problems  Date Reviewed: 4/28/2013          Codes Class Noted POA    * (Principal) Sepsis ([facility] [address] ICD-10-CM: A41.9  ICD-9-CM: 038.9, 995.91  5/18/2022 Unknown                Review of Systems:   A comprehensive review of systems was negative except for that written in the HPI. Vital Signs:    Last 24hrs VS reviewed since prior progress note. Most recent are:  Visit Vitals  BP (!) 94/58   Pulse 65   Temp 97.4 °F (36.3 °C)   Resp 16   Ht 6' (1.829 m)   Wt 129.7 kg (286 lb)   SpO2 98%   BMI 38.79 kg/m²         Intake/Output Summary (Last 24 hours) at 5/29/2022 1410  Last data filed at 5/29/2022 4703  Gross per 24 hour   Intake 120 ml   Output 2050 ml   Net -1930 ml        Physical Examination:     I had a face to face encounter with this patient and independently examined them on 5/29/2022 as outlined below:          Constitutional:  No acute distress, cooperative, pleasant    ENT:  Oral mucosa moist, oropharynx benign. Resp:  CTA bilaterally. No wheezing/rhonchi/rales. No accessory muscle use.     CV:  Regular rhythm, normal rate, no murmurs, gallops, rubs    GI:  Soft, non distended, non tender. normoactive bowel sounds, no hepatosplenomegaly     Musculoskeletal:  Bilateral lower extremity pitting edema, multiple bilateral lower extremity blisters    Neurologic:  Moves all extremities. AAOx3, CN II-XII reviewed            Data Review:    Review and/or order of clinical lab test      Labs:     No results for input(s): WBC, HGB, HCT, PLT, HGBEXT, HCTEXT, PLTEXT, HGBEXT, HCTEXT, PLTEXT in the last 72 hours. Recent Labs     05/29/22  0300 05/28/22  0255 05/27/22  0522   * 133* 132*   K 3.6 3.7 3.7   CL 94* 93* 93*   CO2 36* 36* 35*   BUN 63* 57* 50*   CREA 1.11 1.37* 1.26   * 152* 127*   CA 9.2 9.0 9.3     No results for input(s): ALT, AP, TBIL, TBILI, TP, ALB, GLOB, GGT, AML, LPSE in the last 72 hours. No lab exists for component: SGOT, GPT, AMYP, HLPSE  No results for input(s): INR, PTP, APTT, INREXT, INREXT in the last 72 hours. No results for input(s): FE, TIBC, PSAT, FERR in the last 72 hours. No results found for: FOL, RBCF   No results for input(s): PH, PCO2, PO2 in the last 72 hours. No results for input(s): CPK, CKNDX, TROIQ in the last 72 hours.     No lab exists for component: CPKMB  Lab Results   Component Value Date/Time    Cholesterol, total 170 01/23/2013 11:08 AM    HDL Cholesterol 37 (L) 01/23/2013 11:08 AM    LDL, calculated 97 01/23/2013 11:08 AM    Triglyceride 180 (H) 01/23/2013 11:08 AM    CHOL/HDL Ratio 6.0 (H) 10/06/2010 03:18 PM     Lab Results   Component Value Date/Time    Glucose (POC) 173 (H) 05/29/2022 12:52 PM    Glucose (POC) 135 (H) 05/29/2022 08:47 AM    Glucose (POC) 153 (H) 05/28/2022 10:12 PM    Glucose (POC) 169 (H) 05/28/2022 04:51 PM    Glucose (POC) 231 (H) 05/28/2022 12:35 PM     Lab Results   Component Value Date/Time    Color YELLOW/STRAW 05/18/2022 03:54 PM    Appearance CLEAR 05/18/2022 03:54 PM    Specific gravity 1.011 05/18/2022 03:54 PM    pH (UA) 5.0 05/18/2022 03:54 PM Protein Negative 05/18/2022 03:54 PM    Glucose >1,000 (A) 05/18/2022 03:54 PM    Ketone Negative 05/18/2022 03:54 PM    Bilirubin Negative 05/18/2022 03:54 PM    Urobilinogen 0.2 05/18/2022 03:54 PM    Nitrites Negative 05/18/2022 03:54 PM    Leukocyte Esterase Negative 05/18/2022 03:54 PM    Epithelial cells FEW 05/18/2022 03:54 PM    Bacteria Negative 05/18/2022 03:54 PM    WBC 0-4 05/18/2022 03:54 PM    RBC 20-50 05/18/2022 03:54 PM         Medications Reviewed:     Current Facility-Administered Medications   Medication Dose Route Frequency    cefepime (MAXIPIME) 2 g in 0.9% sodium chloride (MBP/ADV) 100 mL MBP  2 g IntraVENous Q12H    ezetimibe (ZETIA) tablet 10 mg  10 mg Oral DAILY    sacubitriL-valsartan (ENTRESTO) 49-51 mg tablet 1 Tablet  1 Tablet Oral BID    DAPTOmycin (CUBICIN) 600 mg in 0.9% sodium chloride 50 mL IVPB  600 mg IntraVENous Q24H    bumetanide (BUMEX) injection 2 mg  2 mg IntraVENous BID    magnesium citrate solution 148 mL  148 mL Oral Q2H PRN    senna-docusate (PERICOLACE) 8.6-50 mg per tablet 1 Tablet  1 Tablet Oral BID    insulin NPH (NOVOLIN N, HUMULIN N) injection 5 Units  5 Units SubCUTAneous ACB&D    albuterol-ipratropium (DUO-NEB) 2.5 MG-0.5 MG/3 ML  3 mL Nebulization Q6H PRN    traMADoL (ULTRAM) tablet 50 mg  50 mg Oral Q6H PRN    polyethylene glycol (MIRALAX) packet 17 g  17 g Oral BID    albuterol (PROVENTIL VENTOLIN) nebulizer solution 2.5 mg  2.5 mg Nebulization Q4H PRN    insulin lispro (HUMALOG) injection   SubCUTAneous AC&HS    pregabalin (LYRICA) capsule 75 mg  75 mg Oral BID    acetaminophen (TYLENOL) tablet 1,000 mg  1,000 mg Oral Q6H PRN    Or    acetaminophen (TYLENOL) suppository 650 mg  650 mg Rectal Q6H PRN    pantothenic ac-min oil-pet,hyd (AQUAPHOR) 41 % ointment   Topical DAILY    sodium chloride (NS) flush 5-40 mL  5-40 mL IntraVENous Q8H    sodium chloride (NS) flush 5-40 mL  5-40 mL IntraVENous PRN    glucose chewable tablet 16 g  4 Tablet Oral PRN    glucagon (GLUCAGEN) injection 1 mg  1 mg IntraMUSCular PRN    dextrose 10 % infusion 0-250 mL  0-250 mL IntraVENous PRN    sodium chloride (NS) flush 5-40 mL  5-40 mL IntraVENous Q8H    sodium chloride (NS) flush 5-40 mL  5-40 mL IntraVENous PRN    ondansetron (ZOFRAN ODT) tablet 4 mg  4 mg Oral Q8H PRN    Or    ondansetron (ZOFRAN) injection 4 mg  4 mg IntraVENous Q6H PRN    bisacodyL (DULCOLAX) tablet 5 mg  5 mg Oral DAILY    polyethylene glycol (MIRALAX) packet 17 g  17 g Oral DAILY PRN    amiodarone (CORDARONE) tablet 100 mg  100 mg Oral DAILY    apixaban (ELIQUIS) tablet 5 mg  5 mg Oral BID    aspirin delayed-release tablet 81 mg  81 mg Oral DAILY    arformoterol 15 mcg/budesonide 0.5 mg neb solution   Nebulization BID RT    montelukast (SINGULAIR) tablet 10 mg  10 mg Oral QHS    pantoprazole (PROTONIX) tablet 40 mg  40 mg Oral DAILY    [Held by provider] rosuvastatin (CRESTOR) tablet 5 mg  5 mg Oral DAILY    tamsulosin (FLOMAX) capsule 0.4 mg  0.4 mg Oral DAILY     ______________________________________________________________________  EXPECTED LENGTH OF STAY: 4d 19h  ACTUAL LENGTH OF STAY:          11                 Chang Wagner MD No

## 2022-05-30 LAB
ANION GAP SERPL CALC-SCNC: 7 MMOL/L (ref 5–15)
BUN SERPL-MCNC: 68 MG/DL (ref 6–20)
BUN/CREAT SERPL: 56 (ref 12–20)
CALCIUM SERPL-MCNC: 8.6 MG/DL (ref 8.5–10.1)
CHLORIDE SERPL-SCNC: 93 MMOL/L (ref 97–108)
CO2 SERPL-SCNC: 33 MMOL/L (ref 21–32)
CREAT SERPL-MCNC: 1.21 MG/DL (ref 0.7–1.3)
GLUCOSE BLD STRIP.AUTO-MCNC: 136 MG/DL (ref 65–117)
GLUCOSE BLD STRIP.AUTO-MCNC: 143 MG/DL (ref 65–117)
GLUCOSE BLD STRIP.AUTO-MCNC: 150 MG/DL (ref 65–117)
GLUCOSE BLD STRIP.AUTO-MCNC: 169 MG/DL (ref 65–117)
GLUCOSE SERPL-MCNC: 147 MG/DL (ref 65–100)
POTASSIUM SERPL-SCNC: 3.4 MMOL/L (ref 3.5–5.1)
SERVICE CMNT-IMP: ABNORMAL
SODIUM SERPL-SCNC: 133 MMOL/L (ref 136–145)

## 2022-05-30 PROCEDURE — 74011250637 HC RX REV CODE- 250/637: Performed by: INTERNAL MEDICINE

## 2022-05-30 PROCEDURE — 74011000250 HC RX REV CODE- 250: Performed by: HOSPITALIST

## 2022-05-30 PROCEDURE — 74011000250 HC RX REV CODE- 250: Performed by: INTERNAL MEDICINE

## 2022-05-30 PROCEDURE — 94664 DEMO&/EVAL PT USE INHALER: CPT

## 2022-05-30 PROCEDURE — 74011250636 HC RX REV CODE- 250/636: Performed by: FAMILY MEDICINE

## 2022-05-30 PROCEDURE — 74011250636 HC RX REV CODE- 250/636: Performed by: NURSE PRACTITIONER

## 2022-05-30 PROCEDURE — 94640 AIRWAY INHALATION TREATMENT: CPT

## 2022-05-30 PROCEDURE — 82962 GLUCOSE BLOOD TEST: CPT

## 2022-05-30 PROCEDURE — 36415 COLL VENOUS BLD VENIPUNCTURE: CPT

## 2022-05-30 PROCEDURE — 74011000258 HC RX REV CODE- 258: Performed by: NURSE PRACTITIONER

## 2022-05-30 PROCEDURE — 65270000046 HC RM TELEMETRY

## 2022-05-30 PROCEDURE — 74011636637 HC RX REV CODE- 636/637: Performed by: INTERNAL MEDICINE

## 2022-05-30 PROCEDURE — 74011000258 HC RX REV CODE- 258: Performed by: FAMILY MEDICINE

## 2022-05-30 PROCEDURE — 77010033678 HC OXYGEN DAILY

## 2022-05-30 PROCEDURE — 80048 BASIC METABOLIC PNL TOTAL CA: CPT

## 2022-05-30 PROCEDURE — 94760 N-INVAS EAR/PLS OXIMETRY 1: CPT

## 2022-05-30 RX ADMIN — SACUBITRIL AND VALSARTAN 1 TABLET: 49; 51 TABLET, FILM COATED ORAL at 18:09

## 2022-05-30 RX ADMIN — DAPTOMYCIN 600 MG: 500 INJECTION, POWDER, LYOPHILIZED, FOR SOLUTION INTRAVENOUS at 18:09

## 2022-05-30 RX ADMIN — SODIUM CHLORIDE, PRESERVATIVE FREE 10 ML: 5 INJECTION INTRAVENOUS at 22:36

## 2022-05-30 RX ADMIN — TRAMADOL HYDROCHLORIDE 50 MG: 50 TABLET, COATED ORAL at 20:30

## 2022-05-30 RX ADMIN — MONTELUKAST 10 MG: 10 TABLET, FILM COATED ORAL at 22:36

## 2022-05-30 RX ADMIN — PREGABALIN 75 MG: 25 CAPSULE ORAL at 08:31

## 2022-05-30 RX ADMIN — Medication 5 UNITS: at 17:32

## 2022-05-30 RX ADMIN — Medication 2 UNITS: at 09:07

## 2022-05-30 RX ADMIN — ARFORMOTEROL TARTRATE: 15 SOLUTION RESPIRATORY (INHALATION) at 08:09

## 2022-05-30 RX ADMIN — SENNOSIDES AND DOCUSATE SODIUM 1 TABLET: 50; 8.6 TABLET ORAL at 08:31

## 2022-05-30 RX ADMIN — Medication 2 UNITS: at 17:32

## 2022-05-30 RX ADMIN — EZETIMIBE 10 MG: 10 TABLET ORAL at 08:31

## 2022-05-30 RX ADMIN — SODIUM CHLORIDE, PRESERVATIVE FREE 10 ML: 5 INJECTION INTRAVENOUS at 07:25

## 2022-05-30 RX ADMIN — SENNOSIDES AND DOCUSATE SODIUM 1 TABLET: 50; 8.6 TABLET ORAL at 18:00

## 2022-05-30 RX ADMIN — POLYETHYLENE GLYCOL 3350 17 G: 17 POWDER, FOR SOLUTION ORAL at 17:35

## 2022-05-30 RX ADMIN — WHITE PETROLATUM: 1.75 OINTMENT TOPICAL at 08:32

## 2022-05-30 RX ADMIN — BUMETANIDE 2 MG: 0.25 INJECTION INTRAMUSCULAR; INTRAVENOUS at 08:31

## 2022-05-30 RX ADMIN — ARFORMOTEROL TARTRATE: 15 SOLUTION RESPIRATORY (INHALATION) at 20:41

## 2022-05-30 RX ADMIN — CEFEPIME 2 G: 2 INJECTION, POWDER, FOR SOLUTION INTRAVENOUS at 17:38

## 2022-05-30 RX ADMIN — PREGABALIN 75 MG: 25 CAPSULE ORAL at 17:34

## 2022-05-30 RX ADMIN — CEFEPIME 2 G: 2 INJECTION, POWDER, FOR SOLUTION INTRAVENOUS at 07:25

## 2022-05-30 RX ADMIN — TAMSULOSIN HYDROCHLORIDE 0.4 MG: 0.4 CAPSULE ORAL at 08:31

## 2022-05-30 RX ADMIN — BUMETANIDE 2 MG: 0.25 INJECTION INTRAMUSCULAR; INTRAVENOUS at 17:34

## 2022-05-30 RX ADMIN — BISACODYL 5 MG: 5 TABLET, COATED ORAL at 08:31

## 2022-05-30 RX ADMIN — PANTOPRAZOLE SODIUM 40 MG: 40 TABLET, DELAYED RELEASE ORAL at 08:31

## 2022-05-30 RX ADMIN — Medication 5 UNITS: at 09:07

## 2022-05-30 RX ADMIN — SACUBITRIL AND VALSARTAN 1 TABLET: 49; 51 TABLET, FILM COATED ORAL at 09:00

## 2022-05-30 RX ADMIN — AMIODARONE HYDROCHLORIDE 100 MG: 200 TABLET ORAL at 08:31

## 2022-05-30 RX ADMIN — ASPIRIN 81 MG: 81 TABLET, COATED ORAL at 08:31

## 2022-05-30 RX ADMIN — APIXABAN 5 MG: 5 TABLET, FILM COATED ORAL at 08:31

## 2022-05-30 RX ADMIN — APIXABAN 5 MG: 5 TABLET, FILM COATED ORAL at 17:34

## 2022-05-30 RX ADMIN — POLYETHYLENE GLYCOL 3350 17 G: 17 POWDER, FOR SOLUTION ORAL at 08:32

## 2022-05-30 NOTE — PROGRESS NOTES
[address] [facility]  Hospitalist Group                                                                                          Hospitalist Progress Note  Mele Harkins MD  Answering service: [phone] from in house phone        Date of Service:  2022  NAME:  Naaman Brittle  :  1965  MRN:  [id number]      Admission Summary:     Patient  presents with sepsis due to lower extremity cellulitis, on initial presentation patient was admitted to ICU and require low-dose of norepinephrine overnight for short period of time. Later patient was transferred out of ICU. Currently followed by ID. Patient also with acute metabolic encephalopathy and generalized fatigue, overall mental status is better. Interval history / Subjective:     Patient denies any shortness of breath, denies any pain in the leg. Started working with physical therapy.      Assessment & Plan:     Severe sepsis with septic shock  -Sepsis due to lower extremity cellulitis  -Patient initially required ICU admission and vasopressors temporarily  -Sepsis is now resolved  and hemodynamically stable, blood cultures no growth    Lower extremity cellulitis  -Patient with chronic lower extremity edema, culprit for cellulitis  -Lower extremity venous Doppler negative for DVT  -ID following, on cefepime and daptomycin, plan for total of 2 weeks of antibiotic, plan changing to p.o. on discharge    Chronic leg edema with stasis ulcers  -Continue mobility with PT, keep legs antigravity while sitting  -Patient received bariatric bed   -Wound care following    Acute on chronic diastolic CHF  -NYHA class III on admission  -Continue diuresis with Bumex, continue Entresto (unclear if patient's EF was low previously, no documentations available), echo on 2021 shows EF of 50%    Hematuria  -Patient with hematuria and urinary retention  -Now resolved, Foster was removed on 2022  -Urology previously evaluated the patient    MADELAINE  -Now resolved, stable on diuretics    Acute metabolic encephalopathy  -ABGs previously was unremarkable  -Likely possible hospital fatigue, overall improving  -Patient to continue to work with physical therapy    Chronic atrial fibrillation  -Continue amiodarone and Eliquis for anticoagulation  -Stable    COPD  -Patient with chronic hypoxic and hypercapnic respiratory failure due to COPD  -Continue bronchodilator, breathing treatment as needed  -On 3 L of oxygen    Diabetes type 2  -Continue NPH 5 units twice daily, continue sliding scale coverage    Dyslipidemia  -Continue Zetia    BPH  -Continue Flomax    Obstructive sleep apnea  -CPAP nightly    Morbid obesity  -With BMI of 38.78  -Outpatient follow-up for weight management     Code status:  Full  Prophylaxis: Eliquis  Care Plan discussed with: Patient  Anticipated Disposition: 24 to 48 hours, patient needs placement to [facility]  Date Reviewed: 4/28/2013          Codes Class Noted POA    * (Principal) Sepsis ([facility] [address] ICD-10-CM: A41.9  ICD-9-CM: 038.9, 995.91  5/18/2022 Unknown                Review of Systems:   A comprehensive review of systems was negative except for that written in the HPI. Vital Signs:    Last 24hrs VS reviewed since prior progress note. Most recent are:  Visit Vitals  BP (!) 95/57   Pulse 69   Temp 98.4 °F (36.9 °C)   Resp 15   Ht 6' (1.829 m)   Wt 129.7 kg (286 lb)   SpO2 93%   BMI 38.79 kg/m²       No intake or output data in the 24 hours ending 05/30/22 1120     Physical Examination:     I had a face to face encounter with this patient and independently examined them on 5/30/2022 as outlined below:          Constitutional:  No acute distress, cooperative, pleasant    ENT:  Oral mucosa moist, oropharynx benign. Resp:  CTA bilaterally. No wheezing/rhonchi/rales. No accessory muscle use. CV:  Regular rhythm, normal rate, no murmurs, gallops, rubs    GI:  Soft, non distended, non tender.  normoactive bowel sounds, no hepatosplenomegaly     Musculoskeletal:  Bilateral lower extremity pitting edema, multiple bilateral lower extremity blisters    Neurologic:  Moves all extremities. AAOx3, CN II-XII reviewed            Data Review:    Review and/or order of clinical lab test      Labs:     No results for input(s): WBC, HGB, HCT, PLT, HGBEXT, HCTEXT, PLTEXT, HGBEXT, HCTEXT, PLTEXT in the last 72 hours. Recent Labs     05/30/22  0438 05/29/22  0300 05/28/22  0255   * 134* 133*   K 3.4* 3.6 3.7   CL 93* 94* 93*   CO2 33* 36* 36*   BUN 68* 63* 57*   CREA 1.21 1.11 1.37*   * 154* 152*   CA 8.6 9.2 9.0     No results for input(s): ALT, AP, TBIL, TBILI, TP, ALB, GLOB, GGT, AML, LPSE in the last 72 hours. No lab exists for component: SGOT, GPT, AMYP, HLPSE  No results for input(s): INR, PTP, APTT, INREXT, INREXT in the last 72 hours. No results for input(s): FE, TIBC, PSAT, FERR in the last 72 hours. No results found for: FOL, RBCF   No results for input(s): PH, PCO2, PO2 in the last 72 hours. No results for input(s): CPK, CKNDX, TROIQ in the last 72 hours.     No lab exists for component: CPKMB  Lab Results   Component Value Date/Time    Cholesterol, total 170 01/23/2013 11:08 AM    HDL Cholesterol 37 (L) 01/23/2013 11:08 AM    LDL, calculated 97 01/23/2013 11:08 AM    Triglyceride 180 (H) 01/23/2013 11:08 AM    CHOL/HDL Ratio 6.0 (H) 10/06/2010 03:18 PM     Lab Results   Component Value Date/Time    Glucose (POC) 143 (H) 05/30/2022 08:43 AM    Glucose (POC) 139 (H) 05/29/2022 09:45 PM    Glucose (POC) 132 (H) 05/29/2022 04:26 PM    Glucose (POC) 173 (H) 05/29/2022 12:52 PM    Glucose (POC) 135 (H) 05/29/2022 08:47 AM     Lab Results   Component Value Date/Time    Color YELLOW/STRAW 05/18/2022 03:54 PM    Appearance CLEAR 05/18/2022 03:54 PM    Specific gravity 1.011 05/18/2022 03:54 PM    pH (UA) 5.0 05/18/2022 03:54 PM    Protein Negative 05/18/2022 03:54 PM    Glucose >1,000 (A) 05/18/2022 03:54 PM    Ketone Negative 05/18/2022 03:54 PM    Bilirubin Negative 05/18/2022 03:54 PM    Urobilinogen 0.2 05/18/2022 03:54 PM    Nitrites Negative 05/18/2022 03:54 PM    Leukocyte Esterase Negative 05/18/2022 03:54 PM    Epithelial cells FEW 05/18/2022 03:54 PM    Bacteria Negative 05/18/2022 03:54 PM    WBC 0-4 05/18/2022 03:54 PM    RBC 20-50 05/18/2022 03:54 PM         Medications Reviewed:     Current Facility-Administered Medications   Medication Dose Route Frequency    cefepime (MAXIPIME) 2 g in 0.9% sodium chloride (MBP/ADV) 100 mL MBP  2 g IntraVENous Q12H    ezetimibe (ZETIA) tablet 10 mg  10 mg Oral DAILY    sacubitriL-valsartan (ENTRESTO) 49-51 mg tablet 1 Tablet  1 Tablet Oral BID    DAPTOmycin (CUBICIN) 600 mg in 0.9% sodium chloride 50 mL IVPB  600 mg IntraVENous Q24H    bumetanide (BUMEX) injection 2 mg  2 mg IntraVENous BID    magnesium citrate solution 148 mL  148 mL Oral Q2H PRN    senna-docusate (PERICOLACE) 8.6-50 mg per tablet 1 Tablet  1 Tablet Oral BID    insulin NPH (NOVOLIN N, HUMULIN N) injection 5 Units  5 Units SubCUTAneous ACB&D    albuterol-ipratropium (DUO-NEB) 2.5 MG-0.5 MG/3 ML  3 mL Nebulization Q6H PRN    traMADoL (ULTRAM) tablet 50 mg  50 mg Oral Q6H PRN    polyethylene glycol (MIRALAX) packet 17 g  17 g Oral BID    albuterol (PROVENTIL VENTOLIN) nebulizer solution 2.5 mg  2.5 mg Nebulization Q4H PRN    insulin lispro (HUMALOG) injection   SubCUTAneous AC&HS    pregabalin (LYRICA) capsule 75 mg  75 mg Oral BID    acetaminophen (TYLENOL) tablet 1,000 mg  1,000 mg Oral Q6H PRN    Or    acetaminophen (TYLENOL) suppository 650 mg  650 mg Rectal Q6H PRN    pantothenic ac-min oil-pet,hyd (AQUAPHOR) 41 % ointment   Topical DAILY    sodium chloride (NS) flush 5-40 mL  5-40 mL IntraVENous Q8H    sodium chloride (NS) flush 5-40 mL  5-40 mL IntraVENous PRN    glucose chewable tablet 16 g  4 Tablet Oral PRN    glucagon (GLUCAGEN) injection 1 mg  1 mg IntraMUSCular PRN    dextrose 10 % infusion 0-250 mL  0-250 mL IntraVENous PRN    sodium chloride (NS) flush 5-40 mL  5-40 mL IntraVENous Q8H    sodium chloride (NS) flush 5-40 mL  5-40 mL IntraVENous PRN    ondansetron (ZOFRAN ODT) tablet 4 mg  4 mg Oral Q8H PRN    Or    ondansetron (ZOFRAN) injection 4 mg  4 mg IntraVENous Q6H PRN    bisacodyL (DULCOLAX) tablet 5 mg  5 mg Oral DAILY    polyethylene glycol (MIRALAX) packet 17 g  17 g Oral DAILY PRN    amiodarone (CORDARONE) tablet 100 mg  100 mg Oral DAILY    apixaban (ELIQUIS) tablet 5 mg  5 mg Oral BID    aspirin delayed-release tablet 81 mg  81 mg Oral DAILY    arformoterol 15 mcg/budesonide 0.5 mg neb solution   Nebulization BID RT    montelukast (SINGULAIR) tablet 10 mg  10 mg Oral QHS    pantoprazole (PROTONIX) tablet 40 mg  40 mg Oral DAILY    [Held by provider] rosuvastatin (CRESTOR) tablet 5 mg  5 mg Oral DAILY    tamsulosin (FLOMAX) capsule 0.4 mg  0.4 mg Oral DAILY     ______________________________________________________________________  EXPECTED LENGTH OF STAY: 4d 19h  ACTUAL LENGTH OF STAY:          12                 Jonah Davis MD

## 2022-05-30 NOTE — PROGRESS NOTES
05/29/22 2111   Oxygen Therapy   O2 Sat (%) 98 %   Pulse via Oximetry 60 beats per minute   O2 Device Nasal cannula;Humidifier   O2 Flow Rate (L/min) 3 l/min   CPAP/BIPAP   CPAP/BIPAP Start/Stop Off  (Pt refuses)   Device Mode CPAP   $$ CPAP Daily   (no charge, no EIR)   Procedures   $$ Subsequent Procedure Aerosol

## 2022-05-31 LAB
ANION GAP SERPL CALC-SCNC: 7 MMOL/L (ref 5–15)
BUN SERPL-MCNC: 67 MG/DL (ref 6–20)
BUN/CREAT SERPL: 54 (ref 12–20)
CALCIUM SERPL-MCNC: 9.5 MG/DL (ref 8.5–10.1)
CHLORIDE SERPL-SCNC: 94 MMOL/L (ref 97–108)
CO2 SERPL-SCNC: 32 MMOL/L (ref 21–32)
CREAT SERPL-MCNC: 1.25 MG/DL (ref 0.7–1.3)
ERYTHROCYTE [DISTWIDTH] IN BLOOD BY AUTOMATED COUNT: 21.9 % (ref 11.5–14.5)
GLUCOSE BLD STRIP.AUTO-MCNC: 126 MG/DL (ref 65–117)
GLUCOSE BLD STRIP.AUTO-MCNC: 132 MG/DL (ref 65–117)
GLUCOSE BLD STRIP.AUTO-MCNC: 139 MG/DL (ref 65–117)
GLUCOSE BLD STRIP.AUTO-MCNC: 145 MG/DL (ref 65–117)
GLUCOSE SERPL-MCNC: 131 MG/DL (ref 65–100)
HCT VFR BLD AUTO: 41 % (ref 36.6–50.3)
HGB BLD-MCNC: 12.3 G/DL (ref 12.1–17)
MCH RBC QN AUTO: 22.9 PG (ref 26–34)
MCHC RBC AUTO-ENTMCNC: 30 G/DL (ref 30–36.5)
MCV RBC AUTO: 76.4 FL (ref 80–99)
NRBC # BLD: 0 K/UL (ref 0–0.01)
NRBC BLD-RTO: 0 PER 100 WBC
PLATELET # BLD AUTO: 202 K/UL (ref 150–400)
POTASSIUM SERPL-SCNC: 3.9 MMOL/L (ref 3.5–5.1)
RBC # BLD AUTO: 5.37 M/UL (ref 4.1–5.7)
SERVICE CMNT-IMP: ABNORMAL
SODIUM SERPL-SCNC: 133 MMOL/L (ref 136–145)
WBC # BLD AUTO: 7.7 K/UL (ref 4.1–11.1)

## 2022-05-31 PROCEDURE — 77030041076 HC DRSG AG OPTICELL MDII -A

## 2022-05-31 PROCEDURE — 74011000250 HC RX REV CODE- 250: Performed by: HOSPITALIST

## 2022-05-31 PROCEDURE — 85027 COMPLETE CBC AUTOMATED: CPT

## 2022-05-31 PROCEDURE — 65270000046 HC RM TELEMETRY

## 2022-05-31 PROCEDURE — 74011250637 HC RX REV CODE- 250/637: Performed by: INTERNAL MEDICINE

## 2022-05-31 PROCEDURE — 74011636637 HC RX REV CODE- 636/637: Performed by: INTERNAL MEDICINE

## 2022-05-31 PROCEDURE — 94664 DEMO&/EVAL PT USE INHALER: CPT

## 2022-05-31 PROCEDURE — 74011250636 HC RX REV CODE- 250/636: Performed by: NURSE PRACTITIONER

## 2022-05-31 PROCEDURE — 80048 BASIC METABOLIC PNL TOTAL CA: CPT

## 2022-05-31 PROCEDURE — 74011000258 HC RX REV CODE- 258: Performed by: NURSE PRACTITIONER

## 2022-05-31 PROCEDURE — 74011000258 HC RX REV CODE- 258: Performed by: FAMILY MEDICINE

## 2022-05-31 PROCEDURE — 77010033678 HC OXYGEN DAILY

## 2022-05-31 PROCEDURE — 74011000250 HC RX REV CODE- 250: Performed by: INTERNAL MEDICINE

## 2022-05-31 PROCEDURE — 82962 GLUCOSE BLOOD TEST: CPT

## 2022-05-31 PROCEDURE — 94640 AIRWAY INHALATION TREATMENT: CPT

## 2022-05-31 PROCEDURE — 36415 COLL VENOUS BLD VENIPUNCTURE: CPT

## 2022-05-31 PROCEDURE — 74011250636 HC RX REV CODE- 250/636: Performed by: FAMILY MEDICINE

## 2022-05-31 RX ADMIN — PREGABALIN 75 MG: 25 CAPSULE ORAL at 09:49

## 2022-05-31 RX ADMIN — SODIUM CHLORIDE, PRESERVATIVE FREE 10 ML: 5 INJECTION INTRAVENOUS at 05:33

## 2022-05-31 RX ADMIN — Medication 5 UNITS: at 10:43

## 2022-05-31 RX ADMIN — Medication 5 UNITS: at 17:32

## 2022-05-31 RX ADMIN — SODIUM CHLORIDE, PRESERVATIVE FREE 10 ML: 5 INJECTION INTRAVENOUS at 14:00

## 2022-05-31 RX ADMIN — SACUBITRIL AND VALSARTAN 1 TABLET: 49; 51 TABLET, FILM COATED ORAL at 17:27

## 2022-05-31 RX ADMIN — CEFEPIME 2 G: 2 INJECTION, POWDER, FOR SOLUTION INTRAVENOUS at 18:41

## 2022-05-31 RX ADMIN — SACUBITRIL AND VALSARTAN 1 TABLET: 49; 51 TABLET, FILM COATED ORAL at 10:43

## 2022-05-31 RX ADMIN — BUMETANIDE 2 MG: 0.25 INJECTION INTRAMUSCULAR; INTRAVENOUS at 09:50

## 2022-05-31 RX ADMIN — TAMSULOSIN HYDROCHLORIDE 0.4 MG: 0.4 CAPSULE ORAL at 09:49

## 2022-05-31 RX ADMIN — BISACODYL 5 MG: 5 TABLET, COATED ORAL at 09:49

## 2022-05-31 RX ADMIN — APIXABAN 5 MG: 5 TABLET, FILM COATED ORAL at 09:49

## 2022-05-31 RX ADMIN — ASPIRIN 81 MG: 81 TABLET, COATED ORAL at 09:50

## 2022-05-31 RX ADMIN — POLYETHYLENE GLYCOL 3350 17 G: 17 POWDER, FOR SOLUTION ORAL at 09:52

## 2022-05-31 RX ADMIN — EZETIMIBE 10 MG: 10 TABLET ORAL at 09:50

## 2022-05-31 RX ADMIN — PANTOPRAZOLE SODIUM 40 MG: 40 TABLET, DELAYED RELEASE ORAL at 09:49

## 2022-05-31 RX ADMIN — MONTELUKAST 10 MG: 10 TABLET, FILM COATED ORAL at 21:17

## 2022-05-31 RX ADMIN — ACETAMINOPHEN 1000 MG: 500 TABLET ORAL at 17:34

## 2022-05-31 RX ADMIN — ARFORMOTEROL TARTRATE: 15 SOLUTION RESPIRATORY (INHALATION) at 20:49

## 2022-05-31 RX ADMIN — TRAMADOL HYDROCHLORIDE 50 MG: 50 TABLET, COATED ORAL at 12:32

## 2022-05-31 RX ADMIN — ARFORMOTEROL TARTRATE: 15 SOLUTION RESPIRATORY (INHALATION) at 08:04

## 2022-05-31 RX ADMIN — WHITE PETROLATUM: 1.75 OINTMENT TOPICAL at 09:51

## 2022-05-31 RX ADMIN — AMIODARONE HYDROCHLORIDE 100 MG: 200 TABLET ORAL at 09:49

## 2022-05-31 RX ADMIN — SODIUM CHLORIDE, PRESERVATIVE FREE 10 ML: 5 INJECTION INTRAVENOUS at 21:17

## 2022-05-31 RX ADMIN — APIXABAN 5 MG: 5 TABLET, FILM COATED ORAL at 17:26

## 2022-05-31 RX ADMIN — SENNOSIDES AND DOCUSATE SODIUM 1 TABLET: 50; 8.6 TABLET ORAL at 09:49

## 2022-05-31 RX ADMIN — IPRATROPIUM BROMIDE AND ALBUTEROL SULFATE 3 ML: .5; 3 SOLUTION RESPIRATORY (INHALATION) at 20:48

## 2022-05-31 RX ADMIN — BUMETANIDE 2 MG: 0.25 INJECTION INTRAMUSCULAR; INTRAVENOUS at 17:26

## 2022-05-31 RX ADMIN — DAPTOMYCIN 600 MG: 500 INJECTION, POWDER, LYOPHILIZED, FOR SOLUTION INTRAVENOUS at 17:58

## 2022-05-31 RX ADMIN — PREGABALIN 75 MG: 25 CAPSULE ORAL at 17:26

## 2022-05-31 RX ADMIN — CEFEPIME 2 G: 2 INJECTION, POWDER, FOR SOLUTION INTRAVENOUS at 05:33

## 2022-05-31 NOTE — PROGRESS NOTES
Problem: Diabetes Self-Management  Goal: *Disease process and treatment process  Description: Define diabetes and identify own type of diabetes; list 3 options for treating diabetes. Outcome: Progressing Towards Goal  Goal: *Incorporating nutritional management into lifestyle  Description: Describe effect of type, amount and timing of food on blood glucose; list 3 methods for planning meals. Outcome: Progressing Towards Goal  Goal: *Incorporating physical activity into lifestyle  Description: State effect of exercise on blood glucose levels. Outcome: Progressing Towards Goal  Goal: *Developing strategies to promote health/change behavior  Description: Define the ABC's of diabetes; identify appropriate screenings, schedule and personal plan for screenings. Outcome: Progressing Towards Goal  Goal: *Using medications safely  Description: State effect of diabetes medications on diabetes; name diabetes medication taking, action and side effects. Outcome: Progressing Towards Goal  Goal: *Monitoring blood glucose, interpreting and using results  Description: Identify recommended blood glucose targets  and personal targets. Outcome: Progressing Towards Goal  Goal: *Prevention, detection, treatment of acute complications  Description: List symptoms of hyper- and hypoglycemia; describe how to treat low blood sugar and actions for lowering  high blood glucose level. Outcome: Progressing Towards Goal  Goal: *Prevention, detection and treatment of chronic complications  Description: Define the natural course of diabetes and describe the relationship of blood glucose levels to long term complications of diabetes.   Outcome: Progressing Towards Goal  Goal: *Developing strategies to address psychosocial issues  Description: Describe feelings about living with diabetes; identify support needed and support network  Outcome: Progressing Towards Goal  Goal: *Insulin pump training  Outcome: Progressing Towards Goal  Goal: *Sick day guidelines  Outcome: Progressing Towards Goal  Goal: *Patient Specific Goal (EDIT GOAL, INSERT TEXT)  Outcome: Progressing Towards Goal     Problem: Patient Education: Go to Patient Education Activity  Goal: Patient/Family Education  Outcome: Progressing Towards Goal     Problem: Pressure Injury - Risk of  Goal: *Prevention of pressure injury  Description: Document Ben Scale and appropriate interventions in the flowsheet. Outcome: Progressing Towards Goal  Note: Pressure Injury Interventions:  Sensory Interventions: Assess need for specialty bed,Discuss PT/OT consult with provider,Avoid rigorous massage over bony prominences,Keep linens dry and wrinkle-free,Minimize linen layers    Moisture Interventions: Absorbent underpads,Maintain skin hydration (lotion/cream),Minimize layers,Moisture barrier    Activity Interventions: Increase time out of bed,Pressure redistribution bed/mattress(bed type),PT/OT evaluation    Mobility Interventions: Float heels,HOB 30 degrees or less,Pressure redistribution bed/mattress (bed type),PT/OT evaluation    Nutrition Interventions: Document food/fluid/supplement intake    Friction and Shear Interventions: Foam dressings/transparent film/skin sealants,HOB 30 degrees or less,Lift team/patient mobility team,Minimize layers                Problem: Patient Education: Go to Patient Education Activity  Goal: Patient/Family Education  Outcome: Progressing Towards Goal     Problem: Falls - Risk of  Goal: *Absence of Falls  Description: Document Sanaz Fall Risk and appropriate interventions in the flowsheet.   Outcome: Progressing Towards Goal  Note: Fall Risk Interventions:  Mobility Interventions: Communicate number of staff needed for ambulation/transfer,OT consult for ADLs,Patient to call before getting OOB,PT Consult for mobility concerns    Mentation Interventions: Adequate sleep, hydration, pain control,Door open when patient unattended,Evaluate medications/consider consulting pharmacy,Toileting rounds    Medication Interventions: Assess postural VS orthostatic hypotension,Bed/chair exit alarm,Patient to call before getting OOB,Teach patient to arise slowly    Elimination Interventions: Call light in reach,Stay With Me (per policy),Patient to call for help with toileting needs,Toileting schedule/hourly rounds,Urinal in reach    History of Falls Interventions: Door open when patient unattended,Evaluate medications/consider consulting pharmacy         Problem: Patient Education: Go to Patient Education Activity  Goal: Patient/Family Education  Outcome: Progressing Towards Goal     Problem: Risk for Spread of Infection  Goal: Prevent transmission of infectious organism to others  Description: Prevent the transmission of infectious organisms to other patients, staff members, and visitors.   Outcome: Progressing Towards Goal     Problem: Patient Education:  Go to Education Activity  Goal: Patient/Family Education  Outcome: Progressing Towards Goal     Problem: Patient Education: Go to Patient Education Activity  Goal: Patient/Family Education  Outcome: Progressing Towards Goal     Problem: Patient Education: Go to Patient Education Activity  Goal: Patient/Family Education  Outcome: Progressing Towards Goal

## 2022-05-31 NOTE — PROGRESS NOTES
Bedside shift change report given to Felisha Rea RN (oncoming nurse) by Laura Calle RN (offgoing nurse). Report included the following information SBAR, Kardex, Intake/Output, MAR, Recent Results and Cardiac Rhythm NSR.

## 2022-05-31 NOTE — PROGRESS NOTES
Bedside and Verbal shift change report given to Sherryle Dusky (oncoming nurse) by Carol Terry RN (offgoing nurse). Report included the following information SBAR, Kardex, Intake/Output, MAR and Recent Results.

## 2022-05-31 NOTE — PROGRESS NOTES
DEISY: anticipate d/c to [facility] for rehab;    RUR 19%    Pt will need insurance auth through [facility], contact is: Mike Everett [phone]     Pt will need a Rapid covid test for SNF     Pt is currently on 3L/NC 02     [facility]     Charmaine Ray RN/CRM  [phone]

## 2022-05-31 NOTE — PROGRESS NOTES
Heart monitor hardwire changed to remote/box since patient detaches whenever he goes to commode. Instructed the patient to place the monitor box on table so it is not heavy on him, he agreed. (Per staff, he previously disconnected the telebox complaining of heavy to carry).

## 2022-05-31 NOTE — PROGRESS NOTES
ID Progress Note  2022    Subjective:     Sitting up in chair    Review of Systems:            Symptom Y/N Comments   Symptom Y/N Comments   Fever/Chills n      Chest Pain n       Poor Appetite       Edema y       Cough       Abdominal Pain  n      Sputum       Joint Pain        SOB/MCCULLOUGH  *  no change   Pruritis/Rash        Nausea/vomit  n     Tolerating PT/OT        Diarrhea  n     Tolerating Diet        Constipation  n     Other           Could NOT obtain due to:       Objective:     Vitals:   Visit Vitals  BP 97/61 (BP 1 Location: Right upper arm, BP Patient Position: At rest)   Pulse 64   Temp 98.2 °F (36.8 °C)   Resp 16   Ht 6' (1.829 m)   Wt 129.7 kg (286 lb)   SpO2 93%   BMI 38.79 kg/m²        Tmax:  Temp (24hrs), Av.3 °F (36.8 °C), Min:97.3 °F (36.3 °C), Max:98.6 °F (37 °C)      PHYSICAL EXAM:  General: Obese, chronically ill appearing, WD, WN. Alert, cooperative, no acute distress    EENT:  EOMI. Anicteric sclerae. MMM  Resp:  Clear in apex with decreased breath sounds at bases, no wheezing or rales. No accessory muscle use  CV:  Regular  rhythm,  + edema; LLE>RLE  GI:  Soft, obese, Non tender. +Bowel sounds  Neurologic:  Alert and oriented X 3, normal speech,   Psych:   Fair insight. Not anxious nor agitated  Skin:  No rashes.   No jaundice                          Stage III pressure injury to both hip,     LLE wound; edematous, erythematous below the knee, not warm to touch    RLE; chronic venous stasis    Labs:   Lab Results   Component Value Date/Time    WBC 7.7 2022 03:54 AM    HGB 12.3 2022 03:54 AM    HCT 41.0 2022 03:54 AM    PLATELET 373  03:54 AM    MCV 76.4 (L) 2022 03:54 AM     Lab Results   Component Value Date/Time    Sodium 133 (L) 2022 03:54 AM    Potassium 3.9 2022 03:54 AM    Chloride 94 (L) 2022 03:54 AM    CO2 32 2022 03:54 AM    Anion gap 7 2022 03:54 AM    Glucose 131 (H) 2022 03:54 AM    BUN 67 (H) 05/31/2022 03:54 AM    Creatinine 1.25 05/31/2022 03:54 AM    BUN/Creatinine ratio 54 (H) 05/31/2022 03:54 AM    GFR est AA >60 05/31/2022 03:54 AM    GFR est non-AA 60 (L) 05/31/2022 03:54 AM    Calcium 9.5 05/31/2022 03:54 AM    Bilirubin, total 0.8 05/18/2022 01:16 PM    Alk. phosphatase 89 05/18/2022 01:16 PM    Protein, total 6.8 05/18/2022 01:16 PM    Albumin 2.8 (L) 05/18/2022 01:16 PM    Globulin 4.0 05/18/2022 01:16 PM    A-G Ratio 0.7 (L) 05/18/2022 01:16 PM    ALT (SGPT) 12 05/18/2022 01:16 PM         Cultures:   Results     Procedure Component Value Units Date/Time    CULTURE, MRSA [[id number]]  (Abnormal) Collected: 05/18/22 1706    Order Status: Completed Specimen: Nasal from Nares Updated: 05/19/22 1721     Special Requests: NO SPECIAL REQUESTS        Culture result: MRSA PRESENT               Screening of patient nares for MRSA is for surveillance purposes and, if positive, to facilitate isolation considerations in high risk settings. It is not intended for automatic decolonization interventions per se as regimens are not sufficiently effective to warrant routine use. (NOTE) CALLED POSITIVE MRSA TO TYLER ZARAGOZA AT 7613 ON 5/19/22. AT    URINE CULTURE HOLD SAMPLE [[id number]] Collected: 05/18/22 1554    Order Status: Completed Specimen: Serum Updated: 05/18/22 1619     Urine culture hold       Urine on hold in Microbiology dept for 2 days. If unpreserved urine is submitted, it cannot be used for addtional testing after 24 hours, recollection will be required.           RESPIRATORY VIRUS PANEL W/COVID-19, PCR [[id number]] Collected: 05/18/22 1542    Order Status: Completed Specimen: Nasopharyngeal Updated: 05/18/22 1737     Adenovirus Not detected        Coronavirus 229E Not detected        Coronavirus HKU1 Not detected        Coronavirus CVNL63 Not detected        Coronavirus OC43 Not detected        SARS-CoV-2, PCR Not detected        Metapneumovirus Not detected        Rhinovirus and Enterovirus Not detected        Influenza A Not detected        Influenza A, subtype H1 Not detected        Influenza A, subtype H3 Not detected        INFLUENZA A H1N1 PCR Not detected        Influenza B Not detected        Parainfluenza 1 Not detected        Parainfluenza 2 Not detected        Parainfluenza 3 Not detected        Parainfluenza virus 4 Not detected        RSV by PCR Not detected        B. parapertussis, PCR Not detected        Bordetella pertussis - PCR Not detected        Chlamydophila pneumoniae DNA, QL, PCR Not detected        Mycoplasma pneumoniae DNA, QL, PCR Not detected       CULTURE, BLOOD, PAIRED [[id number]] Collected: 05/18/22 1316    Order Status: Completed Specimen: Blood Updated: 05/23/22 0845     Special Requests: NO SPECIAL REQUESTS        Culture result: NO GROWTH 5 DAYS            55year old male with medical hx of type II diabetes, dyslipidemia, hypertension, obesity, COPD was admitted to the hospital on 5/18 with shortness of breath from SNF. Pt was hospitalized between 4/25 to 5/2 treated for resp failure secondary to COPD exacerbation and acute on chronic diastolic CHF then sent to SNF. Pt usually wears O2 @ 2L via n/c. Impression:   PVD  Chronic LE wounds/lymphadema  Cellulitis LLE  Morbid obesity  Leukocytosis (resolved)  - afebrile, WBC 7.7    Blood cx (5/18) no growth    MRSA nare screen + 5/18    CXR (-) ASDZ    CT of abd/pel (5/18) no acute process    Duplex study (-) for DVT LLE    Plan:   · Continue with IV cefepime and daptomycin (KF23); completed total 2 weeks as of 5/31.         Monitor off abx  · Adjust ABX prn  · Fever work up if temp >= 100.4  · Wound care per wound care team's recommendation      Above plan of care discussed and agreed with Dr. Yomi Vazquez, NP

## 2022-05-31 NOTE — PROGRESS NOTES
[facility]  Hospitalist Group                                                                                          Hospitalist Progress Note  Sarah Ford MD  Answering service: [phone] from in house phone        Date of Service:  2022  NAME:  Saurav Araujo  :  1965  MRN:  [id number]      Admission Summary:     Patient  presents with sepsis due to lower extremity cellulitis, on initial presentation patient was admitted to ICU and require low-dose of norepinephrine overnight for short period of time. Later patient was transferred out of ICU. Currently followed by ID. Patient also with acute metabolic encephalopathy and generalized fatigue, overall mental status is better. Interval history / Subjective:     Patient denies any shortness of breath, denies any pain in the leg.   No shortness of breath     Assessment & Plan:     Severe sepsis with septic shock  -Sepsis due to lower extremity cellulitis  -Patient initially required ICU admission and vasopressors temporarily  -Sepsis is now resolved  and hemodynamically stable, blood cultures no growth    Lower extremity cellulitis  -Patient with chronic lower extremity edema, culprit for cellulitis  -Lower extremity venous Doppler negative for DVT  -ID following, on cefepime and daptomycin, plan for total of 2 weeks of antibiotic, plan changing to p.o. on discharge    Chronic leg edema with stasis ulcers  -Continue mobility with PT, keep legs antigravity while sitting  -Patient received bariatric bed   -Wound care following  -Ordered for both lower extremities to be wrapped with Ace bandage to provide compression effect  -Patient still need to continue on IV diuretics, patient is diuresing well    Acute on chronic diastolic CHF  -NYHA class III on admission  -Continue diuresis with Bumex, continue Entresto (unclear if patient's EF was low previously, no documentations available), echo on 2021 shows EF of 50%    Hematuria  -Patient with hematuria and urinary retention  -Now resolved, Foster was removed on 5/22/2022  -Urology previously evaluated the patient    MADELAINE  -Now resolved, stable on diuretics    Acute metabolic encephalopathy  -ABGs previously was unremarkable  -Likely possible hospital fatigue, overall improving  -Patient to continue to work with physical therapy    Chronic atrial fibrillation  -Continue amiodarone and Eliquis for anticoagulation  -Stable    COPD  -Patient with chronic hypoxic and hypercapnic respiratory failure due to COPD  -Continue bronchodilator, breathing treatment as needed  -On 3 L of oxygen    Diabetes type 2  -Continue NPH 5 units twice daily, continue sliding scale coverage    Dyslipidemia  -Continue Zetia    BPH  -Continue Flomax    Obstructive sleep apnea  -CPAP nightly    Morbid obesity  -With BMI of 38.78  -Outpatient follow-up for weight management     Code status:  Full  Prophylaxis: Eliquis  Care Plan discussed with: Patient  Anticipated Disposition: 24 to 48 hours, patient needs placement to [facility] Problems  Date Reviewed: 4/28/2013          Codes Class Noted POA    * (Principal) Sepsis ([facility] [address] ICD-10-CM: A41.9  ICD-9-CM: 038.9, 995.91  5/18/2022 Unknown                Review of Systems:   A comprehensive review of systems was negative except for that written in the HPI. Vital Signs:    Last 24hrs VS reviewed since prior progress note.  Most recent are:  Visit Vitals  BP 97/61 (BP 1 Location: Right upper arm, BP Patient Position: At rest)   Pulse 73   Temp 98.2 °F (36.8 °C)   Resp 16   Ht 6' (1.829 m)   Wt 129.7 kg (286 lb)   SpO2 93%   BMI 38.79 kg/m²         Intake/Output Summary (Last 24 hours) at 5/31/2022 1109  Last data filed at 5/31/2022 0720  Gross per 24 hour   Intake --   Output 3100 ml   Net -3100 ml        Physical Examination:     I had a face to face encounter with this patient and independently examined them on 5/31/2022 as outlined below:          Constitutional:  No acute distress, cooperative, pleasant    ENT:  Oral mucosa moist, oropharynx benign. Resp:  CTA bilaterally. No wheezing/rhonchi/rales. No accessory muscle use. CV:  Regular rhythm, normal rate, no murmurs, gallops, rubs    GI:  Soft, non distended, non tender. normoactive bowel sounds, no hepatosplenomegaly     Musculoskeletal:  Bilateral lower extremity pitting edema, multiple bilateral lower extremity blisters    Neurologic:  Moves all extremities. AAOx3, CN II-XII reviewed            Data Review:    Review and/or order of clinical lab test      Labs:     Recent Labs     05/31/22  0354   WBC 7.7   HGB 12.3   HCT 41.0        Recent Labs     05/31/22  0354 05/30/22  0438 05/29/22  0300   * 133* 134*   K 3.9 3.4* 3.6   CL 94* 93* 94*   CO2 32 33* 36*   BUN 67* 68* 63*   CREA 1.25 1.21 1.11   * 147* 154*   CA 9.5 8.6 9.2     No results for input(s): ALT, AP, TBIL, TBILI, TP, ALB, GLOB, GGT, AML, LPSE in the last 72 hours. No lab exists for component: SGOT, GPT, AMYP, HLPSE  No results for input(s): INR, PTP, APTT, INREXT, INREXT in the last 72 hours. No results for input(s): FE, TIBC, PSAT, FERR in the last 72 hours. No results found for: FOL, RBCF   No results for input(s): PH, PCO2, PO2 in the last 72 hours. No results for input(s): CPK, CKNDX, TROIQ in the last 72 hours.     No lab exists for component: CPKMB  Lab Results   Component Value Date/Time    Cholesterol, total 170 01/23/2013 11:08 AM    HDL Cholesterol 37 (L) 01/23/2013 11:08 AM    LDL, calculated 97 01/23/2013 11:08 AM    Triglyceride 180 (H) 01/23/2013 11:08 AM    CHOL/HDL Ratio 6.0 (H) 10/06/2010 03:18 PM     Lab Results   Component Value Date/Time    Glucose (POC) 126 (H) 05/31/2022 08:15 AM    Glucose (POC) 169 (H) 05/30/2022 08:56 PM    Glucose (POC) 150 (H) 05/30/2022 04:41 PM    Glucose (POC) 136 (H) 05/30/2022 12:35 PM    Glucose (POC) 143 (H) 05/30/2022 08:43 AM     Lab Results   Component Value Date/Time    Color YELLOW/STRAW 05/18/2022 03:54 PM    Appearance CLEAR 05/18/2022 03:54 PM    Specific gravity 1.011 05/18/2022 03:54 PM    pH (UA) 5.0 05/18/2022 03:54 PM    Protein Negative 05/18/2022 03:54 PM    Glucose >1,000 (A) 05/18/2022 03:54 PM    Ketone Negative 05/18/2022 03:54 PM    Bilirubin Negative 05/18/2022 03:54 PM    Urobilinogen 0.2 05/18/2022 03:54 PM    Nitrites Negative 05/18/2022 03:54 PM    Leukocyte Esterase Negative 05/18/2022 03:54 PM    Epithelial cells FEW 05/18/2022 03:54 PM    Bacteria Negative 05/18/2022 03:54 PM    WBC 0-4 05/18/2022 03:54 PM    RBC 20-50 05/18/2022 03:54 PM         Medications Reviewed:     Current Facility-Administered Medications   Medication Dose Route Frequency    cefepime (MAXIPIME) 2 g in 0.9% sodium chloride (MBP/ADV) 100 mL MBP  2 g IntraVENous Q12H    ezetimibe (ZETIA) tablet 10 mg  10 mg Oral DAILY    sacubitriL-valsartan (ENTRESTO) 49-51 mg tablet 1 Tablet  1 Tablet Oral BID    DAPTOmycin (CUBICIN) 600 mg in 0.9% sodium chloride 50 mL IVPB  600 mg IntraVENous Q24H    bumetanide (BUMEX) injection 2 mg  2 mg IntraVENous BID    magnesium citrate solution 148 mL  148 mL Oral Q2H PRN    senna-docusate (PERICOLACE) 8.6-50 mg per tablet 1 Tablet  1 Tablet Oral BID    insulin NPH (NOVOLIN N, HUMULIN N) injection 5 Units  5 Units SubCUTAneous ACB&D    albuterol-ipratropium (DUO-NEB) 2.5 MG-0.5 MG/3 ML  3 mL Nebulization Q6H PRN    traMADoL (ULTRAM) tablet 50 mg  50 mg Oral Q6H PRN    polyethylene glycol (MIRALAX) packet 17 g  17 g Oral BID    albuterol (PROVENTIL VENTOLIN) nebulizer solution 2.5 mg  2.5 mg Nebulization Q4H PRN    insulin lispro (HUMALOG) injection   SubCUTAneous AC&HS    pregabalin (LYRICA) capsule 75 mg  75 mg Oral BID    acetaminophen (TYLENOL) tablet 1,000 mg  1,000 mg Oral Q6H PRN    Or    acetaminophen (TYLENOL) suppository 650 mg  650 mg Rectal Q6H PRN    pantothenic ac-min oil-pet,hyd (AQUAPHOR) 41 % ointment   Topical DAILY    sodium chloride (NS) flush 5-40 mL  5-40 mL IntraVENous Q8H    sodium chloride (NS) flush 5-40 mL  5-40 mL IntraVENous PRN    glucose chewable tablet 16 g  4 Tablet Oral PRN    glucagon (GLUCAGEN) injection 1 mg  1 mg IntraMUSCular PRN    dextrose 10 % infusion 0-250 mL  0-250 mL IntraVENous PRN    sodium chloride (NS) flush 5-40 mL  5-40 mL IntraVENous Q8H    sodium chloride (NS) flush 5-40 mL  5-40 mL IntraVENous PRN    ondansetron (ZOFRAN ODT) tablet 4 mg  4 mg Oral Q8H PRN    Or    ondansetron (ZOFRAN) injection 4 mg  4 mg IntraVENous Q6H PRN    bisacodyL (DULCOLAX) tablet 5 mg  5 mg Oral DAILY    polyethylene glycol (MIRALAX) packet 17 g  17 g Oral DAILY PRN    amiodarone (CORDARONE) tablet 100 mg  100 mg Oral DAILY    apixaban (ELIQUIS) tablet 5 mg  5 mg Oral BID    aspirin delayed-release tablet 81 mg  81 mg Oral DAILY    arformoterol 15 mcg/budesonide 0.5 mg neb solution   Nebulization BID RT    montelukast (SINGULAIR) tablet 10 mg  10 mg Oral QHS    pantoprazole (PROTONIX) tablet 40 mg  40 mg Oral DAILY    [Held by provider] rosuvastatin (CRESTOR) tablet 5 mg  5 mg Oral DAILY    tamsulosin (FLOMAX) capsule 0.4 mg  0.4 mg Oral DAILY     ______________________________________________________________________  EXPECTED LENGTH OF STAY: 4d 19h  ACTUAL LENGTH OF STAY:          13                 Mika Knutson MD

## 2022-06-01 LAB
ANION GAP SERPL CALC-SCNC: 4 MMOL/L (ref 5–15)
BUN SERPL-MCNC: 73 MG/DL (ref 6–20)
BUN/CREAT SERPL: 70 (ref 12–20)
CALCIUM SERPL-MCNC: 9.3 MG/DL (ref 8.5–10.1)
CHLORIDE SERPL-SCNC: 92 MMOL/L (ref 97–108)
CO2 SERPL-SCNC: 36 MMOL/L (ref 21–32)
CREAT SERPL-MCNC: 1.05 MG/DL (ref 0.7–1.3)
GLUCOSE BLD STRIP.AUTO-MCNC: 123 MG/DL (ref 65–117)
GLUCOSE BLD STRIP.AUTO-MCNC: 162 MG/DL (ref 65–117)
GLUCOSE BLD STRIP.AUTO-MCNC: 166 MG/DL (ref 65–117)
GLUCOSE BLD STRIP.AUTO-MCNC: 176 MG/DL (ref 65–117)
GLUCOSE SERPL-MCNC: 144 MG/DL (ref 65–100)
POTASSIUM SERPL-SCNC: 3.7 MMOL/L (ref 3.5–5.1)
SERVICE CMNT-IMP: ABNORMAL
SODIUM SERPL-SCNC: 132 MMOL/L (ref 136–145)

## 2022-06-01 PROCEDURE — 82962 GLUCOSE BLOOD TEST: CPT

## 2022-06-01 PROCEDURE — 80048 BASIC METABOLIC PNL TOTAL CA: CPT

## 2022-06-01 PROCEDURE — 65270000046 HC RM TELEMETRY

## 2022-06-01 PROCEDURE — 94664 DEMO&/EVAL PT USE INHALER: CPT

## 2022-06-01 PROCEDURE — 36415 COLL VENOUS BLD VENIPUNCTURE: CPT

## 2022-06-01 PROCEDURE — 94760 N-INVAS EAR/PLS OXIMETRY 1: CPT

## 2022-06-01 PROCEDURE — 74011636637 HC RX REV CODE- 636/637: Performed by: INTERNAL MEDICINE

## 2022-06-01 PROCEDURE — 94640 AIRWAY INHALATION TREATMENT: CPT

## 2022-06-01 PROCEDURE — 74011250637 HC RX REV CODE- 250/637: Performed by: INTERNAL MEDICINE

## 2022-06-01 PROCEDURE — 97535 SELF CARE MNGMENT TRAINING: CPT

## 2022-06-01 PROCEDURE — 74011000250 HC RX REV CODE- 250: Performed by: INTERNAL MEDICINE

## 2022-06-01 PROCEDURE — 77010033678 HC OXYGEN DAILY

## 2022-06-01 PROCEDURE — 74011000250 HC RX REV CODE- 250: Performed by: HOSPITALIST

## 2022-06-01 PROCEDURE — 97116 GAIT TRAINING THERAPY: CPT

## 2022-06-01 RX ADMIN — POLYETHYLENE GLYCOL 3350 17 G: 17 POWDER, FOR SOLUTION ORAL at 08:42

## 2022-06-01 RX ADMIN — BUMETANIDE 2 MG: 0.25 INJECTION INTRAMUSCULAR; INTRAVENOUS at 17:32

## 2022-06-01 RX ADMIN — AMIODARONE HYDROCHLORIDE 100 MG: 200 TABLET ORAL at 08:41

## 2022-06-01 RX ADMIN — EZETIMIBE 10 MG: 10 TABLET ORAL at 08:41

## 2022-06-01 RX ADMIN — POLYETHYLENE GLYCOL 3350 17 G: 17 POWDER, FOR SOLUTION ORAL at 17:33

## 2022-06-01 RX ADMIN — Medication 2 UNITS: at 17:32

## 2022-06-01 RX ADMIN — ARFORMOTEROL TARTRATE: 15 SOLUTION RESPIRATORY (INHALATION) at 09:14

## 2022-06-01 RX ADMIN — SODIUM CHLORIDE, PRESERVATIVE FREE 10 ML: 5 INJECTION INTRAVENOUS at 15:22

## 2022-06-01 RX ADMIN — PREGABALIN 75 MG: 25 CAPSULE ORAL at 08:40

## 2022-06-01 RX ADMIN — TAMSULOSIN HYDROCHLORIDE 0.4 MG: 0.4 CAPSULE ORAL at 08:41

## 2022-06-01 RX ADMIN — Medication 5 UNITS: at 17:32

## 2022-06-01 RX ADMIN — APIXABAN 5 MG: 5 TABLET, FILM COATED ORAL at 08:41

## 2022-06-01 RX ADMIN — TRAMADOL HYDROCHLORIDE 50 MG: 50 TABLET, COATED ORAL at 09:49

## 2022-06-01 RX ADMIN — BISACODYL 5 MG: 5 TABLET, COATED ORAL at 08:41

## 2022-06-01 RX ADMIN — PANTOPRAZOLE SODIUM 40 MG: 40 TABLET, DELAYED RELEASE ORAL at 08:41

## 2022-06-01 RX ADMIN — SODIUM CHLORIDE, PRESERVATIVE FREE 5 ML: 5 INJECTION INTRAVENOUS at 21:25

## 2022-06-01 RX ADMIN — SODIUM CHLORIDE, PRESERVATIVE FREE 10 ML: 5 INJECTION INTRAVENOUS at 05:11

## 2022-06-01 RX ADMIN — Medication 2 UNITS: at 12:16

## 2022-06-01 RX ADMIN — PREGABALIN 75 MG: 25 CAPSULE ORAL at 17:33

## 2022-06-01 RX ADMIN — WHITE PETROLATUM: 1.75 OINTMENT TOPICAL at 12:17

## 2022-06-01 RX ADMIN — SODIUM CHLORIDE, PRESERVATIVE FREE 10 ML: 5 INJECTION INTRAVENOUS at 05:12

## 2022-06-01 RX ADMIN — MONTELUKAST 10 MG: 10 TABLET, FILM COATED ORAL at 21:24

## 2022-06-01 RX ADMIN — Medication 5 UNITS: at 08:40

## 2022-06-01 RX ADMIN — BUMETANIDE 2 MG: 0.25 INJECTION INTRAMUSCULAR; INTRAVENOUS at 08:40

## 2022-06-01 RX ADMIN — SACUBITRIL AND VALSARTAN 1 TABLET: 49; 51 TABLET, FILM COATED ORAL at 17:36

## 2022-06-01 RX ADMIN — SENNOSIDES AND DOCUSATE SODIUM 1 TABLET: 50; 8.6 TABLET ORAL at 17:33

## 2022-06-01 RX ADMIN — SENNOSIDES AND DOCUSATE SODIUM 1 TABLET: 50; 8.6 TABLET ORAL at 08:41

## 2022-06-01 RX ADMIN — ASPIRIN 81 MG: 81 TABLET, COATED ORAL at 08:40

## 2022-06-01 RX ADMIN — ARFORMOTEROL TARTRATE: 15 SOLUTION RESPIRATORY (INHALATION) at 23:13

## 2022-06-01 RX ADMIN — ACETAMINOPHEN 1000 MG: 500 TABLET ORAL at 15:22

## 2022-06-01 RX ADMIN — APIXABAN 5 MG: 5 TABLET, FILM COATED ORAL at 17:33

## 2022-06-01 RX ADMIN — SACUBITRIL AND VALSARTAN 1 TABLET: 49; 51 TABLET, FILM COATED ORAL at 08:55

## 2022-06-01 NOTE — PROGRESS NOTES
Bedside shift change report given to Maurilio Tony RN (oncoming nurse) by Zach Sanchez RN (offgoing nurse). Report included the following information SBAR, Kardex, ED Summary, Intake/Output, MAR, Accordion, Recent Results, Med Rec Status and Cardiac Rhythm NSR.

## 2022-06-01 NOTE — PROGRESS NOTES
ID Progress Note  2022    Subjective:     Sitting up in chair    Review of Systems:            Symptom Y/N Comments   Symptom Y/N Comments   Fever/Chills n      Chest Pain n       Poor Appetite       Edema y       Cough       Abdominal Pain  n      Sputum       Joint Pain        SOB/MCCULLOUGH  *  no change   Pruritis/Rash        Nausea/vomit  n     Tolerating PT/OT        Diarrhea  n     Tolerating Diet        Constipation  n     Other           Could NOT obtain due to:       Objective:     Vitals:   Visit Vitals  /80 (BP 1 Location: Right upper arm, BP Patient Position: At rest)   Pulse 78   Temp 97.4 °F (36.3 °C)   Resp 18   Ht 6' (1.829 m)   Wt 148.3 kg (327 lb)   SpO2 95%   BMI 44.35 kg/m²        Tmax:  Temp (24hrs), Av.6 °F (36.4 °C), Min:97.2 °F (36.2 °C), Max:98.2 °F (36.8 °C)      PHYSICAL EXAM:  General: Obese, chronically ill appearing, WD, WN. Alert, cooperative, no acute distress    EENT:  EOMI. Anicteric sclerae. MMM  Resp:  Clear in apex with decreased breath sounds at bases, no wheezing or rales. No accessory muscle use  CV:  Regular  rhythm,  + edema; LLE>RLE  GI:  Soft, obese, Non tender. +Bowel sounds  Neurologic:  Alert and oriented X 3, normal speech,   Psych:   Fair insight. Not anxious nor agitated  Skin:  No rashes.   No jaundice                          Stage III pressure injury to both hip,     LLE wound; edematous, erythematous below the knee, not warm to touch    RLE; chronic venous stasis    Labs:   Lab Results   Component Value Date/Time    WBC 7.7 2022 03:54 AM    HGB 12.3 2022 03:54 AM    HCT 41.0 2022 03:54 AM    PLATELET 921  03:54 AM    MCV 76.4 (L) 2022 03:54 AM     Lab Results   Component Value Date/Time    Sodium 132 (L) 2022 03:21 AM    Potassium 3.7 2022 03:21 AM    Chloride 92 (L) 2022 03:21 AM    CO2 36 (H) 2022 03:21 AM    Anion gap 4 (L) 2022 03:21 AM    Glucose 144 (H) 2022 03:21 AM    BUN 73 (H) 06/01/2022 03:21 AM    Creatinine 1.05 06/01/2022 03:21 AM    BUN/Creatinine ratio 70 (H) 06/01/2022 03:21 AM    GFR est AA >60 06/01/2022 03:21 AM    GFR est non-AA >60 06/01/2022 03:21 AM    Calcium 9.3 06/01/2022 03:21 AM    Bilirubin, total 0.8 05/18/2022 01:16 PM    Alk. phosphatase 89 05/18/2022 01:16 PM    Protein, total 6.8 05/18/2022 01:16 PM    Albumin 2.8 (L) 05/18/2022 01:16 PM    Globulin 4.0 05/18/2022 01:16 PM    A-G Ratio 0.7 (L) 05/18/2022 01:16 PM    ALT (SGPT) 12 05/18/2022 01:16 PM         Cultures:   Results     Procedure Component Value Units Date/Time    CULTURE, MRSA [[id number]]  (Abnormal) Collected: 05/18/22 1706    Order Status: Completed Specimen: Nasal from Nares Updated: 05/19/22 1721     Special Requests: NO SPECIAL REQUESTS        Culture result: MRSA PRESENT               Screening of patient nares for MRSA is for surveillance purposes and, if positive, to facilitate isolation considerations in high risk settings. It is not intended for automatic decolonization interventions per se as regimens are not sufficiently effective to warrant routine use. (NOTE) CALLED POSITIVE MRSA TO TYLER ZARAGOZA AT 7395 ON 5/19/22. AT    URINE CULTURE HOLD SAMPLE [[id number]] Collected: 05/18/22 1554    Order Status: Completed Specimen: Serum Updated: 05/18/22 1619     Urine culture hold       Urine on hold in Microbiology dept for 2 days. If unpreserved urine is submitted, it cannot be used for addtional testing after 24 hours, recollection will be required.           RESPIRATORY VIRUS PANEL W/COVID-19, PCR [[id number]] Collected: 05/18/22 1542    Order Status: Completed Specimen: Nasopharyngeal Updated: 05/18/22 1737     Adenovirus Not detected        Coronavirus 229E Not detected        Coronavirus HKU1 Not detected        Coronavirus CVNL63 Not detected        Coronavirus OC43 Not detected        SARS-CoV-2, PCR Not detected        Metapneumovirus Not detected        Rhinovirus and Enterovirus Not detected        Influenza A Not detected        Influenza A, subtype H1 Not detected        Influenza A, subtype H3 Not detected        INFLUENZA A H1N1 PCR Not detected        Influenza B Not detected        Parainfluenza 1 Not detected        Parainfluenza 2 Not detected        Parainfluenza 3 Not detected        Parainfluenza virus 4 Not detected        RSV by PCR Not detected        B. parapertussis, PCR Not detected        Bordetella pertussis - PCR Not detected        Chlamydophila pneumoniae DNA, QL, PCR Not detected        Mycoplasma pneumoniae DNA, QL, PCR Not detected       CULTURE, BLOOD, PAIRED [[id number]] Collected: 05/18/22 1316    Order Status: Completed Specimen: Blood Updated: 05/23/22 0830     Special Requests: NO SPECIAL REQUESTS        Culture result: NO GROWTH 5 DAYS            55year old male with medical hx of type II diabetes, dyslipidemia, hypertension, obesity, COPD was admitted to the hospital on 5/18 with shortness of breath from SNF. Pt was hospitalized between 4/25 to 5/2 treated for resp failure secondary to COPD exacerbation and acute on chronic diastolic CHF then sent to SNF. Pt usually wears O2 @ 2L via n/c. Impression:   PVD  Chronic LE wounds/lymphadema  Cellulitis LLE  Morbid obesity  Leukocytosis (resolved)  - afebrile, WBC 7.7    Blood cx (5/18) no growth    MRSA nare screen + 5/18    CXR (-) ASDZ    CT of abd/pel (5/18) no acute process    Duplex study (-) for DVT LLE    Plan:   · Continue with IV cefepime and daptomycin (QS17); completed total 2 weeks as of 5/31. Afebrile with normal WBC while off abx  · Fever work up if temp >= 100.4  · Wound care per wound care team's recommendation      Above plan of care discussed and agreed with Dr. Cindi Hernandez     ID team signing off. Please contact us with any questions.           Marija Head NP

## 2022-06-01 NOTE — PROGRESS NOTES
Problem: Mobility Impaired (Adult and Pediatric)  Goal: *Acute Goals and Plan of Care (Insert Text)  Description: FUNCTIONAL STATUS PRIOR TO ADMISSION: Patient was modified independent using a rollator for functional mobility. Also had standard wheelchair to use to go to/from appointments in wheelchair Charly Luevano [address]: The patient lived alone with caregiver 1 hour per day M-F to provide assistance. Physical Therapy Goals  Revised 5/27/2022  1. Patient will move from supine to sit and sit to supine , scoot up and down, and roll side to side in bed with modified independence within 7 day(s). 2.  Patient will transfer from bed to chair and chair to bed with modified independence using the least restrictive device within 7 day(s). 3.  Patient will perform sit to stand with modified independence within 7 day(s). 4.  Patient will ambulate with modified independence for 75 feet with the least restrictive device within 7 day(s). Physical Therapy Goals  Initiated 5/20/2022  1. Patient will move from supine to sit and sit to supine , scoot up and down, and roll side to side in bed with modified independence within 7 day(s). 2.  Patient will transfer from bed to chair and chair to bed with modified independence using the least restrictive device within 7 day(s). 3.  Patient will perform sit to stand with modified independence within 7 day(s). 4.  Patient will ambulate with minimal assistance/contact guard assist for 50 feet with the least restrictive device within 7 day(s). Outcome: Progressing Towards Goal       PHYSICAL THERAPY TREATMENT  Patient: Skyler Herrera [de-identified]55 y.o. male)  Date: 6/1/2022  Diagnosis: Sepsis ([address] [A41.9] Sepsis ([address]       Precautions: Fall,Skin,Contact  Chart, physical therapy assessment, plan of care and goals were reviewed. ASSESSMENT  Patient continues with skilled PT services and is progressing towards goals. Pt generally mobilized at [facility] .  Pt received sitting in bedside chair, on [facility], on remote tele, agreeable to work with therapy. Pt requested to perform personal hgene and provided wipes and a fresh gown and sat with trunk unsupported ~ 5 minutes demonstrated good dynamic balance before agreeing to ambulating in hallway. Pt went sit>stand and started to ambulate short distance before stating he had the urge to use the bedside commode for BM. After successfully transferring onto [facility], pt sat ~ 3 minutes before stating he was unable to void bowels and was agreeable to continue ambulation. Pt utilized RW with slow steady gait pattern w/ fwd flexed posture which pt was unable to correct with VC, approx 75 ft before taking standing rest break with forearms on RW 2/2 complaints of UE fatigue. Pt then ambulated approx 20 ft before stating he had lumbar discomfort and and continued walk with forearms on RW stating it \"helped\" decrease discomfort. Pt did not quantify pain during trip. Pt returned to room and successfully went stand>sit in bedside chair at which point session concluded. Current Level of Function Impacting Discharge (mobility/balance): CGA for all OOB mobility    Other factors to consider for discharge: decreased activity tolerance, lumbar pain, PLOF, body habitus         PLAN :  Patient continues to benefit from skilled intervention to address the above impairments. Continue treatment per established plan of care. to address goals. 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  in reference to a vacation he took with his father in the 66's in [place]. OBJECTIVE DATA SUMMARY:   Critical Behavior:  Neurologic State: Alert  Orientation Level: Oriented X4  Cognition: Follows commands  Safety/Judgement: Awareness of environment,Decreased insight into deficits,Fall prevention  Functional Mobility Training:  Transfers:  Sit to Stand: Supervision; Additional time; Adaptive equipment;Assist x1  Stand to Sit: Modified independent        Bed to Chair: Contact guard assistance;Assist x1;Additional time; Adaptive equipment                    Balance:  Sitting: Intact; Without support  Standing: Impaired; Without support  Standing - Static: Constant support;Good  Standing - Dynamic : Fair;Constant support  Ambulation/Gait Training:  Distance (ft): 130 Feet (ft)  Assistive Device: Gait belt;Walker, rolling  Ambulation - Level of Assistance: Stand-by assistance        Gait Abnormalities: Decreased step clearance        Base of Support: Widened     Speed/Maral: Slow;Pace decreased (<100 feet/min)  Step Length: Right shortened;Left shortened        Interventions: Safety awareness training;Verbal cues          Pain Rating:  Pt did not quantify pain during session    Activity Tolerance:   Fair and requires rest breaks    After treatment patient left in no apparent distress:   Sitting in chair and Call bell within reach    COMMUNICATION/COLLABORATION:   The patients plan of care was discussed with: Occupational therapist and Registered nurse.      Debbie Elmore, PT   Time Calculation: 30 mins

## 2022-06-01 NOTE — PROGRESS NOTES
0745  Bedside shift change report given to Laura ZARAGOZA (oncoming nurse) by Nohemi velasquez). Report included the following information SBAR, Kardex, ED Summary, Intake/Output, and Recent Results. This patient was assisted with Intentional Toileting every 2 hours during this shift as appropriate. Documentation of ambulation and output reflected on Flowsheet as appropriate. Purposeful hourly rounding was completed using AIDET and 5Ps. Outcomes of PHR documented as they occurred. Bed alarm in use as appropriate. Dual Suction and ambubag in place.

## 2022-06-01 NOTE — PROGRESS NOTES
Problem: Diabetes Self-Management  Goal: *Developing strategies to promote health/change behavior  Description: Define the ABC's of diabetes; identify appropriate screenings, schedule and personal plan for screenings. Outcome: Progressing Towards Goal     Problem: Falls - Risk of  Goal: *Absence of Falls  Description: Document Ashley Snyder Fall Risk and appropriate interventions in the flowsheet. Outcome: Progressing Towards Goal  Note: Fall Risk Interventions:  Mobility Interventions: Bed/chair exit alarm,Communicate number of staff needed for ambulation/transfer,Patient to call before getting OOB,Strengthening exercises (ROM-active/passive)    Mentation Interventions: Adequate sleep, hydration, pain control,Increase mobility    Medication Interventions: Bed/chair exit alarm,Teach patient to arise slowly,Patient to call before getting OOB,Utilize gait belt for transfers/ambulation    Elimination Interventions: Call light in reach,Stay With Me (per policy)    History of Falls Interventions: Bed/chair exit alarm,Utilize gait belt for transfer/ambulation         Problem: Risk for Spread of Infection  Goal: Prevent transmission of infectious organism to others  Description: Prevent the transmission of infectious organisms to other patients, staff members, and visitors.   Outcome: Progressing Towards Goal

## 2022-06-01 NOTE — PROGRESS NOTES
Problem: Self Care Deficits Care Plan (Adult)  Goal: *Acute Goals and Plan of Care (Insert Text)  Description: FUNCTIONAL STATUS PRIOR TO ADMISSION: Patient was modified independent using a rollator for functional mobility. HOME SUPPORT: The patient lived alone with caregiver who comes in to provide assistance with IADLs. Occupational Therapy Goals  Weekly re-assessment 6/1/2022. Goals updated   1. Patient will perform grooming in stand with supervision/set-up within 7 day(s). 2.  Patient will perform bathing using most appropriate DME with min assist within 7 day(s). 3.  Patient will perform lower body dressing using most appropriate DME with moderate assistance within 7 day(s). 4.  Patient will perform toilet transfers with supervision/set-up within 7 day(s). 5.  Patient will perform all aspects of toileting with min assist within 7 day(s). 6.  Patient will participate in upper extremity therapeutic exercise/activities with supervision/set-up for 5 minutes within 7 day(s). 7.  Patient will utilize energy conservation techniques during functional activities with verbal cues within 7 day(s). Initiated 5/20/2022  1. Patient will perform grooming in sitting with supervision/set-up within 7 day(s). met  2. Patient will perform bathing using most appropriate DME with moderate assistance within 7 day(s). - met  3. Patient will perform lower body dressing using most appropriate DME with moderate assistance within 7 day(s). - continue  4. Patient will perform toilet transfers with supervision/set-up within 7 day(s). - continue for consistency  5. Patient will perform all aspects of toileting with moderate assistance within 7 day(s). - met  6. Patient will participate in upper extremity therapeutic exercise/activities with supervision/set-up for 5 minutes within 7 day(s). 7.  Patient will utilize energy conservation techniques during functional activities with verbal cues within 7 day(s).   Outcome: Progressing Towards Goal   OCCUPATIONAL THERAPY TREATMENT/WEEKLY RE-ASSESSMENT  Patient: Nabil Hudson [de-identified]55 y.o. male)  Date: 6/1/2022  Diagnosis: Sepsis ([facility] [address] [A41.9] Sepsis ([facility] [address]       Precautions: Fall,Skin,Contact  Chart, occupational therapy assessment, plan of care, and goals were reviewed. ASSESSMENT  Patient continues with skilled OT services and is progressing towards goals. 3/7 short term goals met with progress toward all noted this reporting period. Patient is limited by LE and buttocks wounds, body habitus, back pain, decreased standing tolerance, impaired standing balance during ADL task and related mobility. Patient was agreeable to all interventions today and participated well. He does require additional time and rest breaks to complete ADL activities. He will benefit from continued therapy services in rehab setting (SNF level appropriate) upon discharge. Current Level of Function Impacting Discharge (ADLs): mod to max assist for lb bathing and dressing, and toileting     Other factors to consider for discharge:          PLAN :  Goals have been updated based on progression since last assessment. Patient continues to benefit from skilled intervention to address the above impairments. Continue to follow patient 3 times a week to address goals.     Recommend with staff: in chair for meals, enable ADL participation     Recommend next OT session: progress POC- AE trials for LB ADL and toileting    Recommendation for discharge: (in order for the patient to meet his/her long term goals)  Therapy up to 5 days/week in SNF setting    This discharge recommendation:  Has been made in collaboration with the attending provider and/or case management    IF patient discharges home will need the following DME: patient owns DME required for discharge       SUBJECTIVE:   Patient pleasant and cooperative    OBJECTIVE DATA SUMMARY:   Cognitive/Behavioral Status:  Neurologic State: Alert  Orientation Level: Oriented X4  Cognition: Follows commands             Functional Mobility and Transfers for ADLs:  Bed Mobility:       Transfers:  Sit to Stand: Supervision; Additional time; Adaptive equipment;Assist x1  Functional Transfers  Bathroom Mobility: Contact guard assistance  Cues: Physical assistance; Tactile cues provided;Verbal cues provided  Adaptive Equipment: Walker (comment)  Bed to Chair: Contact guard assistance;Assist x1;Additional time; Adaptive equipment    Balance:  Sitting: Intact; Without support  Standing: Impaired; Without support  Standing - Static: Constant support;Good  Standing - Dynamic : Fair;Constant support    ADL Intervention:            Upper Body Bathing  Bathing Assistance: Set-up  Position Performed: Seated in chair    Lower Body Bathing  Perineal  : Maximum assistance  Lower Body : Minimum assistance  Position Performed: Seated in chair    Upper [address]: Set-up    Lower Body Dressing Assistance  Dressing Assistance: Maximum assistance    Toileting  Bladder Hygiene: Set-up (uses urinal )  Bowel Hygiene: Maximum assistance (2/2 to wound)  Adaptive Equipment: Walker       Pain:  Back pain (chronic in nature)    Activity Tolerance:   Fair and requires rest breaks    After treatment patient left in no apparent distress:   Sitting in chair and Call bell within reach    COMMUNICATION/COLLABORATION:   The patients plan of care was discussed with: Physical therapist and Registered nurse.      Hilary Bazzi OT  Time Calculation: 31 mins

## 2022-06-01 NOTE — PROGRESS NOTES
[facility] Adult  Hospitalist Group                                                                                          Hospitalist Progress Note  Rene Trevino MD  Answering service: [phone] from in house phone        Date of Service:  2022  NAME:  Liliane Boyle  :  1965  MRN:  [id number]      Admission Summary:     Patient  presents with sepsis due to lower extremity cellulitis, on initial presentation patient was admitted to ICU and require low-dose of norepinephrine overnight for short period of time. Later patient was transferred out of ICU. Currently followed by ID. Patient also with acute metabolic encephalopathy and generalized fatigue, overall mental status is better. Interval history / Subjective:     Patient is doing well no acute complaint. Not much change in the size of his legs. Patient continuing with PT.      Assessment & Plan:     Severe sepsis with septic shock  -Sepsis due to lower extremity cellulitis  -Patient initially required ICU admission and vasopressors temporarily  -Sepsis is now resolved  and hemodynamically stable, blood cultures no growth     Lower extremity cellulitis  -Patient with chronic lower extremity edema, culprit for cellulitis  -Lower extremity venous Doppler negative for DVT  -ID following, was on cefepime and daptomycin, completed total of 2 weeks of antibiotics     Chronic leg edema with stasis ulcers  -Continue mobility with PT, keep legs antigravity while sitting  -Patient received bariatric bed   -Wound care following  -Ordered for both lower extremities to be wrapped with Ace bandage to provide compression effect  -Patient still need to continue on IV diuretics, patient is diuresing well     Acute on chronic diastolic CHF  -NYHA class III on admission  -Continue diuresis with Bumex, continue Entresto (unclear if patient's EF was low previously, no documentations available), echo on 2021 shows EF of 50%     Hematuria  -Patient with hematuria and urinary retention  -Now resolved, Foster was removed on 5/22/2022  -Urology previously evaluated the patient     MADELAINE  -Now resolved, stable on diuretics     Acute metabolic encephalopathy  -ABGs previously was unremarkable  -Likely possible hospital fatigue, overall improving  -Patient to continue to work with physical therapy     Chronic atrial fibrillation  -Continue amiodarone and Eliquis for anticoagulation  -Stable     COPD  -Patient with chronic hypoxic and hypercapnic respiratory failure due to COPD  -Continue bronchodilator, breathing treatment as needed  -On 3 L of oxygen     Diabetes type 2  -Continue NPH 5 units twice daily, continue sliding scale coverage     Dyslipidemia  -Continue Zetia     BPH  -Continue Flomax     Obstructive sleep apnea  -CPAP nightly     Morbid obesity  -With BMI of 38.78  -Outpatient follow-up for weight management     Code status:  Full  Prophylaxis: Eliquis  Care Plan discussed with: Patient  Anticipated Disposition: 24 to 48 hours, patient needs placement to [facility] Problems  Date Reviewed: 4/28/2013          Codes Class Noted POA    * (Principal) Sepsis ([facility] [address] ICD-10-CM: A41.9  ICD-9-CM: 038.9, 995.91  5/18/2022 Unknown                Review of Systems:   A comprehensive review of systems was negative except for that written in the HPI. Vital Signs:    Last 24hrs VS reviewed since prior progress note.  Most recent are:  Visit Vitals  /63 (BP 1 Location: Right upper arm, BP Patient Position: At rest;Sitting)   Pulse 80   Temp 98.3 °F (36.8 °C)   Resp 18   Ht 6' (1.829 m)   Wt 148.3 kg (327 lb)   SpO2 97%   BMI 44.35 kg/m²         Intake/Output Summary (Last 24 hours) at 6/1/2022 1354  Last data filed at 6/1/2022 1242  Gross per 24 hour   Intake 0 ml   Output 1975 ml   Net -1975 ml        Physical Examination:     I had a face to face encounter with this patient and independently examined them on 6/1/2022 as outlined below:          Constitutional:  No acute distress, cooperative, pleasant    ENT:  Oral mucosa moist, oropharynx benign. Resp:  CTA bilaterally. No wheezing/rhonchi/rales. No accessory muscle use. CV:  Regular rhythm, normal rate, no murmurs, gallops, rubs    GI:  Soft, non distended, non tender. normoactive bowel sounds, no hepatosplenomegaly     Musculoskeletal:  Pitting edema bilateral lower extremities, warm, 2+ pulses throughout    Neurologic:  Moves all extremities. AAOx3, CN II-XII reviewed            Data Review:    Review and/or order of clinical lab test      Labs:     Recent Labs     05/31/22  0354   WBC 7.7   HGB 12.3   HCT 41.0        Recent Labs     06/01/22  0321 05/31/22  0354 05/30/22  0438   * 133* 133*   K 3.7 3.9 3.4*   CL 92* 94* 93*   CO2 36* 32 33*   BUN 73* 67* 68*   CREA 1.05 1.25 1.21   * 131* 147*   CA 9.3 9.5 8.6     No results for input(s): ALT, AP, TBIL, TBILI, TP, ALB, GLOB, GGT, AML, LPSE in the last 72 hours. No lab exists for component: SGOT, GPT, AMYP, HLPSE  No results for input(s): INR, PTP, APTT, INREXT in the last 72 hours. No results for input(s): FE, TIBC, PSAT, FERR in the last 72 hours. No results found for: FOL, RBCF   No results for input(s): PH, PCO2, PO2 in the last 72 hours. No results for input(s): CPK, CKNDX, TROIQ in the last 72 hours.     No lab exists for component: CPKMB  Lab Results   Component Value Date/Time    Cholesterol, total 170 01/23/2013 11:08 AM    HDL Cholesterol 37 (L) 01/23/2013 11:08 AM    LDL, calculated 97 01/23/2013 11:08 AM    Triglyceride 180 (H) 01/23/2013 11:08 AM    CHOL/HDL Ratio 6.0 (H) 10/06/2010 03:18 PM     Lab Results   Component Value Date/Time    Glucose (POC) 162 (H) 06/01/2022 11:56 AM    Glucose (POC) 123 (H) 06/01/2022 08:19 AM    Glucose (POC) 145 (H) 05/31/2022 09:20 PM    Glucose (POC) 132 (H) 05/31/2022 05:29 PM    Glucose (POC) 139 (H) 05/31/2022 12:19 PM     Lab Results Component Value Date/Time    Color YELLOW/STRAW 05/18/2022 03:54 PM    Appearance CLEAR 05/18/2022 03:54 PM    Specific gravity 1.011 05/18/2022 03:54 PM    pH (UA) 5.0 05/18/2022 03:54 PM    Protein Negative 05/18/2022 03:54 PM    Glucose >1,000 (A) 05/18/2022 03:54 PM    Ketone Negative 05/18/2022 03:54 PM    Bilirubin Negative 05/18/2022 03:54 PM    Urobilinogen 0.2 05/18/2022 03:54 PM    Nitrites Negative 05/18/2022 03:54 PM    Leukocyte Esterase Negative 05/18/2022 03:54 PM    Epithelial cells FEW 05/18/2022 03:54 PM    Bacteria Negative 05/18/2022 03:54 PM    WBC 0-4 05/18/2022 03:54 PM    RBC 20-50 05/18/2022 03:54 PM         Medications Reviewed:     Current Facility-Administered Medications   Medication Dose Route Frequency    ezetimibe (ZETIA) tablet 10 mg  10 mg Oral DAILY    sacubitriL-valsartan (ENTRESTO) 49-51 mg tablet 1 Tablet  1 Tablet Oral BID    bumetanide (BUMEX) injection 2 mg  2 mg IntraVENous BID    magnesium citrate solution 148 mL  148 mL Oral Q2H PRN    senna-docusate (PERICOLACE) 8.6-50 mg per tablet 1 Tablet  1 Tablet Oral BID    insulin NPH (NOVOLIN N, HUMULIN N) injection 5 Units  5 Units SubCUTAneous ACB&D    albuterol-ipratropium (DUO-NEB) 2.5 MG-0.5 MG/3 ML  3 mL Nebulization Q6H PRN    traMADoL (ULTRAM) tablet 50 mg  50 mg Oral Q6H PRN    polyethylene glycol (MIRALAX) packet 17 g  17 g Oral BID    albuterol (PROVENTIL VENTOLIN) nebulizer solution 2.5 mg  2.5 mg Nebulization Q4H PRN    insulin lispro (HUMALOG) injection   SubCUTAneous AC&HS    pregabalin (LYRICA) capsule 75 mg  75 mg Oral BID    acetaminophen (TYLENOL) tablet 1,000 mg  1,000 mg Oral Q6H PRN    Or    acetaminophen (TYLENOL) suppository 650 mg  650 mg Rectal Q6H PRN    pantothenic ac-min oil-pet,hyd (AQUAPHOR) 41 % ointment   Topical DAILY    sodium chloride (NS) flush 5-40 mL  5-40 mL IntraVENous Q8H    sodium chloride (NS) flush 5-40 mL  5-40 mL IntraVENous PRN    glucose chewable tablet 16 g  4 Tablet Oral PRN    glucagon (GLUCAGEN) injection 1 mg  1 mg IntraMUSCular PRN    dextrose 10 % infusion 0-250 mL  0-250 mL IntraVENous PRN    sodium chloride (NS) flush 5-40 mL  5-40 mL IntraVENous Q8H    sodium chloride (NS) flush 5-40 mL  5-40 mL IntraVENous PRN    ondansetron (ZOFRAN ODT) tablet 4 mg  4 mg Oral Q8H PRN    Or    ondansetron (ZOFRAN) injection 4 mg  4 mg IntraVENous Q6H PRN    bisacodyL (DULCOLAX) tablet 5 mg  5 mg Oral DAILY    polyethylene glycol (MIRALAX) packet 17 g  17 g Oral DAILY PRN    amiodarone (CORDARONE) tablet 100 mg  100 mg Oral DAILY    apixaban (ELIQUIS) tablet 5 mg  5 mg Oral BID    aspirin delayed-release tablet 81 mg  81 mg Oral DAILY    arformoterol 15 mcg/budesonide 0.5 mg neb solution   Nebulization BID RT    montelukast (SINGULAIR) tablet 10 mg  10 mg Oral QHS    pantoprazole (PROTONIX) tablet 40 mg  40 mg Oral DAILY    [Held by provider] rosuvastatin (CRESTOR) tablet 5 mg  5 mg Oral DAILY    tamsulosin (FLOMAX) capsule 0.4 mg  0.4 mg Oral DAILY     ______________________________________________________________________  EXPECTED LENGTH OF STAY: 4d 19h  ACTUAL LENGTH OF STAY:          14                 Dora Hauser MD

## 2022-06-02 LAB
ANION GAP SERPL CALC-SCNC: 4 MMOL/L (ref 5–15)
BUN SERPL-MCNC: 64 MG/DL (ref 6–20)
BUN/CREAT SERPL: 65 (ref 12–20)
CALCIUM SERPL-MCNC: 9.4 MG/DL (ref 8.5–10.1)
CHLORIDE SERPL-SCNC: 94 MMOL/L (ref 97–108)
CO2 SERPL-SCNC: 32 MMOL/L (ref 21–32)
CREAT SERPL-MCNC: 0.98 MG/DL (ref 0.7–1.3)
GLUCOSE BLD STRIP.AUTO-MCNC: 119 MG/DL (ref 65–117)
GLUCOSE BLD STRIP.AUTO-MCNC: 121 MG/DL (ref 65–117)
GLUCOSE BLD STRIP.AUTO-MCNC: 186 MG/DL (ref 65–117)
GLUCOSE BLD STRIP.AUTO-MCNC: 207 MG/DL (ref 65–117)
GLUCOSE SERPL-MCNC: 138 MG/DL (ref 65–100)
POTASSIUM SERPL-SCNC: 4 MMOL/L (ref 3.5–5.1)
SERVICE CMNT-IMP: ABNORMAL
SODIUM SERPL-SCNC: 130 MMOL/L (ref 136–145)

## 2022-06-02 PROCEDURE — 74011636637 HC RX REV CODE- 636/637: Performed by: INTERNAL MEDICINE

## 2022-06-02 PROCEDURE — 74011250637 HC RX REV CODE- 250/637: Performed by: INTERNAL MEDICINE

## 2022-06-02 PROCEDURE — 82962 GLUCOSE BLOOD TEST: CPT

## 2022-06-02 PROCEDURE — 94760 N-INVAS EAR/PLS OXIMETRY 1: CPT

## 2022-06-02 PROCEDURE — 94640 AIRWAY INHALATION TREATMENT: CPT

## 2022-06-02 PROCEDURE — 80048 BASIC METABOLIC PNL TOTAL CA: CPT

## 2022-06-02 PROCEDURE — 77010033678 HC OXYGEN DAILY

## 2022-06-02 PROCEDURE — 94664 DEMO&/EVAL PT USE INHALER: CPT

## 2022-06-02 PROCEDURE — 74011000250 HC RX REV CODE- 250: Performed by: INTERNAL MEDICINE

## 2022-06-02 PROCEDURE — 36415 COLL VENOUS BLD VENIPUNCTURE: CPT

## 2022-06-02 PROCEDURE — 65270000046 HC RM TELEMETRY

## 2022-06-02 PROCEDURE — 74011000250 HC RX REV CODE- 250: Performed by: HOSPITALIST

## 2022-06-02 RX ADMIN — PANTOPRAZOLE SODIUM 40 MG: 40 TABLET, DELAYED RELEASE ORAL at 09:52

## 2022-06-02 RX ADMIN — TRAMADOL HYDROCHLORIDE 50 MG: 50 TABLET, COATED ORAL at 17:57

## 2022-06-02 RX ADMIN — ARFORMOTEROL TARTRATE: 15 SOLUTION RESPIRATORY (INHALATION) at 10:13

## 2022-06-02 RX ADMIN — APIXABAN 5 MG: 5 TABLET, FILM COATED ORAL at 17:58

## 2022-06-02 RX ADMIN — TRAMADOL HYDROCHLORIDE 50 MG: 50 TABLET, COATED ORAL at 01:35

## 2022-06-02 RX ADMIN — PREGABALIN 75 MG: 25 CAPSULE ORAL at 17:57

## 2022-06-02 RX ADMIN — SODIUM CHLORIDE, PRESERVATIVE FREE 10 ML: 5 INJECTION INTRAVENOUS at 15:20

## 2022-06-02 RX ADMIN — POLYETHYLENE GLYCOL 3350 17 G: 17 POWDER, FOR SOLUTION ORAL at 17:58

## 2022-06-02 RX ADMIN — SODIUM CHLORIDE, PRESERVATIVE FREE 5 ML: 5 INJECTION INTRAVENOUS at 06:00

## 2022-06-02 RX ADMIN — SENNOSIDES AND DOCUSATE SODIUM 1 TABLET: 50; 8.6 TABLET ORAL at 17:57

## 2022-06-02 RX ADMIN — ARFORMOTEROL TARTRATE: 15 SOLUTION RESPIRATORY (INHALATION) at 22:50

## 2022-06-02 RX ADMIN — MONTELUKAST 10 MG: 10 TABLET, FILM COATED ORAL at 22:54

## 2022-06-02 RX ADMIN — WHITE PETROLATUM: 1.75 OINTMENT TOPICAL at 09:53

## 2022-06-02 RX ADMIN — APIXABAN 5 MG: 5 TABLET, FILM COATED ORAL at 09:51

## 2022-06-02 RX ADMIN — AMIODARONE HYDROCHLORIDE 100 MG: 200 TABLET ORAL at 09:51

## 2022-06-02 RX ADMIN — BUMETANIDE 2 MG: 0.25 INJECTION INTRAMUSCULAR; INTRAVENOUS at 09:52

## 2022-06-02 RX ADMIN — BUMETANIDE 2 MG: 0.25 INJECTION INTRAMUSCULAR; INTRAVENOUS at 17:58

## 2022-06-02 RX ADMIN — BISACODYL 5 MG: 5 TABLET, COATED ORAL at 09:51

## 2022-06-02 RX ADMIN — POLYETHYLENE GLYCOL 3350 17 G: 17 POWDER, FOR SOLUTION ORAL at 09:52

## 2022-06-02 RX ADMIN — SODIUM CHLORIDE, PRESERVATIVE FREE 10 ML: 5 INJECTION INTRAVENOUS at 22:56

## 2022-06-02 RX ADMIN — ACETAMINOPHEN 1000 MG: 500 TABLET ORAL at 09:58

## 2022-06-02 RX ADMIN — SODIUM CHLORIDE, PRESERVATIVE FREE 10 ML: 5 INJECTION INTRAVENOUS at 15:19

## 2022-06-02 RX ADMIN — SENNOSIDES AND DOCUSATE SODIUM 1 TABLET: 50; 8.6 TABLET ORAL at 09:51

## 2022-06-02 RX ADMIN — TAMSULOSIN HYDROCHLORIDE 0.4 MG: 0.4 CAPSULE ORAL at 09:51

## 2022-06-02 RX ADMIN — ASPIRIN 81 MG: 81 TABLET, COATED ORAL at 09:51

## 2022-06-02 RX ADMIN — EZETIMIBE 10 MG: 10 TABLET ORAL at 09:52

## 2022-06-02 RX ADMIN — Medication 5 UNITS: at 17:58

## 2022-06-02 RX ADMIN — Medication 3 UNITS: at 12:43

## 2022-06-02 RX ADMIN — PREGABALIN 75 MG: 25 CAPSULE ORAL at 09:51

## 2022-06-02 NOTE — PROGRESS NOTES
NUTRITION  Reason for Assessment: LOS      Recommendations/Interventions/Plan:     Continue consistent CHO 75 gm/meal with Stanislav BID for wound healing         Past Medical History:   Diagnosis Date    DM [facility]di[facility]) [facility]     Dyslipidemia     Hypertension      5/26:  Pt screened for LOS. Chart/labs/meds reviewed. Admitted with Sepsis ([facility] [A41.9]   Met with pt at bedside. Reports a good appetite and eating well. Only preference was that he doesn't want them to send any tea or coffee - doesn't drink. Prefers juice or gingerale. Noted to be diabetic - not on carb restriction. On NPH 5 units BID and SSI coverage. Pt reports intentional wt loss in past year or so, but unclear. Admission weight was 330 lb, does still have quite a bit of edema. On agressive diuresis. Wounds as below - not previously triggered to this service. Re-discussed with pt, he would like to try Stanislav, fruit punch flavor mixed with diet gingerale. 6/2:  Pt visited for follow-up/ongoing LOS. Sleeping, slouched over in chair. Difficult to arouse, but eventually did respond and then fell back asleep. Discussed with RN. He continues to eat well and believes he is drinking his Stanislav. Standing scale weight obtained and more consistent with wt hx.       Estimated Daily Nutrient Needs:  Energy Requirements Based On: Formula  Weight Used for Energy Requirements: Current (148.2 kg)  Energy (kcal/day): 2100 (MSJ x 1.1 (-500 kcal for wt loss))  Weight Used for Protein Requirements: Ideal  Protein (g/day): 120 (1.5 gm/kg IBW or at least 20%)     Fluid (ml/day): 1 mL/kcal      Nutrition Related Findings:   Edema: Genital: Non-pitting (6/1/2022  9:23 PM)  Generalized: 2+ (6/1/2022  9:23 PM)  LLE: 3+ (6/2/2022  8:00 AM)  RLE: 3+ (6/2/2022  8:00 AM)      Last BM: 06/01/22, Soft,Formed    Skin: stage 3 to L&R ischium      Current Nutrition Therapies:  Diet: consistent CHO 75 gm/meal  Supplements: Stanislav BID  Meal intake:   Patient Vitals for the past 168 hrs:   % Diet Eaten   05/31/22 1330 76 - 100%   05/31/22 0804 76 - 100%     Supplement intake: No data found.       Weight Hx:  Wt Readings from Last 10 Encounters:   06/01/22 148.2 kg (327 lb) - standing scale   05/19/22 129.8 kg (286 lb 2.5 oz)   05/02/22 147 kg (324 lb)   09/30/21 142.9 kg (315 lb 1.6 oz)   04/25/13 138.3 kg (305 lb)   01/22/13 139.3 kg (307 lb 3.2 oz)   10/06/10 156.2 kg (344 lb 6.4 oz)       Nutrition Diagnosis:   · Increased nutrient needs related to increased demand for energy/nutrients as evidenced by wounds    Nutrition Interventions:   Food and/or Nutrient Delivery: Modify current diet,Start oral nutrition supplement  Nutrition Education/Counseling: Counseling initiated  Coordination of Nutrition Care: Continue to monitor while inpatient         Goals:     Goals: other (specify)  Specify Other Goals: continued PO intake >/=75% of meals with 1 high protein choice at each meal and consumption of Stanislav BID over next 7 days    Nutrition Monitoring and Evaluation:   Behavioral-Environmental Outcomes: None identified  Food/Nutrient Intake Outcomes: Food and nutrient intake,Supplement intake  Physical Signs/Symptoms Outcomes: Biochemical data,Skin,Fluid status or edema,Weight        Recent Labs     06/02/22  0502 06/01/22  0321 05/31/22  0354   * 144* 131*   BUN 64* 73* 67*   CREA 0.98 1.05 1.25   * 132* 133*   K 4.0 3.7 3.9   CL 94* 92* 94*   CO2 32 36* 32   CA 9.4 9.3 9.5       Recent Labs     06/02/22  1212 06/02/22  0908 06/01/22  2058 06/01/22  1636 06/01/22  1156 06/01/22  0819 05/31/22  2120 05/31/22  1729 05/31/22  1219 05/31/22  0815 05/30/22 2056 05/30/22  1641   GLUCPOC 207* 121* 176* 166* 162* 123* 145* 132* 139* 126* 169* 150*       Lab Results   Component Value Date/Time    Hemoglobin A1c 7.9 (H) 04/25/2022 02:20 PM    Hemoglobin A1c 8.3 (H) 09/11/2021 03:53 PM    Hemoglobin A1c (POC) 10.8 04/25/2013 02:49 PM         Debo Davis RD  Available via [employer]

## 2022-06-02 NOTE — PROGRESS NOTES
Bedside shift change report given to jef Casper (oncoming nurse) by Cherry Thayer RN (offgoing nurse). Report included the following information SBAR, Kardex, ED Summary, Intake/Output, MAR and Cardiac Rhythm NSR.

## 2022-06-02 NOTE — PROGRESS NOTES
Verbal shift change report given to Cholo Wright (oncoming nurse) by Nadine Mosqueda (offgoing nurse). Report included the following information SBAR.

## 2022-06-02 NOTE — PROGRESS NOTES
[facility]  Hospitalist Group                                                                                          Hospitalist Progress Note  Cait Steward MD  Answering service: [phone] OR [phone] from in house phone        Date of Service:  2022  NAME:  Soha Sequeira  :  1965  MRN:  [id number]      Admission Summary:     Patient  presents with sepsis due to lower extremity cellulitis, on initial presentation patient was admitted to ICU and require low-dose of norepinephrine overnight for short period of time. Later patient was transferred out of ICU. Currently followed by ID. Patient also with acute metabolic encephalopathy and generalized fatigue, overall mental status is better. Interval history / Subjective:     Patient is doing well no acute complaint. Not much change in the size of his legs. Patient continuing with PT.   Patient told me that he cannot tolerate CPAP     Assessment & Plan:     Severe sepsis with septic shock-resolved  -Sepsis due to lower extremity cellulitis--completed antibiotics cefepime and daptomycin  -Patient initially required ICU admission and vasopressors temporarily  -Sepsis is now resolved  and hemodynamically stable, blood cultures no growth     Lower extremity cellulitis  -Patient with chronic lower extremity edema, culprit for cellulitis  -Lower extremity venous Doppler negative for DVT  -ID following, was on cefepime and daptomycin, completed total of 2 weeks of antibiotics     Chronic leg edema with stasis ulcers  -Continue mobility with PT, keep legs antigravity while sitting  -Patient received bariatric bed   -Wound care following  -Ordered for both lower extremities to be wrapped with Ace bandage to provide compression effect  -Patient still need to continue on IV diuretics, patient is diuresing well     Acute on chronic diastolic CHF  -NYHA class III on admission  -Continue diuresis with Bumex, continue Entresto (unclear if patient's EF was low previously, no documentations available), echo on 9/20/2021 shows EF of 50%     Hematuria--resolved  -Patient with hematuria and urinary retention  -Now resolved, Foster was removed on 5/22/2022  -Urology previously evaluated the patient     MADELAINE  -Now resolved, stable on diuretics     Acute metabolic encephalopathy  -ABGs previously was unremarkable  -Likely possible hospital fatigue, overall improving  -Patient to continue to work with physical therapy     Chronic atrial fibrillation  -Continue amiodarone and Eliquis for anticoagulation  -Stable     COPD  -Patient with chronic hypoxic and hypercapnic respiratory failure due to COPD  -Continue bronchodilator, breathing treatment as needed  -On 3 L of oxygen     Diabetes type 2  -Continue NPH 5 units twice daily, continue sliding scale coverage     Dyslipidemia  -Continue Zetia     BPH  -Continue Flomax     Obstructive sleep apnea  -CPAP nightly     Morbid obesity  -With BMI of 38.78  -Outpatient follow-up for weight management     Code status:  Full  Prophylaxis: Eliquis  Care Plan discussed with: Patient  Anticipated Disposition:Patient needs placement to [facility] Problems  Date Reviewed: 4/28/2013          Codes Class Noted POA    * (Principal) Sepsis ([facility] [address] ICD-10-CM: A41.9  ICD-9-CM: 038.9, 995.91  5/18/2022 Unknown                Review of Systems:   A comprehensive review of systems was negative except for that written in the HPI. Vital Signs:    Last 24hrs VS reviewed since prior progress note.  Most recent are:  Visit Vitals  /67 (BP 1 Location: Right upper arm, BP Patient Position: At rest)   Pulse 67   Temp 98.1 °F (36.7 °C)   Resp 15   Ht 6' (1.829 m)   Wt 148.3 kg (327 lb)   SpO2 95%   BMI 44.35 kg/m²         Intake/Output Summary (Last 24 hours) at 6/2/2022 1543  Last data filed at 6/2/2022 0848  Gross per 24 hour   Intake --   Output 400 ml   Net -400 ml        Physical Examination:     I had a face to face encounter with this patient and independently examined them on 6/2/2022 as outlined below:          Constitutional:  No acute distress, cooperative, pleasant    ENT:  Oral mucosa moist, oropharynx benign. Resp:  CTA bilaterally. No wheezing/rhonchi/rales. No accessory muscle use. CV:  Regular rhythm, normal rate, no murmurs, gallops, rubs    GI:  Soft, non distended, non tender. normoactive bowel sounds, no hepatosplenomegaly     Musculoskeletal:  Pitting edema bilateral lower extremities, warm, 2+ pulses throughout    Neurologic:  Moves all extremities. AAOx3, CN II-XII reviewed            Data Review:    Review and/or order of clinical lab test      Labs:     Recent Labs     05/31/22  0354   WBC 7.7   HGB 12.3   HCT 41.0        Recent Labs     06/02/22  0502 06/01/22  0321 05/31/22  0354   * 132* 133*   K 4.0 3.7 3.9   CL 94* 92* 94*   CO2 32 36* 32   BUN 64* 73* 67*   CREA 0.98 1.05 1.25   * 144* 131*   CA 9.4 9.3 9.5     No results for input(s): ALT, AP, TBIL, TBILI, TP, ALB, GLOB, GGT, AML, LPSE in the last 72 hours. No lab exists for component: SGOT, GPT, AMYP, HLPSE  No results for input(s): INR, PTP, APTT, INREXT, INREXT in the last 72 hours. No results for input(s): FE, TIBC, PSAT, FERR in the last 72 hours. No results found for: FOL, RBCF   No results for input(s): PH, PCO2, PO2 in the last 72 hours. No results for input(s): CPK, CKNDX, TROIQ in the last 72 hours.     No lab exists for component: CPKMB  Lab Results   Component Value Date/Time    Cholesterol, total 170 01/23/2013 11:08 AM    HDL Cholesterol 37 (L) 01/23/2013 11:08 AM    LDL, calculated 97 01/23/2013 11:08 AM    Triglyceride 180 (H) 01/23/2013 11:08 AM    CHOL/HDL Ratio 6.0 (H) 10/06/2010 03:18 PM     Lab Results   Component Value Date/Time    Glucose (POC) 207 (H) 06/02/2022 12:12 PM    Glucose (POC) 121 (H) 06/02/2022 09:08 AM    Glucose (POC) 176 (H) 06/01/2022 08:58 PM    Glucose (POC) 166 (H) 06/01/2022 04:36 PM    Glucose (POC) 162 (H) 06/01/2022 11:56 AM     Lab Results   Component Value Date/Time    Color YELLOW/STRAW 05/18/2022 03:54 PM    Appearance CLEAR 05/18/2022 03:54 PM    Specific gravity 1.011 05/18/2022 03:54 PM    pH (UA) 5.0 05/18/2022 03:54 PM    Protein Negative 05/18/2022 03:54 PM    Glucose >1,000 (A) 05/18/2022 03:54 PM    Ketone Negative 05/18/2022 03:54 PM    Bilirubin Negative 05/18/2022 03:54 PM    Urobilinogen 0.2 05/18/2022 03:54 PM    Nitrites Negative 05/18/2022 03:54 PM    Leukocyte Esterase Negative 05/18/2022 03:54 PM    Epithelial cells FEW 05/18/2022 03:54 PM    Bacteria Negative 05/18/2022 03:54 PM    WBC 0-4 05/18/2022 03:54 PM    RBC 20-50 05/18/2022 03:54 PM         Medications Reviewed:     Current Facility-Administered Medications   Medication Dose Route Frequency    ezetimibe (ZETIA) tablet 10 mg  10 mg Oral DAILY    sacubitriL-valsartan (ENTRESTO) 49-51 mg tablet 1 Tablet  1 Tablet Oral BID    bumetanide (BUMEX) injection 2 mg  2 mg IntraVENous BID    magnesium citrate solution 148 mL  148 mL Oral Q2H PRN    senna-docusate (PERICOLACE) 8.6-50 mg per tablet 1 Tablet  1 Tablet Oral BID    insulin NPH (NOVOLIN N, HUMULIN N) injection 5 Units  5 Units SubCUTAneous ACB&D    albuterol-ipratropium (DUO-NEB) 2.5 MG-0.5 MG/3 ML  3 mL Nebulization Q6H PRN    traMADoL (ULTRAM) tablet 50 mg  50 mg Oral Q6H PRN    polyethylene glycol (MIRALAX) packet 17 g  17 g Oral BID    albuterol (PROVENTIL VENTOLIN) nebulizer solution 2.5 mg  2.5 mg Nebulization Q4H PRN    insulin lispro (HUMALOG) injection   SubCUTAneous AC&HS    pregabalin (LYRICA) capsule 75 mg  75 mg Oral BID    acetaminophen (TYLENOL) tablet 1,000 mg  1,000 mg Oral Q6H PRN    Or    acetaminophen (TYLENOL) suppository 650 mg  650 mg Rectal Q6H PRN    pantothenic ac-min oil-pet,hyd (AQUAPHOR) 41 % ointment   Topical DAILY    sodium chloride (NS) flush 5-40 mL  5-40 mL IntraVENous Q8H    sodium chloride (NS) flush 5-40 mL  5-40 mL IntraVENous PRN    glucose chewable tablet 16 g  4 Tablet Oral PRN    glucagon (GLUCAGEN) injection 1 mg  1 mg IntraMUSCular PRN    dextrose 10 % infusion 0-250 mL  0-250 mL IntraVENous PRN    sodium chloride (NS) flush 5-40 mL  5-40 mL IntraVENous Q8H    sodium chloride (NS) flush 5-40 mL  5-40 mL IntraVENous PRN    ondansetron (ZOFRAN ODT) tablet 4 mg  4 mg Oral Q8H PRN    Or    ondansetron (ZOFRAN) injection 4 mg  4 mg IntraVENous Q6H PRN    bisacodyL (DULCOLAX) tablet 5 mg  5 mg Oral DAILY    polyethylene glycol (MIRALAX) packet 17 g  17 g Oral DAILY PRN    amiodarone (CORDARONE) tablet 100 mg  100 mg Oral DAILY    apixaban (ELIQUIS) tablet 5 mg  5 mg Oral BID    aspirin delayed-release tablet 81 mg  81 mg Oral DAILY    arformoterol 15 mcg/budesonide 0.5 mg neb solution   Nebulization BID RT    montelukast (SINGULAIR) tablet 10 mg  10 mg Oral QHS    pantoprazole (PROTONIX) tablet 40 mg  40 mg Oral DAILY    [Held by provider] rosuvastatin (CRESTOR) tablet 5 mg  5 mg Oral DAILY    tamsulosin (FLOMAX) capsule 0.4 mg  0.4 mg Oral DAILY     ______________________________________________________________________  EXPECTED LENGTH OF STAY: 4d 19h  ACTUAL LENGTH OF STAY:          15                 Cait Steward MD

## 2022-06-03 VITALS
SYSTOLIC BLOOD PRESSURE: 107 MMHG | HEART RATE: 69 BPM | RESPIRATION RATE: 16 BRPM | HEIGHT: 72 IN | BODY MASS INDEX: 42.66 KG/M2 | WEIGHT: 315 LBS | TEMPERATURE: 98.8 F | OXYGEN SATURATION: 98 % | DIASTOLIC BLOOD PRESSURE: 64 MMHG

## 2022-06-03 LAB
ERYTHROCYTE [DISTWIDTH] IN BLOOD BY AUTOMATED COUNT: 21.6 % (ref 11.5–14.5)
GLUCOSE BLD STRIP.AUTO-MCNC: 121 MG/DL (ref 65–117)
GLUCOSE BLD STRIP.AUTO-MCNC: 169 MG/DL (ref 65–117)
GLUCOSE BLD STRIP.AUTO-MCNC: 184 MG/DL (ref 65–117)
HCT VFR BLD AUTO: 39.1 % (ref 36.6–50.3)
HGB BLD-MCNC: 11.8 G/DL (ref 12.1–17)
MCH RBC QN AUTO: 23.2 PG (ref 26–34)
MCHC RBC AUTO-ENTMCNC: 30.2 G/DL (ref 30–36.5)
MCV RBC AUTO: 76.8 FL (ref 80–99)
NRBC # BLD: 0 K/UL (ref 0–0.01)
NRBC BLD-RTO: 0 PER 100 WBC
PLATELET # BLD AUTO: 228 K/UL (ref 150–400)
PMV BLD AUTO: 11 FL (ref 8.9–12.9)
RBC # BLD AUTO: 5.09 M/UL (ref 4.1–5.7)
SERVICE CMNT-IMP: ABNORMAL
WBC # BLD AUTO: 6.9 K/UL (ref 4.1–11.1)

## 2022-06-03 PROCEDURE — 74011000250 HC RX REV CODE- 250: Performed by: HOSPITALIST

## 2022-06-03 PROCEDURE — 74011000250 HC RX REV CODE- 250: Performed by: INTERNAL MEDICINE

## 2022-06-03 PROCEDURE — 77010033678 HC OXYGEN DAILY

## 2022-06-03 PROCEDURE — 74011636637 HC RX REV CODE- 636/637: Performed by: INTERNAL MEDICINE

## 2022-06-03 PROCEDURE — 36415 COLL VENOUS BLD VENIPUNCTURE: CPT

## 2022-06-03 PROCEDURE — 85027 COMPLETE CBC AUTOMATED: CPT

## 2022-06-03 PROCEDURE — 94640 AIRWAY INHALATION TREATMENT: CPT

## 2022-06-03 PROCEDURE — 97116 GAIT TRAINING THERAPY: CPT

## 2022-06-03 PROCEDURE — 82962 GLUCOSE BLOOD TEST: CPT

## 2022-06-03 PROCEDURE — 74011250637 HC RX REV CODE- 250/637: Performed by: INTERNAL MEDICINE

## 2022-06-03 RX ADMIN — ASPIRIN 81 MG: 81 TABLET, COATED ORAL at 09:52

## 2022-06-03 RX ADMIN — Medication 5 UNITS: at 18:15

## 2022-06-03 RX ADMIN — TAMSULOSIN HYDROCHLORIDE 0.4 MG: 0.4 CAPSULE ORAL at 09:53

## 2022-06-03 RX ADMIN — BUMETANIDE 2 MG: 0.25 INJECTION INTRAMUSCULAR; INTRAVENOUS at 09:51

## 2022-06-03 RX ADMIN — PREGABALIN 75 MG: 25 CAPSULE ORAL at 17:07

## 2022-06-03 RX ADMIN — PANTOPRAZOLE SODIUM 40 MG: 40 TABLET, DELAYED RELEASE ORAL at 09:52

## 2022-06-03 RX ADMIN — SODIUM CHLORIDE, PRESERVATIVE FREE 10 ML: 5 INJECTION INTRAVENOUS at 06:00

## 2022-06-03 RX ADMIN — APIXABAN 5 MG: 5 TABLET, FILM COATED ORAL at 09:51

## 2022-06-03 RX ADMIN — EZETIMIBE 10 MG: 10 TABLET ORAL at 09:53

## 2022-06-03 RX ADMIN — TRAMADOL HYDROCHLORIDE 50 MG: 50 TABLET, COATED ORAL at 09:51

## 2022-06-03 RX ADMIN — AMIODARONE HYDROCHLORIDE 100 MG: 200 TABLET ORAL at 13:41

## 2022-06-03 RX ADMIN — BISACODYL 5 MG: 5 TABLET, COATED ORAL at 09:52

## 2022-06-03 RX ADMIN — PREGABALIN 75 MG: 25 CAPSULE ORAL at 09:51

## 2022-06-03 RX ADMIN — WHITE PETROLATUM: 1.75 OINTMENT TOPICAL at 08:30

## 2022-06-03 RX ADMIN — Medication 2 UNITS: at 18:16

## 2022-06-03 RX ADMIN — SODIUM CHLORIDE, PRESERVATIVE FREE 10 ML: 5 INJECTION INTRAVENOUS at 17:07

## 2022-06-03 RX ADMIN — APIXABAN 5 MG: 5 TABLET, FILM COATED ORAL at 17:07

## 2022-06-03 RX ADMIN — ARFORMOTEROL TARTRATE: 15 SOLUTION RESPIRATORY (INHALATION) at 08:23

## 2022-06-03 RX ADMIN — BUMETANIDE 2 MG: 0.25 INJECTION INTRAMUSCULAR; INTRAVENOUS at 17:07

## 2022-06-03 RX ADMIN — SENNOSIDES AND DOCUSATE SODIUM 1 TABLET: 50; 8.6 TABLET ORAL at 09:53

## 2022-06-03 RX ADMIN — SACUBITRIL AND VALSARTAN 1 TABLET: 49; 51 TABLET, FILM COATED ORAL at 13:42

## 2022-06-03 RX ADMIN — SODIUM CHLORIDE, PRESERVATIVE FREE 20 ML: 5 INJECTION INTRAVENOUS at 17:07

## 2022-06-03 NOTE — PROGRESS NOTES
DEISY: anticipate d/c to [facility] for rehab  Pt insurance auth completed through [facility], contact is: [facility] [phone]  Paige Gallego to provide auth to [address] [place] [address] uploaded d/c summary and recent Attending note to all scripts/careport for [facility] SNF     Pt will not need a Rapid Covid test for this SNF placement     Pt is currently on 2L/NC 02     BLS Transport with scheduled for 6pm    RUR: 20%  -1130-CM was informed by Attending that pt is medically stable for d/c. CM spoke with Nida Lucio of [facility] [phone] and she advised she is checking bed availability. MEGHANA also contacted Rodrigo Saldivarpool of [facility] who advised that she will contact Director at [place] to check bed availability. Rodrigo [place] advised CM to place more referrals to other [facility] SNFs, [address] [facility]. CM to follow.  -[id number]-CM spoke with Geeta Lopez [phone] as well as Bettie Martinez of [facility] [phone] and both SNFs could accept pt today. CM met with pt at bedside and he is agreeable to [facility]. CM informed [facility] as well as Paige Gallego and she will provide insurance auth to SNF. CM spoke with nurse and plan is for 6pm.  CM sent perfect serve to Attending who confirmed he will place d/c order and pt is medically ready for d/c.  CM attempted to request transport with [facility] as per [facility] request, however they do not offer BLS transport. CM requested transport with [facility] instead. CM confirmed with pt that he does not use CPAP at HS and instead pt stated he uses 2L/NC at HS. CM sent perfect serve to Attending to clarify this. Attending clarified pt does not use CPAP at home and pt did as well. Sabrina Brown RN BSN [facility]  Medicare pt has received, reviewed, and signed 2nd IM letter informing them of their right to appeal the discharge. Signed copy has been placed on pt bedside chart.     PLan of Care Plan to SNF/Rehab    SNF/Rehab Transition:  Patient has been accepted to [facility] SNF and meets criteria for admission. Patient will transported by [facility] and expected to leave at 6pm.    Communication to Patient/Family:  Met with patient and and he is are agreeable to the transition plan. Communication to SNF/Rehab:  Bedside RN, Leonor Vázquez, has been notified to update the transition plan to the facility and call report [phone]  Discharge in-formation has been updated on the AVS.         [x] BCPI-A  Patient has been identified as part of the BCPI-A Program.  For Care Coordination associated with that Bundle Program, please contact   Bundle information has been communication to. Nursing Please include all hard scripts for controlled substances, med rec and dc summary, and AVS in packet.      Reviewed and confirmed with facility, [facility], can manage the patient care needs for the following:     SNF/Rehab Transition:  PCP/Specialist: noted on AVS    Reviewed and confirmed with facility, [facility], they can manage the patient care needs for the following:     Deepak Navarro with (X) only those applicable:    Medication:  [x]  Medications will be available at the facility  []  IV Antibiotics   []  Controlled Substance - hard copy to be sent with patient   []  Weekly Labs   Documents:  [] Hard RX  [x] MAR  [x] Kardex  [x] AVS  []Transfer Summary  [x]Discharge   Equipment:  []  CPAP/BiPAP  []  Wound Vacuum  []  Foster or Urinary Device  []  PICC/Central Line  []  Nebulizer  []  Ventilator   Treatment:  []Isolation (for MRSA, VRE, etc.)  []Surgical Drain Management  []Tracheostomy Care  [x]Dressing Changes  []Dialysis with transportation and chair time  []PEG Care  []Oxygen  []Daily Weights for Heart Failure   Dietary:  []Any diet limitations  []Tube Feedings   []Total Parenteral Management (TPN)   Eligible for Medicaid Long Term Services and Supports  Yes:  [] Eligible for medical assistance or will become eligible within 180 days and UAI completed. [] Provider/Patient and/or support system has requested screening. [] UAI copy provided to patient or responsible party. [] UAI unavailable at discharge will send once processed to SNF provider. [] UAI unavailable at discharged mailed to patient  No:   [] Private pay and is not financially eligible for Medicaid within the next 180 days. [] Reside out-of-state.   [] A residents of a state owned/operated facility that is licensed  by [address] I AM AT [facility] or [facility]  [] Enrollment in [facility] hospice services  [] [address]  [x] Patient /Family declines to have screening completed or provide financial information for screening     Financial Resources:  Medicaid    [] Initiated and application pending   [] Full coverage     Advanced Care Plan:  []Surrogate Decision Maker of Care  []POA  [x]Communicated Code Status FULL CODE   Other

## 2022-06-03 NOTE — WOUND CARE
WOCN Note:     Follow up wound care visit  Assessed in room 418    Chart shows:  Patient admitted on 5/18/22 with Sepsis  Past Medical History:   Diagnosis Date    DM (diabetes mellitus) ([address]     Dyslipidemia     Hypertension       6/7/22  WBC = 7.7  Hgb = 11.8    Assessment:   A&O x 4  Independent with mobility with stand by assist  Continence: Continent   Last Ben Score: 16  Surface: Prius bariatric mattress  Diet: Regular; 5 carb choices, oral nutritional supplements. Being followed by dietician     Bilateral heels skin intact and without erythema   Heels offloaded with pillows     Bilateral lower extremities with stasis dermatitis, edema    Patient stays in chair day and night with legs dependent. He refuses to sleep in bed. Educated on importance of leg elevation. States he has the edema and wounds because he hasn't had tubigrip on. Legs are being wrapped with ace wraps per patient request    1. POA stage 3 pressure injury to left ischium   Resolved    2. POA stage 3 pressure injury to right ischium   0.8 x 1 x 0.1 cm  Wound bed red   scant amount serosang exudate  no odor   defined edges  Periwound intact & with blanching erythema   Treatment: Cleansed with saline, honey gel, foam border dressing    3. POA Left lower leg wounds  Etiology: venous  Multiple scattered wounds to lower leg. Skin denuded    4. Right and left mid distal  fleshy buttocks with abraded partial thickness skin breakdown typical of friction skin injury. Wounds cleansed with saline, honey gel, foam border dressing    5.  POA partial thickness skin breakdown under left pannus, right pannus, bilateral groin  Etiology: moisture, poor hygiene  Moderate amount serous exudate to left pannus and right groin skin breakdown, no odor  Treatment: Cleansed with remedy foaming cleanser, [employer]    Wound Recommendations:      Cleanse wounds to sacrum and right ischium with normal saline, apply honey gel, cover with foam border dressing daily and prn if becomes soiled    Cleanse skin breakdown to abdominal folds and bilateral groin with normal saline, place Opticel AG to areas of skin breakdown, change daily    Cleanse left lower leg and left foot with Remedy foaming cleanser, pat dry, cover all wounds with OPTIFOAM AG, cover with abd pads. Secure with stretch roll gauze every other day and prn if becomes soiled with drainage    Cleanse wounds to right posterior lower lower leg with normal saline, apply Optifoam AG, secure with stretch roll gauze, every other day and prn if becomes soiled     Aquaphor to bilateral lower legs and feet daily    PI Prevention:  Turn/reposition approximately every 2 hours  Offload heels with pillows at all times in bed. Sacral Foam dressing: lift to assess regularly; change as needed. Discontinue if incontinent. Z-guard cream to buttocks and sacrum TID  and as needed with incontinence care   Keep HOB 30 degrees or less to decrease shearing and pressure unless medically contraindicated.  If HOB is to be over 30 degrees, raise knees first then [facility] to prevent sliding   Minimize layers of linen/pads under patient to optimize support surface to one flat sheet and one incontinence pad     Orders received from MD  Discussed with RN    Transition of Care: Plan to follow weekly and as needed while admitted to hospital.      Ganga ROJAS, RN, [employer]  Certified Wound and Ostomy Nurse  office [phone]  Can be reached through [facility]

## 2022-06-03 NOTE — DISCHARGE INSTRUCTIONS
Discharge Instructions       PATIENT ID: Nicole Luna  MRN: [id number]   YOB: 1965    DATE OF ADMISSION: 5/18/2022  1:07 PM    DATE OF DISCHARGE: 6/3/2022    PRIMARY CARE PROVIDER: None     ATTENDING PHYSICIAN: Taran Gallardo MD  DISCHARGING PROVIDER: Yolanda Schmitt MD    To contact this individual call [phone] and ask the  to page. If unavailable ask to be transferred the Adult Hospitalist Department. DISCHARGE DIAGNOSES Sepsis due to cellulitis     CONSULTATIONS: IP CONSULT TO INFECTIOUS DISEASES  IP CONSULT TO UROLOGY    PROCEDURES/SURGERIES: * No surgery found *    PENDING TEST RESULTS:   At the time of discharge the following test results are still pending:     FOLLOW UP APPOINTMENTS:   Follow-up Information     Follow up With Specialties Details Why [facility]   6/7/22 at 3 PM with Dr. Jordny Kimball for blood in the urine [phone] [address] [place] [address]       need PCP f/u in 1 week            ADDITIONAL CARE RECOMMENDATIONS:     DIET: Low fat, Low cholesterol    ACTIVITY: Activity as tolerated    WOUND CARE:       EQUIPMENT needed:       Radiology      DISCHARGE MEDICATIONS:   See Medication Reconciliation Form    · It is important that you take the medication exactly as they are prescribed. · Keep your medication in the bottles provided by the pharmacist and keep a list of the medication names, dosages, and times to be taken in your wallet. · Do not take other medications without consulting your doctor. NOTIFY YOUR PHYSICIAN FOR ANY OF THE FOLLOWING:   Fever over 101 degrees for 24 hours. Chest pain, shortness of breath, fever, chills, nausea, vomiting, diarrhea, change in mentation, falling, weakness, bleeding. Severe pain or pain not relieved by medications. Or, any other signs or symptoms that you may have questions about.       DISPOSITION:    Home With:   OT  PT  HH  RN      x SNF/Inpatient Rehab/LTAC Independent/assisted living    Hospice    Other:     CDMP Checked:   Yes x     PROBLEM LIST Updated:  Yes x       Signed:   Juan José Ambrocio MD  6/3/2022  2:08 PM

## 2022-06-03 NOTE — PROGRESS NOTES
Problem: Mobility Impaired (Adult and Pediatric)  Goal: *Acute Goals and Plan of Care (Insert Text)  Description: FUNCTIONAL STATUS PRIOR TO ADMISSION: Patient was modified independent using a rollator for functional mobility. Also had standard wheelchair to use to go to/from appointments in wheelchair Charly Moffett [address]: The patient lived alone with caregiver 1 hour per day M-F to provide assistance. Physical Therapy Goals  Revised 5/27/2022  1. Patient will move from supine to sit and sit to supine , scoot up and down, and roll side to side in bed with modified independence within 7 day(s). 2.  Patient will transfer from bed to chair and chair to bed with modified independence using the least restrictive device within 7 day(s). 3.  Patient will perform sit to stand with modified independence within 7 day(s). 4.  Patient will ambulate with modified independence for 75 feet with the least restrictive device within 7 day(s). Physical Therapy Goals  Initiated 5/20/2022  1. Patient will move from supine to sit and sit to supine , scoot up and down, and roll side to side in bed with modified independence within 7 day(s). 2.  Patient will transfer from bed to chair and chair to bed with modified independence using the least restrictive device within 7 day(s). 3.  Patient will perform sit to stand with modified independence within 7 day(s). 4.  Patient will ambulate with minimal assistance/contact guard assist for 50 feet with the least restrictive device within 7 day(s). Outcome: Progressing Towards Goal    PHYSICAL THERAPY TREATMENT  Patient: Julianne Castillo [de-identified]55 y.o. male)  Date: 6/3/2022  Diagnosis: Sepsis ([address] [A41.9] Sepsis ([address]       Precautions: Fall,Skin,Contact  Chart, physical therapy assessment, plan of care and goals were reviewed. ASSESSMENT  Patient continues with skilled PT services and is progressing towards goals.  Pt generally mobilized at a SP to Modified Independent level during today's session. Pt received in bedside chair on [facility], on tele, agreeable to work with PT. Pt transferred sit>stand with bilateral UE support on chair arm rests, ambulated with slow controlled gait pattern in hallway requiring 1 brief standing rest break past half way point 2/2 back pain and was able to continue back to room without assist from PT. Pt then went stand>sit in bedside chair and session completed. Of note, pt now has d/c orders for SNF for today. Current Level of Function Impacting Discharge (mobility/balance): SPV for ambulation    Other factors to consider for discharge: body habitus, PLOF         PLAN :  Patient continues to benefit from skilled intervention to address the above impairments. Continue treatment per established plan of care. to address goals. Recommendation for discharge: (in order for the patient to meet his/her long term goals)  Therapy up to 5 days/week in SNF setting    This discharge recommendation:  Has been made in collaboration with the attending provider and/or case management    IF patient discharges home will need the following DME: to be determined (TBD)       SUBJECTIVE:   Patient stated thanks for walking with me.     OBJECTIVE DATA SUMMARY:   Critical Behavior:  Neurologic State: Alert  Orientation Level: Oriented X4  Cognition: Follows commands  Safety/Judgement: Awareness of environment,Decreased insight into deficits,Fall prevention  Functional Mobility Training:  Bed Mobility:                    Transfers:  Sit to Stand: Modified independent  Stand to Sit: Modified independent                             Balance:  Sitting: Intact; Without support  Standing: Intact; With support  Standing - Static: Good;Constant support  Standing - Dynamic : Good;Constant support  Ambulation/Gait Training:  Distance (ft): 145 Feet (ft)  Assistive Device: Gait belt;Walker, rolling  Ambulation - Level of Assistance: Supervision        Gait Abnormalities: Decreased step clearance        Base of Support: Widened     Speed/Maral: Slow;Pace decreased (<100 feet/min)  Step Length: Right shortened;Left shortened       Pain Rating:  Pt did not quantify pain during session    Activity Tolerance:   Good and requires rest breaks    After treatment patient left in no apparent distress:   Sitting in chair and Call bell within reach    COMMUNICATION/COLLABORATION:   The patients plan of care was discussed with: Registered nurse.      Ashley Epstein, PT   Time Calculation: 13 mins

## 2022-06-03 NOTE — DISCHARGE SUMMARY
Discharge Summary       PATIENT ID: Alyssa Hodges  MRN: [id number]   YOB: 1965    DATE OF ADMISSION: 5/18/2022  1:07 PM    DATE OF DISCHARGE: 6/3/22   PRIMARY CARE PROVIDER: None     ATTENDING PHYSICIAN: Guille Labor  DISCHARGING PROVIDER: Asha Aguilar MD    To contact this individual call [phone] and ask the  to page. If unavailable ask to be transferred the Adult Hospitalist Department.     CONSULTATIONS: IP CONSULT TO INFECTIOUS DISEASES  IP CONSULT TO UROLOGY    PROCEDURES/SURGERIES: * No surgery found *    DISCHARGE DIAGNOSES: sepsis due to cellulitis    Patient  presents with sepsis due to lower extremity cellulitis, on initial presentation patient was admitted to ICU and require low-dose of norepinephrine overnight for short period of time.  Later patient was transferred out of ICU.  Currently followed by TASNEEM David also with acute metabolic encephalopathy and generalized fatigue, overall mental status is better.     [address]:   Severe sepsis with septic shock-resolved  -Sepsis due to lower extremity cellulitis  -completed antibiotics cefepime and daptomycin     Lower extremity cellulitis- completed abx   -Patient with chronic lower extremity edema, culprit for cellulitis  -Lower extremity venous Doppler negative for DVT     Chronic leg edema with stasis ulcers  -Continue mobility with PT, keep legs antigravity while sitting  -Patient received bariatric bed   -Ordered for both lower extremities to be wrapped with Ace bandage to provide compression effect  -Patient still need to continue on diuretics     Acute on chronic diastolic CHF NYHA 2  -NYHA class III on admission  -Continue diuresis with Bumex, continue Entresto (unclear if patient's EF was low previously, no documentations available), echo on 9/20/2021 shows EF of 50%     Hematuria--resolved     MADELAINE  -Now resolved     Acute metabolic encephalopathy- resolved      Chronic atrial fibrillation  -Continue amiodarone and Eliquis for anticoagulation     COPD  -Patient with chronic hypoxic and hypercapnic respiratory failure due to COPD  -On 3 L of oxygen at baseline     Diabetes type 2 resume homemeds     Dyslipidemia  -Continue Zetia     BPH  -Continue Flomax     Obstructive sleep apnea  -CPAP nightly     Morbid obesity  -With BMI of 38.78  -Outpatient follow-up for weight management    DISCHARGE DIAGNOSES / PLAN:      D/c to SNF     BMI: Body mass index is 44.35 kg/m². . This patient: Meets criteria for severe obesity given BMI >/= to 40 due to excess calories/nutritional. Weight loss and lifestyle modifications should be encouraged as an outpatient. PENDING TEST RESULTS:   At the time of discharge the following test results are still pending:      ADDITIONAL CARE RECOMMENDATIONS:        NOTIFY YOUR PHYSICIAN FOR ANY OF THE FOLLOWING:   Fever over 101 degrees for 24 hours. Chest pain, shortness of breath, fever, chills, nausea, vomiting, diarrhea, change in mentation, falling, weakness, bleeding. Severe pain or pain not relieved by medications, as well as any other signs or symptoms that you may have questions about.     FOLLOW UP APPOINTMENTS:    Follow-up Information     Follow up With Specialties Details Why [facility]   6/7/22 at 3 PM with Dr. Adali Zimmerman for blood in the urine [phone] [address] [place] [address]       need PCP f/u in 1 week     None    None (395) Patient stated that they have no PCP      [address]  [phone]             DIET: Cardiac Diet and Diabetic Diet    ACTIVITY: Activity as tolerated    EQUIPMENT needed:     DISCHARGE MEDICATIONS:  Current Discharge Medication List      CONTINUE these medications which have NOT CHANGED    Details   albuterol-ipratropium (DUO-NEB) 2.5 mg-0.5 mg/3 ml nebu 3 mL by Nebulization route every four to six (4-6) hours as needed for Wheezing or Shortness of Breath. Qty: 30 Nebule, Refills: 0      docusate sodium (COLACE) 100 mg capsule Take 2 Capsules by mouth two (2) times a day for 90 days. Qty: 120 Capsule, Refills: 2      miconazole (MICOTIN) 2 % topical powder Apply  to affected area two (2) times a day. Antifungal powder under pannus, in groin, gluteal cleft, and scrotum  Qty: 1 Each, Refills: 0      polyethylene glycol (MIRALAX) 17 gram packet Take 1 Packet by mouth daily. Qty: 30 Each, Refills: 0      predniSONE (DELTASONE) 10 mg tablet Take 4 tab daily X 3 days then 3 tabs daily X 3 days then 2 tabs daily X 3 days then 1 tab daily X 3 days  Qty: 30 Tablet, Refills: 0      tamsulosin (FLOMAX) 0.4 mg capsule Take 1 Capsule by mouth daily. Qty: 30 Capsule, Refills: 0      budesonide-formoteroL (Symbicort) 160-4.5 mcg/actuation HFAA Take 2 Puffs by inhalation two (2) times a day. Qty: 10.2 g, Refills: 2      torsemide (DEMADEX) 20 mg tablet Take 60 mg by mouth daily. Indications: high blood pressure      pregabalin (LYRICA) 75 mg capsule Take 75 mg by mouth two (2) times a day. Indications: nerve pain after herpes      apixaban (ELIQUIS) 5 mg tablet Take 5 mg by mouth two (2) times a day. Indications: treatment to prevent blood clots in chronic atrial fibrillation      aspirin delayed-release 81 mg tablet Take  by mouth daily. Indications: treatment to prevent a heart attack      amiodarone (PACERONE) 100 mg tablet Take 100 mg by mouth daily. ezetimibe (ZETIA) 10 mg tablet Take 10 mg by mouth daily. pantoprazole (PROTONIX) 40 mg tablet Take 40 mg by mouth daily. Indications: gastroesophageal reflux disease      empagliflozin (Jardiance) 25 mg tablet Take 25 mg by mouth daily. Indications: type 2 diabetes mellitus      isosorbide mononitrate ER (IMDUR) 30 mg tablet Take 30 mg by mouth daily.  Indications: prevention of anginal chest pain associated with coronary artery disease sacubitriL-valsartan (Entresto) 49-51 mg tab tablet Take 1 Tablet by mouth two (2) times a day. rosuvastatin (CRESTOR) 5 mg tablet Take 5 mg by mouth daily. spironolactone (ALDACTONE) 25 mg tablet Take 25 mg by mouth daily. montelukast (SINGULAIR) 10 mg tablet Take 10 mg by mouth nightly. insulin glargine (LANTUS) 100 unit/mL injection 18 Units by SubCUTAneous route daily. DISPOSITION:   Home With:   OT  PT  HH  RN      x Long term SNF/Inpatient Rehab    Independent/assisted living    Hospice    Other:       PATIENT CONDITION AT DISCHARGE:     Functional status    Poor    x Deconditioned    x Independent      Cognition    x Lucid     Forgetful     Dementia      Catheters/lines (plus indication)    Foster     PICC     PEG    x None      Code status   x  Full code     DNR      PHYSICAL EXAMINATION AT DISCHARGE:  General:          Alert, cooperative, no distress, appears stated age. HEENT:           Atraumatic, anicteric sclerae, pink conjunctivae                          No oral ulcers, mucosa moist, throat clear, dentition fair  Neck:               Supple, symmetrical  Lungs:             Clear to auscultation bilaterally. No Wheezing or Rhonchi. No rales. Chest wall:      No tenderness  No Accessory muscle use. Heart:              Regular  rhythm,  No  murmur   No edema  Abdomen:        Soft, non-tender. Not distended. Bowel sounds normal  Extremities:     No cyanosis. No clubbing,                            Skin turgor normal, Capillary refill normal  Skin:                Not pale. Not Jaundiced  No rashes   Psych:             Not anxious or agitated.   Neurologic:      Alert, moves all extremities, answers questions appropriately and responds to commands       CHRONIC MEDICAL DIAGNOSES:  Problem List as of 6/3/2022 Date Reviewed: 4/28/2013          Codes Class Noted - Resolved    (HFpEF) heart failure with preserved ejection fraction (HCC) ICD-10-CM: I50.30  ICD-9-CM: 428.9 5/23/2022 - Present        * (Principal) Sepsis ([address] ICD-10-CM: A41.9  ICD-9-CM: 038.9, 995.91  5/18/2022 - Present        Shortness of breath ICD-10-CM: R06.02  ICD-9-CM: 786.05  4/25/2022 - Present        Acute on chronic respiratory failure [facility]) ICD-10-CM: J96.20  ICD-9-CM: 518.84  9/9/2021 - Present              Greater than 30  minutes were spent with the patient on counseling and coordination of care    Signed:   Jaycee Scott MD  6/3/2022  2:13 PM

## 2022-06-04 NOTE — PROGRESS NOTES
Pt refusing skin care for sacrum, buttocks, and under abd folds/groin at this time. Telephone report given to RN at [facility]. RN had no questions at this time. PIVs removed with tips intact; telemetry discontinued. Pt taken by EMS to [facility] with belongings on O2.

## 2022-06-06 ENCOUNTER — PATIENT OUTREACH (OUTPATIENT)
Dept: CASE MANAGEMENT | Age: 57
End: 2022-06-06

## 2022-06-06 NOTE — PROGRESS NOTES
Transition of care outreach postponed for 14 days due to patient's discharge to SNF. Per EMR patient discharged to [facility], will continue to monitor patient progress.

## 2022-06-20 ENCOUNTER — PATIENT OUTREACH (OUTPATIENT)
Dept: CASE MANAGEMENT | Age: 57
End: 2022-06-20

## 2022-06-20 NOTE — PROGRESS NOTES
Outgoing call placed [facility] spoke to staff member patient identified utilizing 2 identifiers, introduced self and reason for the call. Staff reports patient was not admitted to the facility discharge date not given. Will contact patient     [address] [place] [address] Dr Discharge Follow-Up      Date/Time:  2022 1:32 PM    Patient was admitted to [facility] on 2022 and discharged to [facility] on 6/3/2022. The patients discharge diagnosis was sepsis. Patient was discharge on (patient reported) from [facility]. The discharge summary from the post acute facilty was not available at the time of outreach. Patient was contacted within 2 business days of discharge from the post acute facility. LPN Care Coordinator contacted the patient by telephone to perform post hospital discharge follow up. Verified name and  with patient as identifiers. Provided introduction to self, and explanation of the LPN Care Coordinator role. Patient did not receive post acute facility discharge instructions. LPN Care Coordinator reviewed general discharge instructions and red flags with patient who verbalized understanding. Patient given an opportunity to ask questions and does not have any further questions or concerns at this time. Patient is able to verbalize red flags for CHF exacerbation. The patient agrees to contact the PCP office for questions related to their healthcare.  Patient reports his care is manage through [facility]:   Does patient have an Advance Directive:  not on file     Home Health orders at discharge: OT,  [facility]: services provided [facility]   Date of initial visit: patient reports 2022    [facility] ordered at discharge: none  [address] received: n/a  Patient reports he has Oxygen therapy 2l via prn, wheelchair, cane, walker and rollator. Medication(s):   The post acute facility medication discharge list was not available for this call. Medication review was not  performed with patient, who verbalizes understanding of administration of home medications. There were no barriers to obtaining medications identified at this time. Patient reports he was seen by his [place] provider today. [facility] follow up appointment(s): No future appointments. Non-[facility] follow up appointment(s): patient is to follow up with Dr. Loree Zaragoza (urologist). [facility]: not discussed at this time.

## 2022-06-20 NOTE — Clinical Note
Good afternoon ladies,   Mr. Janae Rm, has a bundle flag CHF, he was initially discharged from [facility] 5/2/2022 dx SOB discharge to [facility] readmitted to [facility] 5/18-6/3/2022 dx sepsis. His care is managed through [place], per both discharge instruction he should follow up with urologist this month and Pulmonologist Dr Papo Nur in August. Patient stated he has case manger (Sky Cruz) through [facility] and he spoke with her today at his appointment.      Thank you,   [place]

## 2022-06-30 ENCOUNTER — PATIENT OUTREACH (OUTPATIENT)
Dept: CASE MANAGEMENT | Age: 57
End: 2022-06-30

## 2022-07-05 ENCOUNTER — PATIENT OUTREACH (OUTPATIENT)
Dept: CASE MANAGEMENT | Age: 57
End: 2022-07-05

## 2022-07-05 NOTE — LETTER
7/6/2022 7:56 AM    Mr. Yakov Willis  [address] [facility] [address]    Dear Yakov Willis    My name is Daly Barnes and I am a Care Transition Nurse with [facility] working to improve patients' health. I've been trying to reach you via phone to let you know that, as a member of your care team, I will work with other providers involved in your care, offer education for your specific health conditions, and connect you with more resources as needed. This program is designed to provide you with the opportunity to have a ([address] [facility]) care manager partner with you for the following situations:     1) if you come home from the hospital or emergency room   2) to help manage your disease   3) when you would like assistance coordinating services or appointments    This added support is provided at no additional cost to you. My primary focus is to help you achieve specific goals and improve your health. Please call me at [phone] to further discuss how I can support your health care needs.     Sincerely,    Daly Barnes  Care Transitions Nurse  [phone]

## 2022-07-05 NOTE — PROGRESS NOTES
Care Transitions Outreach Attempt     Attempted to reach patient for transitions of care follow up. Unable to reach patient. Unable to LVM. Letter sent. Patient: Osmany Sanchez Patient : 1965 MRN: [id number]    Last Discharge [address]       Complaint Diagnosis Description Type Department Provider    22 Shortness of Breath Severe sepsis ([facility] [address] . .. ED to Hosp-Admission (Discharged) (ADMIT) Elliot Fortune MD; Lyle Babin. .. Was this an external facility discharge? Yes, 2022 Discharge Facility: [facility]      Noted following upcoming appointments from discharge chart review:   [facility] follow up appointment(s): No future appointments.   Non-[facility] follow up appointment(s): dena

## 2022-07-15 ENCOUNTER — PATIENT OUTREACH (OUTPATIENT)
Dept: CASE MANAGEMENT | Age: 57
End: 2022-07-15

## 2022-07-15 NOTE — PROGRESS NOTES
Care Transitions Outreach Attempt    Attempted to reach patient for transitions of care follow up. Unable to reach patient. Unable to LVM as VM is full. Patient: Elisa Burdick Patient : 1965 MRN: [id number]    Last Discharge [address]       Complaint Diagnosis Description Type Department Provider    22 Shortness of Breath Severe sepsis ([facility] [address] . .. ED to Hosp-Admission (Discharged) (ADMIT) Arnold Bains MD; Miley Anderson. .. Noted following upcoming appointments from discharge chart review:   [address] [facility] follow up appointment(s): No future appointments.   Non-[facility] follow up appointment(s): manasak

## 2022-08-01 ENCOUNTER — PATIENT OUTREACH (OUTPATIENT)
Dept: CASE MANAGEMENT | Age: 57
End: 2022-08-01

## 2022-08-01 RX ORDER — FUROSEMIDE 20 MG/1
TABLET ORAL DAILY
COMMUNITY

## 2022-08-01 RX ORDER — ACETAMINOPHEN 500 MG
TABLET ORAL
COMMUNITY

## 2022-08-01 RX ORDER — SEMAGLUTIDE 1.34 MG/ML
INJECTION, SOLUTION SUBCUTANEOUS
COMMUNITY

## 2022-08-01 RX ORDER — SENNOSIDES 8.6 MG/1
1 TABLET ORAL DAILY
COMMUNITY

## 2022-08-01 NOTE — PROGRESS NOTES
Goals        Prevent complications post hospitalization. 8/1/2022  Pt reports that he works with [facility] and has all physicians there but does not know names. Pt reports that wt is staying about the same. Pt reports that he wears compression stocking to reduce swelling. Pt reports breathing is at baseline. Pt states that he reads food labels and tries to avoid Na. Performed medication reconciliation with pt. Medication list updated. Pt is agreeable to a call in 2 weeks to review wt, swelling, check on appts.   aFP

## 2022-08-14 NOTE — Clinical Note
Status[de-identified] INPATIENT [101]   Type of Bed:  Level 3 (PCN) [23]   Cardiac Monitoring Required?: Yes   Inpatient Hospitalization Certified Necessary for the Following Reasons: 3.  Patient receiving treatment that can only be provided in an inpatient setting (further clarification in H&P documentation)   Admitting Diagnosis: Acute on chronic respiratory failure [facility]) [[id number]]   Admitting Physician: Toni Arias [[id number]]   Attending Physician: Toni Arias [[id number]]   Estimated Length of Stay: 5-7 Midnights   Discharge Plan[de-identified] Other (Specify)
gradual onset

## 2022-08-15 ENCOUNTER — PATIENT OUTREACH (OUTPATIENT)
Dept: CASE MANAGEMENT | Age: 57
End: 2022-08-15

## 2022-08-15 NOTE — PROGRESS NOTES
Patient has graduated from the Bundle program on 8/15/2022. Patient/family has the ability to self-manage at this time Care management goals have been completed. Patient was not referred to the [facility] team for further management. Goals Addressed                   This Visit's Progress     Prevent complications post hospitalization. 8/1/2022  Pt reports that he works with [facility] and has all physicians there but does not know names. Pt reports that wt is staying about the same. Pt reports that he wears compression stocking to reduce swelling. Pt reports breathing is at baseline. Pt states that he reads food labels and tries to avoid Na. Performed medication reconciliation with pt. Medication list updated. Pt is agreeable to a call in 2 weeks to review wt, swelling, check on appts. aFP    8/15/2022  Unable to reach patient. Episode of care resolved. AFP              Patient has Care Transition Nurse's contact information for any further questions, concerns, or needs. Patients upcoming visits:  No future appointments.

## 2023-07-19 ENCOUNTER — TRANSCRIBE ORDERS (OUTPATIENT)
Facility: HOSPITAL | Age: 58
End: 2023-07-19

## 2023-07-19 DIAGNOSIS — I50.32 CHRONIC DIASTOLIC HEART FAILURE (HCC): Primary | ICD-10-CM

## 2023-07-31 ENCOUNTER — TRANSCRIBE ORDERS (OUTPATIENT)
Facility: HOSPITAL | Age: 58
End: 2023-07-31

## 2023-07-31 DIAGNOSIS — N63.14 MASS OF LOWER INNER QUADRANT OF RIGHT BREAST: Primary | ICD-10-CM

## 2023-11-08 ENCOUNTER — HOSPITAL ENCOUNTER (OUTPATIENT)
Facility: HOSPITAL | Age: 58
Discharge: HOME | End: 2023-11-10
Payer: COMMERCIAL

## 2023-11-08 VITALS
DIASTOLIC BLOOD PRESSURE: 64 MMHG | WEIGHT: 315 LBS | HEIGHT: 72 IN | SYSTOLIC BLOOD PRESSURE: 107 MMHG | BODY MASS INDEX: 42.66 KG/M2

## 2023-11-08 DIAGNOSIS — I50.32 CHRONIC DIASTOLIC HEART FAILURE (HCC): ICD-10-CM

## 2023-11-08 LAB
ECHO AO ROOT DIAM: 3.6 CM
ECHO AO ROOT INDEX: 1.38 CM/M2
ECHO AV PEAK GRADIENT: 4 MMHG
ECHO AV PEAK VELOCITY: 1 M/S
ECHO AV VELOCITY RATIO: 0.6
ECHO BSA: 2.74 M2
ECHO LA DIAMETER INDEX: 1.99 CM/M2
ECHO LA DIAMETER: 5.2 CM
ECHO LA TO AORTIC ROOT RATIO: 1.44
ECHO LV E' LATERAL VELOCITY: 6 CM/S
ECHO LV E' SEPTAL VELOCITY: 6 CM/S
ECHO LV FRACTIONAL SHORTENING: 32 % (ref 28–44)
ECHO LV INTERNAL DIMENSION DIASTOLE INDEX: 2.15 CM/M2
ECHO LV INTERNAL DIMENSION DIASTOLIC: 5.6 CM (ref 4.2–5.9)
ECHO LV INTERNAL DIMENSION SYSTOLIC INDEX: 1.46 CM/M2
ECHO LV INTERNAL DIMENSION SYSTOLIC: 3.8 CM
ECHO LV IVSD: 1.3 CM (ref 0.6–1)
ECHO LV MASS 2D: 365.4 G (ref 88–224)
ECHO LV MASS INDEX 2D: 140 G/M2 (ref 49–115)
ECHO LV POSTERIOR WALL DIASTOLIC: 1.6 CM (ref 0.6–1)
ECHO LV RELATIVE WALL THICKNESS RATIO: 0.57
ECHO LVOT PEAK GRADIENT: 2 MMHG
ECHO LVOT PEAK VELOCITY: 0.6 M/S
ECHO MV A VELOCITY: 0.68 M/S
ECHO MV AREA PHT: 2.9 CM2
ECHO MV E DECELERATION TIME (DT): 263.3 MS
ECHO MV E VELOCITY: 0.66 M/S
ECHO MV E/A RATIO: 0.97
ECHO MV E/E' LATERAL: 11
ECHO MV PRESSURE HALF TIME (PHT): 76.4 MS
ECHO PV MAX VELOCITY: 1 M/S
ECHO PV PEAK GRADIENT: 4 MMHG

## 2023-11-08 PROCEDURE — 93306 TTE W/DOPPLER COMPLETE: CPT | Performed by: INTERNAL MEDICINE

## 2023-11-08 PROCEDURE — 93306 TTE W/DOPPLER COMPLETE: CPT

## 2023-11-24 ENCOUNTER — APPOINTMENT (OUTPATIENT)
Facility: HOSPITAL | Age: 58
DRG: 177 | End: 2023-11-24
Payer: COMMERCIAL

## 2023-11-24 ENCOUNTER — HOSPITAL ENCOUNTER (INPATIENT)
Facility: HOSPITAL | Age: 58
LOS: 12 days | Discharge: NURSING, REHAB OR LONG-TERM CARE | DRG: 177 | End: 2023-12-06
Attending: EMERGENCY MEDICINE | Admitting: INTERNAL MEDICINE
Payer: COMMERCIAL

## 2023-11-24 DIAGNOSIS — N17.9 AKI (ACUTE KIDNEY INJURY) (HCC): ICD-10-CM

## 2023-11-24 DIAGNOSIS — R09.02 HYPOXIA: Primary | ICD-10-CM

## 2023-11-24 DIAGNOSIS — U07.1 COVID-19: ICD-10-CM

## 2023-11-24 PROBLEM — J96.01 ACUTE HYPOXEMIC RESPIRATORY FAILURE (HCC): Status: ACTIVE | Noted: 2023-11-24

## 2023-11-24 LAB
ALBUMIN SERPL-MCNC: 2.7 G/DL (ref 3.5–5)
ALBUMIN/GLOB SERPL: 0.6 (ref 1.1–2.2)
ALP SERPL-CCNC: 62 U/L (ref 45–117)
ALT SERPL-CCNC: 8 U/L (ref 12–78)
ANION GAP BLD CALC-SCNC: 4 (ref 10–20)
ANION GAP SERPL CALC-SCNC: 2 MMOL/L (ref 5–15)
AST SERPL-CCNC: 20 U/L (ref 15–37)
BASE EXCESS BLD CALC-SCNC: 16.5 MMOL/L
BASOPHILS # BLD: 0 K/UL (ref 0–0.1)
BASOPHILS NFR BLD: 0 % (ref 0–1)
BILIRUB SERPL-MCNC: 0.6 MG/DL (ref 0.2–1)
BUN SERPL-MCNC: 45 MG/DL (ref 6–20)
BUN/CREAT SERPL: 19 (ref 12–20)
CA-I BLD-MCNC: 1.12 MMOL/L (ref 1.12–1.32)
CALCIUM SERPL-MCNC: 8.5 MG/DL (ref 8.5–10.1)
CHLORIDE BLD-SCNC: 97 MMOL/L (ref 100–108)
CHLORIDE SERPL-SCNC: 96 MMOL/L (ref 97–108)
CO2 BLD-SCNC: 48 MMOL/L (ref 19–24)
CO2 SERPL-SCNC: 41 MMOL/L (ref 21–32)
CREAT SERPL-MCNC: 2.41 MG/DL (ref 0.7–1.3)
CREAT UR-MCNC: 2.6 MG/DL (ref 0.6–1.3)
CRP SERPL-MCNC: 14.7 MG/DL (ref 0–0.6)
D DIMER PPP FEU-MCNC: <0.19 MG/L FEU (ref 0–0.65)
DIFFERENTIAL METHOD BLD: ABNORMAL
EKG ATRIAL RATE: 267 BPM
EKG ATRIAL RATE: 64 BPM
EKG ATRIAL RATE: 69 BPM
EKG DIAGNOSIS: NORMAL
EKG P AXIS: 110 DEGREES
EKG P AXIS: 60 DEGREES
EKG P-R INTERVAL: 206 MS
EKG P-R INTERVAL: 222 MS
EKG Q-T INTERVAL: 188 MS
EKG Q-T INTERVAL: 388 MS
EKG Q-T INTERVAL: 388 MS
EKG QRS DURATION: 84 MS
EKG QRS DURATION: 96 MS
EKG QRS DURATION: 98 MS
EKG QTC CALCULATION (BAZETT): 344 MS
EKG QTC CALCULATION (BAZETT): 400 MS
EKG QTC CALCULATION (BAZETT): 415 MS
EKG R AXIS: 103 DEGREES
EKG R AXIS: 191 DEGREES
EKG R AXIS: 55 DEGREES
EKG T AXIS: 36 DEGREES
EKG T AXIS: 55 DEGREES
EKG T AXIS: 56 DEGREES
EKG VENTRICULAR RATE: 202 BPM
EKG VENTRICULAR RATE: 64 BPM
EKG VENTRICULAR RATE: 69 BPM
EOSINOPHIL # BLD: 0 K/UL (ref 0–0.4)
EOSINOPHIL NFR BLD: 0 % (ref 0–7)
ERYTHROCYTE [DISTWIDTH] IN BLOOD BY AUTOMATED COUNT: 16.7 % (ref 11.5–14.5)
GLOBULIN SER CALC-MCNC: 4.6 G/DL (ref 2–4)
GLUCOSE BLD STRIP.AUTO-MCNC: 103 MG/DL (ref 74–106)
GLUCOSE BLD STRIP.AUTO-MCNC: 151 MG/DL (ref 65–117)
GLUCOSE BLD STRIP.AUTO-MCNC: 152 MG/DL (ref 65–117)
GLUCOSE SERPL-MCNC: 111 MG/DL (ref 65–100)
HCO3 BLDA-SCNC: 49 MMOL/L
HCT VFR BLD AUTO: 42 % (ref 36.6–50.3)
HGB BLD-MCNC: 12.2 G/DL (ref 12.1–17)
IMM GRANULOCYTES # BLD AUTO: 0.1 K/UL (ref 0–0.04)
IMM GRANULOCYTES NFR BLD AUTO: 1 % (ref 0–0.5)
LACTATE BLD-SCNC: 1.85 MMOL/L (ref 0.4–2)
LYMPHOCYTES # BLD: 0.9 K/UL (ref 0.8–3.5)
LYMPHOCYTES NFR BLD: 9 % (ref 12–49)
MCH RBC QN AUTO: 26 PG (ref 26–34)
MCHC RBC AUTO-ENTMCNC: 29 G/DL (ref 30–36.5)
MCV RBC AUTO: 89.6 FL (ref 80–99)
MONOCYTES # BLD: 0.8 K/UL (ref 0–1)
MONOCYTES NFR BLD: 8 % (ref 5–13)
NEUTS SEG # BLD: 8.1 K/UL (ref 1.8–8)
NEUTS SEG NFR BLD: 82 % (ref 32–75)
NRBC # BLD: 0.02 K/UL (ref 0–0.01)
NRBC BLD-RTO: 0.2 PER 100 WBC
PCO2 BLDV: 94.6 MMHG (ref 41–51)
PH BLDV: 7.32 (ref 7.32–7.42)
PLATELET # BLD AUTO: 168 K/UL (ref 150–400)
PMV BLD AUTO: 11.1 FL (ref 8.9–12.9)
PO2 BLDV: 39 MMHG (ref 25–40)
POTASSIUM BLD-SCNC: 3.6 MMOL/L (ref 3.5–5.5)
POTASSIUM SERPL-SCNC: 3.7 MMOL/L (ref 3.5–5.1)
PROCALCITONIN SERPL-MCNC: 0.1 NG/ML
PROT SERPL-MCNC: 7.3 G/DL (ref 6.4–8.2)
RBC # BLD AUTO: 4.69 M/UL (ref 4.1–5.7)
SAO2 % BLD: 64 %
SERVICE CMNT-IMP: ABNORMAL
SERVICE CMNT-IMP: ABNORMAL
SODIUM BLD-SCNC: 149 MMOL/L (ref 136–145)
SODIUM SERPL-SCNC: 139 MMOL/L (ref 136–145)
SPECIMEN SITE: ABNORMAL
TROPONIN I SERPL HS-MCNC: 50 NG/L (ref 0–76)
WBC # BLD AUTO: 9.9 K/UL (ref 4.1–11.1)

## 2023-11-24 PROCEDURE — 86140 C-REACTIVE PROTEIN: CPT

## 2023-11-24 PROCEDURE — 85379 FIBRIN DEGRADATION QUANT: CPT

## 2023-11-24 PROCEDURE — 93010 ELECTROCARDIOGRAM REPORT: CPT | Performed by: INTERNAL MEDICINE

## 2023-11-24 PROCEDURE — 2580000003 HC RX 258: Performed by: NURSE PRACTITIONER

## 2023-11-24 PROCEDURE — 93005 ELECTROCARDIOGRAM TRACING: CPT | Performed by: STUDENT IN AN ORGANIZED HEALTH CARE EDUCATION/TRAINING PROGRAM

## 2023-11-24 PROCEDURE — 84484 ASSAY OF TROPONIN QUANT: CPT

## 2023-11-24 PROCEDURE — 84295 ASSAY OF SERUM SODIUM: CPT

## 2023-11-24 PROCEDURE — 82947 ASSAY GLUCOSE BLOOD QUANT: CPT

## 2023-11-24 PROCEDURE — 99285 EMERGENCY DEPT VISIT HI MDM: CPT

## 2023-11-24 PROCEDURE — 71045 X-RAY EXAM CHEST 1 VIEW: CPT

## 2023-11-24 PROCEDURE — 82962 GLUCOSE BLOOD TEST: CPT

## 2023-11-24 PROCEDURE — 2500000003 HC RX 250 WO HCPCS: Performed by: NURSE PRACTITIONER

## 2023-11-24 PROCEDURE — 2000000000 HC ICU R&B

## 2023-11-24 PROCEDURE — 84145 PROCALCITONIN (PCT): CPT

## 2023-11-24 PROCEDURE — 93005 ELECTROCARDIOGRAM TRACING: CPT | Performed by: EMERGENCY MEDICINE

## 2023-11-24 PROCEDURE — A4216 STERILE WATER/SALINE, 10 ML: HCPCS | Performed by: NURSE PRACTITIONER

## 2023-11-24 PROCEDURE — 80053 COMPREHEN METABOLIC PANEL: CPT

## 2023-11-24 PROCEDURE — 6360000002 HC RX W HCPCS: Performed by: NURSE PRACTITIONER

## 2023-11-24 PROCEDURE — 94640 AIRWAY INHALATION TREATMENT: CPT

## 2023-11-24 PROCEDURE — 87040 BLOOD CULTURE FOR BACTERIA: CPT

## 2023-11-24 PROCEDURE — 82330 ASSAY OF CALCIUM: CPT

## 2023-11-24 PROCEDURE — 96374 THER/PROPH/DIAG INJ IV PUSH: CPT

## 2023-11-24 PROCEDURE — 6360000002 HC RX W HCPCS: Performed by: EMERGENCY MEDICINE

## 2023-11-24 PROCEDURE — 85025 COMPLETE CBC W/AUTO DIFF WBC: CPT

## 2023-11-24 PROCEDURE — 36415 COLL VENOUS BLD VENIPUNCTURE: CPT

## 2023-11-24 PROCEDURE — 82803 BLOOD GASES ANY COMBINATION: CPT

## 2023-11-24 PROCEDURE — 84132 ASSAY OF SERUM POTASSIUM: CPT

## 2023-11-24 PROCEDURE — 6370000000 HC RX 637 (ALT 250 FOR IP): Performed by: NURSE PRACTITIONER

## 2023-11-24 RX ORDER — TAMSULOSIN HYDROCHLORIDE 0.4 MG/1
0.4 CAPSULE ORAL DAILY
Status: DISCONTINUED | OUTPATIENT
Start: 2023-11-24 | End: 2023-12-06 | Stop reason: HOSPADM

## 2023-11-24 RX ORDER — DEXAMETHASONE SODIUM PHOSPHATE 10 MG/ML
6 INJECTION, SOLUTION INTRAMUSCULAR; INTRAVENOUS
Status: COMPLETED | OUTPATIENT
Start: 2023-11-24 | End: 2023-11-24

## 2023-11-24 RX ORDER — PREGABALIN 25 MG/1
75 CAPSULE ORAL 2 TIMES DAILY
Status: DISCONTINUED | OUTPATIENT
Start: 2023-11-24 | End: 2023-12-06 | Stop reason: HOSPADM

## 2023-11-24 RX ORDER — BUDESONIDE 0.5 MG/2ML
0.5 INHALANT ORAL
Status: DISCONTINUED | OUTPATIENT
Start: 2023-11-24 | End: 2023-12-06 | Stop reason: HOSPADM

## 2023-11-24 RX ORDER — SODIUM CHLORIDE, SODIUM LACTATE, POTASSIUM CHLORIDE, CALCIUM CHLORIDE 600; 310; 30; 20 MG/100ML; MG/100ML; MG/100ML; MG/100ML
INJECTION, SOLUTION INTRAVENOUS CONTINUOUS
Status: DISCONTINUED | OUTPATIENT
Start: 2023-11-24 | End: 2023-11-26

## 2023-11-24 RX ORDER — MONTELUKAST SODIUM 10 MG/1
10 TABLET ORAL NIGHTLY
Status: DISCONTINUED | OUTPATIENT
Start: 2023-11-24 | End: 2023-12-06 | Stop reason: HOSPADM

## 2023-11-24 RX ORDER — POTASSIUM CHLORIDE 7.45 MG/ML
10 INJECTION INTRAVENOUS PRN
Status: DISCONTINUED | OUTPATIENT
Start: 2023-11-24 | End: 2023-12-06 | Stop reason: HOSPADM

## 2023-11-24 RX ORDER — ONDANSETRON 2 MG/ML
4 INJECTION INTRAMUSCULAR; INTRAVENOUS EVERY 6 HOURS PRN
Status: DISCONTINUED | OUTPATIENT
Start: 2023-11-24 | End: 2023-12-06 | Stop reason: HOSPADM

## 2023-11-24 RX ORDER — TORSEMIDE 20 MG/1
40 TABLET ORAL 2 TIMES DAILY
Status: DISCONTINUED | OUTPATIENT
Start: 2023-11-24 | End: 2023-12-06 | Stop reason: HOSPADM

## 2023-11-24 RX ORDER — INSULIN LISPRO 100 [IU]/ML
0-4 INJECTION, SOLUTION INTRAVENOUS; SUBCUTANEOUS NIGHTLY
Status: DISCONTINUED | OUTPATIENT
Start: 2023-11-24 | End: 2023-12-06 | Stop reason: HOSPADM

## 2023-11-24 RX ORDER — SODIUM CHLORIDE 9 MG/ML
INJECTION, SOLUTION INTRAVENOUS PRN
Status: DISCONTINUED | OUTPATIENT
Start: 2023-11-24 | End: 2023-12-06 | Stop reason: HOSPADM

## 2023-11-24 RX ORDER — SODIUM CHLORIDE 0.9 % (FLUSH) 0.9 %
5-40 SYRINGE (ML) INJECTION PRN
Status: DISCONTINUED | OUTPATIENT
Start: 2023-11-24 | End: 2023-12-06 | Stop reason: HOSPADM

## 2023-11-24 RX ORDER — POLYETHYLENE GLYCOL 3350 17 G/17G
17 POWDER, FOR SOLUTION ORAL DAILY PRN
Status: DISCONTINUED | OUTPATIENT
Start: 2023-11-24 | End: 2023-12-06 | Stop reason: HOSPADM

## 2023-11-24 RX ORDER — INSULIN LISPRO 100 [IU]/ML
0-8 INJECTION, SOLUTION INTRAVENOUS; SUBCUTANEOUS
Status: DISCONTINUED | OUTPATIENT
Start: 2023-11-24 | End: 2023-12-06 | Stop reason: HOSPADM

## 2023-11-24 RX ORDER — ISOSORBIDE MONONITRATE 30 MG/1
30 TABLET, EXTENDED RELEASE ORAL DAILY
Status: DISCONTINUED | OUTPATIENT
Start: 2023-11-25 | End: 2023-12-06 | Stop reason: HOSPADM

## 2023-11-24 RX ORDER — AMIODARONE HYDROCHLORIDE 200 MG/1
100 TABLET ORAL DAILY
Status: DISCONTINUED | OUTPATIENT
Start: 2023-11-24 | End: 2023-12-06 | Stop reason: HOSPADM

## 2023-11-24 RX ORDER — SODIUM CHLORIDE 0.9 % (FLUSH) 0.9 %
5-40 SYRINGE (ML) INJECTION EVERY 12 HOURS SCHEDULED
Status: DISCONTINUED | OUTPATIENT
Start: 2023-11-24 | End: 2023-12-06 | Stop reason: HOSPADM

## 2023-11-24 RX ORDER — ARFORMOTEROL TARTRATE 15 UG/2ML
15 SOLUTION RESPIRATORY (INHALATION)
Status: DISCONTINUED | OUTPATIENT
Start: 2023-11-24 | End: 2023-12-06 | Stop reason: HOSPADM

## 2023-11-24 RX ORDER — MAGNESIUM SULFATE IN WATER 40 MG/ML
2000 INJECTION, SOLUTION INTRAVENOUS PRN
Status: DISCONTINUED | OUTPATIENT
Start: 2023-11-24 | End: 2023-12-06 | Stop reason: HOSPADM

## 2023-11-24 RX ORDER — ACETAMINOPHEN 650 MG/1
650 SUPPOSITORY RECTAL EVERY 6 HOURS PRN
Status: DISCONTINUED | OUTPATIENT
Start: 2023-11-24 | End: 2023-12-06 | Stop reason: HOSPADM

## 2023-11-24 RX ORDER — ACETAMINOPHEN 325 MG/1
650 TABLET ORAL EVERY 6 HOURS PRN
Status: DISCONTINUED | OUTPATIENT
Start: 2023-11-24 | End: 2023-12-06 | Stop reason: HOSPADM

## 2023-11-24 RX ORDER — DEXAMETHASONE SODIUM PHOSPHATE 10 MG/ML
6 INJECTION, SOLUTION INTRAMUSCULAR; INTRAVENOUS EVERY 24 HOURS
Status: DISCONTINUED | OUTPATIENT
Start: 2023-11-25 | End: 2023-11-27

## 2023-11-24 RX ORDER — ONDANSETRON 4 MG/1
4 TABLET, ORALLY DISINTEGRATING ORAL EVERY 8 HOURS PRN
Status: DISCONTINUED | OUTPATIENT
Start: 2023-11-24 | End: 2023-12-06 | Stop reason: HOSPADM

## 2023-11-24 RX ORDER — IPRATROPIUM BROMIDE AND ALBUTEROL SULFATE 2.5; .5 MG/3ML; MG/3ML
1 SOLUTION RESPIRATORY (INHALATION) EVERY 6 HOURS PRN
Status: DISCONTINUED | OUTPATIENT
Start: 2023-11-24 | End: 2023-11-25

## 2023-11-24 RX ORDER — POTASSIUM CHLORIDE 29.8 MG/ML
20 INJECTION INTRAVENOUS PRN
Status: DISCONTINUED | OUTPATIENT
Start: 2023-11-24 | End: 2023-12-06 | Stop reason: HOSPADM

## 2023-11-24 RX ADMIN — PREGABALIN 75 MG: 75 CAPSULE ORAL at 21:13

## 2023-11-24 RX ADMIN — APIXABAN 5 MG: 5 TABLET, FILM COATED ORAL at 21:13

## 2023-11-24 RX ADMIN — FAMOTIDINE 20 MG: 10 INJECTION, SOLUTION INTRAVENOUS at 16:13

## 2023-11-24 RX ADMIN — MONTELUKAST SODIUM 10 MG: 10 TABLET ORAL at 21:13

## 2023-11-24 RX ADMIN — BUDESONIDE INHALATION 500 MCG: 0.5 SUSPENSION RESPIRATORY (INHALATION) at 20:37

## 2023-11-24 RX ADMIN — ARFORMOTEROL TARTRATE 15 MCG: 15 SOLUTION RESPIRATORY (INHALATION) at 20:37

## 2023-11-24 RX ADMIN — DEXAMETHASONE SODIUM PHOSPHATE 6 MG: 10 INJECTION INTRAMUSCULAR; INTRAVENOUS at 13:18

## 2023-11-24 RX ADMIN — SODIUM CHLORIDE, PRESERVATIVE FREE 10 ML: 5 INJECTION INTRAVENOUS at 21:14

## 2023-11-24 RX ADMIN — SODIUM CHLORIDE, POTASSIUM CHLORIDE, SODIUM LACTATE AND CALCIUM CHLORIDE: 600; 310; 30; 20 INJECTION, SOLUTION INTRAVENOUS at 18:10

## 2023-11-24 ASSESSMENT — ENCOUNTER SYMPTOMS
COUGH: 1
SHORTNESS OF BREATH: 1

## 2023-11-24 ASSESSMENT — PAIN SCALES - GENERAL
PAINLEVEL_OUTOF10: 10
PAINLEVEL_OUTOF10: 10
PAINLEVEL_OUTOF10: 0

## 2023-11-24 ASSESSMENT — PAIN DESCRIPTION - LOCATION: LOCATION: BACK

## 2023-11-24 ASSESSMENT — PAIN - FUNCTIONAL ASSESSMENT: PAIN_FUNCTIONAL_ASSESSMENT: 0-10

## 2023-11-24 NOTE — ED PROVIDER NOTES
[address] EMERGENCY DEP  EMERGENCY DEPARTMENT ENCOUNTER    Pt Name: Ashvin Thompson  MRN: [id number]  [address] [facility] 1965  Date of evaluation: 11/24/2023  Provider: Elaine Gutierrez MD  [address]       Chief Complaint   Patient presents with    Skin Ulcer    Positive For Covid-19     HISTORY OF PRESENT ILLNESS   (Location/Symptom, Timing/Onset, Context/Setting, Quality, Duration, Modifying Factors, Severity)  Note limiting factors. HPI    81-year-old male transferred from skilled nursing facility for COVID and hypoxia. Patient on 2 L of oxygen at baseline. He states that he feels short of breath and has not been feeling well for couple days. He is unable to provide much history and is a poor historian. He denies fevers. Denies chest pain. He reports being vaccinated for COVID. Additional history from independent historians: Patient and EMS    Review of External Medical Records:     Nursing Notes were reviewed. REVIEW OF SYSTEMS  Review of Systems   Unable to perform ROS: Acuity of condition       PAST MEDICAL HISTORY     Past Medical History:   Diagnosis Date    DM (diabetes mellitus) ([address])     Dyslipidemia     Hypertension      SURGICAL HISTORY     No past surgical history on file.   CURRENT MEDICATIONS       Previous Medications    ACETAMINOPHEN (TYLENOL) 500 MG TABLET    Take by mouth every 6 hours as needed    AMIODARONE (PACERONE) 100 MG TABLET    Take 1 tablet by mouth daily    APIXABAN (ELIQUIS) 5 MG TABS TABLET    Take 1 tablet by mouth 2 times daily    ASPIRIN 81 MG EC TABLET    Take by mouth daily    BUDESONIDE-FORMOTEROL (SYMBICORT) 160-4.5 MCG/ACT AERO    Inhale 2 puffs into the lungs 2 times daily    EMPAGLIFLOZIN (JARDIANCE) 25 MG TABLET    Take 1 tablet by mouth daily    EZETIMIBE (ZETIA) 10 MG TABLET    Take 1 tablet by mouth daily    FUROSEMIDE (LASIX) 20 MG TABLET    Take by mouth daily    INSULIN GLARGINE (LANTUS) 100 UNIT/ML INJECTION VIAL    Inject 18 Units into the skin

## 2023-11-24 NOTE — ED TRIAGE NOTES
Pt to er via ems from [facility] for report ;ow o2 sats after testing positive for covid this am. EMS was unable to get a o2 reading on patient en route. Upon arrival pt denies cp or sob, but c/o sacral pain as he has a wound. Pt was 54% 2L when placed on monitor.

## 2023-11-25 LAB
ALBUMIN SERPL-MCNC: 2.7 G/DL (ref 3.5–5)
ALBUMIN/GLOB SERPL: 0.5 (ref 1.1–2.2)
ALP SERPL-CCNC: 62 U/L (ref 45–117)
ALT SERPL-CCNC: 10 U/L (ref 12–78)
ANION GAP SERPL CALC-SCNC: 3 MMOL/L (ref 5–15)
AST SERPL-CCNC: 14 U/L (ref 15–37)
BASOPHILS # BLD: 0 K/UL (ref 0–0.1)
BASOPHILS NFR BLD: 0 % (ref 0–1)
BILIRUB SERPL-MCNC: 0.4 MG/DL (ref 0.2–1)
BUN SERPL-MCNC: 49 MG/DL (ref 6–20)
BUN/CREAT SERPL: 19 (ref 12–20)
CALCIUM SERPL-MCNC: 8.6 MG/DL (ref 8.5–10.1)
CHLORIDE SERPL-SCNC: 97 MMOL/L (ref 97–108)
CO2 SERPL-SCNC: 41 MMOL/L (ref 21–32)
CREAT SERPL-MCNC: 2.57 MG/DL (ref 0.7–1.3)
DIFFERENTIAL METHOD BLD: ABNORMAL
EOSINOPHIL # BLD: 0 K/UL (ref 0–0.4)
EOSINOPHIL NFR BLD: 0 % (ref 0–7)
ERYTHROCYTE [DISTWIDTH] IN BLOOD BY AUTOMATED COUNT: 16.3 % (ref 11.5–14.5)
GLOBULIN SER CALC-MCNC: 5 G/DL (ref 2–4)
GLUCOSE BLD STRIP.AUTO-MCNC: 113 MG/DL (ref 65–117)
GLUCOSE BLD STRIP.AUTO-MCNC: 120 MG/DL (ref 65–117)
GLUCOSE BLD STRIP.AUTO-MCNC: 129 MG/DL (ref 65–117)
GLUCOSE BLD STRIP.AUTO-MCNC: 153 MG/DL (ref 65–117)
GLUCOSE SERPL-MCNC: 141 MG/DL (ref 65–100)
HCT VFR BLD AUTO: 45.8 % (ref 36.6–50.3)
HGB BLD-MCNC: 12.9 G/DL (ref 12.1–17)
IMM GRANULOCYTES # BLD AUTO: 0.2 K/UL (ref 0–0.04)
IMM GRANULOCYTES NFR BLD AUTO: 2 % (ref 0–0.5)
INR PPP: 1.3 (ref 0.9–1.1)
LYMPHOCYTES # BLD: 0.2 K/UL (ref 0.8–3.5)
LYMPHOCYTES NFR BLD: 3 % (ref 12–49)
MCH RBC QN AUTO: 26 PG (ref 26–34)
MCHC RBC AUTO-ENTMCNC: 28.2 G/DL (ref 30–36.5)
MCV RBC AUTO: 92.3 FL (ref 80–99)
MONOCYTES # BLD: 0.4 K/UL (ref 0–1)
MONOCYTES NFR BLD: 5 % (ref 5–13)
NEUTS SEG # BLD: 7.2 K/UL (ref 1.8–8)
NEUTS SEG NFR BLD: 90 % (ref 32–75)
NRBC # BLD: 0 K/UL (ref 0–0.01)
NRBC BLD-RTO: 0 PER 100 WBC
PHOSPHATE SERPL-MCNC: 5.6 MG/DL (ref 2.6–4.7)
PLATELET # BLD AUTO: 163 K/UL (ref 150–400)
PMV BLD AUTO: 10.2 FL (ref 8.9–12.9)
POTASSIUM SERPL-SCNC: 3.9 MMOL/L (ref 3.5–5.1)
PROT SERPL-MCNC: 7.7 G/DL (ref 6.4–8.2)
PROTHROMBIN TIME: 12.9 SEC (ref 9–11.1)
RBC # BLD AUTO: 4.96 M/UL (ref 4.1–5.7)
RBC MORPH BLD: ABNORMAL
SERVICE CMNT-IMP: ABNORMAL
SERVICE CMNT-IMP: NORMAL
SODIUM SERPL-SCNC: 141 MMOL/L (ref 136–145)
WBC # BLD AUTO: 8 K/UL (ref 4.1–11.1)

## 2023-11-25 PROCEDURE — 2000000000 HC ICU R&B

## 2023-11-25 PROCEDURE — 84100 ASSAY OF PHOSPHORUS: CPT

## 2023-11-25 PROCEDURE — 6370000000 HC RX 637 (ALT 250 FOR IP): Performed by: NURSE PRACTITIONER

## 2023-11-25 PROCEDURE — 6360000002 HC RX W HCPCS: Performed by: NURSE PRACTITIONER

## 2023-11-25 PROCEDURE — 85025 COMPLETE CBC W/AUTO DIFF WBC: CPT

## 2023-11-25 PROCEDURE — 94640 AIRWAY INHALATION TREATMENT: CPT

## 2023-11-25 PROCEDURE — 2500000003 HC RX 250 WO HCPCS: Performed by: NURSE PRACTITIONER

## 2023-11-25 PROCEDURE — 85610 PROTHROMBIN TIME: CPT

## 2023-11-25 PROCEDURE — 2580000003 HC RX 258: Performed by: NURSE PRACTITIONER

## 2023-11-25 PROCEDURE — 80053 COMPREHEN METABOLIC PANEL: CPT

## 2023-11-25 PROCEDURE — 6360000002 HC RX W HCPCS: Performed by: ANESTHESIOLOGY

## 2023-11-25 PROCEDURE — 82962 GLUCOSE BLOOD TEST: CPT

## 2023-11-25 PROCEDURE — 36415 COLL VENOUS BLD VENIPUNCTURE: CPT

## 2023-11-25 PROCEDURE — 6370000000 HC RX 637 (ALT 250 FOR IP): Performed by: ANESTHESIOLOGY

## 2023-11-25 PROCEDURE — A4216 STERILE WATER/SALINE, 10 ML: HCPCS | Performed by: NURSE PRACTITIONER

## 2023-11-25 RX ORDER — IPRATROPIUM BROMIDE AND ALBUTEROL SULFATE 2.5; .5 MG/3ML; MG/3ML
1 SOLUTION RESPIRATORY (INHALATION)
Status: DISCONTINUED | OUTPATIENT
Start: 2023-11-25 | End: 2023-11-27

## 2023-11-25 RX ORDER — FUROSEMIDE 10 MG/ML
40 INJECTION INTRAMUSCULAR; INTRAVENOUS 2 TIMES DAILY
Status: DISCONTINUED | OUTPATIENT
Start: 2023-11-25 | End: 2023-11-27

## 2023-11-25 RX ADMIN — APIXABAN 5 MG: 5 TABLET, FILM COATED ORAL at 21:27

## 2023-11-25 RX ADMIN — TAMSULOSIN HYDROCHLORIDE 0.4 MG: 0.4 CAPSULE ORAL at 08:11

## 2023-11-25 RX ADMIN — FAMOTIDINE 20 MG: 10 INJECTION, SOLUTION INTRAVENOUS at 08:10

## 2023-11-25 RX ADMIN — IPRATROPIUM BROMIDE AND ALBUTEROL SULFATE 1 DOSE: 2.5; .5 SOLUTION RESPIRATORY (INHALATION) at 20:49

## 2023-11-25 RX ADMIN — SODIUM CHLORIDE, PRESERVATIVE FREE 10 ML: 5 INJECTION INTRAVENOUS at 21:32

## 2023-11-25 RX ADMIN — BUDESONIDE INHALATION 500 MCG: 0.5 SUSPENSION RESPIRATORY (INHALATION) at 09:07

## 2023-11-25 RX ADMIN — PREGABALIN 75 MG: 75 CAPSULE ORAL at 21:27

## 2023-11-25 RX ADMIN — APIXABAN 5 MG: 5 TABLET, FILM COATED ORAL at 08:22

## 2023-11-25 RX ADMIN — MONTELUKAST SODIUM 10 MG: 10 TABLET ORAL at 21:27

## 2023-11-25 RX ADMIN — FUROSEMIDE 40 MG: 10 INJECTION, SOLUTION INTRAMUSCULAR; INTRAVENOUS at 18:41

## 2023-11-25 RX ADMIN — ARFORMOTEROL TARTRATE 15 MCG: 15 SOLUTION RESPIRATORY (INHALATION) at 20:49

## 2023-11-25 RX ADMIN — ISOSORBIDE MONONITRATE 30 MG: 30 TABLET, EXTENDED RELEASE ORAL at 08:11

## 2023-11-25 RX ADMIN — DEXAMETHASONE SODIUM PHOSPHATE 6 MG: 10 INJECTION INTRAMUSCULAR; INTRAVENOUS at 08:10

## 2023-11-25 RX ADMIN — AMIODARONE HYDROCHLORIDE 100 MG: 200 TABLET ORAL at 08:11

## 2023-11-25 RX ADMIN — SODIUM CHLORIDE, PRESERVATIVE FREE 10 ML: 5 INJECTION INTRAVENOUS at 08:12

## 2023-11-25 RX ADMIN — FUROSEMIDE 40 MG: 10 INJECTION, SOLUTION INTRAMUSCULAR; INTRAVENOUS at 10:21

## 2023-11-25 RX ADMIN — IPRATROPIUM BROMIDE AND ALBUTEROL SULFATE 1 DOSE: 2.5; .5 SOLUTION RESPIRATORY (INHALATION) at 12:58

## 2023-11-25 RX ADMIN — PREGABALIN 75 MG: 75 CAPSULE ORAL at 08:23

## 2023-11-25 RX ADMIN — BUDESONIDE INHALATION 500 MCG: 0.5 SUSPENSION RESPIRATORY (INHALATION) at 20:49

## 2023-11-25 RX ADMIN — ARFORMOTEROL TARTRATE 15 MCG: 15 SOLUTION RESPIRATORY (INHALATION) at 09:06

## 2023-11-25 ASSESSMENT — PAIN SCALES - GENERAL
PAINLEVEL_OUTOF10: 0

## 2023-11-26 ENCOUNTER — APPOINTMENT (OUTPATIENT)
Facility: HOSPITAL | Age: 58
DRG: 177 | End: 2023-11-26
Payer: COMMERCIAL

## 2023-11-26 LAB
ANION GAP SERPL CALC-SCNC: 9 MMOL/L (ref 5–15)
BASOPHILS # BLD: 0 K/UL (ref 0–0.1)
BASOPHILS NFR BLD: 0 % (ref 0–1)
BUN SERPL-MCNC: 61 MG/DL (ref 6–20)
BUN/CREAT SERPL: 26 (ref 12–20)
CALCIUM SERPL-MCNC: 9.1 MG/DL (ref 8.5–10.1)
CHLORIDE SERPL-SCNC: 97 MMOL/L (ref 97–108)
CO2 SERPL-SCNC: 39 MMOL/L (ref 21–32)
COMMENT:: NORMAL
CREAT SERPL-MCNC: 2.36 MG/DL (ref 0.7–1.3)
DIFFERENTIAL METHOD BLD: ABNORMAL
EOSINOPHIL # BLD: 0 K/UL (ref 0–0.4)
EOSINOPHIL NFR BLD: 0 % (ref 0–7)
ERYTHROCYTE [DISTWIDTH] IN BLOOD BY AUTOMATED COUNT: 16.1 % (ref 11.5–14.5)
GLUCOSE BLD STRIP.AUTO-MCNC: 123 MG/DL (ref 65–117)
GLUCOSE BLD STRIP.AUTO-MCNC: 148 MG/DL (ref 65–117)
GLUCOSE BLD STRIP.AUTO-MCNC: 154 MG/DL (ref 65–117)
GLUCOSE BLD STRIP.AUTO-MCNC: 185 MG/DL (ref 65–117)
GLUCOSE SERPL-MCNC: 145 MG/DL (ref 65–100)
HCT VFR BLD AUTO: 44.5 % (ref 36.6–50.3)
HGB BLD-MCNC: 12.9 G/DL (ref 12.1–17)
IMM GRANULOCYTES # BLD AUTO: 0.1 K/UL (ref 0–0.04)
IMM GRANULOCYTES NFR BLD AUTO: 2 % (ref 0–0.5)
LYMPHOCYTES # BLD: 0.2 K/UL (ref 0.8–3.5)
LYMPHOCYTES NFR BLD: 4 % (ref 12–49)
MAGNESIUM SERPL-MCNC: 2.3 MG/DL (ref 1.6–2.4)
MCH RBC QN AUTO: 25.6 PG (ref 26–34)
MCHC RBC AUTO-ENTMCNC: 29 G/DL (ref 30–36.5)
MCV RBC AUTO: 88.3 FL (ref 80–99)
MONOCYTES # BLD: 0.4 K/UL (ref 0–1)
MONOCYTES NFR BLD: 6 % (ref 5–13)
NEUTS SEG # BLD: 5.5 K/UL (ref 1.8–8)
NEUTS SEG NFR BLD: 88 % (ref 32–75)
NRBC # BLD: 0 K/UL (ref 0–0.01)
NRBC BLD-RTO: 0 PER 100 WBC
PHOSPHATE SERPL-MCNC: 4 MG/DL (ref 2.6–4.7)
PLATELET # BLD AUTO: 182 K/UL (ref 150–400)
PMV BLD AUTO: 10.7 FL (ref 8.9–12.9)
POTASSIUM SERPL-SCNC: 3.9 MMOL/L (ref 3.5–5.1)
RBC # BLD AUTO: 5.04 M/UL (ref 4.1–5.7)
RBC MORPH BLD: ABNORMAL
SERVICE CMNT-IMP: ABNORMAL
SODIUM SERPL-SCNC: 145 MMOL/L (ref 136–145)
SPECIMEN HOLD: NORMAL
WBC # BLD AUTO: 6.2 K/UL (ref 4.1–11.1)

## 2023-11-26 PROCEDURE — A4216 STERILE WATER/SALINE, 10 ML: HCPCS | Performed by: NURSE PRACTITIONER

## 2023-11-26 PROCEDURE — 2500000003 HC RX 250 WO HCPCS: Performed by: NURSE PRACTITIONER

## 2023-11-26 PROCEDURE — 2580000003 HC RX 258: Performed by: NURSE PRACTITIONER

## 2023-11-26 PROCEDURE — 6370000000 HC RX 637 (ALT 250 FOR IP): Performed by: NURSE PRACTITIONER

## 2023-11-26 PROCEDURE — 84100 ASSAY OF PHOSPHORUS: CPT

## 2023-11-26 PROCEDURE — 36415 COLL VENOUS BLD VENIPUNCTURE: CPT

## 2023-11-26 PROCEDURE — 71045 X-RAY EXAM CHEST 1 VIEW: CPT

## 2023-11-26 PROCEDURE — 82962 GLUCOSE BLOOD TEST: CPT

## 2023-11-26 PROCEDURE — 6360000002 HC RX W HCPCS: Performed by: NURSE PRACTITIONER

## 2023-11-26 PROCEDURE — 94640 AIRWAY INHALATION TREATMENT: CPT

## 2023-11-26 PROCEDURE — 2700000000 HC OXYGEN THERAPY PER DAY

## 2023-11-26 PROCEDURE — 6370000000 HC RX 637 (ALT 250 FOR IP): Performed by: ANESTHESIOLOGY

## 2023-11-26 PROCEDURE — 80048 BASIC METABOLIC PNL TOTAL CA: CPT

## 2023-11-26 PROCEDURE — 83735 ASSAY OF MAGNESIUM: CPT

## 2023-11-26 PROCEDURE — 2000000000 HC ICU R&B

## 2023-11-26 PROCEDURE — 85025 COMPLETE CBC W/AUTO DIFF WBC: CPT

## 2023-11-26 PROCEDURE — 6360000002 HC RX W HCPCS: Performed by: ANESTHESIOLOGY

## 2023-11-26 RX ORDER — LABETALOL HYDROCHLORIDE 5 MG/ML
10 INJECTION, SOLUTION INTRAVENOUS EVERY 4 HOURS PRN
Status: DISCONTINUED | OUTPATIENT
Start: 2023-11-26 | End: 2023-12-06 | Stop reason: HOSPADM

## 2023-11-26 RX ORDER — METOPROLOL TARTRATE 1 MG/ML
5 INJECTION, SOLUTION INTRAVENOUS EVERY 6 HOURS PRN
Status: DISCONTINUED | OUTPATIENT
Start: 2023-11-26 | End: 2023-11-26

## 2023-11-26 RX ORDER — HYDRALAZINE HYDROCHLORIDE 20 MG/ML
10 INJECTION INTRAMUSCULAR; INTRAVENOUS EVERY 4 HOURS PRN
Status: DISCONTINUED | OUTPATIENT
Start: 2023-11-26 | End: 2023-12-06 | Stop reason: HOSPADM

## 2023-11-26 RX ADMIN — ISOSORBIDE MONONITRATE 30 MG: 30 TABLET, EXTENDED RELEASE ORAL at 08:04

## 2023-11-26 RX ADMIN — IPRATROPIUM BROMIDE AND ALBUTEROL SULFATE 1 DOSE: 2.5; .5 SOLUTION RESPIRATORY (INHALATION) at 20:30

## 2023-11-26 RX ADMIN — IPRATROPIUM BROMIDE AND ALBUTEROL SULFATE 1 DOSE: 2.5; .5 SOLUTION RESPIRATORY (INHALATION) at 07:00

## 2023-11-26 RX ADMIN — FUROSEMIDE 40 MG: 10 INJECTION, SOLUTION INTRAMUSCULAR; INTRAVENOUS at 08:03

## 2023-11-26 RX ADMIN — IPRATROPIUM BROMIDE AND ALBUTEROL SULFATE 1 DOSE: 2.5; .5 SOLUTION RESPIRATORY (INHALATION) at 12:38

## 2023-11-26 RX ADMIN — ARFORMOTEROL TARTRATE 15 MCG: 15 SOLUTION RESPIRATORY (INHALATION) at 20:30

## 2023-11-26 RX ADMIN — BUDESONIDE INHALATION 500 MCG: 0.5 SUSPENSION RESPIRATORY (INHALATION) at 20:30

## 2023-11-26 RX ADMIN — ACETAMINOPHEN 650 MG: 325 TABLET ORAL at 20:12

## 2023-11-26 RX ADMIN — FUROSEMIDE 40 MG: 10 INJECTION, SOLUTION INTRAMUSCULAR; INTRAVENOUS at 18:01

## 2023-11-26 RX ADMIN — FAMOTIDINE 20 MG: 10 INJECTION, SOLUTION INTRAVENOUS at 08:03

## 2023-11-26 RX ADMIN — SODIUM CHLORIDE, PRESERVATIVE FREE 10 ML: 5 INJECTION INTRAVENOUS at 08:13

## 2023-11-26 RX ADMIN — DEXAMETHASONE SODIUM PHOSPHATE 6 MG: 10 INJECTION INTRAMUSCULAR; INTRAVENOUS at 08:03

## 2023-11-26 RX ADMIN — PREGABALIN 75 MG: 75 CAPSULE ORAL at 20:30

## 2023-11-26 RX ADMIN — MONTELUKAST SODIUM 10 MG: 10 TABLET ORAL at 20:12

## 2023-11-26 RX ADMIN — PREGABALIN 75 MG: 75 CAPSULE ORAL at 08:50

## 2023-11-26 RX ADMIN — METOPROLOL TARTRATE 5 MG: 1 INJECTION, SOLUTION INTRAVENOUS at 06:19

## 2023-11-26 RX ADMIN — TAMSULOSIN HYDROCHLORIDE 0.4 MG: 0.4 CAPSULE ORAL at 08:04

## 2023-11-26 RX ADMIN — ARFORMOTEROL TARTRATE 15 MCG: 15 SOLUTION RESPIRATORY (INHALATION) at 07:00

## 2023-11-26 RX ADMIN — BUDESONIDE INHALATION 500 MCG: 0.5 SUSPENSION RESPIRATORY (INHALATION) at 07:00

## 2023-11-26 ASSESSMENT — PAIN SCALES - GENERAL
PAINLEVEL_OUTOF10: 0
PAINLEVEL_OUTOF10: 4
PAINLEVEL_OUTOF10: 0

## 2023-11-26 ASSESSMENT — PAIN DESCRIPTION - LOCATION: LOCATION: GENERALIZED;BACK

## 2023-11-27 ENCOUNTER — APPOINTMENT (OUTPATIENT)
Facility: HOSPITAL | Age: 58
DRG: 177 | End: 2023-11-27
Payer: COMMERCIAL

## 2023-11-27 LAB
ANION GAP SERPL CALC-SCNC: 6 MMOL/L (ref 5–15)
BASOPHILS # BLD: 0 K/UL (ref 0–0.1)
BASOPHILS NFR BLD: 0 % (ref 0–1)
BUN SERPL-MCNC: 72 MG/DL (ref 6–20)
BUN/CREAT SERPL: 34 (ref 12–20)
CALCIUM SERPL-MCNC: 9 MG/DL (ref 8.5–10.1)
CHLORIDE SERPL-SCNC: 98 MMOL/L (ref 97–108)
CO2 SERPL-SCNC: 40 MMOL/L (ref 21–32)
CREAT SERPL-MCNC: 2.09 MG/DL (ref 0.7–1.3)
DIFFERENTIAL METHOD BLD: ABNORMAL
EOSINOPHIL # BLD: 0 K/UL (ref 0–0.4)
EOSINOPHIL NFR BLD: 0 % (ref 0–7)
ERYTHROCYTE [DISTWIDTH] IN BLOOD BY AUTOMATED COUNT: 16.1 % (ref 11.5–14.5)
GLUCOSE BLD STRIP.AUTO-MCNC: 171 MG/DL (ref 65–117)
GLUCOSE BLD STRIP.AUTO-MCNC: 205 MG/DL (ref 65–117)
GLUCOSE BLD STRIP.AUTO-MCNC: 270 MG/DL (ref 65–117)
GLUCOSE BLD STRIP.AUTO-MCNC: 291 MG/DL (ref 65–117)
GLUCOSE SERPL-MCNC: 188 MG/DL (ref 65–100)
HCT VFR BLD AUTO: 47.6 % (ref 36.6–50.3)
HGB BLD-MCNC: 14 G/DL (ref 12.1–17)
IMM GRANULOCYTES # BLD AUTO: 0.1 K/UL (ref 0–0.04)
IMM GRANULOCYTES NFR BLD AUTO: 1 % (ref 0–0.5)
LYMPHOCYTES # BLD: 0.2 K/UL (ref 0.8–3.5)
LYMPHOCYTES NFR BLD: 4 % (ref 12–49)
MAGNESIUM SERPL-MCNC: 2.4 MG/DL (ref 1.6–2.4)
MCH RBC QN AUTO: 25.7 PG (ref 26–34)
MCHC RBC AUTO-ENTMCNC: 29.4 G/DL (ref 30–36.5)
MCV RBC AUTO: 87.3 FL (ref 80–99)
MONOCYTES # BLD: 0.5 K/UL (ref 0–1)
MONOCYTES NFR BLD: 8 % (ref 5–13)
NEUTS SEG # BLD: 5.2 K/UL (ref 1.8–8)
NEUTS SEG NFR BLD: 87 % (ref 32–75)
NRBC # BLD: 0 K/UL (ref 0–0.01)
NRBC BLD-RTO: 0 PER 100 WBC
PHOSPHATE SERPL-MCNC: 3.2 MG/DL (ref 2.6–4.7)
PLATELET # BLD AUTO: 195 K/UL (ref 150–400)
PMV BLD AUTO: 10.6 FL (ref 8.9–12.9)
POTASSIUM SERPL-SCNC: 3.8 MMOL/L (ref 3.5–5.1)
RBC # BLD AUTO: 5.45 M/UL (ref 4.1–5.7)
RBC MORPH BLD: ABNORMAL
SERVICE CMNT-IMP: ABNORMAL
SODIUM SERPL-SCNC: 144 MMOL/L (ref 136–145)
WBC # BLD AUTO: 6 K/UL (ref 4.1–11.1)

## 2023-11-27 PROCEDURE — 6370000000 HC RX 637 (ALT 250 FOR IP): Performed by: NURSE PRACTITIONER

## 2023-11-27 PROCEDURE — 2500000003 HC RX 250 WO HCPCS: Performed by: INTERNAL MEDICINE

## 2023-11-27 PROCEDURE — 94660 CPAP INITIATION&MGMT: CPT

## 2023-11-27 PROCEDURE — 85025 COMPLETE CBC W/AUTO DIFF WBC: CPT

## 2023-11-27 PROCEDURE — A4216 STERILE WATER/SALINE, 10 ML: HCPCS | Performed by: NURSE PRACTITIONER

## 2023-11-27 PROCEDURE — 6370000000 HC RX 637 (ALT 250 FOR IP): Performed by: ANESTHESIOLOGY

## 2023-11-27 PROCEDURE — 2700000000 HC OXYGEN THERAPY PER DAY

## 2023-11-27 PROCEDURE — 6360000002 HC RX W HCPCS: Performed by: INTERNAL MEDICINE

## 2023-11-27 PROCEDURE — 82962 GLUCOSE BLOOD TEST: CPT

## 2023-11-27 PROCEDURE — 6360000002 HC RX W HCPCS: Performed by: NURSE PRACTITIONER

## 2023-11-27 PROCEDURE — 71045 X-RAY EXAM CHEST 1 VIEW: CPT

## 2023-11-27 PROCEDURE — 94640 AIRWAY INHALATION TREATMENT: CPT

## 2023-11-27 PROCEDURE — 80048 BASIC METABOLIC PNL TOTAL CA: CPT

## 2023-11-27 PROCEDURE — 2580000003 HC RX 258: Performed by: NURSE PRACTITIONER

## 2023-11-27 PROCEDURE — 36415 COLL VENOUS BLD VENIPUNCTURE: CPT

## 2023-11-27 PROCEDURE — 6360000002 HC RX W HCPCS: Performed by: ANESTHESIOLOGY

## 2023-11-27 PROCEDURE — 83735 ASSAY OF MAGNESIUM: CPT

## 2023-11-27 PROCEDURE — 2000000000 HC ICU R&B

## 2023-11-27 PROCEDURE — 2500000003 HC RX 250 WO HCPCS: Performed by: NURSE PRACTITIONER

## 2023-11-27 PROCEDURE — 84100 ASSAY OF PHOSPHORUS: CPT

## 2023-11-27 RX ORDER — DEXMEDETOMIDINE HYDROCHLORIDE 4 UG/ML
.1-1.5 INJECTION, SOLUTION INTRAVENOUS CONTINUOUS
Status: DISCONTINUED | OUTPATIENT
Start: 2023-11-27 | End: 2023-11-29

## 2023-11-27 RX ADMIN — BUDESONIDE INHALATION 500 MCG: 0.5 SUSPENSION RESPIRATORY (INHALATION) at 08:33

## 2023-11-27 RX ADMIN — FAMOTIDINE 20 MG: 10 INJECTION, SOLUTION INTRAVENOUS at 08:29

## 2023-11-27 RX ADMIN — IPRATROPIUM BROMIDE AND ALBUTEROL SULFATE 1 DOSE: 2.5; .5 SOLUTION RESPIRATORY (INHALATION) at 08:33

## 2023-11-27 RX ADMIN — AMIODARONE HYDROCHLORIDE 100 MG: 200 TABLET ORAL at 12:25

## 2023-11-27 RX ADMIN — INSULIN LISPRO 2 UNITS: 100 INJECTION, SOLUTION INTRAVENOUS; SUBCUTANEOUS at 12:40

## 2023-11-27 RX ADMIN — ISOSORBIDE MONONITRATE 30 MG: 30 TABLET, EXTENDED RELEASE ORAL at 08:35

## 2023-11-27 RX ADMIN — FUROSEMIDE 40 MG: 10 INJECTION, SOLUTION INTRAMUSCULAR; INTRAVENOUS at 08:29

## 2023-11-27 RX ADMIN — DEXMEDETOMIDINE HYDROCHLORIDE 0.2 MCG/KG/HR: 400 INJECTION, SOLUTION INTRAVENOUS at 22:28

## 2023-11-27 RX ADMIN — HYDRALAZINE HYDROCHLORIDE 10 MG: 20 INJECTION INTRAMUSCULAR; INTRAVENOUS at 17:05

## 2023-11-27 RX ADMIN — ACETAMINOPHEN 650 MG: 325 TABLET ORAL at 11:16

## 2023-11-27 RX ADMIN — PREGABALIN 75 MG: 75 CAPSULE ORAL at 21:30

## 2023-11-27 RX ADMIN — MONTELUKAST SODIUM 10 MG: 10 TABLET ORAL at 21:31

## 2023-11-27 RX ADMIN — DEXMEDETOMIDINE HYDROCHLORIDE 0.2 MCG/KG/HR: 400 INJECTION, SOLUTION INTRAVENOUS at 12:22

## 2023-11-27 RX ADMIN — TAMSULOSIN HYDROCHLORIDE 0.4 MG: 0.4 CAPSULE ORAL at 08:35

## 2023-11-27 RX ADMIN — INSULIN LISPRO 4 UNITS: 100 INJECTION, SOLUTION INTRAVENOUS; SUBCUTANEOUS at 17:54

## 2023-11-27 RX ADMIN — MICONAZOLE NITRATE: 2 POWDER TOPICAL at 21:32

## 2023-11-27 RX ADMIN — ARFORMOTEROL TARTRATE 15 MCG: 15 SOLUTION RESPIRATORY (INHALATION) at 20:06

## 2023-11-27 RX ADMIN — IPRATROPIUM BROMIDE AND ALBUTEROL SULFATE 1 DOSE: 2.5; .5 SOLUTION RESPIRATORY (INHALATION) at 11:29

## 2023-11-27 RX ADMIN — DEXAMETHASONE SODIUM PHOSPHATE 6 MG: 10 INJECTION INTRAMUSCULAR; INTRAVENOUS at 08:29

## 2023-11-27 RX ADMIN — ARFORMOTEROL TARTRATE 15 MCG: 15 SOLUTION RESPIRATORY (INHALATION) at 08:33

## 2023-11-27 RX ADMIN — PREGABALIN 75 MG: 75 CAPSULE ORAL at 08:35

## 2023-11-27 RX ADMIN — APIXABAN 5 MG: 5 TABLET, FILM COATED ORAL at 21:30

## 2023-11-27 RX ADMIN — METHYLPREDNISOLONE SODIUM SUCCINATE 40 MG: 40 INJECTION, POWDER, FOR SOLUTION INTRAMUSCULAR; INTRAVENOUS at 12:25

## 2023-11-27 RX ADMIN — MICONAZOLE NITRATE: 2 POWDER TOPICAL at 15:17

## 2023-11-27 RX ADMIN — SODIUM CHLORIDE, PRESERVATIVE FREE 10 ML: 5 INJECTION INTRAVENOUS at 10:22

## 2023-11-27 RX ADMIN — BUDESONIDE INHALATION 500 MCG: 0.5 SUSPENSION RESPIRATORY (INHALATION) at 20:06

## 2023-11-27 ASSESSMENT — PAIN DESCRIPTION - LOCATION: LOCATION: BACK

## 2023-11-27 ASSESSMENT — PAIN DESCRIPTION - ORIENTATION: ORIENTATION: MID

## 2023-11-27 ASSESSMENT — PAIN DESCRIPTION - DESCRIPTORS: DESCRIPTORS: ACHING

## 2023-11-27 ASSESSMENT — PAIN SCALES - GENERAL
PAINLEVEL_OUTOF10: 1
PAINLEVEL_OUTOF10: 2
PAINLEVEL_OUTOF10: 5

## 2023-11-27 NOTE — CARE COORDINATION
Care Management Initial Assessment       RUR: 15% Moderate   Readmission? No     11/27/23 0857   Service Assessment   Patient Orientation Alert and Oriented;Person;Place;Situation;Self   Cognition Alert   History Provided By Patient   Primary Caregiver Other (Comment)  (From [facility] [phone])   [facility] Family Members  (Brother Khurram Bruce)   Patient's Healthcare Decision Maker is: Legal Next of Kin  (Brother Khurram Bruce)   PCP Verified by CM Yes  Jordon SANABRIA)   Last Visit to PCP Within last 3 months   Prior Functional Level Assistance with the following:;Bathing;Dressing; Toileting;Mobility   Current Functional Level Assistance with the following:;Bathing;Dressing; Toileting;Mobility   Can patient return to prior living arrangement Unknown at present   Ability to make needs known: Fair   Family able to assist with home care needs: No   Would you like for me to discuss the discharge plan with any other family members/significant others, and if so, who? No   Financial Resources None   [facility]  ([facility])   Social/Functional History   Lives With Other (comment)  (jail)   Type of Home Assisted living   [address]; Wheelchair-manual   ADL Assistance Needs assistance   Bath Moderate assistance   Dressing Moderate assistance   Grooming Moderate assistance   Feeding Modified independent    Toileting Independent   Ambulation Assistance Independent   Transfer Assistance Independent   Active  No   Discharge Planning   Current Services Prior To Admission Oxygen Therapy   Potential Assistance Purchasing Medications No     Patient is Covid Positive. Care manager met with patient to introduce self and explain role. Patient resides at the [address] [place] [address]. He uses a rollator and has a WC, elevated toilet seat , he wears Oxygen at 2 L. He is able to walk to the dining room for meals.  Per patient he

## 2023-11-27 NOTE — WOUND CARE
WOCN Note:     New consult for MASD  Seen in 7114    Chart shows:  Admitted on 11/24/23. Admitted for Covid-19+, respiratory failure, pneumonia. History of DM. Assessment:   Appropriately conversational and reports generalized pain with turning independently onto left side. External urinary device to suction. Surface: JABARI mattress  Heels offloaded    Bilateral heels intact with blanching erythema. Heels offloaded with pillows. Hemosiderin staining noted to lower legs with dry skin. Sacral foam in use; bilateral buttocks with MASD, chaffing, and saw-tooth texture. Extensive MASD in groin, perineum, and under pannus. Red rash at margins consistent with candidiasis. Recommendations:    Antifungal powder twice daily    PI Prevention:  Turn/reposition approximately every 2 hours  Offload heels with heels hanging off end of pillow at all times while in bed. Low Air Loss mattress: Use only flat sheet and one incontinence pad. Discussed with RN, Khang Tineo.     Transition of Care: Plan to follow weekly and as needed while admitted to hospital.     KRISTIAN GreenwoodN, RN, [facility]  Certified Wound, Ostomy, Continence Nurse  office [phone]  Available via [facility] [place] [facility]

## 2023-11-28 LAB
ANION GAP SERPL CALC-SCNC: 3 MMOL/L (ref 5–15)
BASOPHILS # BLD: 0 K/UL (ref 0–0.1)
BASOPHILS NFR BLD: 0 % (ref 0–1)
BUN SERPL-MCNC: 62 MG/DL (ref 6–20)
BUN/CREAT SERPL: 38 (ref 12–20)
CALCIUM SERPL-MCNC: 9.3 MG/DL (ref 8.5–10.1)
CHLORIDE SERPL-SCNC: 98 MMOL/L (ref 97–108)
CO2 SERPL-SCNC: 39 MMOL/L (ref 21–32)
CREAT SERPL-MCNC: 1.63 MG/DL (ref 0.7–1.3)
DIFFERENTIAL METHOD BLD: ABNORMAL
EOSINOPHIL # BLD: 0 K/UL (ref 0–0.4)
EOSINOPHIL NFR BLD: 0 % (ref 0–7)
ERYTHROCYTE [DISTWIDTH] IN BLOOD BY AUTOMATED COUNT: 16.2 % (ref 11.5–14.5)
GLUCOSE BLD STRIP.AUTO-MCNC: 197 MG/DL (ref 65–117)
GLUCOSE BLD STRIP.AUTO-MCNC: 257 MG/DL (ref 65–117)
GLUCOSE BLD STRIP.AUTO-MCNC: 270 MG/DL (ref 65–117)
GLUCOSE BLD STRIP.AUTO-MCNC: 301 MG/DL (ref 65–117)
GLUCOSE SERPL-MCNC: 269 MG/DL (ref 65–100)
HCT VFR BLD AUTO: 49.6 % (ref 36.6–50.3)
HGB BLD-MCNC: 15.1 G/DL (ref 12.1–17)
IMM GRANULOCYTES # BLD AUTO: 0 K/UL (ref 0–0.04)
IMM GRANULOCYTES NFR BLD AUTO: 1 % (ref 0–0.5)
LYMPHOCYTES # BLD: 0.2 K/UL (ref 0.8–3.5)
LYMPHOCYTES NFR BLD: 4 % (ref 12–49)
MAGNESIUM SERPL-MCNC: 2.4 MG/DL (ref 1.6–2.4)
MCH RBC QN AUTO: 25.7 PG (ref 26–34)
MCHC RBC AUTO-ENTMCNC: 30.4 G/DL (ref 30–36.5)
MCV RBC AUTO: 84.4 FL (ref 80–99)
MONOCYTES # BLD: 0.2 K/UL (ref 0–1)
MONOCYTES NFR BLD: 5 % (ref 5–13)
NEUTS SEG # BLD: 4 K/UL (ref 1.8–8)
NEUTS SEG NFR BLD: 90 % (ref 32–75)
NRBC # BLD: 0 K/UL (ref 0–0.01)
NRBC BLD-RTO: 0 PER 100 WBC
PHOSPHATE SERPL-MCNC: 2.8 MG/DL (ref 2.6–4.7)
PLATELET # BLD AUTO: 181 K/UL (ref 150–400)
PMV BLD AUTO: 10.1 FL (ref 8.9–12.9)
POTASSIUM SERPL-SCNC: 4 MMOL/L (ref 3.5–5.1)
RBC # BLD AUTO: 5.88 M/UL (ref 4.1–5.7)
RBC MORPH BLD: ABNORMAL
SERVICE CMNT-IMP: ABNORMAL
SODIUM SERPL-SCNC: 140 MMOL/L (ref 136–145)
WBC # BLD AUTO: 4.4 K/UL (ref 4.1–11.1)

## 2023-11-28 PROCEDURE — 2580000003 HC RX 258: Performed by: INTERNAL MEDICINE

## 2023-11-28 PROCEDURE — 6370000000 HC RX 637 (ALT 250 FOR IP): Performed by: INTERNAL MEDICINE

## 2023-11-28 PROCEDURE — 36415 COLL VENOUS BLD VENIPUNCTURE: CPT

## 2023-11-28 PROCEDURE — 2500000003 HC RX 250 WO HCPCS: Performed by: NURSE PRACTITIONER

## 2023-11-28 PROCEDURE — 84100 ASSAY OF PHOSPHORUS: CPT

## 2023-11-28 PROCEDURE — 2500000003 HC RX 250 WO HCPCS: Performed by: INTERNAL MEDICINE

## 2023-11-28 PROCEDURE — A4216 STERILE WATER/SALINE, 10 ML: HCPCS | Performed by: NURSE PRACTITIONER

## 2023-11-28 PROCEDURE — 6360000002 HC RX W HCPCS: Performed by: NURSE PRACTITIONER

## 2023-11-28 PROCEDURE — 2580000003 HC RX 258: Performed by: NURSE PRACTITIONER

## 2023-11-28 PROCEDURE — 94640 AIRWAY INHALATION TREATMENT: CPT

## 2023-11-28 PROCEDURE — 80048 BASIC METABOLIC PNL TOTAL CA: CPT

## 2023-11-28 PROCEDURE — 6370000000 HC RX 637 (ALT 250 FOR IP): Performed by: NURSE PRACTITIONER

## 2023-11-28 PROCEDURE — 82962 GLUCOSE BLOOD TEST: CPT

## 2023-11-28 PROCEDURE — 85025 COMPLETE CBC W/AUTO DIFF WBC: CPT

## 2023-11-28 PROCEDURE — 83735 ASSAY OF MAGNESIUM: CPT

## 2023-11-28 PROCEDURE — 2700000000 HC OXYGEN THERAPY PER DAY

## 2023-11-28 PROCEDURE — 6360000002 HC RX W HCPCS: Performed by: INTERNAL MEDICINE

## 2023-11-28 PROCEDURE — 2000000000 HC ICU R&B

## 2023-11-28 RX ORDER — DEXTROSE MONOHYDRATE 100 MG/ML
INJECTION, SOLUTION INTRAVENOUS CONTINUOUS PRN
Status: DISCONTINUED | OUTPATIENT
Start: 2023-11-28 | End: 2023-12-06 | Stop reason: HOSPADM

## 2023-11-28 RX ADMIN — INSULIN LISPRO 4 UNITS: 100 INJECTION, SOLUTION INTRAVENOUS; SUBCUTANEOUS at 12:37

## 2023-11-28 RX ADMIN — HYDRALAZINE HYDROCHLORIDE 10 MG: 20 INJECTION INTRAMUSCULAR; INTRAVENOUS at 02:04

## 2023-11-28 RX ADMIN — MICONAZOLE NITRATE: 2 POWDER TOPICAL at 20:42

## 2023-11-28 RX ADMIN — AMIODARONE HYDROCHLORIDE 100 MG: 200 TABLET ORAL at 08:35

## 2023-11-28 RX ADMIN — METHYLPREDNISOLONE SODIUM SUCCINATE 40 MG: 40 INJECTION, POWDER, FOR SOLUTION INTRAMUSCULAR; INTRAVENOUS at 12:24

## 2023-11-28 RX ADMIN — BUDESONIDE INHALATION 500 MCG: 0.5 SUSPENSION RESPIRATORY (INHALATION) at 20:45

## 2023-11-28 RX ADMIN — APIXABAN 5 MG: 5 TABLET, FILM COATED ORAL at 20:40

## 2023-11-28 RX ADMIN — FAMOTIDINE 20 MG: 10 INJECTION, SOLUTION INTRAVENOUS at 08:35

## 2023-11-28 RX ADMIN — Medication 5 UNITS: at 20:39

## 2023-11-28 RX ADMIN — ACETAMINOPHEN 650 MG: 325 TABLET ORAL at 20:40

## 2023-11-28 RX ADMIN — BUDESONIDE INHALATION 500 MCG: 0.5 SUSPENSION RESPIRATORY (INHALATION) at 07:58

## 2023-11-28 RX ADMIN — PREGABALIN 75 MG: 75 CAPSULE ORAL at 08:35

## 2023-11-28 RX ADMIN — ARFORMOTEROL TARTRATE 15 MCG: 15 SOLUTION RESPIRATORY (INHALATION) at 07:58

## 2023-11-28 RX ADMIN — PREGABALIN 75 MG: 75 CAPSULE ORAL at 20:40

## 2023-11-28 RX ADMIN — METHYLPREDNISOLONE SODIUM SUCCINATE 40 MG: 40 INJECTION, POWDER, FOR SOLUTION INTRAMUSCULAR; INTRAVENOUS at 00:28

## 2023-11-28 RX ADMIN — Medication 5 UNITS: at 12:36

## 2023-11-28 RX ADMIN — SODIUM CHLORIDE, PRESERVATIVE FREE 10 ML: 5 INJECTION INTRAVENOUS at 08:42

## 2023-11-28 RX ADMIN — INSULIN LISPRO 4 UNITS: 100 INJECTION, SOLUTION INTRAVENOUS; SUBCUTANEOUS at 20:39

## 2023-11-28 RX ADMIN — MICONAZOLE NITRATE: 2 POWDER TOPICAL at 08:42

## 2023-11-28 RX ADMIN — MONTELUKAST SODIUM 10 MG: 10 TABLET ORAL at 20:40

## 2023-11-28 RX ADMIN — ISOSORBIDE MONONITRATE 30 MG: 30 TABLET, EXTENDED RELEASE ORAL at 08:35

## 2023-11-28 RX ADMIN — TAMSULOSIN HYDROCHLORIDE 0.4 MG: 0.4 CAPSULE ORAL at 08:35

## 2023-11-28 RX ADMIN — SODIUM CHLORIDE, PRESERVATIVE FREE 20 MG: 5 INJECTION INTRAVENOUS at 20:41

## 2023-11-28 RX ADMIN — ARFORMOTEROL TARTRATE 15 MCG: 15 SOLUTION RESPIRATORY (INHALATION) at 20:45

## 2023-11-28 RX ADMIN — APIXABAN 5 MG: 5 TABLET, FILM COATED ORAL at 08:35

## 2023-11-28 RX ADMIN — INSULIN LISPRO 4 UNITS: 100 INJECTION, SOLUTION INTRAVENOUS; SUBCUTANEOUS at 18:03

## 2023-11-28 ASSESSMENT — PAIN DESCRIPTION - LOCATION: LOCATION: BACK

## 2023-11-28 NOTE — CARE COORDINATION
Transition of Care Plan:    RUR: 15% Moderate   Prior Level of Functioning: Needed assistance with ADL's  Disposition: TBD  Follow up appointments: PCP   DME needed: TBD  Transportation at discharge: Stretcher   IM/IMM Medicare/[place] letter given: NA  Is patient a  and connected with VA? No   Caregiver Contact: Husam Hunter [phone]  Discharge Caregiver contacted prior to discharge? No  Care Conference needed? No  Barriers to discharge:    Medical Stability  HFNC   Per notes patient is high risk for intubation. CM received call from liaison at the [address] [place] [address] and patient will not be able to return there as he needs a higher level of care. CM spoke with Marina Muro with [facility] and she will talk to the team in the AM and get back with this CM. CM left a [employer] compliant message for patient's brother Husam Hunter [phone].    Avinash Norman RN,Care Management

## 2023-11-29 LAB
ANION GAP SERPL CALC-SCNC: 5 MMOL/L (ref 5–15)
BACTERIA SPEC CULT: NORMAL
BACTERIA SPEC CULT: NORMAL
BASOPHILS # BLD: 0 K/UL (ref 0–0.1)
BASOPHILS NFR BLD: 0 % (ref 0–1)
BUN SERPL-MCNC: 64 MG/DL (ref 6–20)
BUN/CREAT SERPL: 41 (ref 12–20)
CALCIUM SERPL-MCNC: 9 MG/DL (ref 8.5–10.1)
CHLORIDE SERPL-SCNC: 97 MMOL/L (ref 97–108)
CO2 SERPL-SCNC: 38 MMOL/L (ref 21–32)
CREAT SERPL-MCNC: 1.57 MG/DL (ref 0.7–1.3)
DIFFERENTIAL METHOD BLD: ABNORMAL
EOSINOPHIL # BLD: 0 K/UL (ref 0–0.4)
EOSINOPHIL NFR BLD: 0 % (ref 0–7)
ERYTHROCYTE [DISTWIDTH] IN BLOOD BY AUTOMATED COUNT: 16.1 % (ref 11.5–14.5)
GLUCOSE BLD STRIP.AUTO-MCNC: 276 MG/DL (ref 65–117)
GLUCOSE BLD STRIP.AUTO-MCNC: 322 MG/DL (ref 65–117)
GLUCOSE BLD STRIP.AUTO-MCNC: 382 MG/DL (ref 65–117)
GLUCOSE SERPL-MCNC: 312 MG/DL (ref 65–100)
HCT VFR BLD AUTO: 48 % (ref 36.6–50.3)
HGB BLD-MCNC: 14.4 G/DL (ref 12.1–17)
IMM GRANULOCYTES # BLD AUTO: 0.1 K/UL (ref 0–0.04)
IMM GRANULOCYTES NFR BLD AUTO: 1 % (ref 0–0.5)
LYMPHOCYTES # BLD: 0.1 K/UL (ref 0.8–3.5)
LYMPHOCYTES NFR BLD: 2 % (ref 12–49)
MAGNESIUM SERPL-MCNC: 2.5 MG/DL (ref 1.6–2.4)
MCH RBC QN AUTO: 25.7 PG (ref 26–34)
MCHC RBC AUTO-ENTMCNC: 30 G/DL (ref 30–36.5)
MCV RBC AUTO: 85.6 FL (ref 80–99)
MONOCYTES # BLD: 0.2 K/UL (ref 0–1)
MONOCYTES NFR BLD: 4 % (ref 5–13)
NEUTS SEG # BLD: 5.2 K/UL (ref 1.8–8)
NEUTS SEG NFR BLD: 93 % (ref 32–75)
NRBC # BLD: 0 K/UL (ref 0–0.01)
NRBC BLD-RTO: 0 PER 100 WBC
PHOSPHATE SERPL-MCNC: 3.6 MG/DL (ref 2.6–4.7)
PLATELET # BLD AUTO: 174 K/UL (ref 150–400)
PMV BLD AUTO: 11 FL (ref 8.9–12.9)
POTASSIUM SERPL-SCNC: 3.6 MMOL/L (ref 3.5–5.1)
RBC # BLD AUTO: 5.61 M/UL (ref 4.1–5.7)
RBC MORPH BLD: ABNORMAL
SERVICE CMNT-IMP: ABNORMAL
SERVICE CMNT-IMP: NORMAL
SERVICE CMNT-IMP: NORMAL
SODIUM SERPL-SCNC: 140 MMOL/L (ref 136–145)
WBC # BLD AUTO: 5.6 K/UL (ref 4.1–11.1)

## 2023-11-29 PROCEDURE — 83735 ASSAY OF MAGNESIUM: CPT

## 2023-11-29 PROCEDURE — 2580000003 HC RX 258: Performed by: INTERNAL MEDICINE

## 2023-11-29 PROCEDURE — 82962 GLUCOSE BLOOD TEST: CPT

## 2023-11-29 PROCEDURE — 97162 PT EVAL MOD COMPLEX 30 MIN: CPT

## 2023-11-29 PROCEDURE — 97165 OT EVAL LOW COMPLEX 30 MIN: CPT

## 2023-11-29 PROCEDURE — 80048 BASIC METABOLIC PNL TOTAL CA: CPT

## 2023-11-29 PROCEDURE — 2060000000 HC ICU INTERMEDIATE R&B

## 2023-11-29 PROCEDURE — 94660 CPAP INITIATION&MGMT: CPT

## 2023-11-29 PROCEDURE — 6360000002 HC RX W HCPCS: Performed by: INTERNAL MEDICINE

## 2023-11-29 PROCEDURE — 6360000002 HC RX W HCPCS: Performed by: NURSE PRACTITIONER

## 2023-11-29 PROCEDURE — 6370000000 HC RX 637 (ALT 250 FOR IP): Performed by: INTERNAL MEDICINE

## 2023-11-29 PROCEDURE — 6370000000 HC RX 637 (ALT 250 FOR IP): Performed by: NURSE PRACTITIONER

## 2023-11-29 PROCEDURE — 2500000003 HC RX 250 WO HCPCS: Performed by: INTERNAL MEDICINE

## 2023-11-29 PROCEDURE — 97110 THERAPEUTIC EXERCISES: CPT

## 2023-11-29 PROCEDURE — 84100 ASSAY OF PHOSPHORUS: CPT

## 2023-11-29 PROCEDURE — 94640 AIRWAY INHALATION TREATMENT: CPT

## 2023-11-29 PROCEDURE — 85025 COMPLETE CBC W/AUTO DIFF WBC: CPT

## 2023-11-29 PROCEDURE — 2580000003 HC RX 258: Performed by: NURSE PRACTITIONER

## 2023-11-29 PROCEDURE — 36415 COLL VENOUS BLD VENIPUNCTURE: CPT

## 2023-11-29 PROCEDURE — 97530 THERAPEUTIC ACTIVITIES: CPT

## 2023-11-29 PROCEDURE — 2700000000 HC OXYGEN THERAPY PER DAY

## 2023-11-29 PROCEDURE — A4216 STERILE WATER/SALINE, 10 ML: HCPCS | Performed by: INTERNAL MEDICINE

## 2023-11-29 RX ORDER — PREDNISONE 20 MG/1
20 TABLET ORAL DAILY
Status: DISCONTINUED | OUTPATIENT
Start: 2023-11-30 | End: 2023-11-30 | Stop reason: ALTCHOICE

## 2023-11-29 RX ORDER — PREDNISONE 10 MG/1
10 TABLET ORAL DAILY
Status: DISCONTINUED | OUTPATIENT
Start: 2023-12-05 | End: 2023-11-30 | Stop reason: ALTCHOICE

## 2023-11-29 RX ADMIN — APIXABAN 5 MG: 5 TABLET, FILM COATED ORAL at 09:19

## 2023-11-29 RX ADMIN — INSULIN LISPRO 4 UNITS: 100 INJECTION, SOLUTION INTRAVENOUS; SUBCUTANEOUS at 22:55

## 2023-11-29 RX ADMIN — SODIUM CHLORIDE, PRESERVATIVE FREE 20 MG: 5 INJECTION INTRAVENOUS at 09:18

## 2023-11-29 RX ADMIN — SODIUM CHLORIDE, PRESERVATIVE FREE 10 ML: 5 INJECTION INTRAVENOUS at 22:55

## 2023-11-29 RX ADMIN — Medication 5 UNITS: at 22:54

## 2023-11-29 RX ADMIN — METHYLPREDNISOLONE SODIUM SUCCINATE 40 MG: 40 INJECTION, POWDER, FOR SOLUTION INTRAMUSCULAR; INTRAVENOUS at 00:27

## 2023-11-29 RX ADMIN — MONTELUKAST SODIUM 10 MG: 10 TABLET ORAL at 22:54

## 2023-11-29 RX ADMIN — Medication 5 UNITS: at 09:19

## 2023-11-29 RX ADMIN — PREGABALIN 75 MG: 75 CAPSULE ORAL at 09:19

## 2023-11-29 RX ADMIN — AMIODARONE HYDROCHLORIDE 100 MG: 200 TABLET ORAL at 09:19

## 2023-11-29 RX ADMIN — INSULIN LISPRO 6 UNITS: 100 INJECTION, SOLUTION INTRAVENOUS; SUBCUTANEOUS at 09:19

## 2023-11-29 RX ADMIN — ISOSORBIDE MONONITRATE 30 MG: 30 TABLET, EXTENDED RELEASE ORAL at 09:19

## 2023-11-29 RX ADMIN — APIXABAN 5 MG: 5 TABLET, FILM COATED ORAL at 22:54

## 2023-11-29 RX ADMIN — ARFORMOTEROL TARTRATE 15 MCG: 15 SOLUTION RESPIRATORY (INHALATION) at 22:04

## 2023-11-29 RX ADMIN — MICONAZOLE NITRATE: 2 POWDER TOPICAL at 09:20

## 2023-11-29 RX ADMIN — ACETAMINOPHEN 650 MG: 325 TABLET ORAL at 09:31

## 2023-11-29 RX ADMIN — INSULIN LISPRO 8 UNITS: 100 INJECTION, SOLUTION INTRAVENOUS; SUBCUTANEOUS at 12:47

## 2023-11-29 RX ADMIN — SODIUM CHLORIDE, PRESERVATIVE FREE 10 ML: 5 INJECTION INTRAVENOUS at 09:20

## 2023-11-29 RX ADMIN — SODIUM CHLORIDE, PRESERVATIVE FREE 20 MG: 5 INJECTION INTRAVENOUS at 22:54

## 2023-11-29 RX ADMIN — BUDESONIDE INHALATION 500 MCG: 0.5 SUSPENSION RESPIRATORY (INHALATION) at 22:04

## 2023-11-29 RX ADMIN — PREGABALIN 75 MG: 75 CAPSULE ORAL at 23:01

## 2023-11-29 RX ADMIN — TAMSULOSIN HYDROCHLORIDE 0.4 MG: 0.4 CAPSULE ORAL at 09:19

## 2023-11-29 RX ADMIN — INSULIN LISPRO 4 UNITS: 100 INJECTION, SOLUTION INTRAVENOUS; SUBCUTANEOUS at 18:34

## 2023-11-29 ASSESSMENT — PAIN SCALES - GENERAL
PAINLEVEL_OUTOF10: 4
PAINLEVEL_OUTOF10: 0
PAINLEVEL_OUTOF10: 4
PAINLEVEL_OUTOF10: 0

## 2023-11-29 NOTE — CARE COORDINATION
CM answered call from pt's brother, Fermín Dueñas [phone] who was returning unit CM call. Updated that pt is expected to discharge to nursing facility from hospital and brother is in agreement with this plan. Brother Mike Manjarrez lives in [address] [place] [address] and plans to call ICU nurse to get update on pt's condition. GERSON also called Jacquelin Montanez with [facility] [phone] who confirmed that pt is in their program and pt has an active LTSS/UAI for LTC bed. Jacquelin Montanez will also have to set up transport at discharge in order for [facility] to cover. [facility] accepted pt.      Miles Carlos, [address] [place] [address]   [phone]

## 2023-11-29 NOTE — CARE COORDINATION
Transition of Care Plan:     RUR: 15% Moderate   Prior Level of Functioning: Needed assistance with ADL's  Disposition: TBD  Follow up appointments: PCP   DME needed: TBD  Transportation at discharge: Stretcher   IM/IMM Medicare/ letter given: NA  Is patient a  and connected with VA? No   Caregiver Contact: Fermín Dueñas [phone]  Discharge Caregiver contacted prior to discharge? No  Care Conference needed? No  Barriers to discharge:    Medical Stability  HFNC   Per notes patient is high risk for intubation. CM received call from liaison at the [facility] [address]  and patient will not be able to return there as he needs a higher level of care. CM met with patient to inform him that per [facility] he will not be able to return. Discussed LTC , patient has no preference. Referral made to [facility].   Isa Rhodes RN,Care Management

## 2023-11-29 NOTE — H&P
CRITICAL CARE NOTE      Name: Joshua Hughes   : 1965   MRN: [id number]   Date: 2023      REASON FOR ICU ADMISSION:  Acute hypoxemic respiratory failure     PRINCIPAL ICU DIAGNOSIS   Acute hypoxemic respiratory failure   COVID19  DM2  Neuropathy  PVD  COPD  CAD  HFpEF  AF  HTNGERD  BPH  MEGAN    BRIEF PATIENT SUMMARY   Mr Sarah Lui presented to emergency room from [facility] via EMS with dyspnea and SPO2 on R/A of 54%. He was subsequently placed on NRB as he states he struggles with HFNC an his SPO2 is now >92%. He reports two pearson of cold symptoms, cough and dyspnea. Patient will be admitted to ICU for aggressive monitoring, possible need for intubation/NIPPV and corticosteroids for COVID19 PNA. COMPREHENSIVE ASSESSMENT & PLAN:SYSTEM BASED     24 HOUR EVENTS: Seen in ER, transitioned to ICU.      NEUROLOGICAL:   Serial neurological exam  Avoid deliriogens    PULMONOLOGY:   Continuous SPO2  Serial CXR  Serial ABG  Dexamethasone  Supplemental O2  Maintain SPO2 >92%    CARDIOVASCULAR:   Continuous EKG  Maintain MAP >65    GASTROINTESTINAL   SUP  NPO status     RENAL/ELECTROLYTE/FLUIDS:   Trend renal function and electrolytes  IVF  Strict I&O  Replete per protocol  Avoid nephrotoxins  Dose medications for CrCl    ENDOCRINE:   SSI  Target euglycemia    HEMATOLOGY/ONCOLOGY:   Trend CBC  Transfuse PRN    INFECTIOUS DISEASE:   Monitor for signs of bacterial infection  COVID 19 protocol    ANTIBIOTICS TO DATE:  N/A    CULTURES TO DATE:  N/A    ICU DAILY CHECKLIST     Code Status:Full Code  DVT Prophylaxis:Lovenox  T/L/D: Tubes: None  Lines: Peripheral IV  Drains: None  SUP: Famotidine  Diet: NPO  Activity Level:Bedrest  ABCDEF Bundle/Checklist Completed:Yes  Disposition: Stay in ICU  Multidisciplinary Rounds Completed:  Yes  Goals of Care Discussion/Palliative: Yes  Patient/Family Updated: Yes      [address]   Review of Systems   Respiratory:  Positive for cough and
(*)     All other components within normal limits   CBC WITH AUTO DIFFERENTIAL - Abnormal; Notable for the following components:    MCHC 28.2 (*)     RDW 16.3 (*)     Neutrophils % 90 (*)     Lymphocytes % 3 (*)     Immature Granulocytes 2 (*)     Lymphocytes Absolute 0.2 (*)     Absolute Immature Granulocyte 0.2 (*)     All other components within normal limits   PROTIME-INR - Abnormal; Notable for the following components:    INR 1.3 (*)     Protime 12.9 (*)     All other components within normal limits   BASIC METABOLIC PANEL - Abnormal; Notable for the following components:    CO2 39 (*)     Glucose 145 (*)     BUN 61 (*)     Creatinine 2.36 (*)     Bun/Cre Ratio 26 (*)     Est, Glom Filt Rate 31 (*)     All other components within normal limits   CBC WITH AUTO DIFFERENTIAL - Abnormal; Notable for the following components:    MCH 25.6 (*)     MCHC 29.0 (*)     RDW 16.1 (*)     Neutrophils % 88 (*)     Lymphocytes % 4 (*)     Immature Granulocytes 2 (*)     Lymphocytes Absolute 0.2 (*)     Absolute Immature Granulocyte 0.1 (*)     All other components within normal limits   BASIC METABOLIC PANEL - Abnormal; Notable for the following components:    CO2 40 (*)     Glucose 188 (*)     BUN 72 (*)     Creatinine 2.09 (*)     Bun/Cre Ratio 34 (*)     Est, Glom Filt Rate 36 (*)     All other components within normal limits   CBC WITH AUTO DIFFERENTIAL - Abnormal; Notable for the following components:    MCH 25.7 (*)     MCHC 29.4 (*)     RDW 16.1 (*)     Neutrophils % 87 (*)     Lymphocytes % 4 (*)     Immature Granulocytes 1 (*)     Lymphocytes Absolute 0.2 (*)     Absolute Immature Granulocyte 0.1 (*)     All other components within normal limits   BASIC METABOLIC PANEL - Abnormal; Notable for the following components:    CO2 39 (*)     Anion Gap 3 (*)     Glucose 269 (*)     BUN 62 (*)     Creatinine 1.63 (*)     Bun/Cre Ratio 38 (*)     Est, Glom Filt Rate 49 (*)     All other components within normal limits   CBC

## 2023-11-30 LAB
GLUCOSE BLD STRIP.AUTO-MCNC: 178 MG/DL (ref 65–117)
GLUCOSE BLD STRIP.AUTO-MCNC: 263 MG/DL (ref 65–117)
GLUCOSE BLD STRIP.AUTO-MCNC: 268 MG/DL (ref 65–117)
GLUCOSE BLD STRIP.AUTO-MCNC: 364 MG/DL (ref 65–117)
SERVICE CMNT-IMP: ABNORMAL

## 2023-11-30 PROCEDURE — 6370000000 HC RX 637 (ALT 250 FOR IP): Performed by: NURSE PRACTITIONER

## 2023-11-30 PROCEDURE — 6370000000 HC RX 637 (ALT 250 FOR IP): Performed by: INTERNAL MEDICINE

## 2023-11-30 PROCEDURE — 6370000000 HC RX 637 (ALT 250 FOR IP): Performed by: STUDENT IN AN ORGANIZED HEALTH CARE EDUCATION/TRAINING PROGRAM

## 2023-11-30 PROCEDURE — 6360000002 HC RX W HCPCS: Performed by: STUDENT IN AN ORGANIZED HEALTH CARE EDUCATION/TRAINING PROGRAM

## 2023-11-30 PROCEDURE — A4216 STERILE WATER/SALINE, 10 ML: HCPCS | Performed by: INTERNAL MEDICINE

## 2023-11-30 PROCEDURE — 2700000000 HC OXYGEN THERAPY PER DAY

## 2023-11-30 PROCEDURE — 94760 N-INVAS EAR/PLS OXIMETRY 1: CPT

## 2023-11-30 PROCEDURE — 2580000003 HC RX 258: Performed by: INTERNAL MEDICINE

## 2023-11-30 PROCEDURE — 6360000002 HC RX W HCPCS: Performed by: NURSE PRACTITIONER

## 2023-11-30 PROCEDURE — 82962 GLUCOSE BLOOD TEST: CPT

## 2023-11-30 PROCEDURE — 94640 AIRWAY INHALATION TREATMENT: CPT

## 2023-11-30 PROCEDURE — 2580000003 HC RX 258: Performed by: NURSE PRACTITIONER

## 2023-11-30 PROCEDURE — 94660 CPAP INITIATION&MGMT: CPT

## 2023-11-30 PROCEDURE — 2500000003 HC RX 250 WO HCPCS: Performed by: INTERNAL MEDICINE

## 2023-11-30 PROCEDURE — 2060000000 HC ICU INTERMEDIATE R&B

## 2023-11-30 RX ORDER — DEXAMETHASONE SODIUM PHOSPHATE 10 MG/ML
6 INJECTION, SOLUTION INTRAMUSCULAR; INTRAVENOUS EVERY 24 HOURS
Status: COMPLETED | OUTPATIENT
Start: 2023-12-01 | End: 2023-12-03

## 2023-11-30 RX ORDER — FUROSEMIDE 10 MG/ML
40 INJECTION INTRAMUSCULAR; INTRAVENOUS DAILY
Status: DISCONTINUED | OUTPATIENT
Start: 2023-11-30 | End: 2023-12-05

## 2023-11-30 RX ORDER — CASTOR OIL AND BALSAM, PERU 788; 87 MG/G; MG/G
OINTMENT TOPICAL 2 TIMES DAILY
Status: DISCONTINUED | OUTPATIENT
Start: 2023-11-30 | End: 2023-12-06 | Stop reason: HOSPADM

## 2023-11-30 RX ORDER — PREDNISONE 20 MG/1
20 TABLET ORAL ONCE
Status: COMPLETED | OUTPATIENT
Start: 2023-11-30 | End: 2023-11-30

## 2023-11-30 RX ADMIN — BUDESONIDE INHALATION 500 MCG: 0.5 SUSPENSION RESPIRATORY (INHALATION) at 22:39

## 2023-11-30 RX ADMIN — APIXABAN 5 MG: 5 TABLET, FILM COATED ORAL at 21:30

## 2023-11-30 RX ADMIN — Medication 5 UNITS: at 09:54

## 2023-11-30 RX ADMIN — CASTOR OIL AND BALSAM, PERU: 788; 87 OINTMENT TOPICAL at 21:44

## 2023-11-30 RX ADMIN — ARFORMOTEROL TARTRATE 15 MCG: 15 SOLUTION RESPIRATORY (INHALATION) at 08:02

## 2023-11-30 RX ADMIN — SODIUM CHLORIDE, PRESERVATIVE FREE 20 MG: 5 INJECTION INTRAVENOUS at 09:54

## 2023-11-30 RX ADMIN — INSULIN LISPRO 4 UNITS: 100 INJECTION, SOLUTION INTRAVENOUS; SUBCUTANEOUS at 19:31

## 2023-11-30 RX ADMIN — MONTELUKAST SODIUM 10 MG: 10 TABLET ORAL at 21:45

## 2023-11-30 RX ADMIN — PREDNISONE 20 MG: 20 TABLET ORAL at 09:52

## 2023-11-30 RX ADMIN — BUDESONIDE INHALATION 500 MCG: 0.5 SUSPENSION RESPIRATORY (INHALATION) at 08:02

## 2023-11-30 RX ADMIN — ARFORMOTEROL TARTRATE 15 MCG: 15 SOLUTION RESPIRATORY (INHALATION) at 22:40

## 2023-11-30 RX ADMIN — INSULIN LISPRO 4 UNITS: 100 INJECTION, SOLUTION INTRAVENOUS; SUBCUTANEOUS at 22:01

## 2023-11-30 RX ADMIN — PREGABALIN 75 MG: 75 CAPSULE ORAL at 09:52

## 2023-11-30 RX ADMIN — FUROSEMIDE 40 MG: 10 INJECTION, SOLUTION INTRAMUSCULAR; INTRAVENOUS at 10:29

## 2023-11-30 RX ADMIN — PREGABALIN 75 MG: 75 CAPSULE ORAL at 21:30

## 2023-11-30 RX ADMIN — AMIODARONE HYDROCHLORIDE 100 MG: 200 TABLET ORAL at 09:52

## 2023-11-30 RX ADMIN — CASTOR OIL AND BALSAM, PERU: 788; 87 OINTMENT TOPICAL at 19:32

## 2023-11-30 RX ADMIN — TAMSULOSIN HYDROCHLORIDE 0.4 MG: 0.4 CAPSULE ORAL at 09:52

## 2023-11-30 RX ADMIN — APIXABAN 5 MG: 5 TABLET, FILM COATED ORAL at 09:52

## 2023-11-30 RX ADMIN — MICONAZOLE NITRATE: 2 POWDER TOPICAL at 19:32

## 2023-11-30 RX ADMIN — SODIUM CHLORIDE, PRESERVATIVE FREE 10 ML: 5 INJECTION INTRAVENOUS at 09:55

## 2023-11-30 RX ADMIN — MICONAZOLE NITRATE: 2 POWDER TOPICAL at 09:55

## 2023-11-30 RX ADMIN — INSULIN LISPRO 4 UNITS: 100 INJECTION, SOLUTION INTRAVENOUS; SUBCUTANEOUS at 12:40

## 2023-11-30 RX ADMIN — Medication 5 UNITS: at 21:00

## 2023-11-30 RX ADMIN — ISOSORBIDE MONONITRATE 30 MG: 30 TABLET, EXTENDED RELEASE ORAL at 10:29

## 2023-11-30 RX ADMIN — SODIUM CHLORIDE, PRESERVATIVE FREE 10 ML: 5 INJECTION INTRAVENOUS at 21:45

## 2023-11-30 RX ADMIN — PREDNISONE 20 MG: 20 TABLET ORAL at 12:40

## 2023-11-30 ASSESSMENT — PAIN SCALES - GENERAL: PAINLEVEL_OUTOF10: 0

## 2023-11-30 NOTE — CARE COORDINATION
Transition Of Care: [facility]. Noted, CM also called Katherine De La O with [facility] [phone] who confirmed that pt is in their program and pt has an active LTSS/UAI for LTC bed. Katherine De La O will also have to set up transport at discharge in order for [facility] to cover. Patient's brother, Amna Mcclellan [phone] agrees with plan. RUR: 15% Moderate   Prior Level of Functioning: Needed assistance with ADL's  Disposition: [facility]  Follow up appointments: PCP   DME needed: TBD  Transportation at discharge: Stretcher   /[facility] Medicare/ letter given: NA  Is patient a  and connected with VA? No   Caregiver Contact: Brother: Amna Mcclellan [phone]  Discharge Caregiver contacted prior to discharge? No  Care Conference needed?  No  Barriers to discharge:    Medical Stability    Dinora Shi RN/CRM  [phone]

## 2023-11-30 NOTE — WOUND CARE
WOCN Note:     New consult for MASD  Seen in 7114    Chart shows:  Admitted on 11/24/23. Admitted for Covid-19+, respiratory failure, pneumonia. History of DM. Assessment:   Appropriately conversational and reports generalized pain with turning independently onto left side. External urinary device to suction. Surface: JABARI mattress    Bilateral heels intact with blanching erythema. Heels offloaded with pillows. Hemosiderin staining noted to lower legs with dry skin. Lotion applied. Sacral foam in use and he reports this eases discomfort to buttocks; bilateral buttocks with MASD, chaffing, and saw-tooth texture. This is slightly improved from earlier visit this week and is limited to 2 distinct denuded areas to each central buttock. No photos taken due to Covid-19 isolation room. Resolving MASD in groin, perineum, and under pannus. Red rash at margins consistent with candidiasis & antifungal powder in use. Recommendations:    Antifungal powder twice daily  Venelex twice daily to buttocks    PI Prevention:  Turn/reposition approximately every 2 hours  Offload heels with heels hanging off end of pillow at all times while in bed. Low Air Loss mattress: Use only flat sheet and one incontinence pad.      Transition of Care: Plan to follow weekly and as needed while admitted to hospital.     KRISTIAN hKanN, RN, [facility]  Certified Wound, Ostomy, Continence Nurse  office [phone]  Available via [facility]

## 2023-11-30 NOTE — CONSULTS
Pulmonary, Critical Care, and Sleep Medicine~Consult Note    Name: Mandy Fallon MRN: [id number]   : 1965 Hospital: [facility]   Date: 2023 1:02 PM Admission: 2023     Impression Plan   Acute hypoxic respiratory failure  Decomp heart failure, diuresed well. Imaging supports  COVID-19 positive  Obesity  Sleep apnea, unknown PAP compliance  MEGAN  A-fib, on amiodarone/Eliquis Can likely transition to mid flow if not nasal cannula today, saturations above 90%  Diuresis as able  Complete steroid course  Needs to get up and mobilize  Eliquis  Needs to mobilize, PT involved  Continue CPAP nightly. Daily Progression:    Consult Note requested by hospitalist service    Patient presented on 2023 for dyspnea and hypoxemia. On admission he was found to have saturations in the 50s and was visibly struggling to breathe. He presented with 2 days of cold-like symptoms, cough and dyspnea. With substantial congestion as well. Found to be COVID-19 positive chest imaging showed bibasilar atelectasis that has subsequently improved 511/ film. It does not appear the patient was ever intubated but they did require high flow nasal cannula and noninvasive ventilation at night. He was found to be volume overloaded with pulmonary edema diuresed well and imaging showed that he did improve. No evidence of ARDS associated to COVID. He does have a history of being on amiodarone but only on 100 mg and has been on this for quite some time. He was not given any COVID-specific medications. Does have a history of sleep apnea, unknown compliance of noninvasive ventilation. He is on Eliquis for A-fib. No history of previous pulmonary issues. Did have pedal edema prior to admission but has started to improve per patient. Last echo was on 2023 which showed an EF of 60 to 65% RV not well-visualized, no structures not well-visualized as well.       I have reviewed the labs and previous

## 2023-12-01 LAB
ANION GAP SERPL CALC-SCNC: 2 MMOL/L (ref 5–15)
BASOPHILS # BLD: 0 K/UL (ref 0–0.1)
BASOPHILS NFR BLD: 0 % (ref 0–1)
BUN SERPL-MCNC: 52 MG/DL (ref 6–20)
BUN/CREAT SERPL: 46 (ref 12–20)
CALCIUM SERPL-MCNC: 8.2 MG/DL (ref 8.5–10.1)
CHLORIDE SERPL-SCNC: 102 MMOL/L (ref 97–108)
CO2 SERPL-SCNC: 36 MMOL/L (ref 21–32)
CREAT SERPL-MCNC: 1.12 MG/DL (ref 0.7–1.3)
DIFFERENTIAL METHOD BLD: ABNORMAL
EOSINOPHIL # BLD: 0 K/UL (ref 0–0.4)
EOSINOPHIL NFR BLD: 0 % (ref 0–7)
ERYTHROCYTE [DISTWIDTH] IN BLOOD BY AUTOMATED COUNT: 15.9 % (ref 11.5–14.5)
GLUCOSE BLD STRIP.AUTO-MCNC: 175 MG/DL (ref 65–117)
GLUCOSE BLD STRIP.AUTO-MCNC: 253 MG/DL (ref 65–117)
GLUCOSE BLD STRIP.AUTO-MCNC: 341 MG/DL (ref 65–117)
GLUCOSE BLD STRIP.AUTO-MCNC: 365 MG/DL (ref 65–117)
GLUCOSE SERPL-MCNC: 214 MG/DL (ref 65–100)
HCT VFR BLD AUTO: 45.6 % (ref 36.6–50.3)
HGB BLD-MCNC: 13.8 G/DL (ref 12.1–17)
IMM GRANULOCYTES # BLD AUTO: 0.1 K/UL (ref 0–0.04)
IMM GRANULOCYTES NFR BLD AUTO: 1 % (ref 0–0.5)
LYMPHOCYTES # BLD: 0.3 K/UL (ref 0.8–3.5)
LYMPHOCYTES NFR BLD: 4 % (ref 12–49)
MCH RBC QN AUTO: 25.7 PG (ref 26–34)
MCHC RBC AUTO-ENTMCNC: 30.3 G/DL (ref 30–36.5)
MCV RBC AUTO: 84.8 FL (ref 80–99)
MONOCYTES # BLD: 0.5 K/UL (ref 0–1)
MONOCYTES NFR BLD: 6 % (ref 5–13)
NEUTS SEG # BLD: 7.3 K/UL (ref 1.8–8)
NEUTS SEG NFR BLD: 89 % (ref 32–75)
NRBC # BLD: 0 K/UL (ref 0–0.01)
NRBC BLD-RTO: 0 PER 100 WBC
NT PRO BNP: 852 PG/ML
PLATELET # BLD AUTO: 153 K/UL (ref 150–400)
PMV BLD AUTO: 12 FL (ref 8.9–12.9)
POTASSIUM SERPL-SCNC: 4 MMOL/L (ref 3.5–5.1)
RBC # BLD AUTO: 5.38 M/UL (ref 4.1–5.7)
RBC MORPH BLD: ABNORMAL
SERVICE CMNT-IMP: ABNORMAL
SODIUM SERPL-SCNC: 140 MMOL/L (ref 136–145)
WBC # BLD AUTO: 8.2 K/UL (ref 4.1–11.1)

## 2023-12-01 PROCEDURE — 6370000000 HC RX 637 (ALT 250 FOR IP): Performed by: INTERNAL MEDICINE

## 2023-12-01 PROCEDURE — 85025 COMPLETE CBC W/AUTO DIFF WBC: CPT

## 2023-12-01 PROCEDURE — 80048 BASIC METABOLIC PNL TOTAL CA: CPT

## 2023-12-01 PROCEDURE — 94640 AIRWAY INHALATION TREATMENT: CPT

## 2023-12-01 PROCEDURE — 83880 ASSAY OF NATRIURETIC PEPTIDE: CPT

## 2023-12-01 PROCEDURE — 6360000002 HC RX W HCPCS: Performed by: NURSE PRACTITIONER

## 2023-12-01 PROCEDURE — 6360000002 HC RX W HCPCS: Performed by: STUDENT IN AN ORGANIZED HEALTH CARE EDUCATION/TRAINING PROGRAM

## 2023-12-01 PROCEDURE — 82962 GLUCOSE BLOOD TEST: CPT

## 2023-12-01 PROCEDURE — 2580000003 HC RX 258: Performed by: NURSE PRACTITIONER

## 2023-12-01 PROCEDURE — 6370000000 HC RX 637 (ALT 250 FOR IP): Performed by: NURSE PRACTITIONER

## 2023-12-01 PROCEDURE — 94760 N-INVAS EAR/PLS OXIMETRY 1: CPT

## 2023-12-01 PROCEDURE — 2060000000 HC ICU INTERMEDIATE R&B

## 2023-12-01 PROCEDURE — 2700000000 HC OXYGEN THERAPY PER DAY

## 2023-12-01 PROCEDURE — 36415 COLL VENOUS BLD VENIPUNCTURE: CPT

## 2023-12-01 RX ADMIN — INSULIN LISPRO 8 UNITS: 100 INJECTION, SOLUTION INTRAVENOUS; SUBCUTANEOUS at 17:55

## 2023-12-01 RX ADMIN — CASTOR OIL AND BALSAM, PERU: 788; 87 OINTMENT TOPICAL at 09:52

## 2023-12-01 RX ADMIN — TAMSULOSIN HYDROCHLORIDE 0.4 MG: 0.4 CAPSULE ORAL at 09:47

## 2023-12-01 RX ADMIN — APIXABAN 5 MG: 5 TABLET, FILM COATED ORAL at 20:58

## 2023-12-01 RX ADMIN — MONTELUKAST SODIUM 10 MG: 10 TABLET ORAL at 20:59

## 2023-12-01 RX ADMIN — SODIUM CHLORIDE, PRESERVATIVE FREE 10 ML: 5 INJECTION INTRAVENOUS at 20:59

## 2023-12-01 RX ADMIN — ARFORMOTEROL TARTRATE 15 MCG: 15 SOLUTION RESPIRATORY (INHALATION) at 07:47

## 2023-12-01 RX ADMIN — ARFORMOTEROL TARTRATE 15 MCG: 15 SOLUTION RESPIRATORY (INHALATION) at 21:12

## 2023-12-01 RX ADMIN — PREGABALIN 75 MG: 75 CAPSULE ORAL at 09:46

## 2023-12-01 RX ADMIN — AMIODARONE HYDROCHLORIDE 100 MG: 200 TABLET ORAL at 09:47

## 2023-12-01 RX ADMIN — FUROSEMIDE 40 MG: 10 INJECTION, SOLUTION INTRAMUSCULAR; INTRAVENOUS at 09:49

## 2023-12-01 RX ADMIN — INSULIN LISPRO 4 UNITS: 100 INJECTION, SOLUTION INTRAVENOUS; SUBCUTANEOUS at 21:02

## 2023-12-01 RX ADMIN — SODIUM CHLORIDE, PRESERVATIVE FREE 10 ML: 5 INJECTION INTRAVENOUS at 09:53

## 2023-12-01 RX ADMIN — INSULIN LISPRO 4 UNITS: 100 INJECTION, SOLUTION INTRAVENOUS; SUBCUTANEOUS at 13:55

## 2023-12-01 RX ADMIN — PREGABALIN 75 MG: 75 CAPSULE ORAL at 20:58

## 2023-12-01 RX ADMIN — BUDESONIDE INHALATION 500 MCG: 0.5 SUSPENSION RESPIRATORY (INHALATION) at 21:12

## 2023-12-01 RX ADMIN — MICONAZOLE NITRATE: 2 POWDER TOPICAL at 21:06

## 2023-12-01 RX ADMIN — Medication 5 UNITS: at 09:51

## 2023-12-01 RX ADMIN — DEXAMETHASONE SODIUM PHOSPHATE 6 MG: 10 INJECTION INTRAMUSCULAR; INTRAVENOUS at 09:48

## 2023-12-01 RX ADMIN — Medication 5 UNITS: at 21:03

## 2023-12-01 RX ADMIN — BUDESONIDE INHALATION 500 MCG: 0.5 SUSPENSION RESPIRATORY (INHALATION) at 07:47

## 2023-12-01 RX ADMIN — CASTOR OIL AND BALSAM, PERU: 788; 87 OINTMENT TOPICAL at 21:06

## 2023-12-01 RX ADMIN — APIXABAN 5 MG: 5 TABLET, FILM COATED ORAL at 09:46

## 2023-12-01 RX ADMIN — ISOSORBIDE MONONITRATE 30 MG: 30 TABLET, EXTENDED RELEASE ORAL at 09:47

## 2023-12-01 RX ADMIN — MICONAZOLE NITRATE: 2 POWDER TOPICAL at 09:48

## 2023-12-01 ASSESSMENT — PAIN DESCRIPTION - LOCATION: LOCATION: BUTTOCKS

## 2023-12-01 ASSESSMENT — PAIN SCALES - GENERAL: PAINLEVEL_OUTOF10: 4

## 2023-12-02 LAB
ANION GAP SERPL CALC-SCNC: 3 MMOL/L (ref 5–15)
BASOPHILS # BLD: 0 K/UL (ref 0–0.1)
BASOPHILS NFR BLD: 0 % (ref 0–1)
BUN SERPL-MCNC: 42 MG/DL (ref 6–20)
BUN/CREAT SERPL: 45 (ref 12–20)
CALCIUM SERPL-MCNC: 7.9 MG/DL (ref 8.5–10.1)
CHLORIDE SERPL-SCNC: 102 MMOL/L (ref 97–108)
CO2 SERPL-SCNC: 37 MMOL/L (ref 21–32)
CREAT SERPL-MCNC: 0.93 MG/DL (ref 0.7–1.3)
DIFFERENTIAL METHOD BLD: ABNORMAL
EOSINOPHIL # BLD: 0 K/UL (ref 0–0.4)
EOSINOPHIL NFR BLD: 0 % (ref 0–7)
ERYTHROCYTE [DISTWIDTH] IN BLOOD BY AUTOMATED COUNT: 16 % (ref 11.5–14.5)
GLUCOSE BLD STRIP.AUTO-MCNC: 295 MG/DL (ref 65–117)
GLUCOSE BLD STRIP.AUTO-MCNC: 302 MG/DL (ref 65–117)
GLUCOSE BLD STRIP.AUTO-MCNC: 320 MG/DL (ref 65–117)
GLUCOSE BLD STRIP.AUTO-MCNC: 523 MG/DL (ref 65–117)
GLUCOSE SERPL-MCNC: 226 MG/DL (ref 65–100)
HCT VFR BLD AUTO: 45.3 % (ref 36.6–50.3)
HGB BLD-MCNC: 14 G/DL (ref 12.1–17)
IMM GRANULOCYTES # BLD AUTO: 0.1 K/UL (ref 0–0.04)
IMM GRANULOCYTES NFR BLD AUTO: 1 % (ref 0–0.5)
LYMPHOCYTES # BLD: 0.2 K/UL (ref 0.8–3.5)
LYMPHOCYTES NFR BLD: 3 % (ref 12–49)
MCH RBC QN AUTO: 26 PG (ref 26–34)
MCHC RBC AUTO-ENTMCNC: 30.9 G/DL (ref 30–36.5)
MCV RBC AUTO: 84 FL (ref 80–99)
MONOCYTES # BLD: 0.5 K/UL (ref 0–1)
MONOCYTES NFR BLD: 7 % (ref 5–13)
NEUTS SEG # BLD: 7 K/UL (ref 1.8–8)
NEUTS SEG NFR BLD: 89 % (ref 32–75)
NRBC # BLD: 0 K/UL (ref 0–0.01)
NRBC BLD-RTO: 0 PER 100 WBC
PLATELET # BLD AUTO: 175 K/UL (ref 150–400)
PMV BLD AUTO: 12.9 FL (ref 8.9–12.9)
POTASSIUM SERPL-SCNC: 3.8 MMOL/L (ref 3.5–5.1)
RBC # BLD AUTO: 5.39 M/UL (ref 4.1–5.7)
RBC MORPH BLD: ABNORMAL
SERVICE CMNT-IMP: ABNORMAL
SODIUM SERPL-SCNC: 142 MMOL/L (ref 136–145)
WBC # BLD AUTO: 7.8 K/UL (ref 4.1–11.1)

## 2023-12-02 PROCEDURE — 2060000000 HC ICU INTERMEDIATE R&B

## 2023-12-02 PROCEDURE — 80048 BASIC METABOLIC PNL TOTAL CA: CPT

## 2023-12-02 PROCEDURE — 6370000000 HC RX 637 (ALT 250 FOR IP): Performed by: STUDENT IN AN ORGANIZED HEALTH CARE EDUCATION/TRAINING PROGRAM

## 2023-12-02 PROCEDURE — 36415 COLL VENOUS BLD VENIPUNCTURE: CPT

## 2023-12-02 PROCEDURE — 82962 GLUCOSE BLOOD TEST: CPT

## 2023-12-02 PROCEDURE — 2700000000 HC OXYGEN THERAPY PER DAY

## 2023-12-02 PROCEDURE — 6360000002 HC RX W HCPCS: Performed by: STUDENT IN AN ORGANIZED HEALTH CARE EDUCATION/TRAINING PROGRAM

## 2023-12-02 PROCEDURE — 6370000000 HC RX 637 (ALT 250 FOR IP): Performed by: INTERNAL MEDICINE

## 2023-12-02 PROCEDURE — 2580000003 HC RX 258: Performed by: NURSE PRACTITIONER

## 2023-12-02 PROCEDURE — 6370000000 HC RX 637 (ALT 250 FOR IP): Performed by: NURSE PRACTITIONER

## 2023-12-02 PROCEDURE — 6360000002 HC RX W HCPCS: Performed by: NURSE PRACTITIONER

## 2023-12-02 PROCEDURE — 94640 AIRWAY INHALATION TREATMENT: CPT

## 2023-12-02 PROCEDURE — 85025 COMPLETE CBC W/AUTO DIFF WBC: CPT

## 2023-12-02 RX ORDER — INSULIN LISPRO 100 [IU]/ML
10 INJECTION, SOLUTION INTRAVENOUS; SUBCUTANEOUS
Status: DISCONTINUED | OUTPATIENT
Start: 2023-12-02 | End: 2023-12-06 | Stop reason: HOSPADM

## 2023-12-02 RX ADMIN — BUDESONIDE INHALATION 500 MCG: 0.5 SUSPENSION RESPIRATORY (INHALATION) at 07:48

## 2023-12-02 RX ADMIN — DEXAMETHASONE SODIUM PHOSPHATE 6 MG: 10 INJECTION INTRAMUSCULAR; INTRAVENOUS at 09:30

## 2023-12-02 RX ADMIN — INSULIN LISPRO 6 UNITS: 100 INJECTION, SOLUTION INTRAVENOUS; SUBCUTANEOUS at 13:31

## 2023-12-02 RX ADMIN — INSULIN LISPRO 4 UNITS: 100 INJECTION, SOLUTION INTRAVENOUS; SUBCUTANEOUS at 09:34

## 2023-12-02 RX ADMIN — CASTOR OIL AND BALSAM, PERU: 788; 87 OINTMENT TOPICAL at 09:35

## 2023-12-02 RX ADMIN — TAMSULOSIN HYDROCHLORIDE 0.4 MG: 0.4 CAPSULE ORAL at 09:30

## 2023-12-02 RX ADMIN — INSULIN LISPRO 10 UNITS: 100 INJECTION, SOLUTION INTRAVENOUS; SUBCUTANEOUS at 17:50

## 2023-12-02 RX ADMIN — INSULIN LISPRO 10 UNITS: 100 INJECTION, SOLUTION INTRAVENOUS; SUBCUTANEOUS at 17:49

## 2023-12-02 RX ADMIN — AMIODARONE HYDROCHLORIDE 100 MG: 200 TABLET ORAL at 09:33

## 2023-12-02 RX ADMIN — APIXABAN 5 MG: 5 TABLET, FILM COATED ORAL at 09:34

## 2023-12-02 RX ADMIN — FUROSEMIDE 40 MG: 10 INJECTION, SOLUTION INTRAMUSCULAR; INTRAVENOUS at 09:30

## 2023-12-02 RX ADMIN — SODIUM CHLORIDE, PRESERVATIVE FREE 10 ML: 5 INJECTION INTRAVENOUS at 09:37

## 2023-12-02 RX ADMIN — PREGABALIN 75 MG: 75 CAPSULE ORAL at 10:38

## 2023-12-02 RX ADMIN — ARFORMOTEROL TARTRATE 15 MCG: 15 SOLUTION RESPIRATORY (INHALATION) at 07:48

## 2023-12-02 RX ADMIN — Medication 5 UNITS: at 09:35

## 2023-12-02 RX ADMIN — MICONAZOLE NITRATE: 2 POWDER TOPICAL at 09:35

## 2023-12-02 RX ADMIN — ISOSORBIDE MONONITRATE 30 MG: 30 TABLET, EXTENDED RELEASE ORAL at 09:33

## 2023-12-03 LAB
ANION GAP SERPL CALC-SCNC: 3 MMOL/L (ref 5–15)
BASOPHILS # BLD: 0 K/UL (ref 0–0.1)
BASOPHILS NFR BLD: 0 % (ref 0–1)
BUN SERPL-MCNC: 43 MG/DL (ref 6–20)
BUN/CREAT SERPL: 46 (ref 12–20)
CALCIUM SERPL-MCNC: 7.9 MG/DL (ref 8.5–10.1)
CHLORIDE SERPL-SCNC: 99 MMOL/L (ref 97–108)
CO2 SERPL-SCNC: 38 MMOL/L (ref 21–32)
CREAT SERPL-MCNC: 0.93 MG/DL (ref 0.7–1.3)
DIFFERENTIAL METHOD BLD: ABNORMAL
EOSINOPHIL # BLD: 0 K/UL (ref 0–0.4)
EOSINOPHIL NFR BLD: 0 % (ref 0–7)
ERYTHROCYTE [DISTWIDTH] IN BLOOD BY AUTOMATED COUNT: 15.9 % (ref 11.5–14.5)
GLUCOSE BLD STRIP.AUTO-MCNC: 156 MG/DL (ref 65–117)
GLUCOSE BLD STRIP.AUTO-MCNC: 240 MG/DL (ref 65–117)
GLUCOSE BLD STRIP.AUTO-MCNC: 252 MG/DL (ref 65–117)
GLUCOSE SERPL-MCNC: 180 MG/DL (ref 65–100)
HCT VFR BLD AUTO: 44.1 % (ref 36.6–50.3)
HGB BLD-MCNC: 13.5 G/DL (ref 12.1–17)
IMM GRANULOCYTES # BLD AUTO: 0.1 K/UL (ref 0–0.04)
IMM GRANULOCYTES NFR BLD AUTO: 1 % (ref 0–0.5)
LYMPHOCYTES # BLD: 0.5 K/UL (ref 0.8–3.5)
LYMPHOCYTES NFR BLD: 5 % (ref 12–49)
MCH RBC QN AUTO: 25.7 PG (ref 26–34)
MCHC RBC AUTO-ENTMCNC: 30.6 G/DL (ref 30–36.5)
MCV RBC AUTO: 83.8 FL (ref 80–99)
MONOCYTES # BLD: 0.6 K/UL (ref 0–1)
MONOCYTES NFR BLD: 7 % (ref 5–13)
NEUTS SEG # BLD: 8 K/UL (ref 1.8–8)
NEUTS SEG NFR BLD: 87 % (ref 32–75)
NRBC # BLD: 0 K/UL (ref 0–0.01)
NRBC BLD-RTO: 0 PER 100 WBC
PLATELET # BLD AUTO: 156 K/UL (ref 150–400)
PMV BLD AUTO: 12.3 FL (ref 8.9–12.9)
POTASSIUM SERPL-SCNC: 4 MMOL/L (ref 3.5–5.1)
RBC # BLD AUTO: 5.26 M/UL (ref 4.1–5.7)
RBC MORPH BLD: ABNORMAL
SERVICE CMNT-IMP: ABNORMAL
SODIUM SERPL-SCNC: 140 MMOL/L (ref 136–145)
WBC # BLD AUTO: 9.2 K/UL (ref 4.1–11.1)

## 2023-12-03 PROCEDURE — 6370000000 HC RX 637 (ALT 250 FOR IP): Performed by: STUDENT IN AN ORGANIZED HEALTH CARE EDUCATION/TRAINING PROGRAM

## 2023-12-03 PROCEDURE — 2580000003 HC RX 258: Performed by: NURSE PRACTITIONER

## 2023-12-03 PROCEDURE — 6360000002 HC RX W HCPCS: Performed by: STUDENT IN AN ORGANIZED HEALTH CARE EDUCATION/TRAINING PROGRAM

## 2023-12-03 PROCEDURE — 6370000000 HC RX 637 (ALT 250 FOR IP): Performed by: NURSE PRACTITIONER

## 2023-12-03 PROCEDURE — 6360000002 HC RX W HCPCS: Performed by: NURSE PRACTITIONER

## 2023-12-03 PROCEDURE — 82962 GLUCOSE BLOOD TEST: CPT

## 2023-12-03 PROCEDURE — 80048 BASIC METABOLIC PNL TOTAL CA: CPT

## 2023-12-03 PROCEDURE — 36415 COLL VENOUS BLD VENIPUNCTURE: CPT

## 2023-12-03 PROCEDURE — 2700000000 HC OXYGEN THERAPY PER DAY

## 2023-12-03 PROCEDURE — 94640 AIRWAY INHALATION TREATMENT: CPT

## 2023-12-03 PROCEDURE — 2060000000 HC ICU INTERMEDIATE R&B

## 2023-12-03 PROCEDURE — 85025 COMPLETE CBC W/AUTO DIFF WBC: CPT

## 2023-12-03 PROCEDURE — 94760 N-INVAS EAR/PLS OXIMETRY 1: CPT

## 2023-12-03 RX ADMIN — BUDESONIDE INHALATION 500 MCG: 0.5 SUSPENSION RESPIRATORY (INHALATION) at 22:07

## 2023-12-03 RX ADMIN — FUROSEMIDE 40 MG: 10 INJECTION, SOLUTION INTRAMUSCULAR; INTRAVENOUS at 09:20

## 2023-12-03 RX ADMIN — ARFORMOTEROL TARTRATE 15 MCG: 15 SOLUTION RESPIRATORY (INHALATION) at 08:05

## 2023-12-03 RX ADMIN — APIXABAN 5 MG: 5 TABLET, FILM COATED ORAL at 09:22

## 2023-12-03 RX ADMIN — SODIUM CHLORIDE, PRESERVATIVE FREE 10 ML: 5 INJECTION INTRAVENOUS at 09:25

## 2023-12-03 RX ADMIN — CASTOR OIL AND BALSAM, PERU: 788; 87 OINTMENT TOPICAL at 00:17

## 2023-12-03 RX ADMIN — DEXAMETHASONE SODIUM PHOSPHATE 6 MG: 10 INJECTION INTRAMUSCULAR; INTRAVENOUS at 09:21

## 2023-12-03 RX ADMIN — CASTOR OIL AND BALSAM, PERU: 788; 87 OINTMENT TOPICAL at 22:08

## 2023-12-03 RX ADMIN — APIXABAN 5 MG: 5 TABLET, FILM COATED ORAL at 00:12

## 2023-12-03 RX ADMIN — PREGABALIN 75 MG: 75 CAPSULE ORAL at 00:11

## 2023-12-03 RX ADMIN — PREGABALIN 75 MG: 75 CAPSULE ORAL at 22:07

## 2023-12-03 RX ADMIN — MONTELUKAST SODIUM 10 MG: 10 TABLET ORAL at 22:06

## 2023-12-03 RX ADMIN — INSULIN LISPRO 4 UNITS: 100 INJECTION, SOLUTION INTRAVENOUS; SUBCUTANEOUS at 00:11

## 2023-12-03 RX ADMIN — AMIODARONE HYDROCHLORIDE 100 MG: 200 TABLET ORAL at 09:22

## 2023-12-03 RX ADMIN — Medication 30 UNITS: at 00:10

## 2023-12-03 RX ADMIN — TAMSULOSIN HYDROCHLORIDE 0.4 MG: 0.4 CAPSULE ORAL at 09:22

## 2023-12-03 RX ADMIN — MICONAZOLE NITRATE: 2 POWDER TOPICAL at 00:13

## 2023-12-03 RX ADMIN — SODIUM CHLORIDE, PRESERVATIVE FREE 10 ML: 5 INJECTION INTRAVENOUS at 22:08

## 2023-12-03 RX ADMIN — MICONAZOLE NITRATE: 2 POWDER TOPICAL at 09:22

## 2023-12-03 RX ADMIN — Medication 30 UNITS: at 09:18

## 2023-12-03 RX ADMIN — MONTELUKAST SODIUM 10 MG: 10 TABLET ORAL at 00:15

## 2023-12-03 RX ADMIN — ARFORMOTEROL TARTRATE 15 MCG: 15 SOLUTION RESPIRATORY (INHALATION) at 22:06

## 2023-12-03 RX ADMIN — INSULIN LISPRO 10 UNITS: 100 INJECTION, SOLUTION INTRAVENOUS; SUBCUTANEOUS at 13:06

## 2023-12-03 RX ADMIN — INSULIN LISPRO 10 UNITS: 100 INJECTION, SOLUTION INTRAVENOUS; SUBCUTANEOUS at 17:33

## 2023-12-03 RX ADMIN — MICONAZOLE NITRATE: 2 POWDER TOPICAL at 22:08

## 2023-12-03 RX ADMIN — BUDESONIDE INHALATION 500 MCG: 0.5 SUSPENSION RESPIRATORY (INHALATION) at 08:05

## 2023-12-03 RX ADMIN — SODIUM CHLORIDE, PRESERVATIVE FREE 10 ML: 5 INJECTION INTRAVENOUS at 00:13

## 2023-12-03 RX ADMIN — INSULIN LISPRO 2 UNITS: 100 INJECTION, SOLUTION INTRAVENOUS; SUBCUTANEOUS at 13:06

## 2023-12-03 RX ADMIN — CASTOR OIL AND BALSAM, PERU: 788; 87 OINTMENT TOPICAL at 10:14

## 2023-12-03 RX ADMIN — APIXABAN 5 MG: 5 TABLET, FILM COATED ORAL at 22:06

## 2023-12-03 RX ADMIN — INSULIN LISPRO 4 UNITS: 100 INJECTION, SOLUTION INTRAVENOUS; SUBCUTANEOUS at 17:33

## 2023-12-03 RX ADMIN — Medication 30 UNITS: at 22:07

## 2023-12-03 RX ADMIN — PREGABALIN 75 MG: 75 CAPSULE ORAL at 09:20

## 2023-12-03 RX ADMIN — ISOSORBIDE MONONITRATE 30 MG: 30 TABLET, EXTENDED RELEASE ORAL at 09:22

## 2023-12-03 RX ADMIN — INSULIN LISPRO 10 UNITS: 100 INJECTION, SOLUTION INTRAVENOUS; SUBCUTANEOUS at 09:19

## 2023-12-04 LAB
GLUCOSE BLD STRIP.AUTO-MCNC: 165 MG/DL (ref 65–117)
GLUCOSE BLD STRIP.AUTO-MCNC: 168 MG/DL (ref 65–117)
GLUCOSE BLD STRIP.AUTO-MCNC: 171 MG/DL (ref 65–117)
GLUCOSE BLD STRIP.AUTO-MCNC: 177 MG/DL (ref 65–117)
SERVICE CMNT-IMP: ABNORMAL

## 2023-12-04 PROCEDURE — 6360000002 HC RX W HCPCS: Performed by: STUDENT IN AN ORGANIZED HEALTH CARE EDUCATION/TRAINING PROGRAM

## 2023-12-04 PROCEDURE — 82962 GLUCOSE BLOOD TEST: CPT

## 2023-12-04 PROCEDURE — 6360000002 HC RX W HCPCS: Performed by: NURSE PRACTITIONER

## 2023-12-04 PROCEDURE — 6370000000 HC RX 637 (ALT 250 FOR IP): Performed by: NURSE PRACTITIONER

## 2023-12-04 PROCEDURE — 94760 N-INVAS EAR/PLS OXIMETRY 1: CPT

## 2023-12-04 PROCEDURE — 2580000003 HC RX 258: Performed by: NURSE PRACTITIONER

## 2023-12-04 PROCEDURE — 97535 SELF CARE MNGMENT TRAINING: CPT

## 2023-12-04 PROCEDURE — 6370000000 HC RX 637 (ALT 250 FOR IP): Performed by: STUDENT IN AN ORGANIZED HEALTH CARE EDUCATION/TRAINING PROGRAM

## 2023-12-04 PROCEDURE — 2700000000 HC OXYGEN THERAPY PER DAY

## 2023-12-04 PROCEDURE — 2060000000 HC ICU INTERMEDIATE R&B

## 2023-12-04 PROCEDURE — 97530 THERAPEUTIC ACTIVITIES: CPT

## 2023-12-04 PROCEDURE — 94640 AIRWAY INHALATION TREATMENT: CPT

## 2023-12-04 RX ADMIN — PREGABALIN 75 MG: 75 CAPSULE ORAL at 21:46

## 2023-12-04 RX ADMIN — INSULIN LISPRO 10 UNITS: 100 INJECTION, SOLUTION INTRAVENOUS; SUBCUTANEOUS at 18:13

## 2023-12-04 RX ADMIN — Medication 30 UNITS: at 10:08

## 2023-12-04 RX ADMIN — ARFORMOTEROL TARTRATE 15 MCG: 15 SOLUTION RESPIRATORY (INHALATION) at 08:38

## 2023-12-04 RX ADMIN — SODIUM CHLORIDE, PRESERVATIVE FREE 10 ML: 5 INJECTION INTRAVENOUS at 21:47

## 2023-12-04 RX ADMIN — ISOSORBIDE MONONITRATE 30 MG: 30 TABLET, EXTENDED RELEASE ORAL at 10:07

## 2023-12-04 RX ADMIN — APIXABAN 5 MG: 5 TABLET, FILM COATED ORAL at 10:08

## 2023-12-04 RX ADMIN — APIXABAN 5 MG: 5 TABLET, FILM COATED ORAL at 21:46

## 2023-12-04 RX ADMIN — ARFORMOTEROL TARTRATE 15 MCG: 15 SOLUTION RESPIRATORY (INHALATION) at 21:33

## 2023-12-04 RX ADMIN — CASTOR OIL AND BALSAM, PERU: 788; 87 OINTMENT TOPICAL at 10:10

## 2023-12-04 RX ADMIN — BUDESONIDE INHALATION 500 MCG: 0.5 SUSPENSION RESPIRATORY (INHALATION) at 08:38

## 2023-12-04 RX ADMIN — TAMSULOSIN HYDROCHLORIDE 0.4 MG: 0.4 CAPSULE ORAL at 10:07

## 2023-12-04 RX ADMIN — INSULIN LISPRO 10 UNITS: 100 INJECTION, SOLUTION INTRAVENOUS; SUBCUTANEOUS at 13:05

## 2023-12-04 RX ADMIN — MONTELUKAST SODIUM 10 MG: 10 TABLET ORAL at 21:46

## 2023-12-04 RX ADMIN — CASTOR OIL AND BALSAM, PERU: 788; 87 OINTMENT TOPICAL at 21:47

## 2023-12-04 RX ADMIN — MICONAZOLE NITRATE: 2 POWDER TOPICAL at 21:47

## 2023-12-04 RX ADMIN — SODIUM CHLORIDE, PRESERVATIVE FREE 10 ML: 5 INJECTION INTRAVENOUS at 10:06

## 2023-12-04 RX ADMIN — PREGABALIN 75 MG: 75 CAPSULE ORAL at 10:07

## 2023-12-04 RX ADMIN — AMIODARONE HYDROCHLORIDE 100 MG: 200 TABLET ORAL at 10:08

## 2023-12-04 RX ADMIN — MICONAZOLE NITRATE: 2 POWDER TOPICAL at 10:10

## 2023-12-04 RX ADMIN — BUDESONIDE INHALATION 500 MCG: 0.5 SUSPENSION RESPIRATORY (INHALATION) at 21:33

## 2023-12-04 RX ADMIN — FUROSEMIDE 40 MG: 10 INJECTION, SOLUTION INTRAMUSCULAR; INTRAVENOUS at 10:09

## 2023-12-04 ASSESSMENT — PAIN SCALES - GENERAL: PAINLEVEL_OUTOF10: 0

## 2023-12-05 LAB
GLUCOSE BLD STRIP.AUTO-MCNC: 169 MG/DL (ref 65–117)
GLUCOSE BLD STRIP.AUTO-MCNC: 183 MG/DL (ref 65–117)
GLUCOSE BLD STRIP.AUTO-MCNC: 196 MG/DL (ref 65–117)
GLUCOSE BLD STRIP.AUTO-MCNC: 240 MG/DL (ref 65–117)
SERVICE CMNT-IMP: ABNORMAL

## 2023-12-05 PROCEDURE — 6370000000 HC RX 637 (ALT 250 FOR IP): Performed by: STUDENT IN AN ORGANIZED HEALTH CARE EDUCATION/TRAINING PROGRAM

## 2023-12-05 PROCEDURE — 2060000000 HC ICU INTERMEDIATE R&B

## 2023-12-05 PROCEDURE — 97535 SELF CARE MNGMENT TRAINING: CPT

## 2023-12-05 PROCEDURE — 94640 AIRWAY INHALATION TREATMENT: CPT

## 2023-12-05 PROCEDURE — 94760 N-INVAS EAR/PLS OXIMETRY 1: CPT

## 2023-12-05 PROCEDURE — 2700000000 HC OXYGEN THERAPY PER DAY

## 2023-12-05 PROCEDURE — 6360000002 HC RX W HCPCS: Performed by: NURSE PRACTITIONER

## 2023-12-05 PROCEDURE — 2580000003 HC RX 258: Performed by: NURSE PRACTITIONER

## 2023-12-05 PROCEDURE — 97530 THERAPEUTIC ACTIVITIES: CPT

## 2023-12-05 PROCEDURE — 6370000000 HC RX 637 (ALT 250 FOR IP): Performed by: NURSE PRACTITIONER

## 2023-12-05 PROCEDURE — 82962 GLUCOSE BLOOD TEST: CPT

## 2023-12-05 PROCEDURE — 6360000002 HC RX W HCPCS: Performed by: STUDENT IN AN ORGANIZED HEALTH CARE EDUCATION/TRAINING PROGRAM

## 2023-12-05 RX ORDER — CASTOR OIL AND BALSAM, PERU 788; 87 MG/G; MG/G
OINTMENT TOPICAL 2 TIMES DAILY
Qty: 5 G | Refills: 0 | Status: SHIPPED
Start: 2023-12-05 | End: 2024-01-04

## 2023-12-05 RX ADMIN — SODIUM CHLORIDE, PRESERVATIVE FREE 10 ML: 5 INJECTION INTRAVENOUS at 10:35

## 2023-12-05 RX ADMIN — APIXABAN 5 MG: 5 TABLET, FILM COATED ORAL at 10:01

## 2023-12-05 RX ADMIN — BUDESONIDE INHALATION 500 MCG: 0.5 SUSPENSION RESPIRATORY (INHALATION) at 07:10

## 2023-12-05 RX ADMIN — Medication 30 UNITS: at 10:04

## 2023-12-05 RX ADMIN — CASTOR OIL AND BALSAM, PERU: 788; 87 OINTMENT TOPICAL at 21:13

## 2023-12-05 RX ADMIN — PREGABALIN 75 MG: 75 CAPSULE ORAL at 10:01

## 2023-12-05 RX ADMIN — MONTELUKAST SODIUM 10 MG: 10 TABLET ORAL at 21:13

## 2023-12-05 RX ADMIN — MICONAZOLE NITRATE: 2 POWDER TOPICAL at 21:13

## 2023-12-05 RX ADMIN — TAMSULOSIN HYDROCHLORIDE 0.4 MG: 0.4 CAPSULE ORAL at 10:01

## 2023-12-05 RX ADMIN — CASTOR OIL AND BALSAM, PERU: 788; 87 OINTMENT TOPICAL at 10:08

## 2023-12-05 RX ADMIN — INSULIN LISPRO 10 UNITS: 100 INJECTION, SOLUTION INTRAVENOUS; SUBCUTANEOUS at 10:37

## 2023-12-05 RX ADMIN — AMIODARONE HYDROCHLORIDE 100 MG: 200 TABLET ORAL at 10:14

## 2023-12-05 RX ADMIN — FUROSEMIDE 40 MG: 10 INJECTION, SOLUTION INTRAMUSCULAR; INTRAVENOUS at 10:02

## 2023-12-05 RX ADMIN — ARFORMOTEROL TARTRATE 15 MCG: 15 SOLUTION RESPIRATORY (INHALATION) at 07:10

## 2023-12-05 RX ADMIN — MICONAZOLE NITRATE: 2 POWDER TOPICAL at 10:06

## 2023-12-05 RX ADMIN — Medication 30 UNITS: at 21:15

## 2023-12-05 RX ADMIN — INSULIN LISPRO 10 UNITS: 100 INJECTION, SOLUTION INTRAVENOUS; SUBCUTANEOUS at 18:16

## 2023-12-05 RX ADMIN — PREGABALIN 75 MG: 75 CAPSULE ORAL at 21:13

## 2023-12-05 RX ADMIN — ISOSORBIDE MONONITRATE 30 MG: 30 TABLET, EXTENDED RELEASE ORAL at 10:01

## 2023-12-05 RX ADMIN — APIXABAN 5 MG: 5 TABLET, FILM COATED ORAL at 21:13

## 2023-12-05 ASSESSMENT — PAIN SCALES - GENERAL
PAINLEVEL_OUTOF10: 0
PAINLEVEL_OUTOF10: 0

## 2023-12-05 NOTE — DISCHARGE SUMMARY
Discharge Summary     PATIENT ID: Brandon Boo  MRN: [id number]   YOB: 1965    DATE OF ADMISSION: 11/24/2023 11:54 AM    DATE OF DISCHARGE: 12/05/23    PRIMARY CARE PROVIDER: ELLY Lara NP     ATTENDING PHYSICIAN: Levon Gold MD   DISCHARGING PROVIDER: Levon Gold MD    To contact this individual call [phone] and ask the  to page. If unavailable ask to be transferred the Adult Hospitalist Department. CONSULTATIONS: IP WOUND CARE NURSE CONSULT TO EVAL  IP CONSULT TO PHYSICAL THERAPY  IP WOUND CARE NURSE CONSULT TO EVAL  IP CONSULT TO PULMONOLOGY    PROCEDURES/SURGERIES: * No surgery found *     ADMITTING DIAGNOSES & HOSPITAL COURSE:   HPI: \"Per H&P: Mr Kristi Land presented to emergency room from [facility] via EMS with dyspnea and SPO2 on R/A of 54%. He was subsequently placed on NRB as he states he struggles with HFNC an his SPO2 is now >92%. He reports two pearson of cold symptoms, cough and dyspnea. Patient will be admitted to ICU for aggressive monitoring, possible need for intubation/NIPPV and corticosteroids for COVID19 PNA. \"     In the ICU patient has had fluctuating HFNC needs. He was started on QHS CPAP, Dexamethasone, and supportive therapies. He did present with elevated CRP but was not treated with Baricitnib or Tocilizumab likely given normal D dimer. At bedside today he reports a non-productive cough, but states his breathing feels better. He remains on HFNC 40LPM/ 70% FiO2. Respiratory is grossly unremakarble and good air movement is auscultated in all fields. He reports BLE weakness but he states this has been going on for some time, likely now worsened by critical illness. He is able to move all extremities, strength is 5/5 in the RLE and 4/5 in the LLE. Given improved respiratory status and hemodynamic stability will transfer patient to [facility].  \"        DISCHARGE DIAGNOSES / PLAN:      Acute Hypoxic Respiratory Failure: improved  Covid-19 PNA  -

## 2023-12-05 NOTE — CARE COORDINATION
Transition of Care Plan: anticiapte d/c to [facility] tomorrow, Wednesday 12/5, : Alton Nelson [phone] or [phone]; CM confirmed bed availability with [facility]    Per prior CM note: [facility]. Noted, CM also called Bertha Chavis with [facility] [phone] who confirmed that pt is in their program and pt has an active LTSS/UAI for LTC bed. Bertha Chavis will also have to set up transport at discharge in order for [facility] to cover. Patient's brother, Clifford Montes [phone] agrees with plan. Jennifer Ruiz [phone] set up BLS transport for Wednesday 12/6 at 2pm via [facility]   Folder and transport packet on front of hard chart    Prior Level of Functioning: Needed assistance with ADL's  Disposition: [facility]  Follow up appointments: PCP   DME needed: none  Transportation at discharge: BLS   IM/IMM Medicare/[facility] letter given: NA  Is patient a [place] and connected with VA? No   Caregiver Contact: Brother: Clifford Montes [phone]  Discharge Caregiver contacted prior to discharge? No  Care Conference needed? No  Barriers to discharge:  bed availability, transport set up  -1300- reviewed pt chart and left VM for Alton Nelson of [place] to confirm bed availability. CM also spoke with Jennifer Ruiz and she advised she cannot set up transport today, however she can request transport for tomorrow. CM informed Attending of this plan. 1615-GERSON received phone calls from [address] [place] [address] advising they can accept pt anytime tomorrow 12/5 after 11am and also CM received a phone call from Bertha Chavis of [address] of 2pm BLS transport set up.   Yovanny Jordan RN BSN CCM

## 2023-12-06 VITALS
HEIGHT: 71 IN | RESPIRATION RATE: 18 BRPM | OXYGEN SATURATION: 93 % | BODY MASS INDEX: 40.74 KG/M2 | HEART RATE: 71 BPM | WEIGHT: 291.01 LBS | SYSTOLIC BLOOD PRESSURE: 122 MMHG | DIASTOLIC BLOOD PRESSURE: 73 MMHG | TEMPERATURE: 98.5 F

## 2023-12-06 LAB
GLUCOSE BLD STRIP.AUTO-MCNC: 230 MG/DL (ref 65–117)
GLUCOSE BLD STRIP.AUTO-MCNC: 312 MG/DL (ref 65–117)
SERVICE CMNT-IMP: ABNORMAL
SERVICE CMNT-IMP: ABNORMAL

## 2023-12-06 PROCEDURE — 6360000002 HC RX W HCPCS: Performed by: NURSE PRACTITIONER

## 2023-12-06 PROCEDURE — 6370000000 HC RX 637 (ALT 250 FOR IP): Performed by: STUDENT IN AN ORGANIZED HEALTH CARE EDUCATION/TRAINING PROGRAM

## 2023-12-06 PROCEDURE — 94760 N-INVAS EAR/PLS OXIMETRY 1: CPT

## 2023-12-06 PROCEDURE — 94640 AIRWAY INHALATION TREATMENT: CPT

## 2023-12-06 PROCEDURE — 97530 THERAPEUTIC ACTIVITIES: CPT

## 2023-12-06 PROCEDURE — 2700000000 HC OXYGEN THERAPY PER DAY

## 2023-12-06 PROCEDURE — 2580000003 HC RX 258: Performed by: NURSE PRACTITIONER

## 2023-12-06 PROCEDURE — 82962 GLUCOSE BLOOD TEST: CPT

## 2023-12-06 PROCEDURE — 6370000000 HC RX 637 (ALT 250 FOR IP): Performed by: NURSE PRACTITIONER

## 2023-12-06 RX ADMIN — INSULIN LISPRO 6 UNITS: 100 INJECTION, SOLUTION INTRAVENOUS; SUBCUTANEOUS at 10:26

## 2023-12-06 RX ADMIN — INSULIN LISPRO 10 UNITS: 100 INJECTION, SOLUTION INTRAVENOUS; SUBCUTANEOUS at 10:25

## 2023-12-06 RX ADMIN — APIXABAN 5 MG: 5 TABLET, FILM COATED ORAL at 10:24

## 2023-12-06 RX ADMIN — TAMSULOSIN HYDROCHLORIDE 0.4 MG: 0.4 CAPSULE ORAL at 10:25

## 2023-12-06 RX ADMIN — MICONAZOLE NITRATE: 2 POWDER TOPICAL at 10:27

## 2023-12-06 RX ADMIN — ARFORMOTEROL TARTRATE 15 MCG: 15 SOLUTION RESPIRATORY (INHALATION) at 08:17

## 2023-12-06 RX ADMIN — Medication 30 UNITS: at 10:26

## 2023-12-06 RX ADMIN — ISOSORBIDE MONONITRATE 30 MG: 30 TABLET, EXTENDED RELEASE ORAL at 10:22

## 2023-12-06 RX ADMIN — CASTOR OIL AND BALSAM, PERU: 788; 87 OINTMENT TOPICAL at 10:27

## 2023-12-06 RX ADMIN — PREGABALIN 75 MG: 75 CAPSULE ORAL at 10:24

## 2023-12-06 RX ADMIN — INSULIN LISPRO 10 UNITS: 100 INJECTION, SOLUTION INTRAVENOUS; SUBCUTANEOUS at 14:11

## 2023-12-06 RX ADMIN — BUDESONIDE INHALATION 500 MCG: 0.5 SUSPENSION RESPIRATORY (INHALATION) at 08:17

## 2023-12-06 RX ADMIN — TORSEMIDE 40 MG: 20 TABLET ORAL at 10:21

## 2023-12-06 RX ADMIN — AMIODARONE HYDROCHLORIDE 100 MG: 200 TABLET ORAL at 10:22

## 2023-12-06 RX ADMIN — SODIUM CHLORIDE, PRESERVATIVE FREE 10 ML: 5 INJECTION INTRAVENOUS at 10:28

## 2023-12-06 ASSESSMENT — PAIN SCALES - GENERAL
PAINLEVEL_OUTOF10: 0
PAINLEVEL_OUTOF10: 0

## 2023-12-06 NOTE — PLAN OF CARE
Bedside and Verbal shift change report given to AYAZ Franklin (oncoming nurse) by Roman Owusu RN (offgoing nurse). Report included the following information SBAR, Kardex, ED Summary, MAR, and Cardiac Rhythm SB. Problem: Discharge Planning  Goal: Discharge to home or other facility with appropriate resources  Outcome: Progressing  Flowsheets (Taken 12/4/2023 2000)  Discharge to home or other facility with appropriate resources: Identify barriers to discharge with patient and caregiver     Problem: Pain  Goal: Verbalizes/displays adequate comfort level or baseline comfort level  Outcome: Progressing     Problem: Safety - Adult  Goal: Free from fall injury  Outcome: Progressing     Problem: Skin/Tissue Integrity  Goal: Absence of new skin breakdown  Description: 1. Monitor for areas of redness and/or skin breakdown  2. Assess vascular access sites hourly  3. Every 4-6 hours minimum:  Change oxygen saturation probe site  4. Every 4-6 hours:  If on nasal continuous positive airway pressure, respiratory therapy assess nares and determine need for appliance change or resting period.   Outcome: Progressing     Problem: Chronic Conditions and Co-morbidities  Goal: Patient's chronic conditions and co-morbidity symptoms are monitored and maintained or improved  Outcome: Progressing  Flowsheets (Taken 12/4/2023 2000)  Care Plan - Patient's Chronic Conditions and Co-Morbidity Symptoms are Monitored and Maintained or Improved: Monitor and assess patient's chronic conditions and comorbid symptoms for stability, deterioration, or improvement
Problem: Discharge Planning  Goal: Discharge to home or other facility with appropriate resources  12/1/2023 0302 by Chaparrita Lieberman RN  Outcome: Progressing  11/30/2023 1524 by Nolberto Charles RN  Outcome: Progressing  Flowsheets (Taken 11/30/2023 0800)  Discharge to home or other facility with appropriate resources: Identify barriers to discharge with patient and caregiver     Problem: Pain  Goal: Verbalizes/displays adequate comfort level or baseline comfort level  12/1/2023 0302 by Chaparrita Lieberman RN  Outcome: Progressing  11/30/2023 1524 by Nolberto Charles RN  Outcome: Progressing     Problem: Safety - Adult  Goal: Free from fall injury  12/1/2023 0302 by Chaparrita Lieberman RN  Outcome: Progressing  11/30/2023 1524 by Nolberto Charles RN  Outcome: Progressing     Problem: Skin/Tissue Integrity  Goal: Absence of new skin breakdown  Description: 1. Monitor for areas of redness and/or skin breakdown  2. Assess vascular access sites hourly  3. Every 4-6 hours minimum:  Change oxygen saturation probe site  4. Every 4-6 hours:  If on nasal continuous positive airway pressure, respiratory therapy assess nares and determine need for appliance change or resting period.   12/1/2023 0302 by Chaparrita Lieberman RN  Outcome: Progressing  11/30/2023 1524 by Nolberto Charles RN  Outcome: Progressing     Problem: Chronic Conditions and Co-morbidities  Goal: Patient's chronic conditions and co-morbidity symptoms are monitored and maintained or improved  12/1/2023 0302 by Chaparrita Lieberman RN  Outcome: Progressing  11/30/2023 1524 by Nolberto Charles RN  Outcome: Progressing
Problem: Discharge Planning  Goal: Discharge to home or other facility with appropriate resources  Outcome: Progressing  Flowsheets (Taken 11/30/2023 0800)  Discharge to home or other facility with appropriate resources: Identify barriers to discharge with patient and caregiver     Problem: Pain  Goal: Verbalizes/displays adequate comfort level or baseline comfort level  11/30/2023 1524 by Connie Elmore RN  Outcome: Progressing  11/30/2023 0906 by Geri Abraham RN  Outcome: Progressing     Problem: Safety - Adult  Goal: Free from fall injury  11/30/2023 1524 by Connie Elmore RN  Outcome: Progressing  11/30/2023 0906 by Geri Abraham RN  Outcome: Progressing     Problem: Skin/Tissue Integrity  Goal: Absence of new skin breakdown  Description: 1. Monitor for areas of redness and/or skin breakdown  2. Assess vascular access sites hourly  3. Every 4-6 hours minimum:  Change oxygen saturation probe site  4. Every 4-6 hours:  If on nasal continuous positive airway pressure, respiratory therapy assess nares and determine need for appliance change or resting period.   11/30/2023 1524 by Connie Elmore RN  Outcome: Progressing  11/30/2023 0906 by Geri Abraham RN  Outcome: Progressing     Problem: Chronic Conditions and Co-morbidities  Goal: Patient's chronic conditions and co-morbidity symptoms are monitored and maintained or improved  11/30/2023 1524 by Connie Elmore RN  Outcome: Progressing  11/30/2023 0906 by Geri Abraham RN  Outcome: Progressing  Flowsheets (Taken 11/30/2023 0800 by Connie Elmore RN)  Care Plan - Patient's Chronic Conditions and Co-Morbidity Symptoms are Monitored and Maintained or Improved: Monitor and assess patient's chronic conditions and comorbid symptoms for stability, deterioration, or improvement
Problem: Occupational Therapy - Adult  Goal: By Discharge: Performs self-care activities at highest level of function for planned discharge setting. See evaluation for individualized goals. Description: FUNCTIONAL STATUS PRIOR TO ADMISSION:  Patient was ambulatory using rw and w/c  Receives Help From: Personal care attendant, ADL Assistance: Needs assistance, Bath: Moderate assistance, Dressing: Moderate assistance, Grooming: Moderate assistance, Feeding: Modified independent , Toileting: Independent,  , Ambulation Assistance: Independent, Transfer Assistance: Independent, Active : No     HOME SUPPORT: Patient lived at assisted living. Occupational Therapy Goals:  Initiated 11/29/2023  1. Patient will perform self-feeding with [place] within 7 day(s). 2.  Patient will perform grooming with Moderate Assist within 7 day(s). 3.  Patient will perform bathing with Moderate Assist within 7 day(s). 4.  Patient will perform toilet transfers with Maximal Assist  within 7 day(s). 5.  Patient will perform all aspects of toileting with Moderate Assist within 7 day(s). 6.  Patient will participate in upper extremity therapeutic exercise/activities with Minimal Assist for 5 minutes within 7 day(s). 7.  Patient will utilize energy conservation techniques during functional activities with verbal cues within 7 day(s). Outcome: Progressing   OCCUPATIONAL THERAPY EVALUATION    Patient: Joshua Hughes (63 y.o. male)  Date: 11/29/2023  Primary Diagnosis: Hypoxia [R09.02]  MEGAN (acute kidney injury) ([address]) [N17.9]  Acute hypoxemic respiratory failure ([address]) [J96.01]  COVID-19 [U07.1]         Precautions: Fall Risk                  ASSESSMENT :  The patient is limited by decreased functional mobility, independence in ADLs, high-level IADLs, ROM, strength, body mechanics, activity tolerance, endurance, safety awareness, balance. Per chart review pt admitted with COVID pneumonia.  Prior to admission pt lived at
Problem: Occupational Therapy - Adult  Goal: By Discharge: Performs self-care activities at highest level of function for planned discharge setting. See evaluation for individualized goals. Description: FUNCTIONAL STATUS PRIOR TO ADMISSION:  Patient was ambulatory using rw and w/c  Receives Help From: Personal care attendant, ADL Assistance: Needs assistance, Bath: Moderate assistance, Dressing: Moderate assistance, Grooming: Moderate assistance, Feeding: Modified independent , Toileting: Independent,  , Ambulation Assistance: Independent, Transfer Assistance: Independent, Active : No     HOME SUPPORT: Patient lived at assisted living. Occupational Therapy Goals:  Initiated 11/29/2023  1. Patient will perform self-feeding with [place] within 7 day(s). 2.  Patient will perform grooming with Moderate Assist within 7 day(s). 3.  Patient will perform bathing with Moderate Assist within 7 day(s). 4.  Patient will perform toilet transfers with Maximal Assist  within 7 day(s). 5.  Patient will perform all aspects of toileting with Moderate Assist within 7 day(s). 6.  Patient will participate in upper extremity therapeutic exercise/activities with Minimal Assist for 5 minutes within 7 day(s). 7.  Patient will utilize energy conservation techniques during functional activities with verbal cues within 7 day(s). Outcome: Progressing     OCCUPATIONAL THERAPY TREATMENT  Patient: Adam Cruz (60 y.o. male)  Date: 12/4/2023  Primary Diagnosis: Hypoxia [R09.02]  MEGAN (acute kidney injury) ([address]) [N17.9]  Acute hypoxemic respiratory failure ([address]) [J96.01]  COVID-19 [U07.1]       Precautions: Other (comment) (droplet plus contact)                Chart, occupational therapy assessment, plan of care, and goals were reviewed. ASSESSMENT  Patient continues to benefit from skilled OT services and is slowly progressing towards goals.  Pt presents to OT services supine in bed with HOB elevated and two RNs
Problem: Occupational Therapy - Adult  Goal: By Discharge: Performs self-care activities at highest level of function for planned discharge setting. See evaluation for individualized goals. Description: FUNCTIONAL STATUS PRIOR TO ADMISSION:  Patient was ambulatory using rw and w/c  Receives Help From: Personal care attendant, ADL Assistance: Needs assistance, Bath: Moderate assistance, Dressing: Moderate assistance, Grooming: Moderate assistance, Feeding: Modified independent , Toileting: Independent,  , Ambulation Assistance: Independent, Transfer Assistance: Independent, Active : No     HOME SUPPORT: Patient lived at assisted living. Occupational Therapy Goals:  Initiated 11/29/2023; Weekly Reassessment 12/06/2023 Cont. all goals  1. Patient will perform self-feeding with [place] within 7 day(s). 2.  Patient will perform grooming with Moderate Assist within 7 day(s). 3.  Patient will perform bathing with Moderate Assist within 7 day(s). 4.  Patient will perform toilet transfers with Maximal Assist  within 7 day(s). 5.  Patient will perform all aspects of toileting with Moderate Assist within 7 day(s). 6.  Patient will participate in upper extremity therapeutic exercise/activities with Minimal Assist for 5 minutes within 7 day(s). 7.  Patient will utilize energy conservation techniques during functional activities with verbal cues within 7 day(s). Occupational Therapy Goals:  Initiated 11/29/2023  1. Patient will perform self-feeding with [place] within 7 day(s). 2.  Patient will perform grooming with Moderate Assist within 7 day(s). 3.  Patient will perform bathing with Moderate Assist within 7 day(s). 4.  Patient will perform toilet transfers with Maximal Assist  within 7 day(s). 5.  Patient will perform all aspects of toileting with Moderate Assist within 7 day(s).   6.  Patient will participate in upper extremity therapeutic exercise/activities with Minimal Assist for 5
Problem: Pain  Goal: Verbalizes/displays adequate comfort level or baseline comfort level  Outcome: Progressing     Problem: Safety - Adult  Goal: Free from fall injury  Outcome: Progressing     Problem: Skin/Tissue Integrity  Goal: Absence of new skin breakdown  Description: 1. Monitor for areas of redness and/or skin breakdown  2. Assess vascular access sites hourly  3. Every 4-6 hours minimum:  Change oxygen saturation probe site  4. Every 4-6 hours:  If on nasal continuous positive airway pressure, respiratory therapy assess nares and determine need for appliance change or resting period.   Outcome: Progressing     Problem: Chronic Conditions and Co-morbidities  Goal: Patient's chronic conditions and co-morbidity symptoms are monitored and maintained or improved  Outcome: Progressing
a lift chair at home) that it makes attempting to sit in a standard chair, even with a cushion, impossible to get out of. Attempted lowering the bed height slightly to match the height of the elevated BSC in the room, but he was not able to get to standing without assist of 2 from that height. At that point, he becomes so fatigued that he has to return to supine. Continue to recommend SNF level rehab when medically ready. PLAN:  Patient continues to benefit from skilled intervention to address the above impairments. Continue treatment per established plan of care. Recommendation for discharge: (in order for the patient to meet his/her long term goals): Therapy up to 5 days/week in Skilled nursing facility    Other factors to consider for discharge: high risk for falls, not safe to be alone, and concern for safely navigating or managing the home environment    IF patient discharges home will need the following DME: continuing to assess with progress       SUBJECTIVE:   Patient stated, \"I really only ever used the wheelchair [facility] [place] [facility] \"    OBJECTIVE DATA SUMMARY:   Critical Behavior:          Functional Mobility Training:  Bed Mobility:  Bed Mobility Training  Rolling: Moderate assistance  Supine to Sit: Minimum assistance; Additional time (HOB slightly elevated)  Sit to Supine: Moderate assistance (for LEs)  Scooting: Maximum assistance;Assist X2  Transfers:  Transfer Training  Sit to Stand: Assist X2;Minimum assistance; Additional time; Adaptive equipment;Maximum assistance (min assist from very elevated surface, but assist of 2 needed for standing from a slightly lower surface)  Stand to Sit: Minimum assistance; Additional time; Adaptive equipment  Balance:  Balance  Sitting: Intact  Sitting - Static: Good (unsupported)  Sitting - Dynamic: Good (unsupported)  Standing: Impaired  Standing - Static: Poor;Constant support (tends to stand with R knee in hyperextension to provide stability,
Critical Behavior:  Orientation  Overall Orientation Status: Within Functional Limits  Cognition  Overall Cognitive Status: Exceptions  Arousal/Alertness: Inconsistent responses to stimuli  Following Commands: Follows one step commands consistently; Follows multistep commands with repitition  Attention Span: Attends with cues to redirect  Safety Judgement: Decreased awareness of need for assistance;Decreased awareness of need for safety  Problem Solving: Assistance required to identify errors made;Assistance required to generate solutions;Assistance required to implement solutions  Insights: Decreased awareness of deficits  Initiation: Requires cues for some  Sequencing: Requires cues for some    Functional Mobility Training:  Bed Mobility:  Bed Mobility Training  Bed Mobility Training: Yes  Interventions: Safety awareness training;Verbal cues; Visual cues  Rolling: Moderate assistance;Assist X1  Supine to Sit: Minimum assistance;Assist X1;Additional time  Sit to Supine: Minimum assistance;Assist X1  Scooting: Maximum assistance (maxA x3 supine in bed)  Transfers:  Transfer Training  Transfer Training: Yes  Interventions: Safety awareness training;Verbal cues  Sit to Stand: Moderate assistance;Assist X2 (bed elevated)  Stand to Sit: Moderate assistance;Assist X2  Balance:  Balance  Sitting: Impaired  Sitting - Static: Fair (occasional)  Sitting - Dynamic: Fair (occasional); Poor (constant support)  Standing: Impaired  Standing - Static: Poor  Standing - Dynamic: Poor     Activity Tolerance:   Poor    After treatment:   Patient left in no apparent distress in bed, Call bell within reach, Bed/ chair alarm activated, Side rails x3, and RN present      COMMUNICATION/EDUCATION:   The patient's plan of care was discussed with: occupational therapist, registered nurse, and     Stephanie Canada PT, DPT  Minutes: 08
made;Assistance required to generate solutions;Assistance required to implement solutions  Insights: Decreased awareness of deficits  Initiation: Requires cues for some  Sequencing: Requires cues for some    Functional Mobility and Transfers for ADLs:  Bed Mobility:  Bed Mobility Training  Rolling: Moderate assistance  Supine to Sit: Minimum assistance; Additional time (HOB slightly elevated)  Sit to Supine: Moderate assistance (for LEs)  Scooting: Maximum assistance;Assist X2     Transfers:   Transfer Training  Sit to Stand: Assist X2;Minimum assistance; Additional time; Adaptive equipment;Maximum assistance (min assist from very elevated surface, but assist of 2 needed for standing from a slightly lower surface)  Stand to Sit: Minimum assistance; Additional time; Adaptive equipment           Balance:  Standing: Impaired  Balance  Sitting: Intact  Sitting - Static: Good (unsupported)  Sitting - Dynamic: Good (unsupported)  Standing: Impaired  Standing - Static: Poor;Constant support (tends to stand with R knee in hyperextension to provide stability, reports R knee pain and a slight partial buckle on second standing trial)  Standing - Dynamic: Poor;Constant support      ADL Intervention:    Lower Extremity Dressing: . - Protective Undergarment : Total Assistance and Standing    Pain Rating:  Pt stated increased pain in LLE, stated muscle spasm, unable to quantify  Pain Intervention(s):   rest and repositioning    Activity Tolerance:   Fair   Please refer to the flowsheet for vital signs taken during this treatment. After treatment:   Patient left in no apparent distress in bed, Call bell within reach, Bed/ chair alarm activated, and Side rails x3    COMMUNICATION/EDUCATION:   The patient's plan of care was discussed with: physical therapist and registered nurse    Patient Education  Education Given To: Patient  Education Provided: Plan of Care;Energy Conservation;Transfer Training;Precautions; Equipment; Fall
Barthel Index:    Barthel Index Scale  Feeding: Independent, Able to apply any necessary device. Feeds in reasonable time  Bathing: Cannot perform activity  Grooming: Cannot perform activity  Dressing: Cannot perform activity  Bowel Control: Occasional accidents or needs help with device  Bladder Control: Occasional accidents or needs help with device  Toilet Transfers: Cannot perform activity  Chair/Bed Trannsfers: Cannot perform activity  Ambulation: Cannot perform activity  Stairs: Cannot perform activity  Total Barthel Index Score: 20       The Barthel ADL Index: Guidelines  1. The index should be used as a record of what a patient does, not as a record of what a patient could do. 2. The main aim is to establish degree of independence from any help, physical or verbal, however minor and for whatever reason. 3. The need for supervision renders the patient not independent. 4. A patient's performance should be established using the best available evidence. Asking the patient, friends/relatives and nurses are the usual sources, but direct observation and common sense are also important. However direct testing is not needed. 5. Usually the patient's performance over the preceding 24-48 hours is important, but occasionally longer periods will be relevant. 6. Middle categories imply that the patient supplies over 50 per cent of the effort. 7. Use of aids to be independent is allowed. Score Interpretation (from [address] [place] [address])    Independent   60-79 Minimally independent   40-59 Partially dependent   20-39 Very dependent   <20 Totally dependent     -Sparkle Alicea, Barthel, D.W. (1965). Functional evaluation: the Barthel Index. [address] ([address] (1997).  The Barthel activities of